# Patient Record
Sex: FEMALE | Race: WHITE | NOT HISPANIC OR LATINO | Employment: OTHER | URBAN - METROPOLITAN AREA
[De-identification: names, ages, dates, MRNs, and addresses within clinical notes are randomized per-mention and may not be internally consistent; named-entity substitution may affect disease eponyms.]

---

## 2017-01-11 ENCOUNTER — AMBULATORY - HEALTHEAST (OUTPATIENT)
Dept: CARDIOLOGY | Facility: CLINIC | Age: 82
End: 2017-01-11

## 2017-01-13 ENCOUNTER — OFFICE VISIT - HEALTHEAST (OUTPATIENT)
Dept: CARDIOLOGY | Facility: CLINIC | Age: 82
End: 2017-01-13

## 2017-01-13 DIAGNOSIS — I25.83 CORONARY ARTERY DISEASE DUE TO LIPID RICH PLAQUE: ICD-10-CM

## 2017-01-13 DIAGNOSIS — I25.10 CORONARY ARTERY DISEASE DUE TO LIPID RICH PLAQUE: ICD-10-CM

## 2017-01-13 DIAGNOSIS — E78.00 HYPERCHOLESTEROLEMIA: ICD-10-CM

## 2017-01-13 ASSESSMENT — MIFFLIN-ST. JEOR: SCORE: 1019.21

## 2017-02-01 ENCOUNTER — RECORDS - HEALTHEAST (OUTPATIENT)
Dept: LAB | Facility: CLINIC | Age: 82
End: 2017-02-01

## 2017-02-01 LAB
CHOLEST SERPL-MCNC: 190 MG/DL
FASTING STATUS PATIENT QL REPORTED: NO
HDLC SERPL-MCNC: 62 MG/DL
LDLC SERPL CALC-MCNC: 114 MG/DL
TRIGL SERPL-MCNC: 68 MG/DL

## 2017-02-22 ENCOUNTER — OFFICE VISIT - HEALTHEAST (OUTPATIENT)
Dept: CARDIOLOGY | Facility: CLINIC | Age: 82
End: 2017-02-22

## 2017-02-22 DIAGNOSIS — I10 ESSENTIAL HYPERTENSION WITH GOAL BLOOD PRESSURE LESS THAN 140/90: ICD-10-CM

## 2017-02-22 DIAGNOSIS — I25.10 CAD (CORONARY ARTERY DISEASE): ICD-10-CM

## 2017-02-22 ASSESSMENT — MIFFLIN-ST. JEOR: SCORE: 1010.82

## 2017-02-27 ENCOUNTER — RECORDS - HEALTHEAST (OUTPATIENT)
Dept: ADMINISTRATIVE | Facility: OTHER | Age: 82
End: 2017-02-27

## 2017-02-27 ENCOUNTER — OFFICE VISIT - HEALTHEAST (OUTPATIENT)
Dept: VASCULAR SURGERY | Facility: CLINIC | Age: 82
End: 2017-02-27

## 2017-02-27 ENCOUNTER — RECORDS - HEALTHEAST (OUTPATIENT)
Dept: VASCULAR ULTRASOUND | Facility: CLINIC | Age: 82
End: 2017-02-27

## 2017-02-27 DIAGNOSIS — I65.23 CAROTID STENOSIS, BILATERAL: ICD-10-CM

## 2017-02-27 DIAGNOSIS — I65.29 OCCLUSION AND STENOSIS OF UNSPECIFIED CAROTID ARTERY: ICD-10-CM

## 2017-02-27 DIAGNOSIS — Z87.891 HISTORY OF SMOKING: ICD-10-CM

## 2017-02-27 DIAGNOSIS — I10 HTN (HYPERTENSION): ICD-10-CM

## 2017-02-27 DIAGNOSIS — E78.00 HYPERCHOLESTEROLEMIA: ICD-10-CM

## 2017-02-27 ASSESSMENT — MIFFLIN-ST. JEOR: SCORE: 1006.68

## 2017-05-30 ENCOUNTER — COMMUNICATION - HEALTHEAST (OUTPATIENT)
Dept: ADMINISTRATIVE | Facility: CLINIC | Age: 82
End: 2017-05-30

## 2017-08-25 ENCOUNTER — RECORDS - HEALTHEAST (OUTPATIENT)
Dept: ADMINISTRATIVE | Facility: OTHER | Age: 82
End: 2017-08-25

## 2017-08-25 ENCOUNTER — AMBULATORY - HEALTHEAST (OUTPATIENT)
Dept: CARDIOLOGY | Facility: CLINIC | Age: 82
End: 2017-08-25

## 2017-08-28 ENCOUNTER — OFFICE VISIT - HEALTHEAST (OUTPATIENT)
Dept: CARDIOLOGY | Facility: CLINIC | Age: 82
End: 2017-08-28

## 2017-08-28 DIAGNOSIS — I10 ESSENTIAL HYPERTENSION WITH GOAL BLOOD PRESSURE LESS THAN 140/90: ICD-10-CM

## 2017-08-28 DIAGNOSIS — I25.110 CORONARY ARTERY DISEASE INVOLVING NATIVE CORONARY ARTERY OF NATIVE HEART WITH UNSTABLE ANGINA PECTORIS (H): ICD-10-CM

## 2017-08-28 DIAGNOSIS — N18.3 CKD (CHRONIC KIDNEY DISEASE), STAGE 3 (MODERATE): ICD-10-CM

## 2017-08-28 DIAGNOSIS — E78.00 HYPERCHOLESTEROLEMIA: ICD-10-CM

## 2017-08-28 ASSESSMENT — MIFFLIN-ST. JEOR: SCORE: 988.14

## 2017-11-16 ENCOUNTER — COMMUNICATION - HEALTHEAST (OUTPATIENT)
Dept: SURGERY | Facility: CLINIC | Age: 82
End: 2017-11-16

## 2017-11-17 ENCOUNTER — OFFICE VISIT - HEALTHEAST (OUTPATIENT)
Dept: FAMILY MEDICINE | Facility: CLINIC | Age: 82
End: 2017-11-17

## 2017-11-17 DIAGNOSIS — Z87.898 HISTORY OF EPISTAXIS: ICD-10-CM

## 2017-11-20 ENCOUNTER — OFFICE VISIT - HEALTHEAST (OUTPATIENT)
Dept: FAMILY MEDICINE | Facility: CLINIC | Age: 82
End: 2017-11-20

## 2017-11-20 DIAGNOSIS — R04.0 LEFT-SIDED EPISTAXIS: ICD-10-CM

## 2017-11-20 DIAGNOSIS — K52.1 ANTIBIOTIC-ASSOCIATED DIARRHEA: ICD-10-CM

## 2017-11-20 DIAGNOSIS — T36.95XA ANTIBIOTIC-ASSOCIATED DIARRHEA: ICD-10-CM

## 2017-11-22 ENCOUNTER — OFFICE VISIT - HEALTHEAST (OUTPATIENT)
Dept: OTOLARYNGOLOGY | Facility: CLINIC | Age: 82
End: 2017-11-22

## 2017-11-22 DIAGNOSIS — R04.0 ANTERIOR EPISTAXIS: ICD-10-CM

## 2017-11-22 DIAGNOSIS — R04.0 EPISTAXIS: ICD-10-CM

## 2017-12-07 ENCOUNTER — COMMUNICATION - HEALTHEAST (OUTPATIENT)
Dept: CARDIOLOGY | Facility: CLINIC | Age: 82
End: 2017-12-07

## 2017-12-07 DIAGNOSIS — I25.83 CORONARY ARTERY DISEASE DUE TO LIPID RICH PLAQUE: ICD-10-CM

## 2017-12-07 DIAGNOSIS — I21.4 NSTEMI (NON-ST ELEVATED MYOCARDIAL INFARCTION) (H): ICD-10-CM

## 2017-12-07 DIAGNOSIS — I25.10 CORONARY ARTERY DISEASE DUE TO LIPID RICH PLAQUE: ICD-10-CM

## 2017-12-27 ENCOUNTER — COMMUNICATION - HEALTHEAST (OUTPATIENT)
Dept: CARDIOLOGY | Facility: CLINIC | Age: 82
End: 2017-12-27

## 2018-03-19 ENCOUNTER — COMMUNICATION - HEALTHEAST (OUTPATIENT)
Dept: CARDIOLOGY | Facility: CLINIC | Age: 83
End: 2018-03-19

## 2018-04-12 ENCOUNTER — COMMUNICATION - HEALTHEAST (OUTPATIENT)
Dept: CARDIOLOGY | Facility: CLINIC | Age: 83
End: 2018-04-12

## 2018-04-12 DIAGNOSIS — I25.10 CORONARY ARTERY DISEASE DUE TO LIPID RICH PLAQUE: ICD-10-CM

## 2018-04-12 DIAGNOSIS — I25.83 CORONARY ARTERY DISEASE DUE TO LIPID RICH PLAQUE: ICD-10-CM

## 2018-04-12 DIAGNOSIS — I21.4 NSTEMI (NON-ST ELEVATED MYOCARDIAL INFARCTION) (H): ICD-10-CM

## 2018-06-20 ENCOUNTER — RECORDS - HEALTHEAST (OUTPATIENT)
Dept: LAB | Facility: CLINIC | Age: 83
End: 2018-06-20

## 2018-06-20 LAB
ALBUMIN SERPL-MCNC: 3.5 G/DL (ref 3.5–5)
ALP SERPL-CCNC: 63 U/L (ref 45–120)
ALT SERPL W P-5'-P-CCNC: 11 U/L (ref 0–45)
ANION GAP SERPL CALCULATED.3IONS-SCNC: 6 MMOL/L (ref 5–18)
AST SERPL W P-5'-P-CCNC: 20 U/L (ref 0–40)
BILIRUB SERPL-MCNC: 0.5 MG/DL (ref 0–1)
BUN SERPL-MCNC: 19 MG/DL (ref 8–28)
CALCIUM SERPL-MCNC: 8.6 MG/DL (ref 8.5–10.5)
CHLORIDE BLD-SCNC: 111 MMOL/L (ref 98–107)
CHOLEST SERPL-MCNC: 236 MG/DL
CO2 SERPL-SCNC: 22 MMOL/L (ref 22–31)
CREAT SERPL-MCNC: 1.14 MG/DL (ref 0.6–1.1)
ERYTHROCYTE [DISTWIDTH] IN BLOOD BY AUTOMATED COUNT: 18 % (ref 11–14.5)
FASTING STATUS PATIENT QL REPORTED: ABNORMAL
GFR SERPL CREATININE-BSD FRML MDRD: 46 ML/MIN/1.73M2
GLUCOSE BLD-MCNC: 99 MG/DL (ref 70–125)
HCT VFR BLD AUTO: 34.2 % (ref 35–47)
HDLC SERPL-MCNC: 65 MG/DL
HGB BLD-MCNC: 11.1 G/DL (ref 12–16)
LDLC SERPL CALC-MCNC: 159 MG/DL
MCH RBC QN AUTO: 29.8 PG (ref 27–34)
MCHC RBC AUTO-ENTMCNC: 32.5 G/DL (ref 32–36)
MCV RBC AUTO: 92 FL (ref 80–100)
PLATELET # BLD AUTO: 248 THOU/UL (ref 140–440)
PMV BLD AUTO: 10.4 FL (ref 8.5–12.5)
POTASSIUM BLD-SCNC: 4.2 MMOL/L (ref 3.5–5)
PROT SERPL-MCNC: 6.2 G/DL (ref 6–8)
RBC # BLD AUTO: 3.72 MILL/UL (ref 3.8–5.4)
SODIUM SERPL-SCNC: 139 MMOL/L (ref 136–145)
TRIGL SERPL-MCNC: 60 MG/DL
WBC: 8 THOU/UL (ref 4–11)

## 2018-06-22 ENCOUNTER — HOSPITAL ENCOUNTER (OUTPATIENT)
Dept: MAMMOGRAPHY | Facility: CLINIC | Age: 83
Discharge: HOME | End: 2018-06-22

## 2018-06-22 DIAGNOSIS — Z12.31 VISIT FOR SCREENING MAMMOGRAM: ICD-10-CM

## 2018-08-14 ENCOUNTER — RECORDS - HEALTHEAST (OUTPATIENT)
Dept: ADMINISTRATIVE | Facility: OTHER | Age: 83
End: 2018-08-14

## 2018-08-14 ENCOUNTER — AMBULATORY - HEALTHEAST (OUTPATIENT)
Dept: CARDIOLOGY | Facility: CLINIC | Age: 83
End: 2018-08-14

## 2018-08-17 ENCOUNTER — OFFICE VISIT - HEALTHEAST (OUTPATIENT)
Dept: CARDIOLOGY | Facility: CLINIC | Age: 83
End: 2018-08-17

## 2018-08-17 DIAGNOSIS — I35.0 NONRHEUMATIC AORTIC VALVE STENOSIS: ICD-10-CM

## 2018-08-17 DIAGNOSIS — I10 ESSENTIAL HYPERTENSION WITH GOAL BLOOD PRESSURE LESS THAN 140/90: ICD-10-CM

## 2018-08-17 DIAGNOSIS — E78.00 HYPERCHOLESTEROLEMIA: ICD-10-CM

## 2018-08-17 DIAGNOSIS — I25.110 CORONARY ARTERY DISEASE INVOLVING NATIVE CORONARY ARTERY OF NATIVE HEART WITH UNSTABLE ANGINA PECTORIS (H): ICD-10-CM

## 2018-08-17 ASSESSMENT — MIFFLIN-ST. JEOR: SCORE: 980.66

## 2018-08-28 ENCOUNTER — HOSPITAL ENCOUNTER (OUTPATIENT)
Dept: CARDIOLOGY | Facility: HOSPITAL | Age: 83
Discharge: HOME | End: 2018-08-28
Attending: INTERNAL MEDICINE

## 2018-08-28 DIAGNOSIS — I25.110 CORONARY ARTERY DISEASE INVOLVING NATIVE CORONARY ARTERY OF NATIVE HEART WITH UNSTABLE ANGINA PECTORIS (H): ICD-10-CM

## 2018-08-28 DIAGNOSIS — I35.0 NONRHEUMATIC AORTIC VALVE STENOSIS: ICD-10-CM

## 2018-08-29 LAB
AORTIC ROOT: 2.97 CM
AORTIC VALVE MEAN VELOCITY: 200 CM/S
ASCENDING AORTA: 3.45 CM
AV DIMENSIONLESS INDEX VTI: 0.4
AV MEAN GRADIENT: 17.6 MMHG
AV PEAK GRADIENT: 28.9 MMHG
AV REGURGITATION PRESSURE HALF TIME: 614 MS
AV VALVE AREA: 1 CM2
AV VELOCITY RATIO: 0.4
BSA FOR ECHO PROCEDURE: 1.59 M2
DOP CALC AO PEAK VEL: 269 CM/S
DOP CALC AO VTI: 73.7 CM
DOP CALC LVOT AREA: 2.83 CM2
DOP CALC LVOT DIAMETER: 1.9 CM
DOP CALC LVOT PEAK VEL: 97.2 CM/S
DOP CALC LVOT STROKE VOLUME: 73.1 CM3
DOP CALCLVOT PEAK VEL VTI: 25.8 CM
EJECTION FRACTION: 63 % (ref 55–75)
FRACTIONAL SHORTENING: 32.4 % (ref 28–44)
INTERVENTRICULAR SEPTUM IN END DIASTOLE: 0.93 CM (ref 0.6–0.9)
IVS/PW RATIO: 0.8
LA AREA 1: 15 CM2
LA AREA 2: 15.1 CM2
LEFT ATRIUM LENGTH: 5.4 CM
LEFT ATRIUM SIZE: 2.52 CM
LEFT ATRIUM VOLUME INDEX: 22.4 ML/M2
LEFT ATRIUM VOLUME: 35.7 ML
LEFT VENTRICLE CARDIAC INDEX: 2.6 L/MIN/M2
LEFT VENTRICLE CARDIAC OUTPUT: 4.2 L/MIN
LEFT VENTRICLE DIASTOLIC VOLUME INDEX: 34 CM3/M2 (ref 34–74)
LEFT VENTRICLE DIASTOLIC VOLUME: 54 CM3 (ref 46–106)
LEFT VENTRICLE HEART RATE: 57 BPM
LEFT VENTRICLE MASS INDEX: 65.2 G/M2
LEFT VENTRICLE SYSTOLIC VOLUME INDEX: 12.6 CM3/M2 (ref 11–31)
LEFT VENTRICLE SYSTOLIC VOLUME: 20 CM3 (ref 14–42)
LEFT VENTRICULAR INTERNAL DIMENSION IN DIASTOLE: 3.39 CM (ref 3.8–5.2)
LEFT VENTRICULAR INTERNAL DIMENSION IN SYSTOLE: 2.29 CM (ref 2.2–3.5)
LEFT VENTRICULAR MASS: 103.7 G
LEFT VENTRICULAR OUTFLOW TRACT MEAN GRADIENT: 2.13 MMHG
LEFT VENTRICULAR OUTFLOW TRACT MEAN VELOCITY: 69.1 CM/S
LEFT VENTRICULAR OUTFLOW TRACT PEAK GRADIENT: 3.75 MMHG
LEFT VENTRICULAR POSTERIOR WALL IN END DIASTOLE: 1.14 CM (ref 0.6–0.9)
LV STROKE VOLUME INDEX: 46 ML/M2
MITRAL VALVE E/A RATIO: 1.1
MV AVERAGE E/E' RATIO: 17.7 CM/S
MV DECELERATION TIME: 238 S
MV E'TISSUE VEL-LAT: 6.7 CM/S
MV E'TISSUE VEL-MED: 6.07 CM/S
MV LATERAL E/E' RATIO: 16.9
MV MEDIAL E/E' RATIO: 18.6
MV PEAK A VELOCITY: 104 CM/S
MV PEAK E VELOCITY: 113 CM/S
TRICUSPID REGURGITATION PEAK PRESSURE GRADIENT: 20.3 MMHG
TRICUSPID VALVE ANULAR PLANE SYSTOLIC EXCURSION: 2.3 CM
TRICUSPID VALVE PEAK REGURGITANT VELOCITY: 225 CM/S

## 2018-09-27 ENCOUNTER — RECORDS - HEALTHEAST (OUTPATIENT)
Dept: LAB | Facility: CLINIC | Age: 83
End: 2018-09-27

## 2018-09-27 LAB
ALBUMIN SERPL-MCNC: 3.6 G/DL (ref 3.5–5)
ALP SERPL-CCNC: 63 U/L (ref 45–120)
ALT SERPL W P-5'-P-CCNC: 17 U/L (ref 0–45)
ANION GAP SERPL CALCULATED.3IONS-SCNC: 7 MMOL/L (ref 5–18)
AST SERPL W P-5'-P-CCNC: 24 U/L (ref 0–40)
BILIRUB SERPL-MCNC: 0.6 MG/DL (ref 0–1)
BUN SERPL-MCNC: 23 MG/DL (ref 8–28)
CALCIUM SERPL-MCNC: 9.3 MG/DL (ref 8.5–10.5)
CHLORIDE BLD-SCNC: 110 MMOL/L (ref 98–107)
CO2 SERPL-SCNC: 24 MMOL/L (ref 22–31)
CREAT SERPL-MCNC: 1.02 MG/DL (ref 0.6–1.1)
GFR SERPL CREATININE-BSD FRML MDRD: 52 ML/MIN/1.73M2
GLUCOSE BLD-MCNC: 86 MG/DL (ref 70–125)
POTASSIUM BLD-SCNC: 4.2 MMOL/L (ref 3.5–5)
PROT SERPL-MCNC: 6.3 G/DL (ref 6–8)
SODIUM SERPL-SCNC: 141 MMOL/L (ref 136–145)

## 2018-10-07 ENCOUNTER — COMMUNICATION - HEALTHEAST (OUTPATIENT)
Dept: CARDIOLOGY | Facility: CLINIC | Age: 83
End: 2018-10-07

## 2018-10-07 DIAGNOSIS — I25.83 CORONARY ARTERY DISEASE DUE TO LIPID RICH PLAQUE: ICD-10-CM

## 2018-10-07 DIAGNOSIS — I25.10 CORONARY ARTERY DISEASE DUE TO LIPID RICH PLAQUE: ICD-10-CM

## 2018-10-25 ENCOUNTER — RECORDS - HEALTHEAST (OUTPATIENT)
Dept: LAB | Facility: CLINIC | Age: 83
End: 2018-10-25

## 2018-10-25 LAB
ALBUMIN SERPL-MCNC: 3.7 G/DL (ref 3.5–5)
ALP SERPL-CCNC: 67 U/L (ref 45–120)
ALT SERPL W P-5'-P-CCNC: 14 U/L (ref 0–45)
ANION GAP SERPL CALCULATED.3IONS-SCNC: 12 MMOL/L (ref 5–18)
AST SERPL W P-5'-P-CCNC: 22 U/L (ref 0–40)
BILIRUB SERPL-MCNC: 0.5 MG/DL (ref 0–1)
BUN SERPL-MCNC: 25 MG/DL (ref 8–28)
CALCIUM SERPL-MCNC: 9.4 MG/DL (ref 8.5–10.5)
CHLORIDE BLD-SCNC: 108 MMOL/L (ref 98–107)
CO2 SERPL-SCNC: 20 MMOL/L (ref 22–31)
CREAT SERPL-MCNC: 1.08 MG/DL (ref 0.6–1.1)
GFR SERPL CREATININE-BSD FRML MDRD: 48 ML/MIN/1.73M2
GLUCOSE BLD-MCNC: 83 MG/DL (ref 70–125)
POTASSIUM BLD-SCNC: 4.4 MMOL/L (ref 3.5–5)
PROT SERPL-MCNC: 6.3 G/DL (ref 6–8)
SODIUM SERPL-SCNC: 140 MMOL/L (ref 136–145)

## 2018-11-16 ENCOUNTER — COMMUNICATION - HEALTHEAST (OUTPATIENT)
Dept: CARDIOLOGY | Facility: CLINIC | Age: 83
End: 2018-11-16

## 2018-11-16 DIAGNOSIS — I21.4 NSTEMI (NON-ST ELEVATED MYOCARDIAL INFARCTION) (H): ICD-10-CM

## 2018-11-16 DIAGNOSIS — I25.10 CORONARY ARTERY DISEASE DUE TO LIPID RICH PLAQUE: ICD-10-CM

## 2018-11-16 DIAGNOSIS — I25.83 CORONARY ARTERY DISEASE DUE TO LIPID RICH PLAQUE: ICD-10-CM

## 2019-02-22 ENCOUNTER — RECORDS - HEALTHEAST (OUTPATIENT)
Dept: LAB | Facility: CLINIC | Age: 84
End: 2019-02-22

## 2019-02-22 LAB
ALBUMIN SERPL-MCNC: 3.7 G/DL (ref 3.5–5)
ALP SERPL-CCNC: 58 U/L (ref 45–120)
ALT SERPL W P-5'-P-CCNC: 15 U/L (ref 0–45)
ANION GAP SERPL CALCULATED.3IONS-SCNC: 7 MMOL/L (ref 5–18)
AST SERPL W P-5'-P-CCNC: 18 U/L (ref 0–40)
BILIRUB SERPL-MCNC: 0.5 MG/DL (ref 0–1)
BUN SERPL-MCNC: 22 MG/DL (ref 8–28)
CALCIUM SERPL-MCNC: 8.7 MG/DL (ref 8.5–10.5)
CHLORIDE BLD-SCNC: 110 MMOL/L (ref 98–107)
CO2 SERPL-SCNC: 24 MMOL/L (ref 22–31)
CREAT SERPL-MCNC: 1.07 MG/DL (ref 0.6–1.1)
GFR SERPL CREATININE-BSD FRML MDRD: 49 ML/MIN/1.73M2
GLUCOSE BLD-MCNC: 87 MG/DL (ref 70–125)
POTASSIUM BLD-SCNC: 4.1 MMOL/L (ref 3.5–5)
PROT SERPL-MCNC: 6.3 G/DL (ref 6–8)
SODIUM SERPL-SCNC: 141 MMOL/L (ref 136–145)

## 2019-05-18 ENCOUNTER — COMMUNICATION - HEALTHEAST (OUTPATIENT)
Dept: CARDIOLOGY | Facility: CLINIC | Age: 84
End: 2019-05-18

## 2019-05-18 DIAGNOSIS — I21.4 NSTEMI (NON-ST ELEVATED MYOCARDIAL INFARCTION) (H): ICD-10-CM

## 2019-05-18 DIAGNOSIS — I25.83 CORONARY ARTERY DISEASE DUE TO LIPID RICH PLAQUE: ICD-10-CM

## 2019-05-18 DIAGNOSIS — I25.10 CORONARY ARTERY DISEASE DUE TO LIPID RICH PLAQUE: ICD-10-CM

## 2019-05-31 ENCOUNTER — RECORDS - HEALTHEAST (OUTPATIENT)
Dept: LAB | Facility: CLINIC | Age: 84
End: 2019-05-31

## 2019-05-31 ENCOUNTER — RECORDS - HEALTHEAST (OUTPATIENT)
Dept: ADMINISTRATIVE | Facility: OTHER | Age: 84
End: 2019-05-31

## 2019-05-31 LAB
ALBUMIN SERPL-MCNC: 3 G/DL (ref 3.5–5)
ALP SERPL-CCNC: 49 U/L (ref 45–120)
ALT SERPL W P-5'-P-CCNC: 13 U/L (ref 0–45)
ANION GAP SERPL CALCULATED.3IONS-SCNC: 9 MMOL/L (ref 5–18)
AST SERPL W P-5'-P-CCNC: 19 U/L (ref 0–40)
BILIRUB SERPL-MCNC: 0.4 MG/DL (ref 0–1)
BNP SERPL-MCNC: 67 PG/ML (ref 0–167)
BUN SERPL-MCNC: 22 MG/DL (ref 8–28)
CALCIUM SERPL-MCNC: 8.7 MG/DL (ref 8.5–10.5)
CHLORIDE BLD-SCNC: 110 MMOL/L (ref 98–107)
CHOLEST SERPL-MCNC: 238 MG/DL
CO2 SERPL-SCNC: 20 MMOL/L (ref 22–31)
CREAT SERPL-MCNC: 1.11 MG/DL (ref 0.6–1.1)
FASTING STATUS PATIENT QL REPORTED: NO
GFR SERPL CREATININE-BSD FRML MDRD: 47 ML/MIN/1.73M2
GLUCOSE BLD-MCNC: 84 MG/DL (ref 70–125)
HDLC SERPL-MCNC: 67 MG/DL
LDLC SERPL CALC-MCNC: 161 MG/DL
POTASSIUM BLD-SCNC: 4.6 MMOL/L (ref 3.5–5)
PROT SERPL-MCNC: 5.7 G/DL (ref 6–8)
SODIUM SERPL-SCNC: 139 MMOL/L (ref 136–145)
T4 FREE SERPL-MCNC: 1 NG/DL (ref 0.7–1.8)
TRIGL SERPL-MCNC: 51 MG/DL
TSH SERPL DL<=0.005 MIU/L-ACNC: 2.57 UIU/ML (ref 0.3–5)
VIT B12 SERPL-MCNC: 732 PG/ML (ref 213–816)

## 2019-06-01 LAB — T PALLIDUM AB SER QL: NEGATIVE

## 2019-06-03 ENCOUNTER — RECORDS - HEALTHEAST (OUTPATIENT)
Dept: LAB | Facility: CLINIC | Age: 84
End: 2019-06-03

## 2019-06-03 ENCOUNTER — AMBULATORY - HEALTHEAST (OUTPATIENT)
Dept: CARDIOLOGY | Facility: CLINIC | Age: 84
End: 2019-06-03

## 2019-06-03 LAB
25(OH)D3 SERPL-MCNC: 16.5 NG/ML (ref 30–80)
FERRITIN SERPL-MCNC: 215 NG/ML (ref 10–130)
IRON SATN MFR SERPL: 17 % (ref 20–50)
IRON SERPL-MCNC: 40 UG/DL (ref 42–175)
TIBC SERPL-MCNC: 239 UG/DL (ref 313–563)
TRANSFERRIN SERPL-MCNC: 191 MG/DL (ref 212–360)

## 2019-06-12 ENCOUNTER — HOSPITAL ENCOUNTER (OUTPATIENT)
Dept: NUCLEAR MEDICINE | Facility: HOSPITAL | Age: 84
Discharge: HOME | End: 2019-06-12

## 2019-06-12 ENCOUNTER — HOSPITAL ENCOUNTER (OUTPATIENT)
Dept: CARDIOLOGY | Facility: HOSPITAL | Age: 84
Discharge: HOME | End: 2019-06-12

## 2019-06-12 DIAGNOSIS — R06.02 SOB (SHORTNESS OF BREATH) ON EXERTION: ICD-10-CM

## 2019-06-12 LAB
CV STRESS CURRENT BP HE: NORMAL
CV STRESS CURRENT HR HE: 73
CV STRESS CURRENT HR HE: 75
CV STRESS CURRENT HR HE: 79
CV STRESS CURRENT HR HE: 91
CV STRESS CURRENT HR HE: 91
CV STRESS CURRENT HR HE: 92
CV STRESS CURRENT HR HE: 93
CV STRESS CURRENT HR HE: 93
CV STRESS CURRENT HR HE: 95
CV STRESS CURRENT HR HE: 96
CV STRESS CURRENT HR HE: 96
CV STRESS CURRENT HR HE: 97
CV STRESS DEVIATION TIME HE: NORMAL
CV STRESS ECHO PERCENT HR HE: NORMAL
CV STRESS EXERCISE STAGE HE: NORMAL
CV STRESS FINAL RESTING BP HE: NORMAL
CV STRESS FINAL RESTING HR HE: 91
CV STRESS MAX HR HE: 98
CV STRESS MAX TREADMILL GRADE HE: 0
CV STRESS MAX TREADMILL SPEED HE: 0
CV STRESS PEAK DIA BP HE: NORMAL
CV STRESS PEAK SYS BP HE: NORMAL
CV STRESS PHASE HE: NORMAL
CV STRESS PROTOCOL HE: NORMAL
CV STRESS RESTING PT POSITION HE: NORMAL
CV STRESS ST DEVIATION AMOUNT HE: NORMAL
CV STRESS ST DEVIATION ELEVATION HE: NORMAL
CV STRESS ST EVELATION AMOUNT HE: NORMAL
CV STRESS TEST TYPE HE: NORMAL
CV STRESS TOTAL STAGE TIME MIN 1 HE: NORMAL
NUC STRESS EJECTION FRACTION: 75 %
STRESS ECHO BASELINE BP: NORMAL
STRESS ECHO BASELINE HR: 73
STRESS ECHO CALCULATED PERCENT HR: 72 %
STRESS ECHO LAST STRESS BP: NORMAL
STRESS ECHO LAST STRESS HR: 97

## 2019-07-10 ENCOUNTER — SURGERY - HEALTHEAST (OUTPATIENT)
Dept: GASTROENTEROLOGY | Facility: HOSPITAL | Age: 84
End: 2019-07-10

## 2019-07-10 ENCOUNTER — AMBULATORY - HEALTHEAST (OUTPATIENT)
Dept: CARDIOLOGY | Facility: CLINIC | Age: 84
End: 2019-07-10

## 2019-07-10 ASSESSMENT — MIFFLIN-ST. JEOR: SCORE: 1003.34

## 2020-02-01 ENCOUNTER — COMMUNICATION - HEALTHEAST (OUTPATIENT)
Dept: CARDIOLOGY | Facility: CLINIC | Age: 85
End: 2020-02-01

## 2020-02-01 DIAGNOSIS — I25.83 CORONARY ARTERY DISEASE DUE TO LIPID RICH PLAQUE: ICD-10-CM

## 2020-02-01 DIAGNOSIS — I25.10 CORONARY ARTERY DISEASE DUE TO LIPID RICH PLAQUE: ICD-10-CM

## 2020-03-04 ENCOUNTER — RECORDS - HEALTHEAST (OUTPATIENT)
Dept: LAB | Facility: CLINIC | Age: 85
End: 2020-03-04

## 2020-03-04 LAB
ALBUMIN SERPL-MCNC: 3.7 G/DL (ref 3.5–5)
ALP SERPL-CCNC: 65 U/L (ref 45–120)
ALT SERPL W P-5'-P-CCNC: 23 U/L (ref 0–45)
ANION GAP SERPL CALCULATED.3IONS-SCNC: 10 MMOL/L (ref 5–18)
AST SERPL W P-5'-P-CCNC: 33 U/L (ref 0–40)
BILIRUB SERPL-MCNC: 0.4 MG/DL (ref 0–1)
BUN SERPL-MCNC: 26 MG/DL (ref 8–28)
CALCIUM SERPL-MCNC: 9.1 MG/DL (ref 8.5–10.5)
CHLORIDE BLD-SCNC: 105 MMOL/L (ref 98–107)
CO2 SERPL-SCNC: 26 MMOL/L (ref 22–31)
CREAT SERPL-MCNC: 1.4 MG/DL (ref 0.6–1.1)
FERRITIN SERPL-MCNC: 64 NG/ML (ref 10–130)
GFR SERPL CREATININE-BSD FRML MDRD: 36 ML/MIN/1.73M2
GLUCOSE BLD-MCNC: 77 MG/DL (ref 70–125)
MAGNESIUM SERPL-MCNC: 2.1 MG/DL (ref 1.8–2.6)
POTASSIUM BLD-SCNC: 4.1 MMOL/L (ref 3.5–5)
PROT SERPL-MCNC: 6.5 G/DL (ref 6–8)
SODIUM SERPL-SCNC: 141 MMOL/L (ref 136–145)
T4 FREE SERPL-MCNC: 0.9 NG/DL (ref 0.7–1.8)
TSH SERPL DL<=0.005 MIU/L-ACNC: 3.9 UIU/ML (ref 0.3–5)
VIT B12 SERPL-MCNC: 751 PG/ML (ref 213–816)

## 2020-03-05 LAB — 25(OH)D3 SERPL-MCNC: 45.7 NG/ML (ref 30–80)

## 2020-03-18 ENCOUNTER — RECORDS - HEALTHEAST (OUTPATIENT)
Dept: LAB | Facility: CLINIC | Age: 85
End: 2020-03-18

## 2020-03-19 LAB — BACTERIA SPEC CULT: NO GROWTH

## 2020-03-23 ENCOUNTER — RECORDS - HEALTHEAST (OUTPATIENT)
Dept: LAB | Facility: CLINIC | Age: 85
End: 2020-03-23

## 2020-03-23 LAB — BNP SERPL-MCNC: 58 PG/ML (ref 0–167)

## 2020-04-16 ENCOUNTER — SURGERY - HEALTHEAST (OUTPATIENT)
Dept: CARDIOLOGY | Facility: CLINIC | Age: 85
End: 2020-04-16

## 2020-04-16 ASSESSMENT — MIFFLIN-ST. JEOR
SCORE: 1050.05
SCORE: 1050.05

## 2020-04-17 ENCOUNTER — SURGERY - HEALTHEAST (OUTPATIENT)
Dept: CARDIOLOGY | Facility: CLINIC | Age: 85
End: 2020-04-17

## 2020-04-17 ASSESSMENT — MIFFLIN-ST. JEOR
SCORE: 980.65
SCORE: 980.65

## 2020-04-18 ASSESSMENT — MIFFLIN-ST. JEOR
SCORE: 961.15
SCORE: 1016.03
SCORE: 1016.03
SCORE: 961.15

## 2020-04-19 ASSESSMENT — MIFFLIN-ST. JEOR
SCORE: 1010.59
SCORE: 1010.59

## 2020-04-27 ENCOUNTER — AMBULATORY - HEALTHEAST (OUTPATIENT)
Dept: CARDIOLOGY | Facility: CLINIC | Age: 85
End: 2020-04-27

## 2020-04-27 ENCOUNTER — RECORDS - HEALTHEAST (OUTPATIENT)
Dept: ADMINISTRATIVE | Facility: OTHER | Age: 85
End: 2020-04-27

## 2020-04-28 ENCOUNTER — OFFICE VISIT - HEALTHEAST (OUTPATIENT)
Dept: CARDIOLOGY | Facility: CLINIC | Age: 85
End: 2020-04-28

## 2020-04-28 DIAGNOSIS — I25.10 CORONARY ARTERY DISEASE INVOLVING NATIVE CORONARY ARTERY OF NATIVE HEART, ANGINA PRESENCE UNSPECIFIED: ICD-10-CM

## 2020-04-28 DIAGNOSIS — I10 BENIGN ESSENTIAL HYPERTENSION: ICD-10-CM

## 2020-04-28 DIAGNOSIS — I35.0 SEVERE CALCIFIC AORTIC VALVE STENOSIS: ICD-10-CM

## 2020-04-30 ENCOUNTER — COMMUNICATION - HEALTHEAST (OUTPATIENT)
Dept: CARDIOLOGY | Facility: CLINIC | Age: 85
End: 2020-04-30

## 2020-04-30 ENCOUNTER — OFFICE VISIT - HEALTHEAST (OUTPATIENT)
Dept: CARDIOLOGY | Facility: CLINIC | Age: 85
End: 2020-04-30

## 2020-04-30 DIAGNOSIS — I35.0 NONRHEUMATIC AORTIC VALVE STENOSIS: ICD-10-CM

## 2020-05-05 ENCOUNTER — COMMUNICATION - HEALTHEAST (OUTPATIENT)
Dept: CARDIOLOGY | Facility: CLINIC | Age: 85
End: 2020-05-05

## 2020-05-05 DIAGNOSIS — I35.0 SEVERE AORTIC STENOSIS: ICD-10-CM

## 2020-05-11 ENCOUNTER — COMMUNICATION - HEALTHEAST (OUTPATIENT)
Dept: CARDIOLOGY | Facility: CLINIC | Age: 85
End: 2020-05-11

## 2020-05-12 ENCOUNTER — OFFICE VISIT - HEALTHEAST (OUTPATIENT)
Dept: CARDIOLOGY | Facility: CLINIC | Age: 85
End: 2020-05-12

## 2020-05-12 DIAGNOSIS — I35.0 NONRHEUMATIC AORTIC VALVE STENOSIS: ICD-10-CM

## 2020-05-19 ENCOUNTER — COMMUNICATION - HEALTHEAST (OUTPATIENT)
Dept: CARDIOLOGY | Facility: CLINIC | Age: 85
End: 2020-05-19

## 2020-05-19 ENCOUNTER — RECORDS - HEALTHEAST (OUTPATIENT)
Dept: ADMINISTRATIVE | Facility: OTHER | Age: 85
End: 2020-05-19

## 2020-05-19 ENCOUNTER — AMBULATORY - HEALTHEAST (OUTPATIENT)
Dept: CARDIOLOGY | Facility: CLINIC | Age: 85
End: 2020-05-19

## 2020-05-19 DIAGNOSIS — Z11.59 ENCOUNTER FOR SCREENING FOR OTHER VIRAL DISEASES: ICD-10-CM

## 2020-05-19 DIAGNOSIS — I35.0 SEVERE AORTIC STENOSIS: ICD-10-CM

## 2020-05-20 ENCOUNTER — COMMUNICATION - HEALTHEAST (OUTPATIENT)
Dept: CARDIOLOGY | Facility: CLINIC | Age: 85
End: 2020-05-20

## 2020-05-21 ENCOUNTER — SURGERY - HEALTHEAST (OUTPATIENT)
Dept: CARDIOLOGY | Facility: CLINIC | Age: 85
End: 2020-05-21

## 2020-05-21 DIAGNOSIS — I35.0 SEVERE CALCIFIC AORTIC VALVE STENOSIS: ICD-10-CM

## 2020-05-22 ENCOUNTER — ANESTHESIA - HEALTHEAST (OUTPATIENT)
Dept: CARDIOLOGY | Facility: CLINIC | Age: 85
End: 2020-05-22

## 2020-05-22 ENCOUNTER — OFFICE VISIT - HEALTHEAST (OUTPATIENT)
Dept: CARDIOLOGY | Facility: CLINIC | Age: 85
End: 2020-05-22

## 2020-05-22 ENCOUNTER — COMMUNICATION - HEALTHEAST (OUTPATIENT)
Dept: CARDIOLOGY | Facility: CLINIC | Age: 85
End: 2020-05-22

## 2020-05-22 DIAGNOSIS — I35.0 SEVERE CALCIFIC AORTIC VALVE STENOSIS: ICD-10-CM

## 2020-05-22 DIAGNOSIS — I10 BENIGN ESSENTIAL HYPERTENSION: ICD-10-CM

## 2020-05-22 DIAGNOSIS — I25.110 CORONARY ARTERY DISEASE INVOLVING NATIVE CORONARY ARTERY OF NATIVE HEART WITH UNSTABLE ANGINA PECTORIS (H): ICD-10-CM

## 2020-05-23 ENCOUNTER — AMBULATORY - HEALTHEAST (OUTPATIENT)
Dept: FAMILY MEDICINE | Facility: CLINIC | Age: 85
End: 2020-05-23

## 2020-05-23 DIAGNOSIS — Z11.59 ENCOUNTER FOR SCREENING FOR OTHER VIRAL DISEASES: ICD-10-CM

## 2020-05-26 ENCOUNTER — COMMUNICATION - HEALTHEAST (OUTPATIENT)
Dept: CARDIOLOGY | Facility: CLINIC | Age: 85
End: 2020-05-26

## 2020-05-27 ENCOUNTER — COMMUNICATION - HEALTHEAST (OUTPATIENT)
Dept: CARDIOLOGY | Facility: CLINIC | Age: 85
End: 2020-05-27

## 2020-05-28 ENCOUNTER — AMBULATORY - HEALTHEAST (OUTPATIENT)
Dept: CARDIOLOGY | Facility: CLINIC | Age: 85
End: 2020-05-28

## 2020-05-28 ENCOUNTER — COMMUNICATION - HEALTHEAST (OUTPATIENT)
Dept: CARDIOLOGY | Facility: CLINIC | Age: 85
End: 2020-05-28

## 2020-05-28 DIAGNOSIS — Z11.59 ENCOUNTER FOR SCREENING FOR OTHER VIRAL DISEASES: ICD-10-CM

## 2020-05-29 ENCOUNTER — COMMUNICATION - HEALTHEAST (OUTPATIENT)
Dept: CARDIOLOGY | Facility: CLINIC | Age: 85
End: 2020-05-29

## 2020-05-30 ENCOUNTER — AMBULATORY - HEALTHEAST (OUTPATIENT)
Dept: FAMILY MEDICINE | Facility: CLINIC | Age: 85
End: 2020-05-30

## 2020-05-30 DIAGNOSIS — Z11.59 ENCOUNTER FOR SCREENING FOR OTHER VIRAL DISEASES: ICD-10-CM

## 2020-06-01 ENCOUNTER — COMMUNICATION - HEALTHEAST (OUTPATIENT)
Dept: CARDIOLOGY | Facility: CLINIC | Age: 85
End: 2020-06-01

## 2020-06-01 ENCOUNTER — RECORDS - HEALTHEAST (OUTPATIENT)
Dept: ADMINISTRATIVE | Facility: OTHER | Age: 85
End: 2020-06-01

## 2020-06-01 ENCOUNTER — AMBULATORY - HEALTHEAST (OUTPATIENT)
Dept: CARDIOLOGY | Facility: CLINIC | Age: 85
End: 2020-06-01

## 2020-06-02 ENCOUNTER — COMMUNICATION - HEALTHEAST (OUTPATIENT)
Dept: CARDIOLOGY | Facility: CLINIC | Age: 85
End: 2020-06-02

## 2020-06-02 ENCOUNTER — SURGERY - HEALTHEAST (OUTPATIENT)
Dept: CARDIOLOGY | Facility: CLINIC | Age: 85
End: 2020-06-02

## 2020-06-02 ASSESSMENT — MIFFLIN-ST. JEOR
SCORE: 1024.42
SCORE: 1012.79

## 2020-06-03 ENCOUNTER — AMBULATORY - HEALTHEAST (OUTPATIENT)
Dept: CARDIOLOGY | Facility: CLINIC | Age: 85
End: 2020-06-03

## 2020-06-03 DIAGNOSIS — Z95.2 S/P TAVR (TRANSCATHETER AORTIC VALVE REPLACEMENT): ICD-10-CM

## 2020-06-03 ASSESSMENT — MIFFLIN-ST. JEOR: SCORE: 1017.61

## 2020-06-04 ASSESSMENT — MIFFLIN-ST. JEOR: SCORE: 1018.52

## 2020-06-05 ENCOUNTER — HOME CARE/HOSPICE - HEALTHEAST (OUTPATIENT)
Dept: HOME HEALTH SERVICES | Facility: HOME HEALTH | Age: 85
End: 2020-06-05

## 2020-06-05 ASSESSMENT — MIFFLIN-ST. JEOR: SCORE: 1006.73

## 2020-06-07 ENCOUNTER — HOME CARE/HOSPICE - HEALTHEAST (OUTPATIENT)
Dept: HOME HEALTH SERVICES | Facility: HOME HEALTH | Age: 85
End: 2020-06-07

## 2020-06-08 ENCOUNTER — HOME CARE/HOSPICE - HEALTHEAST (OUTPATIENT)
Dept: HOME HEALTH SERVICES | Facility: HOME HEALTH | Age: 85
End: 2020-06-08

## 2020-06-09 ENCOUNTER — HOME CARE/HOSPICE - HEALTHEAST (OUTPATIENT)
Dept: HOME HEALTH SERVICES | Facility: HOME HEALTH | Age: 85
End: 2020-06-09

## 2020-06-10 ENCOUNTER — RECORDS - HEALTHEAST (OUTPATIENT)
Dept: ADMINISTRATIVE | Facility: OTHER | Age: 85
End: 2020-06-10

## 2020-06-10 ENCOUNTER — RECORDS - HEALTHEAST (OUTPATIENT)
Dept: LAB | Facility: HOSPITAL | Age: 85
End: 2020-06-10

## 2020-06-10 ENCOUNTER — HOME CARE/HOSPICE - HEALTHEAST (OUTPATIENT)
Dept: HOME HEALTH SERVICES | Facility: HOME HEALTH | Age: 85
End: 2020-06-10

## 2020-06-10 ENCOUNTER — AMBULATORY - HEALTHEAST (OUTPATIENT)
Dept: CARDIOLOGY | Facility: CLINIC | Age: 85
End: 2020-06-10

## 2020-06-10 LAB
ERYTHROCYTE [DISTWIDTH] IN BLOOD BY AUTOMATED COUNT: 18.7 % (ref 11–14.5)
HCT VFR BLD AUTO: 28.4 % (ref 35–47)
HGB BLD-MCNC: 9.3 G/DL (ref 12–16)
MCH RBC QN AUTO: 29.5 PG (ref 27–34)
MCHC RBC AUTO-ENTMCNC: 32.7 G/DL (ref 32–36)
MCV RBC AUTO: 90 FL (ref 80–100)
PLATELET # BLD AUTO: 212 THOU/UL (ref 140–440)
PMV BLD AUTO: 11 FL (ref 8.5–12.5)
RBC # BLD AUTO: 3.15 MILL/UL (ref 3.8–5.4)
WBC: 15.7 THOU/UL (ref 4–11)

## 2020-06-11 ENCOUNTER — OFFICE VISIT - HEALTHEAST (OUTPATIENT)
Dept: CARDIOLOGY | Facility: CLINIC | Age: 85
End: 2020-06-11

## 2020-06-11 ENCOUNTER — HOME CARE/HOSPICE - HEALTHEAST (OUTPATIENT)
Dept: HOME HEALTH SERVICES | Facility: HOME HEALTH | Age: 85
End: 2020-06-11

## 2020-06-11 DIAGNOSIS — I35.0 NONRHEUMATIC AORTIC VALVE STENOSIS: ICD-10-CM

## 2020-06-11 DIAGNOSIS — Z95.2 S/P TAVR (TRANSCATHETER AORTIC VALVE REPLACEMENT): ICD-10-CM

## 2020-06-11 DIAGNOSIS — I25.110 CORONARY ARTERY DISEASE INVOLVING NATIVE CORONARY ARTERY OF NATIVE HEART WITH UNSTABLE ANGINA PECTORIS (H): ICD-10-CM

## 2020-06-11 DIAGNOSIS — I50.31 ACUTE DIASTOLIC CONGESTIVE HEART FAILURE (H): ICD-10-CM

## 2020-06-11 DIAGNOSIS — I10 BENIGN ESSENTIAL HYPERTENSION: ICD-10-CM

## 2020-06-11 RX ORDER — AMLODIPINE BESYLATE 5 MG/1
5 TABLET ORAL DAILY
Qty: 90 TABLET | Refills: 3 | Status: SHIPPED | OUTPATIENT
Start: 2020-06-11 | End: 2021-10-11

## 2020-06-15 ENCOUNTER — HOME CARE/HOSPICE - HEALTHEAST (OUTPATIENT)
Dept: HOME HEALTH SERVICES | Facility: HOME HEALTH | Age: 85
End: 2020-06-15

## 2020-06-16 ENCOUNTER — HOME CARE/HOSPICE - HEALTHEAST (OUTPATIENT)
Dept: HOME HEALTH SERVICES | Facility: HOME HEALTH | Age: 85
End: 2020-06-16

## 2020-06-18 ENCOUNTER — HOME CARE/HOSPICE - HEALTHEAST (OUTPATIENT)
Dept: HOME HEALTH SERVICES | Facility: HOME HEALTH | Age: 85
End: 2020-06-18

## 2020-06-18 ENCOUNTER — RECORDS - HEALTHEAST (OUTPATIENT)
Dept: LAB | Facility: HOSPITAL | Age: 85
End: 2020-06-18

## 2020-06-18 LAB
ERYTHROCYTE [DISTWIDTH] IN BLOOD BY AUTOMATED COUNT: 20 % (ref 11–14.5)
HCT VFR BLD AUTO: 28.9 % (ref 35–47)
HGB BLD-MCNC: 9.2 G/DL (ref 12–16)
MCH RBC QN AUTO: 29.3 PG (ref 27–34)
MCHC RBC AUTO-ENTMCNC: 31.8 G/DL (ref 32–36)
MCV RBC AUTO: 92 FL (ref 80–100)
PLATELET # BLD AUTO: 172 THOU/UL (ref 140–440)
PMV BLD AUTO: 11.1 FL (ref 8.5–12.5)
RBC # BLD AUTO: 3.14 MILL/UL (ref 3.8–5.4)
WBC: 11.5 THOU/UL (ref 4–11)

## 2020-06-23 ENCOUNTER — HOME CARE/HOSPICE - HEALTHEAST (OUTPATIENT)
Dept: HOME HEALTH SERVICES | Facility: HOME HEALTH | Age: 85
End: 2020-06-23

## 2020-06-24 ENCOUNTER — HOME CARE/HOSPICE - HEALTHEAST (OUTPATIENT)
Dept: HOME HEALTH SERVICES | Facility: HOME HEALTH | Age: 85
End: 2020-06-24

## 2020-06-30 ENCOUNTER — RECORDS - HEALTHEAST (OUTPATIENT)
Dept: ADMINISTRATIVE | Facility: OTHER | Age: 85
End: 2020-06-30

## 2020-07-02 ENCOUNTER — RECORDS - HEALTHEAST (OUTPATIENT)
Dept: ADMINISTRATIVE | Facility: OTHER | Age: 85
End: 2020-07-02

## 2020-07-02 ENCOUNTER — HOSPITAL ENCOUNTER (OUTPATIENT)
Dept: CARDIOLOGY | Facility: HOSPITAL | Age: 85
Discharge: HOME | End: 2020-07-02
Attending: INTERNAL MEDICINE

## 2020-07-02 ENCOUNTER — AMBULATORY - HEALTHEAST (OUTPATIENT)
Dept: CARDIOLOGY | Facility: CLINIC | Age: 85
End: 2020-07-02

## 2020-07-02 DIAGNOSIS — I35.0 SEVERE AORTIC STENOSIS: ICD-10-CM

## 2020-07-02 LAB
AORTIC ROOT: 3 CM
AORTIC VALVE MEAN VELOCITY: 161 CM/S
ASCENDING AORTA: 3.6 CM
AV DIMENSIONLESS INDEX VTI: 0.5
AV MEAN GRADIENT: 15 MMHG
AV PEAK GRADIENT: 26.2 MMHG
AV VALVE AREA: 1.3 CM2
AV VELOCITY RATIO: 0.4
BSA FOR ECHO PROCEDURE: 1.63 M2
CV BLOOD PRESSURE: ABNORMAL MMHG
CV ECHO HEIGHT: 61.5 IN
CV ECHO WEIGHT: 135 LBS
DOP CALC AO PEAK VEL: 256 CM/S
DOP CALC AO VTI: 53.2 CM
DOP CALC LVOT AREA: 2.54 CM2
DOP CALC LVOT DIAMETER: 1.8 CM
DOP CALC LVOT PEAK VEL: 107 CM/S
DOP CALC LVOT STROKE VOLUME: 67.7 CM3
DOP CALC MV VTI: 39.2 CM
DOP CALCLVOT PEAK VEL VTI: 26.6 CM
EJECTION FRACTION: 62 % (ref 55–75)
FRACTIONAL SHORTENING: 40.6 % (ref 28–44)
INTERVENTRICULAR SEPTUM IN END DIASTOLE: 1.36 CM (ref 0.6–0.9)
IVS/PW RATIO: 1.2
LA AREA 1: 14.5 CM2
LA AREA 2: 13.9 CM2
LEFT ATRIUM LENGTH: 5.1 CM
LEFT ATRIUM SIZE: 2.3 CM
LEFT ATRIUM TO AORTIC ROOT RATIO: 0.77 NO UNITS
LEFT ATRIUM VOLUME INDEX: 20.6 ML/M2
LEFT ATRIUM VOLUME: 33.6 ML
LEFT VENTRICLE CARDIAC INDEX: 2.6 L/MIN/M2
LEFT VENTRICLE CARDIAC OUTPUT: 4.3 L/MIN
LEFT VENTRICLE DIASTOLIC VOLUME INDEX: 36.7 CM3/M2 (ref 29–61)
LEFT VENTRICLE DIASTOLIC VOLUME: 59.8 CM3 (ref 46–106)
LEFT VENTRICLE HEART RATE: 63 BPM
LEFT VENTRICLE MASS INDEX: 82.3 G/M2
LEFT VENTRICLE SYSTOLIC VOLUME INDEX: 13.8 CM3/M2 (ref 8–24)
LEFT VENTRICLE SYSTOLIC VOLUME: 22.5 CM3 (ref 14–42)
LEFT VENTRICULAR INTERNAL DIMENSION IN DIASTOLE: 3.35 CM (ref 3.8–5.2)
LEFT VENTRICULAR INTERNAL DIMENSION IN SYSTOLE: 1.99 CM (ref 2.2–3.5)
LEFT VENTRICULAR MASS: 134.2 G
LEFT VENTRICULAR OUTFLOW TRACT MEAN GRADIENT: 3 MMHG
LEFT VENTRICULAR OUTFLOW TRACT MEAN VELOCITY: 79.1 CM/S
LEFT VENTRICULAR OUTFLOW TRACT PEAK GRADIENT: 5 MMHG
LEFT VENTRICULAR POSTERIOR WALL IN END DIASTOLE: 1.12 CM (ref 0.6–0.9)
LV STROKE VOLUME INDEX: 41.5 ML/M2
MITRAL VALVE DECELERATION SLOPE: 3810 MM/S2
MITRAL VALVE E/A RATIO: 0.8
MITRAL VALVE MEAN INFLOW VELOCITY: 88.8 CM/S
MITRAL VALVE PEAK VELOCITY: 142 CM/S
MITRAL VALVE PRESSURE HALF-TIME: 90 MS
MV AREA VTI: 1.73 CM2
MV AVERAGE E/E' RATIO: 18.4 CM/S
MV DECELERATION TIME: 250 MS
MV E'TISSUE VEL-LAT: 5.68 CM/S
MV E'TISSUE VEL-MED: 6.16 CM/S
MV LATERAL E/E' RATIO: 19.2
MV MEAN GRADIENT: 4 MMHG
MV MEDIAL E/E' RATIO: 17.7
MV PEAK A VELOCITY: 135 CM/S
MV PEAK E VELOCITY: 109 CM/S
MV PEAK GRADIENT: 8.1 MMHG
MV VALVE AREA BY CONTINUITY EQUATION: 1.7 CM2
MV VALVE AREA PRESSURE 1/2 METHOD: 2.4 CM2
NUC REST DIASTOLIC VOLUME INDEX: 2160 LBS
NUC REST SYSTOLIC VOLUME INDEX: 61.5 IN
TRICUSPID REGURGITATION PEAK PRESSURE GRADIENT: 19.9 MMHG
TRICUSPID VALVE ANULAR PLANE SYSTOLIC EXCURSION: 2.7 CM
TRICUSPID VALVE PEAK REGURGITANT VELOCITY: 223 CM/S

## 2020-07-02 ASSESSMENT — MIFFLIN-ST. JEOR: SCORE: 997.67

## 2020-07-07 ENCOUNTER — OFFICE VISIT - HEALTHEAST (OUTPATIENT)
Dept: CARDIOLOGY | Facility: CLINIC | Age: 85
End: 2020-07-07

## 2020-07-07 DIAGNOSIS — I35.0 NONRHEUMATIC AORTIC VALVE STENOSIS: ICD-10-CM

## 2020-07-08 ENCOUNTER — COMMUNICATION - HEALTHEAST (OUTPATIENT)
Dept: CARDIOLOGY | Facility: CLINIC | Age: 85
End: 2020-07-08

## 2020-07-11 ENCOUNTER — HOSPITAL ENCOUNTER (OUTPATIENT)
Dept: MRI IMAGING | Facility: HOSPITAL | Age: 85
Discharge: HOME | End: 2020-07-11

## 2020-07-11 DIAGNOSIS — K86.9 LESION OF PANCREAS: ICD-10-CM

## 2020-07-11 LAB
CREAT BLD-MCNC: 1.4 MG/DL (ref 0.6–1.1)
GFR SERPL CREATININE-BSD FRML MDRD: 36 ML/MIN/1.73M2

## 2020-07-23 ENCOUNTER — COMMUNICATION - HEALTHEAST (OUTPATIENT)
Dept: CARDIOLOGY | Facility: CLINIC | Age: 85
End: 2020-07-23

## 2020-09-01 ENCOUNTER — RECORDS - HEALTHEAST (OUTPATIENT)
Dept: ADMINISTRATIVE | Facility: OTHER | Age: 85
End: 2020-09-01

## 2020-09-03 ENCOUNTER — HOSPITAL ENCOUNTER (OUTPATIENT)
Dept: ULTRASOUND IMAGING | Facility: HOSPITAL | Age: 85
Discharge: HOME | End: 2020-09-03
Attending: PHYSICIAN ASSISTANT

## 2020-09-03 DIAGNOSIS — M79.606 LEG PAIN: ICD-10-CM

## 2020-09-03 DIAGNOSIS — M79.89 PAIN AND SWELLING OF LEFT LOWER LEG: ICD-10-CM

## 2020-09-03 DIAGNOSIS — M79.662 PAIN AND SWELLING OF LEFT LOWER LEG: ICD-10-CM

## 2020-09-03 DIAGNOSIS — M79.605 PAIN OF LEFT LEG: ICD-10-CM

## 2020-09-08 ENCOUNTER — COMMUNICATION - HEALTHEAST (OUTPATIENT)
Dept: CARDIOLOGY | Facility: CLINIC | Age: 85
End: 2020-09-08

## 2020-09-08 DIAGNOSIS — I50.31 ACUTE DIASTOLIC CONGESTIVE HEART FAILURE (H): ICD-10-CM

## 2020-09-09 ENCOUNTER — AMBULATORY - HEALTHEAST (OUTPATIENT)
Dept: LAB | Facility: HOSPITAL | Age: 85
End: 2020-09-09

## 2020-09-09 DIAGNOSIS — I50.31 ACUTE DIASTOLIC CONGESTIVE HEART FAILURE (H): ICD-10-CM

## 2020-09-09 LAB
ANION GAP SERPL CALCULATED.3IONS-SCNC: 6 MMOL/L (ref 5–18)
BNP SERPL-MCNC: 68 PG/ML (ref 0–167)
BUN SERPL-MCNC: 22 MG/DL (ref 8–28)
CALCIUM SERPL-MCNC: 9.1 MG/DL (ref 8.5–10.5)
CHLORIDE BLD-SCNC: 108 MMOL/L (ref 98–107)
CO2 SERPL-SCNC: 27 MMOL/L (ref 22–31)
CREAT SERPL-MCNC: 1 MG/DL (ref 0.6–1.1)
GFR SERPL CREATININE-BSD FRML MDRD: 53 ML/MIN/1.73M2
GLUCOSE BLD-MCNC: 71 MG/DL (ref 70–125)
POTASSIUM BLD-SCNC: 3.9 MMOL/L (ref 3.5–5)
SODIUM SERPL-SCNC: 141 MMOL/L (ref 136–145)

## 2020-10-16 ENCOUNTER — RECORDS - HEALTHEAST (OUTPATIENT)
Dept: ADMINISTRATIVE | Facility: OTHER | Age: 85
End: 2020-10-16

## 2020-10-16 ENCOUNTER — AMBULATORY - HEALTHEAST (OUTPATIENT)
Dept: CARDIOLOGY | Facility: CLINIC | Age: 85
End: 2020-10-16

## 2020-10-19 ENCOUNTER — COMMUNICATION - HEALTHEAST (OUTPATIENT)
Dept: CARDIOLOGY | Facility: CLINIC | Age: 85
End: 2020-10-19

## 2020-10-20 ENCOUNTER — OFFICE VISIT - HEALTHEAST (OUTPATIENT)
Dept: CARDIOLOGY | Facility: CLINIC | Age: 85
End: 2020-10-20

## 2020-10-20 DIAGNOSIS — E78.5 DYSLIPIDEMIA, GOAL LDL BELOW 100: ICD-10-CM

## 2020-10-20 DIAGNOSIS — N18.30 STAGE 3 CHRONIC KIDNEY DISEASE (H): ICD-10-CM

## 2020-10-20 DIAGNOSIS — I50.32 CHRONIC DIASTOLIC HEART FAILURE (H): ICD-10-CM

## 2020-10-20 DIAGNOSIS — I10 BENIGN ESSENTIAL HYPERTENSION: ICD-10-CM

## 2020-10-20 DIAGNOSIS — I25.110 CORONARY ARTERY DISEASE INVOLVING NATIVE CORONARY ARTERY OF NATIVE HEART WITH UNSTABLE ANGINA PECTORIS (H): ICD-10-CM

## 2020-10-20 DIAGNOSIS — Z95.2 S/P TAVR (TRANSCATHETER AORTIC VALVE REPLACEMENT): ICD-10-CM

## 2020-10-20 ASSESSMENT — MIFFLIN-ST. JEOR: SCORE: 997.2

## 2020-10-21 ENCOUNTER — RECORDS - HEALTHEAST (OUTPATIENT)
Dept: LAB | Facility: CLINIC | Age: 85
End: 2020-10-21

## 2020-10-21 LAB
ALBUMIN UR-MCNC: NEGATIVE MG/DL
ANION GAP SERPL CALCULATED.3IONS-SCNC: 8 MMOL/L (ref 5–18)
APPEARANCE UR: CLEAR
BACTERIA #/AREA URNS HPF: ABNORMAL HPF
BILIRUB UR QL STRIP: NEGATIVE
BUN SERPL-MCNC: 22 MG/DL (ref 8–28)
CALCIUM SERPL-MCNC: 9 MG/DL (ref 8.5–10.5)
CHLORIDE BLD-SCNC: 108 MMOL/L (ref 98–107)
CO2 SERPL-SCNC: 25 MMOL/L (ref 22–31)
COLOR UR AUTO: YELLOW
CREAT SERPL-MCNC: 1.06 MG/DL (ref 0.6–1.1)
GFR SERPL CREATININE-BSD FRML MDRD: 49 ML/MIN/1.73M2
GLUCOSE BLD-MCNC: 87 MG/DL (ref 70–125)
GLUCOSE UR STRIP-MCNC: NEGATIVE MG/DL
HGB UR QL STRIP: NEGATIVE
KETONES UR STRIP-MCNC: NEGATIVE MG/DL
LEUKOCYTE ESTERASE UR QL STRIP: NEGATIVE
MUCOUS THREADS #/AREA URNS LPF: ABNORMAL LPF
NITRATE UR QL: NEGATIVE
PH UR STRIP: 5.5 [PH] (ref 4.5–8)
POTASSIUM BLD-SCNC: 4.4 MMOL/L (ref 3.5–5)
RBC #/AREA URNS AUTO: ABNORMAL HPF
SODIUM SERPL-SCNC: 141 MMOL/L (ref 136–145)
SP GR UR STRIP: 1.01 (ref 1–1.03)
SQUAMOUS #/AREA URNS AUTO: ABNORMAL LPF
UROBILINOGEN UR STRIP-ACNC: ABNORMAL
WBC #/AREA URNS AUTO: ABNORMAL HPF

## 2020-10-22 LAB — BACTERIA SPEC CULT: NO GROWTH

## 2021-01-06 ENCOUNTER — RECORDS - HEALTHEAST (OUTPATIENT)
Dept: LAB | Facility: CLINIC | Age: 86
End: 2021-01-06

## 2021-01-06 LAB
ANION GAP SERPL CALCULATED.3IONS-SCNC: 9 MMOL/L (ref 5–18)
BNP SERPL-MCNC: 20 PG/ML (ref 0–167)
BUN SERPL-MCNC: 24 MG/DL (ref 8–28)
CALCIUM SERPL-MCNC: 8.9 MG/DL (ref 8.5–10.5)
CHLORIDE BLD-SCNC: 108 MMOL/L (ref 98–107)
CO2 SERPL-SCNC: 24 MMOL/L (ref 22–31)
CREAT SERPL-MCNC: 1.12 MG/DL (ref 0.6–1.1)
GFR SERPL CREATININE-BSD FRML MDRD: 46 ML/MIN/1.73M2
GLUCOSE BLD-MCNC: 81 MG/DL (ref 70–125)
POTASSIUM BLD-SCNC: 4.5 MMOL/L (ref 3.5–5)
SODIUM SERPL-SCNC: 141 MMOL/L (ref 136–145)

## 2021-03-12 ENCOUNTER — RECORDS - HEALTHEAST (OUTPATIENT)
Dept: LAB | Facility: CLINIC | Age: 86
End: 2021-03-12

## 2021-03-12 LAB
ALBUMIN SERPL-MCNC: 3.7 G/DL (ref 3.5–5)
ALP SERPL-CCNC: 71 U/L (ref 45–120)
ALT SERPL W P-5'-P-CCNC: 18 U/L (ref 0–45)
ANION GAP SERPL CALCULATED.3IONS-SCNC: 9 MMOL/L (ref 5–18)
AST SERPL W P-5'-P-CCNC: 26 U/L (ref 0–40)
BILIRUB SERPL-MCNC: 0.4 MG/DL (ref 0–1)
BUN SERPL-MCNC: 26 MG/DL (ref 8–28)
C REACTIVE PROTEIN LHE: 0.2 MG/DL (ref 0–0.8)
CALCIUM SERPL-MCNC: 8.4 MG/DL (ref 8.5–10.5)
CHLORIDE BLD-SCNC: 111 MMOL/L (ref 98–107)
CK SERPL-CCNC: 232 U/L (ref 30–190)
CO2 SERPL-SCNC: 22 MMOL/L (ref 22–31)
CREAT SERPL-MCNC: 1.1 MG/DL (ref 0.6–1.1)
ERYTHROCYTE [SEDIMENTATION RATE] IN BLOOD BY WESTERGREN METHOD: 18 MM/HR (ref 0–20)
GFR SERPL CREATININE-BSD FRML MDRD: 47 ML/MIN/1.73M2
GLUCOSE BLD-MCNC: 86 MG/DL (ref 70–125)
POTASSIUM BLD-SCNC: 4.4 MMOL/L (ref 3.5–5)
PROT SERPL-MCNC: 6.3 G/DL (ref 6–8)
SODIUM SERPL-SCNC: 142 MMOL/L (ref 136–145)
T4 FREE SERPL-MCNC: 0.9 NG/DL (ref 0.7–1.8)
TSH SERPL DL<=0.005 MIU/L-ACNC: 1.4 UIU/ML (ref 0.3–5)

## 2021-03-15 LAB
25(OH)D3 SERPL-MCNC: 51.4 NG/ML (ref 30–80)
B BURGDOR IGG+IGM SER QL: 0.07 INDEX VALUE

## 2021-03-30 ENCOUNTER — RECORDS - HEALTHEAST (OUTPATIENT)
Dept: LAB | Facility: CLINIC | Age: 86
End: 2021-03-30

## 2021-03-30 LAB
CHOLEST SERPL-MCNC: 226 MG/DL
CK SERPL-CCNC: 216 U/L (ref 30–190)
FASTING STATUS PATIENT QL REPORTED: ABNORMAL
HDLC SERPL-MCNC: 76 MG/DL
LDLC SERPL CALC-MCNC: 132 MG/DL
TRIGL SERPL-MCNC: 89 MG/DL

## 2021-04-05 ENCOUNTER — RECORDS - HEALTHEAST (OUTPATIENT)
Dept: ADMINISTRATIVE | Facility: OTHER | Age: 86
End: 2021-04-05

## 2021-04-05 ENCOUNTER — AMBULATORY - HEALTHEAST (OUTPATIENT)
Dept: CARDIOLOGY | Facility: CLINIC | Age: 86
End: 2021-04-05

## 2021-04-09 ENCOUNTER — OFFICE VISIT - HEALTHEAST (OUTPATIENT)
Dept: CARDIOLOGY | Facility: CLINIC | Age: 86
End: 2021-04-09

## 2021-04-09 DIAGNOSIS — E78.5 DYSLIPIDEMIA, GOAL LDL BELOW 100: ICD-10-CM

## 2021-04-09 DIAGNOSIS — I35.0 SEVERE CALCIFIC AORTIC VALVE STENOSIS: ICD-10-CM

## 2021-04-09 DIAGNOSIS — I50.32 CHRONIC DIASTOLIC HEART FAILURE (H): ICD-10-CM

## 2021-04-09 DIAGNOSIS — N18.30 STAGE 3 CHRONIC KIDNEY DISEASE (H): ICD-10-CM

## 2021-04-09 DIAGNOSIS — I10 BENIGN ESSENTIAL HYPERTENSION: ICD-10-CM

## 2021-04-09 DIAGNOSIS — I25.110 CORONARY ARTERY DISEASE INVOLVING NATIVE CORONARY ARTERY OF NATIVE HEART WITH UNSTABLE ANGINA PECTORIS (H): ICD-10-CM

## 2021-04-12 ENCOUNTER — COMMUNICATION - HEALTHEAST (OUTPATIENT)
Dept: CARDIOLOGY | Facility: CLINIC | Age: 86
End: 2021-04-12

## 2021-04-12 DIAGNOSIS — E78.5 DYSLIPIDEMIA, GOAL LDL BELOW 100: ICD-10-CM

## 2021-04-13 RX ORDER — EVOLOCUMAB 140 MG/ML
140 INJECTION, SOLUTION SUBCUTANEOUS
Qty: 2 SYRINGE | Refills: 6 | Status: SHIPPED | OUTPATIENT
Start: 2021-04-13 | End: 2021-10-27

## 2021-04-17 ENCOUNTER — AMBULATORY - HEALTHEAST (OUTPATIENT)
Dept: SURGERY | Facility: CLINIC | Age: 86
End: 2021-04-17

## 2021-04-17 DIAGNOSIS — Z11.59 ENCOUNTER FOR SCREENING FOR OTHER VIRAL DISEASES: ICD-10-CM

## 2021-05-03 ENCOUNTER — HOSPITAL ENCOUNTER (OUTPATIENT)
Dept: CARDIOLOGY | Facility: HOSPITAL | Age: 86
Discharge: HOME | End: 2021-05-03
Attending: INTERNAL MEDICINE

## 2021-05-03 ENCOUNTER — HOSPITAL ENCOUNTER (OUTPATIENT)
Dept: NUCLEAR MEDICINE | Facility: HOSPITAL | Age: 86
Discharge: HOME | End: 2021-05-03
Attending: INTERNAL MEDICINE

## 2021-05-03 DIAGNOSIS — I35.0 SEVERE CALCIFIC AORTIC VALVE STENOSIS: ICD-10-CM

## 2021-05-03 DIAGNOSIS — I25.110 CORONARY ARTERY DISEASE INVOLVING NATIVE CORONARY ARTERY OF NATIVE HEART WITH UNSTABLE ANGINA PECTORIS (H): ICD-10-CM

## 2021-05-03 LAB
AORTIC VALVE MEAN VELOCITY: 124 CM/S
ASCENDING AORTA: 3.4 CM
AV DIMENSIONLESS INDEX VTI: 0.7
AV MEAN GRADIENT: 7 MMHG
AV PEAK GRADIENT: 14.7 MMHG
AV VALVE AREA: 1.8 CM2
BSA FOR ECHO PROCEDURE: 1.66 M2
CV BLOOD PRESSURE: ABNORMAL MMHG
CV ECHO HEIGHT: 61.5 IN
CV ECHO WEIGHT: 140 LBS
CV STRESS CURRENT BP HE: NORMAL
CV STRESS CURRENT HR HE: 74
CV STRESS CURRENT HR HE: 76
CV STRESS CURRENT HR HE: 78
CV STRESS CURRENT HR HE: 80
CV STRESS CURRENT HR HE: 87
CV STRESS CURRENT HR HE: 88
CV STRESS CURRENT HR HE: 90
CV STRESS CURRENT HR HE: 90
CV STRESS CURRENT HR HE: 91
CV STRESS CURRENT HR HE: 91
CV STRESS CURRENT HR HE: 92
CV STRESS CURRENT HR HE: 93
CV STRESS CURRENT HR HE: 93
CV STRESS CURRENT HR HE: 95
CV STRESS CURRENT HR HE: 95
CV STRESS CURRENT HR HE: 96
CV STRESS CURRENT HR HE: 99
CV STRESS DEVIATION TIME HE: NORMAL
CV STRESS ECHO PERCENT HR HE: NORMAL
CV STRESS EXERCISE STAGE HE: NORMAL
CV STRESS FINAL RESTING BP HE: NORMAL
CV STRESS FINAL RESTING HR HE: 87
CV STRESS MAX HR HE: 100
CV STRESS MAX TREADMILL GRADE HE: 0
CV STRESS MAX TREADMILL SPEED HE: 0
CV STRESS PEAK DIA BP HE: NORMAL
CV STRESS PEAK SYS BP HE: NORMAL
CV STRESS PHASE HE: NORMAL
CV STRESS PROTOCOL HE: NORMAL
CV STRESS RESTING PT POSITION HE: NORMAL
CV STRESS ST DEVIATION AMOUNT HE: NORMAL
CV STRESS ST DEVIATION ELEVATION HE: NORMAL
CV STRESS ST EVELATION AMOUNT HE: NORMAL
CV STRESS TEST TYPE HE: NORMAL
CV STRESS TOTAL STAGE TIME MIN 1 HE: NORMAL
DOP CALC AO PEAK VEL: 192 CM/S
DOP CALC AO VTI: 37.5 CM
DOP CALC LVOT AREA: 2.54 CM2
DOP CALC LVOT DIAMETER: 1.8 CM
DOP CALC LVOT STROKE VOLUME: 67.1 CM3
DOP CALC MV VTI: 30.8 CM
DOP CALCLVOT PEAK VEL VTI: 26.4 CM
EJECTION FRACTION: 71 % (ref 55–75)
FRACTIONAL SHORTENING: 33.3 % (ref 28–44)
INTERVENTRICULAR SEPTUM IN END DIASTOLE: 1.3 CM (ref 0.6–0.9)
IVS/PW RATIO: 1
LA AREA 1: 13.5 CM2
LA AREA 2: 12.6 CM2
LEFT ATRIUM LENGTH: 4.95 CM
LEFT ATRIUM SIZE: 2.6 CM
LEFT ATRIUM VOLUME INDEX: 17.6 ML/M2
LEFT ATRIUM VOLUME: 29.2 ML
LEFT VENTRICLE CARDIAC INDEX: 3.9 L/MIN/M2
LEFT VENTRICLE CARDIAC OUTPUT: 6.4 L/MIN
LEFT VENTRICLE DIASTOLIC VOLUME INDEX: 27.1 CM3/M2 (ref 29–61)
LEFT VENTRICLE DIASTOLIC VOLUME: 45 CM3 (ref 46–106)
LEFT VENTRICLE HEART RATE: 96 BPM
LEFT VENTRICLE MASS INDEX: 47.8 G/M2
LEFT VENTRICLE SYSTOLIC VOLUME INDEX: 7.8 CM3/M2 (ref 8–24)
LEFT VENTRICLE SYSTOLIC VOLUME: 13 CM3 (ref 14–42)
LEFT VENTRICULAR INTERNAL DIMENSION IN DIASTOLE: 2.1 CM (ref 3.8–5.2)
LEFT VENTRICULAR INTERNAL DIMENSION IN SYSTOLE: 1.4 CM (ref 2.2–3.5)
LEFT VENTRICULAR MASS: 79.3 G
LEFT VENTRICULAR OUTFLOW TRACT MEAN GRADIENT: 5 MMHG
LEFT VENTRICULAR OUTFLOW TRACT MEAN VELOCITY: 95 CM/S
LEFT VENTRICULAR POSTERIOR WALL IN END DIASTOLE: 1.3 CM (ref 0.6–0.9)
LV STROKE VOLUME INDEX: 40.4 ML/M2
MITRAL VALVE DECELERATION SLOPE: 2400 MM/S2
MITRAL VALVE E/A RATIO: 0.5
MITRAL VALVE MEAN INFLOW VELOCITY: 76.2 CM/S
MITRAL VALVE PEAK VELOCITY: 136 CM/S
MITRAL VALVE PRESSURE HALF-TIME: 106 MS
MV AREA VTI: 2.18 CM2
MV AVERAGE E/E' RATIO: 20.2 CM/S
MV DECELERATION TIME: 345 MS
MV E'TISSUE VEL-LAT: 2.53 CM/S
MV E'TISSUE VEL-MED: 3.51 CM/S
MV LATERAL E/E' RATIO: 24.1
MV MEAN GRADIENT: 3 MMHG
MV MEDIAL E/E' RATIO: 17.4
MV PEAK A VELOCITY: 126 CM/S
MV PEAK E VELOCITY: 60.9 CM/S
MV PEAK GRADIENT: 7.4 MMHG
MV VALVE AREA BY CONTINUITY EQUATION: 2.2 CM2
MV VALVE AREA PRESSURE 1/2 METHOD: 2.1 CM2
NUC REST DIASTOLIC VOLUME INDEX: 2240 LBS
NUC REST SYSTOLIC VOLUME INDEX: 61.5 IN
PR MAX PG: 2 MMHG
PR PEAK VELOCITY: 78.7 CM/S
RATE PRESSURE PRODUCT: NORMAL
STRESS ECHO BASELINE HR: 65
STRESS ECHO CALCULATED PERCENT HR: 75 %
STRESS ECHO LAST STRESS DIASTOLIC BP: 63
STRESS ECHO LAST STRESS HR: 96
STRESS ECHO LAST STRESS SYSTOLIC BP: 139
STRESS ECHO TARGET HR: 134
TRICUSPID REGURGITATION PEAK PRESSURE GRADIENT: 18.3 MMHG
TRICUSPID VALVE ANULAR PLANE SYSTOLIC EXCURSION: 2.7 CM
TRICUSPID VALVE PEAK REGURGITANT VELOCITY: 214 CM/S

## 2021-05-03 ASSESSMENT — MIFFLIN-ST. JEOR: SCORE: 1020.35

## 2021-05-08 ENCOUNTER — AMBULATORY - HEALTHEAST (OUTPATIENT)
Dept: FAMILY MEDICINE | Facility: CLINIC | Age: 86
End: 2021-05-08

## 2021-05-08 DIAGNOSIS — Z11.59 ENCOUNTER FOR SCREENING FOR OTHER VIRAL DISEASES: ICD-10-CM

## 2021-05-09 LAB
SARS-COV-2 PCR COMMENT: NORMAL
SARS-COV-2 RNA SPEC QL NAA+PROBE: NEGATIVE
SARS-COV-2 VIRUS SPECIMEN SOURCE: NORMAL

## 2021-05-10 ENCOUNTER — ANESTHESIA - HEALTHEAST (OUTPATIENT)
Dept: SURGERY | Facility: CLINIC | Age: 86
End: 2021-05-10

## 2021-05-10 ENCOUNTER — COMMUNICATION - HEALTHEAST (OUTPATIENT)
Dept: SCHEDULING | Facility: CLINIC | Age: 86
End: 2021-05-10

## 2021-05-11 ENCOUNTER — RECORDS - HEALTHEAST (OUTPATIENT)
Dept: ADMINISTRATIVE | Facility: OTHER | Age: 86
End: 2021-05-11

## 2021-05-11 ENCOUNTER — SURGERY - HEALTHEAST (OUTPATIENT)
Dept: SURGERY | Facility: CLINIC | Age: 86
End: 2021-05-11
Payer: MEDICARE

## 2021-05-11 ASSESSMENT — MIFFLIN-ST. JEOR
SCORE: 979.63
SCORE: 1025.89

## 2021-05-14 ENCOUNTER — RECORDS - HEALTHEAST (OUTPATIENT)
Dept: ADMINISTRATIVE | Facility: OTHER | Age: 86
End: 2021-05-14

## 2021-05-14 ENCOUNTER — RECORDS - HEALTHEAST (OUTPATIENT)
Dept: LAB | Facility: CLINIC | Age: 86
End: 2021-05-14

## 2021-05-14 ENCOUNTER — OFFICE VISIT - HEALTHEAST (OUTPATIENT)
Dept: GERIATRICS | Facility: CLINIC | Age: 86
End: 2021-05-14

## 2021-05-14 DIAGNOSIS — D62 ACUTE BLOOD LOSS ANEMIA: ICD-10-CM

## 2021-05-14 DIAGNOSIS — Z96.652 HISTORY OF TOTAL LEFT KNEE REPLACEMENT: ICD-10-CM

## 2021-05-14 DIAGNOSIS — I50.9 CHRONIC CONGESTIVE HEART FAILURE, UNSPECIFIED HEART FAILURE TYPE (H): ICD-10-CM

## 2021-05-14 DIAGNOSIS — I10 ESSENTIAL HYPERTENSION: ICD-10-CM

## 2021-05-14 DIAGNOSIS — R52 PAIN MANAGEMENT: ICD-10-CM

## 2021-05-17 ENCOUNTER — OFFICE VISIT - HEALTHEAST (OUTPATIENT)
Dept: GERIATRICS | Facility: CLINIC | Age: 86
End: 2021-05-17

## 2021-05-17 DIAGNOSIS — Z96.652 HISTORY OF TOTAL LEFT KNEE REPLACEMENT: ICD-10-CM

## 2021-05-17 DIAGNOSIS — D62 ACUTE BLOOD LOSS ANEMIA: ICD-10-CM

## 2021-05-17 DIAGNOSIS — I50.9 CHRONIC CONGESTIVE HEART FAILURE, UNSPECIFIED HEART FAILURE TYPE (H): ICD-10-CM

## 2021-05-17 DIAGNOSIS — M17.5 OTHER SECONDARY OSTEOARTHRITIS OF LEFT KNEE: ICD-10-CM

## 2021-05-17 DIAGNOSIS — I10 ESSENTIAL HYPERTENSION: ICD-10-CM

## 2021-05-17 LAB
ANION GAP SERPL CALCULATED.3IONS-SCNC: 10 MMOL/L (ref 5–18)
BUN SERPL-MCNC: 15 MG/DL (ref 8–28)
CALCIUM SERPL-MCNC: 8.7 MG/DL (ref 8.5–10.5)
CHLORIDE BLD-SCNC: 105 MMOL/L (ref 98–107)
CO2 SERPL-SCNC: 25 MMOL/L (ref 22–31)
CREAT SERPL-MCNC: 0.85 MG/DL (ref 0.6–1.1)
GFR SERPL CREATININE-BSD FRML MDRD: >60 ML/MIN/1.73M2
GLUCOSE BLD-MCNC: 88 MG/DL (ref 70–125)
HGB BLD-MCNC: 9.3 G/DL (ref 12–16)
POTASSIUM BLD-SCNC: 4.1 MMOL/L (ref 3.5–5)
SODIUM SERPL-SCNC: 140 MMOL/L (ref 136–145)

## 2021-05-18 ENCOUNTER — OFFICE VISIT - HEALTHEAST (OUTPATIENT)
Dept: GERIATRICS | Facility: CLINIC | Age: 86
End: 2021-05-18

## 2021-05-18 DIAGNOSIS — I50.9 CHRONIC CONGESTIVE HEART FAILURE, UNSPECIFIED HEART FAILURE TYPE (H): ICD-10-CM

## 2021-05-18 DIAGNOSIS — I10 ESSENTIAL HYPERTENSION: ICD-10-CM

## 2021-05-18 DIAGNOSIS — D62 ACUTE BLOOD LOSS ANEMIA: ICD-10-CM

## 2021-05-18 DIAGNOSIS — Z96.652 HISTORY OF TOTAL LEFT KNEE REPLACEMENT: ICD-10-CM

## 2021-05-18 DIAGNOSIS — K21.9 GASTROESOPHAGEAL REFLUX DISEASE WITHOUT ESOPHAGITIS: ICD-10-CM

## 2021-05-20 ENCOUNTER — OFFICE VISIT - HEALTHEAST (OUTPATIENT)
Dept: GERIATRICS | Facility: CLINIC | Age: 86
End: 2021-05-20

## 2021-05-20 DIAGNOSIS — G25.81 RESTLESS LEG SYNDROME: ICD-10-CM

## 2021-05-20 DIAGNOSIS — I10 ESSENTIAL HYPERTENSION: ICD-10-CM

## 2021-05-20 DIAGNOSIS — Z96.652 HISTORY OF TOTAL LEFT KNEE REPLACEMENT: ICD-10-CM

## 2021-05-20 DIAGNOSIS — D62 ACUTE BLOOD LOSS ANEMIA: ICD-10-CM

## 2021-05-20 DIAGNOSIS — I82.4Z2 ACUTE DEEP VEIN THROMBOSIS (DVT) OF DISTAL VEIN OF LEFT LOWER EXTREMITY (H): ICD-10-CM

## 2021-05-20 DIAGNOSIS — K21.9 GASTROESOPHAGEAL REFLUX DISEASE WITHOUT ESOPHAGITIS: ICD-10-CM

## 2021-05-24 ENCOUNTER — RECORDS - HEALTHEAST (OUTPATIENT)
Dept: ADMINISTRATIVE | Facility: CLINIC | Age: 86
End: 2021-05-24

## 2021-05-24 ENCOUNTER — OFFICE VISIT - HEALTHEAST (OUTPATIENT)
Dept: GERIATRICS | Facility: CLINIC | Age: 86
End: 2021-05-24

## 2021-05-24 ENCOUNTER — RECORDS - HEALTHEAST (OUTPATIENT)
Dept: ADMINISTRATIVE | Facility: OTHER | Age: 86
End: 2021-05-24

## 2021-05-24 ENCOUNTER — HOSPITAL ENCOUNTER (OUTPATIENT)
Dept: CT IMAGING | Facility: HOSPITAL | Age: 86
Discharge: HOME | End: 2021-05-24

## 2021-05-24 DIAGNOSIS — G25.81 RESTLESS LEG SYNDROME: ICD-10-CM

## 2021-05-24 DIAGNOSIS — I50.9 CHRONIC CONGESTIVE HEART FAILURE, UNSPECIFIED HEART FAILURE TYPE (H): ICD-10-CM

## 2021-05-24 DIAGNOSIS — K21.9 GASTROESOPHAGEAL REFLUX DISEASE WITHOUT ESOPHAGITIS: ICD-10-CM

## 2021-05-24 DIAGNOSIS — I26.99 PULMONARY EMBOLI (H): ICD-10-CM

## 2021-05-24 DIAGNOSIS — I82.4Z2 ACUTE DEEP VEIN THROMBOSIS (DVT) OF DISTAL VEIN OF LEFT LOWER EXTREMITY (H): ICD-10-CM

## 2021-05-24 DIAGNOSIS — D62 ACUTE BLOOD LOSS ANEMIA: ICD-10-CM

## 2021-05-24 DIAGNOSIS — I10 ESSENTIAL HYPERTENSION: ICD-10-CM

## 2021-05-24 DIAGNOSIS — Z96.652 HISTORY OF TOTAL LEFT KNEE REPLACEMENT: ICD-10-CM

## 2021-05-24 DIAGNOSIS — R06.02 SOB (SHORTNESS OF BREATH): ICD-10-CM

## 2021-05-25 ENCOUNTER — RECORDS - HEALTHEAST (OUTPATIENT)
Dept: ADMINISTRATIVE | Facility: CLINIC | Age: 86
End: 2021-05-25

## 2021-05-25 ENCOUNTER — OFFICE VISIT - HEALTHEAST (OUTPATIENT)
Dept: GERIATRICS | Facility: CLINIC | Age: 86
End: 2021-05-25

## 2021-05-25 DIAGNOSIS — K21.9 GASTROESOPHAGEAL REFLUX DISEASE WITHOUT ESOPHAGITIS: ICD-10-CM

## 2021-05-25 DIAGNOSIS — R06.02 SOB (SHORTNESS OF BREATH): ICD-10-CM

## 2021-05-25 DIAGNOSIS — G25.81 RESTLESS LEG SYNDROME: ICD-10-CM

## 2021-05-25 DIAGNOSIS — I82.4Z2 ACUTE DEEP VEIN THROMBOSIS (DVT) OF DISTAL VEIN OF LEFT LOWER EXTREMITY (H): ICD-10-CM

## 2021-05-25 DIAGNOSIS — Z96.652 HISTORY OF TOTAL LEFT KNEE REPLACEMENT: ICD-10-CM

## 2021-05-25 DIAGNOSIS — D62 ACUTE BLOOD LOSS ANEMIA: ICD-10-CM

## 2021-05-25 DIAGNOSIS — I10 ESSENTIAL HYPERTENSION: ICD-10-CM

## 2021-05-27 ENCOUNTER — RECORDS - HEALTHEAST (OUTPATIENT)
Dept: ADMINISTRATIVE | Facility: CLINIC | Age: 86
End: 2021-05-27

## 2021-05-27 VITALS — BODY MASS INDEX: 25.76 KG/M2 | HEIGHT: 62 IN | WEIGHT: 140 LBS

## 2021-05-27 VITALS — BODY MASS INDEX: 30.08 KG/M2 | WEIGHT: 140 LBS | BODY MASS INDEX: 28.04 KG/M2 | WEIGHT: 150.2 LBS

## 2021-05-27 VITALS — BODY MASS INDEX: 28.04 KG/M2 | BODY MASS INDEX: 28.04 KG/M2 | HEIGHT: 59 IN | HEIGHT: 59 IN

## 2021-05-27 VITALS — BODY MASS INDEX: 29.48 KG/M2 | WEIGHT: 147.2 LBS

## 2021-05-28 ENCOUNTER — RECORDS - HEALTHEAST (OUTPATIENT)
Dept: ADMINISTRATIVE | Facility: CLINIC | Age: 86
End: 2021-05-28

## 2021-05-29 ENCOUNTER — RECORDS - HEALTHEAST (OUTPATIENT)
Dept: ADMINISTRATIVE | Facility: CLINIC | Age: 86
End: 2021-05-29

## 2021-05-30 ENCOUNTER — RECORDS - HEALTHEAST (OUTPATIENT)
Dept: ADMINISTRATIVE | Facility: CLINIC | Age: 86
End: 2021-05-30

## 2021-05-30 VITALS — HEIGHT: 62 IN | BODY MASS INDEX: 25.38 KG/M2 | WEIGHT: 137.9 LBS

## 2021-05-30 VITALS — HEIGHT: 61 IN | BODY MASS INDEX: 25.86 KG/M2 | WEIGHT: 137 LBS

## 2021-05-30 VITALS — WEIGHT: 138 LBS | BODY MASS INDEX: 25.4 KG/M2 | HEIGHT: 62 IN

## 2021-05-31 ENCOUNTER — RECORDS - HEALTHEAST (OUTPATIENT)
Dept: ADMINISTRATIVE | Facility: CLINIC | Age: 86
End: 2021-05-31

## 2021-05-31 VITALS — BODY MASS INDEX: 24.46 KG/M2 | HEIGHT: 62 IN | WEIGHT: 132.9 LBS

## 2021-05-31 VITALS — BODY MASS INDEX: 24.72 KG/M2 | WEIGHT: 133 LBS

## 2021-05-31 VITALS — WEIGHT: 133 LBS | BODY MASS INDEX: 24.72 KG/M2

## 2021-06-01 ENCOUNTER — RECORDS - HEALTHEAST (OUTPATIENT)
Dept: ADMINISTRATIVE | Facility: CLINIC | Age: 86
End: 2021-06-01

## 2021-06-01 VITALS — WEIGHT: 133 LBS | HEIGHT: 61 IN | BODY MASS INDEX: 25.11 KG/M2

## 2021-06-03 ENCOUNTER — RECORDS - HEALTHEAST (OUTPATIENT)
Dept: ADMINISTRATIVE | Facility: CLINIC | Age: 86
End: 2021-06-03

## 2021-06-03 ENCOUNTER — RECORDS - HEALTHEAST (OUTPATIENT)
Dept: ADMINISTRATIVE | Facility: OTHER | Age: 86
End: 2021-06-03

## 2021-06-03 VITALS — BODY MASS INDEX: 26.06 KG/M2 | HEIGHT: 61 IN | WEIGHT: 138 LBS

## 2021-06-04 VITALS — WEIGHT: 135 LBS | BODY MASS INDEX: 24.84 KG/M2 | HEIGHT: 62 IN

## 2021-06-07 NOTE — TELEPHONE ENCOUNTER
Valve Clinic Nursing Note: Aortic Stenosis    Referring provider: Dr. Hopper (inpatient)  Prim card: Dr. Moran    Patient medical history is significant for CAD x1 KIANA distal LAD , 1 possible remaining  L CFx, AS, HTN, DLD.    Social history:     She lives independently in a senior Co-op directly across the street from [facility]. She has 4 children that live nearby and has been  for a few years now. 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    Symptoms include decrease in functional capacity- she has noticed since the first of the year that she has to stop a few times while climbing the steps down to her car at her senior Co-op. She also continues to have some tightness in her chest. Fatigue is another disabling symptom for her.    Echo information: ()  EF: 75% M P. Fahad: 3.37 BIRD: 0.8 Di: 0.3 Svi: 39    Cath/PCI:   - PCI to severe dLAD lesion w/ DESx1, attempted but unsuccessful another attempt at AV Lcx branch - abandoned due to likely , small size, suboptimal for stent (so would have been balloon only result)  - invasive AV gradient is in the severe range. Therefore, would arrange an outpatient valve clinic appointment for TAVR evaluation  - continue ASA 81mg daily indefinitely, add clopidogrel 600mg once, followed by 75mg daily for at least 12 months   - add amlodipine for both BP control and small vessel vasodilaiton in case Lcx gives her angina. Could also consider imdur  - increase atorva to 80  - continue aggressive risk factor modification           Findings:  LM:no obstruction  LAD:80% dLAD lesion  Lcx:small AV Lcx is either subtotally occluded w/ a small channel or a  w/ bridging collaterals, YONIS 2-3 flow before intervention  RCA:not injected     LVEDP:17  AV mean gradient: simultaneous 39     Access:  R Femoral artery     PCI:  LMT was engaged  w/ a 6F EBU 3.0 Guide catheter and the lesion in Lcx was attempted to be wired w/ the help of Turnpike LP, TwinPass, and Supercross microcatheters and Stephania, Nicolas FC,  200, and Fighter wires, unsuccessfully. Due to the branch small size, likely balloon only result for this lesion, we chose to switch our attention to the LAD, which was wired w/ a Forte wire, ballooned w/ 2.0x8mm Emerge balloon, and stented w/ a 2.5x20mm Synergy EES at 20 xavier. Of note, she had severe 6/10 CP w/ balloon and stent inflations w/ LAD, which resolved after PCI and IC NTG administration. Final angiography showed no dissection or perforation and a YONIS 3 flow.     Closure:   Manual     This is a complex modifer 22 procedure due to difficult anatomy, need for advanced interventional () technics     Tentative Plan: Patient will have a virtual visit with Dr. Caceres this afternoon and discuss timing and plan. She actually did her own research on TAVR and seems to have a pretty good understanding of the procedure. I was able to discuss the routine pre-op testing and appt schedule with her, which she states understanding of. I will call her next week to discuss Dr. Caceres's recommendations.        Preliminary STS score: 3%      YUMI Flores  Valve Clinic Coordinator

## 2021-06-07 NOTE — PROGRESS NOTES
"Review of Systems - Cardiovascular ROS: positive for - chest pain  ALL OTHERS WNL    PT IS CONCERNED ABOUT ADDITIONAL PROBLEMS THAT A YOUNGER PERSON WOULD NOT HAVE    PT ASKED IF THE TEAM IS \"HOT\"    NO OTHER CONCERNS    Chelle Duncan, [place]          "

## 2021-06-07 NOTE — TELEPHONE ENCOUNTER
----- Message from Kim Price RN sent at 5/5/2020  9:59 AM CDT -----  Regarding: FW: STEFANO PT - UPDATE ON PT'S STATUS    ----- Message -----  From: Micheline Estes  Sent: 5/5/2020   9:52 AM CDT  To: Kim Price RN  Subject: STEFANO PT - UPDATE ON PT'S STATUS                General phone call:    Caller: PtCarly issa dtr     Primary cardiologist: STEFANO     Detailed reason for call: Pt's dtr Carly called to give MA a status update on pt. Since Friday, 5/1, pt has been in the hospital and she was in the ED on Sunday for a fall. Carly states MA recommended some surgeries for pt and Carly would like a call back to discuss this.     New or active symptoms? Yes     Best phone number: [phone].     Best time to contact: Anytime     Ok to leave a detailed message? Yes     Device? No     Additional Info:

## 2021-06-07 NOTE — TELEPHONE ENCOUNTER
Patient is getting worse per her daughter Carly- She is now back in the ED at [facility] for chest pain. She states that patient is getting weaker and more and more fatigued since she spoke with Dr. Caceres last week for her virtual valve clinic visit.  I did order the CT scan yesterday but it has not been completed yet.    Dr. Caceres, what would you recommend at this point?    YUMI Flores  Valve Clinic Coordinator

## 2021-06-08 NOTE — TELEPHONE ENCOUNTER
I spoke to Sherice today regarding her abnormal finding on her CTA. Her pcp would like to further assess the pancreatic lesion on an MRI. I did ask Dr. Caceres if he was okay with proceeding with TAVR and he would recommend to proceed with TAVR next week. That way, if the lesion warrants further invasive work up, he heart will be able to handle it better with a new heart valve.  We will plan to use a 23 S3 valve and go through her right subclavian artery. She is aware of this- we discussed that she will be put under general anesthesia for this. She agrees with the plan. Patient will [facility] at 5:30 am.    I am unable to assess frailty on this patient due to COVID-19 pandemic restrictions at this time. Her STS is 4.5%.     I was able to go over instructions with her regarding diet NPO after midnight the evening prior to her procedure. Also, I informed her to take only her Plavix and amlodipine. She is planning to have her pre-op with HALINA Nam on Friday. She knows to call me with any further questions.     YUMI Flores  Valve Clinic Coordinator

## 2021-06-08 NOTE — PROGRESS NOTES
Home PT SOC completed on 6/7/2020.  PATIENT GOAL: return to independence and active lifestyle  REASON FOR THIS EPISODE: hospitalized 6/2-6/5 with severe aortic stenosis, underwent TAVR procedure on 6/2  PERTINENT MEDICAL HISTORY: dyslipidemia, acute diastolic CHF, leukocytosis, asthma  PRECAUTIONS/SAFETY: fall risk, limit R UE use and no lifting with R arm, no driving x2 weeks  PRIOR LEVEL OF FUNCTION: very active lifestyle, no AD used,   very independent, used walker only on occasion if knee pain was increased  CURRENT LEVEL OF FUNCTION: Pt completed TUG in 53 seconds, Jigaretti 13/28 (high risk for falls).  Pt's daughter reports she had a very difficult time walking on the first day she came home from the hospital but this improved yesterday and much better today during SOC. She demonstrates some initial difficulty with bearing weight on L LE upon standing but this impr  oves as distance progresses and stiffness decreases.  Verbal cues for increased step length and heel strike, posture, and continuous motion of walker.  She completes sit<>stand transfers with mod I and safe technique/hand placement during SOC.  Bed transfer with mod I, increased time to complete and use of small foot stool to help boost into bed. Pt has assist/supervision from daughter for bathing.  She reports decreased appetite since   surgery but is trying to still eat majority of her meals.  PERSONS PRESENT FOR VISIT: pt, PT, daughter Jacqueline  HOME ENVIRONMENT: Lives alone in independent senior living apartment, daughters are taking turns staying with patient during day and overnight since coming home from hospital but pt thinks she can manage on her own at night soon  ASSISTANCE AVAILABLE: daughters Jacqueline, Carly, and Gulshan  MULTIDISCIPLINARY PLAN/FOLLOW UP NEEDS:     SN: comprehensive eval with emphasis on monitoring cardiac symptoms and integument healing s/p TAVR, coordination of labs as able (pt has referral for CBC lab panel to completed on  6/10)  PT: SOC + 2w4 for strengthening, endurance, gait, and balance training  Pt refused OT and HHA.

## 2021-06-08 NOTE — TELEPHONE ENCOUNTER
"I touched base with Sherice today to see how she is doing after her recent hospitalization last week. She felt pretty remorseful for going into the hospital for chest pain but I reassured her that we would prefer her to get evaluated when she is having symptoms and that she did the right thing.  She states that she is feeling much better since then. She did fall back on 5/3 and suffered a pretty bruised up left arm but she said that it was x-rayed and it was not broken. She has been \"babying\" it.   She informed me that she is scheduled for her dental appt on Thursday of this week. I have also reached out to the CV surgeon's  to get her appt scheduled. She should look forward to hearing from their department within the next few days.   Once those two things are completed, we can start looking into scheduling her TAVR.  I will be in contact with her within the next week or so.    YUMI Flores  Valve Clinic Coordinator  "

## 2021-06-08 NOTE — TELEPHONE ENCOUNTER
Called pts home and cell several times to discuss the possibility of needing to reschedule her TAVR which is currently scheduled on 5/26. Pt did not answer. Will try later.

## 2021-06-08 NOTE — ANESTHESIA POSTPROCEDURE EVALUATION
Patient: Sherice Alvarez  Procedure(s) with comments:  CV TRANSCATHETER AORTIC VALVE REPLACEMENT - RIGHT SUBCLAVIAN APPROACH  OR TRANSCATHETER AORTIC VALVE REPLACEMENT, SUBCLAVIAN APPROACH - general anesthesia whole case, H&P done virtual 05/22-update in [facility]-admit at 730  Anesthesia type: general    Patient location: [facility]  Last vitals:   Vitals Value Taken Time   BP 96/50 6/2/2020  1:30 PM   Temp 36.4  C (97.5  F) 6/2/2020 12:53 PM   Pulse 65 6/2/2020  1:40 PM   Resp 14 6/2/2020 12:53 PM   SpO2 95 % 6/2/2020  1:40 PM   Vitals shown include unvalidated device data.  Post vital signs: stable  Level of consciousness: awake and responds to simple questions  Post-anesthesia pain: pain controlled  Post-anesthesia nausea and vomiting: no  Pulmonary: unassisted  Cardiovascular: stable  Hydration: adequate  Anesthetic events: no    QCDR Measures:  ASA# 11 - Donya-op Cardiac Arrest: ASA11B - Patient did NOT experience unanticipated cardiac arrest  ASA# 12 - Donya-op Mortality Rate: ASA12B - Patient did NOT die  ASA# 13 - PACU Re-Intubation Rate: ASA13B - Patient did NOT require a new airway mgmt  ASA# 10 - Composite Anes Safety: ASA10A - No serious adverse event    Additional Notes:

## 2021-06-08 NOTE — CONSULTS
DATE OF SERVICE: 05/12/2020    The patient has been informed of the following.  This telephone visit will be  conducted via call between you and your physician/provider.  We have found that  certain health care needs can be provided without the need for a physical exam.   This service lets you provide the care you need with a phone conversation.  If a  prescription is necessary, we can send it directly to your pharmacy.  If lab work is  needed, we can place an order for that and you can then stop by our lab to have this  test done at a later time.  If during the course of the call the physician/provider  feels a telephone visit is not appropriate, you will not be charged for this  service.  Verbal consent has been obtained for this service by a care team member.   Heart Care phone encounter.  I was asked to evaluate this patient for open surgical  versus transcatheter aortic valve replacement by Dr. John Caceres.    HISTORY OF PRESENT ILLNESS:  Mrs. Alvarez is an 85-year-old woman with severe  symptomatic aortic stenosis.  Because she has continuing symptoms, it is felt that  she would benefit from aortic valve replacement.  Given her age of 85 years, she  would be a better candidate to undergo transcatheter aortic valve replacement and  this is in keeping with her wishes as well as her family's wishes.  She has not had  a CTA done yet, but that is pending.  She is seeing a dentist later this week.  She  was hospitalized in April with angina and the PCI of her distal LAD was done with a  drug-eluting stent.  She also has some obstructive disease in the distal circumflex  but this is a chronic occlusion.  Her aortic stenosis is severe.  Her mean gradient  is 39 mmHg.  Her aortic valve area 0.8 cm2 with a peak velocity of 3.4 meters per  second and a hyperdynamic left ventricular systolic function.  Her main symptoms  have been dyspnea on exertion as well as easy fatigability.  She has not had any  chest pain  since her PCI.    PAST SURGICAL AND MEDICAL HISTORY:  SURGICAL PROCEDURES:  1. Status post appendectomy.  2. Status post carotid endarterectomy.  3. Status post  section.  4. Status post hemorrhoidectomy.  5. Status post tonsillectomy and adenoidectomy.  6. Status post right total knee arthroplasty.    MEDICAL PROBLEMS:  1. Asthma.  2. Coronary artery disease.  3. History of basal cell cancer.  4. Chronic kidney disease.  5. Crohn's disease.  6. GERD.  7. Hyperlipidemia.  8. Hypertension.  9. Restless legs syndrome.  10. History of stroke.  11. Aortic stenosis.    ALLERGIES:  ERYTHROMYCIN CAUSES DIARRHEA.  GABAPENTIN CAUSES JERKING MOVEMENTS AND  SWELLING.  NAPROSYN UNKNOWN.  SHE IS ALSO ALLERGIC TO MOLDS AND DANDER.    CURRENT MEDICATIONS:  See chart.    FAMILY HISTORY:  Positive for atrial fibrillation in her mother and parkinsonism in  her father.    SOCIAL HISTORY:  She lives independently.  She has children who live in the area and  her 3rd great grandchild was born last week.    REVIEW OF SYSTEMS:  A 12-organ system review was done and is negative except for the  above-stated.    PHYSICAL EXAMINATION:  Not done, but she reports that she presently is afebrile and  she weighs 141 pounds.    ASSESSMENT:  Mrs. Alvarez is an independent living 85-year-old woman who wants to  live for several more years.  I do not think she would be a good candidate for open  surgical aortic valve replacement.  She would, however, be a good candidate for  transcatheter aortic valve replacement.  She understands that the risks for that  procedure include bleeding, infection, stroke, heart block requiring a pacemaker,  cardiac perforation, aortic root rupture, aortic dissection and an operative  mortality of 1 to 2%.  She accepts these risks and is seeing the dentist later this  week.  She wishes to continue the workup to undergo transcatheter aortic valve  replacement.  Thank you very much for this referral.    Dictated:   5/12/2020 @1536      ROSETTA CAMACHO MD  pg  D 05/14/2020 09:18:24  T 05/14/2020 09:22:55  R 05/14/2020 09:22:55  [id number]        cc:ROSETTA CAMACHO MD

## 2021-06-08 NOTE — PROGRESS NOTES
Assessment/Plan:     1.  Coronary artery disease: She was hospitalized in November 2016 and coronary angiogram showed severe three-vessel disease.  She declined bypass surgery at that time since her  was in hospice.  She had a PCI with drug-eluting stents to proximal RCA and proximal circumflex.  She continued to have chest pain and had a drug-eluting stent to mid LAD on December 27, 2016.  Dual antiplatelet therapy is being used with aspirin indefinitely and clopidogrel for 1 year.  We discussed the importance of antiplatelet therapy and talking with her cardiologist prior to stopping these medications for any reason.  Puncture site is well-healed.      Risk factor modification and lifestyle management topics were discussed including managing comorbidities, heart healthy diet and exercise.  She plans to restart therapy at [facility].      2.  Dyslipidemia: Sherice Alvarez is on rosuvastatin 10 mg every other day.  She is unable to tolerate it daily due to symptoms.  We discussed a diet low in saturated fat, weight loss, and exercise along with medication for better control of cholesterol.     3.  Orthostatic hypotension: Orthostatic blood pressure was borderline positive today (124/70 sitting, 116/70 standing).  She gets lightheaded with rapid position changes.  Lisinopril decreased to 2.5 mg daily.    Sherice will follow-up with Dr. Moran on February 22.    Subjective:     Sherice Alvarez is seen at [facility] for post coronary intervention follow up.  She was hospitalized in November 2016 and coronary angiogram showed severe three-vessel disease.  She declined bypass surgery at that time since her  was in hospice.  She had a PCI with drug-eluting stents to proximal RCA and proximal circumflex.  She continued to have chest pain and had a drug-eluting stent to mid LAD on December 27, 2016.  Dual antiplatelet therapy is being used with aspirin indefinitely and clopidogrel for 1  year.      She denies any chest pain since PCI.  She has occasional heaviness in her chest and hoarseness but she believes both of these are related to acid reflux.  She will be seeing a GI doctor next week.  She has had 3 episodes of feeling weak and shaky over the past 3 weeks.  She has lightheadedness with rapid position changes.  Orthostatic blood pressure was borderline positive today (124/70 sitting, 116/70 standing).  She denies any syncope or near syncope.  She denies shortness of breath, dyspnea on exertion and lower extremity edema.      Review of Systems:   General: WNL  Eyes: WNL  Ears/Nose/Throat: WNL  Lungs: Cough, Shortness of Breath, Wheezing  Heart: WNL  Stomach: WNL  Bladder: WNL  Muscle/Joints: Joint Pain  Skin: WNL  Nervous System: WNL  Mental Health: WNL     Blood: Easy Bruising     Patient Active Problem List   Diagnosis     Constipation     Cancer     Gastroesophageal reflux disease with esophagitis     Essential hypertension     Coronary artery disease due to lipid rich plaque     Hypercholesterolemia     Essential hypertension with goal blood pressure less than 140/90       Past Medical History   Diagnosis Date     Asthma      CAD (coronary artery disease)      Cancer      basal cell     Chronic kidney disease      ckd 3     Crohn's disease      GERD (gastroesophageal reflux disease)      Hyperlipidemia      Hypertension      Restless legs syndrome      Stroke 2010     numbness rt jaw       Past Surgical History   Procedure Laterality Date     Total knee arthroplasty Right       section       Carotid endarterectomy       Hemorroidectomy       Carotid endarterectomy Right 2010     Appendectomy       Tonsillectomy and adenoidectomy       Cardiac catheterization  2016     multiple vessel disease.  no intervention     Cardiac catheterization  2016     Cardiac catheterization N/A 2016     Procedure: Coronary Angiogram;  Surgeon: Sunil Moran MD;  [facility]   [facility];  Service:        No family history on file.    Social History     Social History     Marital status:      Spouse name: N/A     Number of children: N/A     Years of education: N/A     Occupational History     Not on file.     Social History Main Topics     Smoking status: Former Smoker     Quit date: 1/1/1980     Smokeless tobacco: Not on file     Alcohol use No     Drug use: No     Sexual activity: Not on file     Other Topics Concern     Not on file     Social History Narrative    Recently put  onto hospice cares       Current Outpatient Prescriptions   Medication Sig Dispense Refill     acetaminophen (TYLENOL) 500 MG tablet Take 1 tablet (500 mg total) by mouth every 4 (four) hours as needed for pain or fever. 30 tablet 0     albuterol (PROVENTIL HFA;VENTOLIN HFA) 90 mcg/actuation inhaler Inhale 1-2 puffs every 6 (six) hours as needed for wheezing.       amitriptyline (ELAVIL) 25 MG tablet Take 25 mg by mouth bedtime as needed for sleep.        aspirin 81 mg chewable tablet Chew 1 tablet (81 mg total) daily. 30 tablet 0     BIOTIN ORAL Take 1 capsule by mouth daily.       clopidogrel (PLAVIX) 75 mg tablet Take 1 tablet (75 mg total) by mouth daily. 90 tablet 3     coenzyme Q10 100 mg capsule Take 100 mg by mouth daily.       Lactobacillus rhamnosus GG (CULTURELLE) 10-15 Billion cell capsule Take 1 capsule by mouth daily.       lisinopril (PRINIVIL,ZESTRIL) 2.5 MG tablet Take 1 tablet (2.5 mg total) by mouth daily. 90 tablet 3     melatonin 3 mg Tab tablet Take 1 tablet (3 mg total) by mouth bedtime as needed. 30 tablet 0     metoprolol tartrate (LOPRESSOR) 25 MG tablet Take 0.5 tablets (12.5 mg total) by mouth 2 (two) times a day. 90 tablet 3     multivitamin therapeutic (THERAGRAN) tablet Take 1 tablet by mouth daily.       nitroglycerin (NITROSTAT) 0.4 MG SL tablet Place 1 tablet (0.4 mg total) under the tongue every 5 (five) minutes as needed for chest pain. 90 tablet 0      predniSONE (DELTASONE) 10 MG tablet Take 5 mg by mouth as needed (allergies, asthma exacerbation).        rOPINIRole (REQUIP) 2 MG tablet Take 4 mg by mouth bedtime.        rosuvastatin (CRESTOR) 10 MG tablet Take 10 mg by mouth every other day.        sodium chloride (OCEAN) 0.65 % nasal spray 1 spray into each nostril daily as needed for congestion.       sodium chloride (OCEAN) 0.65 % nasal spray Spray twice into both nostrils every hour as needed for treatment of dry nose. 15 mL 0     traMADol (ULTRAM) 50 mg tablet Take 50 mg by mouth every 6 (six) hours as needed for pain (every 6-8 hours as needed for back pain).       No current facility-administered medications for this visit.        Allergies   Allergen Reactions     Erythromycin Unknown     Childhood reaction     Naprosyn [Naproxen] Unknown     Other Environmental Allergy      Molds  dander       Objective:     Vitals:    01/13/17 1029   BP: 122/70   Pulse: 76   Resp: 18     Body mass index is 25.24 kg/(m^2).      General Appearance:   Alert, cooperative and in no acute distress.   HEENT:  No scleral icterus; the mucous membranes were pink and moist.   Chest: The spine was straight. The chest was symmetric.   Lungs:   Respirations unlabored; the lungs are clear to auscultation.   Cardiovascular:   Regular rhythm. S1 and S2 without murmur, clicks or rubs.    Abdomen:  Soft, nontender, nondistended, bowel sounds present   Extremities: No cyanosis, clubbing, or edema.   Skin: No xanthelasma.   Neurologic: Mood and affect are appropriate.   Puncture site: Right radial site is soft with no bruising.  Radial pulses and Pedal pulses intact and symmetrical.  CMS intact.         Lab Review   Lab Results   Component Value Date    CREATININE 1.00 12/28/2016    BUN 17 12/28/2016     12/28/2016    K 4.3 12/28/2016     (H) 12/28/2016    CO2 23 12/28/2016     CREATININE (mg/dL)   Date Value   12/28/2016 1.00   12/27/2016 1.20 (H)   12/01/2016 1.26 (H)    11/08/2016 1.04       Cardiographics  Echocardiogram 11/4/16:  Summary  1. Normal left ventricular size and systolic performance. The ejection  fraction is estimated to be 65%.  2. There is mild aortic stenosis.  3. There is mild aortic insufficiency.      When compared to the prior real-time echocardiogram dated 14 August 2011,  there is now evidence of mild aortic stenosis and mild aortic  insufficiency. Otherwise, there has been little appreciable interval  change.      40 minutes were spent with the patient with greater than 50% spent on education and counseling.      Rosa Carter, [facility]

## 2021-06-08 NOTE — TELEPHONE ENCOUNTER
Voicemail from patient stating that she got it drained and it is infected. She also stated that she has been started on antibiotics. No additional details.    I called and LM for return call    Artis Kent RN  [facility]  [phone]  05/29/20  4:53 PM

## 2021-06-08 NOTE — TELEPHONE ENCOUNTER
Called patient's daughter Gulshan to give her an update on how the TAVR went. We called each other back and fourth a few times but she stated that she did get a hold of Dr. Lerner and he reported that she is doing well. I gave her patient's room number for up on 4000 and she will call up there in a while to check in.    YUMI Flores  Valve Clinic Coordinator

## 2021-06-08 NOTE — TELEPHONE ENCOUNTER
"Patient complains of leg tightness and she is walking slow and she feels \"wobbly\". She is not sure if this is due to her heart valve but she doesn't feel that well.   She's also complaining of some numbness in her hands. She doesn't sound like she's feeling the best these days. I am going to schedule her for her TAVR next Tuesday- May 26th.   She will need to have her pre-op scheduled for Friday, the 22nd since we are closed memorial day. I am planning to call her Thursday to discuss her prep and instructions. She will need a COVID-19 test to be done on Saturday.     She knows to call with any questions.     YUMI Flores  Valve Clinic Coordinator  "

## 2021-06-08 NOTE — TELEPHONE ENCOUNTER
"I called Sherice this morning to see how her weekend went. She states that it was fine. Her right arm feels much better now. The swelling has greatly improved. She denies a fever over the weekend. She has been taking her antibiotic.   She did tell me that she has been feeling increasingly weak and \"doesn't feel right\". She thinks it may be her valve. She has no energy and is ready to have her heart valve replaced. I have obtained records from [facility] with information from her visit with her PCP office. It appears that her WBC was not elevated, with the value being 8.7. Her diagnosis was traumatic bursitis.     Per Dr. Caceres, he states that it is safe to proceed with her TAVR tomorrow. He would prefer that she stays on ABX 5 days post-TAVR as well. I will call patient and inform her of the plan and also go over any additional questions that she has for tomorrow. She is scheduled to arrive at 7:30 tomorrow. Her COVID-19 PCR results were negative. Will plan to re-check all pre-op labs upon arrival tomorrow.       YUMI Flores  Valve Clinic Coordinator  "

## 2021-06-08 NOTE — PROGRESS NOTES
Incision is slightly bleeding, seeing pmd tomorrow. All other review of systems are within normal limits.    Pt has been out of amlodipine for 4 days if you could refill it for her.

## 2021-06-08 NOTE — TELEPHONE ENCOUNTER
"Patient called this morning to let me know that her injured right arm from a fall weeks ago is progressively getting worse these last few days. She informed me that her elbow on her right arm is \"puffed and hard like a blood blister. The scab fell off, it is hot in temperature and dark in color\". She also states that it feels as though it is \"pulling\" when she moves her arm up.   I did encourage her to call her primary physician so they can assess it. With her TAVR procedure scheduled next week, I worry about any type of infection in that area. I will await her call back to let me know what her PCP recommends.     YUMI Flores  Valve Clinic Coordinator  "

## 2021-06-08 NOTE — ANESTHESIA PREPROCEDURE EVALUATION
Anesthesia Evaluation      Patient summary reviewed   No history of anesthetic complications     Airway   Mallampati: II  Neck ROM: full   Pulmonary - normal exam   (+) asthma  shortness of breath (at baseline),   (-) pneumonia                         Cardiovascular   (+) hypertension, CAD, , hypercholesterolemia,     (-) angina  ECG reviewed   murmur      Neuro/Psych    (+) CVA (no residual symptoms) ,     Endo/Other       GI/Hepatic/Renal    (+) GERD,   chronic renal disease,     Comments: Crohns disease          Dental - normal exam                        Anesthesia Plan  Planned anesthetic: general endotracheal    ASA 4   Induction: intravenous   Anesthetic plan and risks discussed with: patient  Anesthesia plan special considerations: arterial catheterization, IV therapy two IVs,   Post-op plan: routine recovery

## 2021-06-08 NOTE — ANESTHESIA PROCEDURE NOTES
GIBRAN    Patient location during procedure: OR  Start time: 6/2/2020 10:37 AM  Staffing:  Performing  Anesthesiologist: Ora Santillan MD  GIBRAN:  Type/Reason: Monitoring GIBRAN  Technique: blind insertion  Difficulty: easy    interpretation completed by Cardiology

## 2021-06-08 NOTE — TELEPHONE ENCOUNTER
Spoke with pt yesterday at about 8pm regarding the plan for her TAVR. Informed pt we may need to reschedule her TAVR to a later date. Pt will be called Monday with update. Pt understands she is to self-isolate at home after her covid PCR testing done today until after her TAVR. Patient verbalizes understanding and agrees with plan. No further questions or concerns at this time.

## 2021-06-08 NOTE — ANESTHESIA CARE TRANSFER NOTE
Last vitals:   Vitals:    06/02/20 1253   BP: 113/57   Pulse: 81   Resp: 14   Temp: 36.4  C (97.5  F)   SpO2: 100%     Patient's level of consciousness is drowsy  Spontaneous respirations: yes  Maintains airway independently: yes  Dentition unchanged: yes  Oropharynx: oropharynx clear of all foreign objects    QCDR Measures:  ASA# 20 - Surgical Safety Checklist: WHO surgical safety checklist completed prior to induction    PQRS# 430 - Adult PONV Prevention: 4558F - Pt received => 2 anti-emetic agents (different classes) preop & intraop  ASA# 8 - Peds PONV Prevention: NA - Not pediatric patient, not GA or 2 or more risk factors NOT present  PQRS# 424 - Donya-op Temp Management: 4559F - At least one body temp DOCUMENTED => 35.5C or 95.9F within required timeframe  PQRS# 426 - PACU Transfer Protocol: - Transfer of care checklist used  ASA# 14 - Acute Post-op Pain: NA - Patient under age 10y or did not go to PACU

## 2021-06-08 NOTE — ANESTHESIA PROCEDURE NOTES
Arterial Line  Reason for Procedure: hemodynamic monitoring  Patient location during procedure: Pre-op  Start time: 6/2/2020 9:37 AM  End time: 6/2/2020 9:42 AM  Staffing:  Performing  Anesthesiologist: Ora Santillan MD  Sterile Precautions:  sterile barriers used during insertion: cap, mask, sterile gloves, large sheet, and hand hygiene used.  Arterial Line:     Laterality: left  Location: radial  Prepped with: ChloroPrep    Needle gauge: 20 G  Number of Attempts: 1  Secured with: tape, transparent dressing and pressure dressing  Flushed with: saline  1% lidocaine local anesthesia used for skin prep.   See MAR for additional medications given.  Ultrasound evaluation of access site: yes  Vessel patent by US exam    Concurrent real time visualization of needle entry

## 2021-06-09 NOTE — PROGRESS NOTES
VASCULAR SURGERY CLINIC    VASCULAR SURGEON: Bogdan Regalado MD     LOCATION:  [facility]    Sherice Alvarez   Medical Record #:  [id number]  YOB: 1934  Age:  82 y.o.     Date of Service: 2017    PRIMARY CARE PROVIDER: José Miguel Chowdary MD  Requesting Provider: José Miguel Chowdary MD      Reason for visit:  Carotid Disease    IMPRESSION:    No evidence for hemodynamically significant right ICA stenosis after CEA 6 years prior.  50-69% Left ICA stenosis, asymptomatic    RECOMMENDATION:    Continued cardiac and peripheral arterial risk factor optimization   Follow up with me as needed  If region on left anterior calf does not heal, she should have a dermatology consultation    HPI:  Sherice Alvarez is a 82 y.o. female who was seen today in follow up from her carotid artery disease. She had a right cea by ne approx 6 years prior. She has not had any symptoms of tia, cva or amaurosis fugax. She has had a PCI (KIANA)m in October and 2026. Subsequently she is on ASA/Plavix. She has been having nose bleeds. Otherwise she is doing well. She   the same day she had an MI and requred PCI by Dr. Luisa MD in 10/2016.    PHH:    Past Medical History:   Diagnosis Date     Asthma      CAD (coronary artery disease)      Cancer     basal cell     Chronic kidney disease     ckd 3     Crohn's disease      GERD (gastroesophageal reflux disease)      Hyperlipidemia      Hypertension      Restless legs syndrome      Stroke     numbness rt jaw        Past Surgical History:   Procedure Laterality Date     APPENDECTOMY       CARDIAC CATHETERIZATION  2016    multiple vessel disease.  no intervention     CARDIAC CATHETERIZATION  2016     CARDIAC CATHETERIZATION N/A 2016    Procedure: Coronary Angiogram;  Surgeon: Sunil Moran MD;  Location: [facility];  Service:      CAROTID ENDARTERECTOMY       CAROTID ENDARTERECTOMY Right 2010       SECTION       HEMORROIDECTOMY       TONSILLECTOMY AND ADENOIDECTOMY       TOTAL KNEE ARTHROPLASTY Right        ALLERGIES:  Erythromycin; Naprosyn [naproxen]; and Other environmental allergy    MEDS:    Current Outpatient Prescriptions:      acetaminophen (TYLENOL) 500 MG tablet, Take 1 tablet (500 mg total) by mouth every 4 (four) hours as needed for pain or fever., Disp: 30 tablet, Rfl: 0     acetaminophen-codeine (TYLENOL #3) 300-30 mg per tablet, Take 1 tablet by mouth every 6 (six) hours as needed. Back pain, Disp: , Rfl: 0     albuterol (PROVENTIL HFA;VENTOLIN HFA) 90 mcg/actuation inhaler, Inhale 1-2 puffs every 6 (six) hours as needed for wheezing., Disp: , Rfl:      amitriptyline (ELAVIL) 25 MG tablet, Take 25 mg by mouth bedtime as needed for sleep. , Disp: , Rfl:      aspirin 81 mg chewable tablet, Chew 1 tablet (81 mg total) daily., Disp: 30 tablet, Rfl: 0     BIOTIN ORAL, Take 1 capsule by mouth daily., Disp: , Rfl:      clopidogrel (PLAVIX) 75 mg tablet, Take 1 tablet (75 mg total) by mouth daily., Disp: 90 tablet, Rfl: 3     coenzyme Q10 100 mg capsule, Take 100 mg by mouth daily., Disp: , Rfl:      Lactobacillus rhamnosus GG (CULTURELLE) 10-15 Billion cell capsule, Take 1 capsule by mouth daily., Disp: , Rfl:      lisinopril (PRINIVIL,ZESTRIL) 2.5 MG tablet, Take 1 tablet (2.5 mg total) by mouth daily., Disp: 90 tablet, Rfl: 3     metoprolol tartrate (LOPRESSOR) 25 MG tablet, Take 0.5 tablets (12.5 mg total) by mouth 2 (two) times a day., Disp: 90 tablet, Rfl: 3     multivitamin therapeutic (THERAGRAN) tablet, Take 1 tablet by mouth daily., Disp: , Rfl:      rOPINIRole (REQUIP) 2 MG tablet, Take 4 mg by mouth bedtime. , Disp: , Rfl:      rosuvastatin (CRESTOR) 10 MG tablet, Take 10 mg by mouth every other day. , Disp: , Rfl:      traMADol (ULTRAM) 50 mg tablet, Take 50 mg by mouth every 6 (six) hours as needed for pain (every 6-8 hours as needed for back pain)., Disp: , Rfl:      nitroglycerin  "(NITROSTAT) 0.4 MG SL tablet, Place 1 tablet (0.4 mg total) under the tongue every 5 (five) minutes as needed for chest pain., Disp: 90 tablet, Rfl: 0     predniSONE (DELTASONE) 10 MG tablet, Take 5 mg by mouth daily as needed (allergies, asthma exacerbation). , Disp: , Rfl:     SOCIAL HABITS:    History   Smoking Status     Former Smoker     Quit date: 1/1/1980   Smokeless Tobacco     Not on file       History   Alcohol Use No       History   Drug Use No       FAMILY HISTORY:    Family History   Problem Relation Age of Onset     Atrial fibrillation Mother      Parkinsonism Father        REVIEW OF SYSTEMS:  12 point ros reviewed    PE:  Visit Vitals     /68     Pulse 76     Temp 99  F (37.2  C) (Temporal)     Resp 14     Ht 5' 1.5\" (1.562 m)     Wt 137 lb (62.1 kg)     LMP  (LMP Unknown)     Breastfeeding No     BMI 25.47 kg/m2     Gen: NAD, AAOx 3  HEENT:  Well healed right sided neck incision   Chest:  nl  Lungs:  nl  Heart:  Rrr. + car/brachial/fem pulses  Abd:  nl  Ext:  Dry eschar on left anterior calf  Skin: intact except for area of eschar  Neuro:Mo deficits      DIAGNOSTIC STUDIES:  Personally interpreted and reviewed with patient. Results as above             Bogdan Regalado MD  VASCULAR SURGERY     [facility]    "

## 2021-06-09 NOTE — PATIENT INSTRUCTIONS - HE
I am glad to hear how well you have done  Let's continue current meds, follow up w/  or one of partners at [facility] ion 6 mos, and w/ me in 1 year with echo

## 2021-06-09 NOTE — PROGRESS NOTES
[facility]   Outpatient Follow-up evaluation.     Current Outpatient Prescriptions:      acetaminophen (TYLENOL) 500 MG tablet, Take 1 tablet (500 mg total) by mouth every 4 (four) hours as needed for pain or fever., Disp: 30 tablet, Rfl: 0     acetaminophen-codeine (TYLENOL #3) 300-30 mg per tablet, Take 1 tablet by mouth every 6 (six) hours as needed. Back pain, Disp: , Rfl: 0     albuterol (PROVENTIL HFA;VENTOLIN HFA) 90 mcg/actuation inhaler, Inhale 1-2 puffs every 6 (six) hours as needed for wheezing., Disp: , Rfl:      amitriptyline (ELAVIL) 25 MG tablet, Take 25 mg by mouth bedtime as needed for sleep. , Disp: , Rfl:      amoxicillin-clavulanate (AUGMENTIN) 500-125 mg per tablet, Take 1 tablet (500 mg total) by mouth 2 (two) times a day for 5 days., Disp: 10 tablet, Rfl: 0     aspirin 81 mg chewable tablet, Chew 1 tablet (81 mg total) daily., Disp: 30 tablet, Rfl: 0     BIOTIN ORAL, Take 1 capsule by mouth daily., Disp: , Rfl:      clopidogrel (PLAVIX) 75 mg tablet, Take 1 tablet (75 mg total) by mouth daily., Disp: 90 tablet, Rfl: 3     coenzyme Q10 100 mg capsule, Take 100 mg by mouth daily., Disp: , Rfl:      Lactobacillus rhamnosus GG (CULTURELLE) 10-15 Billion cell capsule, Take 1 capsule by mouth daily., Disp: , Rfl:      lisinopril (PRINIVIL,ZESTRIL) 2.5 MG tablet, Take 1 tablet (2.5 mg total) by mouth daily., Disp: 90 tablet, Rfl: 3     metoprolol tartrate (LOPRESSOR) 25 MG tablet, Take 0.5 tablets (12.5 mg total) by mouth 2 (two) times a day., Disp: 90 tablet, Rfl: 3     multivitamin therapeutic (THERAGRAN) tablet, Take 1 tablet by mouth daily., Disp: , Rfl:      nitroglycerin (NITROSTAT) 0.4 MG SL tablet, Place 1 tablet (0.4 mg total) under the tongue every 5 (five) minutes as needed for chest pain., Disp: 90 tablet, Rfl: 0     predniSONE (DELTASONE) 10 MG tablet, Take 5 mg by mouth daily as needed (allergies, asthma exacerbation). , Disp: , Rfl:      rOPINIRole (REQUIP) 2 MG  tablet, Take 4 mg by mouth bedtime. , Disp: , Rfl:      rosuvastatin (CRESTOR) 10 MG tablet, Take 10 mg by mouth every other day. , Disp: , Rfl:      traMADol (ULTRAM) 50 mg tablet, Take 50 mg by mouth every 6 (six) hours as needed for pain (every 6-8 hours as needed for back pain)., Disp: , Rfl:         Sherice Alvarez is a 82 y.o. Female    Chief Complaint   Patient presents with     Follow-up       Diagnoses:  CAD    Recommendations:    Continue plavix until       Subjective:   Nosebleeds but thus far controlled, no angna, no sob.    Past Medical History:   Diagnosis Date     Asthma      CAD (coronary artery disease)      Cancer     basal cell     Chronic kidney disease     ckd 3     Crohn's disease      GERD (gastroesophageal reflux disease)      Hyperlipidemia      Hypertension      Restless legs syndrome      Stroke     numbness rt jaw     Past Surgical History:   Procedure Laterality Date     APPENDECTOMY       CARDIAC CATHETERIZATION  2016    multiple vessel disease.  no intervention     CARDIAC CATHETERIZATION  2016     CARDIAC CATHETERIZATION N/A 2016    Procedure: Coronary Angiogram;  Surgeon: Sunil Moran MD;  Location: [facility];  Service:      CAROTID ENDARTERECTOMY       CAROTID ENDARTERECTOMY Right 2010      SECTION       HEMORROIDECTOMY       TONSILLECTOMY AND ADENOIDECTOMY       TOTAL KNEE ARTHROPLASTY Right      Allergies   Allergen Reactions     Erythromycin Unknown     Childhood reaction     Naprosyn [Naproxen] Unknown     Other Environmental Allergy      Molds  dander     No family history on file.   Social History     Social History     Marital status:      Spouse name: N/A     Number of children: N/A     Years of education: N/A     Occupational History     Not on file.     Social History Main Topics     Smoking status: Former Smoker     Quit date: 1980     Smokeless tobacco: Not on file     Alcohol use No     Drug use: No      Sexual activity: Not on file     Other Topics Concern     Not on file     Social History Narrative    Recently put  onto hospice cares     Family history not pertinent to chief complaint or presenting problem    Current Outpatient Prescriptions:      acetaminophen (TYLENOL) 500 MG tablet, Take 1 tablet (500 mg total) by mouth every 4 (four) hours as needed for pain or fever., Disp: 30 tablet, Rfl: 0     acetaminophen-codeine (TYLENOL #3) 300-30 mg per tablet, Take 1 tablet by mouth every 6 (six) hours as needed. Back pain, Disp: , Rfl: 0     albuterol (PROVENTIL HFA;VENTOLIN HFA) 90 mcg/actuation inhaler, Inhale 1-2 puffs every 6 (six) hours as needed for wheezing., Disp: , Rfl:      amitriptyline (ELAVIL) 25 MG tablet, Take 25 mg by mouth bedtime as needed for sleep. , Disp: , Rfl:      amoxicillin-clavulanate (AUGMENTIN) 500-125 mg per tablet, Take 1 tablet (500 mg total) by mouth 2 (two) times a day for 5 days., Disp: 10 tablet, Rfl: 0     aspirin 81 mg chewable tablet, Chew 1 tablet (81 mg total) daily., Disp: 30 tablet, Rfl: 0     BIOTIN ORAL, Take 1 capsule by mouth daily., Disp: , Rfl:      clopidogrel (PLAVIX) 75 mg tablet, Take 1 tablet (75 mg total) by mouth daily., Disp: 90 tablet, Rfl: 3     coenzyme Q10 100 mg capsule, Take 100 mg by mouth daily., Disp: , Rfl:      Lactobacillus rhamnosus GG (CULTURELLE) 10-15 Billion cell capsule, Take 1 capsule by mouth daily., Disp: , Rfl:      lisinopril (PRINIVIL,ZESTRIL) 2.5 MG tablet, Take 1 tablet (2.5 mg total) by mouth daily., Disp: 90 tablet, Rfl: 3     metoprolol tartrate (LOPRESSOR) 25 MG tablet, Take 0.5 tablets (12.5 mg total) by mouth 2 (two) times a day., Disp: 90 tablet, Rfl: 3     multivitamin therapeutic (THERAGRAN) tablet, Take 1 tablet by mouth daily., Disp: , Rfl:      nitroglycerin (NITROSTAT) 0.4 MG SL tablet, Place 1 tablet (0.4 mg total) under the tongue every 5 (five) minutes as needed for chest pain., Disp: 90 tablet, Rfl: 0      "predniSONE (DELTASONE) 10 MG tablet, Take 5 mg by mouth daily as needed (allergies, asthma exacerbation). , Disp: , Rfl:      rOPINIRole (REQUIP) 2 MG tablet, Take 4 mg by mouth bedtime. , Disp: , Rfl:      rosuvastatin (CRESTOR) 10 MG tablet, Take 10 mg by mouth every other day. , Disp: , Rfl:      traMADol (ULTRAM) 50 mg tablet, Take 50 mg by mouth every 6 (six) hours as needed for pain (every 6-8 hours as needed for back pain)., Disp: , Rfl:       Objective:   Visit Vitals     /78 (Patient Site: Left Arm, Patient Position: Sitting, Cuff Size: Adult Regular)     Pulse 81     Ht 5' 1.5\" (1.562 m)     Wt 137 lb 14.4 oz (62.6 kg)     SpO2 95%     BMI 25.63 kg/m2     137 lb 14.4 oz (62.6 kg)   Wt Readings from Last 3 Encounters:   02/22/17 137 lb 14.4 oz (62.6 kg)   02/19/17 139 lb 12.8 oz (63.4 kg)   02/16/17 137 lb (62.1 kg)     BP Readings from Last 3 Encounters:   02/22/17 140/78   02/20/17 131/62   02/16/17 166/69     Pulse Readings from Last 3 Encounters:   02/22/17 81   02/20/17 85   02/16/17 92     General appearance: alert, appears stated age and cooperative  Head: Normocephalic, without obvious abnormality, atraumatic  Eyes: Normal external exam without jaundice.  Ears: Normal external auricular exam.  Nose: Normal external exam.  Lungs: clear to auscultation bilaterally  Chest wall: no tenderness  Heart: regular rate and rhythm, S1, S2 normal, no murmur, click, rub or gallop   Pulses: 2+ and symmetric  Skin: Skin color, texture, turgor normal.   Neurologic: Grossly normal, no focal neurologic findings.    Review of Systems:   General: WNL  Cardiographics: Reviewed in clinic.    Lab Results:  Lab Results: Personally reviewed  Lab Results   Component Value Date    WBC 8.8 02/20/2017    WBC 8.7 05/17/2015    HGB 9.5 (L) 02/20/2017    HCT 27.9 (L) 02/20/2017    MCV 89 02/20/2017     02/20/2017     Lab Results   Component Value Date    CHOL 190 02/01/2017    TRIG 68 02/01/2017    HDL 62 02/01/2017 "       Clinical evaluation time today including exam 25 minutes.  At least 50% of clinic evaluation time involved in assessment and patient counseling.  Part of this chart was created using a dictation software.  Typographic errors, word substitutions, and grammatical errors may unintentionally occur.    Sunil Moran M.D.  [facility]

## 2021-06-09 NOTE — TELEPHONE ENCOUNTER
Patient given results. She knows to follow up in 6 months post-TAVR with her primary cardiologist.     YUMI Flores

## 2021-06-09 NOTE — TELEPHONE ENCOUNTER
I called patient to give her the results of her 30 day post-TAVR echo. She did not answer so I left a message for a return call.    YUMI Flores

## 2021-06-09 NOTE — TELEPHONE ENCOUNTER
===View-only below this line===  ----- Message -----  From: John Caceres MD  Sent: 7/24/2020  10:12 AM CDT  To: Pedrito Price RN    Yes, ok to hold plavix for 7 days if she is having significant back pain. Would resume after the procedure    John

## 2021-06-09 NOTE — PROGRESS NOTES
82 year old returning patient, last seen in 2013. Carotid US Done 11/05/2016 showed 50-69% stenosis left ICA w/ less than 50% stenosis right ICA. Hx of right CEA roughly 6 years ago.  Carotid US prior to appt. Patient reports no new neurologic symptoms, she however suffered a recent MI latter part of 2016 after her  dies, wanted to follow-up and ensure things has not worsened with carotid stenosis. She is followed by Dr. Sunil Moran, underwent PTCA w/ stent placement x 3. Patient is maintained on dual anti-platelet therapy. She has had issues w/ epistaxes, she has seen cardiology fr this.

## 2021-06-09 NOTE — TELEPHONE ENCOUNTER
Left detailed message on nurses confidential phone line, of recommendations from MA. Encouraged to return call if further needs, like needing this recommendation in writing. Given contact information for writer if needed. FELIPE,Rn

## 2021-06-09 NOTE — TELEPHONE ENCOUNTER
----- Message from Shalini Trotter sent at 7/23/2020  8:54 AM CDT -----        Primary cardiologist: Dr. Carrillo    Detailed reason for call: Chester from [facility] called and wants to ask if it would be ok for patient to stop clopidogreL (PLAVIX) 75 mg tablet for 7 days for a procedure she is having with Ortho. Patient has had a TAVR and stents in the past. Please advise at [phone]    Device? no     PC to Chester to clarify Ortho procedure needed. LM to return call. FELIPE,RN

## 2021-06-09 NOTE — TELEPHONE ENCOUNTER
Incoming return call from YUMI Briggs @ [facility] reports that the patient is having back issues, and requesting an HILLARY(Epidural Steroid Injection). Her back is in a lot of pain, procedure is not scheduled at this time. Reports that patient would like to have it scheduled in the near future to help with her discomfort. Will forward to providers for recommendations. CMM,Rn     Patient S/P TAVR 6/20/20 and S/P PCI with Synergy KIANA of dLAD with Clopidogrel load and initiation of therapy on 4/17/20.    Dr Ospina, In the absence of EMG and also in context of placement of TAVR by you, can you please review and make recommendation of request for 7 day hold of plavix for Ortho procedure. Please advise thank you CMM,RN

## 2021-06-11 NOTE — TELEPHONE ENCOUNTER
----- Message from Micheline Estes sent at 9/8/2020  1:54 PM CDT -----  Regarding: [facility]K PT / SYMPTOMS  General phone call:    Caller: Sherice Donnelly     Primary cardiologist: RICHIE    Detailed reason for call: Pt states she used to see [facility]K, but she does not want to follow up with him as he is only down at [facility]. LB has seen pt in the hospital before and she'd like a call back from [facility]'s nurse to discuss her recent weight gain.     Best phone number: [phone]    Best time to contact: Anytime     Ok to leave a detailed message? Yes     Device? No     Additional Info: Pt has an upcoming follow up appt with [facility] on 10/20/20.      Returned call to discuss concerns. Pt states that s      Dr. Feldman, pt requests to establish care with you (she is a past [facility] patient). She did see you inpatient 4/16/2020 and schedule an appointment to see you 10/20. Would you be able to review? She calls with concerns of lower extremity edema - primarily in her left leg that comes and goes. Pt denies shortness of breath. Currently she is only taking 10 mg of lasix as needed (she was unaware that the her med list says to take lasix differently). She took one 10 mg pill yesterday and reports frequent urination and improvement in the edema, however, today the swelling came back. Also, she is following up with [facility] for left knee pain and did have a venous US 9/3. She expresses concern that the swelling could be related to her heart. Are you able to provide any recs/comments regarding this or should we refer back to [facility]? Any change to lasix administration?

## 2021-06-12 NOTE — TELEPHONE ENCOUNTER
Wellness Screening Tool  Symptom Screening:  Do you have one of the following NEW symptoms:    Fever (subjective or >100.0)?  No    A new cough?  No    Shortness of breath?  No     Chills? No     New loss of taste or smell? No     Generalized body aches? No     New persistent headache? No     New sore throat? No     Nausea, vomiting, or diarrhea?  No    Within the past 2 weeks, have you been exposed to someone with a known positive illness below:    COVID-19 (known or suspected)?  No    Chicken pox?  No    Mealses?  No    Pertussis?  No    Patient notified of visitor policy- They may have one person accompany them to their appointment, but they will need to wear a mask and will be screened upon arrival for symptoms: Yes  Pt informed to wear a mask: Yes  Pt notified if they develop any symptoms listed above, prior to their appointment, they are to call the clinic directly at [phone] for further instructions.  Yes  Patient's appointment status: Patient will be seen in clinic as scheduled on 10/20/20

## 2021-06-12 NOTE — PROGRESS NOTES
[facility]   Outpatient Follow-up evaluation.      Current Outpatient Prescriptions:      albuterol (PROVENTIL HFA;VENTOLIN HFA) 90 mcg/actuation inhaler, Inhale 1-2 puffs every 6 (six) hours as needed for wheezing., Disp: , Rfl:      amitriptyline (ELAVIL) 25 MG tablet, Take 25 mg by mouth bedtime as needed for sleep. , Disp: , Rfl:      atorvastatin (LIPITOR) 40 MG tablet, TAKE 1 TABLET BY MOUTH DAILY, Disp: , Rfl: 6     BIOTIN ORAL, Take 1 capsule by mouth every other day. , Disp: , Rfl:      calcium, as carbonate, (OS-BESS) 500 mg calcium (1,250 mg) tablet, Take 1 tablet by mouth daily., Disp: , Rfl:      clopidogrel (PLAVIX) 75 mg tablet, Take 1 tablet (75 mg total) by mouth daily., Disp: 90 tablet, Rfl: 3     coenzyme Q10 100 mg capsule, Take 100 mg by mouth every other day. , Disp: , Rfl:      ferrous sulfate 325 (65 FE) MG tablet, Take 1 tablet by mouth daily with breakfast., Disp: , Rfl:      HYDROmorphone (DILAUDID) 2 MG tablet, Take 0.5 tablets (1 mg total) by mouth every 4 (four) hours as needed for pain., Disp: 20 tablet, Rfl: 0     Lactobacillus rhamnosus GG (CULTURELLE) 10-15 Billion cell capsule, Take 1 capsule by mouth daily., Disp: , Rfl:      lisinopril (PRINIVIL,ZESTRIL) 2.5 MG tablet, Take 1 tablet (2.5 mg total) by mouth daily., Disp: 90 tablet, Rfl: 3     metoprolol tartrate (LOPRESSOR) 25 MG tablet, Take 0.5 tablets (12.5 mg total) by mouth 2 (two) times a day., Disp: 90 tablet, Rfl: 3     multivitamin therapeutic (THERAGRAN) tablet, Take 1 tablet by mouth daily., Disp: , Rfl:      nitroglycerin (NITROSTAT) 0.4 MG SL tablet, Place 1 tablet (0.4 mg total) under the tongue every 5 (five) minutes as needed for chest pain., Disp: 90 tablet, Rfl: 0     omeprazole (PRILOSEC) 20 MG capsule, TAKE ONE CAPSULE BY MOUTH EVERY DAY, Disp: , Rfl: 1     predniSONE (DELTASONE) 10 MG tablet, Take 5 mg by mouth daily as needed (allergies, asthma exacerbation). , Disp: , Rfl:      rOPINIRole  (REQUIP) 2 MG tablet, Take 4 mg by mouth every evening. , Disp: , Rfl:      rosuvastatin (CRESTOR) 10 MG tablet, Take 10 mg by mouth every other day. , Disp: , Rfl:         Sherice Alvarez is a 83 y.o. Female    Chief Complaint   Patient presents with     Follow-up       Diagnoses:  See Problem list     Recommendations:    For now we will continue with her current medical therapy.  In December she can discontinue clopidogrel and then start aspirin 81 mg a day.  For now were continuing with with just 1 antiplatelet agent in view of her recurrent epistaxis and bleeding risk.      Subjective:   Patient returns for follow-up.  83 years old.  Status post three-vessel PCI stent late 2016.  She has not had chest pressure pain or anginal symptoms.  She does have chronic heartburn symptoms that this is typical of her gastroesophageal reflux.  This year she has had recurring issues with bleeding including significant epistaxis.  This prompted a 6 day hospitalization to control a episode of bleeding from her sinusitis leading to a hemorrhagic epistaxis.  In view of this her antiplatelet therapy has been reduced to monotherapy with clopidogrel.  The bleeding is stopped she has resumed her usual activity she says she can walk 16 flights of stairs goes to the [employer] and does Gema [employer]ill[employer] aerobic exercise tolerates all these activities well without symptoms or limitation                    Past Medical History:   Diagnosis Date     Asthma      CAD (coronary artery disease)      Cancer     basal cell     Chronic kidney disease     ckd 3     Crohn's disease      GERD (gastroesophageal reflux disease)      Hyperlipidemia      Hypertension      Restless legs syndrome      Stroke 2010    numbness rt jaw     Past Surgical History:   Procedure Laterality Date     APPENDECTOMY       CARDIAC CATHETERIZATION  11/04/2016    multiple vessel disease.  no intervention     CARDIAC CATHETERIZATION  11/07/2016     CARDIAC  CATHETERIZATION N/A 2016    Procedure: Coronary Angiogram;  Surgeon: Sunil Moran MD;  Location: [facility] Cath Lab;  Service:      CAROTID ENDARTERECTOMY       CAROTID ENDARTERECTOMY Right 2010      SECTION       HEMORROIDECTOMY       TONSILLECTOMY AND ADENOIDECTOMY       TOTAL KNEE ARTHROPLASTY Right      Allergies   Allergen Reactions     Erythromycin Unknown     Childhood reaction     Naprosyn [Naproxen] Unknown     Other Environmental Allergy      Molds  dander     Family History   Problem Relation Age of Onset     Atrial fibrillation Mother      Parkinsonism Father       Social History     Social History     Marital status:      Spouse name: N/A     Number of children: N/A     Years of education: N/A     Occupational History     Not on file.     Social History Main Topics     Smoking status: Former Smoker     Quit date: 1980     Smokeless tobacco: Never Used     Alcohol use No     Drug use: No     Sexual activity: Not on file     Other Topics Concern     Not on file     Social History Narrative    Recently put  onto hospice cares     Family history not pertinent to chief complaint or presenting problem    Current Outpatient Prescriptions:      albuterol (PROVENTIL HFA;VENTOLIN HFA) 90 mcg/actuation inhaler, Inhale 1-2 puffs every 6 (six) hours as needed for wheezing., Disp: , Rfl:      amitriptyline (ELAVIL) 25 MG tablet, Take 25 mg by mouth bedtime as needed for sleep. , Disp: , Rfl:      atorvastatin (LIPITOR) 40 MG tablet, TAKE 1 TABLET BY MOUTH DAILY, Disp: , Rfl: 6     BIOTIN ORAL, Take 1 capsule by mouth every other day. , Disp: , Rfl:      calcium, as carbonate, (OS-BESS) 500 mg calcium (1,250 mg) tablet, Take 1 tablet by mouth daily., Disp: , Rfl:      clopidogrel (PLAVIX) 75 mg tablet, Take 1 tablet (75 mg total) by mouth daily., Disp: 90 tablet, Rfl: 3     coenzyme Q10 100 mg capsule, Take 100 mg by mouth every other day. , Disp: , Rfl:      ferrous sulfate 325  "(65 FE) MG tablet, Take 1 tablet by mouth daily with breakfast., Disp: , Rfl:      HYDROmorphone (DILAUDID) 2 MG tablet, Take 0.5 tablets (1 mg total) by mouth every 4 (four) hours as needed for pain., Disp: 20 tablet, Rfl: 0     Lactobacillus rhamnosus GG (CULTURELLE) 10-15 Billion cell capsule, Take 1 capsule by mouth daily., Disp: , Rfl:      lisinopril (PRINIVIL,ZESTRIL) 2.5 MG tablet, Take 1 tablet (2.5 mg total) by mouth daily., Disp: 90 tablet, Rfl: 3     metoprolol tartrate (LOPRESSOR) 25 MG tablet, Take 0.5 tablets (12.5 mg total) by mouth 2 (two) times a day., Disp: 90 tablet, Rfl: 3     multivitamin therapeutic (THERAGRAN) tablet, Take 1 tablet by mouth daily., Disp: , Rfl:      nitroglycerin (NITROSTAT) 0.4 MG SL tablet, Place 1 tablet (0.4 mg total) under the tongue every 5 (five) minutes as needed for chest pain., Disp: 90 tablet, Rfl: 0     omeprazole (PRILOSEC) 20 MG capsule, TAKE ONE CAPSULE BY MOUTH EVERY DAY, Disp: , Rfl: 1     predniSONE (DELTASONE) 10 MG tablet, Take 5 mg by mouth daily as needed (allergies, asthma exacerbation). , Disp: , Rfl:      rOPINIRole (REQUIP) 2 MG tablet, Take 4 mg by mouth every evening. , Disp: , Rfl:      rosuvastatin (CRESTOR) 10 MG tablet, Take 10 mg by mouth every other day. , Disp: , Rfl:       Objective:   /76 (Patient Site: Left Arm, Patient Position: Sitting, Cuff Size: Adult Regular)  Pulse 66  Resp 16  Ht 5' 1.5\" (1.562 m)  Wt 132 lb 14.4 oz (60.3 kg)  LMP  (LMP Unknown)  BMI 24.7 kg/m2  132 lb 14.4 oz (60.3 kg)   Wt Readings from Last 3 Encounters:   08/28/17 132 lb 14.4 oz (60.3 kg)   07/21/17 132 lb 12.8 oz (60.2 kg)   04/22/17 135 lb (61.2 kg)     BP Readings from Last 3 Encounters:   08/28/17 130/76   07/21/17 142/75   04/22/17 134/63     Pulse Readings from Last 3 Encounters:   08/28/17 66   07/21/17 75   04/22/17 69     General appearance: alert, appears stated age and cooperative  Head: Normocephalic, without obvious abnormality, " atraumatic  Eyes: Normal external exam without jaundice.  Ears: Normal external auricular exam.  Nose: Normal external exam.  Lungs: clear to auscultation bilaterally  Chest wall: no tenderness  Heart: regular rate and rhythm, S1, S2 normal, no murmur, click, rub or gallop a 3/6 but somewhat soft systolic ejection murmur no carotid bruit  Pulses: 2+ and symmetric  Skin: Skin color, texture, turgor normal.   Neurologic: Grossly normal, no focal neurologic findings.    Review of Systems:   General: Weight Loss  Cardiographics: Reviewed in clinic.    Lab Results:  Lab Results: Personally reviewed  Lab Results   Component Value Date    CHOL 190 02/01/2017    TRIG 68 02/01/2017    HDL 62 02/01/2017       Clinical evaluation time today including exam 30 minutes.  At least 50% of clinic evaluation time involved in assessment and patient counseling.  Part of this chart was created using a dictation software.  Typographic errors, word substitutions, and grammatical errors may unintentionally occur.    Sunil Moran M.D.  [facility]

## 2021-06-12 NOTE — PATIENT INSTRUCTIONS - HE
Ms Sherice Alvarez,  I enjoyed visiting with you again today.  I am glad to hear you are doing well.  Per our conversation stop the PLAVIX around April 17, 2021, limit fluids and salt the best you can, and if weight up 3 pounds overnight take a whole FUROSEMIDE.  I will plan on seeing you around April.  Ulisses Feldman

## 2021-06-14 NOTE — PROGRESS NOTES
HISTORY OF PRESENT ILLNESS  Patient reports that she had a nosebleed last week that would not stop.  It was primary from the left side of the nose.  She presented to the ED about 6 days ago and her left nose was packed with a rapid rhino.  She reports no further bleeding since the nose was packed.  No nasal drainage or discharge.  The patient reports that she is otherwise healthy and is not taking any blood thinners.      REVIEW OF SYSTEMS  Review of Systems: a 10-system review was performed. Pertinent positives are noted in the HPI and on a separate scanned document in the chart.    PMH, PSH, FH and SH has documented in the EHR.      EXAM    CONSTITUTIONAL  General Appearance:  Normal, well developed, well nourished, no obvious distress  Ability to Communicate:  communicates appropriately.    HEAD AND FACE  Appearance and Symmetry:  Normal, no scalp or facial scarring or suspicious lesions.  Paranasal sinuses tenderness:  Normal, Paranasal sinuses non tender    EARS  Clinical speech reception threshold:  Normal, able to hear normal speech.  Auricle:  Normal, Auricles without scars, lesions, masses.  External auditory canal:  Normal, External auditory canal normal.  Tympanic membrane:  Normal, Tympanic membranes normal without swelling or erythema.  Tympanic membrane mobility:  Normal, Normal tympanic membrane mobility.    NOSE (speculum or scope)  Architecture:  Normal, Grossly normal external nasal architecture with no masses or lesions.  Mucosa:  Rapid rhino removed from the left side of the nose. No obvious source of bleeding anteriorly.  There was some fresh blood on the mid left septums.  This was likely related to the trauma of the nasal packing.  Septum:  Normal, Septum non-obstructing.  Turbinates:  Normal, No turbinate abnormalities    ORAL CAVITY AND OROPHARYNX  Lips:  Normal.  Dental and gingiva:  Normal, No obvious dental or gingival disease.  Mucosa:  Normal, Moist mucous membranes.  Tongue:  Normal,  Tongue mobile with no mucosal abnormalities  Hard and soft palate:  Normal, Hard and soft palate without cleft or mucosal lesions.  Oral pharynx:  Normal, Posterior pharynx without lesions or remarkable asymmetry.  Saliva:  Normal, Clear saliva.  Masses:  Normal, No palpable masses or pathologically enlarged lymph nodes.    NECK  Masses/lymph nodes:  Normal, No worrisome neck masses or lymph nodes.  Salivary glands:  Normal, Parotid and submandibular glands.  Trachea and larynx position:  Normal, Trachea and larynx midline.  Thyroid:  Normal, No thyroid abnormality.  Tenderness:  Normal, No cervical tenderness.  Suppleness:  Normal, Neck supple    NEUROLOGICAL  Speech pattern:  Normal, Proasaic    RESPIRATORY  Symmetry and Respiratory effort:  Normal, Symmetric chest movement and expansion with no increased intercostal retractions or use of accessory muscles.     IMPRESSION  Some minor blood noted on the septum.  No active bleeding.  I reassured the patient of the findings.     RECOMMENDATION  There  Is nothing to do based on today's exam.  I discussed for the need of an ENT physician exam should she bleed from the left nose in the near future.    Khalif Valdez MD

## 2021-06-14 NOTE — PROGRESS NOTES
Chief Complaint   Patient presents with     Allergic Reaction     Diarrhea     since pt has been on antibiotic on Friday, pt took only 3 pills only and stop taking it.        HPI    Patient is here for having several episodes per day of nonbloody watery diarrhea x 2 days. She was started on Keflex 11/16 after left sided nasal packing 2/2 epistaxis. She stopped taking the keflex 2 days ago and her diarrhea improved. Today she has not had any diarrhea. No dizziness, fever, abdominal pain, (except slight cramping). She had some nausea which resolved. No vomiting. No recent travel, nor dietary changes. She has not been on Keflex before. She is scheduled to see ENT for packing removal in 2 days.       ROS: Pertinent ROS noted in HPI.     Allergies   Allergen Reactions     Erythromycin Unknown     Childhood reaction     Naprosyn [Naproxen] Unknown     Other Environmental Allergy      Molds  dander       Patient Active Problem List   Diagnosis     Constipation     Cancer     Gastroesophageal reflux disease with esophagitis     Coronary artery disease involving native coronary artery of native heart with unstable angina pectoris     Hypercholesterolemia     Essential hypertension with goal blood pressure less than 140/90     Acute infection of nasal sinus     Epistaxis     CKD (chronic kidney disease), stage 3 (moderate)     Chest pain     Abnormal chest CT       Family History   Problem Relation Age of Onset     Atrial fibrillation Mother      Parkinsonism Father        Social History     Social History     Marital status:      Spouse name: N/A     Number of children: N/A     Years of education: N/A     Occupational History     Not on file.     Social History Main Topics     Smoking status: Former Smoker     Quit date: 1/1/1980     Smokeless tobacco: Never Used     Alcohol use No     Drug use: No     Sexual activity: Not on file     Other Topics Concern     Not on file     Social History Narrative    Recently put   onto hospice cares         Objective:    Vitals:    11/20/17 1233   BP: 100/60   Pulse: 65   Resp: 16   Temp: 98  F (36.7  C)   SpO2: 98%       Gen:NAD  Oropharynx: normal throat, oral mucosa moist  Nose: nasal packing in place on left side without evidence of infection, pus drainage. R nasal mucosa normal  CV: RRR, no M, R, G  Pulm: CTA, normal effort  Abd: normal bowel sounds, soft, no pain, no HSM/mass.       Assessment:    #1. Antibiotic associated diarrhea - improving with discontinuation of Keflex.     #2. Left epistaxis s/p nasal packing - no evidence of infection     Plan:    #1. Discontinue Keflex. Start Augmentin with probiotics.  #2. Start Augmentin. F/u with ENT as already scheduled.

## 2021-06-16 PROBLEM — I20.9 ANGINA PECTORIS, UNSPECIFIED (H): Status: ACTIVE | Noted: 2020-04-16

## 2021-06-16 PROBLEM — R06.00 DYSPNEA: Status: ACTIVE | Noted: 2020-05-07

## 2021-06-16 PROBLEM — R07.9 CHEST PAIN: Status: ACTIVE | Noted: 2017-07-21

## 2021-06-16 PROBLEM — R07.89 ATYPICAL CHEST PAIN: Status: ACTIVE | Noted: 2020-04-16

## 2021-06-16 PROBLEM — R06.89 ACUTE RESPIRATORY INSUFFICIENCY: Status: ACTIVE | Noted: 2019-05-14

## 2021-06-16 PROBLEM — J45.21 MILD INTERMITTENT ASTHMA WITH EXACERBATION: Status: ACTIVE | Noted: 2019-05-17

## 2021-06-16 PROBLEM — R04.0 ANTERIOR EPISTAXIS: Status: ACTIVE | Noted: 2017-11-22

## 2021-06-16 PROBLEM — J18.9 RLL PNEUMONIA: Status: ACTIVE | Noted: 2019-05-14

## 2021-06-16 NOTE — TELEPHONE ENCOUNTER
Spoke with patient, she was agreeable to starting Repatha. She is pleased to stop atorvastatin since it has been causing her muscle aches. Referred her to Repatha website for instructions on administration. She will obtain help from her daughter as well. No further questions. Lipid panel order placed.  -kcl

## 2021-06-16 NOTE — PATIENT INSTRUCTIONS - HE
Ms Sherice Alvarez,  I enjoyed visiting with you again today.  I am glad to hear you are doing well.  Per our conversation we will recheck the echo of the heart and the stress test.  I will plan on seeing you 1 year.  Ulisses Feldman

## 2021-06-16 NOTE — TELEPHONE ENCOUNTER
Dr. Feldman, pt did not wish to increase atorvastatin due to muscle aches. However, she was ok with going forward with Repatha and her PA was approved today. Ok to stop atorvastatin and start Repatha injections 140 mg injection every 2 weeks?

## 2021-06-17 ENCOUNTER — RECORDS - HEALTHEAST (OUTPATIENT)
Dept: ADMINISTRATIVE | Facility: OTHER | Age: 86
End: 2021-06-17

## 2021-06-17 NOTE — TELEPHONE ENCOUNTER
Telephone Encounter by Anali Venegas RN at 4/12/2021  1:55 PM     Author: Anali Venegas RN Service: -- Author Type: Registered Nurse    Filed: 4/12/2021  2:02 PM Encounter Date: 4/12/2021 Status: Signed    : Anali Venegas RN (Registered Nurse)       Yolanda Feldman MD Larsen, Kellie C, RN   Caller: Unspecified (Today, 10:29 AM)             I would ask her to try to take her atorvastatin 80 mg every day.  If she feels like muscle cramps are really bad we need to discontinue this and go to a PCSK9 inhibitor.  Given that these recent labs are from March 30, I would like her to not get repeat lipids for about 2 to 3 months provided she can tolerate atorvastatin daily.  If not once again, she should contact us and we will consider PCSK9 inhibitor, plan on fasting lipids once again in about 2 to 3 months depending on her tolerance of the medication and at that time also would like to add CPK and LFTs.  LF      Spoke with patient, she would prefer not to increase dose of Atorvastatin, she states the muscle cramps are pretty bad. She was interested in a PCSK9 inhibitor. She understand these are self-injections and dose not have a problem with that.     Mahnaz, can you help start the PA process for Repatha? Pt was agreeable to going forward with this. Thanks!

## 2021-06-17 NOTE — TELEPHONE ENCOUNTER
Telephone Encounter by Anali Venegas RN at 4/12/2021 10:26 AM     Author: Anali Venegas RN Service: -- Author Type: Registered Nurse    Filed: 4/12/2021 11:47 AM Encounter Date: 4/12/2021 Status: Addendum    : Anali Venegas RN (Registered Nurse)    Related Notes: Original Note by Anali Venegas RN (Registered Nurse) filed at 4/12/2021 10:33 AM       ----- Message from [employer] sent at 4/12/2021 10:12 AM CDT -----  Regarding: [facility] PT / MEDICATION QUESTIONS  General phone call:    Caller: Pt's dtrJacqueline    Primary cardiologist: NEETA     Detailed reason for call: Jacqueline states that pt had a follow-up visit with [facility] on 4/9 and she was instructed to stop taking Plavix. Jacqueline wants to know if this will be a permanent stop or will pt go back on Plavix at a later time? Also, Jacqueline wants to know if pt can have anti-inflammatory medications? Please call back.     Best phone number: [phone], Pt's dtr Jacqueline     Best time to contact: Anytime     Ok to leave a detailed message? Yes     Device? No     Additional Info:        Yolanda Feldman MD Caswell, Shelley AHN RN             A little help with this 86-year-old lady with coronary artery disease so I am getting a stress test and echo on.  I reviewed her lipids, markedly elevated over 200 whereas last time they were good in the 100s.  States she is on atorvastatin 80 mg.  Can we call her at home, probably Monday, and see if she is in fact taking atorvastatin 80 mg and if not why it was stopped.  If she is in fact taking it could we reset fasting lipids, the lipids look drastically changed.            Returned call to patient's daughter, Jacqueline. She wanted to clarify when to discontinue Plavix. Per 4/9 OV note Dr. Feldman recommends discontinuing Plavix at the end of April. Jacqueline also wanted to know if NSAIDs would be ok to use for pain. Informed Jacqueline that due to heart failure, NSAIDs should be avoided. Instructed her to discuss pain  management with PCP for alternatives.     Sherice confirms she is taking atorvastatin 80 mg every other day. She recently had her lipid panel rechecked on 3/30. She states she received a letter stating that her cholesterol has improved so she was confused.     Dr. Feldman, pt confirms she is taking 80 mg of atorvastatin every other day. She does stop taking medication at times for a day or two because it causes her to have muscle cramps occasionally. Also, she states there was a time when she was told to stop taking it for a while but she does not remember why or when. She was agreeable to re-checking lipid profile. I'll have them stop at the lab on 5/3 when she has her echo and stress test completed. Any other recs?

## 2021-06-17 NOTE — ANESTHESIA POSTPROCEDURE EVALUATION
Patient: Sherice Alvarez  Procedure(s):  LEFT TOTAL KNEE ARTHROPLASTY (Left)  Anesthesia type: spinal    Patient location: PACU  Last vitals:   Vitals Value Taken Time   /63 05/11/21 1735   Temp 35.8  C (96.5  F) 05/11/21 1710   Pulse 63 05/11/21 1735   Resp 16 05/11/21 1735   SpO2 95 % 05/11/21 1735     Post vital signs: stable  Level of consciousness: awake and responds to simple questions  Post-anesthesia pain: pain controlled  Post-anesthesia nausea and vomiting: no  Pulmonary: unassisted, return to baseline  Cardiovascular: stable and blood pressure at baseline  Hydration: adequate  Anesthetic events: no

## 2021-06-17 NOTE — PROGRESS NOTES
Code Status:  FULL CODE  Visit Type: Problem Visit (DVT, SOB )    Facility:  [facility] [[id number]]             History of Present Illness: Sherice Alvarez is a 86 y.o. female who I am seeing today for follow-up on the TCU.  Patient recently hospitalized on 5/11/2021 secondary to DJD of the left knee.  Patient status post left total knee arthroplasty on 5/11/2021.  Patient continues on Tylenol and oxycodone for pain.  There were no intraoperative complications.  Postoperative complications included some bruising.  And acute blood loss anemia.  Hemoglobin down to 8.6.  Past medical history includes GERD's, dyslipidemia, hypertension, intermittent asthma, CAD, chronic diastolic heart failure status post TAVR and stage III kidney disease.    Today patient sitting up in bedside chair.  Patient status post left total knee arthroplasty. Pt continues on oxycodone and Tylenol for pain. She is having some increased pain in her RLE. She continues on xarelto for DVT. Over the weekend she complained of shortness of breath and chest tightness. Nursing thought it may be anxiety. Today I talked with her and her daughter regarding possible PE. I have recommended follow up chest CT to rule out prior to discharging. Pt is to discharge home in am. Initially she denied any shortness of breath or CP. However after initial visit just prior to going out for her CT she had an episode in which she felt faint, dizzy and tingling in her arm and hands. She has her eyes closed on visit and is breathing rapid like hyperventilation. I did talk her through it and it resolved. She also described the pain in both her hips and legs as unbearable. After much discussion she was given a muscle relaxer. Acute blood loss anemia.  Hemoglobin 9.3.  She does continue on iron supplement.  History of GERD on PPI twice daily.  Hypertension satisfactory controlled.  Restless leg syndrome on Requip.    Active Ambulatory Problems      Diagnosis Date Noted     Constipation 06/03/2014     Cancer (H)      Gastroesophageal reflux disease with esophagitis 11/04/2016     Dyslipidemia, goal LDL below 100 11/04/2016     Benign essential hypertension      Epistaxis      Chest pain 07/21/2017     Abnormal chest CT      Anterior epistaxis 11/22/2017     Acute respiratory insufficiency 05/14/2019     RLL pneumonia 05/14/2019     Stage 3 chronic kidney disease 05/15/2019     Asthma 05/17/2019     Mild intermittent asthma with exacerbation 05/17/2019     Coronary artery disease involving native coronary artery of native heart with unstable angina pectoris (H)      Lightheadedness      Angina pectoris, unspecified (H) 04/16/2020     Atypical chest pain 04/16/2020     Severe calcific aortic valve stenosis      Dyspnea 05/07/2020     S/P TAVR (transcatheter aortic valve replacement) 06/02/2020     Acute diastolic congestive heart failure (H)      Chronic diastolic heart failure (H)      Leukocytosis, unspecified type      S/P total knee arthroplasty, left 05/11/2021     Resolved Ambulatory Problems     Diagnosis Date Noted     Chest pain 11/03/2016     ACS (acute coronary syndrome) (H) 11/04/2016     Ischemic chest pain (H)      NSTEMI (non-ST elevated myocardial infarction) (H)      Acute chest pain 12/27/2016     Acute infection of nasal sinus 02/18/2017     Severe aortic stenosis 08/17/2018     Acute respiratory failure with hypoxia (H) 05/15/2019     Coronary artery disease 04/23/2020     Past Medical History:   Diagnosis Date     Arthritis      CAD (coronary artery disease)      Chronic kidney disease      Chronic pain syndrome      Crohn's disease (H)      GERD (gastroesophageal reflux disease)      Hx of heart artery stent 11/2016     Hyperlipidemia      Hypertension      PONV (postoperative nausea and vomiting)      Restless legs syndrome      Stroke (H) 2010       Current Outpatient Medications   Medication Sig Note     acetaminophen (TYLENOL) 325 MG  tablet Take 3 tablets (975 mg total) by mouth every 8 (eight) hours.      albuterol (PROVENTIL HFA;VENTOLIN HFA) 90 mcg/actuation inhaler Inhale 1-2 puffs every 6 (six) hours as needed for wheezing.      amLODIPine (NORVASC) 5 MG tablet Take 1 tablet (5 mg total) by mouth daily.      beclomethasone (QVAR) 80 mcg/actuation inhaler Inhale 1 puff 2 (two) times a day.      coenzyme Q10 100 mg capsule Take 100 mg by mouth daily.       ergocalciferol (ERGOCALCIFEROL) 50,000 unit capsule Take 50,000 Units by mouth once a week. 10/8/2019: On Thursdays     evolocumab (REPATHA SYRINGE) 140 mg/mL Syrg Inject 1 mL (140 mg total) under the skin every 14 (fourteen) days.      ferrous sulfate 325 (65 FE) MG tablet Take 1 tablet (325 mg total) by mouth daily with breakfast.      furosemide (LASIX) 20 MG tablet Take 10 mg by mouth 2 (two) times a day as needed.      melatonin 5 mg Tab tablet Take 5 mg by mouth at bedtime as needed (for sleep).       metoprolol succinate (TOPROL-XL) 25 MG Take 1 tablet (25 mg total) by mouth daily.      montelukast (SINGULAIR) 10 mg tablet Take 10 mg by mouth at bedtime as needed.       multivitamin therapeutic (THERAGRAN) tablet Take 1 tablet by mouth daily.      nitroglycerin (NITROSTAT) 0.4 MG SL tablet Place 1 tablet (0.4 mg total) under the tongue every 5 (five) minutes as needed for chest pain.      omeprazole (PRILOSEC) 20 MG capsule Take 1-2 capsules (20-40 mg total) by mouth daily before breakfast.      oxyCODONE (ROXICODONE) 5 MG immediate release tablet Take 1-2 tabs every 4-6 hours as needed for pain. Take 1 tab for pain 1-6/10, take 2 tabs for pain 7-10/10. Do not exceed 6 pills in one day.      rivaroxaban ANTICOAGULANT (XARELTO) 15 mg tablet Take 15 mg by mouth 2 (two) times a day with meals. Take 15 mg twice daily x21 days then 20 mg daily for 3 months.      rOPINIRole (REQUIP) 2 MG tablet Take 4 mg by mouth every evening. Patient takes 7pm      senna-docusate (PERICOLACE) 8.6-50 mg  tablet Take 1 tablet by mouth 2 (two) times a day as needed for constipation.        Allergies   Allergen Reactions     Amitriptyline Hcl Other (See Comments)     Erythromycin Diarrhea and Other (See Comments)     Childhood reaction     Gabapentin Other (See Comments)     Jerking movements, swelling     Naproxen Unknown and Other (See Comments)     Other Environmental Allergy      Molds  dander     Pregabalin Other (See Comments)         Review of Systems  No fevers or chills. No headache, lightheadedness or dizziness. No SOB, chest pains or palpitations. Appetite is fair.  She reports dislike of the food.  No nausea, vomiting, constipation or diarrhea. No dysuria, frequency, burning or pain with urination.  Pain a little better on today's visit.  She also has increased swelling and bruising to the left lower extremity.  Restless leg syndrome on Requip.  Patient continues on Tylenol and oxycodone for pain.  Otherwise review of systems are negative.     Physical Exam  PHYSICAL EXAMINATION:  Vital signs: /74, pulse 84, respirations 12, O2 sat 99% on room air.  Weight 139.4 pounds.  General: Awake, Alert, oriented x3, appropriately, follows simple commands, conversant  HEENT:PERRLA, Pink conjunctiva, anicteric sclerae, moist oral mucosa  NECK: Supple, without any lymphadenopathy, or masses  CVS:  S1  S2, without murmur or gallop.   LUNG: Clear to auscultation, No wheezes, rales or rhonci.  BACK: No kyphosis of the thoracic spine  ABDOMEN: Soft, nontender to palpation, with positive bowel sounds  EXTREMITIES: Good range of motion on both upper and lower extremities, 2+ pedal edema on the surgical side, mild tenderness to LLE.   SKIN: Moderate bruising of the left thigh, knee and shin.  Tegaderm dressing in place.  NEUROLOGIC: Intact, pulses weak on the operative side.  PSYCHIATRIC: Cognition intact.  Pleasant affect.      Labs:    Lab Results   Component Value Date    WBC 11.5 (H) 06/18/2020    HGB 9.3 (L)  05/17/2021    HCT 28.9 (L) 06/18/2020    MCV 92 06/18/2020     06/18/2020     Results for orders placed or performed in visit on 05/17/21   Basic Metabolic Panel   Result Value Ref Range    Sodium 140 136 - 145 mmol/L    Potassium 4.1 3.5 - 5.0 mmol/L    Chloride 105 98 - 107 mmol/L    CO2 25 22 - 31 mmol/L    Anion Gap, Calculation 10 5 - 18 mmol/L    Glucose 88 70 - 125 mg/dL    Calcium 8.7 8.5 - 10.5 mg/dL    BUN 15 8 - 28 mg/dL    Creatinine 0.85 0.60 - 1.10 mg/dL    GFR MDRD Af Amer >60 >60 mL/min/1.73m2    GFR MDRD Non Af Amer >60 >60 mL/min/1.73m2           Assessment/Plan:  1. History of total left knee replacement   Continue with Tylenol and oxycodone for pain.  Methocarbamol 250 mg now and two times a day prn.    2.   DVT left lower extremity  Xarelto 15 mg p.o. twice daily x21 days then transition to 20 mg p.o. daily x3 months.   Continue ice and elevation.  Continue with BLAIR hose on a.m. off at at bedtime. Continue ice and elevation.    3.  shortness of breath  Follow up CTPE run today to rule out PE.   Also could be anxiety.    4.    GERD  continue PPI twice daily while on anticoagulation.  Aspirin discontinued while on Xarelto.   5.  Chronic congestive heart failure, unspecified heart failure type (H)  Weight stable. Edema less on today's visit.    6.  Essential hypertension   satisfactory control.   7.  Acute blood loss anemia   hemoglobin 9.3.  Patient continues on iron supplement.     Total time spent for this visit was 60 minutes with > 50% of the time spent face to face with pt and her daughter reviewing signs of PE, treatment of DVT, muscle relaxer and pain management.     This note has been dictated using voice recognition software. Any grammatical or context distortions are unintentional and inherent to the software    Electronically signed by: Karyn Abraham, EMILE

## 2021-06-17 NOTE — PROGRESS NOTES
Code Status:  FULL CODE  Visit Type: Problem Visit (Left total knee arthroplasty, DVT)     Facility:  [facility] [[id number]]             History of Present Illness: Sherice Alvarez is a 86 y.o. female who I am seeing today for follow-up on the TCU.  Patient recently hospitalized on 5/11/2021 secondary to DJD of the left knee.  Patient status post left total knee arthroplasty on 5/11/2021.  Patient continues on Tylenol and oxycodone for pain.  There were no intraoperative complications.  Postoperative complications included some bruising.  And acute blood loss anemia.  Hemoglobin down to 8.6.  Past medical history includes GERD's, dyslipidemia, hypertension, intermittent asthma, CAD, chronic diastolic heart failure status post TAVR and stage III kidney disease.    Today patient sitting up in bedside chair.  Patient status post left total knee arthroplasty.  Pain well controlled with oxycodone and Tylenol.  Surgical dressing in place.  She does have moderate swelling and bruising.  The bruising today is slightly improved.  Recent venous Doppler obtained which showed DVT to the left lower extremity.  She continues on Xarelto.  Her aspirin has been discontinued while on Xarelto secondary to increased bruising.  She continues in BLAIR hose.  Acute blood loss anemia.  Hemoglobin 9.3.  She does continue on iron supplement.  History of GERD on PPI twice daily.  Hypertension satisfactory controlled.  Restless leg syndrome on Requip.    Active Ambulatory Problems     Diagnosis Date Noted     Constipation 06/03/2014     Cancer (H)      Gastroesophageal reflux disease with esophagitis 11/04/2016     Dyslipidemia, goal LDL below 100 11/04/2016     Benign essential hypertension      Epistaxis      Chest pain 07/21/2017     Abnormal chest CT      Anterior epistaxis 11/22/2017     Acute respiratory insufficiency 05/14/2019     RLL pneumonia 05/14/2019     Stage 3 chronic kidney disease 05/15/2019     Asthma  05/17/2019     Mild intermittent asthma with exacerbation 05/17/2019     Coronary artery disease involving native coronary artery of native heart with unstable angina pectoris (H)      Lightheadedness      Angina pectoris, unspecified (H) 04/16/2020     Atypical chest pain 04/16/2020     Severe calcific aortic valve stenosis      Dyspnea 05/07/2020     S/P TAVR (transcatheter aortic valve replacement) 06/02/2020     Acute diastolic congestive heart failure (H)      Chronic diastolic heart failure (H)      Leukocytosis, unspecified type      S/P total knee arthroplasty, left 05/11/2021     Resolved Ambulatory Problems     Diagnosis Date Noted     Chest pain 11/03/2016     ACS (acute coronary syndrome) (H) 11/04/2016     Ischemic chest pain (H)      NSTEMI (non-ST elevated myocardial infarction) (H)      Acute chest pain 12/27/2016     Acute infection of nasal sinus 02/18/2017     Severe aortic stenosis 08/17/2018     Acute respiratory failure with hypoxia (H) 05/15/2019     Coronary artery disease 04/23/2020     Past Medical History:   Diagnosis Date     Arthritis      CAD (coronary artery disease)      Chronic kidney disease      Chronic pain syndrome      Crohn's disease (H)      GERD (gastroesophageal reflux disease)      Hx of heart artery stent 11/2016     Hyperlipidemia      Hypertension      PONV (postoperative nausea and vomiting)      Restless legs syndrome      Stroke (H) 2010       Current Outpatient Medications   Medication Sig Note     acetaminophen (TYLENOL) 325 MG tablet Take 3 tablets (975 mg total) by mouth every 8 (eight) hours.      albuterol (PROVENTIL HFA;VENTOLIN HFA) 90 mcg/actuation inhaler Inhale 1-2 puffs every 6 (six) hours as needed for wheezing.      amLODIPine (NORVASC) 5 MG tablet Take 1 tablet (5 mg total) by mouth daily.      beclomethasone (QVAR) 80 mcg/actuation inhaler Inhale 1 puff 2 (two) times a day.      coenzyme Q10 100 mg capsule Take 100 mg by mouth daily.        ergocalciferol (ERGOCALCIFEROL) 50,000 unit capsule Take 50,000 Units by mouth once a week. 10/8/2019: On Thursdays     evolocumab (REPATHA SYRINGE) 140 mg/mL Syrg Inject 1 mL (140 mg total) under the skin every 14 (fourteen) days.      ferrous sulfate 325 (65 FE) MG tablet Take 1 tablet (325 mg total) by mouth daily with breakfast.      furosemide (LASIX) 20 MG tablet Take 10 mg by mouth 2 (two) times a day as needed.      melatonin 5 mg Tab tablet Take 5 mg by mouth at bedtime as needed (for sleep).       metoprolol succinate (TOPROL-XL) 25 MG Take 1 tablet (25 mg total) by mouth daily.      montelukast (SINGULAIR) 10 mg tablet Take 10 mg by mouth at bedtime as needed.       multivitamin therapeutic (THERAGRAN) tablet Take 1 tablet by mouth daily.      nitroglycerin (NITROSTAT) 0.4 MG SL tablet Place 1 tablet (0.4 mg total) under the tongue every 5 (five) minutes as needed for chest pain.      omeprazole (PRILOSEC) 20 MG capsule Take 1-2 capsules (20-40 mg total) by mouth daily before breakfast.      oxyCODONE (ROXICODONE) 5 MG immediate release tablet Take 1-2 tabs every 4-6 hours as needed for pain. Take 1 tab for pain 1-6/10, take 2 tabs for pain 7-10/10. Do not exceed 6 pills in one day.      rivaroxaban ANTICOAGULANT (XARELTO) 15 mg tablet Take 15 mg by mouth 2 (two) times a day with meals. Take 15 mg twice daily x21 days then 20 mg daily for 3 months.      rOPINIRole (REQUIP) 2 MG tablet Take 4 mg by mouth every evening. Patient takes 7pm      senna-docusate (PERICOLACE) 8.6-50 mg tablet Take 1 tablet by mouth 2 (two) times a day as needed for constipation.        Allergies   Allergen Reactions     Amitriptyline Hcl Other (See Comments)     Erythromycin Diarrhea and Other (See Comments)     Childhood reaction     Gabapentin Other (See Comments)     Jerking movements, swelling     Naproxen Unknown and Other (See Comments)     Other Environmental Allergy      Molds  dander     Pregabalin Other (See Comments)          Review of Systems  No fevers or chills. No headache, lightheadedness or dizziness. No SOB, chest pains or palpitations. Appetite is fair.  She reports dislike of the food.  No nausea, vomiting, constipation or diarrhea. No dysuria, frequency, burning or pain with urination.  Pain a little better on today's visit.  She also has increased swelling and bruising to the left lower extremity.  Restless leg syndrome on Requip.  Patient continues on Tylenol and oxycodone for pain.  Otherwise review of systems are negative.     Physical Exam  PHYSICAL EXAMINATION:  Vital signs: /65, pulse 86, respirations 20, temperature 98.4, O2 sat 96% on room air.  Weight 139.8 pounds.  General: Awake, Alert, oriented x3, appropriately, follows simple commands, conversant  HEENT:PERRLA, Pink conjunctiva, anicteric sclerae, moist oral mucosa  NECK: Supple, without any lymphadenopathy, or masses  CVS:  S1  S2, without murmur or gallop.   LUNG: Clear to auscultation, No wheezes, rales or rhonci.  BACK: No kyphosis of the thoracic spine  ABDOMEN: Soft, nontender to palpation, with positive bowel sounds  EXTREMITIES: Good range of motion on both upper and lower extremities, 3+ pedal edema on the surgical side, mild tenderness to LLE.   SKIN: Moderate bruising of the left thigh, knee and shin.  Tegaderm dressing in place.  NEUROLOGIC: Intact, pulses weak on the operative side.  PSYCHIATRIC: Cognition intact.  Pleasant affect.      Labs:    Lab Results   Component Value Date    WBC 11.5 (H) 06/18/2020    HGB 9.3 (L) 05/17/2021    HCT 28.9 (L) 06/18/2020    MCV 92 06/18/2020     06/18/2020     Results for orders placed or performed in visit on 05/17/21   Basic Metabolic Panel   Result Value Ref Range    Sodium 140 136 - 145 mmol/L    Potassium 4.1 3.5 - 5.0 mmol/L    Chloride 105 98 - 107 mmol/L    CO2 25 22 - 31 mmol/L    Anion Gap, Calculation 10 5 - 18 mmol/L    Glucose 88 70 - 125 mg/dL    Calcium 8.7 8.5 - 10.5 mg/dL     BUN 15 8 - 28 mg/dL    Creatinine 0.85 0.60 - 1.10 mg/dL    GFR MDRD Af Amer >60 >60 mL/min/1.73m2    GFR MDRD Non Af Amer >60 >60 mL/min/1.73m2           Assessment/Plan:  1. History of total left knee replacement   Continue with Tylenol and oxycodone for pain.   2.   DVT left lower extremity  Xarelto 15 mg p.o. twice daily x21 days then transition to 20 mg p.o. daily x3 months.   Continue ice and elevation.  Continue with BLAIR hose on a.m. off at at bedtime.    2.   GERD  continue PPI twice daily while on anticoagulation.  Aspirin discontinued while on Xarelto.   3. Chronic congestive heart failure, unspecified heart failure type (H)   compensated.  No shortness of breath or chest pain.   4. Essential hypertension   satisfactory control.   5. Acute blood loss anemia   hemoglobin 9.3.  Patient continues on iron supplement.         This note has been dictated using voice recognition software. Any grammatical or context distortions are unintentional and inherent to the software    Electronically signed by: Karyn Abraham, CNP

## 2021-06-17 NOTE — ANESTHESIA PROCEDURE NOTES
Spinal Block    Patient location during procedure: OR  Start time: 5/11/2021 2:46 PM  End time: 5/11/2021 2:49 PM  Reason for block: primary anesthetic    Staffing:  Performing  Anesthesiologist: Phong Daniels MD    Preanesthetic Checklist  Completed: patient identified, risks, benefits, and alternatives discussed, timeout performed, consent obtained, at patient's request, airway assessed, oxygen available, suction available, emergency drugs available and hand hygiene performed  Spinal Block  Patient position: sitting  Prep: ChloraPrep  Patient monitoring: blood pressure, continuous pulse ox, cardiac monitor and heart rate  Approach: midline  Location: L3-4  Injection technique: single-shot  Needle type: pencil-tip   Needle gauge: 24 G

## 2021-06-17 NOTE — PROGRESS NOTES
Code Status:  FULL CODE  Visit Type: Problem Visit (left knee swelling, pain )     Facility:  [facility] [[id number]]             History of Present Illness: Sherice Alvarez is a 86 y.o. female who I am seeing today for follow-up on the TCU.  Patient recently hospitalized on 5/11/2021 secondary to DJD of the left knee.  Patient status post left total knee arthroplasty on 5/11/2021.  Patient continues on Tylenol and oxycodone for pain.  There were no intraoperative complications.  Postoperative complications included some bruising.  And acute blood loss anemia.  Hemoglobin down to 8.6.  Past medical history includes GERD's, dyslipidemia, hypertension, intermittent asthma, CAD, chronic diastolic heart failure status post TAVR and stage III kidney disease.    Today patient sitting up in bedside chair.  Nursing staff reporting increased pain in her left lower extremity.  She does have moderate edema with the left leg being about 2 times the size of the right leg.  She does have moderate bruising surrounding the entire knee, thigh and shin.  She reports pain with dorsi flexion.  Pulses weak.  She does continue on aspirin for DVT prophylactics.  She also has restless leg syndrome.  She takes Requip for.  Earlier she was reporting increased jumpiness in her legs however upon my exam is reporting numbness and tingling with intense pain.  Underlying hypertension.  She continues on metoprolol, Norvasc and Lasix.  Acute blood loss anemia.  Continues on iron supplement.  Today hemoglobin 9.3.  Congestive heart failure.  Compensated.  Today BMP unremarkable.  Patient denies any shortness of breath or chest pain.      Active Ambulatory Problems     Diagnosis Date Noted     Constipation 06/03/2014     Cancer (H)      Gastroesophageal reflux disease with esophagitis 11/04/2016     Dyslipidemia, goal LDL below 100 11/04/2016     Benign essential hypertension      Epistaxis      Chest pain 07/21/2017     Abnormal  chest CT      Anterior epistaxis 11/22/2017     Acute respiratory insufficiency 05/14/2019     RLL pneumonia 05/14/2019     Stage 3 chronic kidney disease 05/15/2019     Asthma 05/17/2019     Mild intermittent asthma with exacerbation 05/17/2019     Coronary artery disease involving native coronary artery of native heart with unstable angina pectoris (H)      Lightheadedness      Angina pectoris, unspecified (H) 04/16/2020     Atypical chest pain 04/16/2020     Severe calcific aortic valve stenosis      Dyspnea 05/07/2020     S/P TAVR (transcatheter aortic valve replacement) 06/02/2020     Acute diastolic congestive heart failure (H)      Chronic diastolic heart failure (H)      Leukocytosis, unspecified type      S/P total knee arthroplasty, left 05/11/2021     Resolved Ambulatory Problems     Diagnosis Date Noted     Chest pain 11/03/2016     ACS (acute coronary syndrome) (H) 11/04/2016     Ischemic chest pain (H)      NSTEMI (non-ST elevated myocardial infarction) (H)      Acute chest pain 12/27/2016     Acute infection of nasal sinus 02/18/2017     Severe aortic stenosis 08/17/2018     Acute respiratory failure with hypoxia (H) 05/15/2019     Coronary artery disease 04/23/2020     Past Medical History:   Diagnosis Date     Arthritis      CAD (coronary artery disease)      Chronic kidney disease      Chronic pain syndrome      Crohn's disease (H)      GERD (gastroesophageal reflux disease)      Hx of heart artery stent 11/2016     Hyperlipidemia      Hypertension      PONV (postoperative nausea and vomiting)      Restless legs syndrome      Stroke (H) 2010       Current Outpatient Medications   Medication Sig Note     acetaminophen (TYLENOL) 325 MG tablet Take 3 tablets (975 mg total) by mouth every 8 (eight) hours.      albuterol (PROVENTIL HFA;VENTOLIN HFA) 90 mcg/actuation inhaler Inhale 1-2 puffs every 6 (six) hours as needed for wheezing.      amLODIPine (NORVASC) 5 MG tablet Take 1 tablet (5 mg total) by  mouth daily.      aspirin 81 MG EC tablet Take 1 tablet (81 mg total) by mouth 2 (two) times a day.      beclomethasone (QVAR) 80 mcg/actuation inhaler Inhale 1 puff 2 (two) times a day.      coenzyme Q10 100 mg capsule Take 100 mg by mouth daily.       ergocalciferol (ERGOCALCIFEROL) 50,000 unit capsule Take 50,000 Units by mouth once a week. 10/8/2019: On Thursdays     evolocumab (REPATHA SYRINGE) 140 mg/mL Syrg Inject 1 mL (140 mg total) under the skin every 14 (fourteen) days.      ferrous sulfate 325 (65 FE) MG tablet Take 1 tablet (325 mg total) by mouth daily with breakfast.      furosemide (LASIX) 20 MG tablet Take 10 mg by mouth 2 (two) times a day as needed.      melatonin 5 mg Tab tablet Take 5 mg by mouth at bedtime as needed (for sleep).       metoprolol succinate (TOPROL-XL) 25 MG Take 1 tablet (25 mg total) by mouth daily.      montelukast (SINGULAIR) 10 mg tablet Take 10 mg by mouth at bedtime as needed.       multivitamin therapeutic (THERAGRAN) tablet Take 1 tablet by mouth daily.      nitroglycerin (NITROSTAT) 0.4 MG SL tablet Place 1 tablet (0.4 mg total) under the tongue every 5 (five) minutes as needed for chest pain.      omeprazole (PRILOSEC) 20 MG capsule Take 1-2 capsules (20-40 mg total) by mouth daily before breakfast.      oxyCODONE (ROXICODONE) 5 MG immediate release tablet Take 1-2 tabs every 4-6 hours as needed for pain. Take 1 tab for pain 1-6/10, take 2 tabs for pain 7-10/10. Do not exceed 6 pills in one day.      rOPINIRole (REQUIP) 2 MG tablet Take 4 mg by mouth every evening. Patient takes 7pm      senna-docusate (PERICOLACE) 8.6-50 mg tablet Take 1 tablet by mouth 2 (two) times a day as needed for constipation.        Allergies   Allergen Reactions     Amitriptyline Hcl Other (See Comments)     Erythromycin Diarrhea and Other (See Comments)     Childhood reaction     Gabapentin Other (See Comments)     Jerking movements, swelling     Naproxen Unknown and Other (See Comments)      Other Environmental Allergy      Molds  dander     Pregabalin Other (See Comments)         Review of Systems  No fevers or chills. No headache, lightheadedness or dizziness. No SOB, chest pains or palpitations. Appetite is fair.  She reports dislike of the food.  No nausea, vomiting, constipation or diarrhea. No dysuria, frequency, burning or pain with urination.  Patient with increased pain with walking and dorsiflexion.  She also has increased swelling and bruising to the left lower extremity.  Restless leg syndrome on Requip.  Patient continues on Tylenol and oxycodone for pain.  Otherwise review of systems are negative.     Physical Exam  PHYSICAL EXAMINATION:  Vital signs: /70, pulse 89, respirations 18, temperature 98.2, O2 sat 99% on room air.  Weight 139 pounds.  General: Awake, Alert, oriented x3, appropriately, follows simple commands, conversant  HEENT:PERRLA, Pink conjunctiva, anicteric sclerae, moist oral mucosa  NECK: Supple, without any lymphadenopathy, or masses  CVS:  S1  S2, without murmur or gallop.   LUNG: Clear to auscultation, No wheezes, rales or rhonci.  BACK: No kyphosis of the thoracic spine  ABDOMEN: Soft, nontender to palpation, with positive bowel sounds  EXTREMITIES: Good range of motion on both upper and lower extremities, 3+ pedal edema on the surgical side, pain with dorsi flexion.  SKIN: Moderate bruising of the left thigh, knee and shin.  Tegaderm dressing in place.  NEUROLOGIC: Intact, pulses weak on the operative side.  PSYCHIATRIC: Cognition intact.  Anxiety.      Labs:    Lab Results   Component Value Date    WBC 11.5 (H) 06/18/2020    HGB 9.3 (L) 05/17/2021    HCT 28.9 (L) 06/18/2020    MCV 92 06/18/2020     06/18/2020     Results for orders placed or performed in visit on 05/17/21   Basic Metabolic Panel   Result Value Ref Range    Sodium 140 136 - 145 mmol/L    Potassium 4.1 3.5 - 5.0 mmol/L    Chloride 105 98 - 107 mmol/L    CO2 25 22 - 31 mmol/L    Anion Gap,  Calculation 10 5 - 18 mmol/L    Glucose 88 70 - 125 mg/dL    Calcium 8.7 8.5 - 10.5 mg/dL    BUN 15 8 - 28 mg/dL    Creatinine 0.85 0.60 - 1.10 mg/dL    GFR MDRD Af Amer >60 >60 mL/min/1.73m2    GFR MDRD Non Af Amer >60 >60 mL/min/1.73m2           Assessment/Plan:  1. History of total left knee replacement   patient with increased pain in the left lower extremity with dorsi flexion and walking.  She does have increased swelling with left lower extremity about 2 times size of the right.  Obtain venous Doppler to rule out DVT.  Patient continues on aspirin for DVT prophylactics.  She does have moderate bruising.  Ice and elevation between therapies.  BLAIR hose on in the a.m. off at at bedtime.  Continue with Tylenol and oxycodone for pain.   2. Other secondary osteoarthritis of left knee   continue with Tylenol and oxycodone for pain.  Continue therapy.   3. Chronic congestive heart failure, unspecified heart failure type (H)   compensated.  No shortness of breath or chest pain.   4. Essential hypertension   satisfactory control.   5. Acute blood loss anemia   iron today 9.3.  Patient continues on iron supplement.         This note has been dictated using voice recognition software. Any grammatical or context distortions are unintentional and inherent to the software    Electronically signed by: Karyn Abraham, EMILE

## 2021-06-17 NOTE — TELEPHONE ENCOUNTER
Telephone Encounter by Anali Venegas RN at 4/12/2021  3:06 PM     Author: Anali Venegas RN Service: -- Author Type: Registered Nurse    Filed: 4/13/2021  8:47 AM Encounter Date: 4/12/2021 Status: Addendum    : Anali Venegas RN (Registered Nurse)    Related Notes: Original Note by Anali Venegas RN (Registered Nurse) filed at 4/12/2021  3:21 PM       Mahnaz Gallegos Kellie C, RN   Caller: Unspecified (Today, 10:29 AM)             PA approved with $30/month copay. Spoke with pt and she will like to fill at [facility]. She also mentioned that she is having to get another COVID vaccine before her knee surgery that is scheduled on May 11th. Just wanted to make sure the vaccine was safe with starting this medication. Pt did have questions about injecting. Does the clinic do injection training?     Thanks,   Mahnaz

## 2021-06-17 NOTE — ANESTHESIA PREPROCEDURE EVALUATION
Anesthesia Evaluation        Airway   Mallampati: II  Neck ROM: full   Pulmonary - normal exam    breath sounds clear to auscultation  (+) asthma                           Cardiovascular - normal exam  (+) hypertension, CAD, angina, CHF, ,     Rhythm: regular  Rate: normal,         Neuro/Psych    (+) CVA ,     Endo/Other       GI/Hepatic/Renal    (+) GERD,   chronic renal disease,           Dental - normal exam                        Anesthesia Plan  Planned anesthetic: spinal and peripheral nerve block    Chart reviewed. Patient examined.    Nerve block procedure and its risks, including, but not limited to, persistent post operative paresthesias or pain, nerve damage, bleeding, infection, seizure, cardiorespiratory arrest, and block failure were discussed fully with patient. The spinal block procedure and its risks, including, but not limited to headache, nerve damage, bleeding, infection, back pain, low blood pressure, cardiorespiratory arrest, and block failure were also discussed fully with patient.   Opportunity for questions given.   Informed consent obtained for all procedures. Desires to proceed.   Routine sedation meds.  ASA 3

## 2021-06-17 NOTE — ANESTHESIA PROCEDURE NOTES
Peripheral Block    Patient location during procedure: pre-op  Start time: 5/11/2021 2:08 PM  End time: 5/11/2021 2:11 PM  post-op analgesia per surgeon order as noted in medical record  Staffing:  Performing  Anesthesiologist: Phong Daniels MD  Preanesthetic Checklist  Completed: patient identified, site marked, risks, benefits, and alternatives discussed, timeout performed, consent obtained, at patient's request, airway assessed, oxygen available, suction available, emergency drugs available and hand hygiene performed  Peripheral Block  Block type: saphenous, adductor canal block  Prep: ChloraPrep  Patient position: supine  Patient monitoring: cardiac monitor, continuous pulse oximetry, heart rate and blood pressure  Laterality: left  Injection technique: ultrasound guided    Ultrasound used to visualize needle placement in proximity to nerve being blocked: yes   US used to visualize anesthetic spread  Visualized anatomic structures normal  No Pathological Findings  Permanent ultrasound image captured for medical record  Sterile gel and probe cover used for ultrasound.  Needle  Needle type: echogenic   Needle gauge: 20G  Needle length: 6 in  no peripheral nerve catheter placed  Assessment  Injection assessment: negative aspiration for heme, no paresthesia on injection, incremental injection and no difficulty with injection

## 2021-06-17 NOTE — TELEPHONE ENCOUNTER
Telephone Encounter by Mahnaz Gallegos at 4/12/2021  2:35 PM     Author: Mahnaz Gallegos Service: -- Author Type: Financial Resource Guide    Filed: 4/12/2021  4:15 PM Encounter Date: 4/12/2021 Status: Signed    : Mahnaz Gallegos (Financial Resource Guide)       Prior Authorization Approval  Medication: Repatha sureclick 140mg/ml  Qty: 2 pens for 28 days  Effective Dates: 3/13/2021 - 04/11/2024  Reference Number: NA  Insurance Company: [employer] Part D (express scripts)  Pharmacy: [facility]   Expected Copay: $30  Copay Card Available: [facility] Assistance Needed/Available: no  Patient Assistance Program Needed: no  Patient Notified? Yes  Pharmacy Notified? Yes

## 2021-06-17 NOTE — TELEPHONE ENCOUNTER
Telephone Encounter by Anali Venegas RN at 4/13/2021  9:07 AM     Author: Anali Venegas RN Service: -- Author Type: Registered Nurse    Filed: 4/13/2021  9:08 AM Encounter Date: 4/12/2021 Status: Signed    : Anali Venegas RN (Registered Nurse)       Yolanda Feldman MD Larsen, Kellie C, RN   Caller: Unspecified (Yesterday, 10:29 AM)             Yes, if PCSK9 inhibitor Repatha is covered, I have no problems with discontinuing atorvastatin and trying the injection every 2 weeks.  We will want to recheck lipids in about 2 months.  LF

## 2021-06-17 NOTE — ANESTHESIA CARE TRANSFER NOTE
Last vitals:   Vitals:    05/11/21 1624   BP: 118/63   Pulse: 81   Resp: 14   Temp: 36.1  C (96.9  F)   SpO2: 97%     Patient's level of consciousness is drowsy  Spontaneous respirations: yes  Maintains airway independently: yes  Dentition unchanged: yes  Oropharynx: oropharynx clear of all foreign objects    QCDR Measures:  ASA# 20 - Surgical Safety Checklist: WHO surgical safety checklist completed prior to induction    PQRS# 430 - Adult PONV Prevention: 4558F - Pt received => 2 anti-emetic agents (different classes) preop & intraop  ASA# 8 - Peds PONV Prevention: NA - Not pediatric patient, not GA or 2 or more risk factors NOT present  PQRS# 424 - Donya-op Temp Management: 4559F - At least one body temp DOCUMENTED => 35.5C or 95.9F within required timeframe  PQRS# 426 - PACU Transfer Protocol: - Transfer of care checklist used  ASA# 14 - Acute Post-op Pain: ASA14B - Patient did NOT experience pain >= 7 out of 10

## 2021-06-17 NOTE — PROGRESS NOTES
Code Status:  FULL CODE  Visit Type: No chief complaint on file.     Facility:  [facility] [[id number]]             History of Present Illness: Sherice Alvarez is a 86 y.o. female who I am seeing today for follow-up on the TCU.  Patient recently hospitalized on 5/11/2021 secondary to DJD of the left knee.  Patient status post left total knee arthroplasty on 5/11/2021.  Patient continues on Tylenol and oxycodone for pain.  There were no intraoperative complications.  Postoperative complications included some bruising.  And acute blood loss anemia.  Hemoglobin down to 8.6.  Past medical history includes GERD's, dyslipidemia, hypertension, intermittent asthma, CAD, chronic diastolic heart failure status post TAVR and stage III kidney disease.    Today patient sitting up in bedside chair.  Patient with recent increased pain in the left lower extremity with moderate edema.  Patient had a venous Doppler which revealed positive DVT.  On call was notified.  She was placed on Xarelto 15 mg p.o. twice daily x21 days then transition to 20 mg daily x3 months.  She does continue with moderate edema.  She is in BLAIR hose.  She has some bruising throughout.  We will discontinue her aspirin while she is on Xarelto.  She does have underlying GERD.  She continues on omeprazole twice daily.  Hypertension.  Satisfactory.  Acute blood loss anemia on iron supplement.  Hemoglobin 9.3.  Restless leg syndrome on Requip.  She is asking can she have this closer to 5 PM instead of 7 PM.  Patient denies any shortness of breath or chest pain.    Active Ambulatory Problems     Diagnosis Date Noted     Constipation 06/03/2014     Cancer (H)      Gastroesophageal reflux disease with esophagitis 11/04/2016     Dyslipidemia, goal LDL below 100 11/04/2016     Benign essential hypertension      Epistaxis      Chest pain 07/21/2017     Abnormal chest CT      Anterior epistaxis 11/22/2017     Acute respiratory insufficiency 05/14/2019      RLL pneumonia 05/14/2019     Stage 3 chronic kidney disease 05/15/2019     Asthma 05/17/2019     Mild intermittent asthma with exacerbation 05/17/2019     Coronary artery disease involving native coronary artery of native heart with unstable angina pectoris (H)      Lightheadedness      Angina pectoris, unspecified (H) 04/16/2020     Atypical chest pain 04/16/2020     Severe calcific aortic valve stenosis      Dyspnea 05/07/2020     S/P TAVR (transcatheter aortic valve replacement) 06/02/2020     Acute diastolic congestive heart failure (H)      Chronic diastolic heart failure (H)      Leukocytosis, unspecified type      S/P total knee arthroplasty, left 05/11/2021     Resolved Ambulatory Problems     Diagnosis Date Noted     Chest pain 11/03/2016     ACS (acute coronary syndrome) (H) 11/04/2016     Ischemic chest pain (H)      NSTEMI (non-ST elevated myocardial infarction) (H)      Acute chest pain 12/27/2016     Acute infection of nasal sinus 02/18/2017     Severe aortic stenosis 08/17/2018     Acute respiratory failure with hypoxia (H) 05/15/2019     Coronary artery disease 04/23/2020     Past Medical History:   Diagnosis Date     Arthritis      CAD (coronary artery disease)      Chronic kidney disease      Chronic pain syndrome      Crohn's disease (H)      GERD (gastroesophageal reflux disease)      Hx of heart artery stent 11/2016     Hyperlipidemia      Hypertension      PONV (postoperative nausea and vomiting)      Restless legs syndrome      Stroke (H) 2010       Current Outpatient Medications   Medication Sig Note     rivaroxaban ANTICOAGULANT (XARELTO) 15 mg tablet Take 15 mg by mouth 2 (two) times a day with meals. Take 15 mg twice daily x21 days then 20 mg daily for 3 months.      acetaminophen (TYLENOL) 325 MG tablet Take 3 tablets (975 mg total) by mouth every 8 (eight) hours.      albuterol (PROVENTIL HFA;VENTOLIN HFA) 90 mcg/actuation inhaler Inhale 1-2 puffs every 6 (six) hours as needed for  wheezing.      amLODIPine (NORVASC) 5 MG tablet Take 1 tablet (5 mg total) by mouth daily.      beclomethasone (QVAR) 80 mcg/actuation inhaler Inhale 1 puff 2 (two) times a day.      coenzyme Q10 100 mg capsule Take 100 mg by mouth daily.       ergocalciferol (ERGOCALCIFEROL) 50,000 unit capsule Take 50,000 Units by mouth once a week. 10/8/2019: On Thursdays     evolocumab (REPATHA SYRINGE) 140 mg/mL Syrg Inject 1 mL (140 mg total) under the skin every 14 (fourteen) days.      ferrous sulfate 325 (65 FE) MG tablet Take 1 tablet (325 mg total) by mouth daily with breakfast.      furosemide (LASIX) 20 MG tablet Take 10 mg by mouth 2 (two) times a day as needed.      melatonin 5 mg Tab tablet Take 5 mg by mouth at bedtime as needed (for sleep).       metoprolol succinate (TOPROL-XL) 25 MG Take 1 tablet (25 mg total) by mouth daily.      montelukast (SINGULAIR) 10 mg tablet Take 10 mg by mouth at bedtime as needed.       multivitamin therapeutic (THERAGRAN) tablet Take 1 tablet by mouth daily.      nitroglycerin (NITROSTAT) 0.4 MG SL tablet Place 1 tablet (0.4 mg total) under the tongue every 5 (five) minutes as needed for chest pain.      omeprazole (PRILOSEC) 20 MG capsule Take 1-2 capsules (20-40 mg total) by mouth daily before breakfast.      oxyCODONE (ROXICODONE) 5 MG immediate release tablet Take 1-2 tabs every 4-6 hours as needed for pain. Take 1 tab for pain 1-6/10, take 2 tabs for pain 7-10/10. Do not exceed 6 pills in one day.      rOPINIRole (REQUIP) 2 MG tablet Take 4 mg by mouth every evening. Patient takes 7pm      senna-docusate (PERICOLACE) 8.6-50 mg tablet Take 1 tablet by mouth 2 (two) times a day as needed for constipation.        Allergies   Allergen Reactions     Amitriptyline Hcl Other (See Comments)     Erythromycin Diarrhea and Other (See Comments)     Childhood reaction     Gabapentin Other (See Comments)     Jerking movements, swelling     Naproxen Unknown and Other (See Comments)     Other  Environmental Allergy      Molds  dander     Pregabalin Other (See Comments)         Review of Systems  No fevers or chills. No headache, lightheadedness or dizziness. No SOB, chest pains or palpitations. Appetite is fair.  She reports dislike of the food.  No nausea, vomiting, constipation or diarrhea. No dysuria, frequency, burning or pain with urination.  Pain a little better on today's visit.  She also has increased swelling and bruising to the left lower extremity.  Restless leg syndrome on Requip.  Patient continues on Tylenol and oxycodone for pain.  Otherwise review of systems are negative.     Physical Exam  PHYSICAL EXAMINATION:  Vital signs: /64, pulse 91, respirations 18, temperature 96.3, O2 sat 96% on room air.  Weight 131.4 pounds.  General: Awake, Alert, oriented x3, appropriately, follows simple commands, conversant  HEENT:PERRLA, Pink conjunctiva, anicteric sclerae, moist oral mucosa  NECK: Supple, without any lymphadenopathy, or masses  CVS:  S1  S2, without murmur or gallop.   LUNG: Clear to auscultation, No wheezes, rales or rhonci.  BACK: No kyphosis of the thoracic spine  ABDOMEN: Soft, nontender to palpation, with positive bowel sounds  EXTREMITIES: Good range of motion on both upper and lower extremities, 3+ pedal edema on the surgical side, mild tenderness to LLE.   SKIN: Moderate bruising of the left thigh, knee and shin.  Tegaderm dressing in place.  NEUROLOGIC: Intact, pulses weak on the operative side.  PSYCHIATRIC: Cognition intact.  Pleasant affect.      Labs:    Lab Results   Component Value Date    WBC 11.5 (H) 06/18/2020    HGB 9.3 (L) 05/17/2021    HCT 28.9 (L) 06/18/2020    MCV 92 06/18/2020     06/18/2020     Results for orders placed or performed in visit on 05/17/21   Basic Metabolic Panel   Result Value Ref Range    Sodium 140 136 - 145 mmol/L    Potassium 4.1 3.5 - 5.0 mmol/L    Chloride 105 98 - 107 mmol/L    CO2 25 22 - 31 mmol/L    Anion Gap, Calculation 10 5  - 18 mmol/L    Glucose 88 70 - 125 mg/dL    Calcium 8.7 8.5 - 10.5 mg/dL    BUN 15 8 - 28 mg/dL    Creatinine 0.85 0.60 - 1.10 mg/dL    GFR MDRD Af Amer >60 >60 mL/min/1.73m2    GFR MDRD Non Af Amer >60 >60 mL/min/1.73m2           Assessment/Plan:  1. History of total left knee replacement   patient with increased pain and swelling in the left venous Doppler positive for DVT.  Patient started on Xarelto 15 mg p.o. twice daily x21 days then transition to 20 mg p.o. daily x3 months.   Continue ice and elevation.  Continue with BLAIR hose on a.m. off at at bedtime.   DC aspirin due to history of GERD.    She does have moderate bruising.  Continue with Tylenol and oxycodone for pain.   2.   GERD  continue PPI twice daily while on anticoagulation.   3. Chronic congestive heart failure, unspecified heart failure type (H)   compensated.  No shortness of breath or chest pain.   4. Essential hypertension   satisfactory control.   5. Acute blood loss anemia   iron today 9.3.  Patient continues on iron supplement.         This note has been dictated using voice recognition software. Any grammatical or context distortions are unintentional and inherent to the software    Electronically signed by: Karyn Abraham CNP

## 2021-06-17 NOTE — TELEPHONE ENCOUNTER
Telephone Encounter by Anali Venegas RN at 9/8/2020  4:27 PM     Author: Anali Venegas RN Service: -- Author Type: Registered Nurse    Filed: 9/8/2020  4:39 PM Encounter Date: 9/8/2020 Status: Signed    : Anali Venegas RN (Registered Nurse)       Yolanda Feldman MD Larsen, Kellie C, RN    Caller: Unspecified (Today,  2:54 PM)               I have no problem following up with her and to help until then would get a bmp and bnp and if bnp up then daily lasix.   LF      Called pt and shared with her LBF's recs. She will utilize walk-in lab at [facility] tomorrow morning. She also shares complaints/concerns of bruises on her lower extremities. Informed her that this is likely caused by her plavix medication. Continue to monitor and let us know if bruises or lesions worsen or do not heal. -kcl

## 2021-06-17 NOTE — PROGRESS NOTES
[facility]      Facility:    [facility] [[id number]]  Code Status: FULL CODE      Chief Complaint/Reason for Visit:  Chief Complaint   Patient presents with     H & P     Left total knee arthroplasty, pain management, anemia, essential hypertension, asthma, fibromyalgia, Crohn's disease, arthritis.       HPI:   Sherice is a 86 y.o. female who heart failure with preserved ejection fraction as well as multiple other conditions mentioned below.  He was then admitted to the hospital on 5/11/2021 for elective left total knee arthroplasty.  This was done secondary DJD is refractory to conservative management.  He underwent the procedure without a perioperative complications and pain was controlled.  He was then treated appropriately and transferred here to the TCU at [facility] in stable condition.    Patient is sitting in chair company does not appear to be in acute distress she says she just moved had a bowel movement and she is moving well at this time and she does not have any pain issues.  She is urinating without difficulty has no chest pain or shortness of breath she is progressing as anticipated with physical and Occupational Therapy.    Past Medical History:  Past Medical History:   Diagnosis Date     Arthritis      Asthma      CAD (coronary artery disease)      Cancer (H)     basal cell     Chronic kidney disease     ckd 3     Chronic pain syndrome      Crohn's disease (H)      GERD (gastroesophageal reflux disease)      Hx of heart artery stent 11/2016    1 stent R Proximal CA and 1 Stent L Proximal circumflex  12/2016 Stent proximal LAD     Hyperlipidemia      Hypertension      NSTEMI (non-ST elevated myocardial infarction) (H) 11/2016     PONV (postoperative nausea and vomiting)     severe povn with last knee surgery     Restless legs syndrome      Stroke (H) 2010    numbness rt jaw           Surgical History:  Past Surgical History:   Procedure  Laterality Date     APPENDECTOMY       CARDIAC CATHETERIZATION  2016    multiple vessel disease.  no intervention     CARDIAC CATHETERIZATION  2016     CARDIAC CATHETERIZATION N/A 2016    Procedure: Coronary Angiogram;  Surgeon: Sunil Moran MD;  Location: [facility];  Service:      CAROTID ENDARTERECTOMY       CAROTID ENDARTERECTOMY Right 2010     CERVICAL LAMINECTOMY  1994      SECTION       CV CORONARY ANGIOGRAM N/A 2020    Procedure: Coronary Angiogram;  Surgeon: Vinita Ramos MD;  Location: [facility];  Service: Cardiology     CV LEFT HEART CATHETERIZATION WO LEFT VETRICULOGRAM Left 2020    Procedure: Left Heart Catheterization Without Left Ventriculogram;  Surgeon: Jerad Lerner MD;  Location: [facility];  Service: Cardiology     CV OTHER APPROACH TRANSCATHETER (TAVR) N/A 2020    Procedure: CV TRANSCATHETER AORTIC VALVE REPLACEMENT - RIGHT SUBCLAVIAN APPROACH;  Surgeon: John Caceres MD;  Location: [facility];  Service: Cardiology     HEMORROIDECTOMY       JOINT REPLACEMENT       UT ESOPHAGOGASTRODUODENOSCOPY TRANSORAL DIAGNOSTIC N/A 07/10/2019    Procedure: ESOPHAGOGASTRODUODENOSCOPY (EGD) with gastric biopsy;  Surgeon: Sunil Stuart MD;  Location: [facility];  Service: Gastroenterology     UT REPLACE AORTIC VALVE OPEN AXILLRY ARTRY APPROACH N/A 2020    Procedure: OR TRANSCATHETER AORTIC VALVE REPLACEMENT, SUBCLAVIAN APPROACH;  Surgeon: Bhavin Cuadra MD;  Location: [facility];  Service: Cardiology     UT TOTAL KNEE ARTHROPLASTY Left 2021    Procedure: LEFT TOTAL KNEE ARTHROPLASTY;  Surgeon: Doug Rhodes MD;  Location: [facility];  Service: Orthopedics     TONSILLECTOMY AND ADENOIDECTOMY       TOTAL KNEE ARTHROPLASTY Right        Family History:   Family History   Problem Relation Age of Onset     Atrial fibrillation Mother      Parkinsonism Father        Social  History:    Social History     Socioeconomic History     Marital status:      Spouse name: None     Number of children: None     Years of education: None     Highest education level: None   Occupational History     None   Social Needs     Financial resource strain: None     Food insecurity     Worry: None     Inability: None     Transportation needs     Medical: None     Non-medical: None   Tobacco Use     Smoking status: Former Smoker     Quit date: 1980     Years since quittin.3     Smokeless tobacco: Never Used   Substance and Sexual Activity     Alcohol use: No     Drug use: No     Sexual activity: None   Lifestyle     Physical activity     Days per week: None     Minutes per session: None     Stress: None   Relationships     Social connections     Talks on phone: None     Gets together: None     Attends Tenri[employer] service: None     Active member of club or organization: None     Attends meetings of clubs or organizations: None     Relationship status: None     Intimate partner violence     Fear of current or ex partner: None     Emotionally abused: None     Physically abused: None     Forced sexual activity: None   Other Topics Concern     None   Social History Narrative    Patient .  Adult daughter here.          Review of Systems   Constitutional:        Patient denies any pain fevers chills nausea vomit diarrhea change in vision hearing taste or smell weakness one-sided of the chest pain shortness of breath.  Denies any current shortness stool polyphagia polydipsia polyuria depression or anxiety in the range of the review of systems is negative.       Vitals:    21 1444   BP: 116/71   Pulse: 84   Resp: 17   Temp: 98.5  F (36.9  C)   SpO2: 95%       Physical Exam  Constitutional:       General: She is not in acute distress.  HENT:      Head: Normocephalic.      Nose: Nose normal.      Mouth/Throat:      Mouth: Mucous membranes are moist.      Pharynx: Oropharynx is clear.   Eyes:       General:         Right eye: No discharge.         Left eye: No discharge.   Cardiovascular:      Rate and Rhythm: Normal rate and regular rhythm.   Pulmonary:      Effort: Pulmonary effort is normal. No respiratory distress.   Abdominal:      General: Bowel sounds are normal. There is no distension.      Tenderness: There is no abdominal tenderness.   Musculoskeletal:      Right lower leg: No edema.      Left lower leg: No edema.   Skin:     General: Skin is warm.   Neurological:      Mental Status: She is alert. Mental status is at baseline.   Psychiatric:         Mood and Affect: Mood normal.         Behavior: Behavior normal.         Medication List:  Current Outpatient Medications   Medication Sig     acetaminophen (TYLENOL) 325 MG tablet Take 3 tablets (975 mg total) by mouth every 8 (eight) hours.     albuterol (PROVENTIL HFA;VENTOLIN HFA) 90 mcg/actuation inhaler Inhale 1-2 puffs every 6 (six) hours as needed for wheezing.     amLODIPine (NORVASC) 5 MG tablet Take 1 tablet (5 mg total) by mouth daily.     aspirin 81 MG EC tablet Take 1 tablet (81 mg total) by mouth 2 (two) times a day.     beclomethasone (QVAR) 80 mcg/actuation inhaler Inhale 1 puff 2 (two) times a day.     coenzyme Q10 100 mg capsule Take 100 mg by mouth daily.      ergocalciferol (ERGOCALCIFEROL) 50,000 unit capsule Take 50,000 Units by mouth once a week.     evolocumab (REPATHA SYRINGE) 140 mg/mL Syrg Inject 1 mL (140 mg total) under the skin every 14 (fourteen) days.     ferrous sulfate 325 (65 FE) MG tablet Take 1 tablet (325 mg total) by mouth daily with breakfast.     furosemide (LASIX) 20 MG tablet Take 10 mg by mouth 2 (two) times a day as needed.     melatonin 5 mg Tab tablet Take 5 mg by mouth at bedtime as needed (for sleep).      metoprolol succinate (TOPROL-XL) 25 MG Take 1 tablet (25 mg total) by mouth daily.     montelukast (SINGULAIR) 10 mg tablet Take 10 mg by mouth at bedtime as needed.      multivitamin therapeutic  (THERAGRAN) tablet Take 1 tablet by mouth daily.     nitroglycerin (NITROSTAT) 0.4 MG SL tablet Place 1 tablet (0.4 mg total) under the tongue every 5 (five) minutes as needed for chest pain.     omeprazole (PRILOSEC) 20 MG capsule Take 1-2 capsules (20-40 mg total) by mouth daily before breakfast.     oxyCODONE (ROXICODONE) 5 MG immediate release tablet Take 1-2 tabs every 4-6 hours as needed for pain. Take 1 tab for pain 1-6/10, take 2 tabs for pain 7-10/10. Do not exceed 6 pills in one day.     rOPINIRole (REQUIP) 2 MG tablet Take 4 mg by mouth every evening. Patient takes 7pm     senna-docusate (PERICOLACE) 8.6-50 mg tablet Take 1 tablet by mouth 2 (two) times a day as needed for constipation.       Labs: Hospital labs are as follows; hemoglobin is 8.6, troponins were less than 0.01, basic metabolic profile was as follows sodium is 141, potassium 4.3, chloride is 114, CO2 is 24, anion gap is 3, glucose is 89, calcium 7.6, BUN was 25, creatinine 0.94, GFR is 56.      Assessment:    ICD-10-CM    1. History of total left knee replacement  Z96.652    2. Pain management  R52    3. Chronic congestive heart failure, unspecified heart failure type (H)  I50.9    4. Essential hypertension  I10    5. Acute blood loss anemia  D62        Plan: At this time hemoglobin on Monday second acute blood loss anemia basic metabolic profile done Monday second of CHF on diuretics.  Will check weights daily notify provider for any 2 pound weight changes at this time will push fluids.  She will continue with physical and occupational therapy at this time and no other changes to care plan at this time no changes in her pain plan.        Electronically signed by: Frank Parisi DO

## 2021-06-18 NOTE — PATIENT INSTRUCTIONS - HE
Patient Instructions by Viv Tolbert PA-C at 6/11/2020  1:30 PM     Author: Viv Tolbert PA-C Service: -- Author Type: Physician Assistant    Filed: 6/11/2020 11:39 AM Encounter Date: 6/11/2020 Status: Signed    : Viv Tolbert PA-C (Physician Assistant)       Sherice Alvarez,    It was a pleasure to speak with you today over the telephone regarding your recent TAVR.     My recommendations after this visit include:     - no medication changes for now  - your white blood cell count is still on high side, which I have no explanation for.  Your blood cultures were negative, your CXR looked fine and your urine was clean.  Your incision looked fine via video today, but make sure you follow up with us if you are having fevers, not feeling well or your incision is looking read    You should followup with an echo on July 2, followed with a visit with Dr. Lerner on July 7      If you have questions or concerns, please call using the numbers below:    Valve Clinic Pool  [phone]    After Hours/Scheduling  [phone]    Otherwise you can dial the nurse directly at:    Sandra Dean RN  Valve clinic coordinator  [phone]

## 2021-06-19 NOTE — PROGRESS NOTES
[facility]   Outpatient Follow-up evaluation.      Current Outpatient Prescriptions:      albuterol (PROVENTIL HFA;VENTOLIN HFA) 90 mcg/actuation inhaler, Inhale 1-2 puffs every 6 (six) hours as needed for wheezing., Disp: , Rfl:      aspirin 81 MG EC tablet, Take 81 mg by mouth daily., Disp: , Rfl:      calcium, as carbonate, (OS-BESS) 500 mg calcium (1,250 mg) tablet, Take 1 tablet by mouth daily., Disp: , Rfl:      coenzyme Q10 100 mg capsule, Take 100 mg by mouth every other day. , Disp: , Rfl:      ferrous sulfate 325 (65 FE) MG tablet, Take 1 tablet by mouth daily with breakfast., Disp: , Rfl:      lisinopril (PRINIVIL,ZESTRIL) 2.5 MG tablet, TAKE 1 TABLET (2.5 MG TOTAL) BY MOUTH DAILY., Disp: 90 tablet, Rfl: 1     metoprolol tartrate (LOPRESSOR) 25 MG tablet, TAKE HALF A TABLET (12.5 MG TOTAL) BY MOUTH 2 (TWO) TIMES A DAY., Disp: 90 tablet, Rfl: 1     nitroglycerin (NITROSTAT) 0.4 MG SL tablet, Place 1 tablet (0.4 mg total) under the tongue every 5 (five) minutes as needed for chest pain., Disp: 90 tablet, Rfl: 0     rOPINIRole (REQUIP) 2 MG tablet, Take 4 mg by mouth every evening. , Disp: , Rfl:      amitriptyline (ELAVIL) 25 MG tablet, Take 25 mg by mouth bedtime as needed for sleep. , Disp: , Rfl:      atorvastatin (LIPITOR) 40 MG tablet, TAKE 1 TABLET BY MOUTH DAILY, Disp: , Rfl: 6     BIOTIN ORAL, Take 1 capsule by mouth every other day. , Disp: , Rfl:      clopidogrel (PLAVIX) 75 mg tablet, TAKE 1 TABLET (75 MG TOTAL) BY MOUTH DAILY., Disp: 90 tablet, Rfl: 2     HYDROcodone-acetaminophen (NORCO )  mg per tablet, Take 1 tablet by mouth every 6 (six) hours as needed for pain., Disp: 20 tablet, Rfl: 0     HYDROcodone-acetaminophen 5-325 mg per tablet, Take 1 tablet by mouth every 6 (six) hours as needed for pain., Disp: 10 tablet, Rfl: 0     HYDROmorphone (DILAUDID) 2 MG tablet, Take 0.5 tablets (1 mg total) by mouth every 4 (four) hours as needed for pain., Disp: 20 tablet,  Rfl: 0     Lactobacillus rhamnosus GG (CULTURELLE) 10-15 Billion cell capsule, Take 1 capsule by mouth daily., Disp: , Rfl:      multivitamin therapeutic (THERAGRAN) tablet, Take 1 tablet by mouth daily., Disp: , Rfl:      omeprazole (PRILOSEC) 20 MG capsule, TAKE ONE CAPSULE BY MOUTH EVERY DAY, Disp: , Rfl: 1     predniSONE (DELTASONE) 10 MG tablet, Take 5 mg by mouth daily as needed (allergies, asthma exacerbation). , Disp: , Rfl:      rosuvastatin (CRESTOR) 10 MG tablet, Take 10 mg by mouth every other day. , Disp: , Rfl:         Sherice Alvarez is a 83 y.o. Female    Chief Complaint   Patient presents with     Follow-up       Diagnoses:(See Problem list)      AS   CHF   CAD    Recommendations:    Would recheck echo to asses AV LV.  No edema on exam today  Asa at 81 every other day because of bruising      Subjective:   C/o of swelling in hands and feet, ecchysmoses of skin, jint pains, but no chest pain, shortness of breath.                    Past Medical History:   Diagnosis Date     Asthma      CAD (coronary artery disease)      Cancer (H)     basal cell     Chronic kidney disease     ckd 3     Crohn's disease (H)      GERD (gastroesophageal reflux disease)      Hyperlipidemia      Hypertension      Restless legs syndrome      Stroke (H)     numbness rt jaw     Past Surgical History:   Procedure Laterality Date     APPENDECTOMY       CARDIAC CATHETERIZATION  2016    multiple vessel disease.  no intervention     CARDIAC CATHETERIZATION  2016     CARDIAC CATHETERIZATION N/A 2016    Procedure: Coronary Angiogram;  Surgeon: Sunil Moran MD;  Location: [facility];  Service:      CAROTID ENDARTERECTOMY       CAROTID ENDARTERECTOMY Right 2010      SECTION       HEMORROIDECTOMY       TONSILLECTOMY AND ADENOIDECTOMY       TOTAL KNEE ARTHROPLASTY Right      Allergies   Allergen Reactions     Erythromycin Unknown     Childhood reaction     Naprosyn [Naproxen] Unknown      "Other Environmental Allergy      Molds  dander     Family History   Problem Relation Age of Onset     Atrial fibrillation Mother      Parkinsonism Father       Social History     Social History     Marital status:      Spouse name: N/A     Number of children: N/A     Years of education: N/A     Occupational History     Not on file.     Social History Main Topics     Smoking status: Former Smoker     Quit date: 1/1/1980     Smokeless tobacco: Never Used     Alcohol use No     Drug use: No     Sexual activity: Not on file     Other Topics Concern     Not on file     Social History Narrative    Recently put  onto hospice cares         Objective:   /80 (Patient Site: Left Arm, Patient Position: Sitting, Cuff Size: Adult Large)  Pulse 80  Resp 18  Ht 5' 1\" (1.549 m)  Wt 133 lb (60.3 kg)  LMP  (LMP Unknown)  BMI 25.13 kg/m2  133 lb (60.3 kg)   Wt Readings from Last 3 Encounters:   08/17/18 133 lb (60.3 kg)   11/29/17 130 lb (59 kg)   11/28/17 131 lb (59.4 kg)     BP Readings from Last 3 Encounters:   08/17/18 134/80   11/29/17 110/73   11/28/17 169/83     Pulse Readings from Last 3 Encounters:   08/17/18 80   11/29/17 96   11/28/17 100     General appearance: alert, appears stated age and cooperative  Head: Normocephalic, without obvious abnormality, atraumatic  Eyes: Normal external exam without jaundice.  Ears: Normal external auricular exam.  Nose: Normal external exam.  Lungs: clear to auscultation bilaterally  Chest wall: no tenderness  Heart: regular rate and rhythm, S1, S2 normal, no murmur, click, rub or gallop 3/6 KAROL  Pulses: 2+ and symmetric  Skin: Skin color, texture, turgor normal.   Neurologic: Grossly normal, no focal neurologic findings.    Review of Systems:   General: Weight Gain  Cardiographics: Reviewed in clinic.    Lab Results:  Lab Results: Personally reviewed  Lab Results   Component Value Date    CHOL 236 (H) 06/20/2018    TRIG 60 06/20/2018    HDL 65 06/20/2018 "       Clinical evaluation time today including exam 45 minutes.  At least 50% of clinic evaluation time involved in assessment and patient counseling.  Part of this chart was created using a dictation software.  Typographic errors, word substitutions, and grammatical errors may unintentionally occur.    Sunil Moran M.D.  [facility]

## 2021-06-21 ENCOUNTER — HOSPITAL ENCOUNTER (OUTPATIENT)
Dept: ULTRASOUND IMAGING | Facility: HOSPITAL | Age: 86
Discharge: HOME | End: 2021-06-21
Attending: ORTHOPAEDIC SURGERY
Payer: MEDICARE

## 2021-06-21 DIAGNOSIS — I82.402 DEEP VEIN THROMBOSIS (DVT) OF LEFT LOWER EXTREMITY, UNSPECIFIED CHRONICITY, UNSPECIFIED VEIN (H): ICD-10-CM

## 2021-06-21 DIAGNOSIS — M25.562 LEFT KNEE PAIN: ICD-10-CM

## 2021-06-21 DIAGNOSIS — M17.12 LEFT KNEE DJD: ICD-10-CM

## 2021-06-21 NOTE — LETTER
Letter by Karyn Abraham CNP at      Author: Karyn Abraham CNP Service: -- Author Type: --    Filed:  Encounter Date: 5/18/2021 Status: (Other)         Patient: Sherice Alvarez   MR Number: [id number]   YOB: 1934   Date of Visit: 5/18/2021     Code Status:  FULL CODE  Visit Type: No chief complaint on file.     Facility:  [facility] [[id number]]             History of Present Illness: Sherice Alvarez is a 86 y.o. female who I am seeing today for follow-up on the TCU.  Patient recently hospitalized on 5/11/2021 secondary to DJD of the left knee.  Patient status post left total knee arthroplasty on 5/11/2021.  Patient continues on Tylenol and oxycodone for pain.  There were no intraoperative complications.  Postoperative complications included some bruising.  And acute blood loss anemia.  Hemoglobin down to 8.6.  Past medical history includes GERD's, dyslipidemia, hypertension, intermittent asthma, CAD, chronic diastolic heart failure status post TAVR and stage III kidney disease.    Today patient sitting up in bedside chair.  Patient with recent increased pain in the left lower extremity with moderate edema.  Patient had a venous Doppler which revealed positive DVT.  On call was notified.  She was placed on Xarelto 15 mg p.o. twice daily x21 days then transition to 20 mg daily x3 months.  She does continue with moderate edema.  She is in BLAIR hose.  She has some bruising throughout.  We will discontinue her aspirin while she is on Xarelto.  She does have underlying GERD.  She continues on omeprazole twice daily.  Hypertension.  Satisfactory.  Acute blood loss anemia on iron supplement.  Hemoglobin 9.3.  Restless leg syndrome on Requip.  She is asking can she have this closer to 5 PM instead of 7 PM.  Patient denies any shortness of breath or chest pain.    Active Ambulatory Problems     Diagnosis Date Noted   ? Constipation 06/03/2014   ? Cancer (H)    ?  Gastroesophageal reflux disease with esophagitis 11/04/2016   ? Dyslipidemia, goal LDL below 100 11/04/2016   ? Benign essential hypertension    ? Epistaxis    ? Chest pain 07/21/2017   ? Abnormal chest CT    ? Anterior epistaxis 11/22/2017   ? Acute respiratory insufficiency 05/14/2019   ? RLL pneumonia 05/14/2019   ? Stage 3 chronic kidney disease 05/15/2019   ? Asthma 05/17/2019   ? Mild intermittent asthma with exacerbation 05/17/2019   ? Coronary artery disease involving native coronary artery of native heart with unstable angina pectoris (H)    ? Lightheadedness    ? Angina pectoris, unspecified (H) 04/16/2020   ? Atypical chest pain 04/16/2020   ? Severe calcific aortic valve stenosis    ? Dyspnea 05/07/2020   ? S/P TAVR (transcatheter aortic valve replacement) 06/02/2020   ? Acute diastolic congestive heart failure (H)    ? Chronic diastolic heart failure (H)    ? Leukocytosis, unspecified type    ? S/P total knee arthroplasty, left 05/11/2021     Resolved Ambulatory Problems     Diagnosis Date Noted   ? Chest pain 11/03/2016   ? ACS (acute coronary syndrome) (H) 11/04/2016   ? Ischemic chest pain (H)    ? NSTEMI (non-ST elevated myocardial infarction) (H)    ? Acute chest pain 12/27/2016   ? Acute infection of nasal sinus 02/18/2017   ? Severe aortic stenosis 08/17/2018   ? Acute respiratory failure with hypoxia (H) 05/15/2019   ? Coronary artery disease 04/23/2020     Past Medical History:   Diagnosis Date   ? Arthritis    ? CAD (coronary artery disease)    ? Chronic kidney disease    ? Chronic pain syndrome    ? Crohn's disease (H)    ? GERD (gastroesophageal reflux disease)    ? Hx of heart artery stent 11/2016   ? Hyperlipidemia    ? Hypertension    ? PONV (postoperative nausea and vomiting)    ? Restless legs syndrome    ? Stroke (H) 2010       Current Outpatient Medications   Medication Sig Note   ? rivaroxaban ANTICOAGULANT (XARELTO) 15 mg tablet Take 15 mg by mouth 2 (two) times a day with meals.  Take 15 mg twice daily x21 days then 20 mg daily for 3 months.    ? acetaminophen (TYLENOL) 325 MG tablet Take 3 tablets (975 mg total) by mouth every 8 (eight) hours.    ? albuterol (PROVENTIL HFA;VENTOLIN HFA) 90 mcg/actuation inhaler Inhale 1-2 puffs every 6 (six) hours as needed for wheezing.    ? amLODIPine (NORVASC) 5 MG tablet Take 1 tablet (5 mg total) by mouth daily.    ? beclomethasone (QVAR) 80 mcg/actuation inhaler Inhale 1 puff 2 (two) times a day.    ? coenzyme Q10 100 mg capsule Take 100 mg by mouth daily.     ? ergocalciferol (ERGOCALCIFEROL) 50,000 unit capsule Take 50,000 Units by mouth once a week. 10/8/2019: On Thursdays   ? evolocumab (REPATHA SYRINGE) 140 mg/mL Syrg Inject 1 mL (140 mg total) under the skin every 14 (fourteen) days.    ? ferrous sulfate 325 (65 FE) MG tablet Take 1 tablet (325 mg total) by mouth daily with breakfast.    ? furosemide (LASIX) 20 MG tablet Take 10 mg by mouth 2 (two) times a day as needed.    ? melatonin 5 mg Tab tablet Take 5 mg by mouth at bedtime as needed (for sleep).     ? metoprolol succinate (TOPROL-XL) 25 MG Take 1 tablet (25 mg total) by mouth daily.    ? montelukast (SINGULAIR) 10 mg tablet Take 10 mg by mouth at bedtime as needed.     ? multivitamin therapeutic (THERAGRAN) tablet Take 1 tablet by mouth daily.    ? nitroglycerin (NITROSTAT) 0.4 MG SL tablet Place 1 tablet (0.4 mg total) under the tongue every 5 (five) minutes as needed for chest pain.    ? omeprazole (PRILOSEC) 20 MG capsule Take 1-2 capsules (20-40 mg total) by mouth daily before breakfast.    ? oxyCODONE (ROXICODONE) 5 MG immediate release tablet Take 1-2 tabs every 4-6 hours as needed for pain. Take 1 tab for pain 1-6/10, take 2 tabs for pain 7-10/10. Do not exceed 6 pills in one day.    ? rOPINIRole (REQUIP) 2 MG tablet Take 4 mg by mouth every evening. Patient takes 7pm    ? senna-docusate (PERICOLACE) 8.6-50 mg tablet Take 1 tablet by mouth 2 (two) times a day as needed for  constipation.        Allergies   Allergen Reactions   ? Amitriptyline Hcl Other (See Comments)   ? Erythromycin Diarrhea and Other (See Comments)     Childhood reaction   ? Gabapentin Other (See Comments)     Jerking movements, swelling   ? Naproxen Unknown and Other (See Comments)   ? Other Environmental Allergy      Molds  dander   ? Pregabalin Other (See Comments)         Review of Systems  No fevers or chills. No headache, lightheadedness or dizziness. No SOB, chest pains or palpitations. Appetite is fair.  She reports dislike of the food.  No nausea, vomiting, constipation or diarrhea. No dysuria, frequency, burning or pain with urination.  Pain a little better on today's visit.  She also has increased swelling and bruising to the left lower extremity.  Restless leg syndrome on Requip.  Patient continues on Tylenol and oxycodone for pain.  Otherwise review of systems are negative.     Physical Exam  PHYSICAL EXAMINATION:  Vital signs: /64, pulse 91, respirations 18, temperature 96.3, O2 sat 96% on room air.  Weight 131.4 pounds.  General: Awake, Alert, oriented x3, appropriately, follows simple commands, conversant  HEENT:PERRLA, Pink conjunctiva, anicteric sclerae, moist oral mucosa  NECK: Supple, without any lymphadenopathy, or masses  CVS:  S1  S2, without murmur or gallop.   LUNG: Clear to auscultation, No wheezes, rales or rhonci.  BACK: No kyphosis of the thoracic spine  ABDOMEN: Soft, nontender to palpation, with positive bowel sounds  EXTREMITIES: Good range of motion on both upper and lower extremities, 3+ pedal edema on the surgical side, mild tenderness to LLE.   SKIN: Moderate bruising of the left thigh, knee and shin.  Tegaderm dressing in place.  NEUROLOGIC: Intact, pulses weak on the operative side.  PSYCHIATRIC: Cognition intact.  Pleasant affect.      Labs:    Lab Results   Component Value Date    WBC 11.5 (H) 06/18/2020    HGB 9.3 (L) 05/17/2021    HCT 28.9 (L) 06/18/2020    MCV 92  06/18/2020     06/18/2020     Results for orders placed or performed in visit on 05/17/21   Basic Metabolic Panel   Result Value Ref Range    Sodium 140 136 - 145 mmol/L    Potassium 4.1 3.5 - 5.0 mmol/L    Chloride 105 98 - 107 mmol/L    CO2 25 22 - 31 mmol/L    Anion Gap, Calculation 10 5 - 18 mmol/L    Glucose 88 70 - 125 mg/dL    Calcium 8.7 8.5 - 10.5 mg/dL    BUN 15 8 - 28 mg/dL    Creatinine 0.85 0.60 - 1.10 mg/dL    GFR MDRD Af Amer >60 >60 mL/min/1.73m2    GFR MDRD Non Af Amer >60 >60 mL/min/1.73m2           Assessment/Plan:  1. History of total left knee replacement   patient with increased pain and swelling in the left venous Doppler positive for DVT.  Patient started on Xarelto 15 mg p.o. twice daily x21 days then transition to 20 mg p.o. daily x3 months.   Continue ice and elevation.  Continue with BLAIR hose on a.m. off at at bedtime.   DC aspirin due to history of GERD.    She does have moderate bruising.  Continue with Tylenol and oxycodone for pain.   2.   GERD  continue PPI twice daily while on anticoagulation.   3. Chronic congestive heart failure, unspecified heart failure type (H)   compensated.  No shortness of breath or chest pain.   4. Essential hypertension   satisfactory control.   5. Acute blood loss anemia   iron today 9.3.  Patient continues on iron supplement.         This note has been dictated using voice recognition software. Any grammatical or context distortions are unintentional and inherent to the software    Electronically signed by: Karyn Abraham, CNP

## 2021-06-21 NOTE — LETTER
Letter by Frank Parisi DO at      Author: Frank Parisi DO Service: -- Author Type: --    Filed:  Encounter Date: 5/14/2021 Status: (Other)         Patient: Sherice Alvarez   MR Number: [id number]   YOB: 1934   Date of Visit: 5/14/2021     [facility]      Facility:    [facility] [[id number]]  Code Status: FULL CODE      Chief Complaint/Reason for Visit:  Chief Complaint   Patient presents with   ? H & P     Left total knee arthroplasty, pain management, anemia, essential hypertension, asthma, fibromyalgia, Crohn's disease, arthritis.       HPI:   Sherice is a 86 y.o. female who heart failure with preserved ejection fraction as well as multiple other conditions mentioned below.  He was then admitted to the hospital on 5/11/2021 for elective left total knee arthroplasty.  This was done secondary DJD is refractory to conservative management.  He underwent the procedure without a perioperative complications and pain was controlled.  He was then treated appropriately and transferred here to the TCU at [facility] in stable condition.    Patient is sitting in chair company does not appear to be in acute distress she says she just moved had a bowel movement and she is moving well at this time and she does not have any pain issues.  She is urinating without difficulty has no chest pain or shortness of breath she is progressing as anticipated with physical and Occupational Therapy.    Past Medical History:  Past Medical History:   Diagnosis Date   ? Arthritis    ? Asthma    ? CAD (coronary artery disease)    ? Cancer (H)     basal cell   ? Chronic kidney disease     ckd 3   ? Chronic pain syndrome    ? Crohn's disease (H)    ? GERD (gastroesophageal reflux disease)    ? Hx of heart artery stent 11/2016    1 stent R Proximal CA and 1 Stent L Proximal circumflex  12/2016 Stent proximal LAD   ? Hyperlipidemia    ? Hypertension    ? NSTEMI  (non-ST elevated myocardial infarction) (H) 2016   ? PONV (postoperative nausea and vomiting)     severe povn with last knee surgery   ? Restless legs syndrome    ? Stroke (H)     numbness rt jaw           Surgical History:  Past Surgical History:   Procedure Laterality Date   ? APPENDECTOMY     ? CARDIAC CATHETERIZATION  2016    multiple vessel disease.  no intervention   ? CARDIAC CATHETERIZATION  2016   ? CARDIAC CATHETERIZATION N/A 2016    Procedure: Coronary Angiogram;  Surgeon: Sunil Moran MD;  Location: [facility] Cath Lab;  Service:    ? CAROTID ENDARTERECTOMY     ? CAROTID ENDARTERECTOMY Right    ? CERVICAL LAMINECTOMY     ?  SECTION     ? CV CORONARY ANGIOGRAM N/A 2020    Procedure: Coronary Angiogram;  Surgeon: Vinita Ramos MD;  Location: [facility] Cath Lab;  Service: Cardiology   ? CV LEFT HEART CATHETERIZATION WO LEFT VETRICULOGRAM Left 2020    Procedure: Left Heart Catheterization Without Left Ventriculogram;  Surgeon: Jerad Lerner MD;  Location: [facility] Cath Lab;  Service: Cardiology   ? CV OTHER APPROACH TRANSCATHETER (TAVR) N/A 2020    Procedure: CV TRANSCATHETER AORTIC VALVE REPLACEMENT - RIGHT SUBCLAVIAN APPROACH;  Surgeon: John Caceres MD;  Location: [facility] Cath Lab;  Service: Cardiology   ? HEMORROIDECTOMY     ? JOINT REPLACEMENT     ? MS ESOPHAGOGASTRODUODENOSCOPY TRANSORAL DIAGNOSTIC N/A 07/10/2019    Procedure: ESOPHAGOGASTRODUODENOSCOPY (EGD) with gastric biopsy;  Surgeon: Sunil Stuart MD;  Location: [facility];  Service: Gastroenterology   ? MS REPLACE AORTIC VALVE OPEN AXILLRY ARTRY APPROACH N/A 2020    Procedure: OR TRANSCATHETER AORTIC VALVE REPLACEMENT, SUBCLAVIAN APPROACH;  Surgeon: Bhavin Cuadra MD;  Location: [facility] Cath Lab;  Service: Cardiology   ? MS TOTAL KNEE ARTHROPLASTY Left 2021    Procedure: LEFT TOTAL KNEE ARTHROPLASTY;  Surgeon: Doug Rhodes,  MD;  Location: [facility] Main OR;  Service: Orthopedics   ? TONSILLECTOMY AND ADENOIDECTOMY     ? TOTAL KNEE ARTHROPLASTY Right        Family History:   Family History   Problem Relation Age of Onset   ? Atrial fibrillation Mother    ? Parkinsonism Father        Social History:    Social History     Socioeconomic History   ? Marital status:      Spouse name: None   ? Number of children: None   ? Years of education: None   ? Highest education level: None   Occupational History   ? None   Social Needs   ? Financial resource strain: None   ? Food insecurity     Worry: None     Inability: None   ? Transportation needs     Medical: None     Non-medical: None   Tobacco Use   ? Smoking status: Former Smoker     Quit date: 1980     Years since quittin.3   ? Smokeless tobacco: Never Used   Substance and Sexual Activity   ? Alcohol use: No   ? Drug use: No   ? Sexual activity: None   Lifestyle   ? Physical activity     Days per week: None     Minutes per session: None   ? Stress: None   Relationships   ? Social connections     Talks on phone: None     Gets together: None     Attends Jainism service: None     Active member of club or organization: None     Attends meetings of clubs or organizations: None     Relationship status: None   ? Intimate partner violence     Fear of current or ex partner: None     Emotionally abused: None     Physically abused: None     Forced sexual activity: None   Other Topics Concern   ? None   Social History Narrative    Patient .  Adult daughter here.          Review of Systems   Constitutional:        Patient denies any pain fevers chills nausea vomit diarrhea change in vision hearing taste or smell weakness one-sided of the chest pain shortness of breath.  Denies any current shortness stool polyphagia polydipsia polyuria depression or anxiety in the range of the review of systems is negative.       Vitals:    21 1444   BP: 116/71   Pulse: 84   Resp: 17   Temp:  98.5  F (36.9  C)   SpO2: 95%       Physical Exam  Constitutional:       General: She is not in acute distress.  HENT:      Head: Normocephalic.      Nose: Nose normal.      Mouth/Throat:      Mouth: Mucous membranes are moist.      Pharynx: Oropharynx is clear.   Eyes:      General:         Right eye: No discharge.         Left eye: No discharge.   Cardiovascular:      Rate and Rhythm: Normal rate and regular rhythm.   Pulmonary:      Effort: Pulmonary effort is normal. No respiratory distress.   Abdominal:      General: Bowel sounds are normal. There is no distension.      Tenderness: There is no abdominal tenderness.   Musculoskeletal:      Right lower leg: No edema.      Left lower leg: No edema.   Skin:     General: Skin is warm.   Neurological:      Mental Status: She is alert. Mental status is at baseline.   Psychiatric:         Mood and Affect: Mood normal.         Behavior: Behavior normal.         Medication List:  Current Outpatient Medications   Medication Sig   ? acetaminophen (TYLENOL) 325 MG tablet Take 3 tablets (975 mg total) by mouth every 8 (eight) hours.   ? albuterol (PROVENTIL HFA;VENTOLIN HFA) 90 mcg/actuation inhaler Inhale 1-2 puffs every 6 (six) hours as needed for wheezing.   ? amLODIPine (NORVASC) 5 MG tablet Take 1 tablet (5 mg total) by mouth daily.   ? aspirin 81 MG EC tablet Take 1 tablet (81 mg total) by mouth 2 (two) times a day.   ? beclomethasone (QVAR) 80 mcg/actuation inhaler Inhale 1 puff 2 (two) times a day.   ? coenzyme Q10 100 mg capsule Take 100 mg by mouth daily.    ? ergocalciferol (ERGOCALCIFEROL) 50,000 unit capsule Take 50,000 Units by mouth once a week.   ? evolocumab (REPATHA SYRINGE) 140 mg/mL Syrg Inject 1 mL (140 mg total) under the skin every 14 (fourteen) days.   ? ferrous sulfate 325 (65 FE) MG tablet Take 1 tablet (325 mg total) by mouth daily with breakfast.   ? furosemide (LASIX) 20 MG tablet Take 10 mg by mouth 2 (two) times a day as needed.   ? melatonin 5  mg Tab tablet Take 5 mg by mouth at bedtime as needed (for sleep).    ? metoprolol succinate (TOPROL-XL) 25 MG Take 1 tablet (25 mg total) by mouth daily.   ? montelukast (SINGULAIR) 10 mg tablet Take 10 mg by mouth at bedtime as needed.    ? multivitamin therapeutic (THERAGRAN) tablet Take 1 tablet by mouth daily.   ? nitroglycerin (NITROSTAT) 0.4 MG SL tablet Place 1 tablet (0.4 mg total) under the tongue every 5 (five) minutes as needed for chest pain.   ? omeprazole (PRILOSEC) 20 MG capsule Take 1-2 capsules (20-40 mg total) by mouth daily before breakfast.   ? oxyCODONE (ROXICODONE) 5 MG immediate release tablet Take 1-2 tabs every 4-6 hours as needed for pain. Take 1 tab for pain 1-6/10, take 2 tabs for pain 7-10/10. Do not exceed 6 pills in one day.   ? rOPINIRole (REQUIP) 2 MG tablet Take 4 mg by mouth every evening. Patient takes 7pm   ? senna-docusate (PERICOLACE) 8.6-50 mg tablet Take 1 tablet by mouth 2 (two) times a day as needed for constipation.       Labs: Hospital labs are as follows; hemoglobin is 8.6, troponins were less than 0.01, basic metabolic profile was as follows sodium is 141, potassium 4.3, chloride is 114, CO2 is 24, anion gap is 3, glucose is 89, calcium 7.6, BUN was 25, creatinine 0.94, GFR is 56.      Assessment:    ICD-10-CM    1. History of total left knee replacement  Z96.652    2. Pain management  R52    3. Chronic congestive heart failure, unspecified heart failure type (H)  I50.9    4. Essential hypertension  I10    5. Acute blood loss anemia  D62        Plan: At this time hemoglobin on Monday second acute blood loss anemia basic metabolic profile done Monday second of CHF on diuretics.  Will check weights daily notify provider for any 2 pound weight changes at this time will push fluids.  She will continue with physical and occupational therapy at this time and no other changes to care plan at this time no changes in her pain plan.        Electronically signed by: Frank JONES  DO [place]

## 2021-06-21 NOTE — LETTER
Letter by Karyn Abraham CNP at      Author: Karyn Abraham CNP Service: -- Author Type: --    Filed:  Encounter Date: 5/20/2021 Status: (Other)         Patient: Sherice Alvarez   MR Number: [id number]   YOB: 1934   Date of Visit: 5/20/2021     Code Status:  FULL CODE  Visit Type: Problem Visit (Left total knee arthroplasty, DVT)     Facility:  [facility] [[id number]]             History of Present Illness: Sherice Alvarez is a 86 y.o. female who I am seeing today for follow-up on the TCU.  Patient recently hospitalized on 5/11/2021 secondary to DJD of the left knee.  Patient status post left total knee arthroplasty on 5/11/2021.  Patient continues on Tylenol and oxycodone for pain.  There were no intraoperative complications.  Postoperative complications included some bruising.  And acute blood loss anemia.  Hemoglobin down to 8.6.  Past medical history includes GERD's, dyslipidemia, hypertension, intermittent asthma, CAD, chronic diastolic heart failure status post TAVR and stage III kidney disease.    Today patient sitting up in bedside chair.  Patient status post left total knee arthroplasty.  Pain well controlled with oxycodone and Tylenol.  Surgical dressing in place.  She does have moderate swelling and bruising.  The bruising today is slightly improved.  Recent venous Doppler obtained which showed DVT to the left lower extremity.  She continues on Xarelto.  Her aspirin has been discontinued while on Xarelto secondary to increased bruising.  She continues in BLAIR hose.  Acute blood loss anemia.  Hemoglobin 9.3.  She does continue on iron supplement.  History of GERD on PPI twice daily.  Hypertension satisfactory controlled.  Restless leg syndrome on Requip.    Active Ambulatory Problems     Diagnosis Date Noted   ? Constipation 06/03/2014   ? Cancer (H)    ? Gastroesophageal reflux disease with esophagitis 11/04/2016   ? Dyslipidemia, goal LDL below 100  11/04/2016   ? Benign essential hypertension    ? Epistaxis    ? Chest pain 07/21/2017   ? Abnormal chest CT    ? Anterior epistaxis 11/22/2017   ? Acute respiratory insufficiency 05/14/2019   ? RLL pneumonia 05/14/2019   ? Stage 3 chronic kidney disease 05/15/2019   ? Asthma 05/17/2019   ? Mild intermittent asthma with exacerbation 05/17/2019   ? Coronary artery disease involving native coronary artery of native heart with unstable angina pectoris (H)    ? Lightheadedness    ? Angina pectoris, unspecified (H) 04/16/2020   ? Atypical chest pain 04/16/2020   ? Severe calcific aortic valve stenosis    ? Dyspnea 05/07/2020   ? S/P TAVR (transcatheter aortic valve replacement) 06/02/2020   ? Acute diastolic congestive heart failure (H)    ? Chronic diastolic heart failure (H)    ? Leukocytosis, unspecified type    ? S/P total knee arthroplasty, left 05/11/2021     Resolved Ambulatory Problems     Diagnosis Date Noted   ? Chest pain 11/03/2016   ? ACS (acute coronary syndrome) (H) 11/04/2016   ? Ischemic chest pain (H)    ? NSTEMI (non-ST elevated myocardial infarction) (H)    ? Acute chest pain 12/27/2016   ? Acute infection of nasal sinus 02/18/2017   ? Severe aortic stenosis 08/17/2018   ? Acute respiratory failure with hypoxia (H) 05/15/2019   ? Coronary artery disease 04/23/2020     Past Medical History:   Diagnosis Date   ? Arthritis    ? CAD (coronary artery disease)    ? Chronic kidney disease    ? Chronic pain syndrome    ? Crohn's disease (H)    ? GERD (gastroesophageal reflux disease)    ? Hx of heart artery stent 11/2016   ? Hyperlipidemia    ? Hypertension    ? PONV (postoperative nausea and vomiting)    ? Restless legs syndrome    ? Stroke (H) 2010       Current Outpatient Medications   Medication Sig Note   ? acetaminophen (TYLENOL) 325 MG tablet Take 3 tablets (975 mg total) by mouth every 8 (eight) hours.    ? albuterol (PROVENTIL HFA;VENTOLIN HFA) 90 mcg/actuation inhaler Inhale 1-2 puffs every 6 (six)  hours as needed for wheezing.    ? amLODIPine (NORVASC) 5 MG tablet Take 1 tablet (5 mg total) by mouth daily.    ? beclomethasone (QVAR) 80 mcg/actuation inhaler Inhale 1 puff 2 (two) times a day.    ? coenzyme Q10 100 mg capsule Take 100 mg by mouth daily.     ? ergocalciferol (ERGOCALCIFEROL) 50,000 unit capsule Take 50,000 Units by mouth once a week. 10/8/2019: On Thursdays   ? evolocumab (REPATHA SYRINGE) 140 mg/mL Syrg Inject 1 mL (140 mg total) under the skin every 14 (fourteen) days.    ? ferrous sulfate 325 (65 FE) MG tablet Take 1 tablet (325 mg total) by mouth daily with breakfast.    ? furosemide (LASIX) 20 MG tablet Take 10 mg by mouth 2 (two) times a day as needed.    ? melatonin 5 mg Tab tablet Take 5 mg by mouth at bedtime as needed (for sleep).     ? metoprolol succinate (TOPROL-XL) 25 MG Take 1 tablet (25 mg total) by mouth daily.    ? montelukast (SINGULAIR) 10 mg tablet Take 10 mg by mouth at bedtime as needed.     ? multivitamin therapeutic (THERAGRAN) tablet Take 1 tablet by mouth daily.    ? nitroglycerin (NITROSTAT) 0.4 MG SL tablet Place 1 tablet (0.4 mg total) under the tongue every 5 (five) minutes as needed for chest pain.    ? omeprazole (PRILOSEC) 20 MG capsule Take 1-2 capsules (20-40 mg total) by mouth daily before breakfast.    ? oxyCODONE (ROXICODONE) 5 MG immediate release tablet Take 1-2 tabs every 4-6 hours as needed for pain. Take 1 tab for pain 1-6/10, take 2 tabs for pain 7-10/10. Do not exceed 6 pills in one day.    ? rivaroxaban ANTICOAGULANT (XARELTO) 15 mg tablet Take 15 mg by mouth 2 (two) times a day with meals. Take 15 mg twice daily x21 days then 20 mg daily for 3 months.    ? rOPINIRole (REQUIP) 2 MG tablet Take 4 mg by mouth every evening. Patient takes 7pm    ? senna-docusate (PERICOLACE) 8.6-50 mg tablet Take 1 tablet by mouth 2 (two) times a day as needed for constipation.        Allergies   Allergen Reactions   ? Amitriptyline Hcl Other (See Comments)   ?  Erythromycin Diarrhea and Other (See Comments)     Childhood reaction   ? Gabapentin Other (See Comments)     Jerking movements, swelling   ? Naproxen Unknown and Other (See Comments)   ? Other Environmental Allergy      Molds  dander   ? Pregabalin Other (See Comments)         Review of Systems  No fevers or chills. No headache, lightheadedness or dizziness. No SOB, chest pains or palpitations. Appetite is fair.  She reports dislike of the food.  No nausea, vomiting, constipation or diarrhea. No dysuria, frequency, burning or pain with urination.  Pain a little better on today's visit.  She also has increased swelling and bruising to the left lower extremity.  Restless leg syndrome on Requip.  Patient continues on Tylenol and oxycodone for pain.  Otherwise review of systems are negative.     Physical Exam  PHYSICAL EXAMINATION:  Vital signs: /65, pulse 86, respirations 20, temperature 98.4, O2 sat 96% on room air.  Weight 139.8 pounds.  General: Awake, Alert, oriented x3, appropriately, follows simple commands, conversant  HEENT:PERRLA, Pink conjunctiva, anicteric sclerae, moist oral mucosa  NECK: Supple, without any lymphadenopathy, or masses  CVS:  S1  S2, without murmur or gallop.   LUNG: Clear to auscultation, No wheezes, rales or rhonci.  BACK: No kyphosis of the thoracic spine  ABDOMEN: Soft, nontender to palpation, with positive bowel sounds  EXTREMITIES: Good range of motion on both upper and lower extremities, 3+ pedal edema on the surgical side, mild tenderness to LLE.   SKIN: Moderate bruising of the left thigh, knee and shin.  Tegaderm dressing in place.  NEUROLOGIC: Intact, pulses weak on the operative side.  PSYCHIATRIC: Cognition intact.  Pleasant affect.      Labs:    Lab Results   Component Value Date    WBC 11.5 (H) 06/18/2020    HGB 9.3 (L) 05/17/2021    HCT 28.9 (L) 06/18/2020    MCV 92 06/18/2020     06/18/2020     Results for orders placed or performed in visit on 05/17/21   Basic  Metabolic Panel   Result Value Ref Range    Sodium 140 136 - 145 mmol/L    Potassium 4.1 3.5 - 5.0 mmol/L    Chloride 105 98 - 107 mmol/L    CO2 25 22 - 31 mmol/L    Anion Gap, Calculation 10 5 - 18 mmol/L    Glucose 88 70 - 125 mg/dL    Calcium 8.7 8.5 - 10.5 mg/dL    BUN 15 8 - 28 mg/dL    Creatinine 0.85 0.60 - 1.10 mg/dL    GFR MDRD Af Amer >60 >60 mL/min/1.73m2    GFR MDRD Non Af Amer >60 >60 mL/min/1.73m2           Assessment/Plan:  1. History of total left knee replacement   Continue with Tylenol and oxycodone for pain.   2.   DVT left lower extremity  Xarelto 15 mg p.o. twice daily x21 days then transition to 20 mg p.o. daily x3 months.   Continue ice and elevation.  Continue with BLAIR hose on a.m. off at at bedtime.    2.   GERD  continue PPI twice daily while on anticoagulation.  Aspirin discontinued while on Xarelto.   3. Chronic congestive heart failure, unspecified heart failure type (H)   compensated.  No shortness of breath or chest pain.   4. Essential hypertension   satisfactory control.   5. Acute blood loss anemia   hemoglobin 9.3.  Patient continues on iron supplement.         This note has been dictated using voice recognition software. Any grammatical or context distortions are unintentional and inherent to the software    Electronically signed by: Karyn Abraham, EMILE

## 2021-06-21 NOTE — LETTER
Letter by Karyn Abraham CNP at      Author: Karyn Abraham CNP Service: -- Author Type: --    Filed:  Encounter Date: 5/17/2021 Status: (Other)         Patient: Sherice Alvarez   MR Number: [id number]   YOB: 1934   Date of Visit: 5/17/2021     Code Status:  FULL CODE  Visit Type: Problem Visit (left knee swelling, pain )     Facility:  [facility] [[id number]]             History of Present Illness: Sherice Alvarez is a 86 y.o. female who I am seeing today for follow-up on the TCU.  Patient recently hospitalized on 5/11/2021 secondary to DJD of the left knee.  Patient status post left total knee arthroplasty on 5/11/2021.  Patient continues on Tylenol and oxycodone for pain.  There were no intraoperative complications.  Postoperative complications included some bruising.  And acute blood loss anemia.  Hemoglobin down to 8.6.  Past medical history includes GERD's, dyslipidemia, hypertension, intermittent asthma, CAD, chronic diastolic heart failure status post TAVR and stage III kidney disease.    Today patient sitting up in bedside chair.  Nursing staff reporting increased pain in her left lower extremity.  She does have moderate edema with the left leg being about 2 times the size of the right leg.  She does have moderate bruising surrounding the entire knee, thigh and shin.  She reports pain with dorsi flexion.  Pulses weak.  She does continue on aspirin for DVT prophylactics.  She also has restless leg syndrome.  She takes Requip for.  Earlier she was reporting increased jumpiness in her legs however upon my exam is reporting numbness and tingling with intense pain.  Underlying hypertension.  She continues on metoprolol, Norvasc and Lasix.  Acute blood loss anemia.  Continues on iron supplement.  Today hemoglobin 9.3.  Congestive heart failure.  Compensated.  Today BMP unremarkable.  Patient denies any shortness of breath or chest pain.      Active Ambulatory  Problems     Diagnosis Date Noted   ? Constipation 06/03/2014   ? Cancer (H)    ? Gastroesophageal reflux disease with esophagitis 11/04/2016   ? Dyslipidemia, goal LDL below 100 11/04/2016   ? Benign essential hypertension    ? Epistaxis    ? Chest pain 07/21/2017   ? Abnormal chest CT    ? Anterior epistaxis 11/22/2017   ? Acute respiratory insufficiency 05/14/2019   ? RLL pneumonia 05/14/2019   ? Stage 3 chronic kidney disease 05/15/2019   ? Asthma 05/17/2019   ? Mild intermittent asthma with exacerbation 05/17/2019   ? Coronary artery disease involving native coronary artery of native heart with unstable angina pectoris (H)    ? Lightheadedness    ? Angina pectoris, unspecified (H) 04/16/2020   ? Atypical chest pain 04/16/2020   ? Severe calcific aortic valve stenosis    ? Dyspnea 05/07/2020   ? S/P TAVR (transcatheter aortic valve replacement) 06/02/2020   ? Acute diastolic congestive heart failure (H)    ? Chronic diastolic heart failure (H)    ? Leukocytosis, unspecified type    ? S/P total knee arthroplasty, left 05/11/2021     Resolved Ambulatory Problems     Diagnosis Date Noted   ? Chest pain 11/03/2016   ? ACS (acute coronary syndrome) (H) 11/04/2016   ? Ischemic chest pain (H)    ? NSTEMI (non-ST elevated myocardial infarction) (H)    ? Acute chest pain 12/27/2016   ? Acute infection of nasal sinus 02/18/2017   ? Severe aortic stenosis 08/17/2018   ? Acute respiratory failure with hypoxia (H) 05/15/2019   ? Coronary artery disease 04/23/2020     Past Medical History:   Diagnosis Date   ? Arthritis    ? CAD (coronary artery disease)    ? Chronic kidney disease    ? Chronic pain syndrome    ? Crohn's disease (H)    ? GERD (gastroesophageal reflux disease)    ? Hx of heart artery stent 11/2016   ? Hyperlipidemia    ? Hypertension    ? PONV (postoperative nausea and vomiting)    ? Restless legs syndrome    ? Stroke (H) 2010       Current Outpatient Medications   Medication Sig Note   ? acetaminophen  (TYLENOL) 325 MG tablet Take 3 tablets (975 mg total) by mouth every 8 (eight) hours.    ? albuterol (PROVENTIL HFA;VENTOLIN HFA) 90 mcg/actuation inhaler Inhale 1-2 puffs every 6 (six) hours as needed for wheezing.    ? amLODIPine (NORVASC) 5 MG tablet Take 1 tablet (5 mg total) by mouth daily.    ? aspirin 81 MG EC tablet Take 1 tablet (81 mg total) by mouth 2 (two) times a day.    ? beclomethasone (QVAR) 80 mcg/actuation inhaler Inhale 1 puff 2 (two) times a day.    ? coenzyme Q10 100 mg capsule Take 100 mg by mouth daily.     ? ergocalciferol (ERGOCALCIFEROL) 50,000 unit capsule Take 50,000 Units by mouth once a week. 10/8/2019: On Thursdays   ? evolocumab (REPATHA SYRINGE) 140 mg/mL Syrg Inject 1 mL (140 mg total) under the skin every 14 (fourteen) days.    ? ferrous sulfate 325 (65 FE) MG tablet Take 1 tablet (325 mg total) by mouth daily with breakfast.    ? furosemide (LASIX) 20 MG tablet Take 10 mg by mouth 2 (two) times a day as needed.    ? melatonin 5 mg Tab tablet Take 5 mg by mouth at bedtime as needed (for sleep).     ? metoprolol succinate (TOPROL-XL) 25 MG Take 1 tablet (25 mg total) by mouth daily.    ? montelukast (SINGULAIR) 10 mg tablet Take 10 mg by mouth at bedtime as needed.     ? multivitamin therapeutic (THERAGRAN) tablet Take 1 tablet by mouth daily.    ? nitroglycerin (NITROSTAT) 0.4 MG SL tablet Place 1 tablet (0.4 mg total) under the tongue every 5 (five) minutes as needed for chest pain.    ? omeprazole (PRILOSEC) 20 MG capsule Take 1-2 capsules (20-40 mg total) by mouth daily before breakfast.    ? oxyCODONE (ROXICODONE) 5 MG immediate release tablet Take 1-2 tabs every 4-6 hours as needed for pain. Take 1 tab for pain 1-6/10, take 2 tabs for pain 7-10/10. Do not exceed 6 pills in one day.    ? rOPINIRole (REQUIP) 2 MG tablet Take 4 mg by mouth every evening. Patient takes 7pm    ? senna-docusate (PERICOLACE) 8.6-50 mg tablet Take 1 tablet by mouth 2 (two) times a day as needed for  constipation.        Allergies   Allergen Reactions   ? Amitriptyline Hcl Other (See Comments)   ? Erythromycin Diarrhea and Other (See Comments)     Childhood reaction   ? Gabapentin Other (See Comments)     Jerking movements, swelling   ? Naproxen Unknown and Other (See Comments)   ? Other Environmental Allergy      Molds  dander   ? Pregabalin Other (See Comments)         Review of Systems  No fevers or chills. No headache, lightheadedness or dizziness. No SOB, chest pains or palpitations. Appetite is fair.  She reports dislike of the food.  No nausea, vomiting, constipation or diarrhea. No dysuria, frequency, burning or pain with urination.  Patient with increased pain with walking and dorsiflexion.  She also has increased swelling and bruising to the left lower extremity.  Restless leg syndrome on Requip.  Patient continues on Tylenol and oxycodone for pain.  Otherwise review of systems are negative.     Physical Exam  PHYSICAL EXAMINATION:  Vital signs: /70, pulse 89, respirations 18, temperature 98.2, O2 sat 99% on room air.  Weight 139 pounds.  General: Awake, Alert, oriented x3, appropriately, follows simple commands, conversant  HEENT:PERRLA, Pink conjunctiva, anicteric sclerae, moist oral mucosa  NECK: Supple, without any lymphadenopathy, or masses  CVS:  S1  S2, without murmur or gallop.   LUNG: Clear to auscultation, No wheezes, rales or rhonci.  BACK: No kyphosis of the thoracic spine  ABDOMEN: Soft, nontender to palpation, with positive bowel sounds  EXTREMITIES: Good range of motion on both upper and lower extremities, 3+ pedal edema on the surgical side, pain with dorsi flexion.  SKIN: Moderate bruising of the left thigh, knee and shin.  Tegaderm dressing in place.  NEUROLOGIC: Intact, pulses weak on the operative side.  PSYCHIATRIC: Cognition intact.  Anxiety.      Labs:    Lab Results   Component Value Date    WBC 11.5 (H) 06/18/2020    HGB 9.3 (L) 05/17/2021    HCT 28.9 (L) 06/18/2020     MCV 92 06/18/2020     06/18/2020     Results for orders placed or performed in visit on 05/17/21   Basic Metabolic Panel   Result Value Ref Range    Sodium 140 136 - 145 mmol/L    Potassium 4.1 3.5 - 5.0 mmol/L    Chloride 105 98 - 107 mmol/L    CO2 25 22 - 31 mmol/L    Anion Gap, Calculation 10 5 - 18 mmol/L    Glucose 88 70 - 125 mg/dL    Calcium 8.7 8.5 - 10.5 mg/dL    BUN 15 8 - 28 mg/dL    Creatinine 0.85 0.60 - 1.10 mg/dL    GFR MDRD Af Amer >60 >60 mL/min/1.73m2    GFR MDRD Non Af Amer >60 >60 mL/min/1.73m2           Assessment/Plan:  1. History of total left knee replacement   patient with increased pain in the left lower extremity with dorsi flexion and walking.  She does have increased swelling with left lower extremity about 2 times size of the right.  Obtain venous Doppler to rule out DVT.  Patient continues on aspirin for DVT prophylactics.  She does have moderate bruising.  Ice and elevation between therapies.  BLAIR hose on in the a.m. off at at bedtime.  Continue with Tylenol and oxycodone for pain.   2. Other secondary osteoarthritis of left knee   continue with Tylenol and oxycodone for pain.  Continue therapy.   3. Chronic congestive heart failure, unspecified heart failure type (H)   compensated.  No shortness of breath or chest pain.   4. Essential hypertension   satisfactory control.   5. Acute blood loss anemia   iron today 9.3.  Patient continues on iron supplement.         This note has been dictated using voice recognition software. Any grammatical or context distortions are unintentional and inherent to the software    Electronically signed by: Karyn Abraham, CNP

## 2021-06-21 NOTE — LETTER
Letter by Karyn Abraham CNP at      Author: Karyn Abraham CNP Service: -- Author Type: --    Filed:  Encounter Date: 5/24/2021 Status: (Other)         Patient: Sherice Alvarez   MR Number: [id number]   YOB: 1934   Date of Visit: 5/24/2021     Code Status:  FULL CODE  Visit Type: Problem Visit (DVT, SOB )    Facility:  [facility] [[id number]]             History of Present Illness: Sherice Alvarez is a 86 y.o. female who I am seeing today for follow-up on the TCU.  Patient recently hospitalized on 5/11/2021 secondary to DJD of the left knee.  Patient status post left total knee arthroplasty on 5/11/2021.  Patient continues on Tylenol and oxycodone for pain.  There were no intraoperative complications.  Postoperative complications included some bruising.  And acute blood loss anemia.  Hemoglobin down to 8.6.  Past medical history includes GERD's, dyslipidemia, hypertension, intermittent asthma, CAD, chronic diastolic heart failure status post TAVR and stage III kidney disease.    Today patient sitting up in bedside chair.  Patient status post left total knee arthroplasty. Pt continues on oxycodone and Tylenol for pain. She is having some increased pain in her RLE. She continues on xarelto for DVT. Over the weekend she complained of shortness of breath and chest tightness. Nursing thought it may be anxiety. Today I talked with her and her daughter regarding possible PE. I have recommended follow up chest CT to rule out prior to discharging. Pt is to discharge home in am. Initially she denied any shortness of breath or CP. However after initial visit just prior to going out for her CT she had an episode in which she felt faint, dizzy and tingling in her arm and hands. She has her eyes closed on visit and is breathing rapid like hyperventilation. I did talk her through it and it resolved. She also described the pain in both her hips and legs as unbearable. After  much discussion she was given a muscle relaxer. Acute blood loss anemia.  Hemoglobin 9.3.  She does continue on iron supplement.  History of GERD on PPI twice daily.  Hypertension satisfactory controlled.  Restless leg syndrome on Requip.    Active Ambulatory Problems     Diagnosis Date Noted   ? Constipation 06/03/2014   ? Cancer (H)    ? Gastroesophageal reflux disease with esophagitis 11/04/2016   ? Dyslipidemia, goal LDL below 100 11/04/2016   ? Benign essential hypertension    ? Epistaxis    ? Chest pain 07/21/2017   ? Abnormal chest CT    ? Anterior epistaxis 11/22/2017   ? Acute respiratory insufficiency 05/14/2019   ? RLL pneumonia 05/14/2019   ? Stage 3 chronic kidney disease 05/15/2019   ? Asthma 05/17/2019   ? Mild intermittent asthma with exacerbation 05/17/2019   ? Coronary artery disease involving native coronary artery of native heart with unstable angina pectoris (H)    ? Lightheadedness    ? Angina pectoris, unspecified (H) 04/16/2020   ? Atypical chest pain 04/16/2020   ? Severe calcific aortic valve stenosis    ? Dyspnea 05/07/2020   ? S/P TAVR (transcatheter aortic valve replacement) 06/02/2020   ? Acute diastolic congestive heart failure (H)    ? Chronic diastolic heart failure (H)    ? Leukocytosis, unspecified type    ? S/P total knee arthroplasty, left 05/11/2021     Resolved Ambulatory Problems     Diagnosis Date Noted   ? Chest pain 11/03/2016   ? ACS (acute coronary syndrome) (H) 11/04/2016   ? Ischemic chest pain (H)    ? NSTEMI (non-ST elevated myocardial infarction) (H)    ? Acute chest pain 12/27/2016   ? Acute infection of nasal sinus 02/18/2017   ? Severe aortic stenosis 08/17/2018   ? Acute respiratory failure with hypoxia (H) 05/15/2019   ? Coronary artery disease 04/23/2020     Past Medical History:   Diagnosis Date   ? Arthritis    ? CAD (coronary artery disease)    ? Chronic kidney disease    ? Chronic pain syndrome    ? Crohn's disease (H)    ? GERD (gastroesophageal reflux  disease)    ? Hx of heart artery stent 11/2016   ? Hyperlipidemia    ? Hypertension    ? PONV (postoperative nausea and vomiting)    ? Restless legs syndrome    ? Stroke (H) 2010       Current Outpatient Medications   Medication Sig Note   ? acetaminophen (TYLENOL) 325 MG tablet Take 3 tablets (975 mg total) by mouth every 8 (eight) hours.    ? albuterol (PROVENTIL HFA;VENTOLIN HFA) 90 mcg/actuation inhaler Inhale 1-2 puffs every 6 (six) hours as needed for wheezing.    ? amLODIPine (NORVASC) 5 MG tablet Take 1 tablet (5 mg total) by mouth daily.    ? beclomethasone (QVAR) 80 mcg/actuation inhaler Inhale 1 puff 2 (two) times a day.    ? coenzyme Q10 100 mg capsule Take 100 mg by mouth daily.     ? ergocalciferol (ERGOCALCIFEROL) 50,000 unit capsule Take 50,000 Units by mouth once a week. 10/8/2019: On Thursdays   ? evolocumab (REPATHA SYRINGE) 140 mg/mL Syrg Inject 1 mL (140 mg total) under the skin every 14 (fourteen) days.    ? ferrous sulfate 325 (65 FE) MG tablet Take 1 tablet (325 mg total) by mouth daily with breakfast.    ? furosemide (LASIX) 20 MG tablet Take 10 mg by mouth 2 (two) times a day as needed.    ? melatonin 5 mg Tab tablet Take 5 mg by mouth at bedtime as needed (for sleep).     ? metoprolol succinate (TOPROL-XL) 25 MG Take 1 tablet (25 mg total) by mouth daily.    ? montelukast (SINGULAIR) 10 mg tablet Take 10 mg by mouth at bedtime as needed.     ? multivitamin therapeutic (THERAGRAN) tablet Take 1 tablet by mouth daily.    ? nitroglycerin (NITROSTAT) 0.4 MG SL tablet Place 1 tablet (0.4 mg total) under the tongue every 5 (five) minutes as needed for chest pain.    ? omeprazole (PRILOSEC) 20 MG capsule Take 1-2 capsules (20-40 mg total) by mouth daily before breakfast.    ? oxyCODONE (ROXICODONE) 5 MG immediate release tablet Take 1-2 tabs every 4-6 hours as needed for pain. Take 1 tab for pain 1-6/10, take 2 tabs for pain 7-10/10. Do not exceed 6 pills in one day.    ? rivaroxaban  ANTICOAGULANT (XARELTO) 15 mg tablet Take 15 mg by mouth 2 (two) times a day with meals. Take 15 mg twice daily x21 days then 20 mg daily for 3 months.    ? rOPINIRole (REQUIP) 2 MG tablet Take 4 mg by mouth every evening. Patient takes 7pm    ? senna-docusate (PERICOLACE) 8.6-50 mg tablet Take 1 tablet by mouth 2 (two) times a day as needed for constipation.        Allergies   Allergen Reactions   ? Amitriptyline Hcl Other (See Comments)   ? Erythromycin Diarrhea and Other (See Comments)     Childhood reaction   ? Gabapentin Other (See Comments)     Jerking movements, swelling   ? Naproxen Unknown and Other (See Comments)   ? Other Environmental Allergy      Molds  dander   ? Pregabalin Other (See Comments)         Review of Systems  No fevers or chills. No headache, lightheadedness or dizziness. No SOB, chest pains or palpitations. Appetite is fair.  She reports dislike of the food.  No nausea, vomiting, constipation or diarrhea. No dysuria, frequency, burning or pain with urination.  Pain a little better on today's visit.  She also has increased swelling and bruising to the left lower extremity.  Restless leg syndrome on Requip.  Patient continues on Tylenol and oxycodone for pain.  Otherwise review of systems are negative.     Physical Exam  PHYSICAL EXAMINATION:  Vital signs: /74, pulse 84, respirations 12, O2 sat 99% on room air.  Weight 139.4 pounds.  General: Awake, Alert, oriented x3, appropriately, follows simple commands, conversant  HEENT:PERRLA, Pink conjunctiva, anicteric sclerae, moist oral mucosa  NECK: Supple, without any lymphadenopathy, or masses  CVS:  S1  S2, without murmur or gallop.   LUNG: Clear to auscultation, No wheezes, rales or rhonci.  BACK: No kyphosis of the thoracic spine  ABDOMEN: Soft, nontender to palpation, with positive bowel sounds  EXTREMITIES: Good range of motion on both upper and lower extremities, 2+ pedal edema on the surgical side, mild tenderness to LLE.   SKIN:  Moderate bruising of the left thigh, knee and shin.  Tegaderm dressing in place.  NEUROLOGIC: Intact, pulses weak on the operative side.  PSYCHIATRIC: Cognition intact.  Pleasant affect.      Labs:    Lab Results   Component Value Date    WBC 11.5 (H) 06/18/2020    HGB 9.3 (L) 05/17/2021    HCT 28.9 (L) 06/18/2020    MCV 92 06/18/2020     06/18/2020     Results for orders placed or performed in visit on 05/17/21   Basic Metabolic Panel   Result Value Ref Range    Sodium 140 136 - 145 mmol/L    Potassium 4.1 3.5 - 5.0 mmol/L    Chloride 105 98 - 107 mmol/L    CO2 25 22 - 31 mmol/L    Anion Gap, Calculation 10 5 - 18 mmol/L    Glucose 88 70 - 125 mg/dL    Calcium 8.7 8.5 - 10.5 mg/dL    BUN 15 8 - 28 mg/dL    Creatinine 0.85 0.60 - 1.10 mg/dL    GFR MDRD Af Amer >60 >60 mL/min/1.73m2    GFR MDRD Non Af Amer >60 >60 mL/min/1.73m2           Assessment/Plan:  1. History of total left knee replacement   Continue with Tylenol and oxycodone for pain.  Methocarbamol 250 mg now and two times a day prn.    2.   DVT left lower extremity  Xarelto 15 mg p.o. twice daily x21 days then transition to 20 mg p.o. daily x3 months.   Continue ice and elevation.  Continue with BLAIR hose on a.m. off at at bedtime. Continue ice and elevation.    3.  shortness of breath  Follow up CTPE run today to rule out PE.   Also could be anxiety.    4.    GERD  continue PPI twice daily while on anticoagulation.  Aspirin discontinued while on Xarelto.   5.  Chronic congestive heart failure, unspecified heart failure type (H)  Weight stable. Edema less on today's visit.    6.  Essential hypertension   satisfactory control.   7.  Acute blood loss anemia   hemoglobin 9.3.  Patient continues on iron supplement.     Total time spent for this visit was 60 minutes with > 50% of the time spent face to face with pt and her daughter reviewing signs of PE, treatment of DVT, muscle relaxer and pain management.     This note has been dictated using voice  recognition software. Any grammatical or context distortions are unintentional and inherent to the software    Electronically signed by: Karyn Abraham CNP

## 2021-06-25 NOTE — PROGRESS NOTES
Progress Notes by Charu Lomeli DO at 11/17/2017 12:10 PM     Author: Charu Lomeli DO Service: -- Author Type: Physician    Filed: 11/20/2017  9:21 AM Encounter Date: 11/17/2017 Status: Signed    : Charu Lomeli DO (Physician)       Chief Complaint   Patient presents with   ? Facial Pain     was Discharges yesterday, burning pain  - asking for pain medication, next appt to see PCP is on 11/22/17      History of Present Illness    Sherice Alvarez is a pleasant 84 y/o female with Hx of recurrent epistaxis who presents to [facility] clinic concerned about pain due to nasal packing.    The patient was seen at [facility] ER for evaluation of epistaxis and had nasal packing with Rhino Rocket which stopped her epistaxis.  She has history of recurrent epistaxis, previously on ASA and Plavix now stopped.  No known underlying bleeding disorder and denies any history of easy bruising or spontaneously bleeding beside episodes of epistaxis.  She uses Vaseline to prevent dryness.  She has appointment with ENT next week.   She is on Keflex prophylactic since she will have nasal packing for about 1 week.  She had prior infections due to prolonged nasal packing in place and had to be hospitalized with sepsis at one point in the past, she was not on Px antibiotics at the time.  She denies any fevers, chills or rigors.  She is asking for pain relief as Tylenol alone has not helped her pain.      Review of systems: See history of present illness, otherwise negative.   Current Outpatient Prescriptions   Medication Sig Dispense Refill   ? albuterol (PROVENTIL HFA;VENTOLIN HFA) 90 mcg/actuation inhaler Inhale 1-2 puffs every 6 (six) hours as needed for wheezing.     ? amitriptyline (ELAVIL) 25 MG tablet Take 25 mg by mouth bedtime as needed for sleep.      ? BIOTIN ORAL Take 1 capsule by mouth every other day.      ? calcium, as carbonate, (OS-BESS) 500 mg calcium (1,250 mg) tablet Take 1 tablet by mouth daily.     ?  cephalexin (KEFLEX) 500 MG capsule Take 1 capsule (500 mg total) by mouth 4 (four) times a day for 5 days. 20 capsule 0   ? coenzyme Q10 100 mg capsule Take 100 mg by mouth every other day.      ? HYDROmorphone (DILAUDID) 2 MG tablet Take 0.5 tablets (1 mg total) by mouth every 4 (four) hours as needed for pain. 20 tablet 0   ? Lactobacillus rhamnosus GG (CULTURELLE) 10-15 Billion cell capsule Take 1 capsule by mouth daily.     ? lisinopril (PRINIVIL,ZESTRIL) 2.5 MG tablet Take 1 tablet (2.5 mg total) by mouth daily. 90 tablet 3   ? metoprolol tartrate (LOPRESSOR) 25 MG tablet Take 0.5 tablets (12.5 mg total) by mouth 2 (two) times a day. 90 tablet 3   ? multivitamin therapeutic (THERAGRAN) tablet Take 1 tablet by mouth daily.     ? nitroglycerin (NITROSTAT) 0.4 MG SL tablet Place 1 tablet (0.4 mg total) under the tongue every 5 (five) minutes as needed for chest pain. 90 tablet 0   ? omeprazole (PRILOSEC) 20 MG capsule TAKE ONE CAPSULE BY MOUTH EVERY DAY  1   ? predniSONE (DELTASONE) 10 MG tablet Take 5 mg by mouth daily as needed (allergies, asthma exacerbation).      ? rOPINIRole (REQUIP) 2 MG tablet Take 4 mg by mouth every evening.      ? rosuvastatin (CRESTOR) 10 MG tablet Take 10 mg by mouth every other day.      ? atorvastatin (LIPITOR) 40 MG tablet TAKE 1 TABLET BY MOUTH DAILY  6   ? clopidogrel (PLAVIX) 75 mg tablet Take 1 tablet (75 mg total) by mouth daily. 90 tablet 3   ? ferrous sulfate 325 (65 FE) MG tablet Take 1 tablet by mouth daily with breakfast.     ? HYDROcodone-acetaminophen (NORCO )  mg per tablet Take 1 tablet by mouth every 6 (six) hours as needed for pain. 20 tablet 0     No current facility-administered medications for this visit.       Past Medical History:   Diagnosis Date   ? Asthma    ? CAD (coronary artery disease)    ? Cancer     basal cell   ? Chronic kidney disease     ckd 3   ? Crohn's disease    ? GERD (gastroesophageal reflux disease)    ? Hyperlipidemia    ?  Hypertension    ? Restless legs syndrome    ? Stroke     numbness rt jaw      Past Surgical History:   Procedure Laterality Date   ? APPENDECTOMY     ? CARDIAC CATHETERIZATION  2016    multiple vessel disease.  no intervention   ? CARDIAC CATHETERIZATION  2016   ? CARDIAC CATHETERIZATION N/A 2016    Procedure: Coronary Angiogram;  Surgeon: Sunil Moran MD;  Location: [facility] Cath Lab;  Service:    ? CAROTID ENDARTERECTOMY     ? CAROTID ENDARTERECTOMY Right 2010   ?  SECTION     ? HEMORROIDECTOMY     ? TONSILLECTOMY AND ADENOIDECTOMY     ? TOTAL KNEE ARTHROPLASTY Right       Social History     Social History   ? Marital status:      Spouse name: N/A   ? Number of children: N/A   ? Years of education: N/A     Social History Main Topics   ? Smoking status: Former Smoker     Quit date: 1980   ? Smokeless tobacco: Never Used   ? Alcohol use No   ? Drug use: No   ? Sexual activity: Not Asked     Other Topics Concern   ? None     Social History Narrative    Recently put  onto hospice cares      History   Smoking Status   ? Former Smoker   ? Quit date: 1980   Smokeless Tobacco   ? Never Used      Exam:   Blood pressure 100/64, pulse 83, temperature 98.7  F (37.1  C), temperature source Oral, weight 133 lb (60.3 kg), SpO2 97 %, not currently breastfeeding.   Nose: Rhino-Rocket (nasal Packing) in place, no evidence of active bleed or epistaxis.        No results found for this or any previous visit (from the past 24 hour(s)).   Assessment/Plan   1. History of epistaxis        Patient Instructions     Prescribed few pills of Norco for pain relief to be use only for severe pain, she will try Tylenol first.  Recommended to follow up with ENT.  Report any signs suggestive of infection including fever, nasal drainage e.t.c.    Nosebleed  The skin inside your nose is fragile and filled with blood vessels. That's why even a slight injury to your nose sometimes may cause  bleeding. Hard nose blowing, dry winter air, colds, and nose-picking can also cause nosebleeds. Medicines such as warfarin, aspirin, and other blood thinners can make it more likely to have a nosebleed that is difficult to stop. Normally, nosebleeds aren't a cause for concern. But in some cases, they can mean that you have a more serious health problem. Know when to seek medical care for a nosebleed.  When to go to the emergency room (ER)  Most nosebleeds arent a medical emergency. In fact, you often can treat them yourself. But see your healthcare provider if you have nosebleeds often. And seek care right away if you:    Have a head injury    Have bleeding that lasts more than 15 to 30 minutes or is severe    Feel weak or faint    Have trouble breathing  What to expect in the ER    You will be examined and may have blood tests.    You may be given medicated nose drops to stop the nosebleed.    The doctor may pack gauze into your nose to put pressure on the vessel and help stop bleeding.    The bleeding vessel may be cauterized. During this procedure, the vessel is burned with an electrical device or chemical. Your nose is first numbed so you wont feel any pain.    In rare cases, you may need surgery to control the bleeding.  Home care for a nosebleed    Don't blow your nose for 12 hours after the bleeding stops. This will allow a strong blood clot to form. Don't pick your nose. This may restart bleeding.    Don't drink alcohol or hot liquids for the next 2 days. Alcohol and hot liquids can dilate blood vessels in your nose. This can cause bleeding to start again.    Don't take ibuprofen, naproxen, or medicines that contain aspirin. These thin the blood and may cause your nose to bleed. You may take acetaminophen for pain, unless another pain medicine was prescribed.    If the bleeding starts again, sit up and lean forward to prevent swallowing blood. Pinch your nose tightly on both sides for 10 to 15 minutes. Time  yourself. Dont release the pressure on your nose until 10 minutes is up. If bleeding doesn't stop, continue to pinch your nose. Call your healthcare provider.    If you have a cold, allergies, or dry nasal membranes, lubricate the nasal passages. Apply a small amount of petroleum jelly inside the nose with a cotton swab twice a day (morning and night).    Don't overheat your home. This can dry the air and make your condition worse.    Put a humidifier in the room where you sleep. This will add moisture to the air.    Use a saline nasal spray to keep nasal passages moist.    Don't pick your nose. Keep fingernails trimmed to decrease risk of bleeds.    Don't smoke.    Follow all other home care instructions from your healthcare provider.    Call your healthcare provider if you have any questions or concerns.  Date Last Reviewed: 10/1/2016    1651-4010 [employer] [address] [place] [address], [place] [address]. All rights reserved. This information is not intended as a substitute for professional medical care. Always follow your healthcare professional's instructions.           Charu Lomeli, DO

## 2021-06-29 NOTE — PROGRESS NOTES
Progress Notes by Yolanda Feldman MD at 10/20/2020 12:50 PM     Author: Yolanda Feldman MD Service: -- Author Type: Physician    Filed: 10/20/2020  1:40 PM Encounter Date: 10/20/2020 Status: Signed    : Yolanda Feldman MD (Physician)           [facility] Follow-up Note    Assessment & Plan        1. Coronary artery disease involving native coronary artery of native heart with unstable angina pectoris (H) -angiography April 2020 showed normal left main, proximal LAD 20% stenosis with a patent stent, mid sequential 30% lesion with a distal 70% lesion, circumflex had patent proximal stent with a third obtuse marginal artery 99% stenosis.  Right coronary artery had a patent stent with a mid 40% stenosis.  Had attempted intervention on distal LAD as well as obtuse marginal artery which were unsuccessful and she is not having any symptoms and continue current medicines.   2. Chronic diastolic heart failure (H) -acute on chronic in the setting of preserved ejection fraction of 71%, no significant signs or symptoms so continue conservative therapy.   3. Benign essential hypertension -slightly elevated today, however given patient's history and age would allow her to have systolics up to 150.   4. Dyslipidemia, goal LDL below 100-cholesterol 146 with an LDL of 63 which is excellent.   5. S/P TAVR (transcatheter aortic valve replacement) -June 2020 had a 23 mm DANIEL 3 valve placed, TAVR, with echo showing 11 mm gradient.   6. Stage 3 chronic kidney disease-under good control with creatinine of 1.0 from September 2020.     Plan  1.  Continue current medications, plan on discontinuing Plavix April 2021.  2.  Furosemide 10 mg a day, if dehydrated hold, if extra fluid 20 mg a day.  3.  Follow-up with me in April which will be a year out from her most recent stent.  4.  Can proceed with knee surgery at mildly increased but not prohibitive risk if needed.    Subjective  CC: 86-year-old  white female being seen in post hospital discharge follow-up, she is here with her daughter who I have never met.  She lives independently in senior housing.  She does admit to shortness of breath and very heavy activity and does have significant back and knee discomfort.  No significant syncope, dizziness, chest discomfort, palpitations, PND, orthopnea or peripheral edema.    Medications  Current Outpatient Medications   Medication Sig Note   ? albuterol (PROVENTIL HFA;VENTOLIN HFA) 90 mcg/actuation inhaler Inhale 1-2 puffs every 6 (six) hours as needed for wheezing.    ? amLODIPine (NORVASC) 5 MG tablet Take 1 tablet (5 mg total) by mouth daily.    ? aspirin 81 mg chewable tablet Chew 1 tablet (81 mg total) daily. Take aspirin indefinitely (for the rest of your life).    ? atorvastatin (LIPITOR) 80 MG tablet Take 80 mg by mouth every other day.    ? clopidogreL (PLAVIX) 75 mg tablet Take 1 tablet (75 mg total) by mouth daily. For 12 months    ? coenzyme Q10 100 mg capsule Take 100 mg by mouth daily.     ? ergocalciferol (ERGOCALCIFEROL) 50,000 unit capsule Take 50,000 Units by mouth once a week. 10/8/2019: On Thursdays   ? ferrous sulfate 325 (65 FE) MG tablet Take 1 tablet (325 mg total) by mouth daily with breakfast.    ? furosemide (LASIX) 20 MG tablet Take 0.5 tablets (10 mg total) by mouth 2 (two) times a day at 9am and 6pm. (Patient taking differently: Take 10 mg by mouth daily as needed. )    ? melatonin 5 mg Tab tablet Take 5 mg by mouth at bedtime as needed (for sleep).     ? metoprolol succinate (TOPROL-XL) 25 MG Take 1 tablet (25 mg total) by mouth daily.    ? montelukast (SINGULAIR) 10 mg tablet Take 10 mg by mouth at bedtime.    ? multivitamin therapeutic (THERAGRAN) tablet Take 1 tablet by mouth daily.    ? nitroglycerin (NITROSTAT) 0.4 MG SL tablet Place 1 tablet (0.4 mg total) under the tongue every 5 (five) minutes as needed for chest pain.    ? omeprazole (PRILOSEC) 20 MG capsule Take 2 capsules  "(40 mg total) by mouth daily before breakfast.    ? rOPINIRole (REQUIP) 2 MG tablet Take 4 mg by mouth every evening. Patient takes between 7-8 pm        Objective  /82 (Patient Site: Right Arm, Patient Position: Sitting, Cuff Size: Adult Regular)   Pulse 72   Resp 16   Ht 5' 1.5\" (1.562 m)   Wt 136 lb (61.7 kg)   LMP  (LMP Unknown)   BMI 25.28 kg/m      General Appearance:    Alert, cooperative, no distress, appears stated age   Head:    Normocephalic, without obvious abnormality, atraumatic   Throat:   Lips, mucosa, and tongue normal; teeth and gums normal   Neck:   Supple, symmetrical, trachea midline, no adenopathy;        thyroid:  No enlargement/tenderness/nodules; no carotid    bruit or JVD   Back:     Symmetric, no curvature, ROM normal, no CVA tenderness   Lungs:     Clear to auscultation bilaterally, respirations unlabored   Chest wall:    No tenderness or deformity   Heart:    Regular rate and rhythm, S1 and S2 normal, no murmur, rub   or gallop   Abdomen:     Soft, non-tender, bowel sounds active all four quadrants,     no masses, no organomegaly   Extremities:   Normal, atraumatic, no cyanosis or edema   Pulses:   2+ and symmetric all extremities   Skin:   Skin color, texture, turgor normal, no rashes or lesions     Results    Lab Results personally reviewed   Lab Results   Component Value Date    CHOL 146 06/04/2020    CHOL 238 (H) 05/31/2019     Lab Results   Component Value Date    HDL 67 06/04/2020    HDL 67 05/31/2019     Lab Results   Component Value Date    LDLCALC 63 06/04/2020    LDLCALC 161 (H) 05/31/2019     Lab Results   Component Value Date    TRIG 78 06/04/2020    TRIG 51 05/31/2019     Lab Results   Component Value Date    WBC 11.5 (H) 06/18/2020    HGB 9.2 (L) 06/18/2020    HCT 28.9 (L) 06/18/2020     06/18/2020     Lab Results   Component Value Date    CREATININE 1.00 09/09/2020    BUN 22 09/09/2020     09/09/2020    K 3.9 09/09/2020    CO2 27 09/09/2020 "     Review of Systems:   General: WNL  Eyes: WNL  Ears/Nose/Throat: Hearing Loss  Lungs: WNL  Heart: Irregular Heartbeat, Leg Swelling  Stomach: WNL  Bladder: Frequent Urination at Night  Muscle/Joints: Joint Pain, Muscle Pain  Skin: WNL  Nervous System: Loss of Balance  Mental Health: Anxiety     Blood: Easy Bruising, Easy Bleeding

## 2021-06-29 NOTE — PROGRESS NOTES
"Progress Notes by Rosa Carter CNP at 4/28/2020  1:30 PM     Author: Rosa Carter CNP Service: -- Author Type: Nurse Practitioner    Filed: 4/28/2020  2:07 PM Encounter Date: 4/28/2020 Status: Signed    : Rosa Carter CNP (Nurse Practitioner)           The patient has been notified of following:     \"This telephone visit will be conducted via a call between you and your physician/provider. We have found that certain health care needs can be provided without the need for a physical exam.  This service lets us provide the care you need with a phone conversation.  If a prescription is necessary we can send it directly to your pharmacy.  If lab work is needed we can place an order for that and you can then stop by our lab to have the test done at a later time. If during the course of the call the physician/provider feels a telephone visit is not appropriate, you will not be charged for this service.\" Verbal consent has been obtained for this service by care team member:         HEART CARE PHONE ENCOUNTER        The patient has chosen to have the visit conducted as a telephone visit, to reduce risk of exposure given the current status of Coronavirus in our community. This telephone visit is being conducted via a call between the patient and physician/provider. Health care needs are being provided without a physical exam.     Assessment/Recommendations   Assessment/Plan:    1. Coronary artery disease:  PCI in April 17, 2020 to the distal LAD with drug-eluting stent and unsuccessful PCI of left circumflex.  She continues on aspirin and Plavix.  We discussed the importance of antiplatelet therapy and talking with her cardiologist prior to stopping these medications for any reason.  She reports no pain or bruising of right femoral or right radial site.  She still has a small \" lump\" at right radial site.    2.  Dyslipidemia: Sherice Alvarez is on high intensity statin therapy with " atorvastatin 80 mg daily.  We discussed a diet low in saturated fat, weight loss, and exercise along with medication for better control of cholesterol.     3.  Severe aortic stenosis: She has an appointment with Dr. Caceres on April 30 to discuss.    I have reviewed the note as documented.  This accurately captures the substance of my conversation with the patient.    Total time of call between patient and provider was 23 minutes   Start Time: 1:22   Stop Time: 1:45       History of Present Illness/Subjective    Sherice Alvarez is a 85 y.o. female who is being evaluated via a billable telephone visit.  She has a history of coronary artery disease with stenting in 2016, hypertension, dyslipidemia, and aortic stenosis.  She was hospitalized April 15 to April 19.  PCI to the distal LAD with drug-eluting stent and unsuccessful PCI of left circumflex.  She was again hospitalized April 23 to April 24 with chest pain and back pain but this was felt to be musculoskeletal.  Echocardiogram on April 16, 2020 showed ejection fraction of 75%.  She was found to have severe aortic stenosis and referral to valve clinic was made.  Dual antiplatelet therapy is being used with aspirin and Plavix.     She notes chest tightness with walking on distances.  She denies any worsening since hospital discharge.  She has not needed any nitro.  She thinks the tightness is improving.  She denies lightheadedness, shortness of breath and dyspnea on exertion.  She has chronic lower extremity edema, left greater than right and is back to baseline.    Results for orders placed during the hospital encounter of 04/16/20   Cardiac Catheterization [CATH01] 04/17/2020    Narrative Sherice Alvarez is a 85 y.o. old female with CAD s/p prior PCI's   latest to mLAD '18, AS, HTN, HL,  who is here for accelearating angina.    - PCI to severe dLAD lesion w/ DESx1, attempted but unsuccessful another   attempt at AV Lcx branch - abandoned due to likely ,  small size,   suboptimal for stent (so would have been balloon only result)  - invasive AV gradient is in the severe range. Therefore, would arrange an   outpatient valve clinic appointment for TAVR evaluation  - continue ASA 81mg daily indefinitely, add clopidogrel 600mg once,   followed by 75mg daily for at least 12 months   - add amlodipine for both BP control and small vessel vasodilaiton in case   Lcx gives her angina. Could also consider imdur  - increase atorva to 80  - continue aggressive risk factor modification         Findings:  LM:no obstruction  LAD:80% dLAD lesion  Lcx:small AV Lcx is either subtotally occluded w/ a small channel or a    w/ bridging collaterals, YONIS 2-3 flow before intervention  RCA:not injected    LVEDP:17  AV mean gradient: simultaneous 39    Access:  R Femoral artery    PCI:  LMT was engaged w/ a 6F EBU 3.0 Guide catheter and the lesion in Lcx was   attempted to be wired w/ the help of Turn[employer] LP, TwinPass, and Supercross   microcatheters and Anjalie, Nicolas FC,  200, and Fighter wires,   unsuccessfully. Due to the branch small size, likely balloon only result   for this lesion, we chose to switch our attention to the LAD, which was   wired w/ a Forte wire, ballooned w/ 2.0x8mm Emerge balloon, and stented w/   a 2.5x20mm Synergy EES at 20 xavier. Of note, she had severe 6/10 CP w/   balloon and stent inflations w/ LAD, which resolved after PCI and IC NTG   administration. Final angiography showed no dissection or perforation and   a YONIS 3 flow.    Closure:   Manual    This is a complex modifer 22 procedure due to difficult anatomy, need for   advanced interventional () technics            I have reviewed and updated the patient's Past Medical History, Social History, Family History and Medication List.     Physical Examination not performed given phone encounter Review of Systems                                                Medical History  Surgical History Family  History Social History   Past Medical History:   Diagnosis Date   ? Asthma    ? CAD (coronary artery disease)    ? Cancer (H)     basal cell   ? Chronic kidney disease     ckd 3   ? Crohn's disease (H)    ? GERD (gastroesophageal reflux disease)    ? Hyperlipidemia    ? Hypertension    ? Restless legs syndrome    ? Stroke (H)     numbness rt jaw    Past Surgical History:   Procedure Laterality Date   ? APPENDECTOMY     ? CARDIAC CATHETERIZATION  2016    multiple vessel disease.  no intervention   ? CARDIAC CATHETERIZATION  2016   ? CARDIAC CATHETERIZATION N/A 2016    Procedure: Coronary Angiogram;  Surgeon: Sunil Moran MD;  Location: [facility];  Service:    ? CAROTID ENDARTERECTOMY     ? CAROTID ENDARTERECTOMY Right    ?  SECTION     ? CV CORONARY ANGIOGRAM N/A 2020    Procedure: Coronary Angiogram;  Surgeon: Vinita Ramos MD;  Location: [facility];  Service: Cardiology   ? CV LEFT HEART CATHETERIZATION WO LEFT VETRICULOGRAM Left 2020    Procedure: Left Heart Catheterization Without Left Ventriculogram;  Surgeon: Jerad Lerner MD;  Location: [facility];  Service: Cardiology   ? HEMORROIDECTOMY     ? NM ESOPHAGOGASTRODUODENOSCOPY TRANSORAL DIAGNOSTIC N/A 7/10/2019    Procedure: ESOPHAGOGASTRODUODENOSCOPY (EGD) with gastric biopsy;  Surgeon: Sunil Stuart MD;  Location: [facility];  Service: Gastroenterology   ? TONSILLECTOMY AND ADENOIDECTOMY     ? TOTAL KNEE ARTHROPLASTY Right     Family History   Problem Relation Age of Onset   ? Atrial fibrillation Mother    ? Parkinsonism Father     Social History     Socioeconomic History   ? Marital status:      Spouse name: Not on file   ? Number of children: Not on file   ? Years of education: Not on file   ? Highest education level: Not on file   Occupational History   ? Not on file   Social Needs   ? Financial resource strain: Not on file   ? Food insecurity     Worry:  Not on file     Inability: Not on file   ? Transportation needs     Medical: Not on file     Non-medical: Not on file   Tobacco Use   ? Smoking status: Former Smoker     Last attempt to quit: 1980     Years since quittin.3   ? Smokeless tobacco: Never Used   Substance and Sexual Activity   ? Alcohol use: No   ? Drug use: No   ? Sexual activity: Not on file   Lifestyle   ? Physical activity     Days per week: Not on file     Minutes per session: Not on file   ? Stress: Not on file   Relationships   ? Social connections     Talks on phone: Not on file     Gets together: Not on file     Attends Holiness service: Not on file     Active member of club or organization: Not on file     Attends meetings of clubs or organizations: Not on file     Relationship status: Not on file   ? Intimate partner violence     Fear of current or ex partner: Not on file     Emotionally abused: Not on file     Physically abused: Not on file     Forced sexual activity: Not on file   Other Topics Concern   ? Not on file   Social History Narrative    Patient .  Adult daughter here.          Medications  Allergies   Current Outpatient Medications   Medication Sig Dispense Refill   ? albuterol (PROVENTIL HFA;VENTOLIN HFA) 90 mcg/actuation inhaler Inhale 1-2 puffs every 6 (six) hours as needed for wheezing.     ? amLODIPine (NORVASC) 5 MG tablet Take 1 tablet (5 mg total) by mouth daily. 30 tablet 0   ? aspirin 81 mg chewable tablet Chew 1 tablet (81 mg total) daily. Take aspirin indefinitely (for the rest of your life). 30 tablet 0   ? atorvastatin (LIPITOR) 80 MG tablet Take 1 tablet (80 mg total) by mouth at bedtime. 30 tablet 11   ? clopidogreL (PLAVIX) 75 mg tablet Take 1 tablet (75 mg total) by mouth daily. For 12 months 30 tablet 11   ? coenzyme Q10 100 mg capsule Take 100 mg by mouth daily.      ? ergocalciferol (ERGOCALCIFEROL) 50,000 unit capsule Take 50,000 Units by mouth once a week.     ? melatonin 5 mg Tab tablet  Take 5 mg by mouth at bedtime as needed (for sleep).      ? metoprolol succinate (TOPROL-XL) 25 MG Take 1.5 tablets (37.5 mg total) by mouth daily. 90 tablet 0   ? montelukast (SINGULAIR) 10 mg tablet Take 10 mg by mouth at bedtime.     ? multivitamin therapeutic (THERAGRAN) tablet Take 1 tablet by mouth daily.     ? omeprazole (PRILOSEC) 20 MG capsule Take 20 mg by mouth daily before breakfast.     ? rOPINIRole (REQUIP) 2 MG tablet Take 4 mg by mouth every evening.      ? nitroglycerin (NITROSTAT) 0.4 MG SL tablet Place 1 tablet (0.4 mg total) under the tongue every 5 (five) minutes as needed for chest pain. 90 tablet 0     No current facility-administered medications for this visit.     Allergies   Allergen Reactions   ? Erythromycin Unknown     Childhood reaction   ? Gabapentin      Jerking movements, swelling   ? Naprosyn [Naproxen] Unknown   ? Other Environmental Allergy      Molds  dander         Lab Results    Chemistry/lipid CBC Cardiac Enzymes/BNP/TSH/INR   Lab Results   Component Value Date    CHOL 238 (H) 05/31/2019    HDL 67 05/31/2019    LDLCALC 161 (H) 05/31/2019    TRIG 51 05/31/2019    CREATININE 1.12 (H) 04/23/2020    BUN 20 04/23/2020    K 4.0 04/23/2020     04/23/2020     04/23/2020    CO2 24 04/23/2020    Lab Results   Component Value Date    WBC 9.2 04/23/2020    HGB 10.3 (L) 04/23/2020    HCT 32.1 (L) 04/23/2020    MCV 92 04/23/2020     04/23/2020    Lab Results   Component Value Date    CKTOTAL 166 03/19/2012    CKMB 3 12/28/2016    TROPONINI 0.03 04/23/2020    BNP 68 04/23/2020    TSH 3.90 03/04/2020    INR 1.08 04/23/2020

## 2021-06-29 NOTE — PROGRESS NOTES
"Progress Notes by Jerad Lerner MD at 7/7/2020 10:50 AM     Author: Jerad Lerner MD Service: -- Author Type: Physician    Filed: 7/7/2020 11:27 AM Encounter Date: 7/7/2020 Status: Signed    : Jerad Lerner MD (Physician)           The patient has been notified of following:     \"This phone visit will be conducted via a call between you and your physician/provider. We have found that certain health care needs can be provided without the need for an in-person physical exam.  This service lets us provide the care you need with a phone conversation.  If a prescription is necessary we can send it directly to your pharmacy.  If lab work is needed we can place an order for that and you can then stop by our lab to have the test done at a later time.      Patient has given verbal consent to a Video visit? Yes    HEART CARE VIDEO ENCOUNTER        The patient has chosen to have the visit conducted as a phone visit, to reduce risk of exposure given the current status of Coronavirus in our community. This visit is being conducted via a call between the patient and physician/provider. Health care needs are being provided without a physical exam.     Assessment/Recommendations   Assessment/Plan: 85F w/ AS s/pTAVR 6/20, CAD s/p prior PCI's latest to dLAD '20, HTN, HL who is here for f/u post-TAVR.    # AS - s/p TAVR w/ #23S3 via R subclavian approach, mean gradient 15, no AI  - continue ASA/clopidogrel    # CAD - multiple PCI's, most recent to severe dLAD lesion w/ DESx1, attempted but unsuccessful another attempt at AV Lcx branch - abandoned due to likely , small size, suboptimal for stent (so would have been balloon only result)  - she had complete resolution of her angina w/ PCI to LAD, so will not plan to pursue Lcx   - continue ASA 81mg daily indefinitely, clopidogrel 75mg daily for at least 12 months   - continue amlodipine for both BP control and small vessel vasodilaiton in case Lcx gives her " angina. Could also consider imdur  - continue atorva to 80  - continue aggressive risk factor modification           Follow Up Plan:  12 months w/ us 6 mods w/ primary Cardiology   I have reviewed the note as documented.  This accurately captures the substance of my conversation with the patient.    Total time of phone call between patient and provider was 10 minutes   Start Time:1105  Stop Time:1115      Distant Location (provider location):  [facility] HEART [facility]      Mode of Communication:  Video Conference via [email or web address]       History of Present Illness/Subjective    Sherice Alvarez is a 85 y.o. female who is being evaluated via a billable video visit and has consented to a video visit. Sherice Alvarez has a history of AS s/pTAVR 6/20, CAD s/p prior PCI's latest to dLAD '20, HTN, HL who is here for f/u post-TAVR.    She feels much better post-PCI and TAVR, no longer has CP, denies orthopnea/PND/edema/syncope/N/V/D/F/C.     I have reviewed and updated the patient's Past Medical History, Social History, Family History and Medication List.     Physical Examination performed via live video encounter Review of Systems   General Appearance:   no distress, normal body habitus, upright.   ENT/Mouth: membranes moist, no nasal discharge or bleeding gums.  Normal head shape, no evidence of injury or laceration.     EYES:  no scleral icterus, normal conjunctivae   Neck: no evidence of thyromegaly.  Supple   Chest/Lungs:   No audible wheezing equal chest wall expansion. Non labored breathing.  No cough.   Cardiovascular:   No evidence of elevated jugular venous pressure.  No evidence of pitting edema bilaterally    Abdomen:  no evidence of abdominal distention. No observe juandice.     Extremities: no cyanosis or clubbing noted.    Skin: no xanthelasma, normal skin color. No evidence of facial lacerations.      Neurologic: Normal arm motion bilateral, no tremors.  No evidence of focal defect.       Psychiatric:  alert and oriented x3, calm                                               Medical History  Surgical History Family History Social History   Past Medical History:   Diagnosis Date   ? Asthma    ? CAD (coronary artery disease)    ? Cancer (H)     basal cell   ? Chronic kidney disease     ckd 3   ? Crohn's disease (H)    ? GERD (gastroesophageal reflux disease)    ? Hyperlipidemia    ? Hypertension    ? Restless legs syndrome    ? Stroke (H)     numbness rt jaw    Past Surgical History:   Procedure Laterality Date   ? APPENDECTOMY     ? CARDIAC CATHETERIZATION  2016    multiple vessel disease.  no intervention   ? CARDIAC CATHETERIZATION  2016   ? CARDIAC CATHETERIZATION N/A 2016    Procedure: Coronary Angiogram;  Surgeon: Sunil Moran MD;  Location: [facility] Cath Lab;  Service:    ? CAROTID ENDARTERECTOMY     ? CAROTID ENDARTERECTOMY Right 2010   ?  SECTION     ? CV CORONARY ANGIOGRAM N/A 2020    Procedure: Coronary Angiogram;  Surgeon: Vinita Ramos MD;  Location: [facility] Cath Lab;  Service: Cardiology   ? CV LEFT HEART CATHETERIZATION WO LEFT VETRICULOGRAM Left 2020    Procedure: Left Heart Catheterization Without Left Ventriculogram;  Surgeon: Jerad Lerner MD;  Location: [facility] Lab;  Service: Cardiology   ? CV OTHER APPROACH TRANSCATHETER (TAVR) N/A 2020    Procedure: CV TRANSCATHETER AORTIC VALVE REPLACEMENT - RIGHT SUBCLAVIAN APPROACH;  Surgeon: John Caceres MD;  Location: [facility] Cath Lab;  Service: Cardiology   ? HEMORROIDECTOMY     ? TX ESOPHAGOGASTRODUODENOSCOPY TRANSORAL DIAGNOSTIC N/A 7/10/2019    Procedure: ESOPHAGOGASTRODUODENOSCOPY (EGD) with gastric biopsy;  Surgeon: Sunil Stuart MD;  Location: [facility];  Service: Gastroenterology   ? TX REPLACE AORTIC VALVE OPEN AXILLRY ARTRY APPROACH N/A 2020    Procedure: OR TRANSCATHETER AORTIC VALVE REPLACEMENT, SUBCLAVIAN APPROACH;  Surgeon: Bhavin Cuadra  MD;  Location: [facility];  Service: Cardiology   ? TONSILLECTOMY AND ADENOIDECTOMY     ? TOTAL KNEE ARTHROPLASTY Right     Family History   Problem Relation Age of Onset   ? Atrial fibrillation Mother    ? Parkinsonism Father       Social History     Socioeconomic History   ? Marital status:      Spouse name: Not on file   ? Number of children: Not on file   ? Years of education: Not on file   ? Highest education level: Not on file   Occupational History   ? Not on file   Social Needs   ? Financial resource strain: Not on file   ? Food insecurity     Worry: Not on file     Inability: Not on file   ? Transportation needs     Medical: Not on file     Non-medical: Not on file   Tobacco Use   ? Smoking status: Former Smoker     Last attempt to quit: 1980     Years since quittin.5   ? Smokeless tobacco: Never Used   Substance and Sexual Activity   ? Alcohol use: No   ? Drug use: No   ? Sexual activity: Not on file   Lifestyle   ? Physical activity     Days per week: Not on file     Minutes per session: Not on file   ? Stress: Not on file   Relationships   ? Social connections     Talks on phone: Not on file     Gets together: Not on file     Attends Protestant service: Not on file     Active member of club or organization: Not on file     Attends meetings of clubs or organizations: Not on file     Relationship status: Not on file   ? Intimate partner violence     Fear of current or ex partner: Not on file     Emotionally abused: Not on file     Physically abused: Not on file     Forced sexual activity: Not on file   Other Topics Concern   ? Not on file   Social History Narrative    Patient .  Adult daughter here.          Medications  Allergies   Current Outpatient Medications   Medication Sig Dispense Refill   ? albuterol (PROVENTIL HFA;VENTOLIN HFA) 90 mcg/actuation inhaler Inhale 1-2 puffs every 6 (six) hours as needed for wheezing.     ? amLODIPine (NORVASC) 5 MG tablet Take 1 tablet  (5 mg total) by mouth daily. 90 tablet 3   ? aspirin 81 mg chewable tablet Chew 1 tablet (81 mg total) daily. Take aspirin indefinitely (for the rest of your life). 30 tablet 0   ? atorvastatin (LIPITOR) 80 MG tablet Take 80 mg by mouth every other day.     ? clopidogreL (PLAVIX) 75 mg tablet Take 1 tablet (75 mg total) by mouth daily. For 12 months 30 tablet 11   ? coenzyme Q10 100 mg capsule Take 100 mg by mouth daily.      ? ergocalciferol (ERGOCALCIFEROL) 50,000 unit capsule Take 50,000 Units by mouth once a week.     ? ferrous sulfate 325 (65 FE) MG tablet Take 1 tablet (325 mg total) by mouth daily with breakfast.  0   ? furosemide (LASIX) 20 MG tablet Take 0.5 tablets (10 mg total) by mouth 2 (two) times a day at 9am and 6pm. (Patient taking differently: Take 10 mg by mouth daily. ) 60 tablet 1   ? melatonin 5 mg Tab tablet Take 5 mg by mouth at bedtime as needed (for sleep).      ? metoprolol succinate (TOPROL-XL) 25 MG Take 1 tablet (25 mg total) by mouth daily. 30 tablet 0   ? montelukast (SINGULAIR) 10 mg tablet Take 10 mg by mouth at bedtime.     ? multivitamin therapeutic (THERAGRAN) tablet Take 1 tablet by mouth daily.     ? nitroglycerin (NITROSTAT) 0.4 MG SL tablet Place 1 tablet (0.4 mg total) under the tongue every 5 (five) minutes as needed for chest pain. 90 tablet 0   ? omeprazole (PRILOSEC) 20 MG capsule Take 2 capsules (40 mg total) by mouth daily before breakfast. 60 capsule 0   ? rOPINIRole (REQUIP) 2 MG tablet Take 4 mg by mouth every evening. Patient takes between 7-8 pm     ? amoxicillin-clavulanate (AUGMENTIN) 875-125 mg per tablet Take 1 tablet by mouth 2 (two) times a day. 1 tablet PO BID for 7 days  Indications: Possible post-op infection       No current facility-administered medications for this visit.     Allergies   Allergen Reactions   ? Erythromycin Diarrhea     Childhood reaction   ? Gabapentin      Jerking movements, swelling   ? Naprosyn [Naproxen] Unknown   ? Other  Environmental Allergy      Molds  dander         Lab Results    Chemistry/lipid CBC Cardiac Enzymes/BNP/TSH/INR   Lab Results   Component Value Date    CHOL 146 06/04/2020    HDL 67 06/04/2020    LDLCALC 63 06/04/2020    TRIG 78 06/04/2020    CREATININE 0.97 06/05/2020    BUN 22 06/05/2020    K 4.0 06/05/2020     06/05/2020     06/05/2020    CO2 25 06/05/2020    Lab Results   Component Value Date    WBC 11.5 (H) 06/18/2020    HGB 9.2 (L) 06/18/2020    HCT 28.9 (L) 06/18/2020    MCV 92 06/18/2020     06/18/2020    Lab Results   Component Value Date    CKTOTAL 166 03/19/2012    CKMB 3 12/28/2016    TROPONINI 0.02 05/06/2020    BNP 48 06/02/2020    TSH 3.90 03/04/2020    INR 1.34 (H) 06/02/2020        Jerad Lerner

## 2021-06-29 NOTE — PROGRESS NOTES
"Progress Notes by Viv Tolbert PA-C at 6/11/2020  1:30 PM     Author: Viv Tolbert PA-C Service: -- Author Type: Physician Assistant    Filed: 6/11/2020  3:08 PM Encounter Date: 6/11/2020 Status: Signed    : Viv Tolbert PA-C (Physician Assistant)       The patient has been notified of following:     \"This video visit will be conducted via a call between you and your physician/provider. We have found that certain health care needs can be provided without the need for an in-person physical exam.  This service lets us provide the care you need with a video conversation.  If a prescription is necessary we can send it directly to your pharmacy.  If lab work is needed we can place an order for that and you can then stop by our lab to have the test done at a later time.      Patient has given verbal consent to a Video visit? Yes    HEART CARE VIDEO ENCOUNTER        The patient has chosen to have the visit conducted as a video visit, to reduce risk of exposure given the current status of Coronavirus in our community. This video visit is being conducted via a call between the patient and physician/provider. Health care needs are being provided without a physical exam.     Assessment/Recommendations   Assessment/Plan:  1. Severe AS - s/p TAVR on June 2 via the right subclavian approach and using a #23 jamari valve.  She has seen significant improvement in her breathing.  She should continue ASA daily.  She is doing in-home PT.  She will have repeat echo on JUly 2 with follow up visit with Dr. Lerner on July 7  2. CAD - no angina.  She should continue DAPT.  Most recently had KIANA to LAD in April 2020, but has hx of stents to circ and RCA in past too.  Continue beta-blocker and statin  3. Hypertension - no BP reported today.  Continue current dose of amlodipine, metoprolol and lasix (patient has decreased dose to 10 mg two times a day)  4. Heart failure with preserved EF, NYHA " class III - compensated.  Patient reports that lasix 20 mg two times a day was too much so she has cut back to 10 mg two times a day.    5. Leukocytosis - WBC is still elevated with last draw yesterday at 15.  Unsure of why.  Her blood cultures, UA/UC were all negative in hospital.  She is afebrile and not feeling lethargic or run down.  Her procalcitonin was normal and CXR showed no evidence of infiltrate.  She just finished up Augmentin for traumatic bursitis and visually her subclavian incision looked good with no signs of infection.  She is seeing her PMD tomorrow to follow up on this.   I have reviewed the note as documented.  This accurately captures the substance of my conversation with the patient.    Total time of video between patient and provider was 18 minutes   Start Time: 1110   Stop Time: 1128    Originating Location (pt. Location): Home    Distant Location (provider location):  [facility] HEART [facility]      Mode of Communication:  [facility] Conference [facility]       History of Present Illness/Subjective    Sherice Alvarez is a 85 y.o. female who is being evaluated via a billable video visit and has consented to a video visit.     Sherice has history of both coronary and valvular heart disease, having undergone stents in the past to her LAD, circumflex and RCA.  She presented back to the hospital in April with angina and underwent coronary angiogram which showed progressive disease in the distal part of her LAD.  All stents were patent at that time, but echocardiogram showed progression of her aortic stenosis to the severe range.    She underwent successful TAVR on June 2.  She is doing much better since the procedure.  She says she feels better each day. Her breathing has improved and she no longer has chest pressure/tightness.  She has a visiting nurse who checks in on her and she said that she's been having a little bleeding from the incision site.  She denies dizziness or lightheadedness. She has  had no fever.     I have reviewed and updated the patient's Past Medical History, Social History, Family History and Medication List.    Pre-discharge echo from [facility] 3, 2020:    When compared to the previous study dated 4/16/2020, Bioprosthetic valve is presented.    Normal left ventricular size, wall motion and function. Left ventricle ejection fraction is normal. The calculated left ventricular ejection fraction is 71%.    Normal right ventricular size and systolic function.    Normal function of bioprosthetic aortic valve (23 mm [place] 3). No stenosis or regurgitation.    No pericardial effusion.     Physical Examination performed via live video encounter Review of Systems   General Appearance:   no distress, normal body habitus, upright.   ENT/Mouth: membranes moist, no nasal discharge or bleeding gums.  Normal head shape, no evidence of injury or laceration.     EYES:  no scleral icterus, normal conjunctivae   Neck: no evidence of thyromegaly.  Supple   Chest/Lungs:   No audible wheezing equal chest wall expansion. Non labored breathing.  No cough.   Cardiovascular:   No evidence of elevated jugular venous pressure.  No evidence of pitting edema bilaterally    Abdomen:  no evidence of abdominal distention. No observe juandice.     Extremities: no cyanosis or clubbing noted.    Skin: no xanthelasma, normal skin color. No evidence of facial lacerations.      Neurologic: Normal arm motion bilateral, no tremors.  No evidence of focal defect.       Psychiatric: alert and oriented x3, calm                                               Medical History  Surgical History Family History Social History   Past Medical History:   Diagnosis Date   ? Asthma    ? CAD (coronary artery disease)    ? Cancer (H)     basal cell   ? Chronic kidney disease     ckd 3   ? Crohn's disease (H)    ? GERD (gastroesophageal reflux disease)    ? Hyperlipidemia    ? Hypertension    ? Restless legs syndrome    ? Stroke (H) 2010    numbness rt  jaw    Past Surgical History:   Procedure Laterality Date   ? APPENDECTOMY     ? CARDIAC CATHETERIZATION  2016    multiple vessel disease.  no intervention   ? CARDIAC CATHETERIZATION  2016   ? CARDIAC CATHETERIZATION N/A 2016    Procedure: Coronary Angiogram;  Surgeon: Sunil Moran MD;  Location: [facility];  Service:    ? CAROTID ENDARTERECTOMY     ? CAROTID ENDARTERECTOMY Right 2010   ?  SECTION     ? CV CORONARY ANGIOGRAM N/A 2020    Procedure: Coronary Angiogram;  Surgeon: Vinita Ramos MD;  Location: [facility];  Service: Cardiology   ? CV LEFT HEART CATHETERIZATION WO LEFT VETRICULOGRAM Left 2020    Procedure: Left Heart Catheterization Without Left Ventriculogram;  Surgeon: Jerad Lerner MD;  Location: [facility];  Service: Cardiology   ? CV OTHER APPROACH TRANSCATHETER (TAVR) N/A 2020    Procedure: CV TRANSCATHETER AORTIC VALVE REPLACEMENT - RIGHT SUBCLAVIAN APPROACH;  Surgeon: John Caceres MD;  Location: [facility];  Service: Cardiology   ? HEMORROIDECTOMY     ? GA ESOPHAGOGASTRODUODENOSCOPY TRANSORAL DIAGNOSTIC N/A 7/10/2019    Procedure: ESOPHAGOGASTRODUODENOSCOPY (EGD) with gastric biopsy;  Surgeon: Sunil Stuart MD;  Location: [facility];  Service: Gastroenterology   ? GA REPLACE AORTIC VALVE OPEN AXILLRY ARTRY APPROACH N/A 2020    Procedure: OR TRANSCATHETER AORTIC VALVE REPLACEMENT, SUBCLAVIAN APPROACH;  Surgeon: Bhavin Cuadra MD;  Location: [facility];  Service: Cardiology   ? TONSILLECTOMY AND ADENOIDECTOMY     ? TOTAL KNEE ARTHROPLASTY Right     Family History   Problem Relation Age of Onset   ? Atrial fibrillation Mother    ? Parkinsonism Father       Social History     Socioeconomic History   ? Marital status:      Spouse name: Not on file   ? Number of children: Not on file   ? Years of education: Not on file   ? Highest education level: Not on file    Occupational History   ? Not on file   Social Needs   ? Financial resource strain: Not on file   ? Food insecurity     Worry: Not on file     Inability: Not on file   ? Transportation needs     Medical: Not on file     Non-medical: Not on file   Tobacco Use   ? Smoking status: Former Smoker     Last attempt to quit: 1980     Years since quittin.4   ? Smokeless tobacco: Never Used   Substance and Sexual Activity   ? Alcohol use: No   ? Drug use: No   ? Sexual activity: Not on file   Lifestyle   ? Physical activity     Days per week: Not on file     Minutes per session: Not on file   ? Stress: Not on file   Relationships   ? Social connections     Talks on phone: Not on file     Gets together: Not on file     Attends Orthodox service: Not on file     Active member of club or organization: Not on file     Attends meetings of clubs or organizations: Not on file     Relationship status: Not on file   ? Intimate partner violence     Fear of current or ex partner: Not on file     Emotionally abused: Not on file     Physically abused: Not on file     Forced sexual activity: Not on file   Other Topics Concern   ? Not on file   Social History Narrative    Patient .  Adult daughter here.          Medications  Allergies   Current Outpatient Medications   Medication Sig Dispense Refill   ? albuterol (PROVENTIL HFA;VENTOLIN HFA) 90 mcg/actuation inhaler Inhale 1-2 puffs every 6 (six) hours as needed for wheezing.     ? amLODIPine (NORVASC) 5 MG tablet Take 1 tablet (5 mg total) by mouth daily. 90 tablet 3   ? aspirin 81 mg chewable tablet Chew 1 tablet (81 mg total) daily. Take aspirin indefinitely (for the rest of your life). 30 tablet 0   ? atorvastatin (LIPITOR) 80 MG tablet Take 80 mg by mouth every other day.     ? clopidogreL (PLAVIX) 75 mg tablet Take 1 tablet (75 mg total) by mouth daily. For 12 months 30 tablet 11   ? coenzyme Q10 100 mg capsule Take 100 mg by mouth daily.      ? ergocalciferol  (ERGOCALCIFEROL) 50,000 unit capsule Take 50,000 Units by mouth once a week.     ? ferrous sulfate 325 (65 FE) MG tablet Take 1 tablet (325 mg total) by mouth daily with breakfast.  0   ? furosemide (LASIX) 20 MG tablet Take 0.5 tablets (10 mg total) by mouth 2 (two) times a day at 9am and 6pm. 60 tablet 1   ? melatonin 5 mg Tab tablet Take 5 mg by mouth at bedtime as needed (for sleep).      ? metoprolol succinate (TOPROL-XL) 25 MG Take 1 tablet (25 mg total) by mouth daily. 30 tablet 0   ? montelukast (SINGULAIR) 10 mg tablet Take 10 mg by mouth at bedtime.     ? multivitamin therapeutic (THERAGRAN) tablet Take 1 tablet by mouth daily.     ? nitroglycerin (NITROSTAT) 0.4 MG SL tablet Place 1 tablet (0.4 mg total) under the tongue every 5 (five) minutes as needed for chest pain. 90 tablet 0   ? omeprazole (PRILOSEC) 20 MG capsule Take 2 capsules (40 mg total) by mouth daily before breakfast. 60 capsule 0   ? rOPINIRole (REQUIP) 2 MG tablet Take 4 mg by mouth every evening. Patient takes between 7-8 pm       No current facility-administered medications for this visit.     Allergies   Allergen Reactions   ? Erythromycin Diarrhea     Childhood reaction   ? Gabapentin      Jerking movements, swelling   ? Naprosyn [Naproxen] Unknown   ? Other Environmental Allergy      Molds  dander         Lab Results    Chemistry/lipid CBC Cardiac Enzymes/BNP/TSH/INR   Lab Results   Component Value Date    CHOL 146 06/04/2020    HDL 67 06/04/2020    LDLCALC 63 06/04/2020    TRIG 78 06/04/2020    CREATININE 0.97 06/05/2020    BUN 22 06/05/2020    K 4.0 06/05/2020     06/05/2020     06/05/2020    CO2 25 06/05/2020    Lab Results   Component Value Date    WBC 15.7 (H) 06/10/2020    HGB 9.3 (L) 06/10/2020    HCT 28.4 (L) 06/10/2020    MCV 90 06/10/2020     06/10/2020    Lab Results   Component Value Date    CKTOTAL 166 03/19/2012    CKMB 3 12/28/2016    TROPONINI 0.02 05/06/2020    BNP 48 06/02/2020    TSH 3.90  03/04/2020    INR 1.34 (H) 06/02/2020

## 2021-06-29 NOTE — PROGRESS NOTES
"Progress Notes by Viv Tolbert PA-C at 5/22/2020  2:50 PM     Author: Viv Tolbert PA-C Service: -- Author Type: Physician Assistant    Filed: 5/22/2020  4:35 PM Encounter Date: 5/22/2020 Status: Signed    : Viv Tolbert PA-C (Physician Assistant)           The patient has been notified of following:     \"This video visit will be conducted via a call between you and your physician/provider. We have found that certain health care needs can be provided without the need for an in-person physical exam.  This service lets us provide the care you need with a video conversation.  If a prescription is necessary we can send it directly to your pharmacy.  If lab work is needed we can place an order for that and you can then stop by our lab to have the test done at a later time.      Patient has given verbal consent to a Video visit? Yes    HEART CARE VIDEO ENCOUNTER        The patient has chosen to have the visit conducted as a video visit, to reduce risk of exposure given the current status of Coronavirus in our community. This video visit is being conducted via a call between the patient and physician/provider. Health care needs are being provided without a physical exam.     Assessment/Recommendations   Assessment/Plan:    1. Severe aortic stenosis - This has become severe and is now causing fatigue and shortness of breath.  She has seen a decline in her functional status.  She has been evaluated by a multidisciplinary team and has been deemed a good candidate for TAVR.  She has now undergone all the required workup for TAVR and she wishes to proceed.  She is currently NYHA class III heart failure.  We discussed the procedure in detail,  including post-procedure care, restrictions and follow up.   She understands that there is a 4-5% risk of bleeding, infection, stroke, heart block requiring permanent pacemaker, cardiac perforation, aortic root rupture, dissection and death. "     All questions were answered   Consents will be signed and witnessed in [facility] on Tuesday morning.  She has never had trouble with anesthesia in the past.    Labs and EKG will be done Tuesday morning upon arrival  Her COVID test is pending    The plan will be for GA with GIBRAN monitoring.  We will use the [place] 3 valve. Sherice will report Tuesday morning for the procedure and all instructions regarding times and medications were given by TAVR coordinator RN.   She would like to think about her bailout options over the weekend and he will speak with her again regarding this  decision Tuesday morning prior to starting the procedure    2. CAD - s/p stents to LAD, circ and RCA in past; all patent, but with progressive native disease in LAD so had PCI to that lesion with DESx1 on April 17.  Now on DAPT with ASA and Plavix.  Atorvastatin was increased and we'll check lipid panel following TAVR.  Last LDL was 161 (in May 2019).  She is also on appropriate beta-blocker therapy and was started on NTG patch for her small vessel disease    3. Hypertension -no BP reported today.  She should continue amlodipine 5 mg daily and metoprolol succinate 50 mg daily    I have reviewed the note as documented.  This accurately captures the substance of my conversation with the patient.    Total time of video between patient and provider was 40 minutes   Start Time:  1500  Stop Time: 1540    Originating Location (pt. Location): Home    Distant Location (provider location):  [facility]      Mode of Communication:  [facility] Conference [facility]       History of Present Illness/Subjective    Sherice Alvarez is a 85 y.o. female who is being evaluated via a billable video visit and has consented to a video visit.     Sherice has history of both coronary and valvular heart disease, having undergone stents in the past 2 the LAD, circumflex and RCA.  She presented to the hospital in April with angina and underwent repeat  coronary angiogram showing progressive disease in the distal part of her LAD.  All 3 patent stents were patent however.  She went back to the cardiac catheterization lab the next day and had PCI with another drug-eluting stent placed to the distal LAD.  There was disease in her circumflex that was not able to be intervened on.    During that admission, she had an echocardiogram showing moderate to severe aortic stenosis, but in the cardiac catheterization lab, invasive aortic valve gradient was in the severe range and we began work-up towards transcatheter aortic valve replacement.  She has noticed progressive symptoms in the last couple of weeks and we are moving forward with procedure.    Sherice complains of feeling fatigued and continues to have CHANEL.  She has no more chest discomfort.  She denies palpitations, paroxysmal nocturnal dyspnea, orthopnea, lightheadedness, dizziness, pre-syncope, or syncope, weight loss, changes in appetite, nausea or vomiting.     ECG: most recent one on May 6, 2020 shows NSR with 1st degree AV block    ECHOCARDIOGRAM done on April 16, 2020:    Left ventricle ejection fraction is normal. The estimated left ventricular ejection fraction is 75%.    Normal right ventricular size and systolic function.    Moderate aortic stenosis. Mild aortic regurgitation.    Mild tricuspid valve regurgitation. No pulmonary hypertension present.    When compared to the previous study dated 8/28/2018, aortic stenosis has progressed    CATH done on April 17, 2020:  Findings:  LM:no obstruction  LAD:80% dLAD lesion  Lcx:small AV Lcx is either subtotally occluded w/ a small channel or a  w/ bridging collaterals, YONIS 2-3 flow before intervention  RCA:not injected     LVEDP:17  AV mean gradient: simultaneous 39     Access:  R Femoral artery     PCI:  LMT was engaged w/ a 6F EBU 3.0 Guide catheter and the lesion in Lcx was attempted to be wired w/ the help of Turnpike LP, TwinPass, and Supercross  microcatheters and Forte, Fielder FC,  200, and Fighter wires, unsuccessfully. Due to the branch small size, likely balloon only result for this lesion, we chose to switch our attention to the LAD, which was wired w/ a Forte wire, ballooned w/ 2.0x8mm Emerge balloon, and stented w/ a 2.5x20mm Synergy EES at 20 xavier. Of note, she had severe 6/10 CP w/ balloon and stent inflations w/ LAD, which resolved after PCI and IC NTG administration. Final angiography showed no dissection or perforation and a YONIS 3 flow.    I have reviewed and updated the patient's Past Medical History, Social History, Family History and Medication List.     Physical Examination performed via live video encounter Review of Systems   General Appearance:   no distress, normal body habitus, upright.   ENT/Mouth: membranes moist, no nasal discharge or bleeding gums.  Normal head shape, no evidence of injury or laceration.     EYES:  no scleral icterus, normal conjunctivae   Neck: no evidence of thyromegaly.  Supple   Chest/Lungs:   No audible wheezing equal chest wall expansion. Non labored breathing.  No cough.   Cardiovascular:   No evidence of elevated jugular venous pressure.  No evidence of pitting edema bilaterally    Abdomen:  no evidence of abdominal distention. No observe juandice.     Extremities: no cyanosis or clubbing noted.    Skin: no xanthelasma, normal skin color. No evidence of facial lacerations.      Neurologic: Normal arm motion bilateral, no tremors.  No evidence of focal defect.       Psychiatric: alert and oriented x3, calm                                               Medical History  Surgical History Family History Social History   Past Medical History:   Diagnosis Date   ? Asthma    ? CAD (coronary artery disease)    ? Cancer (H)     basal cell   ? Chronic kidney disease     ckd 3   ? Crohn's disease (H)    ? GERD (gastroesophageal reflux disease)    ? Hyperlipidemia    ? Hypertension    ? Restless legs syndrome    ?  Stroke (H)     numbness rt jaw    Past Surgical History:   Procedure Laterality Date   ? APPENDECTOMY     ? CARDIAC CATHETERIZATION  2016    multiple vessel disease.  no intervention   ? CARDIAC CATHETERIZATION  2016   ? CARDIAC CATHETERIZATION N/A 2016    Procedure: Coronary Angiogram;  Surgeon: Sunil Moran MD;  Location: [facility] Cath Lab;  Service:    ? CAROTID ENDARTERECTOMY     ? CAROTID ENDARTERECTOMY Right 2010   ?  SECTION     ? CV CORONARY ANGIOGRAM N/A 2020    Procedure: Coronary Angiogram;  Surgeon: Vinita Ramos MD;  Location: [facility] Cath Lab;  Service: Cardiology   ? CV LEFT HEART CATHETERIZATION WO LEFT VETRICULOGRAM Left 2020    Procedure: Left Heart Catheterization Without Left Ventriculogram;  Surgeon: Jerad Lerner MD;  Location: [facility] Cath Lab;  Service: Cardiology   ? HEMORROIDECTOMY     ? MI ESOPHAGOGASTRODUODENOSCOPY TRANSORAL DIAGNOSTIC N/A 7/10/2019    Procedure: ESOPHAGOGASTRODUODENOSCOPY (EGD) with gastric biopsy;  Surgeon: Sunil Stuart MD;  Location: [facility] GI;  Service: Gastroenterology   ? TONSILLECTOMY AND ADENOIDECTOMY     ? TOTAL KNEE ARTHROPLASTY Right     Family History   Problem Relation Age of Onset   ? Atrial fibrillation Mother    ? Parkinsonism Father       Social History     Socioeconomic History   ? Marital status:      Spouse name: Not on file   ? Number of children: Not on file   ? Years of education: Not on file   ? Highest education level: Not on file   Occupational History   ? Not on file   Social Needs   ? Financial resource strain: Not on file   ? Food insecurity     Worry: Not on file     Inability: Not on file   ? Transportation needs     Medical: Not on file     Non-medical: Not on file   Tobacco Use   ? Smoking status: Former Smoker     Last attempt to quit: 1980     Years since quittin.4   ? Smokeless tobacco: Never Used   Substance and Sexual Activity   ? Alcohol use:  No   ? Drug use: No   ? Sexual activity: Not on file   Lifestyle   ? Physical activity     Days per week: Not on file     Minutes per session: Not on file   ? Stress: Not on file   Relationships   ? Social connections     Talks on phone: Not on file     Gets together: Not on file     Attends [employer] service: Not on file     Active member of club or organization: Not on file     Attends meetings of clubs or organizations: Not on file     Relationship status: Not on file   ? Intimate partner violence     Fear of current or ex partner: Not on file     Emotionally abused: Not on file     Physically abused: Not on file     Forced sexual activity: Not on file   Other Topics Concern   ? Not on file   Social History Narrative    Patient .  Adult daughter here.          Medications  Allergies   Current Outpatient Medications   Medication Sig Dispense Refill   ? albuterol (PROVENTIL HFA;VENTOLIN HFA) 90 mcg/actuation inhaler Inhale 1-2 puffs every 6 (six) hours as needed for wheezing.     ? amLODIPine (NORVASC) 5 MG tablet Take 1 tablet (5 mg total) by mouth daily. 30 tablet 0   ? aspirin 81 mg chewable tablet Chew 1 tablet (81 mg total) daily. Take aspirin indefinitely (for the rest of your life). 30 tablet 0   ? atorvastatin (LIPITOR) 80 MG tablet Take 1 tablet (80 mg total) by mouth at bedtime. (Patient taking differently: Take 80 mg by mouth every other day. ) 30 tablet 11   ? clopidogreL (PLAVIX) 75 mg tablet Take 1 tablet (75 mg total) by mouth daily. For 12 months 30 tablet 11   ? coenzyme Q10 100 mg capsule Take 100 mg by mouth daily.      ? ergocalciferol (ERGOCALCIFEROL) 50,000 unit capsule Take 50,000 Units by mouth once a week.     ? furosemide (LASIX) 20 MG tablet Take 20 mg by mouth 2 (two) times a day as needed (leg swelling).     ? melatonin 5 mg Tab tablet Take 5 mg by mouth at bedtime as needed (for sleep).      ? metoprolol succinate (TOPROL-XL) 50 MG 24 hr tablet Take 1 tablet (50 mg total) by  mouth daily. 30 tablet 0   ? montelukast (SINGULAIR) 10 mg tablet Take 10 mg by mouth at bedtime.     ? multivitamin therapeutic (THERAGRAN) tablet Take 1 tablet by mouth daily.     ? nitroglycerin (NITRODUR) 0.2 mg/hr Place 1 patch on the skin Daily at 8:00 am.. 30 patch 0   ? nitroglycerin (NITROSTAT) 0.4 MG SL tablet Place 1 tablet (0.4 mg total) under the tongue every 5 (five) minutes as needed for chest pain. 90 tablet 0   ? omeprazole (PRILOSEC) 20 MG capsule Take 2 capsules (40 mg total) by mouth daily before breakfast. 60 capsule 0   ? rOPINIRole (REQUIP) 2 MG tablet Take 4 mg by mouth every evening. Patient takes between 7-8 pm       No current facility-administered medications for this visit.      Facility-Administered Medications Ordered in Other Visits   Medication Dose Route Frequency Provider Last Rate Last Dose   ? sodium chloride bacteriostatic 0.9 % injection 0.1-0.3 mL  0.1-0.3 mL Subcutaneous PRN Ora Santillan MD       ? sodium chloride flush 3 mL (NS)  3 mL Intravenous Line Care Ora Santillan MD        Allergies   Allergen Reactions   ? Erythromycin Diarrhea     Childhood reaction   ? Gabapentin      Jerking movements, swelling   ? Naprosyn [Naproxen] Unknown   ? Other Environmental Allergy      Molds  dander         Lab Results    Chemistry/lipid CBC Cardiac Enzymes/BNP/TSH/INR   Lab Results   Component Value Date    CHOL 238 (H) 05/31/2019    HDL 67 05/31/2019    LDLCALC 161 (H) 05/31/2019    TRIG 51 05/31/2019    CREATININE 1.18 (H) 05/06/2020    BUN 25 05/06/2020    K 3.9 05/06/2020     05/06/2020     (H) 05/06/2020    CO2 25 05/06/2020    Lab Results   Component Value Date    WBC 7.3 05/06/2020    HGB 9.9 (L) 05/06/2020    HCT 30.7 (L) 05/06/2020    MCV 92 05/06/2020     05/06/2020    Lab Results   Component Value Date    CKTOTAL 166 03/19/2012    CKMB 3 12/28/2016    TROPONINI 0.02 05/06/2020    BNP 50 05/06/2020    TSH 3.90 03/04/2020    INR 1.11 (H) 05/06/2020

## 2021-06-29 NOTE — PROGRESS NOTES
"Progress Notes by John Caceres MD at 4/30/2020  1:30 PM     Author: John Caceres MD Service: -- Author Type: Physician    Filed: 4/30/2020  2:53 PM Encounter Date: 4/30/2020 Status: Signed    : John Caceres MD (Physician)           The patient has been notified of following:     \"This telephone visit will be conducted via a call between you and your physician/provider. We have found that certain health care needs can be provided without the need for a physical exam.  This service lets us provide the care you need with a phone conversation.  If a prescription is necessary we can send it directly to your pharmacy.  If lab work is needed we can place an order for that and you can then stop by our lab to have the test done at a later time. If during the course of the call the physician/provider feels a telephone visit is not appropriate, you will not be charged for this service.\" Verbal consent has been obtained for this service by care team member:         HEART CARE PHONE ENCOUNTER        The patient has chosen to have the visit conducted as a telephone visit, to reduce risk of exposure given the current status of Coronavirus in our community. This telephone visit is being conducted via a call between the patient and physician/provider. Health care needs are being provided without a physical exam.     Assessment/Recommendations   Assessment/Plan:    1.  Severe symptomatic aortic stenosis: Given her persistent symptoms, she will benefit from aortic valve replacement.  The options of surgical as well as transcatheter aortic valve replacement were reviewed, and given her age, she would be a better candidate for TAVR, which is her medical family's preference as well.    She will be scheduled for a CTA sometime in late May 2020, with the TAVR to follow likely in June or July 2020.  This timeline will hopefully allow for some resolution of the COVID-19 pandemic.  She knows however to call before then if " she has any worsening of her symptoms.    I have reviewed the note as documented.  This accurately captures the substance of my conversation with the patient.    Total time of call between patient and provider was 20 minutes   Start Time:155   Stop Time:215       History of Present Illness/Subjective    Sherice Alvarez is a 85 y.o. female who is being evaluated via a billable telephone visit.      Mr Alvarez is a pleasant 84 yo female with established coronary and valvular heart disease.  She was hospitalized in April 2020 with angina, and underwent PCI of her distal LAD with a 2.5 x 20 mm Synergy drug-eluting stent.  She was also seen to have obstructive disease in the distal circumflex, which could not be successfully intervened on, and was felt to be a chronic occlusion.    Invasive assessment of her aortic valve was performed at that time as well which revealed severe aortic stenosis with a mean gradient of 39 mmHg.  On her echocardiogram on April 16, 2020, she had a valve area of 0.8 cm  with a peak velocity of 3.4 m/s and hyperdynamic left ventricular systolic function.    Since then, she states that she has continued to note dyspnea on exertion as well as fatigue.  She has not had any chest discomfort.    I have reviewed and updated the patient's Past Medical History, Social History, Family History and Medication List.     Physical Examination not performed given phone encounter Review of Systems                                                Medical History  Surgical History Family History Social History   Past Medical History:   Diagnosis Date   ? Asthma    ? CAD (coronary artery disease)    ? Cancer (H)     basal cell   ? Chronic kidney disease     ckd 3   ? Crohn's disease (H)    ? GERD (gastroesophageal reflux disease)    ? Hyperlipidemia    ? Hypertension    ? Restless legs syndrome    ? Stroke (H) 2010    numbness rt jaw    Past Surgical History:   Procedure Laterality Date   ? APPENDECTOMY     ?  CARDIAC CATHETERIZATION  2016    multiple vessel disease.  no intervention   ? CARDIAC CATHETERIZATION  2016   ? CARDIAC CATHETERIZATION N/A 2016    Procedure: Coronary Angiogram;  Surgeon: Sunil Moran MD;  Location: [facility] Cath Lab;  Service:    ? CAROTID ENDARTERECTOMY     ? CAROTID ENDARTERECTOMY Right 2010   ?  SECTION     ? CV CORONARY ANGIOGRAM N/A 2020    Procedure: Coronary Angiogram;  Surgeon: Vinita Ramos MD;  Location: [facility] Cath Lab;  Service: Cardiology   ? CV LEFT HEART CATHETERIZATION WO LEFT VETRICULOGRAM Left 2020    Procedure: Left Heart Catheterization Without Left Ventriculogram;  Surgeon: Jerad Lerner MD;  Location: [facility] Cath Lab;  Service: Cardiology   ? HEMORROIDECTOMY     ? VA ESOPHAGOGASTRODUODENOSCOPY TRANSORAL DIAGNOSTIC N/A 7/10/2019    Procedure: ESOPHAGOGASTRODUODENOSCOPY (EGD) with gastric biopsy;  Surgeon: Sunil Stuart MD;  Location: [facility];  Service: Gastroenterology   ? TONSILLECTOMY AND ADENOIDECTOMY     ? TOTAL KNEE ARTHROPLASTY Right     Family History   Problem Relation Age of Onset   ? Atrial fibrillation Mother    ? Parkinsonism Father     Social History     Socioeconomic History   ? Marital status:      Spouse name: Not on file   ? Number of children: Not on file   ? Years of education: Not on file   ? Highest education level: Not on file   Occupational History   ? Not on file   Social Needs   ? Financial resource strain: Not on file   ? Food insecurity     Worry: Not on file     Inability: Not on file   ? Transportation needs     Medical: Not on file     Non-medical: Not on file   Tobacco Use   ? Smoking status: Former Smoker     Last attempt to quit: 1980     Years since quittin.3   ? Smokeless tobacco: Never Used   Substance and Sexual Activity   ? Alcohol use: No   ? Drug use: No   ? Sexual activity: Not on file   Lifestyle   ? Physical activity     Days per week: Not on  file     Minutes per session: Not on file   ? Stress: Not on file   Relationships   ? Social connections     Talks on phone: Not on file     Gets together: Not on file     Attends Protestant service: Not on file     Active member of club or organization: Not on file     Attends meetings of clubs or organizations: Not on file     Relationship status: Not on file   ? Intimate partner violence     Fear of current or ex partner: Not on file     Emotionally abused: Not on file     Physically abused: Not on file     Forced sexual activity: Not on file   Other Topics Concern   ? Not on file   Social History Narrative    Patient .  Adult daughter here.          Medications  Allergies   Current Outpatient Medications   Medication Sig Dispense Refill   ? albuterol (PROVENTIL HFA;VENTOLIN HFA) 90 mcg/actuation inhaler Inhale 1-2 puffs every 6 (six) hours as needed for wheezing.     ? amLODIPine (NORVASC) 5 MG tablet Take 1 tablet (5 mg total) by mouth daily. 30 tablet 0   ? aspirin 81 mg chewable tablet Chew 1 tablet (81 mg total) daily. Take aspirin indefinitely (for the rest of your life). 30 tablet 0   ? atorvastatin (LIPITOR) 80 MG tablet Take 1 tablet (80 mg total) by mouth at bedtime. 30 tablet 11   ? clopidogreL (PLAVIX) 75 mg tablet Take 1 tablet (75 mg total) by mouth daily. For 12 months 30 tablet 11   ? coenzyme Q10 100 mg capsule Take 100 mg by mouth daily.      ? ergocalciferol (ERGOCALCIFEROL) 50,000 unit capsule Take 50,000 Units by mouth once a week.     ? melatonin 5 mg Tab tablet Take 5 mg by mouth at bedtime as needed (for sleep).      ? metoprolol succinate (TOPROL-XL) 25 MG Take 1.5 tablets (37.5 mg total) by mouth daily. 90 tablet 0   ? montelukast (SINGULAIR) 10 mg tablet Take 10 mg by mouth at bedtime.     ? multivitamin therapeutic (THERAGRAN) tablet Take 1 tablet by mouth daily.     ? omeprazole (PRILOSEC) 20 MG capsule Take 20 mg by mouth daily before breakfast.     ? rOPINIRole (REQUIP) 2 MG  tablet Take 4 mg by mouth every evening.      ? nitroglycerin (NITROSTAT) 0.4 MG SL tablet Place 1 tablet (0.4 mg total) under the tongue every 5 (five) minutes as needed for chest pain. 90 tablet 0     No current facility-administered medications for this visit.     Allergies   Allergen Reactions   ? Erythromycin Unknown     Childhood reaction   ? Gabapentin      Jerking movements, swelling   ? Naprosyn [Naproxen] Unknown   ? Other Environmental Allergy      Molds  dander         Lab Results    Chemistry/lipid CBC Cardiac Enzymes/BNP/TSH/INR   Lab Results   Component Value Date    CHOL 238 (H) 05/31/2019    HDL 67 05/31/2019    LDLCALC 161 (H) 05/31/2019    TRIG 51 05/31/2019    CREATININE 1.12 (H) 04/23/2020    BUN 20 04/23/2020    K 4.0 04/23/2020     04/23/2020     04/23/2020    CO2 24 04/23/2020    Lab Results   Component Value Date    WBC 9.2 04/23/2020    HGB 10.3 (L) 04/23/2020    HCT 32.1 (L) 04/23/2020    MCV 92 04/23/2020     04/23/2020    Lab Results   Component Value Date    CKTOTAL 166 03/19/2012    CKMB 3 12/28/2016    TROPONINI 0.03 04/23/2020    BNP 68 04/23/2020    TSH 3.90 03/04/2020    INR 1.08 04/23/2020        John Caceres MD

## 2021-06-30 NOTE — PROGRESS NOTES
Progress Notes by Yolanda Feldman MD at 4/9/2021  3:50 PM     Author: Yolanda Feldman MD Service: -- Author Type: Physician    Filed: 4/9/2021  4:21 PM Encounter Date: 4/9/2021 Status: Signed    : Yolanda Feldman MD (Physician)           [facility] Follow-up Note    Assessment & Plan        1. Coronary artery disease involving native coronary artery of native heart with unstable angina pectoris (H) ) -angiography April 2020 showed normal left main, proximal LAD 20% stenosis with a patent stent, mid sequential 30% lesion with a distal 70% lesion, circumflex had patent proximal stent with a third obtuse marginal artery 99% stenosis. Right coronary artery had a patent stent with a mid 40% stenosis.  Had attempted intervention on distal LAD as well as obtuse marginal artery which were unsuccessful and given her increased chest discomfort and shortness of breath of late will perform pharmacological stress nuclear to make sure there are no major issues.  In addition, plan was to discontinue Plavix at the end of April.    2. Dyslipidemia, goal LDL below 100-cholesterol 226 with an LDL of 132 from March of this year which is unacceptable.  We will need to have these rechecked since markedly elevated from a year ago when it was cholesterol 146 with an LDL of 63.  We will need to confirm that she is taking atorvastatin.   3. Severe calcific aortic valve stenosis June 2020 had a 23 mm DANIEL 3 valve placed, TAVR, with echo showing 11 mm gradient.  Given upcoming surgery and new symptoms will recheck echo.   4. Stage 3 chronic kidney disease-creatinine good at 1.10.   5. Chronic diastolic heart failure (H)-no significant signs or symptoms currently.   6. Benign essential hypertension-under good control currently.     Plan  1.  We will confirm that she is taking atorvastatin, if so recheck fasting lipid profile to determine what is going on.  2.  Echo given TAVR and need for orthopedic  procedure.  3.  Pharmacological stress nuclear and evaluate further for this.  4.  Follow-up me in 1 year or sooner if needed.  5.  Plan on discontinuing Plavix at the end of April.    Subjective  CC: 86-year-old white female here for an urgent add-on visit.  Since I seen her she is now scheduled to have knee replacement on the left side May 11 with [facility].  She tells me she is noted some increased weakness since she is putting more's pressure on her right leg.  During this time she developed a tightness in the chest and shortness of breath on walking a flat area which goes away when she rests.  There is no syncope, dizziness, PND, orthopnea or significant peripheral edema.    Medications  Current Outpatient Medications   Medication Sig Note   ? albuterol (PROVENTIL HFA;VENTOLIN HFA) 90 mcg/actuation inhaler Inhale 1-2 puffs every 6 (six) hours as needed for wheezing.    ? amLODIPine (NORVASC) 5 MG tablet Take 1 tablet (5 mg total) by mouth daily.    ? aspirin 81 mg chewable tablet Chew 1 tablet (81 mg total) daily. Take aspirin indefinitely (for the rest of your life).    ? atorvastatin (LIPITOR) 80 MG tablet Take 80 mg by mouth every other day.    ? clopidogreL (PLAVIX) 75 mg tablet Take 1 tablet (75 mg total) by mouth daily. For 12 months    ? coenzyme Q10 100 mg capsule Take 100 mg by mouth daily.     ? ferrous sulfate 325 (65 FE) MG tablet Take 1 tablet (325 mg total) by mouth daily with breakfast.    ? furosemide (LASIX) 20 MG tablet Take 0.5 tablets (10 mg total) by mouth 2 (two) times a day at 9am and 6pm. (Patient taking differently: Take 10 mg by mouth daily as needed. )    ? melatonin 5 mg Tab tablet Take 5 mg by mouth at bedtime as needed (for sleep).     ? metoprolol succinate (TOPROL-XL) 25 MG Take 1 tablet (25 mg total) by mouth daily.    ? montelukast (SINGULAIR) 10 mg tablet Take 10 mg by mouth at bedtime.    ? multivitamin therapeutic (THERAGRAN) tablet Take 1 tablet by mouth daily.     ? rOPINIRole (REQUIP) 2 MG tablet Take 4 mg by mouth every evening. Patient takes between 7-8 pm    ? ergocalciferol (ERGOCALCIFEROL) 50,000 unit capsule Take 50,000 Units by mouth once a week. 10/8/2019: On Thursdays   ? nitroglycerin (NITROSTAT) 0.4 MG SL tablet Place 1 tablet (0.4 mg total) under the tongue every 5 (five) minutes as needed for chest pain.        Objective  /68 (Patient Site: Left Arm, Patient Position: Sitting, Cuff Size: Adult Regular)   Pulse 93   Resp 17   Wt 140 lb (63.5 kg)   LMP  (LMP Unknown)   SpO2 98%   BMI 26.02 kg/m      General Appearance:    Alert, cooperative, no distress, appears stated age   Head:    Normocephalic, without obvious abnormality, atraumatic   Throat:   Lips, mucosa, and tongue normal; teeth and gums normal   Neck:   Supple, symmetrical, trachea midline, no adenopathy;        thyroid:  No enlargement/tenderness/nodules; no carotid    bruit or JVD   Back:     Symmetric, no curvature, ROM normal, no CVA tenderness   Lungs:     Clear to auscultation bilaterally, respirations unlabored   Chest wall:    No tenderness or deformity   Heart:    Regular rate and rhythm, S1 and S2 normal, no murmur, rub   or gallop   Abdomen:     Soft, non-tender, bowel sounds active all four quadrants,     no masses, no organomegaly   Extremities:   Normal, atraumatic, no cyanosis or edema   Pulses:   2+ and symmetric all extremities   Skin:   Skin color, texture, turgor normal, no rashes or lesions     Results    Lab Results personally reviewed   Lab Results   Component Value Date    CHOL 226 (H) 03/30/2021    CHOL 146 06/04/2020     Lab Results   Component Value Date    HDL 76 03/30/2021    HDL 67 06/04/2020     Lab Results   Component Value Date    LDLCALC 132 (H) 03/30/2021    LDLCALC 63 06/04/2020     Lab Results   Component Value Date    TRIG 89 03/30/2021    TRIG 78 06/04/2020     Lab Results   Component Value Date    WBC 11.5 (H) 06/18/2020    HGB 9.2 (L) 06/18/2020     HCT 28.9 (L) 06/18/2020     06/18/2020     Lab Results   Component Value Date    CREATININE 1.10 03/12/2021    BUN 26 03/12/2021     03/12/2021    K 4.4 03/12/2021    CO2 22 03/12/2021     Review of Systems:   General: Weight Gain  Eyes: WNL  Ears/Nose/Throat: WNL  Lungs: WNL  Heart: Leg Swelling  Stomach: WNL  Bladder: Frequent Urination at Night  Muscle/Joints: Joint Pain, Muscle Weakness, Muscle Pain  Skin: WNL  Nervous System: Loss of Balance  Mental Health: Anxiety     Blood: Easy Bleeding, Easy Bruising

## 2021-07-03 NOTE — ADDENDUM NOTE
Addendum Note by Chantale Dean RN at 5/20/2020  2:19 PM     Author: Chantale Dean RN Service: -- Author Type: Registered Nurse    Filed: 5/20/2020  2:19 PM Encounter Date: 5/19/2020 Status: Signed    : Chantale Dean RN (Registered Nurse)    Addended by: CHANTALE DEAN on: 5/20/2020 02:19 PM        Modules accepted: Orders

## 2021-07-04 NOTE — LETTER
Letter by Karyn Abraham CNP at      Author: Karyn Abraham CNP Service: -- Author Type: --    Filed:  Encounter Date: 5/25/2021 Status: (Other)         Patient: Sherice Alvarez   MR Number: [id number]   YOB: 1934   Date of Visit: 5/25/2021     Code Status:  FULL CODE  Visit Type: Discharge Summary    Facility:  [facility] [[id number]]             History of Present Illness: Sherice Alvarez is a 86 y.o. female who I am seeing today for discharge from the TCU.  Patient recently hospitalized on 5/11/2021 secondary to DJD of the left knee.  Patient status post left total knee arthroplasty on 5/11/2021.  Patient continues on Tylenol and oxycodone for pain.  There were no intraoperative complications.  Postoperative complications included some bruising.  And acute blood loss anemia.  Hemoglobin down to 8.6.  Past medical history includes GERD's, dyslipidemia, hypertension, intermittent asthma, CAD, chronic diastolic heart failure status post TAVR and stage III kidney disease.    Today patient ambulating about the unit with rolling walker.  I did accompany her to her room.  Patient status post left total knee arthroplasty. Pt continues on oxycodone and Tylenol for pain.  Recent addition of methocarbamol 250 mg p.o. twice daily for muscle spasms.  Patient ambulating over 300 feet with rolling walker.  She continues with pain in her lower extremities.  Continue to ice elevate and rest at home.  She continues on Xarelto for DVT for 3 months.  Patient underwent CT of the chest secondary to some increased shortness of breath and chest tightness over the weekend.  This was negative for pulmonary embolism.  It did show atelectasis in bilateral lower bases.  She continues on as needed albuterol.  Patient also with some anxiety however this is greatly improving.  She did have an episode of hyperventilation.  Acute blood loss anemia.  Hemoglobin 9.3.  She does continue on iron  supplement.  History of GERD on PPI.  Hypertension.  Satisfactory control.  Restless leg syndrome on Requip.      Active Ambulatory Problems     Diagnosis Date Noted   ? Constipation 06/03/2014   ? Cancer (H)    ? Gastroesophageal reflux disease with esophagitis 11/04/2016   ? Dyslipidemia, goal LDL below 100 11/04/2016   ? Benign essential hypertension    ? Epistaxis    ? Chest pain 07/21/2017   ? Abnormal chest CT    ? Anterior epistaxis 11/22/2017   ? Acute respiratory insufficiency 05/14/2019   ? RLL pneumonia 05/14/2019   ? Stage 3 chronic kidney disease 05/15/2019   ? Asthma 05/17/2019   ? Mild intermittent asthma with exacerbation 05/17/2019   ? Coronary artery disease involving native coronary artery of native heart with unstable angina pectoris (H)    ? Lightheadedness    ? Angina pectoris, unspecified (H) 04/16/2020   ? Atypical chest pain 04/16/2020   ? Severe calcific aortic valve stenosis    ? Dyspnea 05/07/2020   ? S/P TAVR (transcatheter aortic valve replacement) 06/02/2020   ? Acute diastolic congestive heart failure (H)    ? Chronic diastolic heart failure (H)    ? Leukocytosis, unspecified type    ? S/P total knee arthroplasty, left 05/11/2021     Resolved Ambulatory Problems     Diagnosis Date Noted   ? Chest pain 11/03/2016   ? ACS (acute coronary syndrome) (H) 11/04/2016   ? Ischemic chest pain (H)    ? NSTEMI (non-ST elevated myocardial infarction) (H)    ? Acute chest pain 12/27/2016   ? Acute infection of nasal sinus 02/18/2017   ? Severe aortic stenosis 08/17/2018   ? Acute respiratory failure with hypoxia (H) 05/15/2019   ? Coronary artery disease 04/23/2020     Past Medical History:   Diagnosis Date   ? Arthritis    ? CAD (coronary artery disease)    ? Chronic kidney disease    ? Chronic pain syndrome    ? Crohn's disease (H)    ? GERD (gastroesophageal reflux disease)    ? Hx of heart artery stent 11/2016   ? Hyperlipidemia    ? Hypertension    ? PONV (postoperative nausea and vomiting)     ? Restless legs syndrome    ? Stroke (H) 2010       Current Outpatient Medications   Medication Sig Note   ? acetaminophen (TYLENOL) 325 MG tablet Take 3 tablets (975 mg total) by mouth every 8 (eight) hours.    ? albuterol (PROVENTIL HFA;VENTOLIN HFA) 90 mcg/actuation inhaler Inhale 1-2 puffs every 6 (six) hours as needed for wheezing.    ? amLODIPine (NORVASC) 5 MG tablet Take 1 tablet (5 mg total) by mouth daily.    ? beclomethasone (QVAR) 80 mcg/actuation inhaler Inhale 1 puff 2 (two) times a day.    ? coenzyme Q10 100 mg capsule Take 100 mg by mouth daily.     ? ergocalciferol (ERGOCALCIFEROL) 50,000 unit capsule Take 50,000 Units by mouth once a week. 10/8/2019: On Thursdays   ? evolocumab (REPATHA SYRINGE) 140 mg/mL Syrg Inject 1 mL (140 mg total) under the skin every 14 (fourteen) days.    ? ferrous sulfate 325 (65 FE) MG tablet Take 1 tablet (325 mg total) by mouth daily with breakfast.    ? furosemide (LASIX) 20 MG tablet Take 10 mg by mouth 2 (two) times a day as needed.    ? melatonin 5 mg Tab tablet Take 5 mg by mouth at bedtime as needed (for sleep).     ? metoprolol succinate (TOPROL-XL) 25 MG Take 1 tablet (25 mg total) by mouth daily.    ? montelukast (SINGULAIR) 10 mg tablet Take 10 mg by mouth at bedtime as needed.     ? multivitamin therapeutic (THERAGRAN) tablet Take 1 tablet by mouth daily.    ? nitroglycerin (NITROSTAT) 0.4 MG SL tablet Place 1 tablet (0.4 mg total) under the tongue every 5 (five) minutes as needed for chest pain.    ? omeprazole (PRILOSEC) 20 MG capsule Take 1-2 capsules (20-40 mg total) by mouth daily before breakfast.    ? oxyCODONE (ROXICODONE) 5 MG immediate release tablet Take 1-2 tabs every 4-6 hours as needed for pain. Take 1 tab for pain 1-6/10, take 2 tabs for pain 7-10/10. Do not exceed 6 pills in one day.    ? rivaroxaban ANTICOAGULANT (XARELTO) 15 mg tablet Take 15 mg by mouth 2 (two) times a day with meals. Take 15 mg twice daily x21 days then 20 mg daily for 3  months.    ? rOPINIRole (REQUIP) 2 MG tablet Take 4 mg by mouth every evening. Patient takes 7pm    ? senna-docusate (PERICOLACE) 8.6-50 mg tablet Take 1 tablet by mouth 2 (two) times a day as needed for constipation.        Allergies   Allergen Reactions   ? Amitriptyline Hcl Other (See Comments)   ? Erythromycin Diarrhea and Other (See Comments)     Childhood reaction   ? Gabapentin Other (See Comments)     Jerking movements, swelling   ? Naproxen Unknown and Other (See Comments)   ? Other Environmental Allergy      Molds  dander   ? Pregabalin Other (See Comments)         Review of Systems  No fevers or chills. No headache, lightheadedness or dizziness. No SOB, chest pains or palpitations. Appetite is fair.  She reports dislike of the food.  No nausea, vomiting, constipation or diarrhea. No dysuria, frequency, burning or pain with urination.  Pain a little better on today's visit.  She also has increased swelling and bruising to the left lower extremity.  Restless leg syndrome on Requip.  Patient continues on Tylenol, methocarbamol and oxycodone for pain.  Otherwise review of systems are negative.     Physical Exam  PHYSICAL EXAMINATION:  Vital signs: /74, pulse 84, respirations 12, O2 sat 99% on room air.  Weight 139.4 pounds.  General: Awake, Alert, oriented x3, appropriately, follows simple commands, conversant  HEENT:PERRLA, Pink conjunctiva, anicteric sclerae, moist oral mucosa  NECK: Supple, without any lymphadenopathy, or masses  CVS:  S1  S2, without murmur or gallop.   LUNG: Clear to auscultation, No wheezes, rales or rhonci.  BACK: No kyphosis of the thoracic spine  ABDOMEN: Soft, nontender to palpation, with positive bowel sounds  EXTREMITIES: Good range of motion on both upper and lower extremities, 2+ pedal edema on the surgical side, Ace wraps in place.     SKIN: Moderate bruising of the left thigh, knee and shin.  Tegaderm dressing in place.  NEUROLOGIC: Intact, pulses weak on the operative  side.  PSYCHIATRIC: Cognition intact.  Pleasant affect.      Labs:    Lab Results   Component Value Date    WBC 11.5 (H) 06/18/2020    HGB 9.3 (L) 05/17/2021    HCT 28.9 (L) 06/18/2020    MCV 92 06/18/2020     06/18/2020     Results for orders placed or performed in visit on 05/17/21   Basic Metabolic Panel   Result Value Ref Range    Sodium 140 136 - 145 mmol/L    Potassium 4.1 3.5 - 5.0 mmol/L    Chloride 105 98 - 107 mmol/L    CO2 25 22 - 31 mmol/L    Anion Gap, Calculation 10 5 - 18 mmol/L    Glucose 88 70 - 125 mg/dL    Calcium 8.7 8.5 - 10.5 mg/dL    BUN 15 8 - 28 mg/dL    Creatinine 0.85 0.60 - 1.10 mg/dL    GFR MDRD Af Amer >60 >60 mL/min/1.73m2    GFR MDRD Non Af Amer >60 >60 mL/min/1.73m2           Assessment/Plan:  1. History of total left knee replacement   Continue with Tylenol and oxycodone for pain.  Recent addition of methocarbamol 250 mg now and two times a day prn.    2.   DVT left lower extremity  Xarelto 15 mg p.o. twice daily x21 days then transition to 20 mg p.o. daily x3 months.   Continue ice and elevation.  Continue with BLAIR hose on a.m. off at at bedtime. Continue ice and elevation.    3.  shortness of breath  Follow up CTPE run yesterday negative.  Atelectasis was seen.  Continue to encourage cough and deep breathing.  She also has albuterol inhaler as needed.  Control anxiety.   4.    GERD  continue PPI twice daily while on anticoagulation.  Aspirin discontinued while on Xarelto.   5.  Chronic congestive heart failure, unspecified heart failure type (H)  Weight stable. Edema less on today's visit.    6.  Essential hypertension   satisfactory control.   7.  Acute blood loss anemia   hemoglobin 9.3.  Patient continues on iron supplement.     Okay to DC home with current meds, treatments and narcotics.  Home PT, OT, home health aide and RN for medication management as well as pain assessment.  Follow-up with primary care provider in 1 week.  Follow-up with Ortho on June 1.  Total time  spent for this visit was 35 minutes with greater than 50% of time spent face-to-face    DISCHARGE PLAN/FACE TO FACE:  I certify that this patient is under my care and that I, or a nurse practitioner or physician's assistant working with me, had a face-to-face encounter that meets the physician face-to-face encounter requirements with this patient.       I certify that, based on my findings, the following services are medically necessary home health services.    My clinical findings support the need for the above skilled services.    This patient is homebound because: Right TKA with post surgical DVT.     The patient is, or has been, under my care and I have initiated the establishment of the plan of care. This patient will be followed by a physician who will periodically review the plan of care.    Total time spent for this visit was 60 minutes with > 50% of the time spent face to face with pt with patient reviewing discharge instructions, home care and follow-ups.    This note has been dictated using voice recognition software. Any grammatical or context distortions are unintentional and inherent to the software    Electronically signed by: Karyn Abraham CNP

## 2021-07-04 NOTE — ADDENDUM NOTE
Addendum Note by Fam Du CRNA at 5/11/2021  7:00 PM     Author: Fam Du CRNA Service: -- Author Type: Nurse Anesthetist    Filed: 5/11/2021  7:00 PM Date of Service: 5/11/2021  7:00 PM Status: Signed    : Fam Du CRNA (Nurse Anesthetist)       Addendum  created 05/11/21 1900 by Fam Du CRNA    Intraprocedure Meds edited

## 2021-07-14 PROBLEM — I25.10 CORONARY ARTERY DISEASE: Status: RESOLVED | Noted: 2020-04-23 | Resolved: 2020-10-20

## 2021-07-14 PROBLEM — J96.01 ACUTE RESPIRATORY FAILURE WITH HYPOXIA (H): Status: RESOLVED | Noted: 2019-05-15 | Resolved: 2020-04-28

## 2021-08-05 ENCOUNTER — LAB REQUISITION (OUTPATIENT)
Dept: LAB | Facility: CLINIC | Age: 86
End: 2021-08-05

## 2021-08-05 DIAGNOSIS — I82.402 ACUTE EMBOLISM AND THROMBOSIS OF UNSPECIFIED DEEP VEINS OF LEFT LOWER EXTREMITY (H): ICD-10-CM

## 2021-08-05 LAB
ERYTHROCYTE [DISTWIDTH] IN BLOOD BY AUTOMATED COUNT: 19.2 % (ref 10–15)
HCT VFR BLD AUTO: 33.4 % (ref 35–47)
HGB BLD-MCNC: 10.8 G/DL (ref 11.7–15.7)
MCH RBC QN AUTO: 30.3 PG (ref 26.5–33)
MCHC RBC AUTO-ENTMCNC: 32.3 G/DL (ref 31.5–36.5)
MCV RBC AUTO: 94 FL (ref 78–100)
PLATELET # BLD AUTO: 252 10E3/UL (ref 150–450)
RBC # BLD AUTO: 3.56 10E6/UL (ref 3.8–5.2)
WBC # BLD AUTO: 7.9 10E3/UL (ref 4–11)

## 2021-08-05 PROCEDURE — 85027 COMPLETE CBC AUTOMATED: CPT | Performed by: NURSE PRACTITIONER

## 2021-08-19 ENCOUNTER — APPOINTMENT (OUTPATIENT)
Dept: CT IMAGING | Facility: HOSPITAL | Age: 86
End: 2021-08-19
Payer: COMMERCIAL

## 2021-08-19 ENCOUNTER — APPOINTMENT (OUTPATIENT)
Dept: RADIOLOGY | Facility: HOSPITAL | Age: 86
End: 2021-08-19
Payer: COMMERCIAL

## 2021-08-19 ENCOUNTER — HOSPITAL ENCOUNTER (EMERGENCY)
Facility: HOSPITAL | Age: 86
Discharge: HOME | End: 2021-08-19
Attending: STUDENT IN AN ORGANIZED HEALTH CARE EDUCATION/TRAINING PROGRAM | Admitting: STUDENT IN AN ORGANIZED HEALTH CARE EDUCATION/TRAINING PROGRAM
Payer: COMMERCIAL

## 2021-08-19 VITALS
WEIGHT: 141 LBS | HEART RATE: 67 BPM | BODY MASS INDEX: 25.95 KG/M2 | OXYGEN SATURATION: 97 % | TEMPERATURE: 98.2 F | DIASTOLIC BLOOD PRESSURE: 84 MMHG | SYSTOLIC BLOOD PRESSURE: 187 MMHG | RESPIRATION RATE: 20 BRPM | HEIGHT: 62 IN

## 2021-08-19 DIAGNOSIS — R06.00 DYSPNEA: ICD-10-CM

## 2021-08-19 LAB
ALBUMIN UR-MCNC: NEGATIVE MG/DL
ANION GAP SERPL CALCULATED.3IONS-SCNC: 4 MMOL/L (ref 5–18)
APPEARANCE UR: CLEAR
APTT PPP: 29 SECONDS (ref 22–38)
ATRIAL RATE - MUSE: 77 BPM
BILIRUB UR QL STRIP: NEGATIVE
BNP SERPL-MCNC: 159 PG/ML (ref 0–167)
BUN SERPL-MCNC: 19 MG/DL (ref 8–28)
CALCIUM SERPL-MCNC: 8.8 MG/DL (ref 8.5–10.5)
CHLORIDE BLD-SCNC: 113 MMOL/L (ref 98–107)
CO2 SERPL-SCNC: 27 MMOL/L (ref 22–31)
COLOR UR AUTO: ABNORMAL
CREAT SERPL-MCNC: 0.94 MG/DL (ref 0.6–1.1)
DIASTOLIC BLOOD PRESSURE - MUSE: 75 MMHG
ERYTHROCYTE [DISTWIDTH] IN BLOOD BY AUTOMATED COUNT: 19.1 % (ref 10–15)
GFR SERPL CREATININE-BSD FRML MDRD: 55 ML/MIN/1.73M2
GLUCOSE BLD-MCNC: 81 MG/DL (ref 70–125)
GLUCOSE UR STRIP-MCNC: NEGATIVE MG/DL
HCT VFR BLD AUTO: 32.4 % (ref 35–47)
HGB BLD-MCNC: 10 G/DL (ref 11.7–15.7)
HGB UR QL STRIP: NEGATIVE
INR PPP: 1.15 (ref 0.9–1.15)
INTERPRETATION ECG - MUSE: NORMAL
KETONES UR STRIP-MCNC: NEGATIVE MG/DL
LEUKOCYTE ESTERASE UR QL STRIP: NEGATIVE
MAGNESIUM SERPL-MCNC: 2.1 MG/DL (ref 1.8–2.6)
MCH RBC QN AUTO: 29.1 PG (ref 26.5–33)
MCHC RBC AUTO-ENTMCNC: 30.9 G/DL (ref 31.5–36.5)
MCV RBC AUTO: 94 FL (ref 78–100)
MUCOUS THREADS #/AREA URNS LPF: PRESENT /LPF
NITRATE UR QL: NEGATIVE
P AXIS - MUSE: 67 DEGREES
PH UR STRIP: 6 [PH] (ref 5–7)
PLATELET # BLD AUTO: 207 10E3/UL (ref 150–450)
POTASSIUM BLD-SCNC: 3.9 MMOL/L (ref 3.5–5)
PR INTERVAL - MUSE: 194 MS
QRS DURATION - MUSE: 82 MS
QT - MUSE: 386 MS
QTC - MUSE: 436 MS
R AXIS - MUSE: 49 DEGREES
RBC # BLD AUTO: 3.44 10E6/UL (ref 3.8–5.2)
RBC URINE: <1 /HPF
SODIUM SERPL-SCNC: 144 MMOL/L (ref 136–145)
SP GR UR STRIP: 1.02 (ref 1–1.03)
SQUAMOUS EPITHELIAL: <1 /HPF
SYSTOLIC BLOOD PRESSURE - MUSE: 166 MMHG
T AXIS - MUSE: 72 DEGREES
TROPONIN I SERPL-MCNC: 0.01 NG/ML (ref 0–0.29)
UROBILINOGEN UR STRIP-MCNC: <2 MG/DL
VENTRICULAR RATE- MUSE: 77 BPM
WBC # BLD AUTO: 8.6 10E3/UL (ref 4–11)
WBC URINE: 1 /HPF

## 2021-08-19 PROCEDURE — 93005 ELECTROCARDIOGRAM TRACING: CPT | Performed by: STUDENT IN AN ORGANIZED HEALTH CARE EDUCATION/TRAINING PROGRAM

## 2021-08-19 PROCEDURE — 99285 EMERGENCY DEPT VISIT HI MDM: CPT | Mod: 25

## 2021-08-19 PROCEDURE — 250N000011 HC RX IP 250 OP 636: Performed by: STUDENT IN AN ORGANIZED HEALTH CARE EDUCATION/TRAINING PROGRAM

## 2021-08-19 PROCEDURE — 85610 PROTHROMBIN TIME: CPT | Performed by: PHYSICIAN ASSISTANT

## 2021-08-19 PROCEDURE — 85730 THROMBOPLASTIN TIME PARTIAL: CPT | Performed by: PHYSICIAN ASSISTANT

## 2021-08-19 PROCEDURE — 85027 COMPLETE CBC AUTOMATED: CPT | Performed by: PHYSICIAN ASSISTANT

## 2021-08-19 PROCEDURE — 71045 X-RAY EXAM CHEST 1 VIEW: CPT

## 2021-08-19 PROCEDURE — 84484 ASSAY OF TROPONIN QUANT: CPT | Performed by: PHYSICIAN ASSISTANT

## 2021-08-19 PROCEDURE — 80048 BASIC METABOLIC PNL TOTAL CA: CPT | Performed by: PHYSICIAN ASSISTANT

## 2021-08-19 PROCEDURE — 83735 ASSAY OF MAGNESIUM: CPT | Performed by: PHYSICIAN ASSISTANT

## 2021-08-19 PROCEDURE — 36415 COLL VENOUS BLD VENIPUNCTURE: CPT | Performed by: PHYSICIAN ASSISTANT

## 2021-08-19 PROCEDURE — 81001 URINALYSIS AUTO W/SCOPE: CPT | Performed by: PHYSICIAN ASSISTANT

## 2021-08-19 PROCEDURE — 83880 ASSAY OF NATRIURETIC PEPTIDE: CPT | Performed by: PHYSICIAN ASSISTANT

## 2021-08-19 PROCEDURE — 71275 CT ANGIOGRAPHY CHEST: CPT

## 2021-08-19 RX ORDER — IOPAMIDOL 755 MG/ML
75 INJECTION, SOLUTION INTRAVASCULAR ONCE
Status: COMPLETED | OUTPATIENT
Start: 2021-08-19 | End: 2021-08-19

## 2021-08-19 RX ADMIN — IOPAMIDOL 75 ML: 755 INJECTION, SOLUTION INTRAVENOUS at 12:13

## 2021-08-19 ASSESSMENT — MIFFLIN-ST. JEOR: SCORE: 1024.88

## 2021-08-19 NOTE — ED PROVIDER NOTES
ED PROVIDER NOTE    EMERGENCY DEPARTMENT ENCOUNTER      NAME: Sherice Alvarez  AGE: 86 year old female  YOB: 1934  MRN: [id number]  EVALUATION DATE & TIME: 8/19/2021  9:28 AM    PCP: Anum Rosenberg    ED PROVIDER: Delma Sun PA-C      Chief Complaint   Patient presents with     Chest Pain     Breathing Problem         FINAL IMPRESSION:  1. Dyspnea          MEDICAL DECISION MAKING:    Pertinent Labs & Imaging studies reviewed. (See chart for details)    85yo female with a h/o CHF, DVT, TAVR, HTN, CAD, chronic pain presenting with chest pain and shortness of breath.  On 20mg lasix, presenting with increased shortness of breath, chest tightness and a dry cough of the past several days.    ECHO 5/3/21 revealed EF 71%. Stress test negative.    Initially on arrival here she was hypertensive but this resolved without any intervention.  On examination she has some mild lower extremity edema.  Differential diagnosis includes CHF, ACS, pneumonia, PE, MARC, metabolic imbalance, anemia, anxiety, asthma.  Initiated work-up with EKG, lab work, chest x-ray to start with.  Ultimately obtained CTA of the chest.    Her work-up was overall unremarkable.  Her troponin is negative.  BNP normal at 159.  Hemoglobin stable.  No leukocytosis.  CTA of her chest was without any new airspace disease.  Does have findings of CAD but will normal troponin and several days of symptoms do not feel further delta troponin indicated today.  We discussed possible admission given her symptoms for further workup and monitoring including possible ECHO.  Patient reports she is really feeling quite good and would like to go home.  Ithink this is reasonable given stable workup and vitals. She will contact her cardiology clinic and schedule a follow up. She knows to return to the ER in the mean time if she has new or worsening symptoms.         At the conclusion of the encounter I discussed the results of all of the tests and the  disposition. The questions were answered. The patient or family acknowledged understanding and was agreeable with the care plan.         ED COURSE  9:34 AM  Met and evaluated patient. Discussed ED plan. I was wearing PPE including gloves and surgical mask.  10:42 AM I staffed the patient with my colleague, Dr. Larson.  1:36 PM I rechecked on the patient and updated her on lab and imaging results. Patient is feeling better and is comfortable going home. We discussed the plan for discharge and the patient is agreeable. Reviewed supportive cares, symptomatic treatment, outpatient follow up, and reasons to return to the Emergency Department. Patient to be discharged by ED RN.         MEDICATIONS GIVEN IN THE EMERGENCY:  Medications   iopamidol (ISOVUE-370) solution 75 mL (75 mLs Intravenous Given 8/19/21 1213)       NEW PRESCRIPTIONS STARTED AT TODAY'S ER VISIT  New Prescriptions    No medications on file          =================================================================    HPI    Patient information was obtained from: Patient    Use of Intrepreter: N/A      Sherice Alvarez is a 86 year old female with a PMH of asthma, hypertension, hyperlipidemia, coronary artery disease, NSTEMI, stroke, CKD (stage 3), and chronic diastolic heart failure who presents to this ED for evaluation of chest pain and shortness of breath.    Per chart review,  Patient had a total left knee arthroplasty 5/11/21 and was admitted to TCU. On evaluation 5/18/21, patient was noted to have increased pain and swelling in the left lower extremity. Venous doppler revealed DVT and patient was started on 15 mg Xarelto BID and placed in BLAIR hose. Has since discontinued the Xarelto    Patient reports increased shortness of breath, chest pain, and a dry cough over the past several days. She reports yesterday she did not feel good and felt very fatigued, which is abnormal for her since she is typically active. Patient also endorses diffuse  abdominal pain. Additionally, patient reports increased left leg swelling since her knee surgery 5/11/21 and reports her right leg has also become more swollen over the past 2 weeks. Patient states she is still taking Lasix, but is not on a blood thinner. Of note, patient is fully vaccinated against COVID-19.    Patient denies any fevers, chills, sore throat, vomiting, diarrhea, abdominal distention, urinary symptoms, or other symptoms or concerns at this time.      REVIEW OF SYSTEMS   Constitutional: Negative for fevers, chills. Positive for fatigue and malaise.  Vision: Negative for vision changes  HENT:  Negative for congestion, sore throat  Pulmonary: Positive for shortness of breath and cough (dry).  Cardiac: Positive for chest pain and leg swelling (bilaterally, left>right)  GI:Negative for nausea, vomiting, diarrhea, or abdominal distention. Positive for abdominal pain  : Negative for urinary symptoms  Musculoskeletal: Negative for extremity pain  Skin: Negative for skin changes  Neuro: Negative for weakness, numbness    All other systems reviewed and are negative      PAST MEDICAL HISTORY:  Past Medical History:   Diagnosis Date     Arthritis      Asthma      CAD (coronary artery disease)      Cancer (H)     basal cell     Chronic kidney disease     ckd 3     Chronic pain syndrome      Crohn's disease (H)      GERD (gastroesophageal reflux disease)      Hx of heart artery stent 11/2016    1 stent R Proximal CA and 1 Stent L Proximal circumflex  12/2016 Stent proximal LAD     Hyperlipidemia      Hypertension      NSTEMI (non-ST elevated myocardial infarction) (H) 11/2016     PONV (postoperative nausea and vomiting)     severe povn with last knee surgery     Restless legs syndrome      Stroke (H) 2010    numbness rt jaw       PAST SURGICAL HISTORY:  Past Surgical History:   Procedure Laterality Date     APPENDECTOMY       C TOTAL KNEE ARTHROPLASTY Left 5/11/2021    Procedure: LEFT TOTAL KNEE ARTHROPLASTY;   Surgeon: Doug Rhodes MD;  Location: [facility];  Service: Orthopedics     CARDIAC CATHETERIZATION  2016    multiple vessel disease.  no intervention     CARDIAC CATHETERIZATION  2016     CARDIAC CATHETERIZATION N/A 2016    Procedure: Coronary Angiogram;  Surgeon: Sunil Moran MD;  Location: [facility];  Service:      CAROTID ENDARTERECTOMY       CAROTID ENDARTERECTOMY Right 2010     CERVICAL LAMINECTOMY  1994      SECTION       CV CORONARY ANGIOGRAM N/A 2020    Procedure: Coronary Angiogram;  Surgeon: Vinita Ramos MD;  Location: [facility];  Service: Cardiology     CV LEFT HEART CATHETERIZATION WITHOUT LEFT VENTRICULOGRAM Left 2020    Procedure: Left Heart Catheterization Without Left Ventriculogram;  Surgeon: Jeard Lerner MD;  Location: [facility];  Service: Cardiology     CV TRANSCATHETER AORTIC VALVE REPLACEMENT - OTHER APPROACH N/A 2020    Procedure: CV TRANSCATHETER AORTIC VALVE REPLACEMENT - RIGHT SUBCLAVIAN APPROACH;  Surgeon: John Caceres MD;  Location: [facility];  Service: Cardiology     HEMORRHOIDECTOMY EXTERNAL       IR LUMBAR EPIDURAL STEROID INJECTION  2014     IR LUMBAR NERVE ROOT INJECTION  10/1/2013     JOINT REPLACEMENT       SC ESOPHAGOGASTRODUODENOSCOPY TRANSORAL DIAGNOSTIC N/A 07/10/2019    Procedure: ESOPHAGOGASTRODUODENOSCOPY (EGD) with gastric biopsy;  Surgeon: Sunil Stuart MD;  Location: [facility];  Service: Gastroenterology     SC REPLACE AORTIC VALVE OPEN AXILLRY ARTRY APPROACH N/A 2020    Procedure: OR TRANSCATHETER AORTIC VALVE REPLACEMENT, SUBCLAVIAN APPROACH;  Surgeon: Bhavin Cuadra MD;  Location: [facility];  Service: Cardiology     TONSILLECTOMY & ADENOIDECTOMY       TOTAL KNEE ARTHROPLASTY Right            CURRENT MEDICATIONS:    No current facility-administered medications for this encounter.    Current Outpatient Medications:       acetaminophen (TYLENOL) 325 MG tablet, [ACETAMINOPHEN (TYLENOL) 325 MG TABLET] Take 3 tablets (975 mg total) by mouth every 8 (eight) hours., Disp: 60 tablet, Rfl: 0     albuterol (PROVENTIL HFA;VENTOLIN HFA) 90 mcg/actuation inhaler, [ALBUTEROL (PROVENTIL HFA;VENTOLIN HFA) 90 MCG/ACTUATION INHALER] Inhale 1-2 puffs every 6 (six) hours as needed for wheezing., Disp: , Rfl:      amLODIPine (NORVASC) 5 MG tablet, [AMLODIPINE (NORVASC) 5 MG TABLET] Take 1 tablet (5 mg total) by mouth daily., Disp: 90 tablet, Rfl: 3     beclomethasone (QVAR) 80 mcg/actuation inhaler, [BECLOMETHASONE (QVAR) 80 MCG/ACTUATION INHALER] Inhale 1 puff 2 (two) times a day., Disp: , Rfl:      coenzyme Q10 100 mg capsule, [COENZYME Q10 100 MG CAPSULE] Take 100 mg by mouth daily. , Disp: , Rfl:      ergocalciferol (ERGOCALCIFEROL) 50,000 unit capsule, [ERGOCALCIFEROL (ERGOCALCIFEROL) 50,000 UNIT CAPSULE] Take 50,000 Units by mouth once a week., Disp: , Rfl:      evolocumab (REPATHA SYRINGE) 140 mg/mL Syrg, [EVOLOCUMAB (REPATHA SYRINGE) 140 MG/ML SYRG] Inject 1 mL (140 mg total) under the skin every 14 (fourteen) days., Disp: 2 Syringe, Rfl: 6     ferrous sulfate 325 (65 FE) MG tablet, [FERROUS SULFATE 325 (65 FE) MG TABLET] Take 1 tablet (325 mg total) by mouth daily with breakfast., Disp: , Rfl: 0     furosemide (LASIX) 20 MG tablet, [FUROSEMIDE (LASIX) 20 MG TABLET] Take 10 mg by mouth 2 (two) times a day as needed., Disp: , Rfl:      melatonin 5 mg Tab tablet, [MELATONIN 5 MG TAB TABLET] Take 5 mg by mouth at bedtime as needed (for sleep). , Disp: , Rfl:      metoprolol succinate (TOPROL-XL) 25 MG, [METOPROLOL SUCCINATE (TOPROL-XL) 25 MG] Take 1 tablet (25 mg total) by mouth daily., Disp: 30 tablet, Rfl: 0     montelukast (SINGULAIR) 10 mg tablet, [MONTELUKAST (SINGULAIR) 10 MG TABLET] Take 10 mg by mouth at bedtime as needed. , Disp: , Rfl:      multivitamin therapeutic (THERAGRAN) tablet, [MULTIVITAMIN THERAPEUTIC (THERAGRAN) TABLET]  Take 1 tablet by mouth daily., Disp: , Rfl:      nitroglycerin (NITROSTAT) 0.4 MG SL tablet, [NITROGLYCERIN (NITROSTAT) 0.4 MG SL TABLET] Place 1 tablet (0.4 mg total) under the tongue every 5 (five) minutes as needed for chest pain., Disp: 90 tablet, Rfl: 0     omeprazole (PRILOSEC) 20 MG capsule, [OMEPRAZOLE (PRILOSEC) 20 MG CAPSULE] Take 1-2 capsules (20-40 mg total) by mouth daily before breakfast., Disp: , Rfl: 0     oxyCODONE (ROXICODONE) 5 MG immediate release tablet, [OXYCODONE (ROXICODONE) 5 MG IMMEDIATE RELEASE TABLET] Take 1-2 tabs every 4-6 hours as needed for pain. Take 1 tab for pain 1-6/10, take 2 tabs for pain 7-10/10. Do not exceed 6 pills in one day., Disp: 40 tablet, Rfl: 0     rivaroxaban ANTICOAGULANT (XARELTO) 15 mg tablet, [RIVAROXABAN ANTICOAGULANT (XARELTO) 15 MG TABLET] Take 15 mg by mouth 2 (two) times a day with meals. Take 15 mg twice daily x21 days then 20 mg daily for 3 months., Disp: , Rfl:      rOPINIRole (REQUIP) 2 MG tablet, [ROPINIROLE (REQUIP) 2 MG TABLET] Take 4 mg by mouth every evening. Patient takes 7pm, Disp: , Rfl:      senna-docusate (PERICOLACE) 8.6-50 mg tablet, [SENNA-DOCUSATE (PERICOLACE) 8.6-50 MG TABLET] Take 1 tablet by mouth 2 (two) times a day as needed for constipation., Disp: , Rfl:     ALLERGIES:  Allergies   Allergen Reactions     Amitriptyline Hcl [Amitriptyline] Other (See Comments)     Erythromycin Diarrhea and Other (See Comments)     Childhood reaction     Gabapentin Other (See Comments)     Jerking movements, swelling     Naproxen Unknown and Other (See Comments)     Other Environmental Allergy Unknown     Molds, dander     Pregabalin Other (See Comments)       FAMILY HISTORY:  Family History   Problem Relation Age of Onset     Atrial fibrillation Mother      Parkinsonism Father        SOCIAL HISTORY:   Smoking: Former smoker.  Alcohol: No  Illicit drug: No    VITALS:  Vitals:    08/19/21 1000 08/19/21 1030 08/19/21 1045 08/19/21 1215   BP: 114/55 (!)  151/64 (!) 153/67 (!) 175/71   Pulse: 68 66 68 68   Resp: 20 20 20 21   Temp:       TempSrc:       SpO2: 96% 96% 95% 95%   Weight:       Height:           PHYSICAL EXAM    General Appearance:  Alert, cooperative, no distress, appears stated age  HENT: Normocephalic without obvious deformity, atraumatic. Mucous membranes moist   Eyes: Conjunctiva clear, Lids normal. No discharge.   Respiratory: No distress. Lungs clear to ausculation bilaterally. No wheezes, rhonchi or stridor  Cardiovascular: Regular rate and rhythm, no murmur. Normal cap refill.   GI: Abdomen soft, nontender, normal bowel sounds  : No CVA tenderness  Musculoskeletal: Moving all extremities. 1+ LE edema  Integument: Warm, dry, no rashes or lesions  Neurologic: Alert and orientated x3. No focal deficits.  Psych: Normal mood and affect        LAB:  Labs Ordered and Resulted from Time of ED Arrival Up to the Time of Departure from the ED   BASIC METABOLIC PANEL - Abnormal; Notable for the following components:       Result Value    Chloride 113 (*)     Anion Gap 4 (*)     GFR Estimate 55 (*)     All other components within normal limits   CBC WITH PLATELETS - Abnormal; Notable for the following components:    RBC Count 3.44 (*)     Hemoglobin 10.0 (*)     Hematocrit 32.4 (*)     MCHC 30.9 (*)     RDW 19.1 (*)     All other components within normal limits   ROUTINE UA WITH MICROSCOPIC REFLEX TO CULTURE - Abnormal; Notable for the following components:    Mucus Urine Present (*)     All other components within normal limits    Narrative:     Urine Culture not indicated   B-TYPE NATRIURETIC PEPTIDE ( EAST ONLY) - Normal   TROPONIN I - Normal   PARTIAL THROMBOPLASTIN TIME - Normal   INR - Normal   MAGNESIUM - Normal   MAY SALINE LOCK IV       RADIOLOGY:  CT Chest Pulmonary Embolism w Contrast   Final Result   IMPRESSION:   1.  No PE.   2.  No new airspace disease. Linear atelectasis or scarring in the right lower lobe has not changed. There is minimal  debris in the airways in the right lower lobe.   3.  Status post TAVR.   4.  An infrarenal abdominal aortic aneurysm measuring 3.2 cm has not changed. Recommend surveillance imaging at 3 year intervals.   5.  Severe coronary artery disease.      REFERENCE:   [facility] practice guidelines on the care of patients with an abdominal aortic aneurysm. Suzan HERNANDEZ. J Vasc Surg, 2018. PMID: [id number].      Aorta size: 3.0 cm to 3.9 cm: Surveillance imaging at 3-year intervals.      XR Chest Port 1 View   Final Result   IMPRESSION: Minimal bibasilar atelectasis versus scarring. No focal pneumonic consolidation or pleural effusion. Post TAVR. Upper normal heart size. No vascular congestion.          EKG:    Performed at: 09:26  Impression: Sinus rhythm. No acute changes.  Dr. Larson and I have independently reviewed and interpreted the EKG(s) documented above.      I, Silvina Harris, am serving as a scribe to document services personally performed by Delma Sun PA-C based on my observation and the provider's statements to me. I, Delma Sun PA-C attest that Silvina Harris is acting in a scribe capacity, has observed my performance of the services and has documented them in accordance with my direction.    Delma Sun PA-C   Emergency Medicine       Delma Sun PA-C  08/19/21 4250

## 2021-08-19 NOTE — ED PROVIDER NOTES
At this point I agree with the current assessment done in the Emergency Department.   I, Mira Larson MD have reviewed the documentation, personally taken the patient's history, performed an exam and agree with the physical finds, diagnosis and management plan.     Patient is seen with Delma Sun PA-C. Sherice Alvarez is a 86 year oldfemale, with a history of asthma, hypertension, hyperlipidemia, coronary artery disease, NSTEMI, stroke, CKD (stage 3), and chronic diastolic heart failure , who presents to the Emergency Department for the evaluation of chest pain and breathing problem.     Patient reports increased shortness of breath, chest pain, and a dry cough over the past several days. She reports yesterday she did not feel good and felt very fatigued, which is abnormal for her since she is typically active. Patient also endorses diffuse abdominal pain. Additionally, patient reports increased left leg swelling since her knee surgery 5/11/21 and reports her right leg has also become more swollen over the past 2 weeks. Patient states she is still taking Lasix, but is not on a blood thinner. Patient is fully vaccinated against COVID-19. Patient denies any fevers, chills, sore throat, vomiting, diarrhea, abdominal distention, urinary symptoms, or other symptoms or concerns at this time.     Social: see PA-C note    Physical Exam:   Constitutional: Well developed, well nourished. No acute distress  HENT: Normocephalic, atraumatic, mucous membranes moist. Neck- gross ROM intact.   Eyes: Pupils mid-range, sclera white, no discharge  Respiratory: Clear to auscultation bilaterally, no respiratory distress, no wheezing, speaks full sentences easily.  Cardiovascular: Trace lower extremity edema. Normal heart rate, regular rhythm, no murmurs. 2+ DP pulses.   GI: Soft, no tenderness to deep palpation in all quadrants, no masses.  Musculoskeletal: Moving all 4 extremities intentionally and without pain. No obvious  deformity.  Skin: Warm, dry, no rash.  Neurologic: Alert & oriented x 3, speech clear, moving all extremities spontaneously   Psychiatric: Affect normal, cooperative.    11:30 AM I examined the patient  MDM: 87 y/o F with history of DVT, CAD, asthma, CHF among others who presents with shortness of breath. Vitals here with hypertension otherwise reassuring. Her EKG is sinus without acute ischemic changes, troponin negative, no chest pain and symptoms several days, do not suspect ACS. CTA PE scan without PE or other acute finding to explain her symptoms. BNP within normal range and does not appear significantly volume up on exam. No wheezing to suggest concerning asthma exacerbation and has had normal o2 sats here. Labs generally reassuring. Offered admission (given she reported symptoms were more severe earlier) vs discharge and she would prefer to go home with cardiology follow up.          Mira Larson MD  08/19/21 2162

## 2021-08-19 NOTE — ED TRIAGE NOTES
"Patient reports increased shortness of breath/chest pressure for 3 days, also \"such varying BP's all day yesterday and during the night.  Increased bilateral lower extremity swelling.  "

## 2021-08-19 NOTE — DISCHARGE INSTRUCTIONS
Your work-up today does not show any emergent findings.  We have discussed admission to the hospital however given you are feeling better and after discussion with you, we are discharging you home.  Please contact your cardiology clinic and schedule a follow-up.  If you have worsening breathing, chest pain, fevers, generalized weakness or other progressing symptoms, return to the ER.

## 2021-09-08 ENCOUNTER — DOCUMENTATION ONLY (OUTPATIENT)
Dept: CARDIOLOGY | Facility: CLINIC | Age: 86
End: 2021-09-08

## 2021-09-08 ENCOUNTER — TELEPHONE (OUTPATIENT)
Dept: CARDIOLOGY | Facility: CLINIC | Age: 86
End: 2021-09-08

## 2021-09-08 ENCOUNTER — OFFICE VISIT (OUTPATIENT)
Dept: CARDIOLOGY | Facility: CLINIC | Age: 86
End: 2021-09-08
Payer: COMMERCIAL

## 2021-09-08 VITALS
RESPIRATION RATE: 14 BRPM | SYSTOLIC BLOOD PRESSURE: 132 MMHG | DIASTOLIC BLOOD PRESSURE: 68 MMHG | HEART RATE: 72 BPM | BODY MASS INDEX: 26.68 KG/M2 | WEIGHT: 145 LBS | HEIGHT: 62 IN

## 2021-09-08 DIAGNOSIS — R79.89 ELEVATED BRAIN NATRIURETIC PEPTIDE (BNP) LEVEL: Primary | ICD-10-CM

## 2021-09-08 DIAGNOSIS — I50.32 CHRONIC DIASTOLIC HEART FAILURE (H): ICD-10-CM

## 2021-09-08 DIAGNOSIS — R06.00 DYSPNEA: ICD-10-CM

## 2021-09-08 DIAGNOSIS — I25.110 CORONARY ARTERY DISEASE INVOLVING NATIVE CORONARY ARTERY OF NATIVE HEART WITH UNSTABLE ANGINA PECTORIS (H): Primary | ICD-10-CM

## 2021-09-08 DIAGNOSIS — Z95.2 S/P TAVR (TRANSCATHETER AORTIC VALVE REPLACEMENT): ICD-10-CM

## 2021-09-08 DIAGNOSIS — R06.00 DYSPNEA, UNSPECIFIED TYPE: ICD-10-CM

## 2021-09-08 DIAGNOSIS — I50.31 ACUTE DIASTOLIC CONGESTIVE HEART FAILURE (H): ICD-10-CM

## 2021-09-08 DIAGNOSIS — E78.5 DYSLIPIDEMIA, GOAL LDL BELOW 100: ICD-10-CM

## 2021-09-08 DIAGNOSIS — I10 BENIGN ESSENTIAL HYPERTENSION: ICD-10-CM

## 2021-09-08 DIAGNOSIS — I82.4Z2 ACUTE DEEP VEIN THROMBOSIS (DVT) OF DISTAL VEIN OF LEFT LOWER EXTREMITY (H): ICD-10-CM

## 2021-09-08 PROBLEM — I82.409 ACUTE DEEP VEIN THROMBOSIS (DVT) OF LOWER EXTREMITY (H): Status: ACTIVE | Noted: 2021-09-08

## 2021-09-08 PROBLEM — R07.9 CHEST PAIN: Status: RESOLVED | Noted: 2017-07-21 | Resolved: 2021-09-08

## 2021-09-08 PROBLEM — R07.89 ATYPICAL CHEST PAIN: Status: RESOLVED | Noted: 2020-04-16 | Resolved: 2021-09-08

## 2021-09-08 LAB
ANION GAP SERPL CALCULATED.3IONS-SCNC: 10 MMOL/L (ref 5–18)
BUN SERPL-MCNC: 24 MG/DL (ref 8–28)
CALCIUM SERPL-MCNC: 9.3 MG/DL (ref 8.5–10.5)
CHLORIDE BLD-SCNC: 107 MMOL/L (ref 98–107)
CO2 SERPL-SCNC: 25 MMOL/L (ref 22–31)
CREAT SERPL-MCNC: 1.14 MG/DL (ref 0.6–1.1)
GFR SERPL CREATININE-BSD FRML MDRD: 43 ML/MIN/1.73M2
GLUCOSE BLD-MCNC: 83 MG/DL (ref 70–125)
POTASSIUM BLD-SCNC: 4.1 MMOL/L (ref 3.5–5)
SODIUM SERPL-SCNC: 142 MMOL/L (ref 136–145)
TROPONIN I SERPL-MCNC: 0.01 NG/ML (ref 0–0.29)

## 2021-09-08 PROCEDURE — 83880 ASSAY OF NATRIURETIC PEPTIDE: CPT | Performed by: INTERNAL MEDICINE

## 2021-09-08 PROCEDURE — 80048 BASIC METABOLIC PNL TOTAL CA: CPT | Performed by: INTERNAL MEDICINE

## 2021-09-08 PROCEDURE — 99214 OFFICE O/P EST MOD 30 MIN: CPT | Performed by: INTERNAL MEDICINE

## 2021-09-08 PROCEDURE — 36415 COLL VENOUS BLD VENIPUNCTURE: CPT | Performed by: INTERNAL MEDICINE

## 2021-09-08 PROCEDURE — 84484 ASSAY OF TROPONIN QUANT: CPT | Performed by: INTERNAL MEDICINE

## 2021-09-08 ASSESSMENT — MIFFLIN-ST. JEOR: SCORE: 1045.97

## 2021-09-08 NOTE — LETTER
9/8/2021    Anum Rosenberg NP  [place] [address] [facility] [address]    RE: Sherice Alvarez       Dear Colleague,    I had the pleasure of seeing Sherice Alvarez in the [facility].        [facility]  Heart Care Clinic Follow-up Note    Assessment & Plan        (I25.110) Coronary artery disease involving native coronary artery of native heart with unstable angina pectoris (H)  (primary encounter diagnosis)  Comment: Angiography April 2020 showed normal left main, proximal LAD 20% stenosis with patent stent, mid sequential 30% lesion with a distal 70% lesion, circumflex with a patent proximal stent with a third obtuse marginal artery 99% stenosis.  Right coronary artery are patent stents with a mid 40% stenosis.  Attempted intervention on distal LAD as well as obtuse marginal artery which were unsuccessful and underwent nuclear stress test in May of this year which was normal.  Vague chest discomfort, will check troponin.  If chest pain persist consider stress test.     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      (I50.32) Chronic diastolic heart failure (H)  Comment: Increased shortness of breath with what sounds like some PND, constant chest tightness, weight gain of about 10 pounds.  Bipedal edema as well, suspect heart failure we will have her increase her diuretic to 20 mg p.o. twice daily, instructed her in fluid and salt restriction with nurses today.  Check BMP and BNP today.  We will follow-up with heart failure nurse practitioners.    (I10) Benign essential hypertension  Comment: Under good control currently.    (E78.5) Dyslipidemia, goal LDL below 100  Comment: Cholesterol 226 with LDL of 132.  Consider for one of our studies.    (R06.00)  Dyspnea, unspecified type  Comment: Suspect heart failure and check BNP and increased diuretics.  Might need to consider PE as well but evaluate for this first.    (I82.4Z2) Acute deep vein thrombosis (DVT) of distal vein of left lower extremity (H)  Comment: Left-sided, provoked, on Xarelto for 3 months which has been discontinued.  If shortness of breath persists might need to consider ruling out PE.    Plan  1.  Check BNP and BMP today, increase Lasix to 20 mg p.o. twice daily, however follow-up with heart failure nurse practitioners.  2.  If blood tests are not abnormal, will need to rule out PE.  3.  Check troponin, if chest discomfort persists will need to pursue repeat echo.  4.  Follow-up with heart failure nurse practitioners in 2 weeks and me in 1 to 2 months given all these issues.  5.  Consider for research trial looking at lipids or possibly heart failure.    Subjective  CC: 87-year-old white female being seen in urgent follow-up today.  She states since she has had her knee replacement May 11, she has had increased shortness of breath with 10 pound weight gain.  She is extremely fatigued and has had a tightness in her chest.  This is constant.  She also complains of some PND and possibly orthopnea.  She does have increased bipedal edema.  She is fatigued as well.  The chest discomfort is not effort related.    Medications  Current Outpatient Medications   Medication Sig Dispense Refill     acetaminophen (TYLENOL) 325 MG tablet [ACETAMINOPHEN (TYLENOL) 325 MG TABLET] Take 3 tablets (975 mg total) by mouth every 8 (eight) hours. (Patient taking differently: Take 975 mg by mouth daily as needed ) 60 tablet 0     albuterol (PROVENTIL HFA;VENTOLIN HFA) 90 mcg/actuation inhaler [ALBUTEROL (PROVENTIL HFA;VENTOLIN HFA) 90 MCG/ACTUATION INHALER] Inhale 1-2 puffs every 6 (six) hours as needed for wheezing.       amLODIPine (NORVASC) 5 MG tablet [AMLODIPINE (NORVASC) 5 MG TABLET] Take 1 tablet (5 mg total) by  mouth daily. 90 tablet 3     aspirin (ASPIRIN) 81 MG EC tablet Take 81 mg by mouth daily       beclomethasone (QVAR) 80 mcg/actuation inhaler Inhale 1 puff into the lungs 2 times daily as needed        coenzyme Q10 100 mg capsule [COENZYME Q10 100 MG CAPSULE] Take 100 mg by mouth daily.        diclofenac (VOLTAREN) 1 % topical gel Apply 4 g topically 4 times daily       ergocalciferol (ERGOCALCIFEROL) 50,000 unit capsule [ERGOCALCIFEROL (ERGOCALCIFEROL) 50,000 UNIT CAPSULE] Take 50,000 Units by mouth once a week.       evolocumab (REPATHA SYRINGE) 140 mg/mL Syrg [EVOLOCUMAB (REPATHA SYRINGE) 140 MG/ML SYRG] Inject 1 mL (140 mg total) under the skin every 14 (fourteen) days. 2 Syringe 6     ferrous sulfate 325 (65 FE) MG tablet [FERROUS SULFATE 325 (65 FE) MG TABLET] Take 1 tablet (325 mg total) by mouth daily with breakfast.  0     furosemide (LASIX) 20 MG tablet [FUROSEMIDE (LASIX) 20 MG TABLET] Take 10 mg by mouth 2 (two) times a day as needed.       melatonin 5 mg Tab tablet [MELATONIN 5 MG TAB TABLET] Take 5 mg by mouth at bedtime as needed (for sleep).        metoprolol succinate (TOPROL-XL) 25 MG [METOPROLOL SUCCINATE (TOPROL-XL) 25 MG] Take 1 tablet (25 mg total) by mouth daily. 30 tablet 0     multivitamin therapeutic (THERAGRAN) tablet [MULTIVITAMIN THERAPEUTIC (THERAGRAN) TABLET] Take 1 tablet by mouth daily.       nitroglycerin (NITROSTAT) 0.4 MG SL tablet [NITROGLYCERIN (NITROSTAT) 0.4 MG SL TABLET] Place 1 tablet (0.4 mg total) under the tongue every 5 (five) minutes as needed for chest pain. 90 tablet 0     omeprazole (PRILOSEC) 20 MG capsule [OMEPRAZOLE (PRILOSEC) 20 MG CAPSULE] Take 1-2 capsules (20-40 mg total) by mouth daily before breakfast.  0     rOPINIRole (REQUIP) 2 MG tablet [ROPINIROLE (REQUIP) 2 MG TABLET] Take 4 mg by mouth every evening. Patient takes 7pm         Objective  /68 (BP Location: Left arm, Patient Position: Sitting, Cuff Size: Adult Regular)   Pulse 72   Resp 14   Ht  "1.575 m (5' 2\")   Wt 65.8 kg (145 lb)   BMI 26.52 kg/m      General Appearance:    Alert, cooperative, no distress, appears stated age   Head:    Normocephalic, without obvious abnormality, atraumatic   Throat:   Lips, mucosa, and tongue normal; teeth and gums normal   Neck:   Supple, symmetrical, trachea midline, no adenopathy;        thyroid:  No enlargement/tenderness/nodules; no carotid    bruit, 2 to 3 cm at 30 degrees JVD   Back:     Symmetric, no curvature, ROM normal, no CVA tenderness   Lungs:     Crackles at bases bilaterally, respirations unlabored   Chest wall:    No tenderness or deformity   Heart:    Regular rate and rhythm, S1 and S2 normal, no murmur, rub   or gallop   Abdomen:     Soft, non-tender, bowel sounds active all four quadrants,     no masses, no organomegaly   Extremities:   Normal, atraumatic, no cyanosis or edema   Pulses:   2+ and symmetric all extremities   Skin:   Skin color, texture, turgor normal, no rashes or lesions     Results    Lab Results personally reviewed   Lab Results   Component Value Date    CHOL 226 (H) 03/30/2021    CHOL 146 06/04/2020     Lab Results   Component Value Date    HDL 76 03/30/2021    HDL 67 06/04/2020     No components found for: LDLCALC  Lab Results   Component Value Date    TRIG 89 03/30/2021    TRIG 78 06/04/2020     Lab Results   Component Value Date    WBC 8.6 08/19/2021    HGB 10.0 (L) 08/19/2021    HCT 32.4 (L) 08/19/2021     08/19/2021     Lab Results   Component Value Date    BUN 19 08/19/2021     08/19/2021    CO2 27 08/19/2021     Review of Systems:   Enc Vitals  BP: 132/68  Pulse: 72  Resp: 14  Weight: 65.8 kg (145 lb) (Taken at home this AM.)  Height: 157.5 cm (5' 2\")                                              Thank you for allowing me to participate in the care of your patient.      Sincerely,     NANCY NAVARRO MD     [facility]  cc:   No referring provider defined " for this encounter.

## 2021-09-08 NOTE — PATIENT INSTRUCTIONS
Ms Sherice Alvarez,  I enjoyed visiting with you again today.  I am sorry to hear of the chest tightness and shortness of breath and 10 pound weight gain.  Per our conversation limited fluids, tried a diuretic 20 mg twice a day, if the chest discomfort does not get better let us know.  Follow-up with my nurses  I will plan on seeing you 2 months.  Ulisses Feldman

## 2021-09-08 NOTE — TELEPHONE ENCOUNTER
Ulisses Feldman MD Caswell, Mallory J, RN  Looks like this is an 86-year-old lady of mine who is status post TAVR with history of coronary artery disease and history of diastolic heart failure.  [employer] is concerned about her weight, can we contact her and see how her breathing is doing, if seems short of breath will need to check BNP and renal profile and possibly go up on diuretics.  LF         Orders placed for BNP & BMP. Pt actually has an appt to see LBF today at 10:10 am. Msg to him that labs were in; pt to be evaluated by MD shortly. -[facility] [place]

## 2021-09-08 NOTE — PROGRESS NOTES
[facility]  Heart Care Clinic Follow-up Note    Assessment & Plan        (I25.110) Coronary artery disease involving native coronary artery of native heart with unstable angina pectoris (H)  (primary encounter diagnosis)  Comment: Angiography April 2020 showed normal left main, proximal LAD 20% stenosis with patent stent, mid sequential 30% lesion with a distal 70% lesion, circumflex with a patent proximal stent with a third obtuse marginal artery 99% stenosis.  Right coronary artery are patent stents with a mid 40% stenosis.  Attempted intervention on distal LAD as well as obtuse marginal artery which were unsuccessful and underwent nuclear stress test in May of this year which was normal.  Vague chest discomfort, will check troponin.  If chest pain persist consider stress test.     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      (I50.32) Chronic diastolic heart failure (H)  Comment: Increased shortness of breath with what sounds like some PND, constant chest tightness, weight gain of about 10 pounds.  Bipedal edema as well, suspect heart failure we will have her increase her diuretic to 20 mg p.o. twice daily, instructed her in fluid and salt restriction with nurses today.  Check BMP and BNP today.  We will follow-up with heart failure nurse practitioners.    (I10) Benign essential hypertension  Comment: Under good control currently.    (E78.5) Dyslipidemia, goal LDL below 100  Comment: Cholesterol 226 with LDL of 132.  Consider for one of our studies.    (R06.00) Dyspnea, unspecified type  Comment: Suspect heart failure and check BNP and increased diuretics.  Might need to consider PE as well but evaluate for this first.    (I82.4Z2) Acute deep vein thrombosis (DVT) of distal vein of left lower extremity (H)  Comment: Left-sided,  provoked, on Xarelto for 3 months which has been discontinued.  If shortness of breath persists might need to consider ruling out PE.    Plan  1.  Check BNP and BMP today, increase Lasix to 20 mg p.o. twice daily, however follow-up with heart failure nurse practitioners.  2.  If blood tests are not abnormal, will need to rule out PE.  3.  Check troponin, if chest discomfort persists will need to pursue repeat echo.  4.  Follow-up with heart failure nurse practitioners in 2 weeks and me in 1 to 2 months given all these issues.  5.  Consider for research trial looking at lipids or possibly heart failure.    Subjective  CC: 87-year-old white female being seen in urgent follow-up today.  She states since she has had her knee replacement May 11, she has had increased shortness of breath with 10 pound weight gain.  She is extremely fatigued and has had a tightness in her chest.  This is constant.  She also complains of some PND and possibly orthopnea.  She does have increased bipedal edema.  She is fatigued as well.  The chest discomfort is not effort related.    Medications  Current Outpatient Medications   Medication Sig Dispense Refill     acetaminophen (TYLENOL) 325 MG tablet [ACETAMINOPHEN (TYLENOL) 325 MG TABLET] Take 3 tablets (975 mg total) by mouth every 8 (eight) hours. (Patient taking differently: Take 975 mg by mouth daily as needed ) 60 tablet 0     albuterol (PROVENTIL HFA;VENTOLIN HFA) 90 mcg/actuation inhaler [ALBUTEROL (PROVENTIL HFA;VENTOLIN HFA) 90 MCG/ACTUATION INHALER] Inhale 1-2 puffs every 6 (six) hours as needed for wheezing.       amLODIPine (NORVASC) 5 MG tablet [AMLODIPINE (NORVASC) 5 MG TABLET] Take 1 tablet (5 mg total) by mouth daily. 90 tablet 3     aspirin (ASPIRIN) 81 MG EC tablet Take 81 mg by mouth daily       beclomethasone (QVAR) 80 mcg/actuation inhaler Inhale 1 puff into the lungs 2 times daily as needed        coenzyme Q10 100 mg capsule [COENZYME Q10 100 MG CAPSULE] Take 100 mg by  "mouth daily.        diclofenac (VOLTAREN) 1 % topical gel Apply 4 g topically 4 times daily       ergocalciferol (ERGOCALCIFEROL) 50,000 unit capsule [ERGOCALCIFEROL (ERGOCALCIFEROL) 50,000 UNIT CAPSULE] Take 50,000 Units by mouth once a week.       evolocumab (REPATHA SYRINGE) 140 mg/mL Syrg [EVOLOCUMAB (REPATHA SYRINGE) 140 MG/ML SYRG] Inject 1 mL (140 mg total) under the skin every 14 (fourteen) days. 2 Syringe 6     ferrous sulfate 325 (65 FE) MG tablet [FERROUS SULFATE 325 (65 FE) MG TABLET] Take 1 tablet (325 mg total) by mouth daily with breakfast.  0     furosemide (LASIX) 20 MG tablet [FUROSEMIDE (LASIX) 20 MG TABLET] Take 10 mg by mouth 2 (two) times a day as needed.       melatonin 5 mg Tab tablet [MELATONIN 5 MG TAB TABLET] Take 5 mg by mouth at bedtime as needed (for sleep).        metoprolol succinate (TOPROL-XL) 25 MG [METOPROLOL SUCCINATE (TOPROL-XL) 25 MG] Take 1 tablet (25 mg total) by mouth daily. 30 tablet 0     multivitamin therapeutic (THERAGRAN) tablet [MULTIVITAMIN THERAPEUTIC (THERAGRAN) TABLET] Take 1 tablet by mouth daily.       nitroglycerin (NITROSTAT) 0.4 MG SL tablet [NITROGLYCERIN (NITROSTAT) 0.4 MG SL TABLET] Place 1 tablet (0.4 mg total) under the tongue every 5 (five) minutes as needed for chest pain. 90 tablet 0     omeprazole (PRILOSEC) 20 MG capsule [OMEPRAZOLE (PRILOSEC) 20 MG CAPSULE] Take 1-2 capsules (20-40 mg total) by mouth daily before breakfast.  0     rOPINIRole (REQUIP) 2 MG tablet [ROPINIROLE (REQUIP) 2 MG TABLET] Take 4 mg by mouth every evening. Patient takes 7pm         Objective  /68 (BP Location: Left arm, Patient Position: Sitting, Cuff Size: Adult Regular)   Pulse 72   Resp 14   Ht 1.575 m (5' 2\")   Wt 65.8 kg (145 lb)   BMI 26.52 kg/m      General Appearance:    Alert, cooperative, no distress, appears stated age   Head:    Normocephalic, without obvious abnormality, atraumatic   Throat:   Lips, mucosa, and tongue normal; teeth and gums normal " "  Neck:   Supple, symmetrical, trachea midline, no adenopathy;        thyroid:  No enlargement/tenderness/nodules; no carotid    bruit, 2 to 3 cm at 30 degrees JVD   Back:     Symmetric, no curvature, ROM normal, no CVA tenderness   Lungs:     Crackles at bases bilaterally, respirations unlabored   Chest wall:    No tenderness or deformity   Heart:    Regular rate and rhythm, S1 and S2 normal, no murmur, rub   or gallop   Abdomen:     Soft, non-tender, bowel sounds active all four quadrants,     no masses, no organomegaly   Extremities:   Normal, atraumatic, no cyanosis or edema   Pulses:   2+ and symmetric all extremities   Skin:   Skin color, texture, turgor normal, no rashes or lesions     Results    Lab Results personally reviewed   Lab Results   Component Value Date    CHOL 226 (H) 03/30/2021    CHOL 146 06/04/2020     Lab Results   Component Value Date    HDL 76 03/30/2021    HDL 67 06/04/2020     No components found for: LDLCALC  Lab Results   Component Value Date    TRIG 89 03/30/2021    TRIG 78 06/04/2020     Lab Results   Component Value Date    WBC 8.6 08/19/2021    HGB 10.0 (L) 08/19/2021    HCT 32.4 (L) 08/19/2021     08/19/2021     Lab Results   Component Value Date    BUN 19 08/19/2021     08/19/2021    CO2 27 08/19/2021     Review of Systems:   Enc Vitals  BP: 132/68  Pulse: 72  Resp: 14  Weight: 65.8 kg (145 lb) (Taken at home this AM.)  Height: 157.5 cm (5' 2\")                                          "

## 2021-09-09 LAB — NT-PROBNP SERPL-MCNC: 481 PG/ML (ref 0–450)

## 2021-09-12 NOTE — PROGRESS NOTES
Assessment/Recommendations   Assessment:    1. Acute on chronic diastolic heart failure, NYHA class III:Reports weight gain of 10 pounds with chest tightness with bilateral pedal edema in the last 6 weeks.likely due to salt indiscretion in her diet.  Current home weight is 143.4 pounds this morning.She reports her baseline weight is 136 pounds. She took furosemide dose 20 mg twice a day for 2 days.BMP on 9/8/2021 showed sodium 142, potassium 4.1, creatinine 1.14. NT proBNP was found elevated at 481.    She continues to have shortness of breath and chest tightness on exertion.  She denies PND orthopnea.  She has mild to moderate swelling in bilateral lower extremities (Lt >Rt).    We discussed and reviewed about heart failure, medication management, and lifestyle management including low sodium diet <2 g/day, daily weight, and staying physically active as tolerated. Patient received heart failure education on 9/8/2021.      2. Coronary artery disease with status post PCI/KIANA to distal LAD in April 2021.  Intervention to circumflex was unsuccessful at that time.  She had a negative stress test in May 2021.  Patient was having some vague chest discomfort and chest tightness.  Troponin was negative on 9/8/2021.  Dr. Feldman recommended stress test if persistent chest pain.    She is on aspirin 81 mg daily.  She has nitroglycerin as needed    3  Dyslipidemia with LDL goal <100: On Repatha.  Primary cardiologist recommended to participate in one of our research study.  Most recent LDL is 132 in March 2021.     4.  Hypertension: Her blood pressure is stable.    5.  History of aortic stenosis with status post TAVR 2020: Echocardiogram in May 2021 showed well-seated 23 mm CPN 3 bioprosthetic aortic valve with no evidence of prosthetic stenosis with peak velocity 1.9M/S, mean gradient 7 mmHg.  No regurgitation noted.    6.  History of acute DVT of the left lower extremity: Patient was on Xarelto for 3 months and was  discontinued.  Most recent chest CT was negative for pulmonary embolism in August 2021.  Most recent ultrasound of left lower extremity was negative for DVT in June 2021.    Plan/Recommendation:  -Increase furosemide from 10 mg twice a day to 40 mg daily in a.m.  -Take metoprolol succinate at bedtime to see if this helps with fatigue  -Keep sodium intake less than 2000 mg/day and daily weight monitoring  -Encouraged to seek medical attention if recurrent persistent worsening shortness of breath, chest pain, lightheadedness or dizziness  -Discussed and reviewed utilization of as needed nitroglycerin  -Follow-up in heart failure core clinic in 1 week with repeat BMP    Follow up with Dr. Feldman in 4 weeks.      History of Present Illness/Subjective    Ms. Sherice Alvarez is a 87 year old female with a past medical history of coronary artery disease with status post PCI/KIANA to distal LAD in 2016,dyslipidemia, hypertension, severe aortic stenosis with status post TAVR in June 2020, acute DVT, GERD, coronary disease stage III, asthma, and chronic diastolic heart failure who is seen at [facility] for follow up per recommendation from Dr. Feldman.    Patient had a follow-up with Dr. Feldman 5 days ago.  She was noted to have weight gain of 10 pounds with chest tightness and lower extremity edema.  She was suspected fluid retention.  Her furosemide dose was increased.  She was recommended to follow-up with me.  Her NT proBNP was mildly elevated in 400s.    Today, Sherice reports she did not feel much difference taking increased dose of furosemide for couple days.  She continues to have shortness of breath and some chest tightness with exertion and some dry cough.  She reports weight gain of 10 pounds over the last 6 weeks.  She has been eating out more often.  She eats mostly fast food.  She is suspecting that she is consuming more salt than usual.  She feels her leg started to  swell up more since her left knee surgery back in May.    She denies lightheadedness, shortness of breath, orthopnea, PND, palpitations, chest pain and abdominal fullness/bloating.  She denies chest pain or tightness during the visit today.  She does not report any bleeding complications.  She reports chronic neuropathy pain in both legs.  She has also been feeling more tightness in her both legs since increased swelling.    ECHO May 2021-Reviewed:      Narrative & Impression    Left ventricular ejection fraction is normal. The calculated left ventricular ejection fraction is 71%.    Left ventricular cavity size is small. Moderate concentric increase in wall thickness.    Normal right ventricular size and systolic function.    A well-seated 23 mm DANIEL 3 bioprosthetic aortic valve is present. There is no evidence of prosthetic stenosis with peak velocity 1.9 m/s, mean gradient 7 mm Hg, and dimensionless index 0.7. No regurgitation is noted.    When compared to the previous study dated 7/2/2020, there has been no significant change.    Stress test done on 5/3/2021: Reviewed  Result Text     The nuclear stress test is negative for inducible myocardial ischemia or infarction.     The left ventricular ejection fraction at stress is greater than 70%.     A prior study was conducted on 6/12/2019.  No interval change.    Coronary Angiogram done on 4/17/2021: reviewed:  Shericejoshua Alvarez is a 85 y.o. old female with CAD s/p prior PCI's latest to mLAD '18, AS, HTN, HL,  who is here for accelearating angina.     - PCI to severe dLAD lesion w/ DESx1, attempted but unsuccessful another attempt at AV Lcx branch - abandoned due to likely , small size, suboptimal for stent (so would have been balloon only result)  - invasive AV gradient is in the severe range. Therefore, would arrange an outpatient valve clinic appointment for TAVR evaluation  - continue ASA 81mg daily indefinitely, add clopidogrel 600mg once, followed by  "75mg daily for at least 12 months   - add amlodipine for both BP control and small vessel vasodilaiton in case Lcx gives her angina. Could also consider imdur  - increase atorva to 80  - continue aggressive risk factor modification     Physical Examination Review of Systems   /72 (BP Location: Left arm, Patient Position: Sitting, Cuff Size: Adult Large)   Pulse 77   Resp 28   Ht 1.562 m (5' 1.5\")   Wt 66.2 kg (146 lb)   BMI 27.14 kg/m    Body mass index is 27.14 kg/m .  Wt Readings from Last 3 Encounters:   09/13/21 66.2 kg (146 lb)   09/08/21 65.8 kg (145 lb)   08/19/21 64 kg (141 lb)     General Appearance:   no distress, normal body habitus   ENT/Mouth: membranes moist, no oral lesions or bleeding gums.      EYES:  no scleral icterus, normal conjunctivae   Neck: no carotid bruits or thyromegaly   Chest/Lungs:   lungs are clear to auscultation, no rales or wheezing, equal chest wall expansion    Cardiovascular:    Heart rate regular. Normal first and second heart sounds with no murmurs, rubs, or gallops; the carotid, radial and posterior tibial pulses are intact, JVP is difficult to assess due to the patient's obesity and body habitus   , moderate 2+ non pitting edema bilaterally (Lt>Rt)   Abdomen:  no organomegaly, masses, bruits, or tenderness; bowel sounds are present   Extremities   no cyanosis or clubbing   Radial pulses and Pedal pulses intact and symmetrical.  CMS intact.   Skin: no xanthelasma, warm.    Neurologic: normal  bilateral, no tremors     Psychiatric: alert and oriented x3, calm           Negative unless noted in HPI     Medical History  Surgical History Family History Social History   Past Medical History:   Diagnosis Date     Arthritis      Asthma      CAD (coronary artery disease)      Cancer (H)     basal cell     Chronic kidney disease     ckd 3     Chronic pain syndrome      Crohn's disease (H)      GERD (gastroesophageal reflux disease)      Hx of heart artery stent 11/2016 "    1 stent R Proximal CA and 1 Stent L Proximal circumflex  2016 Stent proximal LAD     Hyperlipidemia      Hypertension      NSTEMI (non-ST elevated myocardial infarction) (H) 2016     PONV (postoperative nausea and vomiting)     severe povn with last knee surgery     Restless legs syndrome      Stroke (H)     numbness rt jaw    Past Surgical History:   Procedure Laterality Date     APPENDECTOMY       C TOTAL KNEE ARTHROPLASTY Left 2021    Procedure: LEFT TOTAL KNEE ARTHROPLASTY;  Surgeon: Doug Rhodes MD;  Location: [facility];  Service: Orthopedics     CARDIAC CATHETERIZATION  2016    multiple vessel disease.  no intervention     CARDIAC CATHETERIZATION  2016     CARDIAC CATHETERIZATION N/A 2016    Procedure: Coronary Angiogram;  Surgeon: Sunil Moran MD;  Location: [facility];  Service:      CAROTID ENDARTERECTOMY       CAROTID ENDARTERECTOMY Right 2010     CERVICAL LAMINECTOMY  1994      SECTION       CV CORONARY ANGIOGRAM N/A 2020    Procedure: Coronary Angiogram;  Surgeon: Vinita Ramos MD;  Location: [facility];  Service: Cardiology     CV LEFT HEART CATHETERIZATION WITHOUT LEFT VENTRICULOGRAM Left 2020    Procedure: Left Heart Catheterization Without Left Ventriculogram;  Surgeon: Jerad Lerner MD;  Location: [facility];  Service: Cardiology     CV TRANSCATHETER AORTIC VALVE REPLACEMENT - OTHER APPROACH N/A 2020    Procedure: CV TRANSCATHETER AORTIC VALVE REPLACEMENT - RIGHT SUBCLAVIAN APPROACH;  Surgeon: John Caceres MD;  Location: [facility];  Service: Cardiology     HEMORRHOIDECTOMY EXTERNAL       IR LUMBAR EPIDURAL STEROID INJECTION  2014     IR LUMBAR NERVE ROOT INJECTION  10/1/2013     JOINT REPLACEMENT       GA ESOPHAGOGASTRODUODENOSCOPY TRANSORAL DIAGNOSTIC N/A 07/10/2019    Procedure: ESOPHAGOGASTRODUODENOSCOPY (EGD) with gastric biopsy;  Surgeon: Sunil Stuart  MD;  Location: [facility];  Service: Gastroenterology     NH REPLACE AORTIC VALVE OPEN AXILLRY ARTRY APPROACH N/A 2020    Procedure: OR TRANSCATHETER AORTIC VALVE REPLACEMENT, SUBCLAVIAN APPROACH;  Surgeon: Bhavin Cuadra MD;  Location: [facility];  Service: Cardiology     TONSILLECTOMY & ADENOIDECTOMY       TOTAL KNEE ARTHROPLASTY Right     Family History   Problem Relation Age of Onset     Atrial fibrillation Mother      Parkinsonism Father     Social History     Socioeconomic History     Marital status:      Spouse name: Not on file     Number of children: Not on file     Years of education: Not on file     Highest education level: Not on file   Occupational History     Not on file   Tobacco Use     Smoking status: Former Smoker     Quit date: 1980     Years since quittin.7     Smokeless tobacco: Never Used   Substance and Sexual Activity     Alcohol use: No     Drug use: No     Sexual activity: Not on file   Other Topics Concern     Not on file   Social History Narrative     Not on file     Social Determinants of Health     Financial Resource Strain:      Difficulty of Paying Living Expenses:    Food Insecurity:      Worried About Running Out of Food in the Last Year:      Ran Out of Food in the Last Year:    Transportation Needs:      Lack of Transportation (Medical):      Lack of Transportation (Non-Medical):    Physical Activity:      Days of Exercise per Week:      Minutes of Exercise per Session:    Stress:      Feeling of Stress :    Social Connections:      Frequency of Communication with Friends and Family:      Frequency of Social Gatherings with Friends and Family:      Attends [employer] Services:      Active Member of Clubs or Organizations:      Attends Club or Organization Meetings:      Marital Status:    Intimate Partner Violence:      Fear of Current or Ex-Partner:      Emotionally Abused:      Physically Abused:      Sexually Abused:            Medications  Allergies   Current Outpatient Medications   Medication Sig Dispense Refill     acetaminophen (TYLENOL) 325 MG tablet [ACETAMINOPHEN (TYLENOL) 325 MG TABLET] Take 3 tablets (975 mg total) by mouth every 8 (eight) hours. (Patient taking differently: Take 975 mg by mouth daily as needed ) 60 tablet 0     albuterol (PROVENTIL HFA;VENTOLIN HFA) 90 mcg/actuation inhaler [ALBUTEROL (PROVENTIL HFA;VENTOLIN HFA) 90 MCG/ACTUATION INHALER] Inhale 1-2 puffs every 6 (six) hours as needed for wheezing.       amLODIPine (NORVASC) 5 MG tablet [AMLODIPINE (NORVASC) 5 MG TABLET] Take 1 tablet (5 mg total) by mouth daily. 90 tablet 3     aspirin (ASPIRIN) 81 MG EC tablet Take 81 mg by mouth daily       beclomethasone (QVAR) 80 mcg/actuation inhaler Inhale 1 puff into the lungs 2 times daily as needed        coenzyme Q10 100 mg capsule [COENZYME Q10 100 MG CAPSULE] Take 100 mg by mouth daily.        diclofenac (VOLTAREN) 1 % topical gel Apply 4 g topically 4 times daily       ergocalciferol (ERGOCALCIFEROL) 50,000 unit capsule [ERGOCALCIFEROL (ERGOCALCIFEROL) 50,000 UNIT CAPSULE] Take 50,000 Units by mouth once a week.       evolocumab (REPATHA SYRINGE) 140 mg/mL Syrg [EVOLOCUMAB (REPATHA SYRINGE) 140 MG/ML SYRG] Inject 1 mL (140 mg total) under the skin every 14 (fourteen) days. 2 Syringe 6     ferrous sulfate 325 (65 FE) MG tablet [FERROUS SULFATE 325 (65 FE) MG TABLET] Take 1 tablet (325 mg total) by mouth daily with breakfast.  0     furosemide (LASIX) 20 MG tablet [FUROSEMIDE (LASIX) 20 MG TABLET] Take 10 mg by mouth 2 (two) times a day as needed.       melatonin 5 mg Tab tablet [MELATONIN 5 MG TAB TABLET] Take 5 mg by mouth at bedtime as needed (for sleep).        metoprolol succinate (TOPROL-XL) 25 MG [METOPROLOL SUCCINATE (TOPROL-XL) 25 MG] Take 1 tablet (25 mg total) by mouth daily. 30 tablet 0     multivitamin therapeutic (THERAGRAN) tablet [MULTIVITAMIN THERAPEUTIC (THERAGRAN) TABLET] Take 1 tablet by  mouth daily.       nitroglycerin (NITROSTAT) 0.4 MG SL tablet [NITROGLYCERIN (NITROSTAT) 0.4 MG SL TABLET] Place 1 tablet (0.4 mg total) under the tongue every 5 (five) minutes as needed for chest pain. 90 tablet 0     omeprazole (PRILOSEC) 20 MG capsule [OMEPRAZOLE (PRILOSEC) 20 MG CAPSULE] Take 1-2 capsules (20-40 mg total) by mouth daily before breakfast.  0     rOPINIRole (REQUIP) 2 MG tablet [ROPINIROLE (REQUIP) 2 MG TABLET] Take 4 mg by mouth every evening. Patient takes 7pm      Allergies   Allergen Reactions     Amitriptyline Hcl [Amitriptyline] Other (See Comments)     Erythromycin Diarrhea and Other (See Comments)     Childhood reaction     Gabapentin Other (See Comments)     Jerking movements, swelling     Naproxen Unknown and Other (See Comments)     Other Environmental Allergy Unknown     Molds, dander     Pregabalin Other (See Comments)         Lab Results    Chemistry/lipid CBC Cardiac Enzymes/BNP/TSH/INR   Lab Results   Component Value Date    CHOL 226 (H) 03/30/2021    HDL 76 03/30/2021    TRIG 89 03/30/2021    BUN 24 09/08/2021     09/08/2021    CO2 25 09/08/2021    Lab Results   Component Value Date    WBC 8.6 08/19/2021    HGB 10.0 (L) 08/19/2021    HCT 32.4 (L) 08/19/2021    MCV 94 08/19/2021     08/19/2021    Lab Results   Component Value Date    TROPONINI 0.01 09/08/2021     08/19/2021    TSH 1.40 03/12/2021    INR 1.15 08/19/2021        50  minutes spent on the date of encounter doing chart review, review of test results, interpretation with above tests, patient visit, documentation, discussion with other provider and discussion with family.      This note has been dictated using voice recognition software. Any grammatical, typographical, or context distortions are unintentional and inherent to the software

## 2021-09-12 NOTE — PATIENT INSTRUCTIONS
Sherice Alvarez,    It was a pleasure to see you today at the [facility].     My recommendations after this visit include:    - Increase Furosemide from 10 mg twice a day to 40 mg daily in AM (take 2 tablets of 20 mg Furosemide).     - Take your Metoprolol Succinate at bed time instead to see if this helps with tiredness     - Keep your salt intake <2000 mg per day    - Please seek medical attention if persistent worsening shortness of breath, chest tightness , lightheaded or dizziness    - Follow up in the Heart Failure Core Clinic in 1 week    - Follow up with Dr. Feldman in 4 weeks    - Please call YUMI Dawn on [phone]  if you have any questions or concerns    Lane Rosario CNP    What Is Heart Failure?  The heart is a muscle. It pumps oxygen-rich blood to all parts of the body. When you have heart failure, the heart can t pump as well as it should. Blood and fluid may back up into the lungs, and some parts of the body don t get enough oxygen-rich blood to work normally. These problems lead to the symptoms you feel.    When You Have Heart Failure  Because of heart failure, not enough blood leaves the heart with each beat. There are two types of heart failure. Both affect the ventricles  ability to pump blood. You may have one or both types.     Systolic heart failure: The heart muscle becomes weak and enlarged. It can t pump enough blood forward when the ventricles contract. Ejection fraction is lower than normal.   Diastolic heart failure: The heart muscle becomes stiff. It doesn t relax normally between contractions, which keeps the ventricles from filling with blood. Ejection fraction is often in the normal range.     How Heart Failure Affects Your Body  When the heart doesn t pump enough blood, hormones (body chemicals) are sent to increase the amount of work the heart does. Some hormones make the heart grow larger. Others tell the heart to pump faster. As a result, the heart  may pump more blood at first, but it can t keep up with the ongoing demands. So, the heart muscle becomes more damaged. Over time, even less blood is pumped through the heart. This leads to problems throughout the body.    What Is Ejection Fraction?  Ejection fraction (EF) measures how much blood the heart pumps out (ejects). This is measured to help diagnose heart failure. A healthy heart pumps at least half of the blood from the ventricles with each beat. This means a normal ejection fraction is around 50% or more.       2894-4105 [employer]. [address] [place] [id number]. All rights reserved. This information is not intended as a substitute for professional medical care. Always follow your healthcare professional's instructions.

## 2021-09-13 ENCOUNTER — OFFICE VISIT (OUTPATIENT)
Dept: CARDIOLOGY | Facility: CLINIC | Age: 86
End: 2021-09-13
Payer: COMMERCIAL

## 2021-09-13 VITALS
WEIGHT: 146 LBS | HEIGHT: 62 IN | BODY MASS INDEX: 26.87 KG/M2 | HEART RATE: 77 BPM | RESPIRATION RATE: 28 BRPM | SYSTOLIC BLOOD PRESSURE: 128 MMHG | DIASTOLIC BLOOD PRESSURE: 72 MMHG

## 2021-09-13 DIAGNOSIS — N18.31 STAGE 3A CHRONIC KIDNEY DISEASE (H): ICD-10-CM

## 2021-09-13 DIAGNOSIS — I25.110 CORONARY ARTERY DISEASE INVOLVING NATIVE CORONARY ARTERY OF NATIVE HEART WITH UNSTABLE ANGINA PECTORIS (H): ICD-10-CM

## 2021-09-13 DIAGNOSIS — E78.5 DYSLIPIDEMIA, GOAL LDL BELOW 100: ICD-10-CM

## 2021-09-13 DIAGNOSIS — R06.00 DYSPNEA, UNSPECIFIED TYPE: ICD-10-CM

## 2021-09-13 DIAGNOSIS — I35.0 SEVERE AORTIC STENOSIS: ICD-10-CM

## 2021-09-13 DIAGNOSIS — I10 BENIGN ESSENTIAL HYPERTENSION: ICD-10-CM

## 2021-09-13 DIAGNOSIS — I50.33 ACUTE ON CHRONIC DIASTOLIC HEART FAILURE (H): Primary | ICD-10-CM

## 2021-09-13 DIAGNOSIS — I82.4Z2 ACUTE DEEP VEIN THROMBOSIS (DVT) OF DISTAL VEIN OF LEFT LOWER EXTREMITY (H): ICD-10-CM

## 2021-09-13 PROCEDURE — 99215 OFFICE O/P EST HI 40 MIN: CPT | Performed by: NURSE PRACTITIONER

## 2021-09-13 RX ORDER — FUROSEMIDE 20 MG
40 TABLET ORAL DAILY
Qty: 60 TABLET | Refills: 0 | Status: SHIPPED | OUTPATIENT
Start: 2021-09-13 | End: 2021-09-23

## 2021-09-13 RX ORDER — METOPROLOL SUCCINATE 25 MG/1
25 TABLET, EXTENDED RELEASE ORAL AT BEDTIME
Qty: 30 TABLET | Refills: 0
Start: 2021-09-13 | End: 2022-01-13

## 2021-09-13 ASSESSMENT — MIFFLIN-ST. JEOR: SCORE: 1042.56

## 2021-09-13 NOTE — LETTER
9/13/2021    Anum Rosenberg NP  [place] [address] [address] [facility] [address]    RE: Sherice Alvarez       Dear Colleague,    I had the pleasure of seeing Sherice Alvarez in the [facility].            Assessment/Recommendations   Assessment:    1. Acute on chronic diastolic heart failure, NYHA class III:Reports weight gain of 10 pounds with chest tightness with bilateral pedal edema in the last 6 weeks.likely due to salt indiscretion in her diet.  Current home weight is 143.4 pounds this morning.She reports her baseline weight is 136 pounds. She took furosemide dose 20 mg twice a day for 2 days.BMP on 9/8/2021 showed sodium 142, potassium 4.1, creatinine 1.14. NT proBNP was found elevated at 481.    She continues to have shortness of breath and chest tightness on exertion.  She denies PND orthopnea.  She has mild to moderate swelling in bilateral lower extremities (Lt >Rt).    We discussed and reviewed about heart failure, medication management, and lifestyle management including low sodium diet <2 g/day, daily weight, and staying physically active as tolerated. Patient received heart failure education on 9/8/2021.      2. Coronary artery disease with status post PCI/KIANA to distal LAD in April 2021.  Intervention to circumflex was unsuccessful at that time.  She had a negative stress test in May 2021.  Patient was having some vague chest discomfort and chest tightness.  Troponin was negative on 9/8/2021.  Dr. Feldman recommended stress test if persistent chest pain.    She is on aspirin 81 mg daily.  She has nitroglycerin as needed    3  Dyslipidemia with LDL goal <100: On Repatha.  Primary cardiologist recommended to participate in one of our research study.  Most recent LDL is 132 in March 2021.     4.  Hypertension: Her blood pressure is stable.    5.  History of aortic stenosis with status post TAVR 2020: Echocardiogram in May  2021 showed well-seated 23 mm CPN 3 bioprosthetic aortic valve with no evidence of prosthetic stenosis with peak velocity 1.9M/S, mean gradient 7 mmHg.  No regurgitation noted.    6.  History of acute DVT of the left lower extremity: Patient was on Xarelto for 3 months and was discontinued.  Most recent chest CT was negative for pulmonary embolism in August 2021.  Most recent ultrasound of left lower extremity was negative for DVT in June 2021.    Plan/Recommendation:  -Increase furosemide from 10 mg twice a day to 40 mg daily in a.m.  -Take metoprolol succinate at bedtime to see if this helps with fatigue  -Keep sodium intake less than 2000 mg/day and daily weight monitoring  -Encouraged to seek medical attention if recurrent persistent worsening shortness of breath, chest pain, lightheadedness or dizziness  -Discussed and reviewed utilization of as needed nitroglycerin  -Follow-up in heart failure core clinic in 1 week with repeat BMP    Follow up with Dr. Feldman in 4 weeks.      History of Present Illness/Subjective    Ms. Sherice Alvarez is a 87 year old female with a past medical history of coronary artery disease with status post PCI/KIANA to distal LAD in 2016,dyslipidemia, hypertension, severe aortic stenosis with status post TAVR in June 2020, acute DVT, GERD, coronary disease stage III, asthma, and chronic diastolic heart failure who is seen at [facility] for follow up per recommendation from Dr. Feldman.    Patient had a follow-up with Dr. Feldman 5 days ago.  She was noted to have weight gain of 10 pounds with chest tightness and lower extremity edema.  She was suspected fluid retention.  Her furosemide dose was increased.  She was recommended to follow-up with me.  Her NT proBNP was mildly elevated in 400s.    Today, Sherice reports she did not feel much difference taking increased dose of furosemide for couple days.  She continues to have shortness of breath and  some chest tightness with exertion and some dry cough.  She reports weight gain of 10 pounds over the last 6 weeks.  She has been eating out more often.  She eats mostly fast food.  She is suspecting that she is consuming more salt than usual.  She feels her leg started to swell up more since her left knee surgery back in May.    She denies lightheadedness, shortness of breath, orthopnea, PND, palpitations, chest pain and abdominal fullness/bloating.  She denies chest pain or tightness during the visit today.  She does not report any bleeding complications.  She reports chronic neuropathy pain in both legs.  She has also been feeling more tightness in her both legs since increased swelling.    ECHO May 2021-Reviewed:      Narrative & Impression    Left ventricular ejection fraction is normal. The calculated left ventricular ejection fraction is 71%.    Left ventricular cavity size is small. Moderate concentric increase in wall thickness.    Normal right ventricular size and systolic function.    A well-seated 23 mm DANIEL 3 bioprosthetic aortic valve is present. There is no evidence of prosthetic stenosis with peak velocity 1.9 m/s, mean gradient 7 mm Hg, and dimensionless index 0.7. No regurgitation is noted.    When compared to the previous study dated 7/2/2020, there has been no significant change.    Stress test done on 5/3/2021: Reviewed  Result Text     The nuclear stress test is negative for inducible myocardial ischemia or infarction.     The left ventricular ejection fraction at stress is greater than 70%.     A prior study was conducted on 6/12/2019.  No interval change.    Coronary Angiogram done on 4/17/2021: reviewed:  Sherice PADMINI Larrybecky is a 85 y.o. old female with CAD s/p prior PCI's latest to mLAD '18, AS, HTN, HL,  who is here for accelearating angina.     - PCI to severe dLAD lesion w/ DESx1, attempted but unsuccessful another attempt at AV Lcx branch - abandoned due to likely , small size,  "suboptimal for stent (so would have been balloon only result)  - invasive AV gradient is in the severe range. Therefore, would arrange an outpatient valve clinic appointment for TAVR evaluation  - continue ASA 81mg daily indefinitely, add clopidogrel 600mg once, followed by 75mg daily for at least 12 months   - add amlodipine for both BP control and small vessel vasodilaiton in case Lcx gives her angina. Could also consider imdur  - increase atorva to 80  - continue aggressive risk factor modification     Physical Examination Review of Systems   /72 (BP Location: Left arm, Patient Position: Sitting, Cuff Size: Adult Large)   Pulse 77   Resp 28   Ht 1.562 m (5' 1.5\")   Wt 66.2 kg (146 lb)   BMI 27.14 kg/m    Body mass index is 27.14 kg/m .  Wt Readings from Last 3 Encounters:   09/13/21 66.2 kg (146 lb)   09/08/21 65.8 kg (145 lb)   08/19/21 64 kg (141 lb)     General Appearance:   no distress, normal body habitus   ENT/Mouth: membranes moist, no oral lesions or bleeding gums.      EYES:  no scleral icterus, normal conjunctivae   Neck: no carotid bruits or thyromegaly   Chest/Lungs:   lungs are clear to auscultation, no rales or wheezing, equal chest wall expansion    Cardiovascular:    Heart rate regular. Normal first and second heart sounds with no murmurs, rubs, or gallops; the carotid, radial and posterior tibial pulses are intact, JVP is difficult to assess due to the patient's obesity and body habitus   , moderate 2+ non pitting edema bilaterally (Lt>Rt)   Abdomen:  no organomegaly, masses, bruits, or tenderness; bowel sounds are present   Extremities   no cyanosis or clubbing   Radial pulses and Pedal pulses intact and symmetrical.  CMS intact.   Skin: no xanthelasma, warm.    Neurologic: normal  bilateral, no tremors     Psychiatric: alert and oriented x3, calm           Negative unless noted in HPI     Medical History  Surgical History Family History Social History   Past Medical History: "   Diagnosis Date     Arthritis      Asthma      CAD (coronary artery disease)      Cancer (H)     basal cell     Chronic kidney disease     ckd 3     Chronic pain syndrome      Crohn's disease (H)      GERD (gastroesophageal reflux disease)      Hx of heart artery stent 2016    1 stent R Proximal CA and 1 Stent L Proximal circumflex  2016 Stent proximal LAD     Hyperlipidemia      Hypertension      NSTEMI (non-ST elevated myocardial infarction) (H) 2016     PONV (postoperative nausea and vomiting)     severe povn with last knee surgery     Restless legs syndrome      Stroke (H)     numbness rt jaw    Past Surgical History:   Procedure Laterality Date     APPENDECTOMY       C TOTAL KNEE ARTHROPLASTY Left 2021    Procedure: LEFT TOTAL KNEE ARTHROPLASTY;  Surgeon: Doug Rhodes MD;  Location: [facility];  Service: Orthopedics     CARDIAC CATHETERIZATION  2016    multiple vessel disease.  no intervention     CARDIAC CATHETERIZATION  2016     CARDIAC CATHETERIZATION N/A 2016    Procedure: Coronary Angiogram;  Surgeon: Sunil Moran MD;  Location: [facility];  Service:      CAROTID ENDARTERECTOMY       CAROTID ENDARTERECTOMY Right 2010     CERVICAL LAMINECTOMY        SECTION       CV CORONARY ANGIOGRAM N/A 2020    Procedure: Coronary Angiogram;  Surgeon: Vinita Ramos MD;  Location: [facility];  Service: Cardiology     CV LEFT HEART CATHETERIZATION WITHOUT LEFT VENTRICULOGRAM Left 2020    Procedure: Left Heart Catheterization Without Left Ventriculogram;  Surgeon: Jerad Lerner MD;  Location: [facility];  Service: Cardiology     CV TRANSCATHETER AORTIC VALVE REPLACEMENT - OTHER APPROACH N/A 2020    Procedure: CV TRANSCATHETER AORTIC VALVE REPLACEMENT - RIGHT SUBCLAVIAN APPROACH;  Surgeon: John Caceres MD;  Location: [facility];  Service: Cardiology     HEMORRHOIDECTOMY EXTERNAL       IR  LUMBAR EPIDURAL STEROID INJECTION  2014     IR LUMBAR NERVE ROOT INJECTION  10/1/2013     JOINT REPLACEMENT       NC ESOPHAGOGASTRODUODENOSCOPY TRANSORAL DIAGNOSTIC N/A 07/10/2019    Procedure: ESOPHAGOGASTRODUODENOSCOPY (EGD) with gastric biopsy;  Surgeon: Sunil Stuart MD;  Location: [facility];  Service: Gastroenterology     NC REPLACE AORTIC VALVE OPEN AXILLRY ARTRY APPROACH N/A 2020    Procedure: OR TRANSCATHETER AORTIC VALVE REPLACEMENT, SUBCLAVIAN APPROACH;  Surgeon: Bhavin Cuadra MD;  Location: [facility];  Service: Cardiology     TONSILLECTOMY & ADENOIDECTOMY       TOTAL KNEE ARTHROPLASTY Right     Family History   Problem Relation Age of Onset     Atrial fibrillation Mother      Parkinsonism Father     Social History     Socioeconomic History     Marital status:      Spouse name: Not on file     Number of children: Not on file     Years of education: Not on file     Highest education level: Not on file   Occupational History     Not on file   Tobacco Use     Smoking status: Former Smoker     Quit date: 1980     Years since quittin.7     Smokeless tobacco: Never Used   Substance and Sexual Activity     Alcohol use: No     Drug use: No     Sexual activity: Not on file   Other Topics Concern     Not on file   Social History Narrative     Not on file     Social Determinants of Health     Financial Resource Strain:      Difficulty of Paying Living Expenses:    Food Insecurity:      Worried About Running Out of Food in the Last Year:      Ran Out of Food in the Last Year:    Transportation Needs:      Lack of Transportation (Medical):      Lack of Transportation (Non-Medical):    Physical Activity:      Days of Exercise per Week:      Minutes of Exercise per Session:    Stress:      Feeling of Stress :    Social Connections:      Frequency of Communication with Friends and Family:      Frequency of Social Gatherings with Friends and Family:      Attends  Jainism Services:      Active Member of Clubs or Organizations:      Attends Club or Organization Meetings:      Marital Status:    Intimate Partner Violence:      Fear of Current or Ex-Partner:      Emotionally Abused:      Physically Abused:      Sexually Abused:           Medications  Allergies   Current Outpatient Medications   Medication Sig Dispense Refill     acetaminophen (TYLENOL) 325 MG tablet [ACETAMINOPHEN (TYLENOL) 325 MG TABLET] Take 3 tablets (975 mg total) by mouth every 8 (eight) hours. (Patient taking differently: Take 975 mg by mouth daily as needed ) 60 tablet 0     albuterol (PROVENTIL HFA;VENTOLIN HFA) 90 mcg/actuation inhaler [ALBUTEROL (PROVENTIL HFA;VENTOLIN HFA) 90 MCG/ACTUATION INHALER] Inhale 1-2 puffs every 6 (six) hours as needed for wheezing.       amLODIPine (NORVASC) 5 MG tablet [AMLODIPINE (NORVASC) 5 MG TABLET] Take 1 tablet (5 mg total) by mouth daily. 90 tablet 3     aspirin (ASPIRIN) 81 MG EC tablet Take 81 mg by mouth daily       beclomethasone (QVAR) 80 mcg/actuation inhaler Inhale 1 puff into the lungs 2 times daily as needed        coenzyme Q10 100 mg capsule [COENZYME Q10 100 MG CAPSULE] Take 100 mg by mouth daily.        diclofenac (VOLTAREN) 1 % topical gel Apply 4 g topically 4 times daily       ergocalciferol (ERGOCALCIFEROL) 50,000 unit capsule [ERGOCALCIFEROL (ERGOCALCIFEROL) 50,000 UNIT CAPSULE] Take 50,000 Units by mouth once a week.       evolocumab (REPATHA SYRINGE) 140 mg/mL Syrg [EVOLOCUMAB (REPATHA SYRINGE) 140 MG/ML SYRG] Inject 1 mL (140 mg total) under the skin every 14 (fourteen) days. 2 Syringe 6     ferrous sulfate 325 (65 FE) MG tablet [FERROUS SULFATE 325 (65 FE) MG TABLET] Take 1 tablet (325 mg total) by mouth daily with breakfast.  0     furosemide (LASIX) 20 MG tablet [FUROSEMIDE (LASIX) 20 MG TABLET] Take 10 mg by mouth 2 (two) times a day as needed.       melatonin 5 mg Tab tablet [MELATONIN 5 MG TAB TABLET] Take 5 mg by mouth at bedtime as  needed (for sleep).        metoprolol succinate (TOPROL-XL) 25 MG [METOPROLOL SUCCINATE (TOPROL-XL) 25 MG] Take 1 tablet (25 mg total) by mouth daily. 30 tablet 0     multivitamin therapeutic (THERAGRAN) tablet [MULTIVITAMIN THERAPEUTIC (THERAGRAN) TABLET] Take 1 tablet by mouth daily.       nitroglycerin (NITROSTAT) 0.4 MG SL tablet [NITROGLYCERIN (NITROSTAT) 0.4 MG SL TABLET] Place 1 tablet (0.4 mg total) under the tongue every 5 (five) minutes as needed for chest pain. 90 tablet 0     omeprazole (PRILOSEC) 20 MG capsule [OMEPRAZOLE (PRILOSEC) 20 MG CAPSULE] Take 1-2 capsules (20-40 mg total) by mouth daily before breakfast.  0     rOPINIRole (REQUIP) 2 MG tablet [ROPINIROLE (REQUIP) 2 MG TABLET] Take 4 mg by mouth every evening. Patient takes 7pm      Allergies   Allergen Reactions     Amitriptyline Hcl [Amitriptyline] Other (See Comments)     Erythromycin Diarrhea and Other (See Comments)     Childhood reaction     Gabapentin Other (See Comments)     Jerking movements, swelling     Naproxen Unknown and Other (See Comments)     Other Environmental Allergy Unknown     Molds, dander     Pregabalin Other (See Comments)         Lab Results    Chemistry/lipid CBC Cardiac Enzymes/BNP/TSH/INR   Lab Results   Component Value Date    CHOL 226 (H) 03/30/2021    HDL 76 03/30/2021    TRIG 89 03/30/2021    BUN 24 09/08/2021     09/08/2021    CO2 25 09/08/2021    Lab Results   Component Value Date    WBC 8.6 08/19/2021    HGB 10.0 (L) 08/19/2021    HCT 32.4 (L) 08/19/2021    MCV 94 08/19/2021     08/19/2021    Lab Results   Component Value Date    TROPONINI 0.01 09/08/2021     08/19/2021    TSH 1.40 03/12/2021    INR 1.15 08/19/2021        50  minutes spent on the date of encounter doing chart review, review of test results, interpretation with above tests, patient visit, documentation, discussion with other provider and discussion with family.      This note has been dictated using voice recognition  software. Any grammatical, typographical, or context distortions are unintentional and inherent to the software          Thank you for allowing me to participate in the care of your patient.      Sincerely,     TAVO [facility]  cc:   No referring provider defined for this encounter.

## 2021-09-20 ENCOUNTER — OFFICE VISIT (OUTPATIENT)
Dept: CARDIOLOGY | Facility: CLINIC | Age: 86
End: 2021-09-20
Payer: COMMERCIAL

## 2021-09-20 VITALS
WEIGHT: 143 LBS | BODY MASS INDEX: 26.31 KG/M2 | HEART RATE: 74 BPM | SYSTOLIC BLOOD PRESSURE: 100 MMHG | DIASTOLIC BLOOD PRESSURE: 54 MMHG | RESPIRATION RATE: 18 BRPM | HEIGHT: 62 IN

## 2021-09-20 DIAGNOSIS — I50.33 ACUTE ON CHRONIC DIASTOLIC HEART FAILURE (H): ICD-10-CM

## 2021-09-20 DIAGNOSIS — I25.110 CORONARY ARTERY DISEASE INVOLVING NATIVE CORONARY ARTERY OF NATIVE HEART WITH UNSTABLE ANGINA PECTORIS (H): ICD-10-CM

## 2021-09-20 DIAGNOSIS — R06.00 DYSPNEA, UNSPECIFIED TYPE: ICD-10-CM

## 2021-09-20 LAB
ANION GAP SERPL CALCULATED.3IONS-SCNC: 9 MMOL/L (ref 5–18)
BUN SERPL-MCNC: 27 MG/DL (ref 8–28)
CALCIUM SERPL-MCNC: 9 MG/DL (ref 8.5–10.5)
CHLORIDE BLD-SCNC: 108 MMOL/L (ref 98–107)
CO2 SERPL-SCNC: 27 MMOL/L (ref 22–31)
CREAT SERPL-MCNC: 1.26 MG/DL (ref 0.6–1.1)
GFR SERPL CREATININE-BSD FRML MDRD: 38 ML/MIN/1.73M2
GLUCOSE BLD-MCNC: 97 MG/DL (ref 70–125)
POTASSIUM BLD-SCNC: 4 MMOL/L (ref 3.5–5)
SODIUM SERPL-SCNC: 144 MMOL/L (ref 136–145)

## 2021-09-20 PROCEDURE — 83880 ASSAY OF NATRIURETIC PEPTIDE: CPT | Performed by: NURSE PRACTITIONER

## 2021-09-20 PROCEDURE — 36415 COLL VENOUS BLD VENIPUNCTURE: CPT | Performed by: NURSE PRACTITIONER

## 2021-09-20 PROCEDURE — 99214 OFFICE O/P EST MOD 30 MIN: CPT | Performed by: NURSE PRACTITIONER

## 2021-09-20 PROCEDURE — 80048 BASIC METABOLIC PNL TOTAL CA: CPT | Performed by: NURSE PRACTITIONER

## 2021-09-20 ASSESSMENT — MIFFLIN-ST. JEOR: SCORE: 1028.95

## 2021-09-20 NOTE — LETTER
9/20/2021    Anum Rosenberg NP  [place] [address] [address] [facility] [address]    RE: Sherice Alvarez       Dear Colleague,    I had the pleasure of seeing Sherice Alvarez in the [facility].          Assessment/Recommendations   Assessment:    1.  Heart failure with preserved ejection fraction: There has been some improvement in her weight and Lasix last week.  She continues to have shortness of breath and cough.  She does have a history of asthma and seasonal allergies.  She is not taking any allergy medication and only taking rescue inhaler occasionally.    2.  Hypertension: Blood pressure 100/54    3.  Coronary artery disease: History of PCI in 2016.  She denies any chest pain.  She has pain in her shoulder but is reproducible with palpation.    Plan:  1.  BMP and NT proBNP pending  2.  Continue low-sodium diet  3.  Her daughter is recommending that she see a pulmonologist due to her history of asthma and prior smoking history.  She has never been assessed for COPD.  I agree with recommendation to see pulmonology.  4.  Recommend starting over-the-counter seasonal allergy medication.  She previously took this for seasonal allergies with relief.    Sherice Alvarez will follow up with Dr. Feldman on October 11.       History of Present Illness/Subjective    Ms. Sherice Alvarez is a 87 year old female seen at [facility] today for follow-up.  She has a history of coronary artery disease, PCI in 2016, heart failure with preserved ejection fraction, dyslipidemia, hypertension, TAVR in 2020, DVT, GERD, and asthma.    BNP was elevated in early September and Dr. Feldman increase Lasix.  She was seen again last week in clinic with no significant improvement in symptoms.  Lasix was increased again to 40 mg daily.  Her weight has decreased 3 pounds.  She continues to have a dry cough and dyspnea on exertion.   "She feels that some of this congestion is from seasonal allergies.  She has taken her albuterol inhaler without much improvement.  She does note wheezing.  Lower extremity edema has improved.  She denies lightheadedness and chest pain.      She is working on following a low-sodium diet.  She admits to higher sodium diet in August due to multiple celebrations for her birthday.        Physical Examination Review of Systems   Vitals: /54 (BP Location: Right arm, Patient Position: Sitting, Cuff Size: Adult Regular)   Pulse 74   Resp 18   Ht 1.562 m (5' 1.5\")   Wt 64.9 kg (143 lb)   BMI 26.58 kg/m    BMI= Body mass index is 26.58 kg/m .  Wt Readings from Last 3 Encounters:   09/20/21 64.9 kg (143 lb)   09/13/21 66.2 kg (146 lb)   09/08/21 65.8 kg (145 lb)       General Appearance:     Alert, cooperative and in no acute distress.   ENT/Mouth: membranes moist, no oral lesions or bleeding gums.      EYES:  no scleral icterus, normal conjunctivae   Chest/Lungs:    Wheezes, no crackles   Cardiovascular:   Regular. Normal first and second heart sounds, trace edema bilateral lower extremities    Abdomen:  Soft, nontender, nondistended, bowel sounds present   Extremities: no cyanosis or clubbing   Skin: warm, dry.    Neurologic: mood and affect are appropriate, alert and oriented x3         Please refer above for cardiac ROS details.      Medical History  Surgical History Family History Social History   Past Medical History:   Diagnosis Date     Arthritis      Asthma      CAD (coronary artery disease)      Cancer (H)     basal cell     Chronic kidney disease     ckd 3     Chronic pain syndrome      Crohn's disease (H)      GERD (gastroesophageal reflux disease)      Hx of heart artery stent 11/2016    1 stent R Proximal CA and 1 Stent L Proximal circumflex  12/2016 Stent proximal LAD     Hyperlipidemia      Hypertension      NSTEMI (non-ST elevated myocardial infarction) (H) 11/2016     PONV (postoperative nausea and " vomiting)     severe povn with last knee surgery     Restless legs syndrome      Stroke (H)     numbness rt jaw     Past Surgical History:   Procedure Laterality Date     APPENDECTOMY       C TOTAL KNEE ARTHROPLASTY Left 2021    Procedure: LEFT TOTAL KNEE ARTHROPLASTY;  Surgeon: Doug Rhodes MD;  Location: [facility];  Service: Orthopedics     CARDIAC CATHETERIZATION  2016    multiple vessel disease.  no intervention     CARDIAC CATHETERIZATION  2016     CARDIAC CATHETERIZATION N/A 2016    Procedure: Coronary Angiogram;  Surgeon: Sunil Moran MD;  Location: [facility];  Service:      CAROTID ENDARTERECTOMY       CAROTID ENDARTERECTOMY Right 2010     CERVICAL LAMINECTOMY        SECTION       CV CORONARY ANGIOGRAM N/A 2020    Procedure: Coronary Angiogram;  Surgeon: Vinita Ramos MD;  Location: [facility];  Service: Cardiology     CV LEFT HEART CATHETERIZATION WITHOUT LEFT VENTRICULOGRAM Left 2020    Procedure: Left Heart Catheterization Without Left Ventriculogram;  Surgeon: Jerad Lerner MD;  Location: [facility];  Service: Cardiology     CV TRANSCATHETER AORTIC VALVE REPLACEMENT - OTHER APPROACH N/A 2020    Procedure: CV TRANSCATHETER AORTIC VALVE REPLACEMENT - RIGHT SUBCLAVIAN APPROACH;  Surgeon: John Caceres MD;  Location: [facility];  Service: Cardiology     HEMORRHOIDECTOMY EXTERNAL       IR LUMBAR EPIDURAL STEROID INJECTION  2014     IR LUMBAR NERVE ROOT INJECTION  10/1/2013     JOINT REPLACEMENT       KS ESOPHAGOGASTRODUODENOSCOPY TRANSORAL DIAGNOSTIC N/A 07/10/2019    Procedure: ESOPHAGOGASTRODUODENOSCOPY (EGD) with gastric biopsy;  Surgeon: Sunil Stuart MD;  Location: [facility];  Service: Gastroenterology     KS REPLACE AORTIC VALVE OPEN AXILLRY ARTRY APPROACH N/A 2020    Procedure: OR TRANSCATHETER AORTIC VALVE REPLACEMENT, SUBCLAVIAN APPROACH;  Surgeon:  Bhavin Cuadra MD;  Location: [facility];  Service: Cardiology     TONSILLECTOMY & ADENOIDECTOMY       TOTAL KNEE ARTHROPLASTY Right      Family History   Problem Relation Age of Onset     Atrial fibrillation Mother      Parkinsonism Father     Social History     Socioeconomic History     Marital status:      Spouse name: Not on file     Number of children: Not on file     Years of education: Not on file     Highest education level: Not on file   Occupational History     Not on file   Tobacco Use     Smoking status: Former Smoker     Quit date: 1980     Years since quittin.7     Smokeless tobacco: Never Used   Substance and Sexual Activity     Alcohol use: No     Drug use: No     Sexual activity: Not on file   Other Topics Concern     Not on file   Social History Narrative     Not on file     Social Determinants of Health     Financial Resource Strain:      Difficulty of Paying Living Expenses:    Food Insecurity:      Worried About Running Out of Food in the Last Year:      Ran Out of Food in the Last Year:    Transportation Needs:      Lack of Transportation (Medical):      Lack of Transportation (Non-Medical):    Physical Activity:      Days of Exercise per Week:      Minutes of Exercise per Session:    Stress:      Feeling of Stress :    Social Connections:      Frequency of Communication with Friends and Family:      Frequency of Social Gatherings with Friends and Family:      Attends Yazidi Services:      Active Member of Clubs or Organizations:      Attends Club or Organization Meetings:      Marital Status:    Intimate Partner Violence:      Fear of Current or Ex-Partner:      Emotionally Abused:      Physically Abused:      Sexually Abused:           Medications  Allergies   Current Outpatient Medications   Medication Sig Dispense Refill     acetaminophen (TYLENOL) 325 MG tablet [ACETAMINOPHEN (TYLENOL) 325 MG TABLET] Take 3 tablets (975 mg total) by mouth every 8  (eight) hours. (Patient taking differently: Take 975 mg by mouth daily as needed ) 60 tablet 0     albuterol (PROVENTIL HFA;VENTOLIN HFA) 90 mcg/actuation inhaler [ALBUTEROL (PROVENTIL HFA;VENTOLIN HFA) 90 MCG/ACTUATION INHALER] Inhale 1-2 puffs every 6 (six) hours as needed for wheezing.       amLODIPine (NORVASC) 5 MG tablet [AMLODIPINE (NORVASC) 5 MG TABLET] Take 1 tablet (5 mg total) by mouth daily. 90 tablet 3     aspirin (ASPIRIN) 81 MG EC tablet Take 81 mg by mouth daily       beclomethasone (QVAR) 80 mcg/actuation inhaler Inhale 1 puff into the lungs 2 times daily as needed        coenzyme Q10 100 mg capsule [COENZYME Q10 100 MG CAPSULE] Take 100 mg by mouth daily.        diclofenac (VOLTAREN) 1 % topical gel Apply 4 g topically 4 times daily       ergocalciferol (ERGOCALCIFEROL) 50,000 unit capsule [ERGOCALCIFEROL (ERGOCALCIFEROL) 50,000 UNIT CAPSULE] Take 50,000 Units by mouth once a week.       evolocumab (REPATHA SYRINGE) 140 mg/mL Syrg [EVOLOCUMAB (REPATHA SYRINGE) 140 MG/ML SYRG] Inject 1 mL (140 mg total) under the skin every 14 (fourteen) days. 2 Syringe 6     ferrous sulfate 325 (65 FE) MG tablet [FERROUS SULFATE 325 (65 FE) MG TABLET] Take 1 tablet (325 mg total) by mouth daily with breakfast.  0     furosemide (LASIX) 20 MG tablet Take 2 tablets (40 mg) by mouth daily 60 tablet 0     melatonin 5 mg Tab tablet [MELATONIN 5 MG TAB TABLET] Take 5 mg by mouth at bedtime as needed (for sleep).        metoprolol succinate ER (TOPROL-XL) 25 MG 24 hr tablet Take 1 tablet (25 mg) by mouth At Bedtime 30 tablet 0     multivitamin therapeutic (THERAGRAN) tablet [MULTIVITAMIN THERAPEUTIC (THERAGRAN) TABLET] Take 1 tablet by mouth daily.       nitroglycerin (NITROSTAT) 0.4 MG SL tablet [NITROGLYCERIN (NITROSTAT) 0.4 MG SL TABLET] Place 1 tablet (0.4 mg total) under the tongue every 5 (five) minutes as needed for chest pain. 90 tablet 0     omeprazole (PRILOSEC) 20 MG capsule [OMEPRAZOLE (PRILOSEC) 20 MG  CAPSULE] Take 1-2 capsules (20-40 mg total) by mouth daily before breakfast.  0     rOPINIRole (REQUIP) 2 MG tablet [ROPINIROLE (REQUIP) 2 MG TABLET] Take 4 mg by mouth every evening. Patient takes 7pm      Allergies   Allergen Reactions     Amitriptyline Hcl [Amitriptyline] Other (See Comments)     Erythromycin Diarrhea and Other (See Comments)     Childhood reaction     Gabapentin Other (See Comments)     Jerking movements, swelling     Naproxen Unknown and Other (See Comments)     Other Environmental Allergy Unknown     Molds, dander     Pregabalin Other (See Comments)         Lab Results    Chemistry/lipid CBC Cardiac Enzymes/BNP/TSH/INR   Recent Labs   Lab Test 03/30/21  1507   CHOL 226*   HDL 76   *   TRIG 89     Recent Labs   Lab Test 03/30/21  1507 06/04/20  0814 05/31/19  1044   * 63 161*     Recent Labs   Lab Test 09/08/21  1346      POTASSIUM 4.1   CHLORIDE 107   CO2 25   GLC 83   BUN 24   CR 1.14*   GFRESTIMATED 43*   BESS 9.3     Recent Labs   Lab Test 09/08/21  1346 08/19/21  1021 06/07/21  1359   CR 1.14* 0.94 1.24*     Recent Labs   Lab Test 11/06/16  0555   A1C 5.6    Recent Labs   Lab Test 08/19/21  1021   WBC 8.6   HGB 10.0*   HCT 32.4*   MCV 94        Recent Labs   Lab Test 08/19/21  1021 08/05/21  1202 06/07/21  1359   HGB 10.0* 10.8* 10.1*    Recent Labs   Lab Test 09/08/21  1346 08/19/21  1021 06/07/21  1359   TROPONINI 0.01 0.01 <0.01     Recent Labs   Lab Test 09/08/21  1346 08/19/21  1021 06/07/21  1359 01/06/21  0835   BNP  --  159 61 20   NTBNP 481*  --   --   --      Recent Labs   Lab Test 03/12/21  1608   TSH 1.40     Recent Labs   Lab Test 08/19/21  1021 05/11/21  1323 06/02/20  1319   INR 1.15 1.19* 1.34*                                                 Thank you for allowing me to participate in the care of your patient.      Sincerely,     Rosa Savage NP     [facility]  cc:   [place]  [facility]  [address]  [place] [address]

## 2021-09-20 NOTE — PROGRESS NOTES
Assessment/Recommendations   Assessment:    1.  Heart failure with preserved ejection fraction: There has been some improvement in her weight and Lasix last week.  She continues to have shortness of breath and cough.  She does have a history of asthma and seasonal allergies.  She is not taking any allergy medication and only taking rescue inhaler occasionally.    2.  Hypertension: Blood pressure 100/54    3.  Coronary artery disease: History of PCI in 2016.  She denies any chest pain.  She has pain in her shoulder but is reproducible with palpation.    Plan:  1.  BMP and NT proBNP pending  2.  Continue low-sodium diet  3.  Her daughter is recommending that she see a pulmonologist due to her history of asthma and prior smoking history.  She has never been assessed for COPD.  I agree with recommendation to see pulmonology.  4.  Recommend starting over-the-counter seasonal allergy medication.  She previously took this for seasonal allergies with relief.    Sherice Alvarez will follow up with Dr. Feldman on October 11.       History of Present Illness/Subjective    Ms. Sherice Alvarez is a 87 year old female seen at [facility] today for follow-up.  She has a history of coronary artery disease, PCI in 2016, heart failure with preserved ejection fraction, dyslipidemia, hypertension, TAVR in 2020, DVT, GERD, and asthma.    BNP was elevated in early September and Dr. Feldman increase Lasix.  She was seen again last week in clinic with no significant improvement in symptoms.  Lasix was increased again to 40 mg daily.  Her weight has decreased 3 pounds.  She continues to have a dry cough and dyspnea on exertion.  She feels that some of this congestion is from seasonal allergies.  She has taken her albuterol inhaler without much improvement.  She does note wheezing.  Lower extremity edema has improved.  She denies lightheadedness and chest pain.      She is working on following a  "low-sodium diet.  She admits to higher sodium diet in August due to multiple celebrations for her birthday.        Physical Examination Review of Systems   Vitals: /54 (BP Location: Right arm, Patient Position: Sitting, Cuff Size: Adult Regular)   Pulse 74   Resp 18   Ht 1.562 m (5' 1.5\")   Wt 64.9 kg (143 lb)   BMI 26.58 kg/m    BMI= Body mass index is 26.58 kg/m .  Wt Readings from Last 3 Encounters:   09/20/21 64.9 kg (143 lb)   09/13/21 66.2 kg (146 lb)   09/08/21 65.8 kg (145 lb)       General Appearance:     Alert, cooperative and in no acute distress.   ENT/Mouth: membranes moist, no oral lesions or bleeding gums.      EYES:  no scleral icterus, normal conjunctivae   Chest/Lungs:    Wheezes, no crackles   Cardiovascular:   Regular. Normal first and second heart sounds, trace edema bilateral lower extremities    Abdomen:  Soft, nontender, nondistended, bowel sounds present   Extremities: no cyanosis or clubbing   Skin: warm, dry.    Neurologic: mood and affect are appropriate, alert and oriented x3         Please refer above for cardiac ROS details.      Medical History  Surgical History Family History Social History   Past Medical History:   Diagnosis Date     Arthritis      Asthma      CAD (coronary artery disease)      Cancer (H)     basal cell     Chronic kidney disease     ckd 3     Chronic pain syndrome      Crohn's disease (H)      GERD (gastroesophageal reflux disease)      Hx of heart artery stent 11/2016    1 stent R Proximal CA and 1 Stent L Proximal circumflex  12/2016 Stent proximal LAD     Hyperlipidemia      Hypertension      NSTEMI (non-ST elevated myocardial infarction) (H) 11/2016     PONV (postoperative nausea and vomiting)     severe povn with last knee surgery     Restless legs syndrome      Stroke (H) 2010    numbness rt jaw     Past Surgical History:   Procedure Laterality Date     APPENDECTOMY       C TOTAL KNEE ARTHROPLASTY Left 5/11/2021    Procedure: LEFT TOTAL KNEE " ARTHROPLASTY;  Surgeon: Doug Rhodes MD;  Location: [facility] OR;  Service: Orthopedics     CARDIAC CATHETERIZATION  2016    multiple vessel disease.  no intervention     CARDIAC CATHETERIZATION  2016     CARDIAC CATHETERIZATION N/A 2016    Procedure: Coronary Angiogram;  Surgeon: Sunil Moran MD;  Location: [facility];  Service:      CAROTID ENDARTERECTOMY       CAROTID ENDARTERECTOMY Right 2010     CERVICAL LAMINECTOMY  1994      SECTION       CV CORONARY ANGIOGRAM N/A 2020    Procedure: Coronary Angiogram;  Surgeon: Vinita Ramos MD;  Location: [facility];  Service: Cardiology     CV LEFT HEART CATHETERIZATION WITHOUT LEFT VENTRICULOGRAM Left 2020    Procedure: Left Heart Catheterization Without Left Ventriculogram;  Surgeon: Jerad Lerner MD;  Location: [facility];  Service: Cardiology     CV TRANSCATHETER AORTIC VALVE REPLACEMENT - OTHER APPROACH N/A 2020    Procedure: CV TRANSCATHETER AORTIC VALVE REPLACEMENT - RIGHT SUBCLAVIAN APPROACH;  Surgeon: John Caceres MD;  Location: [facility];  Service: Cardiology     HEMORRHOIDECTOMY EXTERNAL       IR LUMBAR EPIDURAL STEROID INJECTION  2014     IR LUMBAR NERVE ROOT INJECTION  10/1/2013     JOINT REPLACEMENT       TN ESOPHAGOGASTRODUODENOSCOPY TRANSORAL DIAGNOSTIC N/A 07/10/2019    Procedure: ESOPHAGOGASTRODUODENOSCOPY (EGD) with gastric biopsy;  Surgeon: Sunil Stuart MD;  Location: [facility];  Service: Gastroenterology     TN REPLACE AORTIC VALVE OPEN AXILLRY ARTRY APPROACH N/A 2020    Procedure: OR TRANSCATHETER AORTIC VALVE REPLACEMENT, SUBCLAVIAN APPROACH;  Surgeon: Bhavin Cuadra MD;  Location: [facility];  Service: Cardiology     TONSILLECTOMY & ADENOIDECTOMY       TOTAL KNEE ARTHROPLASTY Right      Family History   Problem Relation Age of Onset     Atrial fibrillation Mother      Parkinsonism Father     Social  History     Socioeconomic History     Marital status:      Spouse name: Not on file     Number of children: Not on file     Years of education: Not on file     Highest education level: Not on file   Occupational History     Not on file   Tobacco Use     Smoking status: Former Smoker     Quit date: 1980     Years since quittin.7     Smokeless tobacco: Never Used   Substance and Sexual Activity     Alcohol use: No     Drug use: No     Sexual activity: Not on file   Other Topics Concern     Not on file   Social History Narrative     Not on file     Social Determinants of Health     Financial Resource Strain:      Difficulty of Paying Living Expenses:    Food Insecurity:      Worried About Running Out of Food in the Last Year:      Ran Out of Food in the Last Year:    Transportation Needs:      Lack of Transportation (Medical):      Lack of Transportation (Non-Medical):    Physical Activity:      Days of Exercise per Week:      Minutes of Exercise per Session:    Stress:      Feeling of Stress :    Social Connections:      Frequency of Communication with Friends and Family:      Frequency of Social Gatherings with Friends and Family:      Attends Shinto Services:      Active Member of Clubs or Organizations:      Attends Club or Organization Meetings:      Marital Status:    Intimate Partner Violence:      Fear of Current or Ex-Partner:      Emotionally Abused:      Physically Abused:      Sexually Abused:           Medications  Allergies   Current Outpatient Medications   Medication Sig Dispense Refill     acetaminophen (TYLENOL) 325 MG tablet [ACETAMINOPHEN (TYLENOL) 325 MG TABLET] Take 3 tablets (975 mg total) by mouth every 8 (eight) hours. (Patient taking differently: Take 975 mg by mouth daily as needed ) 60 tablet 0     albuterol (PROVENTIL HFA;VENTOLIN HFA) 90 mcg/actuation inhaler [ALBUTEROL (PROVENTIL HFA;VENTOLIN HFA) 90 MCG/ACTUATION INHALER] Inhale 1-2 puffs every 6 (six) hours as needed  for wheezing.       amLODIPine (NORVASC) 5 MG tablet [AMLODIPINE (NORVASC) 5 MG TABLET] Take 1 tablet (5 mg total) by mouth daily. 90 tablet 3     aspirin (ASPIRIN) 81 MG EC tablet Take 81 mg by mouth daily       beclomethasone (QVAR) 80 mcg/actuation inhaler Inhale 1 puff into the lungs 2 times daily as needed        coenzyme Q10 100 mg capsule [COENZYME Q10 100 MG CAPSULE] Take 100 mg by mouth daily.        diclofenac (VOLTAREN) 1 % topical gel Apply 4 g topically 4 times daily       ergocalciferol (ERGOCALCIFEROL) 50,000 unit capsule [ERGOCALCIFEROL (ERGOCALCIFEROL) 50,000 UNIT CAPSULE] Take 50,000 Units by mouth once a week.       evolocumab (REPATHA SYRINGE) 140 mg/mL Syrg [EVOLOCUMAB (REPATHA SYRINGE) 140 MG/ML SYRG] Inject 1 mL (140 mg total) under the skin every 14 (fourteen) days. 2 Syringe 6     ferrous sulfate 325 (65 FE) MG tablet [FERROUS SULFATE 325 (65 FE) MG TABLET] Take 1 tablet (325 mg total) by mouth daily with breakfast.  0     furosemide (LASIX) 20 MG tablet Take 2 tablets (40 mg) by mouth daily 60 tablet 0     melatonin 5 mg Tab tablet [MELATONIN 5 MG TAB TABLET] Take 5 mg by mouth at bedtime as needed (for sleep).        metoprolol succinate ER (TOPROL-XL) 25 MG 24 hr tablet Take 1 tablet (25 mg) by mouth At Bedtime 30 tablet 0     multivitamin therapeutic (THERAGRAN) tablet [MULTIVITAMIN THERAPEUTIC (THERAGRAN) TABLET] Take 1 tablet by mouth daily.       nitroglycerin (NITROSTAT) 0.4 MG SL tablet [NITROGLYCERIN (NITROSTAT) 0.4 MG SL TABLET] Place 1 tablet (0.4 mg total) under the tongue every 5 (five) minutes as needed for chest pain. 90 tablet 0     omeprazole (PRILOSEC) 20 MG capsule [OMEPRAZOLE (PRILOSEC) 20 MG CAPSULE] Take 1-2 capsules (20-40 mg total) by mouth daily before breakfast.  0     rOPINIRole (REQUIP) 2 MG tablet [ROPINIROLE (REQUIP) 2 MG TABLET] Take 4 mg by mouth every evening. Patient takes 7pm      Allergies   Allergen Reactions     Amitriptyline Hcl [Amitriptyline] Other  (See Comments)     Erythromycin Diarrhea and Other (See Comments)     Childhood reaction     Gabapentin Other (See Comments)     Jerking movements, swelling     Naproxen Unknown and Other (See Comments)     Other Environmental Allergy Unknown     Molds, dander     Pregabalin Other (See Comments)         Lab Results    Chemistry/lipid CBC Cardiac Enzymes/BNP/TSH/INR   Recent Labs   Lab Test 03/30/21  1507   CHOL 226*   HDL 76   *   TRIG 89     Recent Labs   Lab Test 03/30/21  1507 06/04/20  0814 05/31/19  1044   * 63 161*     Recent Labs   Lab Test 09/08/21  1346      POTASSIUM 4.1   CHLORIDE 107   CO2 25   GLC 83   BUN 24   CR 1.14*   GFRESTIMATED 43*   BESS 9.3     Recent Labs   Lab Test 09/08/21  1346 08/19/21  1021 06/07/21  1359   CR 1.14* 0.94 1.24*     Recent Labs   Lab Test 11/06/16  0555   A1C 5.6    Recent Labs   Lab Test 08/19/21  1021   WBC 8.6   HGB 10.0*   HCT 32.4*   MCV 94        Recent Labs   Lab Test 08/19/21  1021 08/05/21  1202 06/07/21  1359   HGB 10.0* 10.8* 10.1*    Recent Labs   Lab Test 09/08/21  1346 08/19/21  1021 06/07/21  1359   TROPONINI 0.01 0.01 <0.01     Recent Labs   Lab Test 09/08/21  1346 08/19/21  1021 06/07/21  1359 01/06/21  0835   BNP  --  159 61 20   NTBNP 481*  --   --   --      Recent Labs   Lab Test 03/12/21  1608   TSH 1.40     Recent Labs   Lab Test 08/19/21  1021 05/11/21  1323 06/02/20  1319   INR 1.15 1.19* 1.34*

## 2021-09-20 NOTE — PATIENT INSTRUCTIONS
Sherice Alvarez,    It was a pleasure to see you today at the [facility].     My recommendations after this visit include:  - You will be called with the results of your labs.    - I would recommend seeing a lung doctor for asthma and to consider checking for COPD.   - Limit salt in your diet.   - If you have any questions or concerns, please call [phone] to talk with my nurse.    Rosa Carter, CNP

## 2021-09-21 LAB — NT-PROBNP SERPL-MCNC: 539 PG/ML (ref 0–450)

## 2021-09-23 ENCOUNTER — TELEPHONE (OUTPATIENT)
Dept: CARDIOLOGY | Facility: CLINIC | Age: 86
End: 2021-09-23

## 2021-09-23 DIAGNOSIS — R06.00 DYSPNEA, UNSPECIFIED TYPE: Primary | ICD-10-CM

## 2021-09-23 RX ORDER — FUROSEMIDE 20 MG
40 TABLET ORAL 2 TIMES DAILY
Qty: 160 TABLET | Refills: 3 | Status: SHIPPED | OUTPATIENT
Start: 2021-09-23 | End: 2021-10-11

## 2021-09-23 NOTE — TELEPHONE ENCOUNTER
----- Message from Rosa Savage NP sent at 9/23/2021 12:51 PM CDT -----  Can you please try calling patient again?

## 2021-09-23 NOTE — TELEPHONE ENCOUNTER
Sherice is reached today and advised of her lab results. Which indicated elevated BNP.  She was given new recommended Lasix dosing instructions of 40mg bid as per Rosa Carter CNP.  She was advised to take the second dose approx 6 hrs after the initial dose each day.  Sherice was also instructed today to keep fluid intake at 7.5-8 cups of fluids daily. She had not been doing this.  She does all of her own cooking, lives alone and struggles with the sodium content of her meals.  [facility] program was offered to her today.  She was very much in favor of starting this program.  An application will be completed for her and faxed today.   F/U with Lane in one week was advised ( asked  to call to arrange)    Nhi Avery RN BSN, CHFN

## 2021-10-11 ENCOUNTER — TELEPHONE (OUTPATIENT)
Dept: CARDIOLOGY | Facility: CLINIC | Age: 86
End: 2021-10-11

## 2021-10-11 ENCOUNTER — OFFICE VISIT (OUTPATIENT)
Dept: CARDIOLOGY | Facility: CLINIC | Age: 86
End: 2021-10-11
Payer: COMMERCIAL

## 2021-10-11 VITALS
BODY MASS INDEX: 25.84 KG/M2 | SYSTOLIC BLOOD PRESSURE: 124 MMHG | HEART RATE: 75 BPM | WEIGHT: 139 LBS | RESPIRATION RATE: 14 BRPM | DIASTOLIC BLOOD PRESSURE: 70 MMHG

## 2021-10-11 DIAGNOSIS — I35.0 NONRHEUMATIC AORTIC VALVE STENOSIS: ICD-10-CM

## 2021-10-11 DIAGNOSIS — I50.32 CHRONIC DIASTOLIC HEART FAILURE (H): Primary | ICD-10-CM

## 2021-10-11 DIAGNOSIS — R06.00 DYSPNEA, UNSPECIFIED TYPE: ICD-10-CM

## 2021-10-11 DIAGNOSIS — N18.30 STAGE 3 CHRONIC KIDNEY DISEASE, UNSPECIFIED WHETHER STAGE 3A OR 3B CKD (H): ICD-10-CM

## 2021-10-11 DIAGNOSIS — I25.110 CORONARY ARTERY DISEASE INVOLVING NATIVE CORONARY ARTERY OF NATIVE HEART WITH UNSTABLE ANGINA PECTORIS (H): ICD-10-CM

## 2021-10-11 DIAGNOSIS — I35.0 SEVERE CALCIFIC AORTIC VALVE STENOSIS: ICD-10-CM

## 2021-10-11 DIAGNOSIS — I10 BENIGN ESSENTIAL HYPERTENSION: ICD-10-CM

## 2021-10-11 DIAGNOSIS — E78.5 DYSLIPIDEMIA, GOAL LDL BELOW 100: ICD-10-CM

## 2021-10-11 LAB
ANION GAP SERPL CALCULATED.3IONS-SCNC: 7 MMOL/L (ref 5–18)
BNP SERPL-MCNC: 86 PG/ML (ref 0–167)
BUN SERPL-MCNC: 31 MG/DL (ref 8–28)
CALCIUM SERPL-MCNC: 9.2 MG/DL (ref 8.5–10.5)
CHLORIDE BLD-SCNC: 108 MMOL/L (ref 98–107)
CO2 SERPL-SCNC: 27 MMOL/L (ref 22–31)
CREAT SERPL-MCNC: 1.31 MG/DL (ref 0.6–1.1)
GFR SERPL CREATININE-BSD FRML MDRD: 37 ML/MIN/1.73M2
GLUCOSE BLD-MCNC: 87 MG/DL (ref 70–125)
POTASSIUM BLD-SCNC: 3.9 MMOL/L (ref 3.5–5)
SODIUM SERPL-SCNC: 142 MMOL/L (ref 136–145)

## 2021-10-11 PROCEDURE — 80048 BASIC METABOLIC PNL TOTAL CA: CPT | Performed by: INTERNAL MEDICINE

## 2021-10-11 PROCEDURE — 83880 ASSAY OF NATRIURETIC PEPTIDE: CPT | Performed by: INTERNAL MEDICINE

## 2021-10-11 PROCEDURE — 36415 COLL VENOUS BLD VENIPUNCTURE: CPT | Performed by: INTERNAL MEDICINE

## 2021-10-11 PROCEDURE — 99214 OFFICE O/P EST MOD 30 MIN: CPT | Performed by: INTERNAL MEDICINE

## 2021-10-11 RX ORDER — AMLODIPINE BESYLATE 2.5 MG/1
2.5 TABLET ORAL DAILY
Qty: 90 TABLET | Refills: 3 | Status: SHIPPED | OUTPATIENT
Start: 2021-10-11 | End: 2022-01-13

## 2021-10-11 RX ORDER — FUROSEMIDE 20 MG
20 TABLET ORAL 2 TIMES DAILY
Qty: 180 TABLET | Refills: 1
Start: 2021-10-11 | End: 2022-01-14

## 2021-10-11 NOTE — LETTER
10/11/2021    Anum Rosenberg NP  [facility] [address]    RE: Sherice Alvarez       Dear Colleague,    I had the pleasure of seeing Sherice Alvarez in the [facility].        [facility]  Heart Care Clinic Follow-up Note    Assessment & Plan        (I50.32) Chronic diastolic heart failure (H)  (primary encounter diagnosis)  Comment: Increased shortness of breath with what sounds like some PND, constant chest tightness, weight gain of about 10 pounds.  Bipedal edema as well, suspected heart failure and noted increase in BNP and had her furosemide increased to 40 mg p.o. twice daily with mild increase in creatinine.  Still slightly symptomatic, recheck renal profile with BMP today and adjust with heart failure nurse practitioners.  Continue fluid and salt restriction.    Dyspnea on exertion  Comment: Not sure etiology, normal stress test, TAVR looks good, treating heart failure, no recent hemoglobin and will need to re-check this if symptoms persist although was 10.0 in August of this year.  Could also be an element of asthma according to family and will need that evaluated as well.    (I25.110) Coronary artery disease involving native coronary artery of native heart with unstable angina pectoris (H)  Comment: Angiography April 2020 showed normal left main, proximal LAD 20% stenosis with patent stent, mid sequential 30% lesion with a distal 70% lesion, circumflex with a patent proximal stent with a third obtuse marginal artery 99% stenosis.  Right coronary artery patent stents with a mid 40% stenosis.  Attempted intervention on distal LAD as well as obtuse marginal artery which were unsuccessful and underwent nuclear stress test in May of this year which was normal.  Vague chest discomfort, will check troponin.  If chest pain persist consider stress test.     (E78.5) Dyslipidemia, goal LDL below  100  Comment: Cholesterol 226 with an LDL of 133 which are unacceptable.  We will try to get PCSK9 inhibitor.    (I10) Benign essential hypertension  Comment: Under good control currently.    (I35.0) Severe calcific aortic valve stenosis  Comment: Severe calcific aortic valve stenosis June 2020 had a 23 mm DANIEL 3 valve placed, TAVR, with echo showing 7 mm gradient and peak velocity 1.9 m/s.      (N18.30) Stage 3 chronic kidney disease, unspecified whether stage 3a or 3b CKD (H)  Comment: So noted with creatinine mildly increased at 1.26.    Plan  1.  Continue fluid and salt restriction.  2.  Check BNP and BMP today and address if needed.  3.  Have a follow-up with heart failure nurse practitioners in 2 to 3 weeks.  4.  If continued symptoms we need to recheck stress test, hemoglobin, and also asthma/allergy of referral.  5.  We will look for PCSK9 inhibitor.    Subjective  CC: 87-year-old white female here for a follow-up visit with one of her daughters.  She is still living independently, still doing everything she needs to, she has good days and bad days.  Bad days to usually have a tightness in her chest with associated tightness in her legs with an inability to walk due to peripheral edema.  When she walks she has shortness of breath and wheezing and is generally weak.  This weakness get so bad that she becomes nauseated and has a decreased appetite.  She also has a cough productive of mucus.  Sometimes she gets so weak that she has lightheaded but no blackouts.    Medications  Current Outpatient Medications   Medication Sig Dispense Refill     acetaminophen (TYLENOL) 325 MG tablet [ACETAMINOPHEN (TYLENOL) 325 MG TABLET] Take 3 tablets (975 mg total) by mouth every 8 (eight) hours. (Patient taking differently: Take 975 mg by mouth daily as needed ) 60 tablet 0     albuterol (PROVENTIL HFA;VENTOLIN HFA) 90 mcg/actuation inhaler [ALBUTEROL (PROVENTIL HFA;VENTOLIN HFA) 90 MCG/ACTUATION INHALER] Inhale 1-2 puffs  every 6 (six) hours as needed for wheezing.       amLODIPine (NORVASC) 5 MG tablet [AMLODIPINE (NORVASC) 5 MG TABLET] Take 1 tablet (5 mg total) by mouth daily. 90 tablet 3     aspirin (ASPIRIN) 81 MG EC tablet Take 81 mg by mouth daily       beclomethasone (QVAR) 80 mcg/actuation inhaler Inhale 1 puff into the lungs 2 times daily as needed        coenzyme Q10 100 mg capsule [COENZYME Q10 100 MG CAPSULE] Take 100 mg by mouth daily.        diclofenac (VOLTAREN) 1 % topical gel Apply 4 g topically 4 times daily       ergocalciferol (ERGOCALCIFEROL) 50,000 unit capsule [ERGOCALCIFEROL (ERGOCALCIFEROL) 50,000 UNIT CAPSULE] Take 50,000 Units by mouth once a week.       evolocumab (REPATHA SYRINGE) 140 mg/mL Syrg [EVOLOCUMAB (REPATHA SYRINGE) 140 MG/ML SYRG] Inject 1 mL (140 mg total) under the skin every 14 (fourteen) days. 2 Syringe 6     ferrous sulfate 325 (65 FE) MG tablet [FERROUS SULFATE 325 (65 FE) MG TABLET] Take 1 tablet (325 mg total) by mouth daily with breakfast.  0     furosemide (LASIX) 20 MG tablet TAKE 2 TABLETS (40 MG) BY MOUTH DAILY 180 tablet 1     melatonin 5 mg Tab tablet [MELATONIN 5 MG TAB TABLET] Take 5 mg by mouth at bedtime as needed (for sleep).        metoprolol succinate ER (TOPROL-XL) 25 MG 24 hr tablet Take 1 tablet (25 mg) by mouth At Bedtime 30 tablet 0     multivitamin therapeutic (THERAGRAN) tablet [MULTIVITAMIN THERAPEUTIC (THERAGRAN) TABLET] Take 1 tablet by mouth daily.       nitroglycerin (NITROSTAT) 0.4 MG SL tablet [NITROGLYCERIN (NITROSTAT) 0.4 MG SL TABLET] Place 1 tablet (0.4 mg total) under the tongue every 5 (five) minutes as needed for chest pain. 90 tablet 0     omeprazole (PRILOSEC) 20 MG capsule [OMEPRAZOLE (PRILOSEC) 20 MG CAPSULE] Take 1-2 capsules (20-40 mg total) by mouth daily before breakfast.  0     rOPINIRole (REQUIP) 2 MG tablet [ROPINIROLE (REQUIP) 2 MG TABLET] Take 4 mg by mouth every evening. Patient takes 7pm         Objective  /70 (BP Location: Left  arm, Patient Position: Sitting, Cuff Size: Adult Regular)   Pulse 75   Resp 14   Wt 63 kg (139 lb)   BMI 25.84 kg/m      General Appearance:    Alert, cooperative, no distress, appears stated age   Head:    Normocephalic, without obvious abnormality, atraumatic   Throat:   Lips, mucosa, and tongue normal; teeth and gums normal   Neck:   Supple, symmetrical, trachea midline, no adenopathy;        thyroid:  No enlargement/tenderness/nodules; no carotid    bruit or JVD   Back:     Symmetric, no curvature, ROM normal, no CVA tenderness   Lungs:     Clear to auscultation bilaterally, respirations unlabored   Chest wall:    No tenderness or deformity   Heart:    Regular rate and rhythm, S1 and S2 normal, no murmur, rub   or gallop   Abdomen:     Soft, non-tender, bowel sounds active all four quadrants,     no masses, no organomegaly   Extremities:   Normal, atraumatic, no cyanosis or edema   Pulses:   2+ and symmetric all extremities   Skin:   Skin color, texture, turgor normal, no rashes or lesions     Results    Lab Results personally reviewed   Lab Results   Component Value Date    CHOL 226 (H) 03/30/2021    CHOL 146 06/04/2020     Lab Results   Component Value Date    HDL 76 03/30/2021    HDL 67 06/04/2020     No components found for: LDLCALC  Lab Results   Component Value Date    TRIG 89 03/30/2021    TRIG 78 06/04/2020     Lab Results   Component Value Date    WBC 8.6 08/19/2021    HGB 10.0 (L) 08/19/2021    HCT 32.4 (L) 08/19/2021     08/19/2021     Lab Results   Component Value Date    BUN 27 09/20/2021     09/20/2021    CO2 27 09/20/2021               Thank you for allowing me to participate in the care of your patient.      Sincerely,     NANCY FELDMAN MD     [facility]  cc:   Nancy Feldman MD  [address] [place] [id number]

## 2021-10-11 NOTE — TELEPHONE ENCOUNTER
Left detailed msg for patient's daughter Jacqueline informing her that patient confirmed present dose of Amlodipine and understanding that new Rx for 2.5mg tabs prescribed per Dr. Feldman instructions - requested call back with any further questions/concerns.  mg

## 2021-10-11 NOTE — PATIENT INSTRUCTIONS
Ms Sherice Alvarez,  I enjoyed visiting with you again today.  I am sorry to hear of all your issues with the shortness of breath and allergies.  Per our conversation speak with your primary about allergies or inhalers.  Try 1/2 of the AMLODIPINE a day for fluid.  I will get the fluid blood tests,  I will plan on seeing you thereafter.  Ulisses Feldman

## 2021-10-11 NOTE — TELEPHONE ENCOUNTER
----- Message from Alexia Logan sent at 10/11/2021 11:59 AM CDT -----  Regarding: MEDICATION-MELANIE LONG . MELANIE ESTEVEZ WAS AT EXIT.  TOLD HER TO TAKE AMLODIPINE AND SHE DOESN'T HAVE ANY AT HOME AND THE AVS DOESN'T SHOW IT WAS CALLED IN.   ALSO IT SAYS TO TAKE HALF. IS THAT 5 MG OR 2.5 MG?     MRN [id number]    COULD YOU PLEASE CALL HER WITH WHAT SHE IS SUPPOSED TO DO?    THANKS.    PHONE- [phone]

## 2021-10-11 NOTE — TELEPHONE ENCOUNTER
"Noted per 10-11-21 visit note med list indicates:  amLODIPine (NORVASC) 5 MG tablet [AMLODIPINE (NORVASC) 5 MG TABLET] Take 1 tablet (5 mg total) by mouth daily.     Per Dr. Feldman's instructions \"Try 1/2 of the AMLODIPINE a day for fluid\"    Return call to patient who confirmed present dose of Amlodipine 5mg daily which she has been taking and has a few tabs left - confirmed preferred pharmacy for new Rx of 2.5mg daily - no further questions/concerns offered at this time.  mg  "

## 2021-10-11 NOTE — TELEPHONE ENCOUNTER
Msg rec'd from Kimmy Villela 10-11-21 @ 12:23 PM  General phone call:   Caller: PATIENTS DAUGHTERCECILIA   Primary cardiologist: NEETA   Detailed reason for call: PATIENT  WAS AT THE OFFICE TODAY, FORGOT TO TELL LBF SHE IS   ALREADY ON AMLODIPINE. SI IT WON'T NEED TO BE ORDERED as A NEW DRUG, PER DAUGHTER.   Best phone number: [phone]   Best time to contact: ANY   Ok to leave a detailedmessage? YES   Device? NO

## 2021-10-11 NOTE — PROGRESS NOTES
[facility]  Heart Care Clinic Follow-up Note    Assessment & Plan        (I50.32) Chronic diastolic heart failure (H)  (primary encounter diagnosis)  Comment: Increased shortness of breath with what sounds like some PND, constant chest tightness, weight gain of about 10 pounds.  Bipedal edema as well, suspected heart failure and noted increase in BNP and had her furosemide increased to 40 mg p.o. twice daily with mild increase in creatinine.  Still slightly symptomatic, recheck renal profile with BMP today and adjust with heart failure nurse practitioners.  Continue fluid and salt restriction.    Dyspnea on exertion  Comment: Not sure etiology, normal stress test, TAVR looks good, treating heart failure, no recent hemoglobin and will need to re-check this if symptoms persist although was 10.0 in August of this year.  Could also be an element of asthma according to family and will need that evaluated as well.    (I25.110) Coronary artery disease involving native coronary artery of native heart with unstable angina pectoris (H)  Comment: Angiography April 2020 showed normal left main, proximal LAD 20% stenosis with patent stent, mid sequential 30% lesion with a distal 70% lesion, circumflex with a patent proximal stent with a third obtuse marginal artery 99% stenosis.  Right coronary artery patent stents with a mid 40% stenosis.  Attempted intervention on distal LAD as well as obtuse marginal artery which were unsuccessful and underwent nuclear stress test in May of this year which was normal.  Vague chest discomfort, will check troponin.  If chest pain persist consider stress test.     (E78.5) Dyslipidemia, goal LDL below 100  Comment: Cholesterol 226 with an LDL of 133 which are unacceptable.  We will try to get PCSK9 inhibitor.    (I10) Benign essential hypertension  Comment: Under good control currently.    (I35.0) Severe calcific aortic valve stenosis  Comment: Severe calcific aortic valve stenosis  June 2020 had a 23 mm DANIEL 3 valve placed, TAVR, with echo showing 7 mm gradient and peak velocity 1.9 m/s.      (N18.30) Stage 3 chronic kidney disease, unspecified whether stage 3a or 3b CKD (H)  Comment: So noted with creatinine mildly increased at 1.26.    Plan  1.  Continue fluid and salt restriction.  2.  Check BNP and BMP today and address if needed.  3.  Have a follow-up with heart failure nurse practitioners in 2 to 3 weeks.  4.  If continued symptoms we need to recheck stress test, hemoglobin, and also asthma/allergy of referral.  5.  We will look for PCSK9 inhibitor.    Subjective  CC: 87-year-old white female here for a follow-up visit with one of her daughters.  She is still living independently, still doing everything she needs to, she has good days and bad days.  Bad days to usually have a tightness in her chest with associated tightness in her legs with an inability to walk due to peripheral edema.  When she walks she has shortness of breath and wheezing and is generally weak.  This weakness get so bad that she becomes nauseated and has a decreased appetite.  She also has a cough productive of mucus.  Sometimes she gets so weak that she has lightheaded but no blackouts.    Medications  Current Outpatient Medications   Medication Sig Dispense Refill     acetaminophen (TYLENOL) 325 MG tablet [ACETAMINOPHEN (TYLENOL) 325 MG TABLET] Take 3 tablets (975 mg total) by mouth every 8 (eight) hours. (Patient taking differently: Take 975 mg by mouth daily as needed ) 60 tablet 0     albuterol (PROVENTIL HFA;VENTOLIN HFA) 90 mcg/actuation inhaler [ALBUTEROL (PROVENTIL HFA;VENTOLIN HFA) 90 MCG/ACTUATION INHALER] Inhale 1-2 puffs every 6 (six) hours as needed for wheezing.       amLODIPine (NORVASC) 5 MG tablet [AMLODIPINE (NORVASC) 5 MG TABLET] Take 1 tablet (5 mg total) by mouth daily. 90 tablet 3     aspirin (ASPIRIN) 81 MG EC tablet Take 81 mg by mouth daily       beclomethasone (QVAR) 80 mcg/actuation  inhaler Inhale 1 puff into the lungs 2 times daily as needed        coenzyme Q10 100 mg capsule [COENZYME Q10 100 MG CAPSULE] Take 100 mg by mouth daily.        diclofenac (VOLTAREN) 1 % topical gel Apply 4 g topically 4 times daily       ergocalciferol (ERGOCALCIFEROL) 50,000 unit capsule [ERGOCALCIFEROL (ERGOCALCIFEROL) 50,000 UNIT CAPSULE] Take 50,000 Units by mouth once a week.       evolocumab (REPATHA SYRINGE) 140 mg/mL Syrg [EVOLOCUMAB (REPATHA SYRINGE) 140 MG/ML SYRG] Inject 1 mL (140 mg total) under the skin every 14 (fourteen) days. 2 Syringe 6     ferrous sulfate 325 (65 FE) MG tablet [FERROUS SULFATE 325 (65 FE) MG TABLET] Take 1 tablet (325 mg total) by mouth daily with breakfast.  0     furosemide (LASIX) 20 MG tablet TAKE 2 TABLETS (40 MG) BY MOUTH DAILY 180 tablet 1     melatonin 5 mg Tab tablet [MELATONIN 5 MG TAB TABLET] Take 5 mg by mouth at bedtime as needed (for sleep).        metoprolol succinate ER (TOPROL-XL) 25 MG 24 hr tablet Take 1 tablet (25 mg) by mouth At Bedtime 30 tablet 0     multivitamin therapeutic (THERAGRAN) tablet [MULTIVITAMIN THERAPEUTIC (THERAGRAN) TABLET] Take 1 tablet by mouth daily.       nitroglycerin (NITROSTAT) 0.4 MG SL tablet [NITROGLYCERIN (NITROSTAT) 0.4 MG SL TABLET] Place 1 tablet (0.4 mg total) under the tongue every 5 (five) minutes as needed for chest pain. 90 tablet 0     omeprazole (PRILOSEC) 20 MG capsule [OMEPRAZOLE (PRILOSEC) 20 MG CAPSULE] Take 1-2 capsules (20-40 mg total) by mouth daily before breakfast.  0     rOPINIRole (REQUIP) 2 MG tablet [ROPINIROLE (REQUIP) 2 MG TABLET] Take 4 mg by mouth every evening. Patient takes 7pm         Objective  /70 (BP Location: Left arm, Patient Position: Sitting, Cuff Size: Adult Regular)   Pulse 75   Resp 14   Wt 63 kg (139 lb)   BMI 25.84 kg/m      General Appearance:    Alert, cooperative, no distress, appears stated age   Head:    Normocephalic, without obvious abnormality, atraumatic   Throat:   Lips,  mucosa, and tongue normal; teeth and gums normal   Neck:   Supple, symmetrical, trachea midline, no adenopathy;        thyroid:  No enlargement/tenderness/nodules; no carotid    bruit or JVD   Back:     Symmetric, no curvature, ROM normal, no CVA tenderness   Lungs:     Clear to auscultation bilaterally, respirations unlabored   Chest wall:    No tenderness or deformity   Heart:    Regular rate and rhythm, S1 and S2 normal, no murmur, rub   or gallop   Abdomen:     Soft, non-tender, bowel sounds active all four quadrants,     no masses, no organomegaly   Extremities:   Normal, atraumatic, no cyanosis or edema   Pulses:   2+ and symmetric all extremities   Skin:   Skin color, texture, turgor normal, no rashes or lesions     Results    Lab Results personally reviewed   Lab Results   Component Value Date    CHOL 226 (H) 03/30/2021    CHOL 146 06/04/2020     Lab Results   Component Value Date    HDL 76 03/30/2021    HDL 67 06/04/2020     No components found for: LDLCALC  Lab Results   Component Value Date    TRIG 89 03/30/2021    TRIG 78 06/04/2020     Lab Results   Component Value Date    WBC 8.6 08/19/2021    HGB 10.0 (L) 08/19/2021    HCT 32.4 (L) 08/19/2021     08/19/2021     Lab Results   Component Value Date    BUN 27 09/20/2021     09/20/2021    CO2 27 09/20/2021

## 2021-10-27 DIAGNOSIS — E78.5 DYSLIPIDEMIA, GOAL LDL BELOW 100: ICD-10-CM

## 2021-10-27 RX ORDER — EVOLOCUMAB 140 MG/ML
140 INJECTION, SOLUTION SUBCUTANEOUS
Qty: 6 ML | Refills: 1 | Status: SHIPPED | OUTPATIENT
Start: 2021-10-27 | End: 2022-07-18

## 2021-10-28 ENCOUNTER — OFFICE VISIT (OUTPATIENT)
Dept: CARDIOLOGY | Facility: CLINIC | Age: 86
End: 2021-10-28
Payer: COMMERCIAL

## 2021-10-28 VITALS
SYSTOLIC BLOOD PRESSURE: 118 MMHG | DIASTOLIC BLOOD PRESSURE: 62 MMHG | RESPIRATION RATE: 16 BRPM | BODY MASS INDEX: 25.76 KG/M2 | HEART RATE: 70 BPM | HEIGHT: 62 IN | WEIGHT: 140 LBS

## 2021-10-28 DIAGNOSIS — I50.32 CHRONIC HEART FAILURE WITH PRESERVED EJECTION FRACTION (H): Primary | ICD-10-CM

## 2021-10-28 LAB
ANION GAP SERPL CALCULATED.3IONS-SCNC: 7 MMOL/L (ref 5–18)
BUN SERPL-MCNC: 29 MG/DL (ref 8–28)
CALCIUM SERPL-MCNC: 9 MG/DL (ref 8.5–10.5)
CHLORIDE BLD-SCNC: 108 MMOL/L (ref 98–107)
CO2 SERPL-SCNC: 27 MMOL/L (ref 22–31)
CREAT SERPL-MCNC: 1.38 MG/DL (ref 0.6–1.1)
GFR SERPL CREATININE-BSD FRML MDRD: 34 ML/MIN/1.73M2
GLUCOSE BLD-MCNC: 83 MG/DL (ref 70–125)
POTASSIUM BLD-SCNC: 4.1 MMOL/L (ref 3.5–5)
SODIUM SERPL-SCNC: 142 MMOL/L (ref 136–145)

## 2021-10-28 PROCEDURE — 36415 COLL VENOUS BLD VENIPUNCTURE: CPT | Performed by: NURSE PRACTITIONER

## 2021-10-28 PROCEDURE — 99215 OFFICE O/P EST HI 40 MIN: CPT | Performed by: NURSE PRACTITIONER

## 2021-10-28 PROCEDURE — 80048 BASIC METABOLIC PNL TOTAL CA: CPT | Performed by: NURSE PRACTITIONER

## 2021-10-28 ASSESSMENT — MIFFLIN-ST. JEOR: SCORE: 1015.35

## 2021-10-28 NOTE — PROGRESS NOTES
"      Assessment/Recommendations   Assessment:    1.  Heart failure with preserved ejection fraction: She has no symptoms of acute heart failure.  Her shortness of breath has improved.    2.  Hypertension: Blood pressure 118/62    3.  Coronary artery disease: History of PCI in 2016.  She denies any chest pain.     Plan:  1.  BMP pending  2.  Continue low-sodium diet      Sherice Alvarez will follow up with Dr. Feldman in September 2022       History of Present Illness/Subjective    Ms. Sherice Alvarez is a 87 year old female seen at [facility] today for follow-up.  She has a history of coronary artery disease, PCI in 2016, heart failure with preserved ejection fraction, dyslipidemia, hypertension, TAVR in 2020, DVT, GERD, and asthma.    She was seen by Dr. Feldman 2 weeks ago.  BMP was normal and renal function slightly worse.  Lasix was decreased to 20 mg twice daily.  She denies any worsening symptoms on a lower dose of Lasix.  She states that her dyspnea on exertion has improved significantly.  She denies chest pain.  She has edema in her ankles which worsens throughout the day.  She wears compression stockings daily.  She denies lightheadedness.      She is working on following a low-sodium diet.  She receives low-sodium meals from [facility].         Physical Examination Review of Systems   Vitals: /62 (BP Location: Right arm, Patient Position: Sitting, Cuff Size: Adult Regular)   Pulse 70   Resp 16   Ht 1.562 m (5' 1.5\")   Wt 63.5 kg (140 lb)   BMI 26.02 kg/m    BMI= Body mass index is 26.02 kg/m .  Wt Readings from Last 3 Encounters:   10/28/21 63.5 kg (140 lb)   10/11/21 63 kg (139 lb)   09/20/21 64.9 kg (143 lb)       General Appearance:     Alert, cooperative and in no acute distress.   ENT/Mouth: membranes moist, no oral lesions or bleeding gums.      EYES:  no scleral icterus, normal conjunctivae   Chest/Lungs:    Lung sounds clear   Cardiovascular:   " Regular. Normal first and second heart sounds, trace edema bilateral lower extremities    Abdomen:  Soft, nontender, nondistended, bowel sounds present   Extremities: no cyanosis or clubbing   Skin: warm, dry.    Neurologic: mood and affect are appropriate, alert and oriented x3         Please refer above for cardiac ROS details.      Medical History  Surgical History Family History Social History   Past Medical History:   Diagnosis Date     Arthritis      Asthma      CAD (coronary artery disease)      Cancer (H)     basal cell     Chronic kidney disease     ckd 3     Chronic pain syndrome      Crohn's disease (H)      GERD (gastroesophageal reflux disease)      Hx of heart artery stent 2016    1 stent R Proximal CA and 1 Stent L Proximal circumflex  2016 Stent proximal LAD     Hyperlipidemia      Hypertension      NSTEMI (non-ST elevated myocardial infarction) (H) 2016     PONV (postoperative nausea and vomiting)     severe povn with last knee surgery     Restless legs syndrome      Stroke (H)     numbness rt jaw     Past Surgical History:   Procedure Laterality Date     APPENDECTOMY       C TOTAL KNEE ARTHROPLASTY Left 2021    Procedure: LEFT TOTAL KNEE ARTHROPLASTY;  Surgeon: Doug Rhodes MD;  Location: [facility];  Service: Orthopedics     CARDIAC CATHETERIZATION  2016    multiple vessel disease.  no intervention     CARDIAC CATHETERIZATION  2016     CARDIAC CATHETERIZATION N/A 2016    Procedure: Coronary Angiogram;  Surgeon: Sunil Moran MD;  Location: [facility];  Service:      CAROTID ENDARTERECTOMY       CAROTID ENDARTERECTOMY Right 2010     CERVICAL LAMINECTOMY  1994      SECTION       CV CORONARY ANGIOGRAM N/A 2020    Procedure: Coronary Angiogram;  Surgeon: Vinita Ramos MD;  Location: [facility];  Service: Cardiology     CV LEFT HEART CATHETERIZATION WITHOUT LEFT VENTRICULOGRAM Left 2020    Procedure: Left  Heart Catheterization Without Left Ventriculogram;  Surgeon: Jerad Lerner MD;  Location: [facility];  Service: Cardiology     CV TRANSCATHETER AORTIC VALVE REPLACEMENT - OTHER APPROACH N/A 2020    Procedure: CV TRANSCATHETER AORTIC VALVE REPLACEMENT - RIGHT SUBCLAVIAN APPROACH;  Surgeon: John Caceres MD;  Location: [facility];  Service: Cardiology     HEMORRHOIDECTOMY EXTERNAL       IR LUMBAR EPIDURAL STEROID INJECTION  2014     IR LUMBAR NERVE ROOT INJECTION  10/1/2013     JOINT REPLACEMENT       IA ESOPHAGOGASTRODUODENOSCOPY TRANSORAL DIAGNOSTIC N/A 07/10/2019    Procedure: ESOPHAGOGASTRODUODENOSCOPY (EGD) with gastric biopsy;  Surgeon: Sunil Stuart MD;  Location: [facility];  Service: Gastroenterology     IA REPLACE AORTIC VALVE OPEN AXILLRY ARTRY APPROACH N/A 2020    Procedure: OR TRANSCATHETER AORTIC VALVE REPLACEMENT, SUBCLAVIAN APPROACH;  Surgeon: Bhavin Cuadra MD;  Location: [facility];  Service: Cardiology     TONSILLECTOMY & ADENOIDECTOMY       TOTAL KNEE ARTHROPLASTY Right      Family History   Problem Relation Age of Onset     Atrial fibrillation Mother      Parkinsonism Father     Social History     Socioeconomic History     Marital status:      Spouse name: Not on file     Number of children: Not on file     Years of education: Not on file     Highest education level: Not on file   Occupational History     Not on file   Tobacco Use     Smoking status: Former Smoker     Quit date: 1980     Years since quittin.8     Smokeless tobacco: Never Used   Substance and Sexual Activity     Alcohol use: No     Drug use: No     Sexual activity: Not on file   Other Topics Concern     Not on file   Social History Narrative     Not on file     Social Determinants of Health     Financial Resource Strain:      Difficulty of Paying Living Expenses:    Food Insecurity:      Worried About Running Out of Food in the Last Year:       Ran Out of Food in the Last Year:    Transportation Needs:      Lack of Transportation (Medical):      Lack of Transportation (Non-Medical):    Physical Activity:      Days of Exercise per Week:      Minutes of Exercise per Session:    Stress:      Feeling of Stress :    Social Connections:      Frequency of Communication with Friends and Family:      Frequency of Social Gatherings with Friends and Family:      Attends Taoist Services:      Active Member of Clubs or Organizations:      Attends Club or Organization Meetings:      Marital Status:    Intimate Partner Violence:      Fear of Current or Ex-Partner:      Emotionally Abused:      Physically Abused:      Sexually Abused:           Medications  Allergies   Current Outpatient Medications   Medication Sig Dispense Refill     acetaminophen (TYLENOL) 325 MG tablet [ACETAMINOPHEN (TYLENOL) 325 MG TABLET] Take 3 tablets (975 mg total) by mouth every 8 (eight) hours. (Patient taking differently: Take 975 mg by mouth daily as needed ) 60 tablet 0     albuterol (PROVENTIL HFA;VENTOLIN HFA) 90 mcg/actuation inhaler [ALBUTEROL (PROVENTIL HFA;VENTOLIN HFA) 90 MCG/ACTUATION INHALER] Inhale 1-2 puffs every 6 (six) hours as needed for wheezing.       amLODIPine (NORVASC) 2.5 MG tablet Take 1 tablet (2.5 mg) by mouth daily 90 tablet 3     aspirin (ASPIRIN) 81 MG EC tablet Take 81 mg by mouth daily       beclomethasone (QVAR) 80 mcg/actuation inhaler Inhale 1 puff into the lungs 2 times daily as needed        coenzyme Q10 100 mg capsule [COENZYME Q10 100 MG CAPSULE] Take 100 mg by mouth daily.        diclofenac (VOLTAREN) 1 % topical gel Apply 4 g topically 4 times daily       ergocalciferol (ERGOCALCIFEROL) 50,000 unit capsule [ERGOCALCIFEROL (ERGOCALCIFEROL) 50,000 UNIT CAPSULE] Take 50,000 Units by mouth once a week.       evolocumab (REPATHA) 140 MG/ML prefilled syringe Inject 1 mL (140 mg) Subcutaneous every 14 days 6 mL 1     ferrous sulfate 325 (65 FE) MG tablet  [FERROUS SULFATE 325 (65 FE) MG TABLET] Take 1 tablet (325 mg total) by mouth daily with breakfast.  0     furosemide (LASIX) 20 MG tablet Take 1 tablet (20 mg) by mouth 2 times daily 180 tablet 1     melatonin 5 mg Tab tablet [MELATONIN 5 MG TAB TABLET] Take 5 mg by mouth at bedtime as needed (for sleep).        metoprolol succinate ER (TOPROL-XL) 25 MG 24 hr tablet Take 1 tablet (25 mg) by mouth At Bedtime 30 tablet 0     multivitamin therapeutic (THERAGRAN) tablet [MULTIVITAMIN THERAPEUTIC (THERAGRAN) TABLET] Take 1 tablet by mouth daily.       nitroglycerin (NITROSTAT) 0.4 MG SL tablet [NITROGLYCERIN (NITROSTAT) 0.4 MG SL TABLET] Place 1 tablet (0.4 mg total) under the tongue every 5 (five) minutes as needed for chest pain. 90 tablet 0     rOPINIRole (REQUIP) 2 MG tablet [ROPINIROLE (REQUIP) 2 MG TABLET] Take 4 mg by mouth every evening. Patient takes 7pm      Allergies   Allergen Reactions     Amitriptyline Hcl [Amitriptyline] Other (See Comments)     Erythromycin Diarrhea and Other (See Comments)     Childhood reaction     Gabapentin Other (See Comments)     Jerking movements, swelling     Naproxen Unknown and Other (See Comments)     Other Environmental Allergy Unknown     Molds, dander     Pregabalin Other (See Comments)         Lab Results    Chemistry/lipid CBC Cardiac Enzymes/BNP/TSH/INR   Recent Labs   Lab Test 03/30/21  1507   CHOL 226*   HDL 76   *   TRIG 89     Recent Labs   Lab Test 03/30/21  1507 06/04/20  0814 05/31/19  1044   * 63 161*     Recent Labs   Lab Test 09/08/21  1346      POTASSIUM 4.1   CHLORIDE 107   CO2 25   GLC 83   BUN 24   CR 1.14*   GFRESTIMATED 43*   BESS 9.3     Recent Labs   Lab Test 09/08/21  1346 08/19/21  1021 06/07/21  1359   CR 1.14* 0.94 1.24*     Recent Labs   Lab Test 11/06/16  0555   A1C 5.6    Recent Labs   Lab Test 08/19/21  1021   WBC 8.6   HGB 10.0*   HCT 32.4*   MCV 94        Recent Labs   Lab Test 08/19/21  1021 08/05/21  1202  06/07/21  1359   HGB 10.0* 10.8* 10.1*    Recent Labs   Lab Test 09/08/21  1346 08/19/21  1021 06/07/21  1359   TROPONINI 0.01 0.01 <0.01     Recent Labs   Lab Test 09/08/21  1346 08/19/21  1021 06/07/21  1359 01/06/21  0835   BNP  --  159 61 20   NTBNP 481*  --   --   --      Recent Labs   Lab Test 03/12/21  1608   TSH 1.40     Recent Labs   Lab Test 08/19/21  1021 05/11/21  1323 06/02/20  1319   INR 1.15 1.19* 1.34*        40 minutes spent on the date of encounter doing chart review, review of test results, patient visit and documentation.

## 2021-10-28 NOTE — PATIENT INSTRUCTIONS
Sherice Alvarez,    It was a pleasure to see you today at the [facility].     My recommendations after this visit include:  - You will be called with the results of your labs.   - Elevate your legs for 30 minutes in the middle of the day.   - Take your second dose of furosemide (lasix) around 1:00pm.  Limit fluids you drink in the evening so you do not have to go to the bathroom so much at night.   - You will see Dr. Feldman in October 2022.  Please call sooner if needed.   - If you have any questions or concerns, please call [phone] to talk with my nurse.    Rosa Carter, CNP

## 2021-10-28 NOTE — LETTER
"10/28/2021    Anum Rosenberg NP  [address] [address]    RE: Sherice Alvarez       Dear Colleague,    I had the pleasure of seeing Sherice Alvarez in the [facility].          Assessment/Recommendations   Assessment:    1.  Heart failure with preserved ejection fraction: She has no symptoms of acute heart failure.  Her shortness of breath has improved.    2.  Hypertension: Blood pressure 118/62    3.  Coronary artery disease: History of PCI in 2016.  She denies any chest pain.     Plan:  1.  BMP pending  2.  Continue low-sodium diet      Sherice Alvarez will follow up with Dr. Feldman in September 2022       History of Present Illness/Subjective    Ms. Sherice Alvarez is a 87 year old female seen at [facility] today for follow-up.  She has a history of coronary artery disease, PCI in 2016, heart failure with preserved ejection fraction, dyslipidemia, hypertension, TAVR in 2020, DVT, GERD, and asthma.    She was seen by Dr. Feldman 2 weeks ago.  BMP was normal and renal function slightly worse.  Lasix was decreased to 20 mg twice daily.  She denies any worsening symptoms on a lower dose of Lasix.  She states that her dyspnea on exertion has improved significantly.  She denies chest pain.  She has edema in her ankles which worsens throughout the day.  She wears compression stockings daily.  She denies lightheadedness.      She is working on following a low-sodium diet.  She receives low-sodium meals from [facility].         Physical Examination Review of Systems   Vitals: /62 (BP Location: Right arm, Patient Position: Sitting, Cuff Size: Adult Regular)   Pulse 70   Resp 16   Ht 1.562 m (5' 1.5\")   Wt 63.5 kg (140 lb)   BMI 26.02 kg/m    BMI= Body mass index is 26.02 kg/m .  Wt Readings from Last 3 Encounters:   10/28/21 63.5 kg (140 lb)   10/11/21 63 kg (139 lb) "   09/20/21 64.9 kg (143 lb)       General Appearance:     Alert, cooperative and in no acute distress.   ENT/Mouth: membranes moist, no oral lesions or bleeding gums.      EYES:  no scleral icterus, normal conjunctivae   Chest/Lungs:    Lung sounds clear   Cardiovascular:   Regular. Normal first and second heart sounds, trace edema bilateral lower extremities    Abdomen:  Soft, nontender, nondistended, bowel sounds present   Extremities: no cyanosis or clubbing   Skin: warm, dry.    Neurologic: mood and affect are appropriate, alert and oriented x3         Please refer above for cardiac ROS details.      Medical History  Surgical History Family History Social History   Past Medical History:   Diagnosis Date     Arthritis      Asthma      CAD (coronary artery disease)      Cancer (H)     basal cell     Chronic kidney disease     ckd 3     Chronic pain syndrome      Crohn's disease (H)      GERD (gastroesophageal reflux disease)      Hx of heart artery stent 11/2016    1 stent R Proximal CA and 1 Stent L Proximal circumflex  12/2016 Stent proximal LAD     Hyperlipidemia      Hypertension      NSTEMI (non-ST elevated myocardial infarction) (H) 11/2016     PONV (postoperative nausea and vomiting)     severe povn with last knee surgery     Restless legs syndrome      Stroke (H) 2010    numbness rt jaw     Past Surgical History:   Procedure Laterality Date     APPENDECTOMY       C TOTAL KNEE ARTHROPLASTY Left 5/11/2021    Procedure: LEFT TOTAL KNEE ARTHROPLASTY;  Surgeon: Doug Rhodes MD;  Location: [facility];  Service: Orthopedics     CARDIAC CATHETERIZATION  11/04/2016    multiple vessel disease.  no intervention     CARDIAC CATHETERIZATION  11/07/2016     CARDIAC CATHETERIZATION N/A 12/27/2016    Procedure: Coronary Angiogram;  Surgeon: Sunil Moran MD;  Location: [facility];  Service:      CAROTID ENDARTERECTOMY       CAROTID ENDARTERECTOMY Right 2010     CERVICAL LAMINECTOMY  1994       SECTION       CV CORONARY ANGIOGRAM N/A 2020    Procedure: Coronary Angiogram;  Surgeon: Vinita Ramos MD;  Location: [facility];  Service: Cardiology     CV LEFT HEART CATHETERIZATION WITHOUT LEFT VENTRICULOGRAM Left 2020    Procedure: Left Heart Catheterization Without Left Ventriculogram;  Surgeon: Jerad Lerner MD;  Location: [facility];  Service: Cardiology     CV TRANSCATHETER AORTIC VALVE REPLACEMENT - OTHER APPROACH N/A 2020    Procedure: CV TRANSCATHETER AORTIC VALVE REPLACEMENT - RIGHT SUBCLAVIAN APPROACH;  Surgeon: John Caceres MD;  Location: [facility];  Service: Cardiology     HEMORRHOIDECTOMY EXTERNAL       IR LUMBAR EPIDURAL STEROID INJECTION  2014     IR LUMBAR NERVE ROOT INJECTION  10/1/2013     JOINT REPLACEMENT       [place] ESOPHAGOGASTRODUODENOSCOPY TRANSORAL DIAGNOSTIC N/A 07/10/2019    Procedure: ESOPHAGOGASTRODUODENOSCOPY (EGD) with gastric biopsy;  Surgeon: Sunil Stuart MD;  Location: [facility];  Service: Gastroenterology     [place] REPLACE AORTIC VALVE OPEN AXILLRY ARTRY APPROACH N/A 2020    Procedure: OR TRANSCATHETER AORTIC VALVE REPLACEMENT, SUBCLAVIAN APPROACH;  Surgeon: Bhavin Cuadra MD;  Location: [facility];  Service: Cardiology     TONSILLECTOMY & ADENOIDECTOMY       TOTAL KNEE ARTHROPLASTY Right      Family History   Problem Relation Age of Onset     Atrial fibrillation Mother      Parkinsonism Father     Social History     Socioeconomic History     Marital status:      Spouse name: Not on file     Number of children: Not on file     Years of education: Not on file     Highest education level: Not on file   Occupational History     Not on file   Tobacco Use     Smoking status: Former Smoker     Quit date: 1980     Years since quittin.8     Smokeless tobacco: Never Used   Substance and Sexual Activity     Alcohol use: No     Drug use: No     Sexual activity: Not on  file   Other Topics Concern     Not on file   Social History Narrative     Not on file     Social Determinants of Health     Financial Resource Strain:      Difficulty of Paying Living Expenses:    Food Insecurity:      Worried About Running Out of Food in the Last Year:      Ran Out of Food in the Last Year:    Transportation Needs:      Lack of Transportation (Medical):      Lack of Transportation (Non-Medical):    Physical Activity:      Days of Exercise per Week:      Minutes of Exercise per Session:    Stress:      Feeling of Stress :    Social Connections:      Frequency of Communication with Friends and Family:      Frequency of Social Gatherings with Friends and Family:      Attends Protestant Services:      Active Member of Clubs or Organizations:      Attends Club or Organization Meetings:      Marital Status:    Intimate Partner Violence:      Fear of Current or Ex-Partner:      Emotionally Abused:      Physically Abused:      Sexually Abused:           Medications  Allergies   Current Outpatient Medications   Medication Sig Dispense Refill     acetaminophen (TYLENOL) 325 MG tablet [ACETAMINOPHEN (TYLENOL) 325 MG TABLET] Take 3 tablets (975 mg total) by mouth every 8 (eight) hours. (Patient taking differently: Take 975 mg by mouth daily as needed ) 60 tablet 0     albuterol (PROVENTIL HFA;VENTOLIN HFA) 90 mcg/actuation inhaler [ALBUTEROL (PROVENTIL HFA;VENTOLIN HFA) 90 MCG/ACTUATION INHALER] Inhale 1-2 puffs every 6 (six) hours as needed for wheezing.       amLODIPine (NORVASC) 2.5 MG tablet Take 1 tablet (2.5 mg) by mouth daily 90 tablet 3     aspirin (ASPIRIN) 81 MG EC tablet Take 81 mg by mouth daily       beclomethasone (QVAR) 80 mcg/actuation inhaler Inhale 1 puff into the lungs 2 times daily as needed        coenzyme Q10 100 mg capsule [COENZYME Q10 100 MG CAPSULE] Take 100 mg by mouth daily.        diclofenac (VOLTAREN) 1 % topical gel Apply 4 g topically 4 times daily       ergocalciferol  (ERGOCALCIFEROL) 50,000 unit capsule [ERGOCALCIFEROL (ERGOCALCIFEROL) 50,000 UNIT CAPSULE] Take 50,000 Units by mouth once a week.       evolocumab (REPATHA) 140 MG/ML prefilled syringe Inject 1 mL (140 mg) Subcutaneous every 14 days 6 mL 1     ferrous sulfate 325 (65 FE) MG tablet [FERROUS SULFATE 325 (65 FE) MG TABLET] Take 1 tablet (325 mg total) by mouth daily with breakfast.  0     furosemide (LASIX) 20 MG tablet Take 1 tablet (20 mg) by mouth 2 times daily 180 tablet 1     melatonin 5 mg Tab tablet [MELATONIN 5 MG TAB TABLET] Take 5 mg by mouth at bedtime as needed (for sleep).        metoprolol succinate ER (TOPROL-XL) 25 MG 24 hr tablet Take 1 tablet (25 mg) by mouth At Bedtime 30 tablet 0     multivitamin therapeutic (THERAGRAN) tablet [MULTIVITAMIN THERAPEUTIC (THERAGRAN) TABLET] Take 1 tablet by mouth daily.       nitroglycerin (NITROSTAT) 0.4 MG SL tablet [NITROGLYCERIN (NITROSTAT) 0.4 MG SL TABLET] Place 1 tablet (0.4 mg total) under the tongue every 5 (five) minutes as needed for chest pain. 90 tablet 0     rOPINIRole (REQUIP) 2 MG tablet [ROPINIROLE (REQUIP) 2 MG TABLET] Take 4 mg by mouth every evening. Patient takes 7pm      Allergies   Allergen Reactions     Amitriptyline Hcl [Amitriptyline] Other (See Comments)     Erythromycin Diarrhea and Other (See Comments)     Childhood reaction     Gabapentin Other (See Comments)     Jerking movements, swelling     Naproxen Unknown and Other (See Comments)     Other Environmental Allergy Unknown     Molds, dander     Pregabalin Other (See Comments)         Lab Results    Chemistry/lipid CBC Cardiac Enzymes/BNP/TSH/INR   Recent Labs   Lab Test 03/30/21  1507   CHOL 226*   HDL 76   *   TRIG 89     Recent Labs   Lab Test 03/30/21  1507 06/04/20  0814 05/31/19  1044   * 63 161*     Recent Labs   Lab Test 09/08/21  1346      POTASSIUM 4.1   CHLORIDE 107   CO2 25   GLC 83   BUN 24   CR 1.14*   GFRESTIMATED 43*   BESS 9.3     Recent Labs   Lab  Test 09/08/21  1346 08/19/21  1021 06/07/21  1359   CR 1.14* 0.94 1.24*     Recent Labs   Lab Test 11/06/16  0555   A1C 5.6    Recent Labs   Lab Test 08/19/21  1021   WBC 8.6   HGB 10.0*   HCT 32.4*   MCV 94        Recent Labs   Lab Test 08/19/21  1021 08/05/21  1202 06/07/21  1359   HGB 10.0* 10.8* 10.1*    Recent Labs   Lab Test 09/08/21  1346 08/19/21  1021 06/07/21  1359   TROPONINI 0.01 0.01 <0.01     Recent Labs   Lab Test 09/08/21  1346 08/19/21  1021 06/07/21  1359 01/06/21  0835   BNP  --  159 61 20   NTBNP 481*  --   --   --      Recent Labs   Lab Test 03/12/21  1608   TSH 1.40     Recent Labs   Lab Test 08/19/21  1021 05/11/21  1323 06/02/20  1319   INR 1.15 1.19* 1.34*        40 minutes spent on the date of encounter doing chart review, review of test results, patient visit and documentation.                                             Thank you for allowing me to participate in the care of your patient.      Sincerely,     Rosa Savage NP     [facility]  cc:   Ulisses Feldman MD  [address] [place] [address]

## 2021-10-29 ENCOUNTER — TELEPHONE (OUTPATIENT)
Dept: CARDIOLOGY | Facility: CLINIC | Age: 86
End: 2021-10-29

## 2021-10-29 DIAGNOSIS — I50.32 CHRONIC HEART FAILURE WITH PRESERVED EJECTION FRACTION (H): Primary | ICD-10-CM

## 2021-10-29 NOTE — TELEPHONE ENCOUNTER
Called pt and relayed Rosa's lab result message. Pt will decrease lasix to 20 mg daily. Reviewed s/s with fluid retention and when to call RN line. Pt agreeable to plan. Will have Megha from scheduling call and schedule pt for 2-3 week Follow-up with Rosa.     Maria Ines Guerrero RN

## 2021-10-29 NOTE — TELEPHONE ENCOUNTER
----- Message from Rosa Savage NP sent at 10/29/2021  9:25 AM CDT -----  Renal function remains abnormal.  She had no symptoms of fluid retention yesterday.  Please have her decrease lasix to 20 mg daily and monitor for fluid retention symptoms.  Please have her follow up with me in 2-3 weeks.

## 2021-11-22 ENCOUNTER — OFFICE VISIT (OUTPATIENT)
Dept: CARDIOLOGY | Facility: CLINIC | Age: 86
End: 2021-11-22
Attending: NURSE PRACTITIONER
Payer: COMMERCIAL

## 2021-11-22 VITALS
RESPIRATION RATE: 18 BRPM | WEIGHT: 141 LBS | OXYGEN SATURATION: 96 % | HEART RATE: 76 BPM | BODY MASS INDEX: 26.21 KG/M2 | DIASTOLIC BLOOD PRESSURE: 68 MMHG | SYSTOLIC BLOOD PRESSURE: 104 MMHG

## 2021-11-22 DIAGNOSIS — I10 BENIGN ESSENTIAL HYPERTENSION: ICD-10-CM

## 2021-11-22 DIAGNOSIS — N18.30 STAGE 3 CHRONIC KIDNEY DISEASE, UNSPECIFIED WHETHER STAGE 3A OR 3B CKD (H): ICD-10-CM

## 2021-11-22 DIAGNOSIS — I50.32 CHRONIC HEART FAILURE WITH PRESERVED EJECTION FRACTION (H): Primary | ICD-10-CM

## 2021-11-22 LAB
ANION GAP SERPL CALCULATED.3IONS-SCNC: 6 MMOL/L (ref 5–18)
BNP SERPL-MCNC: 109 PG/ML (ref 0–167)
BUN SERPL-MCNC: 34 MG/DL (ref 8–28)
CALCIUM SERPL-MCNC: 9.2 MG/DL (ref 8.5–10.5)
CHLORIDE BLD-SCNC: 108 MMOL/L (ref 98–107)
CO2 SERPL-SCNC: 29 MMOL/L (ref 22–31)
CREAT SERPL-MCNC: 1.65 MG/DL (ref 0.6–1.1)
GFR SERPL CREATININE-BSD FRML MDRD: 28 ML/MIN/1.73M2
GLUCOSE BLD-MCNC: 80 MG/DL (ref 70–125)
POTASSIUM BLD-SCNC: 4.6 MMOL/L (ref 3.5–5)
SODIUM SERPL-SCNC: 143 MMOL/L (ref 136–145)

## 2021-11-22 PROCEDURE — 80048 BASIC METABOLIC PNL TOTAL CA: CPT | Performed by: NURSE PRACTITIONER

## 2021-11-22 PROCEDURE — 99214 OFFICE O/P EST MOD 30 MIN: CPT | Performed by: NURSE PRACTITIONER

## 2021-11-22 PROCEDURE — 83880 ASSAY OF NATRIURETIC PEPTIDE: CPT | Performed by: NURSE PRACTITIONER

## 2021-11-22 PROCEDURE — 36415 COLL VENOUS BLD VENIPUNCTURE: CPT | Performed by: NURSE PRACTITIONER

## 2021-11-22 NOTE — LETTER
11/22/2021    Anum Rosenberg NP  [facility] [address] [facility] [address]    RE: Sherice Alvarez       Dear Colleague,    I had the pleasure of seeing Sherice Alvarez in the [facility].          Assessment/Recommendations   Assessment:    1.  Heart failure with preserved ejection fraction: She notes increased edema and dyspnea on exertion over the past 10 days.  Symptoms have not improved with increasing Lasix.  She does note increased urine output.    2.  Hypertension: She reports home blood pressures have decreased and are now around 100 systolic most days.  She notes a slight increase in lightheadedness.  Blood pressure today 104/68.     3.  Coronary artery disease: History of PCI in 2016.  She denies any chest pain.     Plan:  1.  BMP and BNP pending  2.  Continue low-sodium diet  3.  Will assess medications after labs reviewed.  If BNP is elevated, increase diuretic.  May need to decrease BP medication.     Follow-up will be determined after lab results are reviewed.     History of Present Illness/Subjective    Ms. Sherice Alvarez is a 87 year old female seen at [facility] today for follow-up.  She has a history of coronary artery disease, PCI in 2016, heart failure with preserved ejection fraction, dyslipidemia, hypertension, TAVR in 2020, DVT, GERD, and asthma.    She was seen in clinic about 1 month ago.  She had no symptoms of fluid retention.  Renal function was abnormal.  Lasix decreased to 20 mg daily.  About 10 days ago, she noted increased edema and dyspnea on exertion.  She increased Lasix on her own to 20 mg twice daily.  She continues to have similar symptoms but notes increased urine output.  She has had a slight increase in lightheadedness.  She reports home blood pressures have decreased and are now around 100 systolic most days.  She denies orthopnea, chest pain and abdominal  fullness/bloating.      She receives low-sodium meals from [employer].         Physical Examination Review of Systems   Vitals: /68 (BP Location: Left arm, Patient Position: Sitting, Cuff Size: Adult Regular)   Pulse 76   Resp 18   Wt 64 kg (141 lb)   SpO2 96%   BMI 26.21 kg/m    BMI= Body mass index is 26.21 kg/m .  Wt Readings from Last 3 Encounters:   11/22/21 64 kg (141 lb)   10/28/21 63.5 kg (140 lb)   10/11/21 63 kg (139 lb)       General Appearance:     Alert, cooperative and in no acute distress.   ENT/Mouth: membranes moist, no oral lesions or bleeding gums.      EYES:  no scleral icterus, normal conjunctivae   Chest/Lungs:    Lung sounds clear   Cardiovascular:   Regular. Normal first and second heart sounds, 1+ edema bilateral lower extremities    Abdomen:  Soft, nontender, nondistended, bowel sounds present   Extremities: no cyanosis or clubbing   Skin: warm, dry.    Neurologic: mood and affect are appropriate, alert and oriented x3         Please refer above for cardiac ROS details.      Medical History  Surgical History Family History Social History   Past Medical History:   Diagnosis Date     Arthritis      Asthma      CAD (coronary artery disease)      Cancer (H)     basal cell     Chronic kidney disease     ckd 3     Chronic pain syndrome      Crohn's disease (H)      GERD (gastroesophageal reflux disease)      Hx of heart artery stent 11/2016    1 stent R Proximal CA and 1 Stent L Proximal circumflex  12/2016 Stent proximal LAD     Hyperlipidemia      Hypertension      NSTEMI (non-ST elevated myocardial infarction) (H) 11/2016     PONV (postoperative nausea and vomiting)     severe povn with last knee surgery     Restless legs syndrome      Stroke (H) 2010    numbness rt jaw     Past Surgical History:   Procedure Laterality Date     APPENDECTOMY       C TOTAL KNEE ARTHROPLASTY Left 5/11/2021    Procedure: LEFT TOTAL KNEE ARTHROPLASTY;  Surgeon: Doug Rhodes MD;  Location: [facility]  Main OR;  Service: Orthopedics     CARDIAC CATHETERIZATION  2016    multiple vessel disease.  no intervention     CARDIAC CATHETERIZATION  2016     CARDIAC CATHETERIZATION N/A 2016    Procedure: Coronary Angiogram;  Surgeon: Sunil Moran MD;  Location: [facility] Cath Lab;  Service:      CAROTID ENDARTERECTOMY       CAROTID ENDARTERECTOMY Right 2010     CERVICAL LAMINECTOMY  1994      SECTION       CV CORONARY ANGIOGRAM N/A 2020    Procedure: Coronary Angiogram;  Surgeon: Vinita Ramos MD;  Location: [facility] Cath Lab;  Service: Cardiology     CV LEFT HEART CATHETERIZATION WITHOUT LEFT VENTRICULOGRAM Left 2020    Procedure: Left Heart Catheterization Without Left Ventriculogram;  Surgeon: Jerad Lerner MD;  Location: [facility] Cath Lab;  Service: Cardiology     CV TRANSCATHETER AORTIC VALVE REPLACEMENT - OTHER APPROACH N/A 2020    Procedure: CV TRANSCATHETER AORTIC VALVE REPLACEMENT - RIGHT SUBCLAVIAN APPROACH;  Surgeon: John Caceres MD;  Location: [facility] Cath Lab;  Service: Cardiology     HEMORRHOIDECTOMY EXTERNAL       IR LUMBAR EPIDURAL STEROID INJECTION  2014     IR LUMBAR NERVE ROOT INJECTION  10/1/2013     JOINT REPLACEMENT       ID ESOPHAGOGASTRODUODENOSCOPY TRANSORAL DIAGNOSTIC N/A 07/10/2019    Procedure: ESOPHAGOGASTRODUODENOSCOPY (EGD) with gastric biopsy;  Surgeon: Sunil Stuart MD;  Location: [facility];  Service: Gastroenterology     ID REPLACE AORTIC VALVE OPEN AXILLRY ARTRY APPROACH N/A 2020    Procedure: OR TRANSCATHETER AORTIC VALVE REPLACEMENT, SUBCLAVIAN APPROACH;  Surgeon: Bhavin Cuadra MD;  Location: [facility];  Service: Cardiology     TONSILLECTOMY & ADENOIDECTOMY       TOTAL KNEE ARTHROPLASTY Right      Family History   Problem Relation Age of Onset     Atrial fibrillation Mother      Parkinsonism Father     Social History     Socioeconomic History     Marital status:       Spouse name: Not on file     Number of children: Not on file     Years of education: Not on file     Highest education level: Not on file   Occupational History     Not on file   Tobacco Use     Smoking status: Former Smoker     Quit date: 1980     Years since quittin.9     Smokeless tobacco: Never Used   Substance and Sexual Activity     Alcohol use: No     Drug use: No     Sexual activity: Not on file   Other Topics Concern     Not on file   Social History Narrative     Not on file     Social Determinants of Health     Financial Resource Strain: Not on file   Food Insecurity: Not on file   Transportation Needs: Not on file   Physical Activity: Not on file   Stress: Not on file   Social Connections: Not on file   Intimate Partner Violence: Not on file   Housing Stability: Not on file          Medications  Allergies   Current Outpatient Medications   Medication Sig Dispense Refill     acetaminophen (TYLENOL) 325 MG tablet [ACETAMINOPHEN (TYLENOL) 325 MG TABLET] Take 3 tablets (975 mg total) by mouth every 8 (eight) hours. (Patient taking differently: Take 975 mg by mouth daily as needed ) 60 tablet 0     albuterol (PROVENTIL HFA;VENTOLIN HFA) 90 mcg/actuation inhaler [ALBUTEROL (PROVENTIL HFA;VENTOLIN HFA) 90 MCG/ACTUATION INHALER] Inhale 1-2 puffs every 6 (six) hours as needed for wheezing.       amLODIPine (NORVASC) 2.5 MG tablet Take 1 tablet (2.5 mg) by mouth daily 90 tablet 3     aspirin (ASPIRIN) 81 MG EC tablet Take 81 mg by mouth daily       beclomethasone (QVAR) 80 mcg/actuation inhaler Inhale 1 puff into the lungs 2 times daily as needed        coenzyme Q10 100 mg capsule [COENZYME Q10 100 MG CAPSULE] Take 100 mg by mouth daily.        diclofenac (VOLTAREN) 1 % topical gel Apply 4 g topically 4 times daily       ergocalciferol (ERGOCALCIFEROL) 50,000 unit capsule [ERGOCALCIFEROL (ERGOCALCIFEROL) 50,000 UNIT CAPSULE] Take 50,000 Units by mouth once a week.       evolocumab (REPATHA) 140 MG/ML  prefilled syringe Inject 1 mL (140 mg) Subcutaneous every 14 days 6 mL 1     ferrous sulfate 325 (65 FE) MG tablet [FERROUS SULFATE 325 (65 FE) MG TABLET] Take 1 tablet (325 mg total) by mouth daily with breakfast.  0     furosemide (LASIX) 20 MG tablet Take 1 tablet (20 mg) by mouth 2 times daily 180 tablet 1     melatonin 5 mg Tab tablet [MELATONIN 5 MG TAB TABLET] Take 5 mg by mouth at bedtime as needed (for sleep).        metoprolol succinate ER (TOPROL-XL) 25 MG 24 hr tablet Take 1 tablet (25 mg) by mouth At Bedtime 30 tablet 0     multivitamin therapeutic (THERAGRAN) tablet [MULTIVITAMIN THERAPEUTIC (THERAGRAN) TABLET] Take 1 tablet by mouth daily.       nitroglycerin (NITROSTAT) 0.4 MG SL tablet [NITROGLYCERIN (NITROSTAT) 0.4 MG SL TABLET] Place 1 tablet (0.4 mg total) under the tongue every 5 (five) minutes as needed for chest pain. 90 tablet 0     rOPINIRole (REQUIP) 2 MG tablet [ROPINIROLE (REQUIP) 2 MG TABLET] Take 4 mg by mouth every evening. Patient takes 7pm      Allergies   Allergen Reactions     Amitriptyline Hcl [Amitriptyline] Other (See Comments)     Erythromycin Diarrhea and Other (See Comments)     Childhood reaction     Gabapentin Other (See Comments)     Jerking movements, swelling     Naproxen Unknown and Other (See Comments)     Other Environmental Allergy Unknown     Molds, dander     Pregabalin Other (See Comments)         Lab Results    Chemistry/lipid CBC Cardiac Enzymes/BNP/TSH/INR   Recent Labs   Lab Test 03/30/21  1507   CHOL 226*   HDL 76   *   TRIG 89     Recent Labs   Lab Test 03/30/21  1507 06/04/20  0814 05/31/19  1044   * 63 161*     Recent Labs   Lab Test 09/08/21  1346      POTASSIUM 4.1   CHLORIDE 107   CO2 25   GLC 83   BUN 24   CR 1.14*   GFRESTIMATED 43*   BESS 9.3     Recent Labs   Lab Test 09/08/21  1346 08/19/21  1021 06/07/21  1359   CR 1.14* 0.94 1.24*     Recent Labs   Lab Test 11/06/16  0555   A1C 5.6    Recent Labs   Lab Test 08/19/21  1021   WBC  8.6   HGB 10.0*   HCT 32.4*   MCV 94        Recent Labs   Lab Test 08/19/21  1021 08/05/21  1202 06/07/21  1359   HGB 10.0* 10.8* 10.1*    Recent Labs   Lab Test 09/08/21  1346 08/19/21  1021 06/07/21  1359   TROPONINI 0.01 0.01 <0.01     Recent Labs   Lab Test 09/08/21  1346 08/19/21  1021 06/07/21  1359 01/06/21  0835   BNP  --  159 61 20   NTBNP 481*  --   --   --      Recent Labs   Lab Test 03/12/21  1608   TSH 1.40     Recent Labs   Lab Test 08/19/21  1021 05/11/21  1323 06/02/20  1319   INR 1.15 1.19* 1.34*                  Thank you for allowing me to participate in the care of your patient.      Sincerely,     Rosa Savage NP     [facility]  cc:   Rosa Savage NP  [address] [place] [address]

## 2021-11-22 NOTE — PROGRESS NOTES
Assessment/Recommendations   Assessment:    1.  Heart failure with preserved ejection fraction: She notes increased edema and dyspnea on exertion over the past 10 days.  Symptoms have not improved with increasing Lasix.  She does note increased urine output.    2.  Hypertension: She reports home blood pressures have decreased and are now around 100 systolic most days.  She notes a slight increase in lightheadedness.  Blood pressure today 104/68.     3.  Coronary artery disease: History of PCI in 2016.  She denies any chest pain.     Plan:  1.  BMP and BNP pending  2.  Continue low-sodium diet  3.  Will assess medications after labs reviewed.  If BNP is elevated, increase diuretic.  May need to decrease BP medication.     Follow-up will be determined after lab results are reviewed.     History of Present Illness/Subjective    Ms. Sherice Alvarez is a 87 year old female seen at [facility] today for follow-up.  She has a history of coronary artery disease, PCI in 2016, heart failure with preserved ejection fraction, dyslipidemia, hypertension, TAVR in 2020, DVT, GERD, and asthma.    She was seen in clinic about 1 month ago.  She had no symptoms of fluid retention.  Renal function was abnormal.  Lasix decreased to 20 mg daily.  About 10 days ago, she noted increased edema and dyspnea on exertion.  She increased Lasix on her own to 20 mg twice daily.  She continues to have similar symptoms but notes increased urine output.  She has had a slight increase in lightheadedness.  She reports home blood pressures have decreased and are now around 100 systolic most days.  She denies orthopnea, chest pain and abdominal fullness/bloating.      She receives low-sodium meals from [employer].         Physical Examination Review of Systems   Vitals: /68 (BP Location: Left arm, Patient Position: Sitting, Cuff Size: Adult Regular)   Pulse 76   Resp 18   Wt 64 kg (141 lb)   SpO2 96%   BMI  26.21 kg/m    BMI= Body mass index is 26.21 kg/m .  Wt Readings from Last 3 Encounters:   11/22/21 64 kg (141 lb)   10/28/21 63.5 kg (140 lb)   10/11/21 63 kg (139 lb)       General Appearance:     Alert, cooperative and in no acute distress.   ENT/Mouth: membranes moist, no oral lesions or bleeding gums.      EYES:  no scleral icterus, normal conjunctivae   Chest/Lungs:    Lung sounds clear   Cardiovascular:   Regular. Normal first and second heart sounds, 1+ edema bilateral lower extremities    Abdomen:  Soft, nontender, nondistended, bowel sounds present   Extremities: no cyanosis or clubbing   Skin: warm, dry.    Neurologic: mood and affect are appropriate, alert and oriented x3         Please refer above for cardiac ROS details.      Medical History  Surgical History Family History Social History   Past Medical History:   Diagnosis Date     Arthritis      Asthma      CAD (coronary artery disease)      Cancer (H)     basal cell     Chronic kidney disease     ckd 3     Chronic pain syndrome      Crohn's disease (H)      GERD (gastroesophageal reflux disease)      Hx of heart artery stent 11/2016    1 stent R Proximal CA and 1 Stent L Proximal circumflex  12/2016 Stent proximal LAD     Hyperlipidemia      Hypertension      NSTEMI (non-ST elevated myocardial infarction) (H) 11/2016     PONV (postoperative nausea and vomiting)     severe povn with last knee surgery     Restless legs syndrome      Stroke (H) 2010    numbness rt jaw     Past Surgical History:   Procedure Laterality Date     APPENDECTOMY       C TOTAL KNEE ARTHROPLASTY Left 5/11/2021    Procedure: LEFT TOTAL KNEE ARTHROPLASTY;  Surgeon: Doug Rhodes MD;  Location: [facility];  Service: Orthopedics     CARDIAC CATHETERIZATION  11/04/2016    multiple vessel disease.  no intervention     CARDIAC CATHETERIZATION  11/07/2016     CARDIAC CATHETERIZATION N/A 12/27/2016    Procedure: Coronary Angiogram;  Surgeon: Sunil Moran MD;  Location:   [facility] Cath Lab;  Service:      CAROTID ENDARTERECTOMY       CAROTID ENDARTERECTOMY Right 2010     CERVICAL LAMINECTOMY  1994      SECTION       CV CORONARY ANGIOGRAM N/A 2020    Procedure: Coronary Angiogram;  Surgeon: Vinita Ramos MD;  Location: [facility] Cath Lab;  Service: Cardiology     CV LEFT HEART CATHETERIZATION WITHOUT LEFT VENTRICULOGRAM Left 2020    Procedure: Left Heart Catheterization Without Left Ventriculogram;  Surgeon: Jerad Lerner MD;  Location: [facility] Cath Lab;  Service: Cardiology     CV TRANSCATHETER AORTIC VALVE REPLACEMENT - OTHER APPROACH N/A 2020    Procedure: CV TRANSCATHETER AORTIC VALVE REPLACEMENT - RIGHT SUBCLAVIAN APPROACH;  Surgeon: John Caceres MD;  Location: [facility] Cath Lab;  Service: Cardiology     HEMORRHOIDECTOMY EXTERNAL       IR LUMBAR EPIDURAL STEROID INJECTION  2014     IR LUMBAR NERVE ROOT INJECTION  10/1/2013     JOINT REPLACEMENT       SD ESOPHAGOGASTRODUODENOSCOPY TRANSORAL DIAGNOSTIC N/A 07/10/2019    Procedure: ESOPHAGOGASTRODUODENOSCOPY (EGD) with gastric biopsy;  Surgeon: Sunil Stuart MD;  Location: [facility] GI;  Service: Gastroenterology     SD REPLACE AORTIC VALVE OPEN AXILLRY ARTRY APPROACH N/A 2020    Procedure: OR TRANSCATHETER AORTIC VALVE REPLACEMENT, SUBCLAVIAN APPROACH;  Surgeon: Bhavin Cuadra MD;  Location: [facility];  Service: Cardiology     TONSILLECTOMY & ADENOIDECTOMY       TOTAL KNEE ARTHROPLASTY Right      Family History   Problem Relation Age of Onset     Atrial fibrillation Mother      Parkinsonism Father     Social History     Socioeconomic History     Marital status:      Spouse name: Not on file     Number of children: Not on file     Years of education: Not on file     Highest education level: Not on file   Occupational History     Not on file   Tobacco Use     Smoking status: Former Smoker     Quit date: 1980     Years since quittin.9      Smokeless tobacco: Never Used   Substance and Sexual Activity     Alcohol use: No     Drug use: No     Sexual activity: Not on file   Other Topics Concern     Not on file   Social History Narrative     Not on file     Social Determinants of Health     Financial Resource Strain: Not on file   Food Insecurity: Not on file   Transportation Needs: Not on file   Physical Activity: Not on file   Stress: Not on file   Social Connections: Not on file   Intimate Partner Violence: Not on file   Housing Stability: Not on file          Medications  Allergies   Current Outpatient Medications   Medication Sig Dispense Refill     acetaminophen (TYLENOL) 325 MG tablet [ACETAMINOPHEN (TYLENOL) 325 MG TABLET] Take 3 tablets (975 mg total) by mouth every 8 (eight) hours. (Patient taking differently: Take 975 mg by mouth daily as needed ) 60 tablet 0     albuterol (PROVENTIL HFA;VENTOLIN HFA) 90 mcg/actuation inhaler [ALBUTEROL (PROVENTIL HFA;VENTOLIN HFA) 90 MCG/ACTUATION INHALER] Inhale 1-2 puffs every 6 (six) hours as needed for wheezing.       amLODIPine (NORVASC) 2.5 MG tablet Take 1 tablet (2.5 mg) by mouth daily 90 tablet 3     aspirin (ASPIRIN) 81 MG EC tablet Take 81 mg by mouth daily       beclomethasone (QVAR) 80 mcg/actuation inhaler Inhale 1 puff into the lungs 2 times daily as needed        coenzyme Q10 100 mg capsule [COENZYME Q10 100 MG CAPSULE] Take 100 mg by mouth daily.        diclofenac (VOLTAREN) 1 % topical gel Apply 4 g topically 4 times daily       ergocalciferol (ERGOCALCIFEROL) 50,000 unit capsule [ERGOCALCIFEROL (ERGOCALCIFEROL) 50,000 UNIT CAPSULE] Take 50,000 Units by mouth once a week.       evolocumab (REPATHA) 140 MG/ML prefilled syringe Inject 1 mL (140 mg) Subcutaneous every 14 days 6 mL 1     ferrous sulfate 325 (65 FE) MG tablet [FERROUS SULFATE 325 (65 FE) MG TABLET] Take 1 tablet (325 mg total) by mouth daily with breakfast.  0     furosemide (LASIX) 20 MG tablet Take 1 tablet (20 mg) by mouth  2 times daily 180 tablet 1     melatonin 5 mg Tab tablet [MELATONIN 5 MG TAB TABLET] Take 5 mg by mouth at bedtime as needed (for sleep).        metoprolol succinate ER (TOPROL-XL) 25 MG 24 hr tablet Take 1 tablet (25 mg) by mouth At Bedtime 30 tablet 0     multivitamin therapeutic (THERAGRAN) tablet [MULTIVITAMIN THERAPEUTIC (THERAGRAN) TABLET] Take 1 tablet by mouth daily.       nitroglycerin (NITROSTAT) 0.4 MG SL tablet [NITROGLYCERIN (NITROSTAT) 0.4 MG SL TABLET] Place 1 tablet (0.4 mg total) under the tongue every 5 (five) minutes as needed for chest pain. 90 tablet 0     rOPINIRole (REQUIP) 2 MG tablet [ROPINIROLE (REQUIP) 2 MG TABLET] Take 4 mg by mouth every evening. Patient takes 7pm      Allergies   Allergen Reactions     Amitriptyline Hcl [Amitriptyline] Other (See Comments)     Erythromycin Diarrhea and Other (See Comments)     Childhood reaction     Gabapentin Other (See Comments)     Jerking movements, swelling     Naproxen Unknown and Other (See Comments)     Other Environmental Allergy Unknown     Molds, dander     Pregabalin Other (See Comments)         Lab Results    Chemistry/lipid CBC Cardiac Enzymes/BNP/TSH/INR   Recent Labs   Lab Test 03/30/21  1507   CHOL 226*   HDL 76   *   TRIG 89     Recent Labs   Lab Test 03/30/21  1507 06/04/20  0814 05/31/19  1044   * 63 161*     Recent Labs   Lab Test 09/08/21  1346      POTASSIUM 4.1   CHLORIDE 107   CO2 25   GLC 83   BUN 24   CR 1.14*   GFRESTIMATED 43*   BESS 9.3     Recent Labs   Lab Test 09/08/21  1346 08/19/21  1021 06/07/21  1359   CR 1.14* 0.94 1.24*     Recent Labs   Lab Test 11/06/16  0555   A1C 5.6    Recent Labs   Lab Test 08/19/21  1021   WBC 8.6   HGB 10.0*   HCT 32.4*   MCV 94        Recent Labs   Lab Test 08/19/21  1021 08/05/21  1202 06/07/21  1359   HGB 10.0* 10.8* 10.1*    Recent Labs   Lab Test 09/08/21  1346 08/19/21  1021 06/07/21  1359   TROPONINI 0.01 0.01 <0.01     Recent Labs   Lab Test 09/08/21  5549  08/19/21  1021 06/07/21  1359 01/06/21  0835   BNP  --  159 61 20   NTBNP 481*  --   --   --      Recent Labs   Lab Test 03/12/21  1608   TSH 1.40     Recent Labs   Lab Test 08/19/21  1021 05/11/21  1323 06/02/20  1319   INR 1.15 1.19* 1.34*

## 2021-11-22 NOTE — PATIENT INSTRUCTIONS
Sherice Alvarez,    It was a pleasure to see you today at the [facility].     My recommendations after this visit include:  - You will be called with the results of your labs on Tuesday or Wednesday.    - Continue to limit salt in your diet.   - We will plan follow up after your labs are reviewed.   - Continue to monitor for signs of retaining fluid (increasing weights, shortness of breath, swelling) and call with any concerns.  - If you have any questions or concerns, please call [phone] to talk with my nurse.    Rosa Carter, CNP

## 2021-11-23 ENCOUNTER — TELEPHONE (OUTPATIENT)
Dept: CARDIOLOGY | Facility: CLINIC | Age: 86
End: 2021-11-23
Payer: MEDICARE

## 2021-11-23 NOTE — TELEPHONE ENCOUNTER
----- Message from Rosa Savage NP sent at 11/23/2021  8:55 AM CST -----  BNP is normal so I do not think her symptoms are from fluid retention.  Renal function has worsened.  Please have her decrease lasix back to 20 mg daily and stop amlodipine. Her BP has been running low which may be contributing to her symptoms of lightheadedness, dyspnea on exertion, and fatigue. Please have her schedule follow up with Kim in 1-2 weeks.  If she has worsening symptoms, she needs to call sooner.

## 2021-11-23 NOTE — TELEPHONE ENCOUNTER
Called pt and relayed Rosa's message below. Pt will stop taking Amlodipine and decrease her Lasix to 20 mg once per day.     Pt has existing follow up appointment with Kim on 12-2-21. Advised pt to [facility] HF RN line if she has any new or worsening symptoms. Pt agreeable to plan; confirmed that she had HF RN number.     Maria Ines Guerrero RN

## 2021-11-30 ENCOUNTER — TELEPHONE (OUTPATIENT)
Dept: CARDIOLOGY | Facility: CLINIC | Age: 86
End: 2021-11-30
Payer: MEDICARE

## 2021-11-30 NOTE — TELEPHONE ENCOUNTER
"Pt called and left  asking for a call back regarding labs drawn on 11-23-21. Writer had already relayed lab results to pt with medication change recommendations.     Called pt back and reviewed Rosa's message from previous labs. Pt stated \"oh yes I remember now!\" Confirmed that patient had stopped taking amlodipine and reduced lasix to 20 mg daily when we talked on 11-23-21.     Maria Ines Guerrero RN    "

## 2021-12-31 ENCOUNTER — TELEPHONE (OUTPATIENT)
Dept: CARDIOLOGY | Facility: CLINIC | Age: 86
End: 2021-12-31
Payer: COMMERCIAL

## 2021-12-31 NOTE — TELEPHONE ENCOUNTER
Message  Received: Yesterday  Ulisses Feldman MD Caswell, Shelley AHN, RN  Seen by chf in November and not since, got this report today, can we forward to chf np, looks stable to me.   If unable then would consider calling her to see how doing.   LF           Noted LBF review of weight log. Pt was seen by HF NP back in November and then cancelled her follow up visit due to covid exposure. msg sent to schedulers to please arrange follow up visit with DCB as previously recommended by JE. Will route to DC[facility] for review Monday per LBF request. -[facility]        Cody Alvarez,  See scanned in [employer] weight log dated 12/30/21. Forwarding on to HF team per LBF request. He reviewed as stable. Pt was due to see you 12/2 and cancelled her appt. I sent a message to scheduling team to please reach out and reschedule this visit.  Thanks,  Mal

## 2022-01-10 ENCOUNTER — TELEPHONE (OUTPATIENT)
Dept: CARDIOLOGY | Facility: CLINIC | Age: 87
End: 2022-01-10
Payer: COMMERCIAL

## 2022-01-10 NOTE — TELEPHONE ENCOUNTER
Ulisses Feldman MD Caswell, Mallory J, RN  Looks like [employer] device triggered phone call from their nurses due to higher blood pressures.  Most recent blood pressures are normal.  Can we give her a call in a few days to make sure she is still doing well, if so follow-up as needed.  If not we will need to be seen by rapid access.  LF           Noted BP review and writer reviewed. Pt is being seen in HF clinic on 1/13- appropriate follow up plan in place. No rac required. -[facility]

## 2022-01-12 VITALS — BODY MASS INDEX: 30.28 KG/M2 | WEIGHT: 150.2 LBS | HEIGHT: 59 IN

## 2022-01-13 ENCOUNTER — OFFICE VISIT (OUTPATIENT)
Dept: CARDIOLOGY | Facility: CLINIC | Age: 87
End: 2022-01-13
Payer: COMMERCIAL

## 2022-01-13 VITALS
DIASTOLIC BLOOD PRESSURE: 72 MMHG | SYSTOLIC BLOOD PRESSURE: 120 MMHG | BODY MASS INDEX: 26.87 KG/M2 | HEIGHT: 62 IN | WEIGHT: 146 LBS | HEART RATE: 72 BPM | RESPIRATION RATE: 16 BRPM

## 2022-01-13 DIAGNOSIS — Z95.2 S/P TAVR (TRANSCATHETER AORTIC VALVE REPLACEMENT): ICD-10-CM

## 2022-01-13 DIAGNOSIS — N18.30 STAGE 3 CHRONIC KIDNEY DISEASE, UNSPECIFIED WHETHER STAGE 3A OR 3B CKD (H): ICD-10-CM

## 2022-01-13 DIAGNOSIS — I35.0 NONRHEUMATIC AORTIC VALVE STENOSIS: ICD-10-CM

## 2022-01-13 DIAGNOSIS — I10 BENIGN ESSENTIAL HYPERTENSION: ICD-10-CM

## 2022-01-13 DIAGNOSIS — I35.0 SEVERE AORTIC STENOSIS: ICD-10-CM

## 2022-01-13 DIAGNOSIS — I50.32 CHRONIC HEART FAILURE WITH PRESERVED EJECTION FRACTION (H): Primary | ICD-10-CM

## 2022-01-13 LAB
ALBUMIN SERPL-MCNC: 3.6 G/DL (ref 3.5–5)
ALP SERPL-CCNC: 68 U/L (ref 45–120)
ALT SERPL W P-5'-P-CCNC: 14 U/L (ref 0–45)
ANION GAP SERPL CALCULATED.3IONS-SCNC: 7 MMOL/L (ref 5–18)
AST SERPL W P-5'-P-CCNC: 23 U/L (ref 0–40)
BILIRUB SERPL-MCNC: 0.5 MG/DL (ref 0–1)
BNP SERPL-MCNC: 152 PG/ML (ref 0–167)
BUN SERPL-MCNC: 21 MG/DL (ref 8–28)
CALCIUM SERPL-MCNC: 9.1 MG/DL (ref 8.5–10.5)
CHLORIDE BLD-SCNC: 109 MMOL/L (ref 98–107)
CO2 SERPL-SCNC: 25 MMOL/L (ref 22–31)
CREAT SERPL-MCNC: 1.16 MG/DL (ref 0.6–1.1)
ERYTHROCYTE [DISTWIDTH] IN BLOOD BY AUTOMATED COUNT: 19.5 % (ref 10–15)
GFR SERPL CREATININE-BSD FRML MDRD: 45 ML/MIN/1.73M2
GLUCOSE BLD-MCNC: 94 MG/DL (ref 70–125)
HCT VFR BLD AUTO: 37.1 % (ref 35–47)
HGB BLD-MCNC: 11.7 G/DL (ref 11.7–15.7)
MCH RBC QN AUTO: 30 PG (ref 26.5–33)
MCHC RBC AUTO-ENTMCNC: 31.5 G/DL (ref 31.5–36.5)
MCV RBC AUTO: 95 FL (ref 78–100)
PLATELET # BLD AUTO: 212 10E3/UL (ref 150–450)
POTASSIUM BLD-SCNC: 4.1 MMOL/L (ref 3.5–5)
PROT SERPL-MCNC: 6.5 G/DL (ref 6–8)
RBC # BLD AUTO: 3.9 10E6/UL (ref 3.8–5.2)
SODIUM SERPL-SCNC: 141 MMOL/L (ref 136–145)
WBC # BLD AUTO: 7.9 10E3/UL (ref 4–11)

## 2022-01-13 PROCEDURE — 83880 ASSAY OF NATRIURETIC PEPTIDE: CPT | Performed by: NURSE PRACTITIONER

## 2022-01-13 PROCEDURE — 36415 COLL VENOUS BLD VENIPUNCTURE: CPT | Performed by: NURSE PRACTITIONER

## 2022-01-13 PROCEDURE — 85027 COMPLETE CBC AUTOMATED: CPT | Performed by: NURSE PRACTITIONER

## 2022-01-13 PROCEDURE — 99214 OFFICE O/P EST MOD 30 MIN: CPT | Performed by: NURSE PRACTITIONER

## 2022-01-13 PROCEDURE — 80053 COMPREHEN METABOLIC PANEL: CPT | Performed by: NURSE PRACTITIONER

## 2022-01-13 RX ORDER — AMLODIPINE BESYLATE 2.5 MG/1
2.5 TABLET ORAL DAILY
Qty: 90 TABLET | Refills: 3 | Status: SHIPPED | OUTPATIENT
Start: 2022-01-13 | End: 2022-08-02

## 2022-01-13 RX ORDER — METOPROLOL SUCCINATE 25 MG/1
25 TABLET, EXTENDED RELEASE ORAL AT BEDTIME
Qty: 90 TABLET | Refills: 3 | Status: SHIPPED | OUTPATIENT
Start: 2022-01-13 | End: 2022-08-03

## 2022-01-13 ASSESSMENT — MIFFLIN-ST. JEOR: SCORE: 1050.5

## 2022-01-13 NOTE — LETTER
1/13/2022    Anum Rosenberg NP  [place] [address] [facility] [address]    RE: Sherice Alvarez       Dear Colleague,     I had the pleasure of seeing Sherice Alvarez in the [facility].        Assessment/Recommendations   Assessment:    1. Heart failure with preserved ejection fraction, NYHA class III: Decompensated.  She states she has had increased weight, dyspnea on exertion, fatigue, chest tightness, abdominal distention and lower extremity edema.  She follows a low-sodium diet and is part of [employer] service.  2. Hypertension: Controlled here in the clinic 120/72.  She monitors her blood pressure at home and has been 170/80  3. Chronic kidney disease stage III: Her creatinine was elevated in November at 1.65.    Plan:  1. CMP, BNP and CBC pending    2. Take amlodipine at night  3. Continue daily weights and low sodium diet  4. Monitor blood pressure different times of the day and keep a log    Sherice Alvarez will follow up with Dr Feldman in April and in the heart failure clinic in 1-2 weeks.     History of Present Illness/Subjective    Ms. Sherice Alvarez is a 87 year old female seen at [facility] heart failure clinic today for continued follow-up.  She follows up for heart failure with preserved ejection fraction.  The heart clinic was notified a few weeks ago due to elevated blood pressures and weights increasing.  Dr. Feldman recommended she follow-up in the heart failure clinic.  Her SBP has been around 170.  She also has gained approximately 6 pounds.  Past medical history significant for hypertension, TAVR, DVT, dyslipidemia, chronic kidney disease stage III.    Today, she comes in with increased fatigue, dyspnea on exertion, abdominal distention, edema and chest tightness.  Her weight has also increased.  She denies orthopnea or PND.  She denies lightheadedness, orthopnea, PND and palpitations.      She is monitoring home weights  "which have increased to 146 pounds from 140 pounds.  She is following a low sodium diet. She is enrolled in [employer] service.         Physical Examination Review of Systems   /72 (BP Location: Left arm, Patient Position: Sitting, Cuff Size: Adult Regular)   Pulse 72   Resp 16   Ht 1.575 m (5' 2\")   Wt 66.2 kg (146 lb)   BMI 26.70 kg/m    Body mass index is 26.7 kg/m .  Wt Readings from Last 3 Encounters:   01/13/22 66.2 kg (146 lb)   11/22/21 64 kg (141 lb)   10/28/21 63.5 kg (140 lb)       General Appearance:   no acute distress   ENT/Mouth: membranes moist, no oral lesions or bleeding gums.      EYES:  no scleral icterus, normal conjunctivae   Neck: no carotid bruits or thyromegaly   Chest/Lungs:   lungs are clear to auscultation, no rales or wheezing, equal chest wall expansion    Cardiovascular:   Regular. Normal first and second heart sounds with no murmurs, rubs, or gallops; Jugular venous pressure normal, mild edema bilaterally    Abdomen:  no organomegaly, masses, bruits, or tenderness; bowel sounds are present   Extremities: no cyanosis or clubbing   Skin: no xanthelasma, warm.    Neurologic: normal  bilateral, no tremors     Psychiatric: alert and oriented x3    Enc Vitals  BP: 120/72  Pulse: 72  Resp: 16  Weight: 66.2 kg (146 lb)  Height: 157.5 cm (5' 2\")                                         Medical History  Surgical History Family History Social History   Past Medical History:   Diagnosis Date     Arthritis      Asthma      CAD (coronary artery disease)      Cancer (H)     basal cell     Chronic kidney disease     ckd 3     Chronic pain syndrome      Crohn's disease (H)      GERD (gastroesophageal reflux disease)      Hx of heart artery stent 11/2016    1 stent R Proximal CA and 1 Stent L Proximal circumflex  12/2016 Stent proximal LAD     Hyperlipidemia      Hypertension      NSTEMI (non-ST elevated myocardial infarction) (H) 11/2016     PONV (postoperative nausea and vomiting)  "    severe povn with last knee surgery     Restless legs syndrome      Stroke (H)     numbness rt jaw    Past Surgical History:   Procedure Laterality Date     APPENDECTOMY       CARDIAC CATHETERIZATION  2016    multiple vessel disease.  no intervention     CARDIAC CATHETERIZATION  2016     CARDIAC CATHETERIZATION N/A 2016    Procedure: Coronary Angiogram;  Surgeon: Sunil Moran MD;  Location: [facility] Cath Lab;  Service:      CAROTID ENDARTERECTOMY       CAROTID ENDARTERECTOMY Right 2010     CERVICAL LAMINECTOMY  1994      SECTION       CV CORONARY ANGIOGRAM N/A 2020    Procedure: Coronary Angiogram;  Surgeon: Vinita Ramos MD;  Location: [facility] Cath Lab;  Service: Cardiology     CV LEFT HEART CATHETERIZATION WITHOUT LEFT VENTRICULOGRAM Left 2020    Procedure: Left Heart Catheterization Without Left Ventriculogram;  Surgeon: Jerad Lerner MD;  Location: [facility] Cath Lab;  Service: Cardiology     CV TRANSCATHETER AORTIC VALVE REPLACEMENT - OTHER APPROACH N/A 2020    Procedure: CV TRANSCATHETER AORTIC VALVE REPLACEMENT - RIGHT SUBCLAVIAN APPROACH;  Surgeon: John Caceres MD;  Location: [facility] Cath Lab;  Service: Cardiology     HEMORRHOIDECTOMY EXTERNAL       IR LUMBAR EPIDURAL STEROID INJECTION  2014     IR LUMBAR NERVE ROOT INJECTION  10/1/2013     JOINT REPLACEMENT       RI ESOPHAGOGASTRODUODENOSCOPY TRANSORAL DIAGNOSTIC N/A 07/10/2019    Procedure: ESOPHAGOGASTRODUODENOSCOPY (EGD) with gastric biopsy;  Surgeon: Sunil Stuart MD;  Location: [facility];  Service: Gastroenterology     RI REPLACE AORTIC VALVE OPEN AXILLRY ARTRY APPROACH N/A 2020    Procedure: OR TRANSCATHETER AORTIC VALVE REPLACEMENT, SUBCLAVIAN APPROACH;  Surgeon: Bhavin Cuadra MD;  Location: [facility] Lab;  Service: Cardiology     TONSILLECTOMY & ADENOIDECTOMY       TOTAL KNEE ARTHROPLASTY Right      ZZC TOTAL KNEE ARTHROPLASTY Left  2021    Procedure: LEFT TOTAL KNEE ARTHROPLASTY;  Surgeon: Doug Rhodes MD;  Location: [facility] Main OR;  Service: Orthopedics    Family History   Problem Relation Age of Onset     Atrial fibrillation Mother      Parkinsonism Father     Social History     Socioeconomic History     Marital status:      Spouse name: Not on file     Number of children: Not on file     Years of education: Not on file     Highest education level: Not on file   Occupational History     Not on file   Tobacco Use     Smoking status: Former Smoker     Quit date: 1980     Years since quittin.0     Smokeless tobacco: Never Used   Substance and Sexual Activity     Alcohol use: No     Drug use: No     Sexual activity: Not on file   Other Topics Concern     Not on file   Social History Narrative     Not on file     Social Determinants of Health     Financial Resource Strain: Not on file   Food Insecurity: Not on file   Transportation Needs: Not on file   Physical Activity: Not on file   Stress: Not on file   Social Connections: Not on file   Intimate Partner Violence: Not on file   Housing Stability: Not on file          Medications  Allergies   Current Outpatient Medications   Medication Sig Dispense Refill     acetaminophen (TYLENOL) 325 MG tablet [ACETAMINOPHEN (TYLENOL) 325 MG TABLET] Take 3 tablets (975 mg total) by mouth every 8 (eight) hours. (Patient taking differently: Take 975 mg by mouth daily as needed ) 60 tablet 0     albuterol (PROVENTIL HFA;VENTOLIN HFA) 90 mcg/actuation inhaler [ALBUTEROL (PROVENTIL HFA;VENTOLIN HFA) 90 MCG/ACTUATION INHALER] Inhale 1-2 puffs every 6 (six) hours as needed for wheezing.       amLODIPine (NORVASC) 2.5 MG tablet Take 1 tablet (2.5 mg) by mouth daily 90 tablet 3     aspirin (ASPIRIN) 81 MG EC tablet Take 81 mg by mouth daily       beclomethasone (QVAR) 80 mcg/actuation inhaler Inhale 1 puff into the lungs 2 times daily as needed        coenzyme Q10 100 mg capsule [COENZYME  Q10 100 MG CAPSULE] Take 100 mg by mouth daily.        diclofenac (VOLTAREN) 1 % topical gel Apply 4 g topically 4 times daily       ergocalciferol (ERGOCALCIFEROL) 50,000 unit capsule [ERGOCALCIFEROL (ERGOCALCIFEROL) 50,000 UNIT CAPSULE] Take 50,000 Units by mouth once a week.       evolocumab (REPATHA) 140 MG/ML prefilled syringe Inject 1 mL (140 mg) Subcutaneous every 14 days 6 mL 1     ferrous sulfate 325 (65 FE) MG tablet [FERROUS SULFATE 325 (65 FE) MG TABLET] Take 1 tablet (325 mg total) by mouth daily with breakfast. (Patient taking differently: Take 1 tablet by mouth four times a week )  0     furosemide (LASIX) 20 MG tablet Take 1 tablet (20 mg) by mouth 2 times daily 180 tablet 1     melatonin 5 mg Tab tablet [MELATONIN 5 MG TAB TABLET] Take 5 mg by mouth at bedtime as needed (for sleep).        metoprolol succinate ER (TOPROL-XL) 25 MG 24 hr tablet Take 1 tablet (25 mg) by mouth At Bedtime 90 tablet 3     multivitamin therapeutic (THERAGRAN) tablet [MULTIVITAMIN THERAPEUTIC (THERAGRAN) TABLET] Take 1 tablet by mouth daily.       nitroglycerin (NITROSTAT) 0.4 MG SL tablet [NITROGLYCERIN (NITROSTAT) 0.4 MG SL TABLET] Place 1 tablet (0.4 mg total) under the tongue every 5 (five) minutes as needed for chest pain. 90 tablet 0     rOPINIRole (REQUIP) 2 MG tablet [ROPINIROLE (REQUIP) 2 MG TABLET] Take 4 mg by mouth every evening. Patient takes 7pm      Allergies   Allergen Reactions     Amitriptyline Hcl [Amitriptyline] Other (See Comments)     Erythromycin Diarrhea and Other (See Comments)     Childhood reaction     Gabapentin Other (See Comments)     Jerking movements, swelling     Naproxen Unknown and Other (See Comments)     Other Environmental Allergy Unknown     Molds, dander     Pregabalin Other (See Comments)         Lab Results    Chemistry/lipid CBC Cardiac Enzymes/BNP/TSH/INR   Lab Results   Component Value Date    CHOL 226 (H) 03/30/2021    HDL 76 03/30/2021    TRIG 89 03/30/2021    BUN 34 (H)  11/22/2021     11/22/2021    CO2 29 11/22/2021    Lab Results   Component Value Date    WBC 8.6 08/19/2021    HGB 10.0 (L) 08/19/2021    HCT 32.4 (L) 08/19/2021    MCV 94 08/19/2021     08/19/2021    Lab Results   Component Value Date    TROPONINI 0.01 09/08/2021     11/22/2021    TSH 1.40 03/12/2021    INR 1.15 08/19/2021          This note has been dictated using voice recognition software. Any grammatical, typographical, or context distortions are unintentional and inherent to the software    30 minutes spent on the date of encounter doing chart review, review of outside records, review of test results, patient visit and documentation.

## 2022-01-13 NOTE — PROGRESS NOTES
Assessment/Recommendations   Assessment:    1. Heart failure with preserved ejection fraction, NYHA class III: Decompensated.  She states she has had increased weight, dyspnea on exertion, fatigue, chest tightness, abdominal distention and lower extremity edema.  She follows a low-sodium diet and is part of [employer] service.  2. Hypertension: Controlled here in the clinic 120/72.  She monitors her blood pressure at home and has been 170/80  3. Chronic kidney disease stage III: Her creatinine was elevated in November at 1.65.    Plan:  1. CMP, BNP and CBC pending    2. Take amlodipine at night  3. Continue daily weights and low sodium diet  4. Monitor blood pressure different times of the day and keep a log    Sherice Alvarez will follow up with Dr Feldman in April and in the heart failure clinic in 1-2 weeks.     History of Present Illness/Subjective    Ms. Sherice Alvarez is a 87 year old female seen at [facility] heart failure clinic today for continued follow-up.  She follows up for heart failure with preserved ejection fraction.  The heart clinic was notified a few weeks ago due to elevated blood pressures and weights increasing.  Dr. Feldman recommended she follow-up in the heart failure clinic.  Her SBP has been around 170.  She also has gained approximately 6 pounds.  Past medical history significant for hypertension, TAVR, DVT, dyslipidemia, chronic kidney disease stage III.    Today, she comes in with increased fatigue, dyspnea on exertion, abdominal distention, edema and chest tightness.  Her weight has also increased.  She denies orthopnea or PND.  She denies lightheadedness, orthopnea, PND and palpitations.      She is monitoring home weights which have increased to 146 pounds from 140 pounds.  She is following a low sodium diet. She is enrolled in [employer] service.         Physical Examination Review of Systems   /72 (BP Location: Left arm, Patient Position:  "Sitting, Cuff Size: Adult Regular)   Pulse 72   Resp 16   Ht 1.575 m (5' 2\")   Wt 66.2 kg (146 lb)   BMI 26.70 kg/m    Body mass index is 26.7 kg/m .  Wt Readings from Last 3 Encounters:   01/13/22 66.2 kg (146 lb)   11/22/21 64 kg (141 lb)   10/28/21 63.5 kg (140 lb)       General Appearance:   no acute distress   ENT/Mouth: membranes moist, no oral lesions or bleeding gums.      EYES:  no scleral icterus, normal conjunctivae   Neck: no carotid bruits or thyromegaly   Chest/Lungs:   lungs are clear to auscultation, no rales or wheezing, equal chest wall expansion    Cardiovascular:   Regular. Normal first and second heart sounds with no murmurs, rubs, or gallops; Jugular venous pressure normal, mild edema bilaterally    Abdomen:  no organomegaly, masses, bruits, or tenderness; bowel sounds are present   Extremities: no cyanosis or clubbing   Skin: no xanthelasma, warm.    Neurologic: normal  bilateral, no tremors     Psychiatric: alert and oriented x3    Enc Vitals  BP: 120/72  Pulse: 72  Resp: 16  Weight: 66.2 kg (146 lb)  Height: 157.5 cm (5' 2\")                                         Medical History  Surgical History Family History Social History   Past Medical History:   Diagnosis Date     Arthritis      Asthma      CAD (coronary artery disease)      Cancer (H)     basal cell     Chronic kidney disease     ckd 3     Chronic pain syndrome      Crohn's disease (H)      GERD (gastroesophageal reflux disease)      Hx of heart artery stent 11/2016    1 stent R Proximal CA and 1 Stent L Proximal circumflex  12/2016 Stent proximal LAD     Hyperlipidemia      Hypertension      NSTEMI (non-ST elevated myocardial infarction) (H) 11/2016     PONV (postoperative nausea and vomiting)     severe povn with last knee surgery     Restless legs syndrome      Stroke (H) 2010    numbness rt jaw    Past Surgical History:   Procedure Laterality Date     APPENDECTOMY       CARDIAC CATHETERIZATION  11/04/2016    multiple " vessel disease.  no intervention     CARDIAC CATHETERIZATION  2016     CARDIAC CATHETERIZATION N/A 2016    Procedure: Coronary Angiogram;  Surgeon: Sunil Moran MD;  Location: [facility] Cath Lab;  Service:      CAROTID ENDARTERECTOMY       CAROTID ENDARTERECTOMY Right 2010     CERVICAL LAMINECTOMY  1994      SECTION       CV CORONARY ANGIOGRAM N/A 2020    Procedure: Coronary Angiogram;  Surgeon: Vinita Ramos MD;  Location: [facility] Cath Lab;  Service: Cardiology     CV LEFT HEART CATHETERIZATION WITHOUT LEFT VENTRICULOGRAM Left 2020    Procedure: Left Heart Catheterization Without Left Ventriculogram;  Surgeon: Jerad Lerner MD;  Location: [facility] Cath Lab;  Service: Cardiology     CV TRANSCATHETER AORTIC VALVE REPLACEMENT - OTHER APPROACH N/A 2020    Procedure: CV TRANSCATHETER AORTIC VALVE REPLACEMENT - RIGHT SUBCLAVIAN APPROACH;  Surgeon: John Caceres MD;  Location: [facility] Cath Lab;  Service: Cardiology     HEMORRHOIDECTOMY EXTERNAL       IR LUMBAR EPIDURAL STEROID INJECTION  2014     IR LUMBAR NERVE ROOT INJECTION  10/1/2013     JOINT REPLACEMENT       NH ESOPHAGOGASTRODUODENOSCOPY TRANSORAL DIAGNOSTIC N/A 07/10/2019    Procedure: ESOPHAGOGASTRODUODENOSCOPY (EGD) with gastric biopsy;  Surgeon: Sunil Stuart MD;  Location: [facility];  Service: Gastroenterology     NH REPLACE AORTIC VALVE OPEN AXILLRY ARTRY APPROACH N/A 2020    Procedure: OR TRANSCATHETER AORTIC VALVE REPLACEMENT, SUBCLAVIAN APPROACH;  Surgeon: Bhavin Cuadra MD;  Location: [facility] Cath Lab;  Service: Cardiology     TONSILLECTOMY & ADENOIDECTOMY       TOTAL KNEE ARTHROPLASTY Right      ZZC TOTAL KNEE ARTHROPLASTY Left 2021    Procedure: LEFT TOTAL KNEE ARTHROPLASTY;  Surgeon: Doug Rhodes MD;  Location: [facility];  Service: Orthopedics    Family History   Problem Relation Age of Onset     Atrial fibrillation Mother       Parkinsonism Father     Social History     Socioeconomic History     Marital status:      Spouse name: Not on file     Number of children: Not on file     Years of education: Not on file     Highest education level: Not on file   Occupational History     Not on file   Tobacco Use     Smoking status: Former Smoker     Quit date: 1980     Years since quittin.0     Smokeless tobacco: Never Used   Substance and Sexual Activity     Alcohol use: No     Drug use: No     Sexual activity: Not on file   Other Topics Concern     Not on file   Social History Narrative     Not on file     Social Determinants of Health     Financial Resource Strain: Not on file   Food Insecurity: Not on file   Transportation Needs: Not on file   Physical Activity: Not on file   Stress: Not on file   Social Connections: Not on file   Intimate Partner Violence: Not on file   Housing Stability: Not on file          Medications  Allergies   Current Outpatient Medications   Medication Sig Dispense Refill     acetaminophen (TYLENOL) 325 MG tablet [ACETAMINOPHEN (TYLENOL) 325 MG TABLET] Take 3 tablets (975 mg total) by mouth every 8 (eight) hours. (Patient taking differently: Take 975 mg by mouth daily as needed ) 60 tablet 0     albuterol (PROVENTIL HFA;VENTOLIN HFA) 90 mcg/actuation inhaler [ALBUTEROL (PROVENTIL HFA;VENTOLIN HFA) 90 MCG/ACTUATION INHALER] Inhale 1-2 puffs every 6 (six) hours as needed for wheezing.       amLODIPine (NORVASC) 2.5 MG tablet Take 1 tablet (2.5 mg) by mouth daily 90 tablet 3     aspirin (ASPIRIN) 81 MG EC tablet Take 81 mg by mouth daily       beclomethasone (QVAR) 80 mcg/actuation inhaler Inhale 1 puff into the lungs 2 times daily as needed        coenzyme Q10 100 mg capsule [COENZYME Q10 100 MG CAPSULE] Take 100 mg by mouth daily.        diclofenac (VOLTAREN) 1 % topical gel Apply 4 g topically 4 times daily       ergocalciferol (ERGOCALCIFEROL) 50,000 unit capsule [ERGOCALCIFEROL (ERGOCALCIFEROL) 50,000  UNIT CAPSULE] Take 50,000 Units by mouth once a week.       evolocumab (REPATHA) 140 MG/ML prefilled syringe Inject 1 mL (140 mg) Subcutaneous every 14 days 6 mL 1     ferrous sulfate 325 (65 FE) MG tablet [FERROUS SULFATE 325 (65 FE) MG TABLET] Take 1 tablet (325 mg total) by mouth daily with breakfast. (Patient taking differently: Take 1 tablet by mouth four times a week )  0     furosemide (LASIX) 20 MG tablet Take 1 tablet (20 mg) by mouth 2 times daily 180 tablet 1     melatonin 5 mg Tab tablet [MELATONIN 5 MG TAB TABLET] Take 5 mg by mouth at bedtime as needed (for sleep).        metoprolol succinate ER (TOPROL-XL) 25 MG 24 hr tablet Take 1 tablet (25 mg) by mouth At Bedtime 90 tablet 3     multivitamin therapeutic (THERAGRAN) tablet [MULTIVITAMIN THERAPEUTIC (THERAGRAN) TABLET] Take 1 tablet by mouth daily.       nitroglycerin (NITROSTAT) 0.4 MG SL tablet [NITROGLYCERIN (NITROSTAT) 0.4 MG SL TABLET] Place 1 tablet (0.4 mg total) under the tongue every 5 (five) minutes as needed for chest pain. 90 tablet 0     rOPINIRole (REQUIP) 2 MG tablet [ROPINIROLE (REQUIP) 2 MG TABLET] Take 4 mg by mouth every evening. Patient takes 7pm      Allergies   Allergen Reactions     Amitriptyline Hcl [Amitriptyline] Other (See Comments)     Erythromycin Diarrhea and Other (See Comments)     Childhood reaction     Gabapentin Other (See Comments)     Jerking movements, swelling     Naproxen Unknown and Other (See Comments)     Other Environmental Allergy Unknown     Molds, dander     Pregabalin Other (See Comments)         Lab Results    Chemistry/lipid CBC Cardiac Enzymes/BNP/TSH/INR   Lab Results   Component Value Date    CHOL 226 (H) 03/30/2021    HDL 76 03/30/2021    TRIG 89 03/30/2021    BUN 34 (H) 11/22/2021     11/22/2021    CO2 29 11/22/2021    Lab Results   Component Value Date    WBC 8.6 08/19/2021    HGB 10.0 (L) 08/19/2021    HCT 32.4 (L) 08/19/2021    MCV 94 08/19/2021     08/19/2021    Lab Results    Component Value Date    TROPONINI 0.01 09/08/2021     11/22/2021    TSH 1.40 03/12/2021    INR 1.15 08/19/2021             This note has been dictated using voice recognition software. Any grammatical, typographical, or context distortions are unintentional and inherent to the software      30 minutes spent on the date of encounter doing chart review, review of outside records, review of test results, patient visit and documentation.

## 2022-01-13 NOTE — PATIENT INSTRUCTIONS
Sherice Alvarez,    It was a pleasure to see you today at [facility] HE[facility].     My recommendations after this visit include:  - Please follow up with Dr Feldman in April   - Please follow up with Kim Arreola in 1-2 weeks  - I have checked labs and will contact you with results  - Continue current medications  - Try taking your amlodipine at night  - Monitor your blood pressure different times of the day and log them and we will touch base with you early next week    Kim Arreola, CNP

## 2022-01-14 ENCOUNTER — TELEPHONE (OUTPATIENT)
Dept: CARDIOLOGY | Facility: CLINIC | Age: 87
End: 2022-01-14
Payer: COMMERCIAL

## 2022-01-14 DIAGNOSIS — R06.00 DYSPNEA, UNSPECIFIED TYPE: ICD-10-CM

## 2022-01-14 RX ORDER — FUROSEMIDE 20 MG
TABLET ORAL
Qty: 270 TABLET | Refills: 2 | Status: SHIPPED | OUTPATIENT
Start: 2022-01-14 | End: 2022-08-03

## 2022-01-14 NOTE — TELEPHONE ENCOUNTER
----- Message from TAVO Fernandez CNP sent at 1/14/2022  7:37 AM CST -----  Please inform patient of lab results.  BNP mildly elevated but within normal limits.  She has been having issues with higher blood pressures and fluid retention.  Please have her increase Lasix to 40 mg in the morning and 20 mg in the afternoon.  Please have her continue to monitor blood pressure 3 times a day and record readings.  We will follow-up with her early next week to touch base.  Thank you

## 2022-01-14 NOTE — TELEPHONE ENCOUNTER
Called pt and reviewed lab results. Relayed Kim's recommendation to increase Lasix ro 40 mg in the AM and 20 mg in the afternoon. Medication updated in Epic.  Pt requested refill of Lasix, called into preferred pharmacy.     Pt will continue monitoring BP 3x per day and recording.     Will call pt on Monday to see how she is feeling on increased Lasix dose.     Maria Ines Guerrero RN

## 2022-01-15 ENCOUNTER — HOSPITAL ENCOUNTER (EMERGENCY)
Facility: HOSPITAL | Age: 87
Discharge: HOME | End: 2022-01-15
Attending: EMERGENCY MEDICINE | Admitting: EMERGENCY MEDICINE
Payer: COMMERCIAL

## 2022-01-15 ENCOUNTER — APPOINTMENT (OUTPATIENT)
Dept: CT IMAGING | Facility: HOSPITAL | Age: 87
End: 2022-01-15
Attending: EMERGENCY MEDICINE
Payer: COMMERCIAL

## 2022-01-15 ENCOUNTER — APPOINTMENT (OUTPATIENT)
Dept: RADIOLOGY | Facility: HOSPITAL | Age: 87
End: 2022-01-15
Attending: EMERGENCY MEDICINE
Payer: COMMERCIAL

## 2022-01-15 VITALS
OXYGEN SATURATION: 94 % | DIASTOLIC BLOOD PRESSURE: 64 MMHG | HEART RATE: 73 BPM | SYSTOLIC BLOOD PRESSURE: 135 MMHG | RESPIRATION RATE: 21 BRPM

## 2022-01-15 DIAGNOSIS — J40 BRONCHITIS: ICD-10-CM

## 2022-01-15 DIAGNOSIS — R07.9 CHEST PAIN, UNSPECIFIED TYPE: ICD-10-CM

## 2022-01-15 LAB
ALBUMIN SERPL-MCNC: 3.6 G/DL (ref 3.5–5)
ALP SERPL-CCNC: 67 U/L (ref 45–120)
ALT SERPL W P-5'-P-CCNC: 12 U/L (ref 0–45)
ANION GAP SERPL CALCULATED.3IONS-SCNC: 11 MMOL/L (ref 5–18)
APTT PPP: 30 SECONDS (ref 22–38)
AST SERPL W P-5'-P-CCNC: 21 U/L (ref 0–40)
ATRIAL RATE - MUSE: 80 BPM
BASOPHILS # BLD AUTO: 0.1 10E3/UL (ref 0–0.2)
BASOPHILS NFR BLD AUTO: 1 %
BILIRUB SERPL-MCNC: 0.4 MG/DL (ref 0–1)
BNP SERPL-MCNC: 80 PG/ML (ref 0–167)
BUN SERPL-MCNC: 29 MG/DL (ref 8–28)
C REACTIVE PROTEIN LHE: 0.3 MG/DL (ref 0–0.8)
CALCIUM SERPL-MCNC: 9 MG/DL (ref 8.5–10.5)
CHLORIDE BLD-SCNC: 105 MMOL/L (ref 98–107)
CO2 SERPL-SCNC: 25 MMOL/L (ref 22–31)
CREAT SERPL-MCNC: 1.51 MG/DL (ref 0.6–1.1)
D DIMER PPP FEU-MCNC: 1.56 UG/ML FEU (ref 0–0.5)
DIASTOLIC BLOOD PRESSURE - MUSE: 70 MMHG
EOSINOPHIL # BLD AUTO: 0.3 10E3/UL (ref 0–0.7)
EOSINOPHIL NFR BLD AUTO: 4 %
ERYTHROCYTE [DISTWIDTH] IN BLOOD BY AUTOMATED COUNT: 19.4 % (ref 10–15)
FLUAV RNA SPEC QL NAA+PROBE: NEGATIVE
FLUBV RNA RESP QL NAA+PROBE: NEGATIVE
GFR SERPL CREATININE-BSD FRML MDRD: 33 ML/MIN/1.73M2
GLUCOSE BLD-MCNC: 86 MG/DL (ref 70–125)
HCT VFR BLD AUTO: 34.4 % (ref 35–47)
HGB BLD-MCNC: 11.1 G/DL (ref 11.7–15.7)
HOLD SPECIMEN: NORMAL
IMM GRANULOCYTES # BLD: 0.1 10E3/UL
IMM GRANULOCYTES NFR BLD: 1 %
INR PPP: 1.13 (ref 0.9–1.15)
INTERPRETATION ECG - MUSE: NORMAL
LYMPHOCYTES # BLD AUTO: 1.6 10E3/UL (ref 0.8–5.3)
LYMPHOCYTES NFR BLD AUTO: 19 %
MAGNESIUM SERPL-MCNC: 2.1 MG/DL (ref 1.8–2.6)
MCH RBC QN AUTO: 29.9 PG (ref 26.5–33)
MCHC RBC AUTO-ENTMCNC: 32.3 G/DL (ref 31.5–36.5)
MCV RBC AUTO: 93 FL (ref 78–100)
MONOCYTES # BLD AUTO: 0.9 10E3/UL (ref 0–1.3)
MONOCYTES NFR BLD AUTO: 10 %
NEUTROPHILS # BLD AUTO: 5.5 10E3/UL (ref 1.6–8.3)
NEUTROPHILS NFR BLD AUTO: 65 %
NRBC # BLD AUTO: 0 10E3/UL
NRBC BLD AUTO-RTO: 0 /100
P AXIS - MUSE: 66 DEGREES
PLATELET # BLD AUTO: 206 10E3/UL (ref 150–450)
POTASSIUM BLD-SCNC: 4 MMOL/L (ref 3.5–5)
PR INTERVAL - MUSE: 218 MS
PROT SERPL-MCNC: 6.4 G/DL (ref 6–8)
QRS DURATION - MUSE: 78 MS
QT - MUSE: 414 MS
QTC - MUSE: 477 MS
R AXIS - MUSE: 48 DEGREES
RBC # BLD AUTO: 3.71 10E6/UL (ref 3.8–5.2)
SARS-COV-2 RNA RESP QL NAA+PROBE: NEGATIVE
SODIUM SERPL-SCNC: 141 MMOL/L (ref 136–145)
SYSTOLIC BLOOD PRESSURE - MUSE: 154 MMHG
T AXIS - MUSE: 67 DEGREES
TROPONIN I SERPL-MCNC: 0.01 NG/ML (ref 0–0.29)
TROPONIN I SERPL-MCNC: 0.02 NG/ML (ref 0–0.29)
VENTRICULAR RATE- MUSE: 80 BPM
WBC # BLD AUTO: 8.4 10E3/UL (ref 4–11)

## 2022-01-15 PROCEDURE — 84484 ASSAY OF TROPONIN QUANT: CPT | Performed by: EMERGENCY MEDICINE

## 2022-01-15 PROCEDURE — 85730 THROMBOPLASTIN TIME PARTIAL: CPT | Performed by: EMERGENCY MEDICINE

## 2022-01-15 PROCEDURE — 250N000011 HC RX IP 250 OP 636: Performed by: EMERGENCY MEDICINE

## 2022-01-15 PROCEDURE — 99285 EMERGENCY DEPT VISIT HI MDM: CPT | Mod: 25

## 2022-01-15 PROCEDURE — 86140 C-REACTIVE PROTEIN: CPT | Performed by: EMERGENCY MEDICINE

## 2022-01-15 PROCEDURE — 85379 FIBRIN DEGRADATION QUANT: CPT | Performed by: EMERGENCY MEDICINE

## 2022-01-15 PROCEDURE — 83880 ASSAY OF NATRIURETIC PEPTIDE: CPT | Performed by: EMERGENCY MEDICINE

## 2022-01-15 PROCEDURE — 85014 HEMATOCRIT: CPT | Performed by: EMERGENCY MEDICINE

## 2022-01-15 PROCEDURE — 85610 PROTHROMBIN TIME: CPT | Performed by: EMERGENCY MEDICINE

## 2022-01-15 PROCEDURE — 36415 COLL VENOUS BLD VENIPUNCTURE: CPT | Performed by: EMERGENCY MEDICINE

## 2022-01-15 PROCEDURE — 83735 ASSAY OF MAGNESIUM: CPT | Performed by: EMERGENCY MEDICINE

## 2022-01-15 PROCEDURE — 71275 CT ANGIOGRAPHY CHEST: CPT

## 2022-01-15 PROCEDURE — C9803 HOPD COVID-19 SPEC COLLECT: HCPCS

## 2022-01-15 PROCEDURE — 93005 ELECTROCARDIOGRAM TRACING: CPT | Performed by: EMERGENCY MEDICINE

## 2022-01-15 PROCEDURE — 87636 SARSCOV2 & INF A&B AMP PRB: CPT | Performed by: EMERGENCY MEDICINE

## 2022-01-15 PROCEDURE — 71045 X-RAY EXAM CHEST 1 VIEW: CPT

## 2022-01-15 PROCEDURE — 80053 COMPREHEN METABOLIC PANEL: CPT | Performed by: EMERGENCY MEDICINE

## 2022-01-15 RX ORDER — IOPAMIDOL 755 MG/ML
75 INJECTION, SOLUTION INTRAVASCULAR ONCE
Status: COMPLETED | OUTPATIENT
Start: 2022-01-15 | End: 2022-01-15

## 2022-01-15 RX ADMIN — IOPAMIDOL 75 ML: 755 INJECTION, SOLUTION INTRAVENOUS at 04:59

## 2022-01-15 NOTE — ED NOTES
Pt comes in tonight with known CHF. Pt reports blood pressure in the 300's systolic at home. Pt took two extra doses of metoprolol at home to attempt to reduce BP. Pt is also short of breath and has more leg swelling than normal. Pt reports ringing in ears, dyspnea, dizziness and chest pain with exertion.

## 2022-01-15 NOTE — DISCHARGE INSTRUCTIONS
All of your labs and imaging are reassuring.  Your heart work-up does not show any significant worsening of your heart failure.  Please follow-up with your cardiologist if you are not getting better in the next 5 to 7 days.  You may call your clinic to schedule an appointment.

## 2022-01-15 NOTE — ED PROVIDER NOTES
EMERGENCY DEPARTMENT ENCOUNTER      NAME: Sherice Alvarez  AGE: 87 year old female  YOB: 1934  MRN: [id number]  EVALUATION DATE & TIME: 1/15/2022  1:47 AM    PCP: Anum Rosenberg    ED PROVIDER: Marshal Díaz D.O.      CHIEF COMPLAINT:  Chief Complaint   Patient presents with     Chest Pain     Shortness of Breath         FINAL IMPRESSION:  1. Chest pain, unspecified type          ED COURSE & MEDICAL DECISION MAKIN year old female with history of coronary disease, hypertension, GERD, CVA, CHF, DVT presented to the ED for evaluation of chest pressure and shortness of breath.  The patient reported that the symptoms of been ongoing for the last 2 weeks but worsened suddenly tonight.  The patient also reported that her systolic pressure at home earlier this evening was 296.  Patient also reports increased lower extremity edema and weight gain.  Here in the ED the patient was noted to have a blood pressure of 196/85 upon arrival.  She was also slightly tachycardic, but she was otherwise hemodynamically stable.  The patient did not appear to have any obvious respiratory difficulty and she was not acutely ill-appearing or in any obvious distress.  Patient was noted to have a systolic murmur and trace lower extremity edema but the remainder of her physical exam was unremarkable.  The patient's systolic blood pressure at the time of my admission was 163.    An EKG was obtained which revealed normal sinus rhythm without any new or concerning ST or T wave changes.  The patient's creatinine was elevated but at baseline.  The remainder of the BMP as well as a CBC, hepatic panel, magnesium, troponin, CRP, and BNP were all reassuring.  COVID testing was negative.  Chest x-ray was nondiagnostic    The patient was reevaluated and informed of the initial reassuring laboratory, EKG, and chest x-ray results.  The patient reported that her chest discomfort and shortness of breath were all resolved and now  back at her normal baseline.  At the time of reevaluation the patient's blood pressure was noted to be 117/55.  Patient was not given anything to lower her blood pressure.      The patient's D-dimer was elevated at 1.56.  A CT scan of the chest will be obtained to rule out a PE.  The patient's care was turned over to Dr. Elliott.  The patient will also need a repeat troponin at 6 am.  If the chest CT scan is unremarkable and the repeat troponin is negative the patient can be discharged home.      2:01AM I met with the patient for the initial interview and physical examination. Discussed plan for treatment and workup in the ED. PPE: Provider wore N95 mask and goggles.    At the conclusion of the encounter I discussed the results of all of the tests and the disposition. The questions were answered. The patient or family acknowledged understanding and was agreeable with the care plan.     MEDICATIONS GIVEN IN THE EMERGENCY:  Medications - No data to display    NEW PRESCRIPTIONS STARTED AT TODAY'S ER VISIT:  New Prescriptions    No medications on file          =================================================================    HPI  Sherice Alvarez is a 87 year old female with a pertinent history of CHF, CAD s/p stenting, DVT, asthma, GERD, hypertension, cancer, and chronic pain syndrome who presents to this ED for evaluation of chest pain and shortness of breath.     Patient reports that she developed chest pressure and worsening shortness of breath tonight while at rest. She has been experiencing chest pressure and shortness of breath intermittently for the past few weeks, although it was much worse tonight. Also states that she checked her BP at home and it was 296 systolic. No nausea, vomiting, or diaphoresis. Feels as if there has been more fluid in her legs the past few days. She was seen by cardiology yesterday for similar symptoms and her morning lasix dose was changed from 20mg to 40mg. Otherwise denies  cough, fever, chills, or any other complaints at this time.     Social: Denies tobacco use. Drinks alcohol socially.       I, Bhavna Ac am serving as a scribe to document services personally performed by Dr. Marshal Díaz DO, based on my observation and the provider's statements to me. I, Dr. Marshal Díaz DO attest that Bhavna Ac is acting in a scribe capacity, has observed my performance of the services and has documented them in accordance with my direction.      REVIEW OF SYSTEMS   Constitutional: Denies fever, chills, unintentional weight loss or fatigue  Eyes: Denies visual changes or discharge    HENT: Denies sore throat, ear pain or neck pain  Respiratory: Denies cough. Reports shortness of breath  Cardiovascular: Denies palpitations. Reports chest pressure and leg swelling  GI: Denies abdominal pain, nausea, vomiting, or dark, bloody stools.    : Denies hematuria, dysuria, or flank pain  Musculoskeletal: Denies any new back pain or new muscle/joint pains  Skin: Denies rash or wound  Neurologic: Denies current headache, new weakness, focal weakness, or sensory changes    Lymphatic: Denies swollen glands    Psychiatric: Denies depression, suicidal ideation or homicidal ideation.    Remainder of systems reviewed, unless noted in HPI all others negative.      PAST MEDICAL HISTORY:  Past Medical History:   Diagnosis Date     Arthritis      Asthma      CAD (coronary artery disease)      Cancer (H)     basal cell     Chronic kidney disease     ckd 3     Chronic pain syndrome      Crohn's disease (H)      GERD (gastroesophageal reflux disease)      Hx of heart artery stent 11/2016    1 stent R Proximal CA and 1 Stent L Proximal circumflex  12/2016 Stent proximal LAD     Hyperlipidemia      Hypertension      NSTEMI (non-ST elevated myocardial infarction) (H) 11/2016     PONV (postoperative nausea and vomiting)     severe povn with last knee surgery     Restless legs syndrome      Stroke (H) 2010    numbness rt  jaw       PAST SURGICAL HISTORY:  Past Surgical History:   Procedure Laterality Date     APPENDECTOMY       CARDIAC CATHETERIZATION  2016    multiple vessel disease.  no intervention     CARDIAC CATHETERIZATION  2016     CARDIAC CATHETERIZATION N/A 2016    Procedure: Coronary Angiogram;  Surgeon: Sunil Moran MD;  Location: [facility];  Service:      CAROTID ENDARTERECTOMY       CAROTID ENDARTERECTOMY Right 2010     CERVICAL LAMINECTOMY  1994      SECTION       CV CORONARY ANGIOGRAM N/A 2020    Procedure: Coronary Angiogram;  Surgeon: Vinita Ramos MD;  Location: [facility];  Service: Cardiology     CV LEFT HEART CATHETERIZATION WITHOUT LEFT VENTRICULOGRAM Left 2020    Procedure: Left Heart Catheterization Without Left Ventriculogram;  Surgeon: Jerad Lerner MD;  Location: [facility];  Service: Cardiology     CV TRANSCATHETER AORTIC VALVE REPLACEMENT - OTHER APPROACH N/A 2020    Procedure: CV TRANSCATHETER AORTIC VALVE REPLACEMENT - RIGHT SUBCLAVIAN APPROACH;  Surgeon: John Caceres MD;  Location: [facility];  Service: Cardiology     HEMORRHOIDECTOMY EXTERNAL       IR LUMBAR EPIDURAL STEROID INJECTION  2014     IR LUMBAR NERVE ROOT INJECTION  10/1/2013     JOINT REPLACEMENT       IN ESOPHAGOGASTRODUODENOSCOPY TRANSORAL DIAGNOSTIC N/A 07/10/2019    Procedure: ESOPHAGOGASTRODUODENOSCOPY (EGD) with gastric biopsy;  Surgeon: Sunil Stuart MD;  Location: [facility];  Service: Gastroenterology     IN REPLACE AORTIC VALVE OPEN AXILLRY ARTRY APPROACH N/A 2020    Procedure: OR TRANSCATHETER AORTIC VALVE REPLACEMENT, SUBCLAVIAN APPROACH;  Surgeon: Bhavin Cuadra MD;  Location: [facility];  Service: Cardiology     TONSILLECTOMY & ADENOIDECTOMY       TOTAL KNEE ARTHROPLASTY Right      ZZC TOTAL KNEE ARTHROPLASTY Left 2021    Procedure: LEFT TOTAL KNEE ARTHROPLASTY;  Surgeon: Carmelo  Doug MCKEON MD;  Location: [facility];  Service: Orthopedics           CURRENT MEDICATIONS:    Prior to Admission medications    Medication Sig Start Date End Date Taking? Authorizing Provider   acetaminophen (TYLENOL) 325 MG tablet [ACETAMINOPHEN (TYLENOL) 325 MG TABLET] Take 3 tablets (975 mg total) by mouth every 8 (eight) hours.  Patient taking differently: Take 975 mg by mouth daily as needed  5/13/21   Jayne Mendez PA-C   albuterol (PROVENTIL HFA;VENTOLIN HFA) 90 mcg/actuation inhaler [ALBUTEROL (PROVENTIL HFA;VENTOLIN HFA) 90 MCG/ACTUATION INHALER] Inhale 1-2 puffs every 6 (six) hours as needed for wheezing. 11/3/16   Provider, Historical   amLODIPine (NORVASC) 2.5 MG tablet Take 1 tablet (2.5 mg) by mouth daily 1/13/22   Kim Arreola APRN CNP   aspirin (ASPIRIN) 81 MG EC tablet Take 81 mg by mouth daily    Reported, Patient   beclomethasone (QVAR) 80 mcg/actuation inhaler Inhale 1 puff into the lungs 2 times daily as needed  5/6/21   Provider, Historical   coenzyme Q10 100 mg capsule [COENZYME Q10 100 MG CAPSULE] Take 100 mg by mouth daily.  12/27/16   Provider, Historical   diclofenac (VOLTAREN) 1 % topical gel Apply 4 g topically 4 times daily 8/5/21   Reported, Patient   ergocalciferol (ERGOCALCIFEROL) 50,000 unit capsule [ERGOCALCIFEROL (ERGOCALCIFEROL) 50,000 UNIT CAPSULE] Take 50,000 Units by mouth once a week. 10/8/19   Provider, Historical   evolocumab (REPATHA) 140 MG/ML prefilled syringe Inject 1 mL (140 mg) Subcutaneous every 14 days 10/27/21   Ulisses Feldman MD   ferrous sulfate 325 (65 FE) MG tablet [FERROUS SULFATE 325 (65 FE) MG TABLET] Take 1 tablet (325 mg total) by mouth daily with breakfast.  Patient taking differently: Take 1 tablet by mouth four times a week  6/6/20   Viv Tolbert PA-C   furosemide (LASIX) 20 MG tablet Take Lasix 40 mg in the morning and 20 mg in the afternoon. 1/14/22   Kim Arreola, APRN CNP   melatonin 5 mg Tab  tablet [MELATONIN 5 MG TAB TABLET] Take 5 mg by mouth at bedtime as needed (for sleep).  4/15/20   Provider, Historical   metoprolol succinate ER (TOPROL-XL) 25 MG 24 hr tablet Take 1 tablet (25 mg) by mouth At Bedtime 22   Kim Arreola APRN CNP   multivitamin therapeutic (THERAGRAN) tablet [MULTIVITAMIN THERAPEUTIC (THERAGRAN) TABLET] Take 1 tablet by mouth daily. 11/3/16   Provider, Historical   nitroglycerin (NITROSTAT) 0.4 MG SL tablet [NITROGLYCERIN (NITROSTAT) 0.4 MG SL TABLET] Place 1 tablet (0.4 mg total) under the tongue every 5 (five) minutes as needed for chest pain. 16   Ino Prieto MD   rOPINIRole (REQUIP) 2 MG tablet [ROPINIROLE (REQUIP) 2 MG TABLET] Take 4 mg by mouth every evening. Patient takes 7pm 14   Provider, Historical         ALLERGIES:  Allergies   Allergen Reactions     Amitriptyline Hcl [Amitriptyline] Other (See Comments)     Erythromycin Diarrhea and Other (See Comments)     Childhood reaction     Gabapentin Other (See Comments)     Jerking movements, swelling     Naproxen Unknown and Other (See Comments)     Other Environmental Allergy Unknown     Molds, dander     Pregabalin Other (See Comments)       FAMILY HISTORY:  Family History   Problem Relation Age of Onset     Atrial fibrillation Mother      Parkinsonism Father        SOCIAL HISTORY:   Social History     Socioeconomic History     Marital status:      Spouse name: None     Number of children: None     Years of education: None     Highest education level: None   Occupational History     None   Tobacco Use     Smoking status: Former Smoker     Quit date: 1980     Years since quittin.0     Smokeless tobacco: Never Used   Substance and Sexual Activity     Alcohol use: No     Drug use: No     Sexual activity: None   Other Topics Concern     None   Social History Narrative     None     Social Determinants of Health     Financial Resource Strain: Not on file   Food Insecurity: Not on  file   Transportation Needs: Not on file   Physical Activity: Not on file   Stress: Not on file   Social Connections: Not on file   Intimate Partner Violence: Not on file   Housing Stability: Not on file       PHYSICAL EXAM    /55   Pulse 66   Resp 19   SpO2 93%   General presentation: Alert, Vital signs reviewed. NAD  HENT: ENT inspection is normal. Oropharynx is moist and clear.   Eye: Pupils are equal and reactive to light. EOMI  Neck: The neck is supple, with full ROM, with no evidence of meningismus.  Pulmonary: Currently in no acute respiratory distress. Normal, non labored respirations, the lung sounds are normal with good equal air movement. Clear to auscultation bilaterally.   Circulatory: Regular rate and rhythm. Peripheral pulses are strong and equal. Systolic murmur. No rubs, or gallops.   Abdominal: The abdomen is soft. Nontender. No rigidity, guarding, or rebound. Bowel sounds normal.   Neurologic: Alert, oriented to person, place, and time. No motor deficit. No sensory deficit. Cranial nerves II through XII are intact.  Musculoskeletal: No extremity tenderness. Full range of motion in all extremities. Trace pitting edema in BLE.  Skin: Skin color is normal. No rash. Warm. Dry to touch.        LAB:  All pertinent labs reviewed and interpreted.  Labs Ordered and Resulted from Time of ED Arrival to Time of ED Departure   COMPREHENSIVE METABOLIC PANEL - Abnormal       Result Value    Sodium 141      Potassium 4.0      Chloride 105      Carbon Dioxide (CO2) 25      Anion Gap 11      Urea Nitrogen 29 (*)     Creatinine 1.51 (*)     Calcium 9.0      Glucose 86      Alkaline Phosphatase 67      AST 21      ALT 12      Protein Total 6.4      Albumin 3.6      Bilirubin Total 0.4      GFR Estimate 33 (*)    CBC WITH PLATELETS AND DIFFERENTIAL - Abnormal    WBC Count 8.4      RBC Count 3.71 (*)     Hemoglobin 11.1 (*)     Hematocrit 34.4 (*)     MCV 93      MCH 29.9      MCHC 32.3      RDW 19.4 (*)      Platelet Count 206      % Neutrophils 65      % Lymphocytes 19      % Monocytes 10      % Eosinophils 4      % Basophils 1      % Immature Granulocytes 1      NRBCs per 100 WBC 0      Absolute Neutrophils 5.5      Absolute Lymphocytes 1.6      Absolute Monocytes 0.9      Absolute Eosinophils 0.3      Absolute Basophils 0.1      Absolute Immature Granulocytes 0.1      Absolute NRBCs 0.0     D DIMER QUANTITATIVE - Abnormal    D-Dimer Quantitative 1.56 (*)    INR - Normal    INR 1.13     PARTIAL THROMBOPLASTIN TIME - Normal    aPTT 30     MAGNESIUM - Normal    Magnesium 2.1     TROPONIN I - Normal    Troponin I 0.01     B-TYPE NATRIURETIC PEPTIDE ([facility] ONLY) - Normal    BNP 80     INFLUENZA A/B & SARS-COV2 PCR MULTIPLEX - Normal    Influenza A PCR Negative      Influenza B PCR Negative      SARS CoV2 PCR Negative     CRP INFLAMMATION - Normal    CRP 0.3         RADIOLOGY:  Reviewed all pertinent imaging. Please see official radiology reports  XR Chest Port 1 View   Final Result   IMPRESSION: TAVR. Normal heart size and pulmonary vascularity. No acute infiltrates or consolidation. Mild degenerative changes in the spine and both shoulders. Remainder unremarkable. No acute findings.      CT Chest Pulmonary Embolism w Contrast    (Results Pending)         EKG:    Normal sinus rhythm.  Rate of 80.  PVC noted.  First-degree AV block.  Normal QRS.  Normal QT.  No ST or T wave changes.  Compared to the EKG on 8/19/2021 the PVC and first-degree AV block both appear new.  No other significant changes noted.    I have independently reviewed and interpreted the EKG(s) documented above.      I, Bhavna Ac, am serving as a scribe to document services personally performed by Dr. Marshal Díaz based on my observation and the provider's statements to me. I, Marshal Díaz, DO attest that Bhavna Ac is acting in a scribe capacity, has observed my performance of the services and has documented them in accordance with my  direction.    Marshal Díaz D.O.  Emergency Medicine  [facility] EMERGENCY DEPARTMENT  [facility] [id number] [phone]  Dept: [phone]       Marshal Díaz DO  01/15/22 0453

## 2022-01-15 NOTE — ED NOTES
"Pt presents NAD. SKIN: NWD.   HEENT: normocephalic, PERRL, clear x 3. Pt c/o a HA.  CHEST: symmetrical rise, CEBBS, pt c/o chest \"tightness,\" also c/o intermittent SOB.  ABD: NTND, no N&V or diarrhea.  EXT: MENDOZA x 4, trace pedal edema BLE  CARDIAC MONITOR: NSR  Rest of exam unremarkable.     "

## 2022-01-15 NOTE — ED PROVIDER NOTES
EMERGENCY DEPARTMENT SIGN OUT NOTE        ED COURSE AND MEDICAL DECISION MAKING  Patient was signed out to me by Dr Marshal Díaz at 4:55 AM  6:49 AM Met with patient. Updated them on results. Discussed plans for discharge. Patient is in agreement.     In brief, Sherice Alvarez is a 87 year old female who initially presented for evaluation of chest pain and shortness of breath. Patient reports that she developed chest pressure and worsening shortness of breath tonight while at rest. She has been experiencing chest pressure and shortness of breath intermittently for the past few weeks, although it was much worse tonight. Also states that she checked her BP at home and it was 296 systolic. No nausea, vomiting, or diaphoresis. Feels as if there has been more fluid in her legs the past few days. She was seen by cardiology yesterday for similar symptoms and her morning lasix dose was changed from 20mg to 40mg. Otherwise denies cough, fever, chills, or any other complaints at this time.     At time of sign out, disposition was pending CT scan and repeat troponin.     I reviewed the CT scan and the delta troponin.  Both are not suggestive of significant cardiothoracic disease.  She has a small amount of inflammation of the lungs which is consistent with bronchitis.  Oxygenation is stable, she is in no respiratory distress and vital signs have been normal since she has been here.  We discussed warning signs and indications to return to the emergency department.  She understands these warning signs and will return with other concerns.  She will follow-up with her cardiologist and primary care physician as needed.    FINAL IMPRESSION    1. Chest pain, unspecified type    2. Bronchitis        ED MEDS  Medications   iopamidol (ISOVUE-370) solution 75 mL (75 mLs Intravenous Given 1/15/22 6951)       LAB  Labs Ordered and Resulted from Time of ED Arrival to Time of ED Departure   COMPREHENSIVE METABOLIC PANEL - Abnormal        Result Value    Sodium 141      Potassium 4.0      Chloride 105      Carbon Dioxide (CO2) 25      Anion Gap 11      Urea Nitrogen 29 (*)     Creatinine 1.51 (*)     Calcium 9.0      Glucose 86      Alkaline Phosphatase 67      AST 21      ALT 12      Protein Total 6.4      Albumin 3.6      Bilirubin Total 0.4      GFR Estimate 33 (*)    CBC WITH PLATELETS AND DIFFERENTIAL - Abnormal    WBC Count 8.4      RBC Count 3.71 (*)     Hemoglobin 11.1 (*)     Hematocrit 34.4 (*)     MCV 93      MCH 29.9      MCHC 32.3      RDW 19.4 (*)     Platelet Count 206      % Neutrophils 65      % Lymphocytes 19      % Monocytes 10      % Eosinophils 4      % Basophils 1      % Immature Granulocytes 1      NRBCs per 100 WBC 0      Absolute Neutrophils 5.5      Absolute Lymphocytes 1.6      Absolute Monocytes 0.9      Absolute Eosinophils 0.3      Absolute Basophils 0.1      Absolute Immature Granulocytes 0.1      Absolute NRBCs 0.0     D DIMER QUANTITATIVE - Abnormal    D-Dimer Quantitative 1.56 (*)    INR - Normal    INR 1.13     PARTIAL THROMBOPLASTIN TIME - Normal    aPTT 30     MAGNESIUM - Normal    Magnesium 2.1     TROPONIN I - Normal    Troponin I 0.01     B-TYPE NATRIURETIC PEPTIDE ([facility] ONLY) - Normal    BNP 80     INFLUENZA A/B & SARS-COV2 PCR MULTIPLEX - Normal    Influenza A PCR Negative      Influenza B PCR Negative      SARS CoV2 PCR Negative     CRP INFLAMMATION - Normal    CRP 0.3     TROPONIN I - Normal    Troponin I 0.02         EKG  None.     RADIOLOGY    CT Chest Pulmonary Embolism w Contrast   Final Result   IMPRESSION:   1.  Negative for pulmonary embolism.      2.  Mild area of subpleural consolidation right lower lobe posteriorly is increased compared with the prior study. While some of this is probably due to some chronic scarring and atelectasis, cannot exclude some superimposed pneumonitis. Clinical    correlation. Consider follow-up CT scan in 3-6 months for reevaluation.      3.  Mild bronchial wall  thickening in the lower lungs suggestive of bronchiolitis.      4.  Prominent atheromatous disease including severe coronary artery calcification.         XR Chest Port 1 View   Final Result   IMPRESSION: TAVR. Normal heart size and pulmonary vascularity. No acute infiltrates or consolidation. Mild degenerative changes in the spine and both shoulders. Remainder unremarkable. No acute findings.            Cha Elliott M.D.  Emergency Medicine  [facility] EMERGENCY DEPARTMENT  [address] [place] [id number] [phone]         Cha Elliott MD  01/15/22 0653

## 2022-01-17 NOTE — TELEPHONE ENCOUNTER
Called pt this AM to Follow-up on how she is feeling over the weekend on increased Lasix 40 mg AM 20 mg afternoon.     Pt states that she has to go to the ER on Saturday 1-15-22 for 's systolic, lightheadedness and nausea. See ER notes. Pt R/O for stroke and discharged same day. Noted on CT that pt has mild Bronchitis; recommended that she Follow-up with PCP. Pt states she will call and get appt scheduled today.     Pt states she has felt well since home from hospital. She denies and lightheadedness, nausea, chest pain or SOB.     Pt weight today was 142.1#. Down from 146#.     Had pt take BP while on the phone 132/72 HR 70. Pt currently taking amlodipine 2.5 mg daily and Metoprolol 25 mg at night.     Encouraged pt to continue monitoring Bps; confirmed that pt had HF RN line and 24 hour RN line. Pt will call with any high BP's or new/worsening symptoms.     Routing to Kim to review.     Maria Ines Guerrero RN

## 2022-01-18 VITALS
RESPIRATION RATE: 16 BRPM | WEIGHT: 136 LBS | BODY MASS INDEX: 25.03 KG/M2 | HEART RATE: 72 BPM | HEIGHT: 62 IN | SYSTOLIC BLOOD PRESSURE: 148 MMHG | DIASTOLIC BLOOD PRESSURE: 82 MMHG

## 2022-01-18 VITALS
WEIGHT: 135 LBS | BODY MASS INDEX: 25.09 KG/M2 | SYSTOLIC BLOOD PRESSURE: 122 MMHG | RESPIRATION RATE: 21 BRPM | DIASTOLIC BLOOD PRESSURE: 66 MMHG | HEART RATE: 90 BPM | TEMPERATURE: 98.4 F | OXYGEN SATURATION: 95 %

## 2022-01-18 VITALS
HEART RATE: 84 BPM | SYSTOLIC BLOOD PRESSURE: 116 MMHG | TEMPERATURE: 98.5 F | RESPIRATION RATE: 17 BRPM | OXYGEN SATURATION: 95 % | DIASTOLIC BLOOD PRESSURE: 71 MMHG

## 2022-01-18 VITALS
TEMPERATURE: 98.2 F | DIASTOLIC BLOOD PRESSURE: 58 MMHG | OXYGEN SATURATION: 97 % | HEART RATE: 84 BPM | RESPIRATION RATE: 16 BRPM | SYSTOLIC BLOOD PRESSURE: 120 MMHG

## 2022-01-18 VITALS
OXYGEN SATURATION: 95 % | TEMPERATURE: 97.5 F | SYSTOLIC BLOOD PRESSURE: 132 MMHG | HEART RATE: 80 BPM | DIASTOLIC BLOOD PRESSURE: 72 MMHG | RESPIRATION RATE: 21 BRPM

## 2022-01-18 VITALS
HEART RATE: 73 BPM | OXYGEN SATURATION: 97 % | DIASTOLIC BLOOD PRESSURE: 68 MMHG | SYSTOLIC BLOOD PRESSURE: 112 MMHG | TEMPERATURE: 99 F

## 2022-01-18 VITALS
SYSTOLIC BLOOD PRESSURE: 110 MMHG | DIASTOLIC BLOOD PRESSURE: 60 MMHG | RESPIRATION RATE: 18 BRPM | TEMPERATURE: 98.3 F | HEART RATE: 74 BPM | OXYGEN SATURATION: 96 %

## 2022-01-18 VITALS — BODY MASS INDEX: 25.89 KG/M2 | DIASTOLIC BLOOD PRESSURE: 73 MMHG | WEIGHT: 137 LBS | SYSTOLIC BLOOD PRESSURE: 126 MMHG

## 2022-01-18 VITALS — WEIGHT: 138.1 LBS | HEIGHT: 62 IN | BODY MASS INDEX: 25.41 KG/M2

## 2022-01-18 VITALS
RESPIRATION RATE: 17 BRPM | BODY MASS INDEX: 26.02 KG/M2 | DIASTOLIC BLOOD PRESSURE: 68 MMHG | OXYGEN SATURATION: 98 % | WEIGHT: 140 LBS | HEART RATE: 93 BPM | SYSTOLIC BLOOD PRESSURE: 126 MMHG

## 2022-01-18 VITALS
WEIGHT: 140.7 LBS | WEIGHT: 140.7 LBS | BODY MASS INDEX: 26.56 KG/M2 | HEIGHT: 61 IN | WEIGHT: 137 LBS | BODY MASS INDEX: 25.89 KG/M2 | HEIGHT: 61 IN | BODY MASS INDEX: 26.56 KG/M2

## 2022-01-18 VITALS — WEIGHT: 137 LBS | BODY MASS INDEX: 25.89 KG/M2

## 2022-01-18 VITALS
RESPIRATION RATE: 18 BRPM | TEMPERATURE: 98 F | DIASTOLIC BLOOD PRESSURE: 62 MMHG | OXYGEN SATURATION: 94 % | SYSTOLIC BLOOD PRESSURE: 116 MMHG | HEART RATE: 78 BPM

## 2022-01-18 NOTE — TELEPHONE ENCOUNTER
Please have her increase amlodipine to 2.5 mg twice a day.  Once in the morning and once in the evening.  Continue to monitor blood pressures and call if any concerns.  Thank you

## 2022-01-18 NOTE — TELEPHONE ENCOUNTER
Called pt and relayed Kim's message to increase Amlodipine to 2.5 mg BID. Pt verbalized understanding and will start increased dose today. Amlodipine medication just refilled.  Pt will continue to monitor BP s 2 times per day.     Advised pt to call HF RN line if her BP continue to be high or if she experiences any lightheadedness or dizziness. Pt will call HF line with any changes in symptoms.     Maria Ines Guerrero RN

## 2022-01-28 ENCOUNTER — OFFICE VISIT (OUTPATIENT)
Dept: CARDIOLOGY | Facility: CLINIC | Age: 87
End: 2022-01-28
Payer: COMMERCIAL

## 2022-01-28 VITALS
BODY MASS INDEX: 26.31 KG/M2 | SYSTOLIC BLOOD PRESSURE: 108 MMHG | HEART RATE: 70 BPM | RESPIRATION RATE: 16 BRPM | HEIGHT: 62 IN | DIASTOLIC BLOOD PRESSURE: 60 MMHG | WEIGHT: 143 LBS

## 2022-01-28 DIAGNOSIS — I10 BENIGN ESSENTIAL HYPERTENSION: ICD-10-CM

## 2022-01-28 DIAGNOSIS — K21.00 GASTROESOPHAGEAL REFLUX DISEASE WITH ESOPHAGITIS WITHOUT HEMORRHAGE: ICD-10-CM

## 2022-01-28 DIAGNOSIS — I50.32 CHRONIC HEART FAILURE WITH PRESERVED EJECTION FRACTION (H): Primary | ICD-10-CM

## 2022-01-28 DIAGNOSIS — N18.30 STAGE 3 CHRONIC KIDNEY DISEASE, UNSPECIFIED WHETHER STAGE 3A OR 3B CKD (H): ICD-10-CM

## 2022-01-28 PROCEDURE — 99214 OFFICE O/P EST MOD 30 MIN: CPT | Performed by: NURSE PRACTITIONER

## 2022-01-28 RX ORDER — OMEPRAZOLE 20 MG/1
20 TABLET, DELAYED RELEASE ORAL DAILY
Qty: 90 TABLET | Refills: 0
Start: 2022-01-28 | End: 2022-08-02

## 2022-01-28 ASSESSMENT — MIFFLIN-ST. JEOR: SCORE: 1036.89

## 2022-01-28 NOTE — LETTER
1/28/2022    Anum Rosenberg NP  [address] [address]    RE: Sherice Alvarez       Dear Colleague,     I had the pleasure of seeing Sherice Alvarez in the [facility].        Assessment/Recommendations   Assessment:    1.  Heart failure with preserved ejection fraction, NYHA class II: Compensated.  She states overall she feels well.  Her lower extremity edema and dyspnea on exertion has improved.  Her weight has decreased also.  She follows a low-sodium diet.  2.  Hypertension: Controlled.  Blood pressure 108/60.  She states her blood pressures have been more stable at home.  She has had a few blood pressures 90/50 and has felt lightheaded and unsteady.  She states she is only drinking 16 ounces of fluids per day.  3.  Chronic kidney disease stage III: She had labs a few weeks ago with potassium 4.0, BUN 29 and creatinine 1.51.  Her baseline creatinine is 1.3    Plan:  1.  Continue current medications  2.  Continue daily weights and low-salt diet  3.  Increase fluid intake to 40 ounces per day    Sherice Alvarez will follow up Dr. Feldman in April and in the heart failure clinic in 1 month.     History of Present Illness/Subjective    Ms. Sherice Alvarez is a 87 year old female seen at [facility] heart failure clinic today for continued follow-up.  She follows up for heart failure with preserved ejection fraction.  Her last echocardiogram was done May 2021 which showed an ejection fraction of 71%.   Past medical history significant for hypertension, TAVR, DVT, dyslipidemia, chronic kidney disease stage III.     Today, her dyspnea on exertion and lower extremity edema have improved.  She does continue to have occasional lightheadedness and unsteadiness.  She denies shortness of breath, orthopnea, PND, palpitations, chest pain, abdominal fullness/bloating and lower extremity edema.      She is monitoring home weights which decreased to  "140 pounds.  She participates in [employer] service.  She is following a low sodium diet.  She participates in regular physical activity including walking.       Physical Examination Review of Systems   /60 (BP Location: Left arm, Patient Position: Sitting, Cuff Size: Adult Regular)   Pulse 70   Resp 16   Ht 1.575 m (5' 2\")   Wt 64.9 kg (143 lb)   BMI 26.16 kg/m    Body mass index is 26.16 kg/m .  Wt Readings from Last 3 Encounters:   01/28/22 64.9 kg (143 lb)   01/13/22 66.2 kg (146 lb)   11/22/21 64 kg (141 lb)       General Appearance:   no acute distress   ENT/Mouth: membranes moist, no oral lesions or bleeding gums.      EYES:  no scleral icterus, normal conjunctivae   Neck: no carotid bruits or thyromegaly   Chest/Lungs:   lungs are clear to auscultation, no rales or wheezing, equal chest wall expansion    Cardiovascular:   Regular. Normal first and second heart sounds with no murmurs, rubs, or gallops; Jugular venous pressure normal, no edema bilaterally    Abdomen:  no organomegaly, masses, bruits, or tenderness; bowel sounds are present   Extremities: no cyanosis or clubbing   Skin: no xanthelasma, warm.    Neurologic: normal  bilateral, no tremors     Psychiatric: alert and oriented x3                                              Medical History  Surgical History Family History Social History   Past Medical History:   Diagnosis Date     Arthritis      Asthma      CAD (coronary artery disease)      Cancer (H)     basal cell     Chronic kidney disease     ckd 3     Chronic pain syndrome      Crohn's disease (H)      GERD (gastroesophageal reflux disease)      Hx of heart artery stent 11/2016    1 stent R Proximal CA and 1 Stent L Proximal circumflex  12/2016 Stent proximal LAD     Hyperlipidemia      Hypertension      NSTEMI (non-ST elevated myocardial infarction) (H) 11/2016     PONV (postoperative nausea and vomiting)     severe povn with last knee surgery     Restless legs syndrome      " Stroke (H)     numbness rt jaw    Past Surgical History:   Procedure Laterality Date     APPENDECTOMY       CARDIAC CATHETERIZATION  2016    multiple vessel disease.  no intervention     CARDIAC CATHETERIZATION  2016     CARDIAC CATHETERIZATION N/A 2016    Procedure: Coronary Angiogram;  Surgeon: Sunil Moran MD;  Location: [facility] Lab;  Service:      CAROTID ENDARTERECTOMY       CAROTID ENDARTERECTOMY Right 2010     CERVICAL LAMINECTOMY  1994      SECTION       CV CORONARY ANGIOGRAM N/A 2020    Procedure: Coronary Angiogram;  Surgeon: Vinita Ramos MD;  Location: [facility] Cath Lab;  Service: Cardiology     CV LEFT HEART CATHETERIZATION WITHOUT LEFT VENTRICULOGRAM Left 2020    Procedure: Left Heart Catheterization Without Left Ventriculogram;  Surgeon: Jerad Lerner MD;  Location: [facility] Cath Lab;  Service: Cardiology     CV TRANSCATHETER AORTIC VALVE REPLACEMENT - OTHER APPROACH N/A 2020    Procedure: CV TRANSCATHETER AORTIC VALVE REPLACEMENT - RIGHT SUBCLAVIAN APPROACH;  Surgeon: John Caceres MD;  Location: [facility] Cath Lab;  Service: Cardiology     HEMORRHOIDECTOMY EXTERNAL       IR LUMBAR EPIDURAL STEROID INJECTION  2014     IR LUMBAR NERVE ROOT INJECTION  10/1/2013     JOINT REPLACEMENT       NC ESOPHAGOGASTRODUODENOSCOPY TRANSORAL DIAGNOSTIC N/A 07/10/2019    Procedure: ESOPHAGOGASTRODUODENOSCOPY (EGD) with gastric biopsy;  Surgeon: Sunil Stuart MD;  Location: [facility];  Service: Gastroenterology     NC REPLACE AORTIC VALVE OPEN AXILLRY ARTRY APPROACH N/A 2020    Procedure: OR TRANSCATHETER AORTIC VALVE REPLACEMENT, SUBCLAVIAN APPROACH;  Surgeon: Bhavin Cuadra MD;  Location: [facility];  Service: Cardiology     TONSILLECTOMY & ADENOIDECTOMY       TOTAL KNEE ARTHROPLASTY Right      ZZC TOTAL KNEE ARTHROPLASTY Left 2021    Procedure: LEFT TOTAL KNEE ARTHROPLASTY;  Surgeon:  Doug Rhodes MD;  Location: [facility] Main OR;  Service: Orthopedics    Family History   Problem Relation Age of Onset     Atrial fibrillation Mother      Parkinsonism Father     Social History     Socioeconomic History     Marital status:      Spouse name: Not on file     Number of children: Not on file     Years of education: Not on file     Highest education level: Not on file   Occupational History     Not on file   Tobacco Use     Smoking status: Former Smoker     Quit date: 1980     Years since quittin.1     Smokeless tobacco: Never Used   Substance and Sexual Activity     Alcohol use: No     Drug use: No     Sexual activity: Not on file   Other Topics Concern     Not on file   Social History Narrative     Not on file     Social Determinants of Health     Financial Resource Strain: Not on file   Food Insecurity: Not on file   Transportation Needs: Not on file   Physical Activity: Not on file   Stress: Not on file   Social Connections: Not on file   Intimate Partner Violence: Not on file   Housing Stability: Not on file          Medications  Allergies   Current Outpatient Medications   Medication Sig Dispense Refill     acetaminophen (TYLENOL) 325 MG tablet [ACETAMINOPHEN (TYLENOL) 325 MG TABLET] Take 3 tablets (975 mg total) by mouth every 8 (eight) hours. (Patient taking differently: Take 975 mg by mouth daily as needed ) 60 tablet 0     albuterol (PROVENTIL HFA;VENTOLIN HFA) 90 mcg/actuation inhaler [ALBUTEROL (PROVENTIL HFA;VENTOLIN HFA) 90 MCG/ACTUATION INHALER] Inhale 1-2 puffs every 6 (six) hours as needed for wheezing.       amLODIPine (NORVASC) 2.5 MG tablet Take 1 tablet (2.5 mg) by mouth daily 90 tablet 3     aspirin (ASPIRIN) 81 MG EC tablet Take 81 mg by mouth daily       beclomethasone (QVAR) 80 mcg/actuation inhaler Inhale 1 puff into the lungs 2 times daily as needed        coenzyme Q10 100 mg capsule [COENZYME Q10 100 MG CAPSULE] Take 100 mg by mouth daily.        diclofenac  (VOLTAREN) 1 % topical gel Apply 4 g topically 4 times daily       ergocalciferol (ERGOCALCIFEROL) 50,000 unit capsule [ERGOCALCIFEROL (ERGOCALCIFEROL) 50,000 UNIT CAPSULE] Take 50,000 Units by mouth once a week.       evolocumab (REPATHA) 140 MG/ML prefilled syringe Inject 1 mL (140 mg) Subcutaneous every 14 days 6 mL 1     ferrous sulfate 325 (65 FE) MG tablet [FERROUS SULFATE 325 (65 FE) MG TABLET] Take 1 tablet (325 mg total) by mouth daily with breakfast. (Patient taking differently: Take 1 tablet by mouth four times a week )  0     furosemide (LASIX) 20 MG tablet Take Lasix 40 mg in the morning and 20 mg in the afternoon. 270 tablet 2     melatonin 5 mg Tab tablet [MELATONIN 5 MG TAB TABLET] Take 5 mg by mouth at bedtime as needed (for sleep).        metoprolol succinate ER (TOPROL-XL) 25 MG 24 hr tablet Take 1 tablet (25 mg) by mouth At Bedtime 90 tablet 3     multivitamin therapeutic (THERAGRAN) tablet [MULTIVITAMIN THERAPEUTIC (THERAGRAN) TABLET] Take 1 tablet by mouth daily.       nitroglycerin (NITROSTAT) 0.4 MG SL tablet [NITROGLYCERIN (NITROSTAT) 0.4 MG SL TABLET] Place 1 tablet (0.4 mg total) under the tongue every 5 (five) minutes as needed for chest pain. 90 tablet 0     omeprazole (PRILOSEC OTC) 20 MG EC tablet Take 1 tablet (20 mg) by mouth daily 90 tablet 0     rOPINIRole (REQUIP) 2 MG tablet [ROPINIROLE (REQUIP) 2 MG TABLET] Take 4 mg by mouth every evening. Patient takes 7pm      Allergies   Allergen Reactions     Amitriptyline Hcl [Amitriptyline] Other (See Comments)     Erythromycin Diarrhea and Other (See Comments)     Childhood reaction     Gabapentin Other (See Comments)     Jerking movements, swelling     Naproxen Unknown and Other (See Comments)     Other Environmental Allergy Unknown     Molds, dander     Pregabalin Other (See Comments)         Lab Results    Chemistry/lipid CBC Cardiac Enzymes/BNP/TSH/INR   Lab Results   Component Value Date    CHOL 226 (H) 03/30/2021    HDL 76  03/30/2021    TRIG 89 03/30/2021    BUN 29 (H) 01/15/2022     01/15/2022    CO2 25 01/15/2022    Lab Results   Component Value Date    WBC 8.4 01/15/2022    HGB 11.1 (L) 01/15/2022    HCT 34.4 (L) 01/15/2022    MCV 93 01/15/2022     01/15/2022    Lab Results   Component Value Date    TROPONINI 0.02 01/15/2022    BNP 80 01/15/2022    TSH 1.40 03/12/2021    INR 1.13 01/15/2022             This note has been dictated using voice recognition software. Any grammatical, typographical, or context distortions are unintentional and inherent to the software    30 minutes spent on the date of encounter doing chart review, review of outside records, review of test results, patient visit and documentation.                Thank you for allowing me to participate in the care of your patient.      Sincerely,     TAVO Fernandez [facility]  cc:   No referring provider defined for this encounter.

## 2022-01-28 NOTE — PATIENT INSTRUCTIONS
Sherice Alvarez,    It was a pleasure to see you today at [facility].     My recommendations after this visit include:  - Please follow up with Dr Feldman in April   - Please follow up with Kim Arreola in 1 month  - Please drink 40 oz of fluids per day  - Continue current medications    Kim Arreola, CNP

## 2022-01-28 NOTE — PROGRESS NOTES
Assessment/Recommendations   Assessment:    1.  Heart failure with preserved ejection fraction, NYHA class II: Compensated.  She states overall she feels well.  Her lower extremity edema and dyspnea on exertion has improved.  Her weight has decreased also.  She follows a low-sodium diet.  2.  Hypertension: Controlled.  Blood pressure 108/60.  She states her blood pressures have been more stable at home.  She has had a few blood pressures 90/50 and has felt lightheaded and unsteady.  She states she is only drinking 16 ounces of fluids per day.  3.  Chronic kidney disease stage III: She had labs a few weeks ago with potassium 4.0, BUN 29 and creatinine 1.51.  Her baseline creatinine is 1.3    Plan:  1.  Continue current medications  2.  Continue daily weights and low-salt diet  3.  Increase fluid intake to 40 ounces per day    Sherice Alvarez will follow up Dr. Feldman in April and in the heart failure clinic in 1 month.     History of Present Illness/Subjective    Ms. Sherice Alvarez is a 87 year old female seen at [facility] heart failure clinic today for continued follow-up.  She follows up for heart failure with preserved ejection fraction.  Her last echocardiogram was done May 2021 which showed an ejection fraction of 71%.   Past medical history significant for hypertension, TAVR, DVT, dyslipidemia, chronic kidney disease stage III.     Today, her dyspnea on exertion and lower extremity edema have improved.  She does continue to have occasional lightheadedness and unsteadiness.  She denies shortness of breath, orthopnea, PND, palpitations, chest pain, abdominal fullness/bloating and lower extremity edema.      She is monitoring home weights which decreased to 140 pounds.  She participates in [employer] service.  She is following a low sodium diet.  She participates in regular physical activity including walking.       Physical Examination Review of Systems   /60 (BP Location: Left  "arm, Patient Position: Sitting, Cuff Size: Adult Regular)   Pulse 70   Resp 16   Ht 1.575 m (5' 2\")   Wt 64.9 kg (143 lb)   BMI 26.16 kg/m    Body mass index is 26.16 kg/m .  Wt Readings from Last 3 Encounters:   01/28/22 64.9 kg (143 lb)   01/13/22 66.2 kg (146 lb)   11/22/21 64 kg (141 lb)       General Appearance:   no acute distress   ENT/Mouth: membranes moist, no oral lesions or bleeding gums.      EYES:  no scleral icterus, normal conjunctivae   Neck: no carotid bruits or thyromegaly   Chest/Lungs:   lungs are clear to auscultation, no rales or wheezing, equal chest wall expansion    Cardiovascular:   Regular. Normal first and second heart sounds with no murmurs, rubs, or gallops; Jugular venous pressure normal, no edema bilaterally    Abdomen:  no organomegaly, masses, bruits, or tenderness; bowel sounds are present   Extremities: no cyanosis or clubbing   Skin: no xanthelasma, warm.    Neurologic: normal  bilateral, no tremors     Psychiatric: alert and oriented x3                                              Medical History  Surgical History Family History Social History   Past Medical History:   Diagnosis Date     Arthritis      Asthma      CAD (coronary artery disease)      Cancer (H)     basal cell     Chronic kidney disease     ckd 3     Chronic pain syndrome      Crohn's disease (H)      GERD (gastroesophageal reflux disease)      Hx of heart artery stent 11/2016    1 stent R Proximal CA and 1 Stent L Proximal circumflex  12/2016 Stent proximal LAD     Hyperlipidemia      Hypertension      NSTEMI (non-ST elevated myocardial infarction) (H) 11/2016     PONV (postoperative nausea and vomiting)     severe povn with last knee surgery     Restless legs syndrome      Stroke (H) 2010    numbness rt jaw    Past Surgical History:   Procedure Laterality Date     APPENDECTOMY       CARDIAC CATHETERIZATION  11/04/2016    multiple vessel disease.  no intervention     CARDIAC CATHETERIZATION  11/07/2016 "     CARDIAC CATHETERIZATION N/A 2016    Procedure: Coronary Angiogram;  Surgeon: Sunil Moran MD;  Location: [facility] Lab;  Service:      CAROTID ENDARTERECTOMY       CAROTID ENDARTERECTOMY Right 2010     CERVICAL LAMINECTOMY  1994      SECTION       CV CORONARY ANGIOGRAM N/A 2020    Procedure: Coronary Angiogram;  Surgeon: Vinita Ramos MD;  Location: [facility] Lab;  Service: Cardiology     CV LEFT HEART CATHETERIZATION WITHOUT LEFT VENTRICULOGRAM Left 2020    Procedure: Left Heart Catheterization Without Left Ventriculogram;  Surgeon: Jerad Lerner MD;  Location: [facility] Lab;  Service: Cardiology     CV TRANSCATHETER AORTIC VALVE REPLACEMENT - OTHER APPROACH N/A 2020    Procedure: CV TRANSCATHETER AORTIC VALVE REPLACEMENT - RIGHT SUBCLAVIAN APPROACH;  Surgeon: John Caceres MD;  Location: [facility] Lab;  Service: Cardiology     HEMORRHOIDECTOMY EXTERNAL       IR LUMBAR EPIDURAL STEROID INJECTION  2014     IR LUMBAR NERVE ROOT INJECTION  10/1/2013     JOINT REPLACEMENT       OR ESOPHAGOGASTRODUODENOSCOPY TRANSORAL DIAGNOSTIC N/A 07/10/2019    Procedure: ESOPHAGOGASTRODUODENOSCOPY (EGD) with gastric biopsy;  Surgeon: Sunil Stuart MD;  Location: [facility];  Service: Gastroenterology     OR REPLACE AORTIC VALVE OPEN AXILLRY ARTRY APPROACH N/A 2020    Procedure: OR TRANSCATHETER AORTIC VALVE REPLACEMENT, SUBCLAVIAN APPROACH;  Surgeon: Bhavin Cuadra MD;  Location: [facility] Lab;  Service: Cardiology     TONSILLECTOMY & ADENOIDECTOMY       TOTAL KNEE ARTHROPLASTY Right      ZZC TOTAL KNEE ARTHROPLASTY Left 2021    Procedure: LEFT TOTAL KNEE ARTHROPLASTY;  Surgeon: Doug Rhodes MD;  Location: [facility];  Service: Orthopedics    Family History   Problem Relation Age of Onset     Atrial fibrillation Mother      Parkinsonism Father     Social History     Socioeconomic History     Marital  status:      Spouse name: Not on file     Number of children: Not on file     Years of education: Not on file     Highest education level: Not on file   Occupational History     Not on file   Tobacco Use     Smoking status: Former Smoker     Quit date: 1980     Years since quittin.1     Smokeless tobacco: Never Used   Substance and Sexual Activity     Alcohol use: No     Drug use: No     Sexual activity: Not on file   Other Topics Concern     Not on file   Social History Narrative     Not on file     Social Determinants of Health     Financial Resource Strain: Not on file   Food Insecurity: Not on file   Transportation Needs: Not on file   Physical Activity: Not on file   Stress: Not on file   Social Connections: Not on file   Intimate Partner Violence: Not on file   Housing Stability: Not on file          Medications  Allergies   Current Outpatient Medications   Medication Sig Dispense Refill     acetaminophen (TYLENOL) 325 MG tablet [ACETAMINOPHEN (TYLENOL) 325 MG TABLET] Take 3 tablets (975 mg total) by mouth every 8 (eight) hours. (Patient taking differently: Take 975 mg by mouth daily as needed ) 60 tablet 0     albuterol (PROVENTIL HFA;VENTOLIN HFA) 90 mcg/actuation inhaler [ALBUTEROL (PROVENTIL HFA;VENTOLIN HFA) 90 MCG/ACTUATION INHALER] Inhale 1-2 puffs every 6 (six) hours as needed for wheezing.       amLODIPine (NORVASC) 2.5 MG tablet Take 1 tablet (2.5 mg) by mouth daily 90 tablet 3     aspirin (ASPIRIN) 81 MG EC tablet Take 81 mg by mouth daily       beclomethasone (QVAR) 80 mcg/actuation inhaler Inhale 1 puff into the lungs 2 times daily as needed        coenzyme Q10 100 mg capsule [COENZYME Q10 100 MG CAPSULE] Take 100 mg by mouth daily.        diclofenac (VOLTAREN) 1 % topical gel Apply 4 g topically 4 times daily       ergocalciferol (ERGOCALCIFEROL) 50,000 unit capsule [ERGOCALCIFEROL (ERGOCALCIFEROL) 50,000 UNIT CAPSULE] Take 50,000 Units by mouth once a week.       evolocumab  (REPATHA) 140 MG/ML prefilled syringe Inject 1 mL (140 mg) Subcutaneous every 14 days 6 mL 1     ferrous sulfate 325 (65 FE) MG tablet [FERROUS SULFATE 325 (65 FE) MG TABLET] Take 1 tablet (325 mg total) by mouth daily with breakfast. (Patient taking differently: Take 1 tablet by mouth four times a week )  0     furosemide (LASIX) 20 MG tablet Take Lasix 40 mg in the morning and 20 mg in the afternoon. 270 tablet 2     melatonin 5 mg Tab tablet [MELATONIN 5 MG TAB TABLET] Take 5 mg by mouth at bedtime as needed (for sleep).        metoprolol succinate ER (TOPROL-XL) 25 MG 24 hr tablet Take 1 tablet (25 mg) by mouth At Bedtime 90 tablet 3     multivitamin therapeutic (THERAGRAN) tablet [MULTIVITAMIN THERAPEUTIC (THERAGRAN) TABLET] Take 1 tablet by mouth daily.       nitroglycerin (NITROSTAT) 0.4 MG SL tablet [NITROGLYCERIN (NITROSTAT) 0.4 MG SL TABLET] Place 1 tablet (0.4 mg total) under the tongue every 5 (five) minutes as needed for chest pain. 90 tablet 0     omeprazole (PRILOSEC OTC) 20 MG EC tablet Take 1 tablet (20 mg) by mouth daily 90 tablet 0     rOPINIRole (REQUIP) 2 MG tablet [ROPINIROLE (REQUIP) 2 MG TABLET] Take 4 mg by mouth every evening. Patient takes 7pm      Allergies   Allergen Reactions     Amitriptyline Hcl [Amitriptyline] Other (See Comments)     Erythromycin Diarrhea and Other (See Comments)     Childhood reaction     Gabapentin Other (See Comments)     Jerking movements, swelling     Naproxen Unknown and Other (See Comments)     Other Environmental Allergy Unknown     Molds, dander     Pregabalin Other (See Comments)         Lab Results    Chemistry/lipid CBC Cardiac Enzymes/BNP/TSH/INR   Lab Results   Component Value Date    CHOL 226 (H) 03/30/2021    HDL 76 03/30/2021    TRIG 89 03/30/2021    BUN 29 (H) 01/15/2022     01/15/2022    CO2 25 01/15/2022    Lab Results   Component Value Date    WBC 8.4 01/15/2022    HGB 11.1 (L) 01/15/2022    HCT 34.4 (L) 01/15/2022    MCV 93 01/15/2022      01/15/2022    Lab Results   Component Value Date    TROPONINI 0.02 01/15/2022    BNP 80 01/15/2022    TSH 1.40 03/12/2021    INR 1.13 01/15/2022             This note has been dictated using voice recognition software. Any grammatical, typographical, or context distortions are unintentional and inherent to the software    30 minutes spent on the date of encounter doing chart review, review of outside records, review of test results, patient visit and documentation.

## 2022-02-25 ENCOUNTER — OFFICE VISIT (OUTPATIENT)
Dept: CARDIOLOGY | Facility: CLINIC | Age: 87
End: 2022-02-25
Payer: COMMERCIAL

## 2022-02-25 VITALS
BODY MASS INDEX: 26.16 KG/M2 | DIASTOLIC BLOOD PRESSURE: 72 MMHG | RESPIRATION RATE: 16 BRPM | WEIGHT: 143 LBS | HEART RATE: 68 BPM | SYSTOLIC BLOOD PRESSURE: 116 MMHG

## 2022-02-25 DIAGNOSIS — I50.32 CHRONIC HEART FAILURE WITH PRESERVED EJECTION FRACTION (H): Primary | ICD-10-CM

## 2022-02-25 DIAGNOSIS — I10 BENIGN ESSENTIAL HYPERTENSION: ICD-10-CM

## 2022-02-25 DIAGNOSIS — N18.31 STAGE 3A CHRONIC KIDNEY DISEASE (H): ICD-10-CM

## 2022-02-25 PROCEDURE — 99214 OFFICE O/P EST MOD 30 MIN: CPT | Performed by: NURSE PRACTITIONER

## 2022-02-25 NOTE — LETTER
2/25/2022    Anum Rosenberg NP  [place] [address] [address] [facility] [address]    RE: Sherice Alvarez       Dear Colleague,     I had the pleasure of seeing Sherice Alvarez in the [facility].      Assessment/Recommendations   Assessment:    1. Heart failure with preserved ejection fraction, NYHA class III: Compensated.  She has fatigue and dyspnea on exertion.  She states her weight has been stable.  She watches her salt intake and is enrolled in open arms meal service.  2. Hypertension: Controlled.  Blood pressure 116/72  3. Chronic kidney disease stage III: She had labs mid January which showed potassium 4.0, BUN 29 and creatinine 1.51. Baseline creatinine is close to 1.3    Plan:  1. Continue current medications  2.  Continue monitoring daily weights and low-salt diet  3.  Continue open arms  4.  Continue regular activity    Sherice Alvarez will follow up with Dr. Feldman April 19 and in the heart failure clinic in July.     History of Present Illness/Subjective    Ms. Sherice Alvarez is a 87 year old female seen at [facility] heart failure clinic today for continued follow-up.  Her daughter Carly accompanies her today.  She follows up for heart failure with preserved ejection fraction.  Her last echocardiogram was done May 2021 which showed an ejection fraction of 71%.   Past medical history significant for hypertension, TAVR, DVT, dyslipidemia, chronic kidney disease stage III.     Today, she has many complaints of body achiness and pains.  She has mild fatigue and dyspnea on exertion.  She denies lightheadedness, orthopnea, PND, palpitations, chest pain, abdominal fullness/bloating and lower extremity edema.      She is monitoring home weights which are stable.  She is following a low sodium diet and participates in open arms.  She participates in regular physical activity including walking.       Physical Examination Review of Systems   /72 (BP  Location: Left arm, Patient Position: Sitting, Cuff Size: Adult Regular)   Pulse 68   Resp 16   Wt 64.9 kg (143 lb)   BMI 26.16 kg/m    Body mass index is 26.16 kg/m .  Wt Readings from Last 3 Encounters:   02/25/22 64.9 kg (143 lb)   01/28/22 64.9 kg (143 lb)   01/13/22 66.2 kg (146 lb)       General Appearance:   no acute distress   ENT/Mouth: membranes moist, no oral lesions or bleeding gums.      EYES:  no scleral icterus, normal conjunctivae   Neck: no carotid bruits or thyromegaly   Chest/Lungs:   lungs are clear to auscultation, no rales or wheezing, equal chest wall expansion    Cardiovascular:   Regular. Normal first and second heart sounds with no murmurs, rubs, or gallops; Jugular venous pressure normal, no edema bilaterally    Abdomen:  no organomegaly, masses, bruits, or tenderness; bowel sounds are present   Extremities: no cyanosis or clubbing   Skin: no xanthelasma, warm.    Neurologic: normal  bilateral, no tremors     Psychiatric: alert and oriented x3                                              Medical History  Surgical History Family History Social History   Past Medical History:   Diagnosis Date     Arthritis      Asthma      CAD (coronary artery disease)      Cancer (H)     basal cell     Chronic kidney disease     ckd 3     Chronic pain syndrome      Crohn's disease (H)      GERD (gastroesophageal reflux disease)      Hx of heart artery stent 11/2016    1 stent R Proximal CA and 1 Stent L Proximal circumflex  12/2016 Stent proximal LAD     Hyperlipidemia      Hypertension      NSTEMI (non-ST elevated myocardial infarction) (H) 11/2016     PONV (postoperative nausea and vomiting)     severe povn with last knee surgery     Restless legs syndrome      Stroke (H) 2010    numbness rt jaw    Past Surgical History:   Procedure Laterality Date     APPENDECTOMY       CARDIAC CATHETERIZATION  11/04/2016    multiple vessel disease.  no intervention     CARDIAC CATHETERIZATION  11/07/2016      CARDIAC CATHETERIZATION N/A 2016    Procedure: Coronary Angiogram;  Surgeon: Sunil Moran MD;  Location: [facility];  Service:      CAROTID ENDARTERECTOMY       CAROTID ENDARTERECTOMY Right 2010     CERVICAL LAMINECTOMY  1994      SECTION       CV CORONARY ANGIOGRAM N/A 2020    Procedure: Coronary Angiogram;  Surgeon: Vinita Ramos MD;  Location: [facility] Lab;  Service: Cardiology     CV LEFT HEART CATHETERIZATION WITHOUT LEFT VENTRICULOGRAM Left 2020    Procedure: Left Heart Catheterization Without Left Ventriculogram;  Surgeon: Jerad Lerner MD;  Location: [facility] Lab;  Service: Cardiology     CV TRANSCATHETER AORTIC VALVE REPLACEMENT - OTHER APPROACH N/A 2020    Procedure: CV TRANSCATHETER AORTIC VALVE REPLACEMENT - RIGHT SUBCLAVIAN APPROACH;  Surgeon: John Caceres MD;  Location: [facility] Lab;  Service: Cardiology     HEMORRHOIDECTOMY EXTERNAL       IR LUMBAR EPIDURAL STEROID INJECTION  2014     IR LUMBAR NERVE ROOT INJECTION  10/1/2013     JOINT REPLACEMENT       WA ESOPHAGOGASTRODUODENOSCOPY TRANSORAL DIAGNOSTIC N/A 07/10/2019    Procedure: ESOPHAGOGASTRODUODENOSCOPY (EGD) with gastric biopsy;  Surgeon: Sunil Stuart MD;  Location: [facility];  Service: Gastroenterology     WA REPLACE AORTIC VALVE OPEN AXILLRY ARTRY APPROACH N/A 2020    Procedure: OR TRANSCATHETER AORTIC VALVE REPLACEMENT, SUBCLAVIAN APPROACH;  Surgeon: Bhavin Cuadra MD;  Location: [facility] Lab;  Service: Cardiology     TONSILLECTOMY & ADENOIDECTOMY       TOTAL KNEE ARTHROPLASTY Right      ZZC TOTAL KNEE ARTHROPLASTY Left 2021    Procedure: LEFT TOTAL KNEE ARTHROPLASTY;  Surgeon: Doug Rhodes MD;  Location: [facility];  Service: Orthopedics    Family History   Problem Relation Age of Onset     Atrial fibrillation Mother      Parkinsonism Father     Social History     Socioeconomic History     Marital status:       Spouse name: Not on file     Number of children: Not on file     Years of education: Not on file     Highest education level: Not on file   Occupational History     Not on file   Tobacco Use     Smoking status: Former Smoker     Quit date: 1980     Years since quittin.1     Smokeless tobacco: Never Used   Substance and Sexual Activity     Alcohol use: No     Drug use: No     Sexual activity: Not on file   Other Topics Concern     Not on file   Social History Narrative     Not on file     Social Determinants of Health     Financial Resource Strain: Not on file   Food Insecurity: Not on file   Transportation Needs: Not on file   Physical Activity: Not on file   Stress: Not on file   Social Connections: Not on file   Intimate Partner Violence: Not on file   Housing Stability: Not on file          Medications  Allergies   Current Outpatient Medications   Medication Sig Dispense Refill     acetaminophen (TYLENOL) 325 MG tablet [ACETAMINOPHEN (TYLENOL) 325 MG TABLET] Take 3 tablets (975 mg total) by mouth every 8 (eight) hours. (Patient taking differently: Take 975 mg by mouth daily as needed ) 60 tablet 0     albuterol (PROVENTIL HFA;VENTOLIN HFA) 90 mcg/actuation inhaler [ALBUTEROL (PROVENTIL HFA;VENTOLIN HFA) 90 MCG/ACTUATION INHALER] Inhale 1-2 puffs every 6 (six) hours as needed for wheezing.       amLODIPine (NORVASC) 2.5 MG tablet Take 1 tablet (2.5 mg) by mouth daily 90 tablet 3     aspirin (ASPIRIN) 81 MG EC tablet Take 81 mg by mouth daily       beclomethasone (QVAR) 80 mcg/actuation inhaler Inhale 1 puff into the lungs 2 times daily as needed        coenzyme Q10 100 mg capsule [COENZYME Q10 100 MG CAPSULE] Take 100 mg by mouth daily.        diclofenac (VOLTAREN) 1 % topical gel Apply 4 g topically 4 times daily       ergocalciferol (ERGOCALCIFEROL) 50,000 unit capsule [ERGOCALCIFEROL (ERGOCALCIFEROL) 50,000 UNIT CAPSULE] Take 50,000 Units by mouth once a week.       evolocumab (REPATHA) 140  MG/ML prefilled syringe Inject 1 mL (140 mg) Subcutaneous every 14 days 6 mL 1     ferrous sulfate 325 (65 FE) MG tablet [FERROUS SULFATE 325 (65 FE) MG TABLET] Take 1 tablet (325 mg total) by mouth daily with breakfast. (Patient taking differently: Take 1 tablet by mouth once a week )  0     furosemide (LASIX) 20 MG tablet Take Lasix 40 mg in the morning and 20 mg in the afternoon. 270 tablet 2     melatonin 5 mg Tab tablet [MELATONIN 5 MG TAB TABLET] Take 5 mg by mouth at bedtime as needed (for sleep).        metoprolol succinate ER (TOPROL-XL) 25 MG 24 hr tablet Take 1 tablet (25 mg) by mouth At Bedtime 90 tablet 3     multivitamin therapeutic (THERAGRAN) tablet [MULTIVITAMIN THERAPEUTIC (THERAGRAN) TABLET] Take 1 tablet by mouth daily.       nitroglycerin (NITROSTAT) 0.4 MG SL tablet [NITROGLYCERIN (NITROSTAT) 0.4 MG SL TABLET] Place 1 tablet (0.4 mg total) under the tongue every 5 (five) minutes as needed for chest pain. 90 tablet 0     omeprazole (PRILOSEC OTC) 20 MG EC tablet Take 1 tablet (20 mg) by mouth daily 90 tablet 0     rOPINIRole (REQUIP) 2 MG tablet [ROPINIROLE (REQUIP) 2 MG TABLET] Take 4 mg by mouth every evening. Patient takes 7pm      Allergies   Allergen Reactions     Amitriptyline Hcl [Amitriptyline] Other (See Comments)     Erythromycin Diarrhea and Other (See Comments)     Childhood reaction     Gabapentin Other (See Comments)     Jerking movements, swelling     Naproxen Unknown and Other (See Comments)     Other Environmental Allergy Unknown     Molds, dander     Pregabalin Other (See Comments)         Lab Results    Chemistry/lipid CBC Cardiac Enzymes/BNP/TSH/INR   Lab Results   Component Value Date    CHOL 226 (H) 03/30/2021    HDL 76 03/30/2021    TRIG 89 03/30/2021    BUN 29 (H) 01/15/2022     01/15/2022    CO2 25 01/15/2022    Lab Results   Component Value Date    WBC 8.4 01/15/2022    HGB 11.1 (L) 01/15/2022    HCT 34.4 (L) 01/15/2022    MCV 93 01/15/2022     01/15/2022     Lab Results   Component Value Date    TROPONINI 0.02 01/15/2022    BNP 80 01/15/2022    TSH 1.40 03/12/2021    INR 1.13 01/15/2022             This note has been dictated using voice recognition software. Any grammatical, typographical, or context distortions are unintentional and inherent to the software    30 minutes spent on the date of encounter doing chart review, review of outside records, review of test results, patient visit, documentation and discussion with family.                  Thank you for allowing me to participate in the care of your patient.      Sincerely,     TAVO Fernandez [facility]  cc:   No referring provider defined for this encounter.

## 2022-02-25 NOTE — PATIENT INSTRUCTIONS
Sherice Alvarez,    It was a pleasure to see you today at [facility].     My recommendations after this visit include:  - Please follow up with Dr Feldman April 19   - Please follow up with Kim Arreola in July  - Continue current medications    Kim Arreola, CNP

## 2022-02-25 NOTE — PROGRESS NOTES
Assessment/Recommendations   Assessment:    1. Heart failure with preserved ejection fraction, NYHA class III: Compensated.  She has fatigue and dyspnea on exertion.  She states her weight has been stable.  She watches her salt intake and is enrolled in [facility] meal service.  2. Hypertension: Controlled.  Blood pressure 116/72  3. Chronic kidney disease stage III: She had labs mid January which showed potassium 4.0, BUN 29 and creatinine 1.51. Baseline creatinine is close to 1.3    Plan:  1. Continue current medications  2.  Continue monitoring daily weights and low-salt diet  3.  Continue open arms  4.  Continue regular activity    Sherice Alvarez will follow up with Dr. Feldman April 19 and in the heart failure clinic in July.     History of Present Illness/Subjective    Ms. Sherice Alvarez is a 87 year old female seen at [facility] heart failure clinic today for continued follow-up.  Her daughter Carly accompanies her today.  She follows up for heart failure with preserved ejection fraction.  Her last echocardiogram was done May 2021 which showed an ejection fraction of 71%.   Past medical history significant for hypertension, TAVR, DVT, dyslipidemia, chronic kidney disease stage III.     Today, she has many complaints of body achiness and pains.  She has mild fatigue and dyspnea on exertion.  She denies lightheadedness, orthopnea, PND, palpitations, chest pain, abdominal fullness/bloating and lower extremity edema.      She is monitoring home weights which are stable.  She is following a low sodium diet and participates in open arms.  She participates in regular physical activity including walking.       Physical Examination Review of Systems   /72 (BP Location: Left arm, Patient Position: Sitting, Cuff Size: Adult Regular)   Pulse 68   Resp 16   Wt 64.9 kg (143 lb)   BMI 26.16 kg/m    Body mass index is 26.16 kg/m .  Wt Readings from Last 3 Encounters:   02/25/22 64.9 kg (143  lb)   01/28/22 64.9 kg (143 lb)   01/13/22 66.2 kg (146 lb)       General Appearance:   no acute distress   ENT/Mouth: membranes moist, no oral lesions or bleeding gums.      EYES:  no scleral icterus, normal conjunctivae   Neck: no carotid bruits or thyromegaly   Chest/Lungs:   lungs are clear to auscultation, no rales or wheezing, equal chest wall expansion    Cardiovascular:   Regular. Normal first and second heart sounds with no murmurs, rubs, or gallops; Jugular venous pressure normal, no edema bilaterally    Abdomen:  no organomegaly, masses, bruits, or tenderness; bowel sounds are present   Extremities: no cyanosis or clubbing   Skin: no xanthelasma, warm.    Neurologic: normal  bilateral, no tremors     Psychiatric: alert and oriented x3                                              Medical History  Surgical History Family History Social History   Past Medical History:   Diagnosis Date     Arthritis      Asthma      CAD (coronary artery disease)      Cancer (H)     basal cell     Chronic kidney disease     ckd 3     Chronic pain syndrome      Crohn's disease (H)      GERD (gastroesophageal reflux disease)      Hx of heart artery stent 11/2016    1 stent R Proximal CA and 1 Stent L Proximal circumflex  12/2016 Stent proximal LAD     Hyperlipidemia      Hypertension      NSTEMI (non-ST elevated myocardial infarction) (H) 11/2016     PONV (postoperative nausea and vomiting)     severe povn with last knee surgery     Restless legs syndrome      Stroke (H) 2010    numbness rt jaw    Past Surgical History:   Procedure Laterality Date     APPENDECTOMY       CARDIAC CATHETERIZATION  11/04/2016    multiple vessel disease.  no intervention     CARDIAC CATHETERIZATION  11/07/2016     CARDIAC CATHETERIZATION N/A 12/27/2016    Procedure: Coronary Angiogram;  Surgeon: Sunil Moran MD;  Location: [facility] Lab;  Service:      CAROTID ENDARTERECTOMY       CAROTID ENDARTERECTOMY Right 2010     CERVICAL  LAMINECTOMY  1994      SECTION       CV CORONARY ANGIOGRAM N/A 2020    Procedure: Coronary Angiogram;  Surgeon: Vinita Ramos MD;  Location: [facility] Lab;  Service: Cardiology     CV LEFT HEART CATHETERIZATION WITHOUT LEFT VENTRICULOGRAM Left 2020    Procedure: Left Heart Catheterization Without Left Ventriculogram;  Surgeon: Jerad Lerner MD;  Location: [facility] Lab;  Service: Cardiology     CV TRANSCATHETER AORTIC VALVE REPLACEMENT - OTHER APPROACH N/A 2020    Procedure: CV TRANSCATHETER AORTIC VALVE REPLACEMENT - RIGHT SUBCLAVIAN APPROACH;  Surgeon: John Caceres MD;  Location: [facility] Lab;  Service: Cardiology     HEMORRHOIDECTOMY EXTERNAL       IR LUMBAR EPIDURAL STEROID INJECTION  2014     IR LUMBAR NERVE ROOT INJECTION  10/1/2013     JOINT REPLACEMENT       OH ESOPHAGOGASTRODUODENOSCOPY TRANSORAL DIAGNOSTIC N/A 07/10/2019    Procedure: ESOPHAGOGASTRODUODENOSCOPY (EGD) with gastric biopsy;  Surgeon: Sunil Stuart MD;  Location: [facility];  Service: Gastroenterology     [place] REPLACE AORTIC VALVE OPEN AXILLRY ARTRY APPROACH N/A 2020    Procedure: OR TRANSCATHETER AORTIC VALVE REPLACEMENT, SUBCLAVIAN APPROACH;  Surgeon: Bhavin Cuadra MD;  Location: [facility];  Service: Cardiology     TONSILLECTOMY & ADENOIDECTOMY       TOTAL KNEE ARTHROPLASTY Right      ZZC TOTAL KNEE ARTHROPLASTY Left 2021    Procedure: LEFT TOTAL KNEE ARTHROPLASTY;  Surgeon: Doug Rhodes MD;  Location: [facility];  Service: Orthopedics    Family History   Problem Relation Age of Onset     Atrial fibrillation Mother      Parkinsonism Father     Social History     Socioeconomic History     Marital status:      Spouse name: Not on file     Number of children: Not on file     Years of education: Not on file     Highest education level: Not on file   Occupational History     Not on file   Tobacco Use     Smoking status:  Former Smoker     Quit date: 1980     Years since quittin.1     Smokeless tobacco: Never Used   Substance and Sexual Activity     Alcohol use: No     Drug use: No     Sexual activity: Not on file   Other Topics Concern     Not on file   Social History Narrative     Not on file     Social Determinants of Health     Financial Resource Strain: Not on file   Food Insecurity: Not on file   Transportation Needs: Not on file   Physical Activity: Not on file   Stress: Not on file   Social Connections: Not on file   Intimate Partner Violence: Not on file   Housing Stability: Not on file          Medications  Allergies   Current Outpatient Medications   Medication Sig Dispense Refill     acetaminophen (TYLENOL) 325 MG tablet [ACETAMINOPHEN (TYLENOL) 325 MG TABLET] Take 3 tablets (975 mg total) by mouth every 8 (eight) hours. (Patient taking differently: Take 975 mg by mouth daily as needed ) 60 tablet 0     albuterol (PROVENTIL HFA;VENTOLIN HFA) 90 mcg/actuation inhaler [ALBUTEROL (PROVENTIL HFA;VENTOLIN HFA) 90 MCG/ACTUATION INHALER] Inhale 1-2 puffs every 6 (six) hours as needed for wheezing.       amLODIPine (NORVASC) 2.5 MG tablet Take 1 tablet (2.5 mg) by mouth daily 90 tablet 3     aspirin (ASPIRIN) 81 MG EC tablet Take 81 mg by mouth daily       beclomethasone (QVAR) 80 mcg/actuation inhaler Inhale 1 puff into the lungs 2 times daily as needed        coenzyme Q10 100 mg capsule [COENZYME Q10 100 MG CAPSULE] Take 100 mg by mouth daily.        diclofenac (VOLTAREN) 1 % topical gel Apply 4 g topically 4 times daily       ergocalciferol (ERGOCALCIFEROL) 50,000 unit capsule [ERGOCALCIFEROL (ERGOCALCIFEROL) 50,000 UNIT CAPSULE] Take 50,000 Units by mouth once a week.       evolocumab (REPATHA) 140 MG/ML prefilled syringe Inject 1 mL (140 mg) Subcutaneous every 14 days 6 mL 1     ferrous sulfate 325 (65 FE) MG tablet [FERROUS SULFATE 325 (65 FE) MG TABLET] Take 1 tablet (325 mg total) by mouth daily with breakfast.  (Patient taking differently: Take 1 tablet by mouth once a week )  0     furosemide (LASIX) 20 MG tablet Take Lasix 40 mg in the morning and 20 mg in the afternoon. 270 tablet 2     melatonin 5 mg Tab tablet [MELATONIN 5 MG TAB TABLET] Take 5 mg by mouth at bedtime as needed (for sleep).        metoprolol succinate ER (TOPROL-XL) 25 MG 24 hr tablet Take 1 tablet (25 mg) by mouth At Bedtime 90 tablet 3     multivitamin therapeutic (THERAGRAN) tablet [MULTIVITAMIN THERAPEUTIC (THERAGRAN) TABLET] Take 1 tablet by mouth daily.       nitroglycerin (NITROSTAT) 0.4 MG SL tablet [NITROGLYCERIN (NITROSTAT) 0.4 MG SL TABLET] Place 1 tablet (0.4 mg total) under the tongue every 5 (five) minutes as needed for chest pain. 90 tablet 0     omeprazole (PRILOSEC OTC) 20 MG EC tablet Take 1 tablet (20 mg) by mouth daily 90 tablet 0     rOPINIRole (REQUIP) 2 MG tablet [ROPINIROLE (REQUIP) 2 MG TABLET] Take 4 mg by mouth every evening. Patient takes 7pm      Allergies   Allergen Reactions     Amitriptyline Hcl [Amitriptyline] Other (See Comments)     Erythromycin Diarrhea and Other (See Comments)     Childhood reaction     Gabapentin Other (See Comments)     Jerking movements, swelling     Naproxen Unknown and Other (See Comments)     Other Environmental Allergy Unknown     Molds, dander     Pregabalin Other (See Comments)         Lab Results    Chemistry/lipid CBC Cardiac Enzymes/BNP/TSH/INR   Lab Results   Component Value Date    CHOL 226 (H) 03/30/2021    HDL 76 03/30/2021    TRIG 89 03/30/2021    BUN 29 (H) 01/15/2022     01/15/2022    CO2 25 01/15/2022    Lab Results   Component Value Date    WBC 8.4 01/15/2022    HGB 11.1 (L) 01/15/2022    HCT 34.4 (L) 01/15/2022    MCV 93 01/15/2022     01/15/2022    Lab Results   Component Value Date    TROPONINI 0.02 01/15/2022    BNP 80 01/15/2022    TSH 1.40 03/12/2021    INR 1.13 01/15/2022             This note has been dictated using voice recognition software. Any  grammatical, typographical, or context distortions are unintentional and inherent to the software    30 minutes spent on the date of encounter doing chart review, review of outside records, review of test results, patient visit, documentation and discussion with family.

## 2022-03-03 ENCOUNTER — LAB REQUISITION (OUTPATIENT)
Dept: LAB | Facility: CLINIC | Age: 87
End: 2022-03-03

## 2022-03-03 DIAGNOSIS — G62.9 POLYNEUROPATHY, UNSPECIFIED: ICD-10-CM

## 2022-03-03 LAB
ALBUMIN SERPL-MCNC: 3.6 G/DL (ref 3.5–5)
ALP SERPL-CCNC: 72 U/L (ref 45–120)
ALT SERPL W P-5'-P-CCNC: 13 U/L (ref 0–45)
ANION GAP SERPL CALCULATED.3IONS-SCNC: 12 MMOL/L (ref 5–18)
AST SERPL W P-5'-P-CCNC: 23 U/L (ref 0–40)
BILIRUB SERPL-MCNC: 0.5 MG/DL (ref 0–1)
BUN SERPL-MCNC: 30 MG/DL (ref 8–28)
CALCIUM SERPL-MCNC: 9 MG/DL (ref 8.5–10.5)
CHLORIDE BLD-SCNC: 107 MMOL/L (ref 98–107)
CO2 SERPL-SCNC: 22 MMOL/L (ref 22–31)
CREAT SERPL-MCNC: 1.21 MG/DL (ref 0.6–1.1)
ERYTHROCYTE [DISTWIDTH] IN BLOOD BY AUTOMATED COUNT: 19.3 % (ref 10–15)
GFR SERPL CREATININE-BSD FRML MDRD: 43 ML/MIN/1.73M2
GLUCOSE BLD-MCNC: 78 MG/DL (ref 70–125)
HCT VFR BLD AUTO: 35.7 % (ref 35–47)
HGB BLD-MCNC: 11.5 G/DL (ref 11.7–15.7)
MCH RBC QN AUTO: 29.7 PG (ref 26.5–33)
MCHC RBC AUTO-ENTMCNC: 32.2 G/DL (ref 31.5–36.5)
MCV RBC AUTO: 92 FL (ref 78–100)
PLATELET # BLD AUTO: 222 10E3/UL (ref 150–450)
POTASSIUM BLD-SCNC: 3.8 MMOL/L (ref 3.5–5)
PROT SERPL-MCNC: 6.1 G/DL (ref 6–8)
RBC # BLD AUTO: 3.87 10E6/UL (ref 3.8–5.2)
SODIUM SERPL-SCNC: 141 MMOL/L (ref 136–145)
TSH SERPL DL<=0.005 MIU/L-ACNC: 3.2 UIU/ML (ref 0.3–5)
WBC # BLD AUTO: 7.7 10E3/UL (ref 4–11)

## 2022-03-03 PROCEDURE — 80053 COMPREHEN METABOLIC PANEL: CPT | Performed by: NURSE PRACTITIONER

## 2022-03-03 PROCEDURE — 82306 VITAMIN D 25 HYDROXY: CPT | Performed by: NURSE PRACTITIONER

## 2022-03-03 PROCEDURE — 82040 ASSAY OF SERUM ALBUMIN: CPT | Performed by: NURSE PRACTITIONER

## 2022-03-03 PROCEDURE — 84443 ASSAY THYROID STIM HORMONE: CPT | Performed by: NURSE PRACTITIONER

## 2022-03-03 PROCEDURE — 85027 COMPLETE CBC AUTOMATED: CPT | Performed by: NURSE PRACTITIONER

## 2022-03-03 PROCEDURE — 82607 VITAMIN B-12: CPT | Performed by: NURSE PRACTITIONER

## 2022-03-04 LAB
DEPRECATED CALCIDIOL+CALCIFEROL SERPL-MC: 67 UG/L (ref 30–80)
VIT B12 SERPL-MCNC: 490 PG/ML (ref 213–816)

## 2022-04-19 ENCOUNTER — OFFICE VISIT (OUTPATIENT)
Dept: CARDIOLOGY | Facility: CLINIC | Age: 87
End: 2022-04-19
Attending: INTERNAL MEDICINE
Payer: COMMERCIAL

## 2022-04-19 VITALS
BODY MASS INDEX: 26.7 KG/M2 | SYSTOLIC BLOOD PRESSURE: 114 MMHG | DIASTOLIC BLOOD PRESSURE: 60 MMHG | WEIGHT: 146 LBS | RESPIRATION RATE: 20 BRPM | OXYGEN SATURATION: 97 % | HEART RATE: 68 BPM

## 2022-04-19 DIAGNOSIS — I50.32 CHRONIC DIASTOLIC HEART FAILURE (H): ICD-10-CM

## 2022-04-19 DIAGNOSIS — R06.09 DYSPNEA ON EXERTION: ICD-10-CM

## 2022-04-19 DIAGNOSIS — Z95.2 S/P TAVR (TRANSCATHETER AORTIC VALVE REPLACEMENT): ICD-10-CM

## 2022-04-19 DIAGNOSIS — E78.5 DYSLIPIDEMIA, GOAL LDL BELOW 100: ICD-10-CM

## 2022-04-19 DIAGNOSIS — I25.110 CORONARY ARTERY DISEASE INVOLVING NATIVE CORONARY ARTERY OF NATIVE HEART WITH UNSTABLE ANGINA PECTORIS (H): Primary | ICD-10-CM

## 2022-04-19 DIAGNOSIS — N18.30 STAGE 3 CHRONIC KIDNEY DISEASE, UNSPECIFIED WHETHER STAGE 3A OR 3B CKD (H): ICD-10-CM

## 2022-04-19 PROCEDURE — 99214 OFFICE O/P EST MOD 30 MIN: CPT | Performed by: INTERNAL MEDICINE

## 2022-04-19 NOTE — LETTER
4/19/2022    Anum Rosenberg NP  [place] [address] [address] [facility] [address]    RE: Sherice Alvarez       Dear Colleague,     I had the pleasure of seeing Sherice Alvarez in the [facility].      [facility]  Heart Care Clinic Follow-up Note    Assessment & Plan        (I25.110) Coronary artery disease involving native coronary artery of native heart with unstable angina pectoris (H)  (primary encounter diagnosis)  Comment: Angiography April 2020 showed normal left main, proximal LAD 20% stenosis with patent stent, mid sequential 30% lesion with a distal 70% lesion, circumflex with a patent proximal stent with a third obtuse marginal artery 99% stenosis.  Right coronary artery patent stents with a mid 40% stenosis.  Attempted intervention on distal LAD as well as obtuse marginal artery which were unsuccessful and underwent nuclear stress test May 2021 which was normal.  Vague chest discomfort, and shortness of breath and significant anxiety so I will recheck stress test with exercise.      (I50.32) Chronic diastolic heart failure (H)  Comment: No significant signs or symptoms currently, ejection fraction greater than 70%, discussed fluid and salt restriction.    (E78.5) Dyslipidemia, goal LDL below 100  Comment: Cholesterol was 226 and LDL was 133, she tells me she was on a PCSK9 inhibitor but for some reason is not on it now.  We will look into this.    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      (R06.00) Dyspnea on exertion  Comment: Chronic, rule out ischemia, valve looks good before although we will check echo as well. Hemoglobin was normal in March.  If all checks out most likely is due to asthma.    (N18.30) Stage 3 chronic kidney disease, unspecified whether stage 3a or 3b CKD  (H)  Comment: Creatinine minimally elevated at 1.21.    Plan  1.  Exercise stress nuclear, if significant ischemia pursue angiography.  2.  Echocardiogram, look at TAVR.  3.  If both check out okay suggest follow-up with primary concerning shortness of breath and anxiety.  4.  Follow-up me in 1 year or sooner if needed.    Subjective  CC: 87-year-old white female here for follow-up visit.  She has numerous concerns.  She tells me she is extremely depressed over her 51-year-old adopted son who apparently is dying from multiple system abnormalities.  She tells me that because of this she is fatigued, she now has a pain and tingling in all 4 extremities.  She has increased shortness of breath with minimal activity with what sounds like a raspy voice.  This is not all the time.  There is no PND or orthopnea.  She has a vague tightness in her chest which is there all the time.  She tells me that she is able to function fairly well the majority of the time and is always running around.  There was some mild orthostatic lightheadedness but no true syncope.  She does have some mild bipedal edema but this is controlled with compression stockings.    Medications  Current Outpatient Medications   Medication Sig Dispense Refill     acetaminophen (TYLENOL) 325 MG tablet [ACETAMINOPHEN (TYLENOL) 325 MG TABLET] Take 3 tablets (975 mg total) by mouth every 8 (eight) hours. (Patient taking differently: Take 975 mg by mouth daily as needed) 60 tablet 0     albuterol (PROVENTIL HFA;VENTOLIN HFA) 90 mcg/actuation inhaler [ALBUTEROL (PROVENTIL HFA;VENTOLIN HFA) 90 MCG/ACTUATION INHALER] Inhale 1-2 puffs every 6 (six) hours as needed for wheezing.       amLODIPine (NORVASC) 2.5 MG tablet Take 1 tablet (2.5 mg) by mouth daily 90 tablet 3     aspirin (ASA) 81 MG EC tablet Take 81 mg by mouth daily       beclomethasone (QVAR) 80 mcg/actuation inhaler Inhale 1 puff into the lungs 2 times daily as needed        coenzyme Q10 100 mg capsule  [COENZYME Q10 100 MG CAPSULE] Take 100 mg by mouth daily.        diclofenac (VOLTAREN) 1 % topical gel Apply 4 g topically as needed       ferrous sulfate 325 (65 FE) MG tablet [FERROUS SULFATE 325 (65 FE) MG TABLET] Take 1 tablet (325 mg total) by mouth daily with breakfast. (Patient taking differently: Take 1 tablet by mouth once a week)  0     furosemide (LASIX) 20 MG tablet Take Lasix 40 mg in the morning and 20 mg in the afternoon. (Patient taking differently: Take 40-60 mg by mouth daily Take Lasix 40 mg in the morning and 20 mg in the afternoon prn.) 270 tablet 2     melatonin 5 mg Tab tablet [MELATONIN 5 MG TAB TABLET] Take 5 mg by mouth at bedtime as needed (for sleep).        metoprolol succinate ER (TOPROL-XL) 25 MG 24 hr tablet Take 1 tablet (25 mg) by mouth At Bedtime 90 tablet 3     multivitamin therapeutic (THERAGRAN) tablet [MULTIVITAMIN THERAPEUTIC (THERAGRAN) TABLET] Take 1 tablet by mouth daily.       nitroglycerin (NITROSTAT) 0.4 MG SL tablet [NITROGLYCERIN (NITROSTAT) 0.4 MG SL TABLET] Place 1 tablet (0.4 mg total) under the tongue every 5 (five) minutes as needed for chest pain. 90 tablet 0     omeprazole (PRILOSEC OTC) 20 MG EC tablet Take 1 tablet (20 mg) by mouth daily 90 tablet 0     rOPINIRole (REQUIP) 2 MG tablet [ROPINIROLE (REQUIP) 2 MG TABLET] Take 4 mg by mouth every evening. Patient takes 7pm       ergocalciferol (ERGOCALCIFEROL) 50,000 unit capsule [ERGOCALCIFEROL (ERGOCALCIFEROL) 50,000 UNIT CAPSULE] Take 50,000 Units by mouth once a week. (Patient not taking: Reported on 4/19/2022)       evolocumab (REPATHA) 140 MG/ML prefilled syringe Inject 1 mL (140 mg) Subcutaneous every 14 days (Patient not taking: Reported on 4/19/2022) 6 mL 1       Objective  /60 (BP Location: Right arm, Patient Position: Sitting, Cuff Size: Adult Regular)   Pulse 68   Resp 20   Wt 66.2 kg (146 lb)   SpO2 97%   BMI 26.70 kg/m      General Appearance:    Alert, cooperative, no distress, appears  stated age   Head:    Normocephalic, without obvious abnormality, atraumatic   Throat:   Lips, mucosa, and tongue normal; teeth and gums normal   Neck:   Supple, symmetrical, trachea midline, no adenopathy;        thyroid:  No enlargement/tenderness/nodules; no carotid    bruit or JVD   Back:     Symmetric, no curvature, ROM normal, no CVA tenderness   Lungs:     Clear to auscultation bilaterally, respirations unlabored   Chest wall:    No tenderness or deformity   Heart:    Regular rate and rhythm, S1 and S2 normal, 1/6 systolic ejection murmur, no rub   or gallop   Abdomen:     Soft, non-tender, bowel sounds active all four quadrants,     no masses, no organomegaly   Extremities:   Normal, atraumatic, no cyanosis or edema   Pulses:   2+ and symmetric all extremities   Skin:   Skin color, texture, turgor normal, no rashes or lesions     Results    Lab Results personally reviewed   Lab Results   Component Value Date    CHOL 226 (H) 03/30/2021    CHOL 146 06/04/2020     Lab Results   Component Value Date    HDL 76 03/30/2021    HDL 67 06/04/2020     No components found for: LDLCALC  Lab Results   Component Value Date    TRIG 89 03/30/2021    TRIG 78 06/04/2020     Lab Results   Component Value Date    WBC 7.7 03/03/2022    HGB 11.5 (L) 03/03/2022    HCT 35.7 03/03/2022     03/03/2022     Lab Results   Component Value Date    BUN 30 (H) 03/03/2022     03/03/2022    CO2 22 03/03/2022               Thank you for allowing me to participate in the care of your patient.      Sincerely,     NANCY FELDMAN MD     [facility]  cc:   Nancy Feldman MD  [address] [place] [address]

## 2022-04-19 NOTE — PROGRESS NOTES
[facility]  Heart Care Clinic Follow-up Note    Assessment & Plan        (I25.110) Coronary artery disease involving native coronary artery of native heart with unstable angina pectoris (H)  (primary encounter diagnosis)  Comment: Angiography April 2020 showed normal left main, proximal LAD 20% stenosis with patent stent, mid sequential 30% lesion with a distal 70% lesion, circumflex with a patent proximal stent with a third obtuse marginal artery 99% stenosis.  Right coronary artery patent stents with a mid 40% stenosis.  Attempted intervention on distal LAD as well as obtuse marginal artery which were unsuccessful and underwent nuclear stress test May 2021 which was normal.  Vague chest discomfort, and shortness of breath and significant anxiety so I will recheck stress test with exercise.      (I50.32) Chronic diastolic heart failure (H)  Comment: No significant signs or symptoms currently, ejection fraction greater than 70%, discussed fluid and salt restriction.    (E78.5) Dyslipidemia, goal LDL below 100  Comment: Cholesterol was 226 and LDL was 133, she tells me she was on a PCSK9 inhibitor but for some reason is not on it now.  We will look into this.    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      (R06.00) Dyspnea on exertion  Comment: Chronic, rule out ischemia, valve looks good before although we will check echo as well. Hemoglobin was normal in March.  If all checks out most likely is due to asthma.    (N18.30) Stage 3 chronic kidney disease, unspecified whether stage 3a or 3b CKD (H)  Comment: Creatinine minimally elevated at 1.21.    Plan  1.  Exercise stress nuclear, if significant ischemia pursue angiography.  2.  Echocardiogram, look at TAVR.  3.  If both check out okay suggest follow-up with primary concerning  shortness of breath and anxiety.  4.  Follow-up me in 1 year or sooner if needed.    Subjective  CC: 87-year-old white female here for follow-up visit.  She has numerous concerns.  She tells me she is extremely depressed over her 51-year-old adopted son who apparently is dying from multiple system abnormalities.  She tells me that because of this she is fatigued, she now has a pain and tingling in all 4 extremities.  She has increased shortness of breath with minimal activity with what sounds like a raspy voice.  This is not all the time.  There is no PND or orthopnea.  She has a vague tightness in her chest which is there all the time.  She tells me that she is able to function fairly well the majority of the time and is always running around.  There was some mild orthostatic lightheadedness but no true syncope.  She does have some mild bipedal edema but this is controlled with compression stockings.    Medications  Current Outpatient Medications   Medication Sig Dispense Refill     acetaminophen (TYLENOL) 325 MG tablet [ACETAMINOPHEN (TYLENOL) 325 MG TABLET] Take 3 tablets (975 mg total) by mouth every 8 (eight) hours. (Patient taking differently: Take 975 mg by mouth daily as needed) 60 tablet 0     albuterol (PROVENTIL HFA;VENTOLIN HFA) 90 mcg/actuation inhaler [ALBUTEROL (PROVENTIL HFA;VENTOLIN HFA) 90 MCG/ACTUATION INHALER] Inhale 1-2 puffs every 6 (six) hours as needed for wheezing.       amLODIPine (NORVASC) 2.5 MG tablet Take 1 tablet (2.5 mg) by mouth daily 90 tablet 3     aspirin (ASA) 81 MG EC tablet Take 81 mg by mouth daily       beclomethasone (QVAR) 80 mcg/actuation inhaler Inhale 1 puff into the lungs 2 times daily as needed        coenzyme Q10 100 mg capsule [COENZYME Q10 100 MG CAPSULE] Take 100 mg by mouth daily.        diclofenac (VOLTAREN) 1 % topical gel Apply 4 g topically as needed       ferrous sulfate 325 (65 FE) MG tablet [FERROUS SULFATE 325 (65 FE) MG TABLET] Take 1 tablet (325 mg  total) by mouth daily with breakfast. (Patient taking differently: Take 1 tablet by mouth once a week)  0     furosemide (LASIX) 20 MG tablet Take Lasix 40 mg in the morning and 20 mg in the afternoon. (Patient taking differently: Take 40-60 mg by mouth daily Take Lasix 40 mg in the morning and 20 mg in the afternoon prn.) 270 tablet 2     melatonin 5 mg Tab tablet [MELATONIN 5 MG TAB TABLET] Take 5 mg by mouth at bedtime as needed (for sleep).        metoprolol succinate ER (TOPROL-XL) 25 MG 24 hr tablet Take 1 tablet (25 mg) by mouth At Bedtime 90 tablet 3     multivitamin therapeutic (THERAGRAN) tablet [MULTIVITAMIN THERAPEUTIC (THERAGRAN) TABLET] Take 1 tablet by mouth daily.       nitroglycerin (NITROSTAT) 0.4 MG SL tablet [NITROGLYCERIN (NITROSTAT) 0.4 MG SL TABLET] Place 1 tablet (0.4 mg total) under the tongue every 5 (five) minutes as needed for chest pain. 90 tablet 0     omeprazole (PRILOSEC OTC) 20 MG EC tablet Take 1 tablet (20 mg) by mouth daily 90 tablet 0     rOPINIRole (REQUIP) 2 MG tablet [ROPINIROLE (REQUIP) 2 MG TABLET] Take 4 mg by mouth every evening. Patient takes 7pm       ergocalciferol (ERGOCALCIFEROL) 50,000 unit capsule [ERGOCALCIFEROL (ERGOCALCIFEROL) 50,000 UNIT CAPSULE] Take 50,000 Units by mouth once a week. (Patient not taking: Reported on 4/19/2022)       evolocumab (REPATHA) 140 MG/ML prefilled syringe Inject 1 mL (140 mg) Subcutaneous every 14 days (Patient not taking: Reported on 4/19/2022) 6 mL 1       Objective  /60 (BP Location: Right arm, Patient Position: Sitting, Cuff Size: Adult Regular)   Pulse 68   Resp 20   Wt 66.2 kg (146 lb)   SpO2 97%   BMI 26.70 kg/m      General Appearance:    Alert, cooperative, no distress, appears stated age   Head:    Normocephalic, without obvious abnormality, atraumatic   Throat:   Lips, mucosa, and tongue normal; teeth and gums normal   Neck:   Supple, symmetrical, trachea midline, no adenopathy;        thyroid:  No  enlargement/tenderness/nodules; no carotid    bruit or JVD   Back:     Symmetric, no curvature, ROM normal, no CVA tenderness   Lungs:     Clear to auscultation bilaterally, respirations unlabored   Chest wall:    No tenderness or deformity   Heart:    Regular rate and rhythm, S1 and S2 normal, 1/6 systolic ejection murmur, no rub   or gallop   Abdomen:     Soft, non-tender, bowel sounds active all four quadrants,     no masses, no organomegaly   Extremities:   Normal, atraumatic, no cyanosis or edema   Pulses:   2+ and symmetric all extremities   Skin:   Skin color, texture, turgor normal, no rashes or lesions     Results    Lab Results personally reviewed   Lab Results   Component Value Date    CHOL 226 (H) 03/30/2021    CHOL 146 06/04/2020     Lab Results   Component Value Date    HDL 76 03/30/2021    HDL 67 06/04/2020     No components found for: LDLCALC  Lab Results   Component Value Date    TRIG 89 03/30/2021    TRIG 78 06/04/2020     Lab Results   Component Value Date    WBC 7.7 03/03/2022    HGB 11.5 (L) 03/03/2022    HCT 35.7 03/03/2022     03/03/2022     Lab Results   Component Value Date    BUN 30 (H) 03/03/2022     03/03/2022    CO2 22 03/03/2022

## 2022-04-19 NOTE — PATIENT INSTRUCTIONS
Ms Sherice Alvarez,  I enjoyed visiting with you again today.  I am glad to hear you are doing well.  Per our conversation your heart looks good but let the stress test again.  I will plan on seeing you 1 year or sooner if needed.  Ulisses Feldman

## 2022-04-21 ENCOUNTER — TELEPHONE (OUTPATIENT)
Dept: CARDIOLOGY | Facility: CLINIC | Age: 87
End: 2022-04-21
Payer: COMMERCIAL

## 2022-04-21 DIAGNOSIS — I50.32 CHRONIC DIASTOLIC HEART FAILURE (H): Primary | ICD-10-CM

## 2022-04-21 DIAGNOSIS — R06.09 DYSPNEA ON EXERTION: ICD-10-CM

## 2022-04-21 NOTE — TELEPHONE ENCOUNTER
Message  Received: 2 days ago  Ulisses Feldman MD Peltier, Carol; Shelley Perez, RN  I saw her today, no real bad symptoms or signs of heart failure on exam.  I am setting up a stress test.   For my nurses, I meant to set her up for echo as well, can we set an echo look at TAVR?  When she comes in for the echo and the stress test can we also get BNP?   LF                     Noted message- echo order placed + BNP. -[facility] [place]   Msg to -[facility] [place]     Per 4/19 visit:  Plan  1.  Exercise stress nuclear, if significant ischemia pursue angiography.  2.  Echocardiogram, look at TAVR.  3.  If both check out okay suggest follow-up with primary concerning shortness of breath and anxiety.  4.  Follow-up me in 1 year or sooner if needed.

## 2022-05-03 ENCOUNTER — HOSPITAL ENCOUNTER (OUTPATIENT)
Dept: NUCLEAR MEDICINE | Facility: HOSPITAL | Age: 87
Discharge: HOME | End: 2022-05-03
Attending: INTERNAL MEDICINE
Payer: COMMERCIAL

## 2022-05-03 ENCOUNTER — HOSPITAL ENCOUNTER (OUTPATIENT)
Dept: CARDIOLOGY | Facility: HOSPITAL | Age: 87
Discharge: HOME | End: 2022-05-03
Attending: INTERNAL MEDICINE
Payer: COMMERCIAL

## 2022-05-03 ENCOUNTER — LAB (OUTPATIENT)
Dept: LAB | Facility: HOSPITAL | Age: 87
End: 2022-05-03
Attending: INTERNAL MEDICINE
Payer: COMMERCIAL

## 2022-05-03 DIAGNOSIS — Z95.2 S/P TAVR (TRANSCATHETER AORTIC VALVE REPLACEMENT): ICD-10-CM

## 2022-05-03 DIAGNOSIS — I50.32 CHRONIC DIASTOLIC HEART FAILURE (H): ICD-10-CM

## 2022-05-03 DIAGNOSIS — I25.110 CORONARY ARTERY DISEASE INVOLVING NATIVE CORONARY ARTERY OF NATIVE HEART WITH UNSTABLE ANGINA PECTORIS (H): ICD-10-CM

## 2022-05-03 DIAGNOSIS — R06.09 DYSPNEA ON EXERTION: ICD-10-CM

## 2022-05-03 LAB
BNP SERPL-MCNC: 127 PG/ML (ref 0–167)
CV STRESS CURRENT BP HE: NORMAL
CV STRESS CURRENT HR HE: 68
CV STRESS CURRENT HR HE: 71
CV STRESS CURRENT HR HE: 77
CV STRESS CURRENT HR HE: 78
CV STRESS CURRENT HR HE: 82
CV STRESS CURRENT HR HE: 85
CV STRESS CURRENT HR HE: 86
CV STRESS CURRENT HR HE: 88
CV STRESS CURRENT HR HE: 89
CV STRESS CURRENT HR HE: 92
CV STRESS CURRENT HR HE: 93
CV STRESS CURRENT HR HE: 94
CV STRESS CURRENT HR HE: 94
CV STRESS CURRENT HR HE: 95
CV STRESS CURRENT HR HE: 95
CV STRESS DEVIATION TIME HE: NORMAL
CV STRESS ECHO PERCENT HR HE: NORMAL
CV STRESS EXERCISE STAGE HE: NORMAL
CV STRESS FINAL RESTING BP HE: NORMAL
CV STRESS FINAL RESTING HR HE: 78
CV STRESS MAX HR HE: 95
CV STRESS MAX TREADMILL GRADE HE: 0
CV STRESS MAX TREADMILL SPEED HE: 0
CV STRESS PEAK DIA BP HE: NORMAL
CV STRESS PEAK SYS BP HE: NORMAL
CV STRESS PHASE HE: NORMAL
CV STRESS PROTOCOL HE: NORMAL
CV STRESS RESTING PT POSITION HE: NORMAL
CV STRESS ST DEVIATION AMOUNT HE: NORMAL
CV STRESS ST DEVIATION ELEVATION HE: NORMAL
CV STRESS ST EVELATION AMOUNT HE: NORMAL
CV STRESS TEST TYPE HE: NORMAL
CV STRESS TOTAL STAGE TIME MIN 1 HE: NORMAL
RATE PRESSURE PRODUCT: NORMAL
STRESS ECHO BASELINE DIASTOLIC HE: 84
STRESS ECHO BASELINE HR: 69
STRESS ECHO BASELINE SYSTOLIC BP: 186
STRESS ECHO CALCULATED PERCENT HR: 71 %
STRESS ECHO LAST STRESS DIASTOLIC BP: 67
STRESS ECHO LAST STRESS HR: 95
STRESS ECHO LAST STRESS SYSTOLIC BP: 144
STRESS ECHO TARGET HR: 133

## 2022-05-03 PROCEDURE — 343N000001 HC RX 343: Performed by: INTERNAL MEDICINE

## 2022-05-03 PROCEDURE — 93017 CV STRESS TEST TRACING ONLY: CPT

## 2022-05-03 PROCEDURE — 93017 CV STRESS TEST TRACING ONLY: CPT | Performed by: INTERNAL MEDICINE

## 2022-05-03 PROCEDURE — 78452 HT MUSCLE IMAGE SPECT MULT: CPT | Mod: 26 | Performed by: GENERAL ACUTE CARE HOSPITAL

## 2022-05-03 PROCEDURE — A9500 TC99M SESTAMIBI: HCPCS | Performed by: INTERNAL MEDICINE

## 2022-05-03 PROCEDURE — 250N000011 HC RX IP 250 OP 636: Performed by: INTERNAL MEDICINE

## 2022-05-03 PROCEDURE — 93018 CV STRESS TEST I&R ONLY: CPT | Performed by: GENERAL ACUTE CARE HOSPITAL

## 2022-05-03 PROCEDURE — 36415 COLL VENOUS BLD VENIPUNCTURE: CPT

## 2022-05-03 PROCEDURE — 78452 HT MUSCLE IMAGE SPECT MULT: CPT

## 2022-05-03 PROCEDURE — 83880 ASSAY OF NATRIURETIC PEPTIDE: CPT

## 2022-05-03 PROCEDURE — 93016 CV STRESS TEST SUPVJ ONLY: CPT | Performed by: INTERNAL MEDICINE

## 2022-05-03 RX ORDER — REGADENOSON 0.08 MG/ML
0.4 INJECTION, SOLUTION INTRAVENOUS ONCE
Status: COMPLETED | OUTPATIENT
Start: 2022-05-03 | End: 2022-05-03

## 2022-05-03 RX ORDER — AMINOPHYLLINE 25 MG/ML
50 INJECTION, SOLUTION INTRAVENOUS
Status: DISCONTINUED | OUTPATIENT
Start: 2022-05-03 | End: 2022-05-03 | Stop reason: HOSPADM

## 2022-05-03 RX ADMIN — AMINOPHYLLINE 50 MG: 25 INJECTION, SOLUTION INTRAVENOUS at 13:11

## 2022-05-03 RX ADMIN — Medication 30.8 MILLICURIE: at 14:34

## 2022-05-03 RX ADMIN — Medication 8.3 MCI.: at 11:44

## 2022-05-03 RX ADMIN — REGADENOSON 0.4 MG: 0.08 INJECTION, SOLUTION INTRAVENOUS at 13:07

## 2022-05-03 NOTE — PROGRESS NOTES
Pt has cardiac history.  Here for chest tightness and sever CHANEL with fatigue.  Increase sx over the last few months.  Hx of CHF.    Rosa Arciniega RN.

## 2022-05-17 ENCOUNTER — HOSPITAL ENCOUNTER (OUTPATIENT)
Dept: CARDIOLOGY | Facility: HOSPITAL | Age: 87
Discharge: HOME | End: 2022-05-17
Attending: INTERNAL MEDICINE | Admitting: INTERNAL MEDICINE
Payer: COMMERCIAL

## 2022-05-17 DIAGNOSIS — R06.09 DYSPNEA ON EXERTION: ICD-10-CM

## 2022-05-17 DIAGNOSIS — I50.32 CHRONIC DIASTOLIC HEART FAILURE (H): ICD-10-CM

## 2022-05-17 LAB — LVEF ECHO: NORMAL

## 2022-05-17 PROCEDURE — 93306 TTE W/DOPPLER COMPLETE: CPT | Mod: 26 | Performed by: INTERNAL MEDICINE

## 2022-05-17 PROCEDURE — 93306 TTE W/DOPPLER COMPLETE: CPT

## 2022-05-26 ENCOUNTER — OFFICE VISIT (OUTPATIENT)
Dept: CARDIOLOGY | Facility: CLINIC | Age: 87
End: 2022-05-26
Attending: NURSE PRACTITIONER
Payer: COMMERCIAL

## 2022-05-26 VITALS
RESPIRATION RATE: 20 BRPM | BODY MASS INDEX: 26.3 KG/M2 | DIASTOLIC BLOOD PRESSURE: 82 MMHG | WEIGHT: 143.8 LBS | SYSTOLIC BLOOD PRESSURE: 138 MMHG | HEART RATE: 82 BPM

## 2022-05-26 DIAGNOSIS — N18.31 STAGE 3A CHRONIC KIDNEY DISEASE (H): ICD-10-CM

## 2022-05-26 DIAGNOSIS — I50.32 CHRONIC HEART FAILURE WITH PRESERVED EJECTION FRACTION (H): ICD-10-CM

## 2022-05-26 DIAGNOSIS — I10 BENIGN ESSENTIAL HYPERTENSION: Primary | ICD-10-CM

## 2022-05-26 LAB
ANION GAP SERPL CALCULATED.3IONS-SCNC: 8 MMOL/L (ref 5–18)
BUN SERPL-MCNC: 23 MG/DL (ref 8–28)
CALCIUM SERPL-MCNC: 9.2 MG/DL (ref 8.5–10.5)
CHLORIDE BLD-SCNC: 106 MMOL/L (ref 98–107)
CO2 SERPL-SCNC: 25 MMOL/L (ref 22–31)
CREAT SERPL-MCNC: 1.17 MG/DL (ref 0.6–1.1)
GFR SERPL CREATININE-BSD FRML MDRD: 45 ML/MIN/1.73M2
GLUCOSE BLD-MCNC: 80 MG/DL (ref 70–125)
MAGNESIUM SERPL-MCNC: 2.2 MG/DL (ref 1.8–2.6)
POTASSIUM BLD-SCNC: 4.2 MMOL/L (ref 3.5–5)
SODIUM SERPL-SCNC: 139 MMOL/L (ref 136–145)

## 2022-05-26 PROCEDURE — 36415 COLL VENOUS BLD VENIPUNCTURE: CPT | Performed by: NURSE PRACTITIONER

## 2022-05-26 PROCEDURE — 83735 ASSAY OF MAGNESIUM: CPT | Performed by: NURSE PRACTITIONER

## 2022-05-26 PROCEDURE — 99214 OFFICE O/P EST MOD 30 MIN: CPT | Performed by: NURSE PRACTITIONER

## 2022-05-26 PROCEDURE — 80048 BASIC METABOLIC PNL TOTAL CA: CPT | Performed by: NURSE PRACTITIONER

## 2022-05-26 NOTE — PROGRESS NOTES
Assessment/Recommendations   Assessment:    1.  Heart failure with preserved ejection fraction, NYHA class III: Compensated.  She continues to have fatigue, dyspnea on exertion and occasional lower extremity edema.  Her weight has been stable.  She tries to follow a low-sodium diet.  2.  Hypertension: Controlled.  Blood pressure 138/82  3.  Chronic kidney disease stage III: She had labs early March which were stable with sodium 141, potassium 3.8, BUN 30 and creatinine 1.21    Plan:  1.  BMP and magnesium pending due to muscle leg cramps  2.  Continue current medications  3.  Continue monitoring weights and low-salt diet    Sherice Alvarez will follow up with Dr. Feldman April 2023 and in the heart failure clinic in 6 months.     History of Present Illness/Subjective    Ms. Sherice Alvarez is a 87 year old female seen at [facility] heart failure clinic today for continued follow-up.  She follows up for heart failure with preserved ejection fraction.  She had a recent echocardiogram which showed an ejection fraction of 60 to 65%.  She also had a nuclear stress test early May which was negative for inducible myocardial ischemia or infarction.  She has a past medical history significant for hypertension, TAVR, DVT, dyslipidemia and chronic kidney disease stage III.    Today, he continues to have fatigue, dyspnea on exertion, occasional lightheadedness and occasional lower extremity edema.  She denies orthopnea, PND, palpitations, chest pain and abdominal fullness/bloating.      She is monitoring home weights which are stable.  She is following a low sodium diet.  She is enrolled in [employer] service.       ECHOCARDIOGRAM: 5/17/2022  Interpretation Summary     Left ventricular size, wall motion and function are normal. The ejection  fraction is 60-65%.  Normal right ventricle size and systolic function.  There is a bioprosthetic aortic valve.  The prosthetic valve gradients are normal .  IVC  diameter <2.1 cm collapsing >50% with sniff suggests a normal RA pressure  of 3 mmHg.  ______________________________________________________________________________  NM MPI w lexiscan: 5/3/2022  Result Text        The regadenoson nuclear stress test is negative for inducible myocardial ischemia or infarction.     The left ventricular ejection fraction at stress is greater than 70%.     The patient is at a low risk of future cardiac ischemic events.     A prior study was conducted on 5/3/2021.  This study has no change when compared with the prior study     Physical Examination Review of Systems   /82   Pulse 82   Resp 20   Wt 65.2 kg (143 lb 12.8 oz)   BMI 26.30 kg/m    Body mass index is 26.3 kg/m .  Wt Readings from Last 3 Encounters:   05/26/22 65.2 kg (143 lb 12.8 oz)   04/19/22 66.2 kg (146 lb)   02/25/22 64.9 kg (143 lb)       General Appearance:   no acute distress   ENT/Mouth: Wearing mask   EYES:  no scleral icterus, normal conjunctivae   Neck: no thyromegaly   Chest/Lungs:   lungs are clear to auscultation, no rales or wheezing, equal chest wall expansion    Cardiovascular:   Regular. Normal first and second heart sounds with no murmurs, rubs, or gallops; Jugular venous pressure normal, no edema bilaterally    Abdomen:  no organomegaly, masses, bruits, or tenderness; bowel sounds are present   Extremities: no cyanosis or clubbing   Skin: no xanthelasma, warm.    Neurologic: normal  bilateral, no tremors     Psychiatric: alert and oriented x3                                              Medical History  Surgical History Family History Social History   Past Medical History:   Diagnosis Date     Arthritis      Asthma      CAD (coronary artery disease)      Cancer (H)     basal cell     Chronic kidney disease     ckd 3     Chronic pain syndrome      Congestive heart failure (H)      Crohn's disease (H)      GERD (gastroesophageal reflux disease)      Hx of heart artery stent 11/01/2016    1  stent R Proximal CA and 1 Stent L Proximal circumflex  2016 Stent proximal LAD     Hyperlipidemia      Hypertension      NSTEMI (non-ST elevated myocardial infarction) (H) 2016     PONV (postoperative nausea and vomiting)     severe povn with last knee surgery     Restless legs syndrome      Stroke (H) 2010    numbness rt jaw    Past Surgical History:   Procedure Laterality Date     APPENDECTOMY       CARDIAC CATHETERIZATION  2016    multiple vessel disease.  no intervention     CARDIAC CATHETERIZATION  2016     CARDIAC CATHETERIZATION N/A 2016    Procedure: Coronary Angiogram;  Surgeon: Sunil Moran MD;  Location: [facility] Cath Lab;  Service:      CAROTID ENDARTERECTOMY       CAROTID ENDARTERECTOMY Right 2010     CERVICAL LAMINECTOMY  1994      SECTION       CV CORONARY ANGIOGRAM N/A 2020    Procedure: Coronary Angiogram;  Surgeon: Vinita Ramos MD;  Location: [facility] Cath Lab;  Service: Cardiology     CV LEFT HEART CATHETERIZATION WITHOUT LEFT VENTRICULOGRAM Left 2020    Procedure: Left Heart Catheterization Without Left Ventriculogram;  Surgeon: Jerad Lerner MD;  Location: [facility] Cath Lab;  Service: Cardiology     CV TRANSCATHETER AORTIC VALVE REPLACEMENT - OTHER APPROACH N/A 2020    Procedure: CV TRANSCATHETER AORTIC VALVE REPLACEMENT - RIGHT SUBCLAVIAN APPROACH;  Surgeon: John Caceres MD;  Location: [facility] Cath Lab;  Service: Cardiology     HEMORRHOIDECTOMY EXTERNAL       IR LUMBAR EPIDURAL STEROID INJECTION  2014     IR LUMBAR NERVE ROOT INJECTION  10/1/2013     JOINT REPLACEMENT       PA ESOPHAGOGASTRODUODENOSCOPY TRANSORAL DIAGNOSTIC N/A 07/10/2019    Procedure: ESOPHAGOGASTRODUODENOSCOPY (EGD) with gastric biopsy;  Surgeon: Sunil Stuart MD;  Location: [facility] GI;  Service: Gastroenterology     PA REPLACE AORTIC VALVE OPEN AXILLRY ARTRY APPROACH N/A 2020    Procedure: OR TRANSCATHETER AORTIC  VALVE REPLACEMENT, SUBCLAVIAN APPROACH;  Surgeon: Bhavin Cuadra MD;  Location: [facility] Cath Lab;  Service: Cardiology     TONSILLECTOMY & ADENOIDECTOMY       TOTAL KNEE ARTHROPLASTY Right      ZZC TOTAL KNEE ARTHROPLASTY Left 2021    Procedure: LEFT TOTAL KNEE ARTHROPLASTY;  Surgeon: Doug Rhodes MD;  Location: [facility];  Service: Orthopedics    Family History   Problem Relation Age of Onset     Atrial fibrillation Mother      Parkinsonism Father     Social History     Socioeconomic History     Marital status:      Spouse name: Not on file     Number of children: Not on file     Years of education: Not on file     Highest education level: Not on file   Occupational History     Not on file   Tobacco Use     Smoking status: Former Smoker     Quit date: 1980     Years since quittin.4     Smokeless tobacco: Never Used   Substance and Sexual Activity     Alcohol use: No     Drug use: No     Sexual activity: Not on file   Other Topics Concern     Not on file   Social History Narrative     Not on file     Social Determinants of Health     Financial Resource Strain: Not on file   Food Insecurity: Not on file   Transportation Needs: Not on file   Physical Activity: Not on file   Stress: Not on file   Social Connections: Not on file   Intimate Partner Violence: Not on file   Housing Stability: Not on file          Medications  Allergies   Current Outpatient Medications   Medication Sig Dispense Refill     acetaminophen (TYLENOL) 325 MG tablet [ACETAMINOPHEN (TYLENOL) 325 MG TABLET] Take 3 tablets (975 mg total) by mouth every 8 (eight) hours. (Patient taking differently: Take 975 mg by mouth daily as needed) 60 tablet 0     albuterol (PROVENTIL HFA;VENTOLIN HFA) 90 mcg/actuation inhaler [ALBUTEROL (PROVENTIL HFA;VENTOLIN HFA) 90 MCG/ACTUATION INHALER] Inhale 1-2 puffs every 6 (six) hours as needed for wheezing.       amLODIPine (NORVASC) 2.5 MG tablet Take 1 tablet (2.5 mg) by  mouth daily 90 tablet 3     aspirin (ASA) 81 MG EC tablet Take 81 mg by mouth daily       beclomethasone (QVAR) 80 mcg/actuation inhaler Inhale 1 puff into the lungs 2 times daily as needed        coenzyme Q10 100 mg capsule [COENZYME Q10 100 MG CAPSULE] Take 100 mg by mouth daily.        diclofenac (VOLTAREN) 1 % topical gel Apply 4 g topically as needed       ergocalciferol (ERGOCALCIFEROL) 50,000 unit capsule [ERGOCALCIFEROL (ERGOCALCIFEROL) 50,000 UNIT CAPSULE] Take 50,000 Units by mouth once a week. (Patient not taking: Reported on 4/19/2022)       evolocumab (REPATHA) 140 MG/ML prefilled syringe Inject 1 mL (140 mg) Subcutaneous every 14 days (Patient not taking: Reported on 4/19/2022) 6 mL 1     ferrous sulfate 325 (65 FE) MG tablet [FERROUS SULFATE 325 (65 FE) MG TABLET] Take 1 tablet (325 mg total) by mouth daily with breakfast. (Patient taking differently: Take 1 tablet by mouth once a week)  0     furosemide (LASIX) 20 MG tablet Take Lasix 40 mg in the morning and 20 mg in the afternoon. (Patient taking differently: Take 40-60 mg by mouth daily Take Lasix 40 mg in the morning and 20 mg in the afternoon prn.) 270 tablet 2     melatonin 5 mg Tab tablet [MELATONIN 5 MG TAB TABLET] Take 5 mg by mouth at bedtime as needed (for sleep).        metoprolol succinate ER (TOPROL-XL) 25 MG 24 hr tablet Take 1 tablet (25 mg) by mouth At Bedtime 90 tablet 3     multivitamin therapeutic (THERAGRAN) tablet [MULTIVITAMIN THERAPEUTIC (THERAGRAN) TABLET] Take 1 tablet by mouth daily.       nitroglycerin (NITROSTAT) 0.4 MG SL tablet [NITROGLYCERIN (NITROSTAT) 0.4 MG SL TABLET] Place 1 tablet (0.4 mg total) under the tongue every 5 (five) minutes as needed for chest pain. 90 tablet 0     omeprazole (PRILOSEC OTC) 20 MG EC tablet Take 1 tablet (20 mg) by mouth daily 90 tablet 0     rOPINIRole (REQUIP) 2 MG tablet [ROPINIROLE (REQUIP) 2 MG TABLET] Take 4 mg by mouth every evening. Patient takes 7pm      Allergies   Allergen  Reactions     Amitriptyline Hcl [Amitriptyline] Other (See Comments)     Erythromycin Diarrhea and Other (See Comments)     Childhood reaction     Gabapentin Other (See Comments)     Jerking movements, swelling     Naproxen Unknown and Other (See Comments)     Other Environmental Allergy Unknown     Molds, dander     Pregabalin Other (See Comments)         Lab Results    Chemistry/lipid CBC Cardiac Enzymes/BNP/TSH/INR   Lab Results   Component Value Date    CHOL 226 (H) 03/30/2021    HDL 76 03/30/2021    TRIG 89 03/30/2021    BUN 30 (H) 03/03/2022     03/03/2022    CO2 22 03/03/2022    Lab Results   Component Value Date    WBC 7.7 03/03/2022    HGB 11.5 (L) 03/03/2022    HCT 35.7 03/03/2022    MCV 92 03/03/2022     03/03/2022    Lab Results   Component Value Date    TROPONINI 0.02 01/15/2022     05/03/2022    TSH 3.20 03/03/2022    INR 1.13 01/15/2022             This note has been dictated using voice recognition software. Any grammatical, typographical, or context distortions are unintentional and inherent to the software    30 minutes spent on the date of encounter doing chart review, review of outside records, review of test results, interpretation with above tests, patient visit and documentation.

## 2022-05-26 NOTE — PATIENT INSTRUCTIONS
Sherice Alvarez,    It was a pleasure to see you today at [facility].     My recommendations after this visit include:  - Please follow up with Dr Feldman April 2023   - Please follow up with Kim Arreola 6 months  - I have checked labs and will contact you with results  - Continue current medications    Kim Arreola, CNP

## 2022-05-26 NOTE — LETTER
5/26/2022    Anum Rosenberg NP  [address] [address]    RE: Sherice Alvarez       Dear Colleague,     I had the pleasure of seeing Sherice Alvarez in the [facility].        Assessment/Recommendations   Assessment:    1.  Heart failure with preserved ejection fraction, NYHA class III: Compensated.  She continues to have fatigue, dyspnea on exertion and occasional lower extremity edema.  Her weight has been stable.  She tries to follow a low-sodium diet.  2.  Hypertension: Controlled.  Blood pressure 138/82  3.  Chronic kidney disease stage III: She had labs early March which were stable with sodium 141, potassium 3.8, BUN 30 and creatinine 1.21    Plan:  1.  BMP and magnesium pending due to muscle leg cramps  2.  Continue current medications  3.  Continue monitoring weights and low-salt diet    Sherice Alvarez will follow up with Dr. Feldman April 2023 and in the heart failure clinic in 6 months.     History of Present Illness/Subjective    Ms. Sherice Alvarez is a 87 year old female seen at [facility] heart failure clinic today for continued follow-up.  She follows up for heart failure with preserved ejection fraction.  She had a recent echocardiogram which showed an ejection fraction of 60 to 65%.  She also had a nuclear stress test early May which was negative for inducible myocardial ischemia or infarction.  She has a past medical history significant for hypertension, TAVR, DVT, dyslipidemia and chronic kidney disease stage III.    Today, he continues to have fatigue, dyspnea on exertion, occasional lightheadedness and occasional lower extremity edema.  She denies orthopnea, PND, palpitations, chest pain and abdominal fullness/bloating.      She is monitoring home weights which are stable.  She is following a low sodium diet.  She is enrolled in [employer] service.       ECHOCARDIOGRAM: 5/17/2022  Interpretation Summary      Left ventricular size, wall motion and function are normal. The ejection  fraction is 60-65%.  Normal right ventricle size and systolic function.  There is a bioprosthetic aortic valve.  The prosthetic valve gradients are normal .  IVC diameter <2.1 cm collapsing >50% with sniff suggests a normal RA pressure  of 3 mmHg.  ______________________________________________________________________________  NM MPI w lexiscan: 5/3/2022  Result Text        The regadenoson nuclear stress test is negative for inducible myocardial ischemia or infarction.     The left ventricular ejection fraction at stress is greater than 70%.     The patient is at a low risk of future cardiac ischemic events.     A prior study was conducted on 5/3/2021.  This study has no change when compared with the prior study     Physical Examination Review of Systems   /82   Pulse 82   Resp 20   Wt 65.2 kg (143 lb 12.8 oz)   BMI 26.30 kg/m    Body mass index is 26.3 kg/m .  Wt Readings from Last 3 Encounters:   05/26/22 65.2 kg (143 lb 12.8 oz)   04/19/22 66.2 kg (146 lb)   02/25/22 64.9 kg (143 lb)       General Appearance:   no acute distress   ENT/Mouth: Wearing mask   EYES:  no scleral icterus, normal conjunctivae   Neck: no thyromegaly   Chest/Lungs:   lungs are clear to auscultation, no rales or wheezing, equal chest wall expansion    Cardiovascular:   Regular. Normal first and second heart sounds with no murmurs, rubs, or gallops; Jugular venous pressure normal, no edema bilaterally    Abdomen:  no organomegaly, masses, bruits, or tenderness; bowel sounds are present   Extremities: no cyanosis or clubbing   Skin: no xanthelasma, warm.    Neurologic: normal  bilateral, no tremors     Psychiatric: alert and oriented x3                                              Medical History  Surgical History Family History Social History   Past Medical History:   Diagnosis Date     Arthritis      Asthma      CAD (coronary artery disease)       Cancer (H)     basal cell     Chronic kidney disease     ckd 3     Chronic pain syndrome      Congestive heart failure (H)      Crohn's disease (H)      GERD (gastroesophageal reflux disease)      Hx of heart artery stent 2016    1 stent R Proximal CA and 1 Stent L Proximal circumflex  2016 Stent proximal LAD     Hyperlipidemia      Hypertension      NSTEMI (non-ST elevated myocardial infarction) (H) 2016     PONV (postoperative nausea and vomiting)     severe povn with last knee surgery     Restless legs syndrome      Stroke (H) 2010    numbness rt jaw    Past Surgical History:   Procedure Laterality Date     APPENDECTOMY       CARDIAC CATHETERIZATION  2016    multiple vessel disease.  no intervention     CARDIAC CATHETERIZATION  2016     CARDIAC CATHETERIZATION N/A 2016    Procedure: Coronary Angiogram;  Surgeon: Sunil Moran MD;  Location: [facility];  Service:      CAROTID ENDARTERECTOMY       CAROTID ENDARTERECTOMY Right 2010     CERVICAL LAMINECTOMY  1994      SECTION       CV CORONARY ANGIOGRAM N/A 2020    Procedure: Coronary Angiogram;  Surgeon: Vinita Ramos MD;  Location: [facility];  Service: Cardiology     CV LEFT HEART CATHETERIZATION WITHOUT LEFT VENTRICULOGRAM Left 2020    Procedure: Left Heart Catheterization Without Left Ventriculogram;  Surgeon: Jerad Lerner MD;  Location: [facility];  Service: Cardiology     CV TRANSCATHETER AORTIC VALVE REPLACEMENT - OTHER APPROACH N/A 2020    Procedure: CV TRANSCATHETER AORTIC VALVE REPLACEMENT - RIGHT SUBCLAVIAN APPROACH;  Surgeon: John Caceres MD;  Location: [facility];  Service: Cardiology     HEMORRHOIDECTOMY EXTERNAL       IR LUMBAR EPIDURAL STEROID INJECTION  2014     IR LUMBAR NERVE ROOT INJECTION  10/1/2013     JOINT REPLACEMENT       ND ESOPHAGOGASTRODUODENOSCOPY TRANSORAL DIAGNOSTIC N/A 07/10/2019    Procedure:  ESOPHAGOGASTRODUODENOSCOPY (EGD) with gastric biopsy;  Surgeon: Sunil Stuart MD;  Location: [facility] GI;  Service: Gastroenterology     MS REPLACE AORTIC VALVE OPEN AXILLRY ARTRY APPROACH N/A 2020    Procedure: OR TRANSCATHETER AORTIC VALVE REPLACEMENT, SUBCLAVIAN APPROACH;  Surgeon: Bhavin Cuadra MD;  Location: [facility] Lab;  Service: Cardiology     TONSILLECTOMY & ADENOIDECTOMY       TOTAL KNEE ARTHROPLASTY Right      ZZC TOTAL KNEE ARTHROPLASTY Left 2021    Procedure: LEFT TOTAL KNEE ARTHROPLASTY;  Surgeon: Doug Rhodes MD;  Location: [facility];  Service: Orthopedics    Family History   Problem Relation Age of Onset     Atrial fibrillation Mother      Parkinsonism Father     Social History     Socioeconomic History     Marital status:      Spouse name: Not on file     Number of children: Not on file     Years of education: Not on file     Highest education level: Not on file   Occupational History     Not on file   Tobacco Use     Smoking status: Former Smoker     Quit date: 1980     Years since quittin.4     Smokeless tobacco: Never Used   Substance and Sexual Activity     Alcohol use: No     Drug use: No     Sexual activity: Not on file   Other Topics Concern     Not on file   Social History Narrative     Not on file     Social Determinants of Health     Financial Resource Strain: Not on file   Food Insecurity: Not on file   Transportation Needs: Not on file   Physical Activity: Not on file   Stress: Not on file   Social Connections: Not on file   Intimate Partner Violence: Not on file   Housing Stability: Not on file          Medications  Allergies   Current Outpatient Medications   Medication Sig Dispense Refill     acetaminophen (TYLENOL) 325 MG tablet [ACETAMINOPHEN (TYLENOL) 325 MG TABLET] Take 3 tablets (975 mg total) by mouth every 8 (eight) hours. (Patient taking differently: Take 975 mg by mouth daily as needed) 60 tablet 0     albuterol  (PROVENTIL HFA;VENTOLIN HFA) 90 mcg/actuation inhaler [ALBUTEROL (PROVENTIL HFA;VENTOLIN HFA) 90 MCG/ACTUATION INHALER] Inhale 1-2 puffs every 6 (six) hours as needed for wheezing.       amLODIPine (NORVASC) 2.5 MG tablet Take 1 tablet (2.5 mg) by mouth daily 90 tablet 3     aspirin (ASA) 81 MG EC tablet Take 81 mg by mouth daily       beclomethasone (QVAR) 80 mcg/actuation inhaler Inhale 1 puff into the lungs 2 times daily as needed        coenzyme Q10 100 mg capsule [COENZYME Q10 100 MG CAPSULE] Take 100 mg by mouth daily.        diclofenac (VOLTAREN) 1 % topical gel Apply 4 g topically as needed       ergocalciferol (ERGOCALCIFEROL) 50,000 unit capsule [ERGOCALCIFEROL (ERGOCALCIFEROL) 50,000 UNIT CAPSULE] Take 50,000 Units by mouth once a week. (Patient not taking: Reported on 4/19/2022)       evolocumab (REPATHA) 140 MG/ML prefilled syringe Inject 1 mL (140 mg) Subcutaneous every 14 days (Patient not taking: Reported on 4/19/2022) 6 mL 1     ferrous sulfate 325 (65 FE) MG tablet [FERROUS SULFATE 325 (65 FE) MG TABLET] Take 1 tablet (325 mg total) by mouth daily with breakfast. (Patient taking differently: Take 1 tablet by mouth once a week)  0     furosemide (LASIX) 20 MG tablet Take Lasix 40 mg in the morning and 20 mg in the afternoon. (Patient taking differently: Take 40-60 mg by mouth daily Take Lasix 40 mg in the morning and 20 mg in the afternoon prn.) 270 tablet 2     melatonin 5 mg Tab tablet [MELATONIN 5 MG TAB TABLET] Take 5 mg by mouth at bedtime as needed (for sleep).        metoprolol succinate ER (TOPROL-XL) 25 MG 24 hr tablet Take 1 tablet (25 mg) by mouth At Bedtime 90 tablet 3     multivitamin therapeutic (THERAGRAN) tablet [MULTIVITAMIN THERAPEUTIC (THERAGRAN) TABLET] Take 1 tablet by mouth daily.       nitroglycerin (NITROSTAT) 0.4 MG SL tablet [NITROGLYCERIN (NITROSTAT) 0.4 MG SL TABLET] Place 1 tablet (0.4 mg total) under the tongue every 5 (five) minutes as needed for chest pain. 90 tablet  0     omeprazole (PRILOSEC OTC) 20 MG EC tablet Take 1 tablet (20 mg) by mouth daily 90 tablet 0     rOPINIRole (REQUIP) 2 MG tablet [ROPINIROLE (REQUIP) 2 MG TABLET] Take 4 mg by mouth every evening. Patient takes 7pm      Allergies   Allergen Reactions     Amitriptyline Hcl [Amitriptyline] Other (See Comments)     Erythromycin Diarrhea and Other (See Comments)     Childhood reaction     Gabapentin Other (See Comments)     Jerking movements, swelling     Naproxen Unknown and Other (See Comments)     Other Environmental Allergy Unknown     Molds, dander     Pregabalin Other (See Comments)         Lab Results    Chemistry/lipid CBC Cardiac Enzymes/BNP/TSH/INR   Lab Results   Component Value Date    CHOL 226 (H) 03/30/2021    HDL 76 03/30/2021    TRIG 89 03/30/2021    BUN 30 (H) 03/03/2022     03/03/2022    CO2 22 03/03/2022    Lab Results   Component Value Date    WBC 7.7 03/03/2022    HGB 11.5 (L) 03/03/2022    HCT 35.7 03/03/2022    MCV 92 03/03/2022     03/03/2022    Lab Results   Component Value Date    TROPONINI 0.02 01/15/2022     05/03/2022    TSH 3.20 03/03/2022    INR 1.13 01/15/2022             This note has been dictated using voice recognition software. Any grammatical, typographical, or context distortions are unintentional and inherent to the software    30 minutes spent on the date of encounter doing chart review, review of outside records, review of test results, interpretation with above tests, patient visit and documentation.                  Thank you for allowing me to participate in the care of your patient.      Sincerely,     TAVO Fernandez CNP     [facility]  cc:   TAVO Fernandez CNP  [address] [place] [address]

## 2022-06-10 ENCOUNTER — NURSE TRIAGE (OUTPATIENT)
Dept: CARDIOLOGY | Facility: CLINIC | Age: 87
End: 2022-06-10
Payer: COMMERCIAL

## 2022-06-10 NOTE — TELEPHONE ENCOUNTER
Patient called saying her legs have doubled in size in the last 4-5 days. She has chest tightness, she says it's a heaviness that she's never had before and she can feel her heart pounding. She also feels very weak and says her balance is off. I asked her if she had anyone who could take her to the ER and she said she can walk there because she lives just across the [place] from [facility]. I thought she should call an ambulance but she wants to walk. She asked if she should go over to the Clinic and I told her no, to go to the ER.   Reason for Disposition    Chest pain lasting longer than 5 minutes and ANY of the following:* Over 45 years old* Over 30 years old and at least one cardiac risk factor (e.g., diabetes, high blood pressure, high cholesterol, smoker, or strong family history of heart disease)* History of heart disease (i.e., angina, heart attack, heart failure, bypass surgery, takes nitroglycerin)* Pain is crushing, pressure-like, or heavy    History of prior 'blood clot' in leg or lungs (i.e., deep vein thrombosis, pulmonary embolism)    Chest pain lasting longer than 5 minutes and occurred in last 3 days (72 hours) (Exception: feels exactly the same as previously diagnosed heartburn and has accompanying sour taste in mouth)    Dizziness or lightheadedness    Patient sounds very sick or weak to the triager    Additional Information    Negative: Severe difficulty breathing (e.g., struggling for each breath, speaks in single words)    Negative: Passed out (i.e., fainted, collapsed and was not responding)    Negative: Difficult to awaken or acting confused (e.g., disoriented, slurred speech)    Negative: Shock suspected (e.g., cold/pale/clammy skin, too weak to stand, low BP, rapid pulse)    Negative: Heart beating < 50 beats per minute OR > 140 beats per minute    Negative: Visible sweat on face or sweat dripping down face    Negative: Sounds like a life-threatening emergency to the triager    Negative:  "Followed an injury to chest    Negative: SEVERE chest pain    Negative: Pain also in shoulder(s) or arm(s) or jaw    Negative: Difficulty breathing    Negative: Cocaine use within last 3 days    Negative: Major surgery in the past month    Negative: Hip or leg fracture (broken bone) in past month (or had cast on leg or ankle in past month)    Negative: Illness requiring prolonged bedrest in past month (e.g., immobilization, long hospital stay)    Negative: Long-distance travel in past month (e.g., car, bus, train, plane; with trip lasting 6 or more hours)    Negative: History of inherited increased risk of blood clots (e.g., Factor 5 Leiden, Anti-thrombin 3, Protein C or Protein S deficiency, Prothrombin mutation)    Negative: Heart beating irregularly or very rapidly    Negative: Chest pain or 'angina' comes and goes and is happening more often (increasing in frequency) or getting worse (increasing in severity) (Exception: chest pains that last only a few seconds)    Negative: Coughing up blood    Negative: Cancer treatment in the past two months (or has cancer now)    Negative: Patient says chest pain feels exactly the same as previously diagnosed 'heartburn'  and  describes burning in chest and accompanying sour taste in mouth    Negative: Fever > 100.4 F (38.0 C)    Negative: Chest pain(s) lasting a few seconds persists > 3 days    Negative: Rash in same area as pain (may be described as 'small blisters')    Negative: All other patients with chest pain (Exception: fleeting chest pain lasting a few seconds)    Negative: Patient wants to be seen    Negative: Chest pain(s) lasting a few seconds from coughing    Negative: Chest pain(s) lasting a few seconds    Answer Assessment - Initial Assessment Questions  1. LOCATION: \"Where does it hurt?\"        Chest tightness  2. RADIATION: \"Does the pain go anywhere else?\" (e.g., into neck, jaw, arms, back)     No  3. ONSET: \"When did the chest pain begin?\" (Minutes, hours " "or days)      Past few days  4. PATTERN \"Does the pain come and go, or has it been constant since it started?\"  \"Does it get worse with exertion?\"       Constant  5. DURATION: \"How long does it last\" (e.g., seconds, minutes, hours)      hours  6. SEVERITY: \"How bad is the pain?\"  (e.g., Scale 1-10; mild, moderate, or severe)     - MILD (1-3): doesn't interfere with normal activities      - MODERATE (4-7): interferes with normal activities or awakens from sleep     - SEVERE (8-10): excruciating pain, unable to do any normal activities        Moderate to severe  7. CARDIAC RISK FACTORS: \"Do you have any history of heart problems or risk factors for heart disease?\" (e.g., angina, prior heart attack; diabetes, high blood pressure, high cholesterol, smoker, or strong family history of heart disease)      yes  8. PULMONARY RISK FACTORS: \"Do you have any history of lung disease?\"  (e.g., blood clots in lung, asthma, emphysema, birth control pills)     no  9. CAUSE: \"What do you think is causing the chest pain?\"     Not sure  10. OTHER SYMPTOMS: \"Do you have any other symptoms?\" (e.g., dizziness, nausea, vomiting, sweating, fever, difficulty breathing, cough)       Legs have doubled in size in past 4-5 days, weak, balance is off,    Protocols used: CHEST PAIN-A-OH    "

## 2022-06-10 NOTE — TELEPHONE ENCOUNTER
Agree with asessment and instruction to go to the ER. FYI to [facility]. -[facility]        Dr. Feldman,  FYI only- pt called in and nurse triage took the call and recommended ER eval based on symptoms. Pt will present to [facility] per documentation.  Mal

## 2022-06-14 ENCOUNTER — LAB REQUISITION (OUTPATIENT)
Dept: LAB | Facility: CLINIC | Age: 87
End: 2022-06-14

## 2022-06-14 DIAGNOSIS — E55.9 VITAMIN D DEFICIENCY, UNSPECIFIED: ICD-10-CM

## 2022-06-14 DIAGNOSIS — R25.2 CRAMP AND SPASM: ICD-10-CM

## 2022-06-14 LAB
ANION GAP SERPL CALCULATED.3IONS-SCNC: 10 MMOL/L (ref 5–18)
BUN SERPL-MCNC: 32 MG/DL (ref 8–28)
CALCIUM SERPL-MCNC: 8.9 MG/DL (ref 8.5–10.5)
CHLORIDE BLD-SCNC: 107 MMOL/L (ref 98–107)
CO2 SERPL-SCNC: 25 MMOL/L (ref 22–31)
CREAT SERPL-MCNC: 1.21 MG/DL (ref 0.6–1.1)
GFR SERPL CREATININE-BSD FRML MDRD: 43 ML/MIN/1.73M2
GLUCOSE BLD-MCNC: 77 MG/DL (ref 70–125)
MAGNESIUM SERPL-MCNC: 2.1 MG/DL (ref 1.8–2.6)
PHOSPHATE SERPL-MCNC: 4 MG/DL (ref 2.5–4.5)
POTASSIUM BLD-SCNC: 4.3 MMOL/L (ref 3.5–5)
SODIUM SERPL-SCNC: 142 MMOL/L (ref 136–145)

## 2022-06-14 PROCEDURE — 84100 ASSAY OF PHOSPHORUS: CPT | Performed by: NURSE PRACTITIONER

## 2022-06-14 PROCEDURE — 80048 BASIC METABOLIC PNL TOTAL CA: CPT | Performed by: NURSE PRACTITIONER

## 2022-06-14 PROCEDURE — 82306 VITAMIN D 25 HYDROXY: CPT | Performed by: NURSE PRACTITIONER

## 2022-06-14 PROCEDURE — 83735 ASSAY OF MAGNESIUM: CPT | Performed by: NURSE PRACTITIONER

## 2022-06-15 LAB — DEPRECATED CALCIDIOL+CALCIFEROL SERPL-MC: 26 UG/L (ref 20–75)

## 2022-07-12 ENCOUNTER — OFFICE VISIT (OUTPATIENT)
Dept: CARDIOLOGY | Facility: CLINIC | Age: 87
End: 2022-07-12
Payer: COMMERCIAL

## 2022-07-12 VITALS
BODY MASS INDEX: 26.7 KG/M2 | HEART RATE: 66 BPM | DIASTOLIC BLOOD PRESSURE: 60 MMHG | WEIGHT: 146 LBS | SYSTOLIC BLOOD PRESSURE: 118 MMHG

## 2022-07-12 DIAGNOSIS — I10 BENIGN ESSENTIAL HYPERTENSION: ICD-10-CM

## 2022-07-12 DIAGNOSIS — I50.32 CHRONIC HEART FAILURE WITH PRESERVED EJECTION FRACTION (H): Primary | ICD-10-CM

## 2022-07-12 DIAGNOSIS — I25.110 CORONARY ARTERY DISEASE INVOLVING NATIVE CORONARY ARTERY OF NATIVE HEART WITH UNSTABLE ANGINA PECTORIS (H): ICD-10-CM

## 2022-07-12 DIAGNOSIS — N18.31 STAGE 3A CHRONIC KIDNEY DISEASE (H): ICD-10-CM

## 2022-07-12 LAB
ALBUMIN SERPL-MCNC: 3.5 G/DL (ref 3.5–5)
ALP SERPL-CCNC: 64 U/L (ref 45–120)
ALT SERPL W P-5'-P-CCNC: 15 U/L (ref 0–45)
ANION GAP SERPL CALCULATED.3IONS-SCNC: 5 MMOL/L (ref 5–18)
AST SERPL W P-5'-P-CCNC: 24 U/L (ref 0–40)
BILIRUB SERPL-MCNC: 0.3 MG/DL (ref 0–1)
BUN SERPL-MCNC: 37 MG/DL (ref 8–28)
CALCIUM SERPL-MCNC: 8.7 MG/DL (ref 8.5–10.5)
CHLORIDE BLD-SCNC: 110 MMOL/L (ref 98–107)
CHOLEST SERPL-MCNC: 222 MG/DL
CO2 SERPL-SCNC: 27 MMOL/L (ref 22–31)
CREAT SERPL-MCNC: 1.3 MG/DL (ref 0.6–1.1)
FASTING STATUS PATIENT QL REPORTED: YES
GFR SERPL CREATININE-BSD FRML MDRD: 40 ML/MIN/1.73M2
GLUCOSE BLD-MCNC: 102 MG/DL (ref 70–125)
HDLC SERPL-MCNC: 59 MG/DL
LDLC SERPL CALC-MCNC: 148 MG/DL
POTASSIUM BLD-SCNC: 4 MMOL/L (ref 3.5–5)
PROT SERPL-MCNC: 5.9 G/DL (ref 6–8)
SODIUM SERPL-SCNC: 142 MMOL/L (ref 136–145)
TRIGL SERPL-MCNC: 75 MG/DL

## 2022-07-12 PROCEDURE — 80053 COMPREHEN METABOLIC PANEL: CPT | Performed by: NURSE PRACTITIONER

## 2022-07-12 PROCEDURE — 99214 OFFICE O/P EST MOD 30 MIN: CPT | Performed by: NURSE PRACTITIONER

## 2022-07-12 PROCEDURE — 80061 LIPID PANEL: CPT | Performed by: NURSE PRACTITIONER

## 2022-07-12 PROCEDURE — 36415 COLL VENOUS BLD VENIPUNCTURE: CPT | Performed by: NURSE PRACTITIONER

## 2022-07-12 NOTE — PROGRESS NOTES
Assessment/Recommendations   Assessment:    1.  Heart failure with preserved ejection fraction, NYHA class III: Compensated.  She continues to have fatigue, dyspnea on exertion and trace edema.  Her weight has been stable and she follows a low-sodium diet.  She is enrolled in open [employer] meal service.  She states she feels dehydrated today.  We will check labs.  2.  Hypertension: Controlled.  Blood pressure 118/60  3.  CAD: Denies chest pain.  Recent Lexiscan in early May was negative for inducible myocardial ischemia or infarction.  She does state her Repatha supply was canceled.  She is not sure why this occurred.  We will check a lipid profile which has not been checked in over a year and she is fasting  4.  CKD stage III: She had labs mid June which were stable.  CMP pending    Plan:  1.  CMP and lipid profile pending  2.  Continue current medications.  Will discuss Repatha with Dr. Feldman as she states she has not been on this and not quite sure why it was stopped  3.  Continue monitoring weights and low-salt diet.  Continue local [employer] meal service    Sherice Alvarez will follow up with Dr. Feldman April 2023 and in the heart failure clinic in 4 months.     History of Present Illness/Subjective    Ms. Sherice Alvarez is a 87 year old female seen at [facility] heart failure clinic today for continued follow-up.  She follows up for heart failure with preserved ejection fraction.  She had a recent echocardiogram which showed an ejection fraction of 60 to 65%.  She also had a Lexiscan early May which was negative for inducible myocardial ischemia or infarction.  She has a past medical history significant for hypertension, TAVR, DVT, dyslipidemia, CAD, and chronic kidney disease stage III.  Her last coronary angiogram was in 2020 which showed patent stents in her RCA, proximal LAD and circumflex.  It was noted she had severe disease in the distal LAD.  She is also dealing with a lot of family  stress and has anxiety and depression regarding these issues.    Today, she comes in with many concerns.  Many of which are not heart related.  She feels she states she feels dehydrated.  She feels weakness, fatigue and dizzy.  She complains of muscle cramps.  She has mild dyspnea on exertion. she denies shortness of breath, orthopnea, PND, palpitations, chest pain and abdominal fullness/bloating.      She is monitoring home weights which are stable between 141-144 pounds.  She is following a low sodium diet.  She is enrolled in [employer].  She participates in regular physical activity including walking.      ECHOCARDIOGRAM: 5/17/2022  Interpretation Summary     Left ventricular size, wall motion and function are normal. The ejection  fraction is 60-65%.  Normal right ventricle size and systolic function.  There is a bioprosthetic aortic valve.  The prosthetic valve gradients are normal .  IVC diameter <2.1 cm collapsing >50% with sniff suggests a normal RA pressure  of 3 mmHg.    NM lexiscan: 5/3/2022  Result Text        The regadenoson nuclear stress test is negative for inducible myocardial ischemia or infarction.     The left ventricular ejection fraction at stress is greater than 70%.     The patient is at a low risk of future cardiac ischemic events.     A prior study was conducted on 5/3/2021.  This study has no change when compared with the prior study.        Physical Examination Review of Systems   /60 (BP Location: Left arm, Patient Position: Sitting, Cuff Size: Adult Regular)   Pulse 66   Wt 66.2 kg (146 lb)   BMI 26.70 kg/m    Body mass index is 26.7 kg/m .  Wt Readings from Last 3 Encounters:   07/12/22 66.2 kg (146 lb)   05/26/22 65.2 kg (143 lb 12.8 oz)   04/19/22 66.2 kg (146 lb)       General Appearance:   no acute distress   ENT/Mouth: Wearing mask   EYES:  no scleral icterus, normal conjunctivae   Neck: no thyromegaly   Chest/Lungs:   lungs are clear to auscultation, no  rales or wheezing, equal chest wall expansion    Cardiovascular:   Regular. Normal first and second heart sounds with no murmurs, rubs, or gallops; Jugular venous pressure normal, trace edema bilaterally    Abdomen:  no organomegaly, masses, bruits, or tenderness; bowel sounds are present   Extremities: no cyanosis or clubbing   Skin: no xanthelasma, warm.    Neurologic: normal  bilateral, no tremors     Psychiatric: alert and oriented x3    Enc Vitals  BP: 118/60  Pulse: 66  Weight: 66.2 kg (146 lb)                                         Medical History  Surgical History Family History Social History   Past Medical History:   Diagnosis Date     Arthritis      Asthma      CAD (coronary artery disease)      Cancer (H)     basal cell     Chronic kidney disease     ckd 3     Chronic pain syndrome      Congestive heart failure (H)      Crohn's disease (H)      GERD (gastroesophageal reflux disease)      Hx of heart artery stent 2016    1 stent R Proximal CA and 1 Stent L Proximal circumflex  2016 Stent proximal LAD     Hyperlipidemia      Hypertension      NSTEMI (non-ST elevated myocardial infarction) (H) 2016     PONV (postoperative nausea and vomiting)     severe povn with last knee surgery     Restless legs syndrome      Stroke (H) 2010    numbness rt jaw    Past Surgical History:   Procedure Laterality Date     APPENDECTOMY       CARDIAC CATHETERIZATION  2016    multiple vessel disease.  no intervention     CARDIAC CATHETERIZATION  2016     CARDIAC CATHETERIZATION N/A 2016    Procedure: Coronary Angiogram;  Surgeon: Sunil Moran MD;  Location: [facility];  Service:      CAROTID ENDARTERECTOMY       CAROTID ENDARTERECTOMY Right 2010     CERVICAL LAMINECTOMY  1994      SECTION       CV CORONARY ANGIOGRAM N/A 2020    Procedure: Coronary Angiogram;  Surgeon: Vinita Ramos MD;  Location: [facility];  Service: Cardiology      CV LEFT HEART CATHETERIZATION WITHOUT LEFT VENTRICULOGRAM Left 2020    Procedure: Left Heart Catheterization Without Left Ventriculogram;  Surgeon: Jerad Lerner MD;  Location: [facility];  Service: Cardiology     CV TRANSCATHETER AORTIC VALVE REPLACEMENT - OTHER APPROACH N/A 2020    Procedure: CV TRANSCATHETER AORTIC VALVE REPLACEMENT - RIGHT SUBCLAVIAN APPROACH;  Surgeon: John Caceres MD;  Location: [facility];  Service: Cardiology     HEMORRHOIDECTOMY EXTERNAL       IR LUMBAR EPIDURAL STEROID INJECTION  2014     IR LUMBAR NERVE ROOT INJECTION  10/01/2013     JOINT REPLACEMENT       AL ESOPHAGOGASTRODUODENOSCOPY TRANSORAL DIAGNOSTIC N/A 07/10/2019    Procedure: ESOPHAGOGASTRODUODENOSCOPY (EGD) with gastric biopsy;  Surgeon: Sunil Stuart MD;  Location: [facility];  Service: Gastroenterology     AL REPLACE AORTIC VALVE OPEN AXILLRY ARTRY APPROACH N/A 2020    Procedure: OR TRANSCATHETER AORTIC VALVE REPLACEMENT, SUBCLAVIAN APPROACH;  Surgeon: Bhavin Cuadra MD;  Location: [facility];  Service: Cardiology     TONSILLECTOMY & ADENOIDECTOMY       TOTAL KNEE ARTHROPLASTY Right      TRANSCATHETER AORTIC-VALVE REPLACEMENT       ZZC TOTAL KNEE ARTHROPLASTY Left 2021    Procedure: LEFT TOTAL KNEE ARTHROPLASTY;  Surgeon: Doug Rhodes MD;  Location: [facility];  Service: Orthopedics    Family History   Problem Relation Age of Onset     Atrial fibrillation Mother      Parkinsonism Father     Social History     Socioeconomic History     Marital status:      Spouse name: Not on file     Number of children: Not on file     Years of education: Not on file     Highest education level: Not on file   Occupational History     Not on file   Tobacco Use     Smoking status: Former Smoker     Quit date: 1980     Years since quittin.5     Smokeless tobacco: Never Used   Substance and Sexual Activity     Alcohol use: No     Drug  use: No     Sexual activity: Not on file   Other Topics Concern     Not on file   Social History Narrative     Not on file     Social Determinants of Health     Financial Resource Strain: Not on file   Food Insecurity: Not on file   Transportation Needs: Not on file   Physical Activity: Not on file   Stress: Not on file   Social Connections: Not on file   Intimate Partner Violence: Not on file   Housing Stability: Not on file          Medications  Allergies   Current Outpatient Medications   Medication Sig Dispense Refill     acetaminophen (TYLENOL) 325 MG tablet [ACETAMINOPHEN (TYLENOL) 325 MG TABLET] Take 3 tablets (975 mg total) by mouth every 8 (eight) hours. (Patient taking differently: Take 975 mg by mouth daily as needed) 60 tablet 0     albuterol (PROVENTIL HFA;VENTOLIN HFA) 90 mcg/actuation inhaler [ALBUTEROL (PROVENTIL HFA;VENTOLIN HFA) 90 MCG/ACTUATION INHALER] Inhale 1-2 puffs every 6 (six) hours as needed for wheezing.       aspirin (ASA) 81 MG EC tablet Take 81 mg by mouth daily       beclomethasone (QVAR) 80 mcg/actuation inhaler Inhale 1 puff into the lungs 2 times daily as needed        coenzyme Q10 100 mg capsule [COENZYME Q10 100 MG CAPSULE] Take 100 mg by mouth daily.        diclofenac (VOLTAREN) 1 % topical gel Apply 4 g topically as needed       ferrous sulfate 325 (65 FE) MG tablet [FERROUS SULFATE 325 (65 FE) MG TABLET] Take 1 tablet (325 mg total) by mouth daily with breakfast. (Patient taking differently: Take 1 tablet by mouth once a week)  0     furosemide (LASIX) 20 MG tablet Take Lasix 40 mg in the morning and 20 mg in the afternoon. (Patient taking differently: Take 40-60 mg by mouth daily Take Lasix 40 mg in the morning and 20 mg in the afternoon prn.) 270 tablet 2     melatonin 5 mg Tab tablet [MELATONIN 5 MG TAB TABLET] Take 5 mg by mouth at bedtime as needed (for sleep).        metoprolol succinate ER (TOPROL-XL) 25 MG 24 hr tablet Take 1 tablet (25 mg) by mouth At Bedtime 90  tablet 3     multivitamin therapeutic (THERAGRAN) tablet [MULTIVITAMIN THERAPEUTIC (THERAGRAN) TABLET] Take 1 tablet by mouth daily.       omeprazole (PRILOSEC OTC) 20 MG EC tablet Take 1 tablet (20 mg) by mouth daily 90 tablet 0     rOPINIRole (REQUIP) 2 MG tablet [ROPINIROLE (REQUIP) 2 MG TABLET] Take 4 mg by mouth every evening. Patient takes 7pm       amLODIPine (NORVASC) 2.5 MG tablet Take 1 tablet (2.5 mg) by mouth daily (Patient not taking: Reported on 7/12/2022) 90 tablet 3     ergocalciferol (ERGOCALCIFEROL) 50,000 unit capsule [ERGOCALCIFEROL (ERGOCALCIFEROL) 50,000 UNIT CAPSULE] Take 50,000 Units by mouth once a week. (Patient not taking: No sig reported)       evolocumab (REPATHA) 140 MG/ML prefilled syringe Inject 1 mL (140 mg) Subcutaneous every 14 days (Patient not taking: No sig reported) 6 mL 1     nitroglycerin (NITROSTAT) 0.4 MG SL tablet [NITROGLYCERIN (NITROSTAT) 0.4 MG SL TABLET] Place 1 tablet (0.4 mg total) under the tongue every 5 (five) minutes as needed for chest pain. (Patient not taking: Reported on 7/12/2022) 90 tablet 0    Allergies   Allergen Reactions     Amitriptyline Hcl [Amitriptyline] Other (See Comments)     Erythromycin Diarrhea and Other (See Comments)     Childhood reaction     Gabapentin Other (See Comments)     Jerking movements, swelling     Naproxen Unknown and Other (See Comments)     Other Environmental Allergy Unknown     Molds, dander     Pregabalin Other (See Comments)         Lab Results    Chemistry/lipid CBC Cardiac Enzymes/BNP/TSH/INR   Lab Results   Component Value Date    CHOL 226 (H) 03/30/2021    HDL 76 03/30/2021    TRIG 89 03/30/2021    BUN 32 (H) 06/14/2022     06/14/2022    CO2 25 06/14/2022    Lab Results   Component Value Date    WBC 7.7 03/03/2022    HGB 11.5 (L) 03/03/2022    HCT 35.7 03/03/2022    MCV 92 03/03/2022     03/03/2022    Lab Results   Component Value Date    TROPONINI 0.02 01/15/2022     05/03/2022    TSH 3.20  03/03/2022    INR 1.13 01/15/2022             This note has been dictated using voice recognition software. Any grammatical, typographical, or context distortions are unintentional and inherent to the software    30 minutes spent on the date of encounter doing chart review, review of outside records, review of test results, interpretation with above tests, patient visit and documentation.

## 2022-07-12 NOTE — PATIENT INSTRUCTIONS
Sherice Alvarez,    It was a pleasure to see you today at [facility].     My recommendations after this visit include:  - Please follow up with Dr Feldman April 2023   - Please follow up with Kim Arreola in 4 months  - I have checked labs and will call you with results  - Continue current medications    Kim Arreola, CNP

## 2022-07-12 NOTE — LETTER
7/12/2022    Anum Rosenberg NP  [place] [address] [address] [facility] [address]    RE: Sherice Alvarez       Dear Colleague,     I had the pleasure of seeing Sherice Alvarez in the [facility].        Assessment/Recommendations   Assessment:    1.  Heart failure with preserved ejection fraction, NYHA class III: Compensated.  She continues to have fatigue, dyspnea on exertion and trace edema.  Her weight has been stable and she follows a low-sodium diet.  She is enrolled in [employer] service.  She states she feels dehydrated today.  We will check labs.  2.  Hypertension: Controlled.  Blood pressure 118/60  3.  CAD: Denies chest pain.  Recent Lexiscan in early May was negative for inducible myocardial ischemia or infarction.  She does state her Repatha supply was canceled.  She is not sure why this occurred.  We will check a lipid profile which has not been checked in over a year and she is fasting  4.  CKD stage III: She had labs mid June which were stable.  CMP pending    Plan:  1.  CMP and lipid profile pending  2.  Continue current medications.  Will discuss Repatha with Dr. Feldman as she states she has not been on this and not quite sure why it was stopped  3.  Continue monitoring weights and low-salt diet.  Continue [employer] service    Sherice Alvarez will follow up with Dr. Feldman April 2023 and in the heart failure clinic in 4 months.     History of Present Illness/Subjective    Ms. Sherice Alvarez is a 87 year old female seen at [facility] heart failure clinic today for continued follow-up.  She follows up for heart failure with preserved ejection fraction.  She had a recent echocardiogram which showed an ejection fraction of 60 to 65%.  She also had a Lexiscan early May which was negative for inducible myocardial ischemia or infarction.  She has a past medical history significant for hypertension, TAVR, DVT, dyslipidemia, CAD, and chronic  kidney disease stage III.  Her last coronary angiogram was in 2020 which showed patent stents in her RCA, proximal LAD and circumflex.  It was noted she had severe disease in the distal LAD.  She is also dealing with a lot of family stress and has anxiety and depression regarding these issues.    Today, she comes in with many concerns.  Many of which are not heart related.  She feels she states she feels dehydrated.  She feels weakness, fatigue and dizzy.  She complains of muscle cramps.  She has mild dyspnea on exertion. she denies shortness of breath, orthopnea, PND, palpitations, chest pain and abdominal fullness/bloating.      She is monitoring home weights which are stable between 141-144 pounds.  She is following a low sodium diet.  She is enrolled in [employer] service.  She participates in regular physical activity including walking.      ECHOCARDIOGRAM: 5/17/2022  Interpretation Summary     Left ventricular size, wall motion and function are normal. The ejection  fraction is 60-65%.  Normal right ventricle size and systolic function.  There is a bioprosthetic aortic valve.  The prosthetic valve gradients are normal .  IVC diameter <2.1 cm collapsing >50% with sniff suggests a normal RA pressure  of 3 mmHg.    NM lexiscan: 5/3/2022  Result Text        The regadenoson nuclear stress test is negative for inducible myocardial ischemia or infarction.     The left ventricular ejection fraction at stress is greater than 70%.     The patient is at a low risk of future cardiac ischemic events.     A prior study was conducted on 5/3/2021.  This study has no change when compared with the prior study.        Physical Examination Review of Systems   /60 (BP Location: Left arm, Patient Position: Sitting, Cuff Size: Adult Regular)   Pulse 66   Wt 66.2 kg (146 lb)   BMI 26.70 kg/m    Body mass index is 26.7 kg/m .  Wt Readings from Last 3 Encounters:   07/12/22 66.2 kg (146 lb)   05/26/22 65.2 kg (143 lb 12.8  oz)   04/19/22 66.2 kg (146 lb)       General Appearance:   no acute distress   ENT/Mouth: Wearing mask   EYES:  no scleral icterus, normal conjunctivae   Neck: no thyromegaly   Chest/Lungs:   lungs are clear to auscultation, no rales or wheezing, equal chest wall expansion    Cardiovascular:   Regular. Normal first and second heart sounds with no murmurs, rubs, or gallops; Jugular venous pressure normal, trace edema bilaterally    Abdomen:  no organomegaly, masses, bruits, or tenderness; bowel sounds are present   Extremities: no cyanosis or clubbing   Skin: no xanthelasma, warm.    Neurologic: normal  bilateral, no tremors     Psychiatric: alert and oriented x3    Enc Vitals  BP: 118/60  Pulse: 66  Weight: 66.2 kg (146 lb)                                         Medical History  Surgical History Family History Social History   Past Medical History:   Diagnosis Date     Arthritis      Asthma      CAD (coronary artery disease)      Cancer (H)     basal cell     Chronic kidney disease     ckd 3     Chronic pain syndrome      Congestive heart failure (H)      Crohn's disease (H)      GERD (gastroesophageal reflux disease)      Hx of heart artery stent 11/01/2016    1 stent R Proximal CA and 1 Stent L Proximal circumflex  12/2016 Stent proximal LAD     Hyperlipidemia      Hypertension      NSTEMI (non-ST elevated myocardial infarction) (H) 11/01/2016     PONV (postoperative nausea and vomiting)     severe povn with last knee surgery     Restless legs syndrome      Stroke (H) 01/01/2010    numbness rt jaw    Past Surgical History:   Procedure Laterality Date     APPENDECTOMY       CARDIAC CATHETERIZATION  11/04/2016    multiple vessel disease.  no intervention     CARDIAC CATHETERIZATION  11/07/2016     CARDIAC CATHETERIZATION N/A 12/27/2016    Procedure: Coronary Angiogram;  Surgeon: Sunil Moran MD;  Location: [facility] Cath Lab;  Service:      CAROTID ENDARTERECTOMY       CAROTID ENDARTERECTOMY Right  2010     CERVICAL LAMINECTOMY  1994      SECTION       CV CORONARY ANGIOGRAM N/A 2020    Procedure: Coronary Angiogram;  Surgeon: Vinita Ramos MD;  Location: [facility] Cath Lab;  Service: Cardiology     CV LEFT HEART CATHETERIZATION WITHOUT LEFT VENTRICULOGRAM Left 2020    Procedure: Left Heart Catheterization Without Left Ventriculogram;  Surgeon: Jerad Lerner MD;  Location: [facility] Cath Lab;  Service: Cardiology     CV TRANSCATHETER AORTIC VALVE REPLACEMENT - OTHER APPROACH N/A 2020    Procedure: CV TRANSCATHETER AORTIC VALVE REPLACEMENT - RIGHT SUBCLAVIAN APPROACH;  Surgeon: John Caceres MD;  Location: [facility] Cath Lab;  Service: Cardiology     HEMORRHOIDECTOMY EXTERNAL       IR LUMBAR EPIDURAL STEROID INJECTION  2014     IR LUMBAR NERVE ROOT INJECTION  10/01/2013     JOINT REPLACEMENT       AZ ESOPHAGOGASTRODUODENOSCOPY TRANSORAL DIAGNOSTIC N/A 07/10/2019    Procedure: ESOPHAGOGASTRODUODENOSCOPY (EGD) with gastric biopsy;  Surgeon: Sunil Stuart MD;  Location: [facility];  Service: Gastroenterology     AZ REPLACE AORTIC VALVE OPEN AXILLRY ARTRY APPROACH N/A 2020    Procedure: OR TRANSCATHETER AORTIC VALVE REPLACEMENT, SUBCLAVIAN APPROACH;  Surgeon: Bhavin Cuadra MD;  Location: [facility];  Service: Cardiology     TONSILLECTOMY & ADENOIDECTOMY       TOTAL KNEE ARTHROPLASTY Right      TRANSCATHETER AORTIC-VALVE REPLACEMENT       ZZC TOTAL KNEE ARTHROPLASTY Left 2021    Procedure: LEFT TOTAL KNEE ARTHROPLASTY;  Surgeon: Doug Rhodes MD;  Location: [facility];  Service: Orthopedics    Family History   Problem Relation Age of Onset     Atrial fibrillation Mother      Parkinsonism Father     Social History     Socioeconomic History     Marital status:      Spouse name: Not on file     Number of children: Not on file     Years of education: Not on file     Highest education level: Not on file    Occupational History     Not on file   Tobacco Use     Smoking status: Former Smoker     Quit date: 1980     Years since quittin.5     Smokeless tobacco: Never Used   Substance and Sexual Activity     Alcohol use: No     Drug use: No     Sexual activity: Not on file   Other Topics Concern     Not on file   Social History Narrative     Not on file     Social Determinants of Health     Financial Resource Strain: Not on file   Food Insecurity: Not on file   Transportation Needs: Not on file   Physical Activity: Not on file   Stress: Not on file   Social Connections: Not on file   Intimate Partner Violence: Not on file   Housing Stability: Not on file          Medications  Allergies   Current Outpatient Medications   Medication Sig Dispense Refill     acetaminophen (TYLENOL) 325 MG tablet [ACETAMINOPHEN (TYLENOL) 325 MG TABLET] Take 3 tablets (975 mg total) by mouth every 8 (eight) hours. (Patient taking differently: Take 975 mg by mouth daily as needed) 60 tablet 0     albuterol (PROVENTIL HFA;VENTOLIN HFA) 90 mcg/actuation inhaler [ALBUTEROL (PROVENTIL HFA;VENTOLIN HFA) 90 MCG/ACTUATION INHALER] Inhale 1-2 puffs every 6 (six) hours as needed for wheezing.       aspirin (ASA) 81 MG EC tablet Take 81 mg by mouth daily       beclomethasone (QVAR) 80 mcg/actuation inhaler Inhale 1 puff into the lungs 2 times daily as needed        coenzyme Q10 100 mg capsule [COENZYME Q10 100 MG CAPSULE] Take 100 mg by mouth daily.        diclofenac (VOLTAREN) 1 % topical gel Apply 4 g topically as needed       ferrous sulfate 325 (65 FE) MG tablet [FERROUS SULFATE 325 (65 FE) MG TABLET] Take 1 tablet (325 mg total) by mouth daily with breakfast. (Patient taking differently: Take 1 tablet by mouth once a week)  0     furosemide (LASIX) 20 MG tablet Take Lasix 40 mg in the morning and 20 mg in the afternoon. (Patient taking differently: Take 40-60 mg by mouth daily Take Lasix 40 mg in the morning and 20 mg in the afternoon prn.)  270 tablet 2     melatonin 5 mg Tab tablet [MELATONIN 5 MG TAB TABLET] Take 5 mg by mouth at bedtime as needed (for sleep).        metoprolol succinate ER (TOPROL-XL) 25 MG 24 hr tablet Take 1 tablet (25 mg) by mouth At Bedtime 90 tablet 3     multivitamin therapeutic (THERAGRAN) tablet [MULTIVITAMIN THERAPEUTIC (THERAGRAN) TABLET] Take 1 tablet by mouth daily.       omeprazole (PRILOSEC OTC) 20 MG EC tablet Take 1 tablet (20 mg) by mouth daily 90 tablet 0     rOPINIRole (REQUIP) 2 MG tablet [ROPINIROLE (REQUIP) 2 MG TABLET] Take 4 mg by mouth every evening. Patient takes 7pm       amLODIPine (NORVASC) 2.5 MG tablet Take 1 tablet (2.5 mg) by mouth daily (Patient not taking: Reported on 7/12/2022) 90 tablet 3     ergocalciferol (ERGOCALCIFEROL) 50,000 unit capsule [ERGOCALCIFEROL (ERGOCALCIFEROL) 50,000 UNIT CAPSULE] Take 50,000 Units by mouth once a week. (Patient not taking: No sig reported)       evolocumab (REPATHA) 140 MG/ML prefilled syringe Inject 1 mL (140 mg) Subcutaneous every 14 days (Patient not taking: No sig reported) 6 mL 1     nitroglycerin (NITROSTAT) 0.4 MG SL tablet [NITROGLYCERIN (NITROSTAT) 0.4 MG SL TABLET] Place 1 tablet (0.4 mg total) under the tongue every 5 (five) minutes as needed for chest pain. (Patient not taking: Reported on 7/12/2022) 90 tablet 0    Allergies   Allergen Reactions     Amitriptyline Hcl [Amitriptyline] Other (See Comments)     Erythromycin Diarrhea and Other (See Comments)     Childhood reaction     Gabapentin Other (See Comments)     Jerking movements, swelling     Naproxen Unknown and Other (See Comments)     Other Environmental Allergy Unknown     Molds, dander     Pregabalin Other (See Comments)         Lab Results    Chemistry/lipid CBC Cardiac Enzymes/BNP/TSH/INR   Lab Results   Component Value Date    CHOL 226 (H) 03/30/2021    HDL 76 03/30/2021    TRIG 89 03/30/2021    BUN 32 (H) 06/14/2022     06/14/2022    CO2 25 06/14/2022    Lab Results   Component  Value Date    WBC 7.7 03/03/2022    HGB 11.5 (L) 03/03/2022    HCT 35.7 03/03/2022    MCV 92 03/03/2022     03/03/2022    Lab Results   Component Value Date    TROPONINI 0.02 01/15/2022     05/03/2022    TSH 3.20 03/03/2022    INR 1.13 01/15/2022             This note has been dictated using voice recognition software. Any grammatical, typographical, or context distortions are unintentional and inherent to the software    30 minutes spent on the date of encounter doing chart review, review of outside records, review of test results, interpretation with above tests, patient visit and documentation.                  Thank you for allowing me to participate in the care of your patient.      Sincerely,     TAVO Fernandez [facility]  cc:   No referring provider defined for this encounter.

## 2022-07-12 NOTE — Clinical Note
Saw Sherice today for follow-up.  She stated that her Repatha had been stopped due to supply she states.  She is not quite sure how long she has not been taking it.  I see she has not had a lipid profile for over a year so I checked that today since she was fasting.  Not sure if you want her back on repatha Dr. Feldman? Thank you

## 2022-07-15 ENCOUNTER — TELEPHONE (OUTPATIENT)
Dept: CARDIOLOGY | Facility: CLINIC | Age: 87
End: 2022-07-15

## 2022-07-15 DIAGNOSIS — E78.5 DYSLIPIDEMIA, GOAL LDL BELOW 100: ICD-10-CM

## 2022-07-15 NOTE — TELEPHONE ENCOUNTER
Kim Arreola APRN CNP Caswell, Mallory J, RN  Mal,     Will you please help patient get started back on Repatha per Dr Feldman. Thanks so much.             Previous Messages       ----- Message -----   From: Ulisses Feldman MD   Sent: 7/12/2022   4:09 PM CDT   To: TAVO Fernandez CNP     If possible would like her back on Repatha, given her age might be able to get away with only 1 injection every 4 weeks of 140 mg subcu.   LF   ----- Message -----   From: Kim Arreola APRN CNP   Sent: 7/12/2022   9:19 AM CDT   To: Shelley Perez RN, Ulisses Feldman MD     Saw Sherice today for follow-up.  She stated that her Repatha had been stopped due to supply she states.  She is not quite sure how long she has not been taking it.  I see she has not had a lipid profile for over a year so I checked that today since she was fasting.  Not sure if you want her back on repatha Dr. Feldman? Thank you

## 2022-07-18 RX ORDER — EVOLOCUMAB 140 MG/ML
140 INJECTION, SOLUTION SUBCUTANEOUS
Qty: 2 ML | Refills: 11 | Status: SHIPPED | OUTPATIENT
Start: 2022-07-18 | End: 2022-08-04

## 2022-07-18 NOTE — TELEPHONE ENCOUNTER
Left a detailed message that rx had been sent to local pharmacy- repatha is no longer specialty. If she wants it sent to [facility] specialty, she just needs to call [facility] and set up delivery monthly. -brooke

## 2022-08-01 PROCEDURE — 99285 EMERGENCY DEPT VISIT HI MDM: CPT | Mod: 25

## 2022-08-02 ENCOUNTER — APPOINTMENT (OUTPATIENT)
Dept: PHYSICAL THERAPY | Facility: HOSPITAL | Age: 87
End: 2022-08-02
Attending: INTERNAL MEDICINE
Payer: COMMERCIAL

## 2022-08-02 ENCOUNTER — APPOINTMENT (OUTPATIENT)
Dept: RADIOLOGY | Facility: HOSPITAL | Age: 87
End: 2022-08-02
Attending: EMERGENCY MEDICINE
Payer: COMMERCIAL

## 2022-08-02 ENCOUNTER — HOSPITAL ENCOUNTER (OUTPATIENT)
Facility: HOSPITAL | Age: 87
Setting detail: OBSERVATION
Discharge: HOME | End: 2022-08-03
Attending: EMERGENCY MEDICINE | Admitting: EMERGENCY MEDICINE
Payer: COMMERCIAL

## 2022-08-02 ENCOUNTER — APPOINTMENT (OUTPATIENT)
Dept: ULTRASOUND IMAGING | Facility: HOSPITAL | Age: 87
End: 2022-08-02
Attending: EMERGENCY MEDICINE
Payer: COMMERCIAL

## 2022-08-02 DIAGNOSIS — I50.32 CHRONIC HEART FAILURE WITH PRESERVED EJECTION FRACTION (H): Primary | ICD-10-CM

## 2022-08-02 DIAGNOSIS — G47.09 OTHER INSOMNIA: ICD-10-CM

## 2022-08-02 DIAGNOSIS — I10 ACCELERATED HYPERTENSION: ICD-10-CM

## 2022-08-02 DIAGNOSIS — F43.23 ADJUSTMENT DISORDER WITH MIXED ANXIETY AND DEPRESSED MOOD: ICD-10-CM

## 2022-08-02 DIAGNOSIS — R07.9 CHEST PAIN, UNSPECIFIED TYPE: ICD-10-CM

## 2022-08-02 LAB
ANION GAP SERPL CALCULATED.3IONS-SCNC: 9 MMOL/L (ref 5–18)
ANION GAP SERPL CALCULATED.3IONS-SCNC: 9 MMOL/L (ref 5–18)
BASOPHILS # BLD AUTO: 0.1 10E3/UL (ref 0–0.2)
BASOPHILS NFR BLD AUTO: 1 %
BNP SERPL-MCNC: 62 PG/ML (ref 0–167)
BUN SERPL-MCNC: 28 MG/DL (ref 8–28)
BUN SERPL-MCNC: 34 MG/DL (ref 8–28)
CALCIUM SERPL-MCNC: 8.8 MG/DL (ref 8.5–10.5)
CALCIUM SERPL-MCNC: 9 MG/DL (ref 8.5–10.5)
CHLORIDE BLD-SCNC: 106 MMOL/L (ref 98–107)
CHLORIDE BLD-SCNC: 111 MMOL/L (ref 98–107)
CO2 SERPL-SCNC: 22 MMOL/L (ref 22–31)
CO2 SERPL-SCNC: 25 MMOL/L (ref 22–31)
CREAT SERPL-MCNC: 1.29 MG/DL (ref 0.6–1.1)
CREAT SERPL-MCNC: 1.7 MG/DL (ref 0.6–1.1)
EOSINOPHIL # BLD AUTO: 0.4 10E3/UL (ref 0–0.7)
EOSINOPHIL NFR BLD AUTO: 4 %
ERYTHROCYTE [DISTWIDTH] IN BLOOD BY AUTOMATED COUNT: 18.9 % (ref 10–15)
GFR SERPL CREATININE-BSD FRML MDRD: 29 ML/MIN/1.73M2
GFR SERPL CREATININE-BSD FRML MDRD: 40 ML/MIN/1.73M2
GLUCOSE BLD-MCNC: 83 MG/DL (ref 70–125)
GLUCOSE BLD-MCNC: 94 MG/DL (ref 70–125)
HCT VFR BLD AUTO: 36.9 % (ref 35–47)
HGB BLD-MCNC: 12.2 G/DL (ref 11.7–15.7)
HOLD SPECIMEN: NORMAL
IMM GRANULOCYTES # BLD: 0.1 10E3/UL
IMM GRANULOCYTES NFR BLD: 1 %
LYMPHOCYTES # BLD AUTO: 1.7 10E3/UL (ref 0.8–5.3)
LYMPHOCYTES NFR BLD AUTO: 17 %
MAGNESIUM SERPL-MCNC: 2.2 MG/DL (ref 1.8–2.6)
MCH RBC QN AUTO: 30.7 PG (ref 26.5–33)
MCHC RBC AUTO-ENTMCNC: 33.1 G/DL (ref 31.5–36.5)
MCV RBC AUTO: 93 FL (ref 78–100)
MONOCYTES # BLD AUTO: 1 10E3/UL (ref 0–1.3)
MONOCYTES NFR BLD AUTO: 10 %
NEUTROPHILS # BLD AUTO: 6.9 10E3/UL (ref 1.6–8.3)
NEUTROPHILS NFR BLD AUTO: 67 %
NRBC # BLD AUTO: 0 10E3/UL
NRBC BLD AUTO-RTO: 0 /100
PLATELET # BLD AUTO: 221 10E3/UL (ref 150–450)
POTASSIUM BLD-SCNC: 3.7 MMOL/L (ref 3.5–5)
POTASSIUM BLD-SCNC: 4 MMOL/L (ref 3.5–5)
RBC # BLD AUTO: 3.98 10E6/UL (ref 3.8–5.2)
SARS-COV-2 RNA RESP QL NAA+PROBE: NEGATIVE
SODIUM SERPL-SCNC: 140 MMOL/L (ref 136–145)
SODIUM SERPL-SCNC: 142 MMOL/L (ref 136–145)
TROPONIN I SERPL-MCNC: 0.02 NG/ML (ref 0–0.29)
TROPONIN I SERPL-MCNC: 0.03 NG/ML (ref 0–0.29)
TROPONIN I SERPL-MCNC: 0.03 NG/ML (ref 0–0.29)
WBC # BLD AUTO: 10 10E3/UL (ref 4–11)

## 2022-08-02 PROCEDURE — 999N000157 HC STATISTIC RCP TIME EA 10 MIN

## 2022-08-02 PROCEDURE — 250N000011 HC RX IP 250 OP 636: Performed by: INTERNAL MEDICINE

## 2022-08-02 PROCEDURE — 97116 GAIT TRAINING THERAPY: CPT | Mod: GP

## 2022-08-02 PROCEDURE — 999N000043 HC STATISTIC CTO2 CONT OXYGEN TECH TIME EA 90 MIN

## 2022-08-02 PROCEDURE — 80048 BASIC METABOLIC PNL TOTAL CA: CPT | Performed by: INTERNAL MEDICINE

## 2022-08-02 PROCEDURE — 250N000013 HC RX MED GY IP 250 OP 250 PS 637: Performed by: NURSE PRACTITIONER

## 2022-08-02 PROCEDURE — 36415 COLL VENOUS BLD VENIPUNCTURE: CPT | Performed by: HOSPITALIST

## 2022-08-02 PROCEDURE — 87635 SARS-COV-2 COVID-19 AMP PRB: CPT | Performed by: EMERGENCY MEDICINE

## 2022-08-02 PROCEDURE — 84484 ASSAY OF TROPONIN QUANT: CPT | Performed by: HOSPITALIST

## 2022-08-02 PROCEDURE — 99220 PR INITIAL OBSERVATION CARE,LEVEL III: CPT | Performed by: INTERNAL MEDICINE

## 2022-08-02 PROCEDURE — G0378 HOSPITAL OBSERVATION PER HR: HCPCS

## 2022-08-02 PROCEDURE — 97530 THERAPEUTIC ACTIVITIES: CPT | Mod: GP

## 2022-08-02 PROCEDURE — 83735 ASSAY OF MAGNESIUM: CPT | Performed by: INTERNAL MEDICINE

## 2022-08-02 PROCEDURE — 36415 COLL VENOUS BLD VENIPUNCTURE: CPT | Mod: 59 | Performed by: EMERGENCY MEDICINE

## 2022-08-02 PROCEDURE — 84484 ASSAY OF TROPONIN QUANT: CPT | Mod: 91 | Performed by: EMERGENCY MEDICINE

## 2022-08-02 PROCEDURE — 99204 OFFICE O/P NEW MOD 45 MIN: CPT | Performed by: NURSE PRACTITIONER

## 2022-08-02 PROCEDURE — 94002 VENT MGMT INPAT INIT DAY: CPT

## 2022-08-02 PROCEDURE — 97161 PT EVAL LOW COMPLEX 20 MIN: CPT | Mod: GP

## 2022-08-02 PROCEDURE — 250N000013 HC RX MED GY IP 250 OP 250 PS 637: Performed by: EMERGENCY MEDICINE

## 2022-08-02 PROCEDURE — 36415 COLL VENOUS BLD VENIPUNCTURE: CPT | Performed by: INTERNAL MEDICINE

## 2022-08-02 PROCEDURE — 93005 ELECTROCARDIOGRAM TRACING: CPT | Performed by: EMERGENCY MEDICINE

## 2022-08-02 PROCEDURE — 85004 AUTOMATED DIFF WBC COUNT: CPT | Performed by: EMERGENCY MEDICINE

## 2022-08-02 PROCEDURE — 93971 EXTREMITY STUDY: CPT | Mod: LT

## 2022-08-02 PROCEDURE — 80048 BASIC METABOLIC PNL TOTAL CA: CPT | Performed by: EMERGENCY MEDICINE

## 2022-08-02 PROCEDURE — 96372 THER/PROPH/DIAG INJ SC/IM: CPT | Performed by: INTERNAL MEDICINE

## 2022-08-02 PROCEDURE — 93005 ELECTROCARDIOGRAM TRACING: CPT | Performed by: INTERNAL MEDICINE

## 2022-08-02 PROCEDURE — 71045 X-RAY EXAM CHEST 1 VIEW: CPT

## 2022-08-02 PROCEDURE — 83880 ASSAY OF NATRIURETIC PEPTIDE: CPT | Performed by: EMERGENCY MEDICINE

## 2022-08-02 PROCEDURE — 99214 OFFICE O/P EST MOD 30 MIN: CPT | Performed by: INTERNAL MEDICINE

## 2022-08-02 PROCEDURE — 999N000009 HC STATISTIC AIRWAY CARE

## 2022-08-02 PROCEDURE — 250N000013 HC RX MED GY IP 250 OP 250 PS 637: Performed by: INTERNAL MEDICINE

## 2022-08-02 RX ORDER — ACETAMINOPHEN 500 MG
500-1000 TABLET ORAL EVERY 8 HOURS PRN
COMMUNITY
End: 2023-10-13

## 2022-08-02 RX ORDER — HYDRALAZINE HYDROCHLORIDE 20 MG/ML
10 INJECTION INTRAMUSCULAR; INTRAVENOUS EVERY 4 HOURS PRN
Status: DISCONTINUED | OUTPATIENT
Start: 2022-08-02 | End: 2022-08-03 | Stop reason: HOSPADM

## 2022-08-02 RX ORDER — IPRATROPIUM BROMIDE AND ALBUTEROL SULFATE 2.5; .5 MG/3ML; MG/3ML
3 SOLUTION RESPIRATORY (INHALATION) ONCE
Status: DISCONTINUED | OUTPATIENT
Start: 2022-08-02 | End: 2022-08-03 | Stop reason: HOSPADM

## 2022-08-02 RX ORDER — OLANZAPINE 2.5 MG/1
2.5 TABLET, FILM COATED ORAL 2 TIMES DAILY
Status: DISCONTINUED | OUTPATIENT
Start: 2022-08-02 | End: 2022-08-03

## 2022-08-02 RX ORDER — ROPINIROLE 1 MG/1
1 TABLET, FILM COATED ORAL 2 TIMES DAILY
Status: DISCONTINUED | OUTPATIENT
Start: 2022-08-02 | End: 2022-08-03 | Stop reason: HOSPADM

## 2022-08-02 RX ORDER — HEPARIN SODIUM 5000 [USP'U]/.5ML
5000 INJECTION, SOLUTION INTRAVENOUS; SUBCUTANEOUS EVERY 12 HOURS
Status: DISCONTINUED | OUTPATIENT
Start: 2022-08-02 | End: 2022-08-03 | Stop reason: HOSPADM

## 2022-08-02 RX ORDER — ROPINIROLE 1 MG/1
2 TABLET, FILM COATED ORAL AT BEDTIME
Status: DISCONTINUED | OUTPATIENT
Start: 2022-08-02 | End: 2022-08-03 | Stop reason: HOSPADM

## 2022-08-02 RX ORDER — ROPINIROLE 1 MG/1
1 TABLET, FILM COATED ORAL 2 TIMES DAILY
Status: DISCONTINUED | OUTPATIENT
Start: 2022-08-02 | End: 2022-08-02

## 2022-08-02 RX ORDER — METOPROLOL SUCCINATE 25 MG/1
25 TABLET, EXTENDED RELEASE ORAL 2 TIMES DAILY
Status: DISCONTINUED | OUTPATIENT
Start: 2022-08-02 | End: 2022-08-03 | Stop reason: HOSPADM

## 2022-08-02 RX ORDER — ASPIRIN 81 MG/1
162 TABLET, CHEWABLE ORAL ONCE
Status: COMPLETED | OUTPATIENT
Start: 2022-08-02 | End: 2022-08-02

## 2022-08-02 RX ORDER — FUROSEMIDE 20 MG
20 TABLET ORAL
Status: DISCONTINUED | OUTPATIENT
Start: 2022-08-02 | End: 2022-08-03 | Stop reason: HOSPADM

## 2022-08-02 RX ORDER — HYDRALAZINE HYDROCHLORIDE 20 MG/ML
10 INJECTION INTRAMUSCULAR; INTRAVENOUS EVERY 6 HOURS PRN
Status: DISCONTINUED | OUTPATIENT
Start: 2022-08-02 | End: 2022-08-02

## 2022-08-02 RX ORDER — ACETAMINOPHEN 500 MG
500-1000 TABLET ORAL EVERY 8 HOURS PRN
Status: DISCONTINUED | OUTPATIENT
Start: 2022-08-02 | End: 2022-08-03 | Stop reason: HOSPADM

## 2022-08-02 RX ORDER — ASPIRIN 81 MG/1
81 TABLET ORAL DAILY
Status: DISCONTINUED | OUTPATIENT
Start: 2022-08-02 | End: 2022-08-03 | Stop reason: HOSPADM

## 2022-08-02 RX ORDER — METOPROLOL SUCCINATE 25 MG/1
25 TABLET, EXTENDED RELEASE ORAL AT BEDTIME
Status: DISCONTINUED | OUTPATIENT
Start: 2022-08-02 | End: 2022-08-02

## 2022-08-02 RX ORDER — OLANZAPINE 2.5 MG/1
2.5 TABLET, FILM COATED ORAL 2 TIMES DAILY PRN
Status: DISCONTINUED | OUTPATIENT
Start: 2022-08-02 | End: 2022-08-03 | Stop reason: ALTCHOICE

## 2022-08-02 RX ORDER — ROPINIROLE 1 MG/1
4 TABLET, FILM COATED ORAL EVERY EVENING
Status: DISCONTINUED | OUTPATIENT
Start: 2022-08-02 | End: 2022-08-02

## 2022-08-02 RX ORDER — RAMELTEON 8 MG/1
8 TABLET ORAL AT BEDTIME
Status: DISCONTINUED | OUTPATIENT
Start: 2022-08-02 | End: 2022-08-03 | Stop reason: HOSPADM

## 2022-08-02 RX ORDER — NITROGLYCERIN 0.4 MG/1
0.4 TABLET SUBLINGUAL EVERY 5 MIN PRN
Status: DISCONTINUED | OUTPATIENT
Start: 2022-08-02 | End: 2022-08-03 | Stop reason: HOSPADM

## 2022-08-02 RX ADMIN — NITROGLYCERIN 0.4 MG: 0.4 TABLET, ORALLY DISINTEGRATING SUBLINGUAL at 03:25

## 2022-08-02 RX ADMIN — HEPARIN SODIUM 5000 UNITS: 10000 INJECTION, SOLUTION INTRAVENOUS; SUBCUTANEOUS at 20:44

## 2022-08-02 RX ADMIN — OLANZAPINE 2.5 MG: 2.5 TABLET, FILM COATED ORAL at 13:05

## 2022-08-02 RX ADMIN — ROPINIROLE HYDROCHLORIDE 1 MG: 1 TABLET, FILM COATED ORAL at 15:39

## 2022-08-02 RX ADMIN — Medication 81 MG: at 09:56

## 2022-08-02 RX ADMIN — OLANZAPINE 2.5 MG: 2.5 TABLET, FILM COATED ORAL at 18:59

## 2022-08-02 RX ADMIN — HYDRALAZINE HYDROCHLORIDE 10 MG: 20 INJECTION INTRAMUSCULAR; INTRAVENOUS at 10:51

## 2022-08-02 RX ADMIN — ASPIRIN 162 MG: 81 TABLET, CHEWABLE ORAL at 01:28

## 2022-08-02 RX ADMIN — METOPROLOL SUCCINATE 25 MG: 25 TABLET, EXTENDED RELEASE ORAL at 20:49

## 2022-08-02 RX ADMIN — FUROSEMIDE 20 MG: 20 TABLET ORAL at 15:39

## 2022-08-02 RX ADMIN — ROPINIROLE HYDROCHLORIDE 1 MG: 1 TABLET, FILM COATED ORAL at 12:33

## 2022-08-02 RX ADMIN — FUROSEMIDE 20 MG: 20 TABLET ORAL at 09:56

## 2022-08-02 RX ADMIN — NITROGLYCERIN 0.4 MG: 0.4 TABLET, ORALLY DISINTEGRATING SUBLINGUAL at 01:29

## 2022-08-02 RX ADMIN — METOPROLOL SUCCINATE 25 MG: 25 TABLET, EXTENDED RELEASE ORAL at 09:56

## 2022-08-02 ASSESSMENT — ACTIVITIES OF DAILY LIVING (ADL): DEPENDENT_IADLS:: INDEPENDENT

## 2022-08-02 NOTE — CONSULTS
Cardiology Consult Note    Thank you, Dr. Brian Michaud, for asking the [facility] Heart [facility] team to see Sherice Alvarez in consultation at [facility] emergency department to evaluate accelerated hypertension, chronic diastolic heart failure.      Assessment:   1.  Accelerated hypertension, likely precipitated by stress of family social situation.  We will attempt to increase metoprolol succinate to 25 mg twice daily to see if we can get more smoothing control of her blood pressure.  We will need to monitor for low blood pressures as she reports her blood pressures sometimes drop low.  If so, may go back to her usual dose of metoprolol succinate but have her take half tablet twice daily.  2.  Chronic diastolic heart failure, currently well compensated.  No evidence of volume overload on her current examination.  We will resume her usual dose of furosemide 20 mg twice daily.  3.  Restless leg syndrome.  We will restart her ropinirole.  4.  Anxiety.  Patient under significant amount of stress as her son is on hospice care and her daughter-in-law in chemical dependency treatment.  5.  Chest tightness, likely due to anxiety and accelerated blood pressure as recent nuclear stress study demonstrated no evidence of ischemia or infarct.     Plan:   1.  Attempt to increase metoprolol succinate to 25 mg twice daily with close observation on blood pressure for the next 24 hours  2.  Resume usual dose of furosemide since she appears well compensated.    Primary cardiologist: Ulisses Feldman MD     Current History:   Ms. Sherice Alvarez is a 87 year old female with history of chronic diastolic heart failure, aortic valve stenosis status post TAVR, coronary artery disease with previous stents to the proximal LAD, circumflex and right coronary artery which were widely patent in April 2020 who presented to the ED last night for complaints of chest tightness and shortness of breath.  States she has had fluctuations  in blood pressure at home as well as fluctuations in weight and peripheral edema.  States she has been following her low sodium diet as recommended although been snacking a little bit more due to significant emotional distress as her son is on hospice care and her daughter-in-law is currently in inpatient treatment for alcoholism.  Last night, noted severe chest tightness and palpitations and took a sublingual nitroglycerin without benefit.  Subsequently came to the ED for evaluation.  ECG showed normal sinus rhythm without acute ischemic changes.  Initial troponin and subsequent troponins within normal limits.  Was noted to be quite hypertensive on arrival with blood pressures remaining high overnight.  Does report chronic symptoms of orthopnea as well as postnasal drainage which prompts her to sleep on 2 pillows.  Denies michael PND.    Past Medical History:     Past Medical History:   Diagnosis Date     Arthritis      Asthma      CAD (coronary artery disease)      Cancer (H)     basal cell     Chronic kidney disease     ckd 3     Chronic pain syndrome      Congestive heart failure (H)      Crohn's disease (H)      GERD (gastroesophageal reflux disease)      Hx of heart artery stent 11/01/2016    1 stent R Proximal CA and 1 Stent L Proximal circumflex  12/2016 Stent proximal LAD     Hyperlipidemia      Hypertension      NSTEMI (non-ST elevated myocardial infarction) (H) 11/01/2016     PONV (postoperative nausea and vomiting)     severe povn with last knee surgery     Restless legs syndrome      Stroke (H) 01/01/2010    numbness rt jaw       Past Surgical History:     Past Surgical History:   Procedure Laterality Date     APPENDECTOMY       CARDIAC CATHETERIZATION  11/04/2016    multiple vessel disease.  no intervention     CARDIAC CATHETERIZATION  11/07/2016     CARDIAC CATHETERIZATION N/A 12/27/2016    Procedure: Coronary Angiogram;  Surgeon: Sunil Moran MD;  Location: [facility] Cath Lab;  Service:       CAROTID ENDARTERECTOMY       CAROTID ENDARTERECTOMY Right 2010     CERVICAL LAMINECTOMY  1994      SECTION       CV CORONARY ANGIOGRAM N/A 2020    Procedure: Coronary Angiogram;  Surgeon: Vinita Ramos MD;  Location: [facility] Cath Lab;  Service: Cardiology     CV LEFT HEART CATHETERIZATION WITHOUT LEFT VENTRICULOGRAM Left 2020    Procedure: Left Heart Catheterization Without Left Ventriculogram;  Surgeon: Jerad Lerner MD;  Location: [facility] Cath Lab;  Service: Cardiology     CV TRANSCATHETER AORTIC VALVE REPLACEMENT - OTHER APPROACH N/A 2020    Procedure: CV TRANSCATHETER AORTIC VALVE REPLACEMENT - RIGHT SUBCLAVIAN APPROACH;  Surgeon: John Caceres MD;  Location: [facility] Cath Lab;  Service: Cardiology     HEMORRHOIDECTOMY EXTERNAL       IR LUMBAR EPIDURAL STEROID INJECTION  2014     IR LUMBAR NERVE ROOT INJECTION  10/01/2013     JOINT REPLACEMENT       FL ESOPHAGOGASTRODUODENOSCOPY TRANSORAL DIAGNOSTIC N/A 07/10/2019    Procedure: ESOPHAGOGASTRODUODENOSCOPY (EGD) with gastric biopsy;  Surgeon: Sunil Stuart MD;  Location: [facility] GI;  Service: Gastroenterology     FL REPLACE AORTIC VALVE OPEN AXILLRY ARTRY APPROACH N/A 2020    Procedure: OR TRANSCATHETER AORTIC VALVE REPLACEMENT, SUBCLAVIAN APPROACH;  Surgeon: Bhavin Cuadra MD;  Location: [facility] Cath Lab;  Service: Cardiology     TONSILLECTOMY & ADENOIDECTOMY       TOTAL KNEE ARTHROPLASTY Right      TRANSCATHETER AORTIC-VALVE REPLACEMENT       ZZC TOTAL KNEE ARTHROPLASTY Left 2021    Procedure: LEFT TOTAL KNEE ARTHROPLASTY;  Surgeon: Doug Rhodes MD;  Location: [facility];  Service: Orthopedics       Family History:     Family History   Problem Relation Age of Onset     Atrial fibrillation Mother      Parkinsonism Father        Social History:    reports that she quit smoking about 42 years ago. She has never used smokeless tobacco. She reports that she  does not drink alcohol and does not use drugs.    Meds:     Current Facility-Administered Medications:      acetaminophen (TYLENOL) tablet 500-1,000 mg, 500-1,000 mg, Oral, Q8H PRN, Christina Hopper MD     aspirin EC tablet 81 mg, 81 mg, Oral, Daily, Christina Hopper MD     diclofenac (VOLTAREN) 1 % topical gel 2-4 g, 2-4 g, Topical, 4x Daily PRN, Christina Hopper MD     evolocumab (REPATHA) prefilled syringe 140 mg, 140 mg, Subcutaneous, Q14 Days, Christina Hopper MD     furosemide (LASIX) tablet 20 mg, 20 mg, Oral, BID, Christina Hopper MD     hydrALAZINE (APRESOLINE) injection 10 mg, 10 mg, Intravenous, Q6H PRN, Brian Michaud MD     ipratropium - albuterol 0.5 mg/2.5 mg/3 mL (DUONEB) neb solution 3 mL, 3 mL, Nebulization, Once, Doug Leal DO     metoprolol succinate ER (TOPROL XL) 24 hr tablet 25 mg, 25 mg, Oral, BID, Christina Hopper MD     nitroGLYcerin (NITROSTAT) sublingual tablet 0.4 mg, 0.4 mg, Sublingual, Q5 Min PRN, Doug Leal DO, 0.4 mg at 08/02/22 0325     rOPINIRole (REQUIP) tablet 4 mg, 4 mg, Oral, QPM, Christina Hopper MD    Current Outpatient Medications:      acetaminophen (TYLENOL) 500 MG tablet, Take 500-1,000 mg by mouth every 8 hours as needed for mild pain, Disp: , Rfl:      albuterol (PROVENTIL HFA;VENTOLIN HFA) 90 mcg/actuation inhaler, [ALBUTEROL (PROVENTIL HFA;VENTOLIN HFA) 90 MCG/ACTUATION INHALER] Inhale 1-2 puffs every 6 (six) hours as needed for wheezing., Disp: , Rfl:      aspirin (ASA) 81 MG EC tablet, Take 81 mg by mouth daily, Disp: , Rfl:      beclomethasone (QVAR) 80 mcg/actuation inhaler, Inhale 1 puff into the lungs 2 times daily as needed , Disp: , Rfl:      coenzyme Q10 100 mg capsule, [COENZYME Q10 100 MG CAPSULE] Take 100 mg by mouth daily. , Disp: , Rfl:      diclofenac (VOLTAREN) 1 % topical gel, Apply 2-4 g topically 4 times daily as needed APPLY TO LEFT SHOULDER AND LEFT KNEE., Disp: , Rfl:      evolocumab (REPATHA) 140 MG/ML prefilled syringe, Inject 1 mL (140 mg)  Subcutaneous every 14 days, Disp: 2 mL, Rfl: 11     ferrous sulfate 325 (65 FE) MG tablet, [FERROUS SULFATE 325 (65 FE) MG TABLET] Take 1 tablet (325 mg total) by mouth daily with breakfast. (Patient taking differently: Take 1 tablet by mouth twice a week), Disp: , Rfl: 0     furosemide (LASIX) 20 MG tablet, Take Lasix 40 mg in the morning and 20 mg in the afternoon. (Patient taking differently: Take 20 mg by mouth 3 times daily 20 mg two to three times a day), Disp: 270 tablet, Rfl: 2     melatonin 5 mg Tab tablet, [MELATONIN 5 MG TAB TABLET] Take 5 mg by mouth at bedtime as needed (for sleep). , Disp: , Rfl:      metoprolol succinate ER (TOPROL-XL) 25 MG 24 hr tablet, Take 1 tablet (25 mg) by mouth At Bedtime, Disp: 90 tablet, Rfl: 3     Multiple Vitamins-Minerals (SENIOR MULTIVITAMIN PLUS PO), Take 1 tablet by mouth daily, Disp: , Rfl:      nitroglycerin (NITROSTAT) 0.4 MG SL tablet, [NITROGLYCERIN (NITROSTAT) 0.4 MG SL TABLET] Place 1 tablet (0.4 mg total) under the tongue every 5 (five) minutes as needed for chest pain., Disp: 90 tablet, Rfl: 0     rOPINIRole (REQUIP) 2 MG tablet, [ROPINIROLE (REQUIP) 2 MG TABLET] Take 4 mg by mouth every evening. Patient takes 7pm, Disp: , Rfl:      Vitamin D3 (CHOLECALCIFEROL) 125 MCG (5000 UT) tablet, Take 1 tablet by mouth daily, Disp: , Rfl:     aspirin  81 mg Oral Daily     evolocumab  140 mg Subcutaneous Q14 Days     furosemide  20 mg Oral BID     ipratropium - albuterol 0.5 mg/2.5 mg/3 mL  3 mL Nebulization Once     metoprolol succinate ER  25 mg Oral BID     rOPINIRole  4 mg Oral QPM       Allergies:   Amitriptyline hcl [amitriptyline], Erythromycin, Gabapentin, Naproxen, Other environmental allergy, and Pregabalin    Review of Systems:   A 12 point comprehensive review of systems was negative except as noted in the current history.    Objective:      Physical Exam  Body mass index is 26.16 kg/m .  BP (!) 184/84   Pulse 81   Temp 98  F (36.7  C) (Temporal)   Resp  "(!) 34   Ht 1.575 m (5' 2\")   Wt 64.9 kg (143 lb)   SpO2 97%   BMI 26.16 kg/m      General Appearance:    Well-developed, well-nourished elderly female who appears in no acute distress   HEENT:   Normocephalic, atraumatic.  Sclera nonicteric.  Mucous membranes moist.   Neck:  No jugular venous distention or carotid bruits   Chest:  Symmetric with normal AP diameter.  Mild kyphosis.   Lungs:    Clear to auscultation percussion bilaterally   Cardiovascular:    Regular rate and rhythm.  S1, S2 normal.  No murmur or gallop   Abdomen:   Obese, soft, nondistended, nontender.  No organomegaly or mass.   Extremities:  No peripheral edema, clubbing or cyanosis.  Distal pulses full and symmetric   Skin:  No rash or lesions   Neurologic:  Alert and oriented x3.  No gross focal deficits             Cardiographics (personally reviewed)  ECG demonstrated normal sinus rhythm without acute ischemic changes.  Repeat ECG unchanged.    Imaging (personally reviewed)  Chest x-ray without evidence of acute congestive failure.    Lab Review (personally reviewed all results)  Recent Labs   Lab Test 07/12/22  0915   CHOL 222*   HDL 59   *   TRIG 75     Recent Labs   Lab Test 07/12/22  0915 03/30/21  1507 06/04/20  0814   * 132* 63     Recent Labs   Lab Test 08/02/22  0009      POTASSIUM 3.7   CHLORIDE 106   CO2 25   GLC 94   BUN 34*   CR 1.70*   GFRESTIMATED 29*   BESS 9.0     Recent Labs   Lab Test 08/02/22  0009 07/12/22  0915 06/14/22  1452   CR 1.70* 1.30* 1.21*     Recent Labs   Lab Test 11/06/16  0555   A1C 5.6          Recent Labs   Lab Test 08/02/22  0009   WBC 10.0   HGB 12.2   HCT 36.9   MCV 93        Recent Labs   Lab Test 08/02/22  0009 03/03/22  1200 01/15/22  0248   HGB 12.2 11.5* 11.1*    Recent Labs   Lab Test 08/02/22  0727 08/02/22  0009 01/15/22  0556   TROPONINI 0.03 0.02 0.02     Recent Labs   Lab Test 08/02/22  0009 05/03/22  1509 01/15/22  0248 10/11/21  1154 09/20/21  1328 09/08/21  1346 "   BNP 62 127 80   < >  --   --    NTBNP  --   --   --   --  539* 481*    < > = values in this interval not displayed.     Recent Labs   Lab Test 03/03/22  1200   TSH 3.20     Recent Labs   Lab Test 01/15/22  0248 08/19/21  1021 05/11/21  1323   INR 1.13 1.15 1.19*

## 2022-08-02 NOTE — PHARMACY-ADMISSION MEDICATION HISTORY
Pharmacy Note - Admission Medication History    Pertinent Provider Information: Consider new RX for SL nitro     ______________________________________________________________________    Prior To Admission (PTA) med list completed and updated in EMR.       PTA Med List   Medication Sig Note Last Dose     acetaminophen (TYLENOL) 500 MG tablet Take 500-1,000 mg by mouth every 8 hours as needed for mild pain  Unknown at Unknown time     albuterol (PROVENTIL HFA;VENTOLIN HFA) 90 mcg/actuation inhaler [ALBUTEROL (PROVENTIL HFA;VENTOLIN HFA) 90 MCG/ACTUATION INHALER] Inhale 1-2 puffs every 6 (six) hours as needed for wheezing. 2022: Inhaler with in ER  2016 - pt sure she as newer at home Unknown at Unknown time     aspirin (ASA) 81 MG EC tablet Take 81 mg by mouth daily  2022 at Unknown time     beclomethasone (QVAR) 80 mcg/actuation inhaler Inhale 1 puff into the lungs 2 times daily as needed   Unknown at Unknown time     coenzyme Q10 100 mg capsule [COENZYME Q10 100 MG CAPSULE] Take 100 mg by mouth daily.   Past Week at Unknown time     diclofenac (VOLTAREN) 1 % topical gel Apply 2-4 g topically 4 times daily as needed APPLY TO LEFT SHOULDER AND LEFT KNEE.  Unknown at Unknown time     evolocumab (REPATHA) 140 MG/ML prefilled syringe Inject 1 mL (140 mg) Subcutaneous every 14 days  Past Month at Unknown time     ferrous sulfate 325 (65 FE) MG tablet [FERROUS SULFATE 325 (65 FE) MG TABLET] Take 1 tablet (325 mg total) by mouth daily with breakfast. (Patient taking differently: Take 1 tablet by mouth twice a week)  Past Week at Unknown time     furosemide (LASIX) 20 MG tablet Take Lasix 40 mg in the morning and 20 mg in the afternoon. (Patient taking differently: Take 20 mg by mouth 3 times daily 20 mg two to three times a day)  2022 at Unknown time     melatonin 5 mg Tab tablet [MELATONIN 5 MG TAB TABLET] Take 5 mg by mouth at bedtime as needed (for sleep).   Past Week at Unknown time     metoprolol  succinate ER (TOPROL-XL) 25 MG 24 hr tablet Take 1 tablet (25 mg) by mouth At Bedtime  Past Week at Unknown time     Multiple Vitamins-Minerals (SENIOR MULTIVITAMIN PLUS PO) Take 1 tablet by mouth daily  2022 at Unknown time     nitroglycerin (NITROSTAT) 0.4 MG SL tablet [NITROGLYCERIN (NITROSTAT) 0.4 MG SL TABLET] Place 1 tablet (0.4 mg total) under the tongue every 5 (five) minutes as needed for chest pain. 2022: Requests new supply 2022 at Unknown time     rOPINIRole (REQUIP) 2 MG tablet [ROPINIROLE (REQUIP) 2 MG TABLET] Take 4 mg by mouth every evening. Patient takes 7pm  Past Week at Unknown time     Vitamin D3 (CHOLECALCIFEROL) 125 MCG (5000 UT) tablet Take 1 tablet by mouth daily  Past Week at Unknown time       Information source(s): Patient and CareEverywhere/SureScripts  Method of interview communication: in-person    Summary of Changes to PTA Med List  New: none  Discontinued: zoloft, omeprazole  Changed: vit d, Lasix    Patient was asked about OTC/herbal products specifically.  PTA med list reflects this.    In the past week, patient estimated taking medication this percent of the time:  greater than 90%.    Medications available for use during hospital stay: Qvar with exp  but albuterol with already .     The information provided in this note is only as accurate as the sources available at the time of the update(s).    Thank you for the opportunity to participate in the care of this patient.    Nelly Rangel [facility]  2022 8:11 AM

## 2022-08-02 NOTE — ED PROVIDER NOTES
EMERGENCY DEPARTMENT ENCOUNTER      NAME: Sherice Alvarez  AGE: 87 year old female  YOB: 1934  MRN: [id number]  EVALUATION DATE & TIME: No admission date for patient encounter.    PCP: Anum Rosenberg    ED PROVIDER: Doug Leal D.O.      Chief Complaint   Patient presents with     Chest Pain       FINAL IMPRESSION:  1. Chest pain, unspecified type        ED COURSE & MEDICAL DECISION MAKIN:11 AM I met with the patient to gather history and to perform my initial exam. I discussed the plan for care while in the Emergency Department.  1:50 AM Nitroglycerin improved patients symptoms.   2:29 AM I rechecked and updated the patient. Spoke about plan for admission.   2:38 AM I spoke with Dr. Michaud, hospitalist.   3:22 AM Nurse reports patient is having pain currently.          Pertinent Labs & Imaging studies reviewed. (See chart for details)  87 year old female presents to the Emergency Department for evaluation of chest pain shortness of breath.  Patient does have improvement with nitro, and had taken nitro at home prior to coming to the emergency department.  After arrival to the emergency department her symptoms had returned, and she was also complaining of swelling of her lower extremities.  BNP is not elevated tonight therefore I do not believe this represents a CHF exacerbation.  EKG did not show any evidence of ischemia or arrhythmia, and her first troponin is negative.  Nitro here has improved her symptoms significantly.  She did complain of some left inner thigh pain, however ultrasound does not show any evidence of DVT.  Clinically I do not see findings by history or exam that would be concerning for PE, dissection, pneumothorax.  Patient will be admitted for cardiac rule out.  Discussed with the hospitalist who agreed to the observation.    At the conclusion of the encounter I discussed the results of all of the tests and the disposition. The questions were answered. The  patient or family acknowledged understanding and was agreeable with the care plan.      HPI    Patient information was obtained from: Patient    Use of : N/A      Sherice Alvarez is a 87 year old female with a history of TAVR, heart failure, hypertension, and CKD who presents with chest pain.     Patient reports that she has stage three heart failure. She has not felt good for the last few days and has had chest tightness and heart pounding rapidly on and off all day today. Today her blood pressure was increasing and took a metoprolol and nitroglycerin. Tonight her symptoms were prolonged longer than usual prompting an ED visit. She additionally has shortness of breath, lightheadedness, leg swelling, cough, nausea, diarrhea, and a minimal sore throat. Patient currently feels better after the nitroglycerin. Patient drinks alcohol occasionally and does not smoke. She is on Lasix. Patient denies any fever, abdominal pain, dysuria, hematuria, or any other complaints at this time.     REVIEW OF SYSTEMS  Constitutional:  Denies fever, chills, weight loss or weakness  Eyes:  No pain, discharge, redness  HENT: Positive for sore throat. Denies ear pain, congestion  Respiratory: Positive for shortness of breath and cough. No wheeze  Cardiovascular:  Positive for chest pain, palpitations, and leg swelling.   GI: Positive for nausea and diarrhea. Denies abdominal pain, vomiting  : Denies dysuria, hematuria  Musculoskeletal:  Denies any new muscle/joint pain, swelling or loss of function.  Skin:  Denies rash, pallor  Neurologic: Positive for lightheadedness. Denies headache, focal weakness or sensory changes  Lymph: Denies swollen nodes    All other systems negative unless noted in HPI.    PAST MEDICAL HISTORY:  Past Medical History:   Diagnosis Date     Arthritis      Asthma      CAD (coronary artery disease)      Cancer (H)     basal cell     Chronic kidney disease     ckd 3     Chronic pain syndrome       Congestive heart failure (H)      Crohn's disease (H)      GERD (gastroesophageal reflux disease)      Hx of heart artery stent 2016    1 stent R Proximal CA and 1 Stent L Proximal circumflex  2016 Stent proximal LAD     Hyperlipidemia      Hypertension      NSTEMI (non-ST elevated myocardial infarction) (H) 2016     PONV (postoperative nausea and vomiting)     severe povn with last knee surgery     Restless legs syndrome      Stroke (H) 2010    numbness rt jaw       PAST SURGICAL HISTORY:  Past Surgical History:   Procedure Laterality Date     APPENDECTOMY       CARDIAC CATHETERIZATION  2016    multiple vessel disease.  no intervention     CARDIAC CATHETERIZATION  2016     CARDIAC CATHETERIZATION N/A 2016    Procedure: Coronary Angiogram;  Surgeon: Sunil Moran MD;  Location: [facility] Cath Lab;  Service:      CAROTID ENDARTERECTOMY       CAROTID ENDARTERECTOMY Right 2010     CERVICAL LAMINECTOMY  1994      SECTION       CV CORONARY ANGIOGRAM N/A 2020    Procedure: Coronary Angiogram;  Surgeon: Vinita Ramos MD;  Location: [facility] Cath Lab;  Service: Cardiology     CV LEFT HEART CATHETERIZATION WITHOUT LEFT VENTRICULOGRAM Left 2020    Procedure: Left Heart Catheterization Without Left Ventriculogram;  Surgeon: Jerad Lerner MD;  Location: [facility] Cath Lab;  Service: Cardiology     CV TRANSCATHETER AORTIC VALVE REPLACEMENT - OTHER APPROACH N/A 2020    Procedure: CV TRANSCATHETER AORTIC VALVE REPLACEMENT - RIGHT SUBCLAVIAN APPROACH;  Surgeon: John Caceres MD;  Location: [facility] Cath Lab;  Service: Cardiology     HEMORRHOIDECTOMY EXTERNAL       IR LUMBAR EPIDURAL STEROID INJECTION  2014     IR LUMBAR NERVE ROOT INJECTION  10/01/2013     JOINT REPLACEMENT       MS ESOPHAGOGASTRODUODENOSCOPY TRANSORAL DIAGNOSTIC N/A 07/10/2019    Procedure: ESOPHAGOGASTRODUODENOSCOPY (EGD) with gastric biopsy;  Surgeon:  Sunil Stuart MD;  Location: [facility] GI;  Service: Gastroenterology     GA REPLACE AORTIC VALVE OPEN AXILLRY ARTRY APPROACH N/A 06/02/2020    Procedure: OR TRANSCATHETER AORTIC VALVE REPLACEMENT, SUBCLAVIAN APPROACH;  Surgeon: Bhavin Cuadra MD;  Location: [facility] Cath Lab;  Service: Cardiology     TONSILLECTOMY & ADENOIDECTOMY       TOTAL KNEE ARTHROPLASTY Right      TRANSCATHETER AORTIC-VALVE REPLACEMENT       ZZC TOTAL KNEE ARTHROPLASTY Left 05/11/2021    Procedure: LEFT TOTAL KNEE ARTHROPLASTY;  Surgeon: Doug Rhodes MD;  Location: [facility];  Service: Orthopedics         CURRENT MEDICATIONS:    Current Facility-Administered Medications   Medication     hydrALAZINE (APRESOLINE) injection 10 mg     ipratropium - albuterol 0.5 mg/2.5 mg/3 mL (DUONEB) neb solution 3 mL     nitroGLYcerin (NITROSTAT) sublingual tablet 0.4 mg     Current Outpatient Medications   Medication     acetaminophen (TYLENOL) 325 MG tablet     albuterol (PROVENTIL HFA;VENTOLIN HFA) 90 mcg/actuation inhaler     amLODIPine (NORVASC) 2.5 MG tablet     aspirin (ASA) 81 MG EC tablet     beclomethasone (QVAR) 80 mcg/actuation inhaler     coenzyme Q10 100 mg capsule     diclofenac (VOLTAREN) 1 % topical gel     ergocalciferol (ERGOCALCIFEROL) 50,000 unit capsule     evolocumab (REPATHA) 140 MG/ML prefilled syringe     ferrous sulfate 325 (65 FE) MG tablet     furosemide (LASIX) 20 MG tablet     melatonin 5 mg Tab tablet     metoprolol succinate ER (TOPROL-XL) 25 MG 24 hr tablet     multivitamin therapeutic (THERAGRAN) tablet     nitroglycerin (NITROSTAT) 0.4 MG SL tablet     omeprazole (PRILOSEC OTC) 20 MG EC tablet     rOPINIRole (REQUIP) 2 MG tablet         ALLERGIES:  Allergies   Allergen Reactions     Amitriptyline Hcl [Amitriptyline] Other (See Comments)     Erythromycin Diarrhea and Other (See Comments)     Childhood reaction     Gabapentin Other (See Comments)     Jerking movements, swelling     Naproxen  "Unknown and Other (See Comments)     Other Environmental Allergy Unknown     Molds, dander     Pregabalin Other (See Comments)       FAMILY HISTORY:  Family History   Problem Relation Age of Onset     Atrial fibrillation Mother      Parkinsonism Father        SOCIAL HISTORY:  Social History     Socioeconomic History     Marital status:    Tobacco Use     Smoking status: Former Smoker     Quit date: 1980     Years since quittin.6     Smokeless tobacco: Never Used   Substance and Sexual Activity     Alcohol use: No     Drug use: No       VITALS:  Patient Vitals for the past 24 hrs:   BP Temp Temp src Pulse Resp SpO2 Height Weight   22 0400 (!) 184/84 -- -- 81 (!) 34 97 % -- --   22 0345 -- -- -- 85 28 95 % -- --   22 0335 -- -- -- 85 29 94 % -- --   22 0330 (!) 185/85 -- -- 86 (!) 40 95 % -- --   22 0300 (!) 163/71 -- -- 78 (!) 35 95 % -- --   22 0230 (!) 159/72 -- -- 79 28 94 % -- --   22 0200 (!) 178/74 -- -- 84 30 94 % -- --   22 0130 (!) 189/88 -- -- 82 -- 98 % -- --   22 0100 (!) 182/85 -- -- 77 -- 97 % -- --   22 0035 (!) 197/83 -- -- 77 -- 98 % -- --   22 2349 (!) 174/84 98  F (36.7  C) Temporal 83 22 98 % 1.575 m (5' 2\") 64.9 kg (143 lb)       PHYSICAL EXAM    VITAL SIGNS: BP (!) 184/84   Pulse 81   Temp 98  F (36.7  C) (Temporal)   Resp (!) 34   Ht 1.575 m (5' 2\")   Wt 64.9 kg (143 lb)   SpO2 97%   BMI 26.16 kg/m      General Appearance: Well-appearing, well-nourished, no acute distress   Head:  Normocephalic, without obvious abnormality, atraumatic  Eyes:  PERRL, conjunctiva/corneas clear, EOM's intact,  ENT:  Lips, mucosa, and tongue normal, membranes are moist without pallor  Neck:  Normal ROM, symmetrical, trachea midline    Chest:  No tenderness or deformity, no crepitus  Cardio:  Regular rate and rhythm,rub or gallop, 2+ pulses symmetric in all extremities. 2/6 murmur. 3+ pitting edema.   Pulm:  Clear to " auscultation bilaterally, respirations unlabored,  Back:  ROM normal, no CVA tenderness, no spinal tenderness, no paraspinal tenderness  Abdomen:  Soft, non-tender, no rebound or guarding.  Musculoskeletal: Full ROM, no edema, no cyanosis, good ROM of major joints  Integument:  Warm, Dry, No erythema, No rash.    Neurologic:  Alert & oriented.  No focal deficits appreciated.  Ambulatory.  Psychiatric:  Affect normal, Judgment normal, Mood normal.      LABS  Results for orders placed or performed during the hospital encounter of 08/02/22 (from the past 24 hour(s))   CBC with platelets + differential    Narrative    The following orders were created for panel order CBC with platelets + differential.  Procedure                               Abnormality         Status                     ---------                               -----------         ------                     CBC with platelets and d...[[id number]]  Abnormal            Final result                 Please view results for these tests on the individual orders.   Basic metabolic panel   Result Value Ref Range    Sodium 140 136 - 145 mmol/L    Potassium 3.7 3.5 - 5.0 mmol/L    Chloride 106 98 - 107 mmol/L    Carbon Dioxide (CO2) 25 22 - 31 mmol/L    Anion Gap 9 5 - 18 mmol/L    Urea Nitrogen 34 (H) 8 - 28 mg/dL    Creatinine 1.70 (H) 0.60 - 1.10 mg/dL    Calcium 9.0 8.5 - 10.5 mg/dL    Glucose 94 70 - 125 mg/dL    GFR Estimate 29 (L) >60 mL/min/1.73m2   B-Type Natriuretic Peptide ([facility] Only)   Result Value Ref Range    BNP 62 0 - 167 pg/mL   Troponin I (now)   Result Value Ref Range    Troponin I 0.02 0.00 - 0.29 ng/mL   [place] Draw    Narrative    The following orders were created for panel order [place] Draw.  Procedure                               Abnormality         Status                     ---------                               -----------         ------                     Extra Red Top Tube[[id number]]                               Final result                Extra Green Top (Lithium...[[id number]]                      Final result               Extra Purple Top Tube[[id number]]                            Final result                 Please view results for these tests on the individual orders.   CBC with platelets and differential   Result Value Ref Range    WBC Count 10.0 4.0 - 11.0 10e3/uL    RBC Count 3.98 3.80 - 5.20 10e6/uL    Hemoglobin 12.2 11.7 - 15.7 g/dL    Hematocrit 36.9 35.0 - 47.0 %    MCV 93 78 - 100 fL    MCH 30.7 26.5 - 33.0 pg    MCHC 33.1 31.5 - 36.5 g/dL    RDW 18.9 (H) 10.0 - 15.0 %    Platelet Count 221 150 - 450 10e3/uL    % Neutrophils 67 %    % Lymphocytes 17 %    % Monocytes 10 %    % Eosinophils 4 %    % Basophils 1 %    % Immature Granulocytes 1 %    NRBCs per 100 WBC 0 <1 /100    Absolute Neutrophils 6.9 1.6 - 8.3 10e3/uL    Absolute Lymphocytes 1.7 0.8 - 5.3 10e3/uL    Absolute Monocytes 1.0 0.0 - 1.3 10e3/uL    Absolute Eosinophils 0.4 0.0 - 0.7 10e3/uL    Absolute Basophils 0.1 0.0 - 0.2 10e3/uL    Absolute Immature Granulocytes 0.1 <=0.4 10e3/uL    Absolute NRBCs 0.0 10e3/uL   Extra Red Top Tube   Result Value Ref Range    Hold Specimen JIC    Extra Green Top (Lithium Heparin) Tube   Result Value Ref Range    Hold Specimen JIC    Extra Purple Top Tube   Result Value Ref Range    Hold Specimen JIC    [place] Draw    Narrative    The following orders were created for panel order [place] Draw.  Procedure                               Abnormality         Status                     ---------                               -----------         ------                     Extra Blue Top Tube[[id number]]                              Final result                 Please view results for these tests on the individual orders.   Extra Blue Top Tube   Result Value Ref Range    Hold Specimen JIC    XR Chest Port 1 View    Narrative    EXAM: XR CHEST PORT 1 VIEW  LOCATION: [facility]  DATE/TIME: 8/2/2022 12:45  AM    INDICATION: Chest pain.  COMPARISON: Portable chest single view 1/15/2022 at 0252 hours.      Impression    IMPRESSION: A few strands of linear atelectasis in the lower lungs. No adenopathy or effusion. Cardiac size approaches upper limits of normal with normal pulmonary vascularity. Transcatheter aortic valve replacement. Atherosclerotic aorta. Degenerative   changes both shoulders and the spine. Surgical clips overlying the right axilla. Monitoring leads overlying the chest. No significant interval change.   Asymptomatic COVID-19 Virus (Coronavirus) by PCR Nasopharyngeal    Specimen: Nasopharyngeal; Swab   Result Value Ref Range    SARS CoV2 PCR Negative Negative    Narrative    Testing was performed using the yanni  SARS-CoV-2 & Influenza A/B Assay on the yanni  [place]  System.  This test should be ordered for the detection of SARS-COV-2 in individuals who meet SARS-CoV-2 clinical and/or epidemiological criteria. Test performance is unknown in asymptomatic patients.  This test is for in vitro diagnostic use under the FDA EUA for laboratories certified under CLIA to perform moderate and/or high complexity testing. This test has not been FDA cleared or approved.  A negative test does not rule out the presence of PCR inhibitors in the specimen or target RNA in concentration below the limit of detection for the assay. The possibility of a false negative should be considered if the patient's recent exposure or clinical presentation suggests COVID-19.  [facility] are certified under the Clinical Laboratory Improvement Amendments of 1988 (CLIA-88) as qualified to perform moderate and/or high complexity laboratory testing.   US Lower Extremity Venous Duplex Left    Narrative    EXAM: ULTRASOUND VENOUS LEFT LOWER EXTREMITY WITH DOPPLER  LOCATION: [facility]  DATE/TIME: 8/2/2022 5:00 AM    INDICATION: Left leg pain and swelling.  COMPARISON: None.    TECHNIQUE: Venous Duplex  ultrasound of the left lower extremity with and without compression, augmentation and duplex. Color flow and spectral Doppler with waveform analysis performed.    FINDINGS: Exam includes the common femoral, femoral, popliteal veins as well as segmentally visualized deep calf veins and greater saphenous vein.    LEFT: Normal compressibility of the common femoral, femoral, popliteal, posterior tibial, peroneal and great saphenous veins. Unremarkable Doppler waveform evaluation of the common femoral, femoral and popliteal veins.      Impression    IMPRESSION: No evidence of thrombus in the major veins of the left lower extremity.                     RADIOLOGY  US Lower Extremity Venous Duplex Left   Final Result   IMPRESSION: No evidence of thrombus in the major veins of the left lower extremity.                      XR Chest Port 1 View   Final Result   IMPRESSION: A few strands of linear atelectasis in the lower lungs. No adenopathy or effusion. Cardiac size approaches upper limits of normal with normal pulmonary vascularity. Transcatheter aortic valve replacement. Atherosclerotic aorta. Degenerative    changes both shoulders and the spine. Surgical clips overlying the right axilla. Monitoring leads overlying the chest. No significant interval change.           EKG:    Rate: 83 bpm  Rhythm: Normal Sinus Rhythm  Axis: Normal  Interval: Normal  Conduction: Normal  QRS: Normal  ST: Normal  T-wave: Normal  QT: Not prolonged  Comparison EKG: No significant change compared to 15 January 2022  Impression:  No acute ischemic change   I have independently reviewed and interpreted today's EKG, pending Cardiologist read    MEDICATIONS GIVEN IN THE EMERGENCY:  Medications   nitroGLYcerin (NITROSTAT) sublingual tablet 0.4 mg (0.4 mg Sublingual Given 8/2/22 0596)   ipratropium - albuterol 0.5 mg/2.5 mg/3 mL (DUONEB) neb solution 3 mL (3 mLs Nebulization Not Given 8/2/22 0105)   hydrALAZINE (APRESOLINE) injection 10 mg (has no  administration in time range)   aspirin (ASA) chewable tablet 162 mg (162 mg Oral Given 8/2/22 0128)       NEW PRESCRIPTIONS STARTED AT TODAY'S ER VISIT  New Prescriptions    No medications on file      I, Ankit Patel, am serving as a scribe to document services personally performed by Doug Leal DO, based on my observations and the provider's statements to me.  I, Doug Leal DO, attest that Ankit Patel is acting in a scribe capacity, has observed my performance of the services and has documented them in accordance with my direction.    Doug Leal D.O.  Emergency Medicine  [facility] EMERGENCY DEPARTMENT  [address] [place] [id number]  [phone]  Dept: [phone]     Doug Leal DO  08/02/22 0628

## 2022-08-02 NOTE — PROGRESS NOTES
[facility]      OUTPATIENT PHYSICAL THERAPY EVALUATION  PLAN OF TREATMENT FOR OUTPATIENT REHABILITATION  (COMPLETE FOR INITIAL CLAIMS ONLY)  Patient's Last Name, First Name, M.I.  YOB: 1934  Sherice Alvarez                        Provider's Name  [facility] Medical Record No.  [id number]                               Onset Date:  08/02/22   Start of Care Date:  08/02/22      Type:     _X_PT   ___OT   ___SLP Medical Diagnosis:  Chest pain                        PT Diagnosis:  Impaired functional mobility   Visits from SOC:  1   _________________________________________________________________________________  Plan of Treatment/Functional Goals    Planned Interventions: balance training, bed mobility training, gait training, home exercise program, strengthening, transfer training     Goals: See Physical Therapy Goals on Care Plan in [employer] electronic health record.    Therapy Frequency: Daily  Predicted Duration of Therapy Intervention: 08/05/22  _________________________________________________________________________________    I CERTIFY THE NEED FOR THESE SERVICES FURNISHED UNDER        THIS PLAN OF TREATMENT AND WHILE UNDER MY CARE     (Physician co-signature of this document indicates review and certification of the therapy plan).              Certification date from: 08/02/22, Certification date to: 08/09/22    Referring Physician: Diana Morales MD            Initial Assessment        See Physical Therapy evaluation dated 08/02/22 in [employer] electronic health record.

## 2022-08-02 NOTE — PROGRESS NOTES
08/02/22 1445   Quick Adds   Type of Visit Initial PT Evaluation   Living Environment   People in Home alone   Current Living Arrangements independent living facility   Home Accessibility no concerns   Transportation Anticipated family or friend will provide   Self-Care   Usual Activity Tolerance good   Current Activity Tolerance moderate   Equipment Currently Used at Home none  (owns [employer])   General Information   Onset of Illness/Injury or Date of Surgery 08/02/22   Referring Physician Diana Morales MD   Patient/Family Therapy Goals Statement (PT) return home   Pertinent History of Current Problem (include personal factors and/or comorbidities that impact the POC) 87 year old female presenting with CP and SOB   Existing Precautions/Restrictions no known precautions/restrictions   Strength (Manual Muscle Testing)   Strength (Manual Muscle Testing) strength is WFL   Bed Mobility   Bed Mobility supine-sit   Supine-Sit [place] (Bed Mobility) supervision;verbal cues   Transfers   Transfers sit-stand transfer   Sit-Stand Transfer   Sit-Stand [place] (Transfers) contact guard   Assistive Device (Sit-Stand Transfers) walker, front-wheeled   Gait/Stairs (Locomotion)   [place] Level (Gait) contact guard   Assistive Device (Gait) walker, front-wheeled   Distance in Feet (Required for LE Total Joints) 75'   Pattern (Gait) step-through   Deviations/Abnormal Patterns (Gait) gait speed decreased   Clinical Impression   Criteria for Skilled Therapeutic Intervention Yes, treatment indicated   PT Diagnosis (PT) Impaired functional mobility   Influenced by the following impairments Impaired balance, fatigue   Functional limitations due to impairments Impaired transfers, impaired gait   Clinical Presentation (PT Evaluation Complexity) Stable/Uncomplicated   Clinical Presentation Rationale Presents as diagnosed   Clinical Decision Making (Complexity) low complexity   Planned Therapy Interventions (PT) balance  training;bed mobility training;gait training;home exercise program;strengthening;transfer training   Anticipated Equipment Needs at Discharge (PT) walker, rolling   Risk & Benefits of therapy have been explained patient   PT Discharge Planning   PT Discharge Recommendation (DC Rec) home with assist;home with home care physical therapy   PT Rationale for DC Rec Home PT pending progress.   Plan of Care Review   Plan of Care Reviewed With patient   Total Evaluation Time   Total Evaluation Time (Minutes) 10   Physical Therapy Goals   PT Frequency Daily   PT Predicted Duration/Target Date for Goal Attainment 08/05/22   PT Goals Bed Mobility;Transfers;Gait   PT: Bed Mobility Independent;Supine to/from sit   PT: Transfers Modified independent;Sit to/from stand;Bed to/from chair;Assistive device   PT: Gait Modified independent;Rolling walker;100 feet       Ora Young, PT, DPT  8/2/2022

## 2022-08-02 NOTE — PROGRESS NOTES
Pt. Denies pain at this time. She states she just has a little bit of numbness in right hand. She has been able to take some naps this evening.     Vinita Hobson RN

## 2022-08-02 NOTE — CONSULTS
Care Management Initial Consult    General Information  Assessment completed with: Patient, pt  Type of CM/SW Visit: Initial Assessment    Primary Care Provider verified and updated as needed: Yes   Readmission within the last 30 days: no previous admission in last 30 days   Return Category: Progression of disease  Reason for Consult: discharge planning  Advance Care Planning: Advance Care Planning Reviewed: verified with patient     General Information Comments: lives alone and independent at [place] and no svcs, still drives.    Communication Assessment  Patient's communication style: spoken language (English or Bilingual)             Cognitive  Cognitive/Neuro/Behavioral: WDL           Mood/Behavior: anxious          Living Environment:   People in home: alone     Current living Arrangements: apartment      Able to return to prior arrangements: yes       Family/Social Support:  Care provided by: self  Provides care for: no one  Marital Status: Single  Children          Description of Support System: Supportive, Involved    Support Assessment: Adequate family and caregiver support, Adequate social supports    Current Resources:   Patient receiving home care services: No     Community Resources: None  Equipment currently used at home: none (owns [employer])  Supplies currently used at home: None    Employment/Financial:  Employment Status:          Financial Concerns: No concerns identified   Referral to Financial Worker: No       Lifestyle & Psychosocial Needs:  Social Determinants of Health     Tobacco Use: Medium Risk     Smoking Tobacco Use: Former Smoker     Smokeless Tobacco Use: Never Used   Alcohol Use: Not on file   Financial Resource Strain: Not on file   Food Insecurity: Not on file   Transportation Needs: Not on file   Physical Activity: Not on file   Stress: Not on file   Social Connections: Not on file   Intimate Partner Violence: Not on file   Depression: Not on file   Housing Stability: Not on file        Functional Status:  Prior to admission patient needed assistance:   Dependent ADLs:: Independent  Dependent IADLs:: Independent  Assesssment of Functional Status: Not at baseline with ADL Functioning    Mental Health Status:  Mental Health Status: No Current Concerns       Chemical Dependency Status:                Values/Beliefs:  Spiritual, Cultural Beliefs, Yazidism Practices, Values that affect care:                 Additional Information:  Assessed, lives alone and independent at [place] and no svcs, still drives. Daughter was here and will transport at discharge. CM to follow for needs.      Hardeep Cortés RN

## 2022-08-02 NOTE — ED TRIAGE NOTES
Pt presents with chest tightness which started over an hour ago.  Pt took one NTG with relief and now pressure is back.  Pt states she is short of breath and has significant swelling in bilateral lower extremeties.     Triage Assessment     Row Name 08/01/22 2497       Respiratory WDL    Respiratory WDL rhythm/pattern    Rhythm/Pattern, Respiratory dyspnea on exertion;tachypneic       Skin Circulation/Temperature WDL    Skin Circulation/Temperature WDL WDL       Cardiac WDL    Cardiac WDL WDL       Peripheral/Neurovascular WDL    Peripheral Neurovascular WDL WDL       Cognitive/Neuro/Behavioral WDL    Cognitive/Neuro/Behavioral WDL WDL

## 2022-08-02 NOTE — CONSULTS
"  INITIAL PSYCHIATRIC CONSULTATION                  REASON FOR REQUEST: Medications management for depression anxiety     ASSESSMENT/RECOMMENDATIONS/PLAN :   Adjustment reaction with anxiety and an depression due to psychosocial stressors.  Sleep difficulties likely exacerbated the above and restless leg.  Anxiety reaction      Recommendations:  Patient is struggling with anxiety, nervousness and restlessness affecting her sleep and ability to perform daily routines thus would benefit from pharmaceutical intervention.  Discussed different medications, patient is amenable to trying something that works quickly and can work on as-needed basis.    Rozerem 8 mg at bedtime for sleep.  Change ropinirole 4 mg at bedtime to 1 mg twice daily and 2 mg at bedtime.  Olanzapine 2.5 mg twice daily for anxiety and sleep.   to provide information psychotherapy on an outpatient basis.  Patient is stable to discharge from psychiatric standpoint.  Case discussed with the hospitalist and bedside RN in the ER.      MENTAL STATUS EXAMINATION:   General Appearance: Not in acute distress, Mild anxiousness.   Behavior: Good eye contact, no bizarre ideations  Speech: Coherent.  Thought Process: Linear, clear.  Thought content: No evidence of hallucinations, delusions or paranoia.    Thought Formation: Associations are connected  Judgment: Fair  Insight : Intact  Attention : Adequate  Memory: Sufficient  Fund Of Knowledge: Average  Affect: Neutral  Mood: Congruent  Alert : Awake  Suicidal ideation: Absent  Homicidal ideation: Absent  Orientation: X 4  Comprehension: Sufficient pertaining to current medical needs  Generative thought content: Adequate.  Spontaneous conversation  Language: Intact  Gait and Ambulation: Gait and ambulation at baseline.    Musculoskeletal: No tonal abnormalities      /60   Pulse 81   Temp 98  F (36.7  C) (Temporal)   Resp 23   Ht 1.575 m (5' 2\")   Wt 64.9 kg (143 lb)   SpO2 98%   BMI " 26.16 kg/m      HISTORY OF PRESENT ILLNESS:   Presenting history to include: Per [facility] [place]:    HPI: Sherice Alvarez is a 87 year old old female with h/o chronic diastolic heart failure, aortic valve stenosis status post TAVR, coronary artery disease with previous stents to the proximal LAD, circumflex and right coronary artery which were widely patent in April 2020 who presented to the ED last night for complaints of chest tightness and shortness of breath.  States she has had fluctuations in blood pressure at home as well as fluctuations in weight and peripheral edema.  States she has been following her low sodium diet as recommended although been snacking a little bit more due to significant emotional distress as her son is on hospice care and her daughter-in-law is currently in inpatient treatment for alcoholism.  Last night, noted severe chest tightness and palpitations and took a sublingual nitroglycerin without benefit.  Subsequently came to the ED for evaluation.  ECG showed normal sinus rhythm without acute ischemic changes.  Initial troponin and subsequent troponins within normal limits.  Was noted to be quite hypertensive on arrival with blood pressures remaining high overnight.  Does report chronic symptoms of orthopnea as well as postnasal drainage which prompts her to sleep on 2 pillows.  Denies michael PND.       Upon assessment patient was noted to be resting in bed mildly anxious and difficulty falling asleep due to restless leg.  She engages in a coherent and reliable conversation.  Endorsing of multiple stressors, psychosocial, family, and her struggles with sleep affecting her day-to-day functionality as her anxiety becomes debilitating during daytime.  She wants to get a good night sleep.  She denies any thoughts of self-harm or suicidality.  She denies any hopelessness, helplessness or apathy.  Denies any symptoms consistent with maxi or hypomania.  In the past she had a trial of  "sertraline which she did not tolerate, had to stop the medication due to it causing nosebleeds.  She is not interested in any medication that she would need to take on a regular basis \"I do not think I need anything that strong\" referring to scheduled SSRI or SNRI.  However she is willing to give any anxiolytic agent I tried to help calm \"my nerves and sleep\".  She denies any visual or auditory hallucinations or delusions.  Review of Systems:As per HPI. Remainders of 12 point review of systems negative.  Psychiatric ROS:    Endorsing of anxiety, restlessness, nervousness and sleep difficulties.        PFSH reviewed  and not pertinent to chief complaint/reason for visit  /60   Pulse 81   Temp 98  F (36.7  C) (Temporal)   Resp 23   Ht 1.575 m (5' 2\")   Wt 64.9 kg (143 lb)   SpO2 98%   BMI 26.16 kg/m    No results found for: AMPHET, PCP, BARBIT, OXYCODONE, THC, ETOH  @24HOURRESULTS@  Recent Results (from the past 72 hour(s))   Basic metabolic panel    Collection Time: 08/02/22 12:09 AM   Result Value Ref Range    Sodium 140 136 - 145 mmol/L    Potassium 3.7 3.5 - 5.0 mmol/L    Chloride 106 98 - 107 mmol/L    Carbon Dioxide (CO2) 25 22 - 31 mmol/L    Anion Gap 9 5 - 18 mmol/L    Urea Nitrogen 34 (H) 8 - 28 mg/dL    Creatinine 1.70 (H) 0.60 - 1.10 mg/dL    Calcium 9.0 8.5 - 10.5 mg/dL    Glucose 94 70 - 125 mg/dL    GFR Estimate 29 (L) >60 mL/min/1.73m2   B-Type Natriuretic Peptide ([facility] Only)    Collection Time: 08/02/22 12:09 AM   Result Value Ref Range    BNP 62 0 - 167 pg/mL   Troponin I (now)    Collection Time: 08/02/22 12:09 AM   Result Value Ref Range    Troponin I 0.02 0.00 - 0.29 ng/mL   CBC with platelets and differential    Collection Time: 08/02/22 12:09 AM   Result Value Ref Range    WBC Count 10.0 4.0 - 11.0 10e3/uL    RBC Count 3.98 3.80 - 5.20 10e6/uL    Hemoglobin 12.2 11.7 - 15.7 g/dL    Hematocrit 36.9 35.0 - 47.0 %    MCV 93 78 - 100 fL    MCH 30.7 26.5 - 33.0 pg    MCHC 33.1 31.5 - " 36.5 g/dL    RDW 18.9 (H) 10.0 - 15.0 %    Platelet Count 221 150 - 450 10e3/uL    % Neutrophils 67 %    % Lymphocytes 17 %    % Monocytes 10 %    % Eosinophils 4 %    % Basophils 1 %    % Immature Granulocytes 1 %    NRBCs per 100 WBC 0 <1 /100    Absolute Neutrophils 6.9 1.6 - 8.3 10e3/uL    Absolute Lymphocytes 1.7 0.8 - 5.3 10e3/uL    Absolute Monocytes 1.0 0.0 - 1.3 10e3/uL    Absolute Eosinophils 0.4 0.0 - 0.7 10e3/uL    Absolute Basophils 0.1 0.0 - 0.2 10e3/uL    Absolute Immature Granulocytes 0.1 <=0.4 10e3/uL    Absolute NRBCs 0.0 10e3/uL   Extra Red Top Tube    Collection Time: 08/02/22 12:09 AM   Result Value Ref Range    Hold Specimen JIC    Extra Green Top (Lithium Heparin) Tube    Collection Time: 08/02/22 12:09 AM   Result Value Ref Range    Hold Specimen JIC    Extra Purple Top Tube    Collection Time: 08/02/22 12:09 AM   Result Value Ref Range    Hold Specimen JIC    Extra Blue Top Tube    Collection Time: 08/02/22 12:11 AM   Result Value Ref Range    Hold Specimen JIC    Asymptomatic COVID-19 Virus (Coronavirus) by PCR Nasopharyngeal    Collection Time: 08/02/22  4:18 AM    Specimen: Nasopharyngeal; Swab   Result Value Ref Range    SARS CoV2 PCR Negative Negative   Troponin I    Collection Time: 08/02/22  7:27 AM   Result Value Ref Range    Troponin I 0.03 0.00 - 0.29 ng/mL       PMH:   Past Medical History:   Diagnosis Date     Arthritis      Asthma      CAD (coronary artery disease)      Cancer (H)     basal cell     Chronic kidney disease     ckd 3     Chronic pain syndrome      Congestive heart failure (H)      Crohn's disease (H)      GERD (gastroesophageal reflux disease)      Hx of heart artery stent 11/01/2016    1 stent R Proximal CA and 1 Stent L Proximal circumflex  12/2016 Stent proximal LAD     Hyperlipidemia      Hypertension      NSTEMI (non-ST elevated myocardial infarction) (H) 11/01/2016     PONV (postoperative nausea and vomiting)     severe povn with last knee surgery      Restless legs syndrome      Stroke (H) 2010    numbness rt jaw           Current Medications:Scheduled Meds:    aspirin  81 mg Oral Daily     furosemide  20 mg Oral BID     heparin ANTICOAGULANT  5,000 Units Subcutaneous Q12H     ipratropium - albuterol 0.5 mg/2.5 mg/3 mL  3 mL Nebulization Once     metoprolol succinate ER  25 mg Oral BID     OLANZapine  2.5 mg Oral BID     ramelteon  8 mg Oral At Bedtime     rOPINIRole  1 mg Oral BID     rOPINIRole  2 mg Oral At Bedtime     Continuous Infusions:  PRN Meds:.acetaminophen, diclofenac, hydrALAZINE, nitroGLYcerin, OLANZapine                Family History: PERSONALLY REVIEWED.  Family History   Problem Relation Age of Onset     Atrial fibrillation Mother      Parkinsonism Father      Pertinent Family hx not pertinent to Chief Complaint or reason for visit.     Social History:  PERSONALLY REVIEWED.  Social History     Socioeconomic History     Marital status:      Spouse name: Not on file     Number of children: Not on file     Years of education: Not on file     Highest education level: Not on file   Occupational History     Not on file   Tobacco Use     Smoking status: Former Smoker     Quit date: 1980     Years since quittin.6     Smokeless tobacco: Never Used   Substance and Sexual Activity     Alcohol use: No     Drug use: No     Sexual activity: Not on file   Other Topics Concern     Not on file   Social History Narrative     Not on file     Social Determinants of Health     Financial Resource Strain: Not on file   Food Insecurity: Not on file   Transportation Needs: Not on file   Physical Activity: Not on file   Stress: Not on file   Social Connections: Not on file   Intimate Partner Violence: Not on file   Housing Stability: Not on file    not pertinent to Chief Complaint or reason for visit.             Allergies as of 2014 Reviewed     Review of Systems:As per HPI. Remainders of 12 point review of systems negative.    Review of  Pertinent Laboratory:      PERSONALLY REVIEWED.    Physical Exam: Temp:  [98  F (36.7  C)] 98  F (36.7  C)  Pulse:  [71-93] 81  Resp:  [22-40] 23  BP: (132-199)/(60-92) 132/60  SpO2:  [94 %-99 %] 98 %   Vitals: reviewed in chart     Physical exam as per medical team: reviewed in chart      diagnoses, risk and benefits of medications discussed with staff. Care coordination with care management team.   Thank you for this consultation.       Ángela Dinero; NP  [facility]        This note was created with the help of Dragon dictation system. Grammatical and typing errors are not intentional.

## 2022-08-02 NOTE — ED NOTES
Pt stated that she is experiencing more pain and tightness in her chest. EKG done, provider notified, and another dose of sublingual nitroglycerin given.

## 2022-08-02 NOTE — PROGRESS NOTES
duoneb not given at this time, pt was heading to ultrasound.  She is not in any current respiratory distress.  Sats were 97%.  RR WNL.  BS clear/dim.  Told pt if she is feeling short of breath in future we will come give neb.  She stated she was in for cardiac reasons

## 2022-08-02 NOTE — ED NOTES
"Patient put call light on because she said she feels \"like her throat is full of mucus and I can't get it out so it's putting pressure on my breathing. I can breath okay, but it feels like it is closing.\"    After review of O2 saturation, the patient's O2 sats have dropped down to 94-95%, from 97-98% upon arrival to her room. 1L of oxygen via nasal cannula administered. MD notified.   "

## 2022-08-02 NOTE — H&P
Admission History and Physical   Sherice Alvarez, 8/21/1934, [id number]  [facility]  Chest pain, unspecified type [R07.9]  PCP:Anum Rosenberg, [phone]   Admitting provider: Diana Morales MD.    Code status:  Full Code          Extended Emergency Contact Information  Primary Emergency Contact: Gulshan Renteria   [employer]  Work Phone: [phone]  Mobile Phone: [phone]  Relation: Daughter  Secondary Emergency Contact: Carly Velázquez   [place]  Mobile Phone: [phone]  Relation: Daughter       Assessment and Plan  Active Problems:    Benign essential hypertension    Stage 3 chronic kidney disease (H)    (HFpEF) heart failure with preserved ejection fraction (H)    Chest pain, unspecified type    Other insomnia    Adjustment disorder with mixed anxiety and depressed mood    Sherice Alvarez is a 87 year old female presenting with CP and SOB     accelerated HTN likely cause for CP, due to stressors   - Recent NM stress test negative   -  appreciate cards consult   - increased metoprolol by cards to 25mg BID   - telemetry     Chronic HF compensated   - continue Metoprolol and home lasix  - I/Os    Anxiety and depression patients son is currently on hospice and her DIL has issues with chemical dependency patient was very emotional when seen. Also insomnia is issue   - Psych consulted and patient in agreement  - medications added by psych greatly appreciated   - better sleep cycle   - may benefit from Psychology, therapy?    RLS  - continue Requip and given extra dose, psych added more doses for the day     Nursing called due to patient restless and complaining of burning and pins and needles but no focal deficits on exam and she did not get her requip last night and has not been sleeping.      COVID-19 PCR Results    COVID-19 PCR Results 5/8/21 1/15/22 8/2/22   SARS-CoV-2 Virus Specimen Source Nasopharyngeal     SARS-CoV-2 PCR Result NEGATIVE     SARS CoV2 PCR  Negative  Negative      Comments are available for some flowsheets but are not being displayed.         COVID-19 Antibody Results, Testing for Immunity    COVID-19 Antibody Results, Testing for Immunity   No data to display.           VTE prophylaxis:  Heparin SQ  DIET: Orders Placed This Encounter      Combination Diet 2 gm NA Diet; Low Saturated Fat Diet    Drains/Lines: none  Weight bearing status: WBAT  Disposition/Barriers to discharge: anticipate discharge tomorrow if pressures better and no further CP symptoms   Code Status:Full Code    HPI: Sherice Alvarez is a 87 year old old female with h/o chronic diastolic heart failure, aortic valve stenosis status post TAVR, coronary artery disease with previous stents to the proximal LAD, circumflex and right coronary artery which were widely patent in April 2020 who presented to the ED last night for complaints of chest tightness and shortness of breath.  States she has had fluctuations in blood pressure at home as well as fluctuations in weight and peripheral edema.  States she has been following her low sodium diet as recommended although been snacking a little bit more due to significant emotional distress as her son is on hospice care and her daughter-in-law is currently in inpatient treatment for alcoholism.  Last night, noted severe chest tightness and palpitations and took a sublingual nitroglycerin without benefit.  Subsequently came to the ED for evaluation.  ECG showed normal sinus rhythm without acute ischemic changes.  Initial troponin and subsequent troponins within normal limits.  Was noted to be quite hypertensive on arrival with blood pressures remaining high overnight.  Does report chronic symptoms of orthopnea as well as postnasal drainage which prompts her to sleep on 2 pillows.  Denies michael PND.    Past Medical History:   Diagnosis Date     Arthritis      Asthma      CAD (coronary artery disease)      Cancer (H)     basal cell     Chronic kidney  disease     ckd 3     Chronic pain syndrome      Congestive heart failure (H)      Crohn's disease (H)      GERD (gastroesophageal reflux disease)      Hx of heart artery stent 2016    1 stent R Proximal CA and 1 Stent L Proximal circumflex  2016 Stent proximal LAD     Hyperlipidemia      Hypertension      NSTEMI (non-ST elevated myocardial infarction) (H) 2016     PONV (postoperative nausea and vomiting)     severe povn with last knee surgery     Restless legs syndrome      Stroke (H) 2010    numbness rt jaw     Past Surgical History:   Procedure Laterality Date     APPENDECTOMY       CARDIAC CATHETERIZATION  2016    multiple vessel disease.  no intervention     CARDIAC CATHETERIZATION  2016     CARDIAC CATHETERIZATION N/A 2016    Procedure: Coronary Angiogram;  Surgeon: Sunil Moran MD;  Location: [facility] Cath Lab;  Service:      CAROTID ENDARTERECTOMY       CAROTID ENDARTERECTOMY Right 2010     CERVICAL LAMINECTOMY  1994      SECTION       CV CORONARY ANGIOGRAM N/A 2020    Procedure: Coronary Angiogram;  Surgeon: Vinita Ramos MD;  Location: [facility] Cath Lab;  Service: Cardiology     CV LEFT HEART CATHETERIZATION WITHOUT LEFT VENTRICULOGRAM Left 2020    Procedure: Left Heart Catheterization Without Left Ventriculogram;  Surgeon: Jerad Lerner MD;  Location: [facility] Cath Lab;  Service: Cardiology     CV TRANSCATHETER AORTIC VALVE REPLACEMENT - OTHER APPROACH N/A 2020    Procedure: CV TRANSCATHETER AORTIC VALVE REPLACEMENT - RIGHT SUBCLAVIAN APPROACH;  Surgeon: John Caceres MD;  Location: [facility] Cath Lab;  Service: Cardiology     HEMORRHOIDECTOMY EXTERNAL       IR LUMBAR EPIDURAL STEROID INJECTION  2014     IR LUMBAR NERVE ROOT INJECTION  10/01/2013     JOINT REPLACEMENT       CT ESOPHAGOGASTRODUODENOSCOPY TRANSORAL DIAGNOSTIC N/A 07/10/2019    Procedure: ESOPHAGOGASTRODUODENOSCOPY (EGD) with gastric  biopsy;  Surgeon: Sunil Stuart MD;  Location: [facility] GI;  Service: Gastroenterology     WA REPLACE AORTIC VALVE OPEN AXILLRY ARTRY APPROACH N/A 2020    Procedure: OR TRANSCATHETER AORTIC VALVE REPLACEMENT, SUBCLAVIAN APPROACH;  Surgeon: Bhavin Cuadra MD;  Location: [facility] Cath Lab;  Service: Cardiology     TONSILLECTOMY & ADENOIDECTOMY       TOTAL KNEE ARTHROPLASTY Right      TRANSCATHETER AORTIC-VALVE REPLACEMENT       ZZC TOTAL KNEE ARTHROPLASTY Left 2021    Procedure: LEFT TOTAL KNEE ARTHROPLASTY;  Surgeon: Doug Rhodes MD;  Location: [facility];  Service: Orthopedics     Family History   Problem Relation Age of Onset     Atrial fibrillation Mother      Parkinsonism Father      Social History     Socioeconomic History     Marital status:      Spouse name: Not on file     Number of children: Not on file     Years of education: Not on file     Highest education level: Not on file   Occupational History     Not on file   Tobacco Use     Smoking status: Former Smoker     Quit date: 1980     Years since quittin.6     Smokeless tobacco: Never Used   Substance and Sexual Activity     Alcohol use: No     Drug use: No     Sexual activity: Not on file   Other Topics Concern     Not on file   Social History Narrative     Not on file     Social Determinants of Health     Financial Resource Strain: Not on file   Food Insecurity: Not on file   Transportation Needs: Not on file   Physical Activity: Not on file   Stress: Not on file   Social Connections: Not on file   Intimate Partner Violence: Not on file   Housing Stability: Not on file     Allergies   Allergen Reactions     Amitriptyline Hcl [Amitriptyline] Other (See Comments)     Erythromycin Diarrhea and Other (See Comments)     Childhood reaction     Gabapentin Other (See Comments)     Jerking movements, swelling     Naproxen Unknown and Other (See Comments)     Other Environmental Allergy Unknown     Molds,  benjamin     Pregabalin Other (See Comments)       PRIOR TO ADMISSION MEDICATIONS   (Not in a hospital admission)       REVIEW OF SYSTEMS:  12 point reviewed pertinent negatives and positives in HPI all others negative     PHYSICAL EXAM  Temp:  [98  F (36.7  C)] 98  F (36.7  C)  Pulse:  [71-93] 93  Resp:  [22-40] 23  BP: (146-199)/(62-92) 146/62  SpO2:  [94 %-99 %] 98 %  Wt Readings from Last 1 Encounters:   08/01/22 64.9 kg (143 lb)     Body mass index is 26.16 kg/m .  Physical Exam  Constitutional:       Comments: Elderly restless female, weeping and very anxious    HENT:      Head: Normocephalic and atraumatic.   Cardiovascular:      Rate and Rhythm: Regular rhythm. Tachycardia present.      Pulses: Normal pulses.      Comments: Soft murmur  Pulmonary:      Effort: Pulmonary effort is normal.      Breath sounds: Normal breath sounds.   Abdominal:      General: Bowel sounds are normal.      Palpations: Abdomen is soft.   Musculoskeletal:      Comments: BLE ankle edema    Skin:     General: Skin is warm and dry.      Capillary Refill: Capillary refill takes less than 2 seconds.   Neurological:      General: No focal deficit present.      Mental Status: She is alert and oriented to person, place, and time. Mental status is at baseline.   Psychiatric:         Mood and Affect: Mood is anxious.         PERTINENT LABS and RADIOLOGY   Results for orders placed or performed during the hospital encounter of 08/02/22   XR Chest Port 1 View    Impression    IMPRESSION: A few strands of linear atelectasis in the lower lungs. No adenopathy or effusion. Cardiac size approaches upper limits of normal with normal pulmonary vascularity. Transcatheter aortic valve replacement. Atherosclerotic aorta. Degenerative   changes both shoulders and the spine. Surgical clips overlying the right axilla. Monitoring leads overlying the chest. No significant interval change.   US Lower Extremity Venous Duplex Left    Impression    IMPRESSION: No  evidence of thrombus in the major veins of the left lower extremity.                   Recent Labs   Lab 08/02/22  0009   WBC 10.0   HGB 12.2   MCV 93         POTASSIUM 3.7   CHLORIDE 106   CO2 25   BUN 34*   CR 1.70*   ANIONGAP 9   BESS 9.0   GLC 94     EKG:NSR no acute ST changes       Diana Morales MD  [facility] Medicine Service  [phone]

## 2022-08-02 NOTE — ED NOTES
Patient stated that she was experiencing sharp pain in her left leg intermittently, mostly on the medial side left knee, but slightly above and below that area too. Patient's left leg is slightly more edematous and extends higher than the right leg. The area is slightly warmer to the touch than the right leg. Patient stated that the pain does not feel the same as her typical restless leg syndrome symptoms, and also does not feel the same as when she accumulates fluid in her legs. Dr. Leal notified and said he would confer with the hospitalist that just took over care.

## 2022-08-03 ENCOUNTER — APPOINTMENT (OUTPATIENT)
Dept: PHYSICAL THERAPY | Facility: HOSPITAL | Age: 87
End: 2022-08-03
Payer: COMMERCIAL

## 2022-08-03 ENCOUNTER — APPOINTMENT (OUTPATIENT)
Dept: OCCUPATIONAL THERAPY | Facility: HOSPITAL | Age: 87
End: 2022-08-03
Attending: INTERNAL MEDICINE
Payer: COMMERCIAL

## 2022-08-03 VITALS
SYSTOLIC BLOOD PRESSURE: 139 MMHG | WEIGHT: 143 LBS | OXYGEN SATURATION: 94 % | HEART RATE: 73 BPM | RESPIRATION RATE: 18 BRPM | HEIGHT: 62 IN | DIASTOLIC BLOOD PRESSURE: 60 MMHG | BODY MASS INDEX: 26.31 KG/M2 | TEMPERATURE: 98.1 F

## 2022-08-03 LAB
ANION GAP SERPL CALCULATED.3IONS-SCNC: 7 MMOL/L (ref 5–18)
ATRIAL RATE - MUSE: 83 BPM
ATRIAL RATE - MUSE: 85 BPM
BUN SERPL-MCNC: 27 MG/DL (ref 8–28)
CALCIUM SERPL-MCNC: 8.8 MG/DL (ref 8.5–10.5)
CHLORIDE BLD-SCNC: 110 MMOL/L (ref 98–107)
CO2 SERPL-SCNC: 26 MMOL/L (ref 22–31)
CREAT SERPL-MCNC: 1.15 MG/DL (ref 0.6–1.1)
DIASTOLIC BLOOD PRESSURE - MUSE: 62 MMHG
DIASTOLIC BLOOD PRESSURE - MUSE: NORMAL MMHG
GFR SERPL CREATININE-BSD FRML MDRD: 46 ML/MIN/1.73M2
GLUCOSE BLD-MCNC: 80 MG/DL (ref 70–125)
INTERPRETATION ECG - MUSE: NORMAL
INTERPRETATION ECG - MUSE: NORMAL
MAGNESIUM SERPL-MCNC: 2.3 MG/DL (ref 1.8–2.6)
P AXIS - MUSE: 63 DEGREES
P AXIS - MUSE: 73 DEGREES
POTASSIUM BLD-SCNC: 3.6 MMOL/L (ref 3.5–5)
PR INTERVAL - MUSE: 200 MS
PR INTERVAL - MUSE: 210 MS
QRS DURATION - MUSE: 70 MS
QRS DURATION - MUSE: 76 MS
QT - MUSE: 388 MS
QT - MUSE: 396 MS
QTC - MUSE: 455 MS
QTC - MUSE: 471 MS
R AXIS - MUSE: 61 DEGREES
R AXIS - MUSE: 76 DEGREES
SODIUM SERPL-SCNC: 143 MMOL/L (ref 136–145)
SYSTOLIC BLOOD PRESSURE - MUSE: 146 MMHG
SYSTOLIC BLOOD PRESSURE - MUSE: NORMAL MMHG
T AXIS - MUSE: 65 DEGREES
T AXIS - MUSE: 74 DEGREES
VENTRICULAR RATE- MUSE: 83 BPM
VENTRICULAR RATE- MUSE: 85 BPM

## 2022-08-03 PROCEDURE — 97116 GAIT TRAINING THERAPY: CPT | Mod: GP

## 2022-08-03 PROCEDURE — 250N000013 HC RX MED GY IP 250 OP 250 PS 637: Performed by: NURSE PRACTITIONER

## 2022-08-03 PROCEDURE — 96374 THER/PROPH/DIAG INJ IV PUSH: CPT

## 2022-08-03 PROCEDURE — 96372 THER/PROPH/DIAG INJ SC/IM: CPT | Performed by: INTERNAL MEDICINE

## 2022-08-03 PROCEDURE — 999N000009 HC STATISTIC AIRWAY CARE

## 2022-08-03 PROCEDURE — 36415 COLL VENOUS BLD VENIPUNCTURE: CPT | Performed by: INTERNAL MEDICINE

## 2022-08-03 PROCEDURE — 99217 PR OBSERVATION CARE DISCHARGE: CPT | Performed by: INTERNAL MEDICINE

## 2022-08-03 PROCEDURE — 97165 OT EVAL LOW COMPLEX 30 MIN: CPT | Mod: GO

## 2022-08-03 PROCEDURE — G0378 HOSPITAL OBSERVATION PER HR: HCPCS

## 2022-08-03 PROCEDURE — 80048 BASIC METABOLIC PNL TOTAL CA: CPT | Performed by: INTERNAL MEDICINE

## 2022-08-03 PROCEDURE — 97535 SELF CARE MNGMENT TRAINING: CPT | Mod: GO

## 2022-08-03 PROCEDURE — 250N000011 HC RX IP 250 OP 636: Performed by: INTERNAL MEDICINE

## 2022-08-03 PROCEDURE — 999N000158 HC STATISTIC RCP TIME ED VENT EA 10 MIN

## 2022-08-03 PROCEDURE — 250N000013 HC RX MED GY IP 250 OP 250 PS 637: Performed by: INTERNAL MEDICINE

## 2022-08-03 PROCEDURE — 999N000178 HC STATISTIC SUCTION SPUTUM

## 2022-08-03 PROCEDURE — 97110 THERAPEUTIC EXERCISES: CPT | Mod: GP

## 2022-08-03 PROCEDURE — 83735 ASSAY OF MAGNESIUM: CPT | Performed by: INTERNAL MEDICINE

## 2022-08-03 RX ORDER — METOPROLOL SUCCINATE 25 MG/1
25 TABLET, EXTENDED RELEASE ORAL 2 TIMES DAILY
Qty: 60 TABLET | Refills: 0 | Status: SHIPPED | OUTPATIENT
Start: 2022-08-03 | End: 2022-09-20

## 2022-08-03 RX ORDER — ROPINIROLE 1 MG/1
1 TABLET, FILM COATED ORAL 2 TIMES DAILY
Qty: 60 TABLET | Refills: 0 | Status: SHIPPED | OUTPATIENT
Start: 2022-08-03 | End: 2022-12-10

## 2022-08-03 RX ORDER — RAMELTEON 8 MG/1
8 TABLET ORAL AT BEDTIME
Qty: 30 TABLET | Refills: 0 | Status: SHIPPED | OUTPATIENT
Start: 2022-08-03 | End: 2022-09-02

## 2022-08-03 RX ORDER — OLANZAPINE 2.5 MG/1
2.5 TABLET, FILM COATED ORAL 2 TIMES DAILY PRN
Qty: 14 TABLET | Refills: 0 | Status: SHIPPED | OUTPATIENT
Start: 2022-08-03 | End: 2024-02-09

## 2022-08-03 RX ORDER — OLANZAPINE 2.5 MG/1
2.5 TABLET, FILM COATED ORAL 2 TIMES DAILY PRN
Status: DISCONTINUED | OUTPATIENT
Start: 2022-08-03 | End: 2022-08-03 | Stop reason: HOSPADM

## 2022-08-03 RX ORDER — ROPINIROLE 2 MG/1
2 TABLET, FILM COATED ORAL AT BEDTIME
Qty: 30 TABLET | Refills: 0 | Status: SHIPPED | OUTPATIENT
Start: 2022-08-03 | End: 2023-05-18

## 2022-08-03 RX ORDER — FUROSEMIDE 20 MG
20 TABLET ORAL
Qty: 60 TABLET | Refills: 0 | Status: SHIPPED | OUTPATIENT
Start: 2022-08-03 | End: 2022-12-10

## 2022-08-03 RX ADMIN — RAMELTEON 8 MG: 8 TABLET ORAL at 00:23

## 2022-08-03 RX ADMIN — FUROSEMIDE 20 MG: 20 TABLET ORAL at 09:49

## 2022-08-03 RX ADMIN — ROPINIROLE HYDROCHLORIDE 2 MG: 1 TABLET, FILM COATED ORAL at 00:22

## 2022-08-03 RX ADMIN — ROPINIROLE HYDROCHLORIDE 1 MG: 1 TABLET, FILM COATED ORAL at 11:16

## 2022-08-03 RX ADMIN — Medication 81 MG: at 09:48

## 2022-08-03 RX ADMIN — DICLOFENAC SODIUM 2 G: 10 GEL TOPICAL at 13:18

## 2022-08-03 RX ADMIN — METOPROLOL SUCCINATE 25 MG: 25 TABLET, EXTENDED RELEASE ORAL at 09:49

## 2022-08-03 RX ADMIN — ACETAMINOPHEN 1000 MG: 500 TABLET ORAL at 13:17

## 2022-08-03 RX ADMIN — HEPARIN SODIUM 5000 UNITS: 10000 INJECTION, SOLUTION INTRAVENOUS; SUBCUTANEOUS at 09:49

## 2022-08-03 NOTE — PROGRESS NOTES
Occupational Therapy       08/03/22 1000   Quick Adds   Quick Adds Certification   Type of Visit Initial Occupational Therapy Evaluation   Living Environment   People in Home alone   Current Living Arrangements independent living facility   Home Accessibility no concerns   Living Environment Comments Patient independent with ADLs at baseline   Self-Care   Usual Activity Tolerance good   Equipment Currently Used at Home none   General Information   Onset of Illness/Injury or Date of Surgery 08/02/22   Referring Physician Diana Morales MD   Patient/Family Therapy Goal Statement (OT) get home today   Additional Occupational Profile Info/Pertinent History of Current Problem Sherice Alvarez is a 87 year old female presenting with CP and SOB   Existing Precautions/Restrictions no known precautions/restrictions   Cognitive Status Examination   Orientation Status orientation to person, place and time   Range of Motion Comprehensive   General Range of Motion no range of motion deficits identified   Strength Comprehensive (MMT)   General Manual Muscle Testing (MMT) Assessment no strength deficits identified   Bed Mobility   Bed Mobility sit-supine;supine-sit   Supine-Sit Kirksey (Bed Mobility) independent   Sit-Supine Kirk[place] (Bed Mobility) independent   Transfers   Transfers sit-stand transfer   Sit-Stand Transfer   Sit-Stand Kirksey (Transfers) independent   Clinical Impression   Criteria for Skilled Therapeutic Interventions Met (OT) Yes, treatment indicated   OT Diagnosis decreased endurance for ADLs   OT Problem List-Impairments impacting ADL problems related to;activity tolerance impaired   Assessment of Occupational Performance 1-3 Performance Deficits   Identified Performance Deficits endurance for ADLs   Planned Therapy Interventions (OT) home program guidelines;progressive activity/exercise   Clinical Decision Making Complexity (OT) low complexity   Risk & Benefits of therapy have been  explained evaluation/treatment results reviewed;care plan/treatment goals reviewed   OT Discharge Planning   OT Discharge Recommendation (DC Rec) home with home care occupational therapy   Therapy Certification   Start of Care Date 08/03/22   Certification date from 08/03/22   Certification date to 08/04/22   Medical Diagnosis chest pain   Total Evaluation Time (Minutes)   Total Evaluation Time (Minutes) 10   OT Goals   Therapy Frequency (OT) One time eval and treatment   OT Predicted Duration/Target Date for Goal Attainment 08/04/22   OT Goals OT Goal 1   OT: Goal 1 Patient will verbalize 2 energy conservation strategies

## 2022-08-03 NOTE — DISCHARGE INSTRUCTIONS
Follow up with primary care provider, Anum Rosenberg, within 3-5 days, to evaluate medication change and hospital follow-up.  The following labs/tests are recommended:  BMP and monitor blood pressure.

## 2022-08-03 NOTE — DISCHARGE SUMMARY
[facility]  Hospitalist Discharge Summary      Date of Admission:  8/2/2022  Date of Discharge:  8/3/2022  Discharging Provider: Diana Morales MD  Discharge Service: Hospitalist Service    Discharge Diagnoses   Active Problems:    Accelerated hypertension    Stage 3 chronic kidney disease (H)    (HFpEF) heart failure with preserved ejection fraction (H)    Chest pain, unspecified type    Other insomnia    Adjustment disorder with mixed anxiety and depressed mood    Restless leg syndrome      Follow-ups Needed After Discharge   Follow-up Appointments     Follow-up and recommended labs and tests       Follow up with primary care provider, Anum Rosenberg, within 3-5days, to   evaluate medication change and for hospital follow- up. The following   labs/tests are recommended: BMP and monitor Blood pressure.             Unresulted Labs Ordered in the Past 30 Days of this Admission     No orders found for last 31 day(s).          Discharge Disposition   Discharged to home  Condition at discharge: Stable      Hospital Course   Sherice Alvarez is a 87 year old female presenting with CP and SOB      accelerated HTN likely cause for CP, due to stressors none further   - Recent NM stress test negative   -  appreciate cards consult   - increased metoprolol by cards to 25mg BID      Chronic HF compensated   - continue Metoprolol and home lasix     Anxiety and depression patients son is currently on hospice and her DIL has issues with chemical dependency patient was very emotional when seen. Also insomnia is issue   - Psych consulted   - medications added by psych greatly appreciated   - better sleep cycle   - may benefit from Psychology, therapy follow-up with PCP and discuss,   - Olanzapine BID prn and rozerem 8mg at bedtime      RLS  - continue Requip at bedtime dose decreased to 2mg and , psych added 1mg BID        Consultations This Hospital Stay   CARDIOLOGY IP CONSULT  PSYCHIATRY IP  CONSULT  OCCUPATIONAL THERAPY ADULT IP CONSULT  PHYSICAL THERAPY ADULT IP CONSULT  CARE MANAGEMENT / SOCIAL WORK IP CONSULT    Code Status   Full Code    Time Spent on this Encounter          Diana Morales MD  [facility] EMERGENCY DEPARTMENT  [facility] [facility] [address]  Phone: [phone]  ______________________________________________________________________    Physical Exam   Vital Signs: Temp: 98.1  F (36.7  C) Temp src: Oral BP: 139/60 Pulse: 73   Resp: 18 SpO2: 94 % O2 Device: None (Room air)    Weight: 143 lbs 0 oz  Physical Exam  Constitutional:       Appearance: Normal appearance.      Comments: In better spirits today    HENT:      Head: Normocephalic and atraumatic.   Cardiovascular:      Rate and Rhythm: Normal rate and regular rhythm.      Pulses: Normal pulses.   Pulmonary:      Breath sounds: Normal breath sounds.   Abdominal:      General: Bowel sounds are normal.      Palpations: Abdomen is soft.   Musculoskeletal:      Comments: Trace ankle edema    Skin:     General: Skin is warm and dry.      Capillary Refill: Capillary refill takes less than 2 seconds.   Neurological:      General: No focal deficit present.      Mental Status: She is alert and oriented to person, place, and time. Mental status is at baseline.   Psychiatric:         Attention and Perception: Attention normal.         Mood and Affect: Mood and affect normal.         Speech: Speech normal.         Behavior: Behavior is cooperative.         Thought Content: Thought content normal.         Cognition and Memory: Cognition and memory normal.         Judgment: Judgment normal.            Primary Care Physician   Anum Rosenberg    Discharge Orders      Reason for your hospital stay    Active Problems:    Accelerated hypertension    Stage 3 chronic kidney disease (H)    (HFpEF) heart failure with preserved ejection fraction (H)    Chest pain, unspecified type    Other insomnia    Adjustment  disorder with mixed anxiety and depressed mood     Follow-up and recommended labs and tests     Follow up with primary care provider, Anum Rosenberg, within 3-5days, to evaluate medication change and for hospital follow- up. The following labs/tests are recommended: BMP and monitor Blood pressure.     Activity    Your activity upon discharge: activity as tolerated     Diet    Follow this diet upon discharge: Orders Placed This Encounter      Combination Diet 2 gm NA Diet; Low Saturated Fat Diet       Significant Results and Procedures   Most Recent 3 CBC's:Recent Labs   Lab Test 08/02/22  0009 03/03/22  1200 01/15/22  0248   WBC 10.0 7.7 8.4   HGB 12.2 11.5* 11.1*   MCV 93 92 93    222 206     Most Recent 3 BMP's:Recent Labs   Lab Test 08/03/22  0753 08/02/22  0727 08/02/22  0009    142 140   POTASSIUM 3.6 4.0 3.7   CHLORIDE 110* 111* 106   CO2 26 22 25   BUN 27 28 34*   CR 1.15* 1.29* 1.70*   ANIONGAP 7 9 9   BESS 8.8 8.8 9.0   GLC 80 83 94   ,   Results for orders placed or performed during the hospital encounter of 08/02/22   XR Chest Port 1 View    Narrative    EXAM: XR CHEST PORT 1 VIEW  LOCATION: [facility]  DATE/TIME: 8/2/2022 12:45 AM    INDICATION: Chest pain.  COMPARISON: Portable chest single view 1/15/2022 at 0252 hours.      Impression    IMPRESSION: A few strands of linear atelectasis in the lower lungs. No adenopathy or effusion. Cardiac size approaches upper limits of normal with normal pulmonary vascularity. Transcatheter aortic valve replacement. Atherosclerotic aorta. Degenerative   changes both shoulders and the spine. Surgical clips overlying the right axilla. Monitoring leads overlying the chest. No significant interval change.   US Lower Extremity Venous Duplex Left    Narrative    EXAM: ULTRASOUND VENOUS LEFT LOWER EXTREMITY WITH DOPPLER  LOCATION: [facility]  DATE/TIME: 8/2/2022 5:00 AM    INDICATION: Left leg pain and  swelling.  COMPARISON: None.    TECHNIQUE: Venous Duplex ultrasound of the left lower extremity with and without compression, augmentation and duplex. Color flow and spectral Doppler with waveform analysis performed.    FINDINGS: Exam includes the common femoral, femoral, popliteal veins as well as segmentally visualized deep calf veins and greater saphenous vein.    LEFT: Normal compressibility of the common femoral, femoral, popliteal, posterior tibial, peroneal and great saphenous veins. Unremarkable Doppler waveform evaluation of the common femoral, femoral and popliteal veins.      Impression    IMPRESSION: No evidence of thrombus in the major veins of the left lower extremity.                     Discharge Medications   Current Discharge Medication List      START taking these medications    Details   OLANZapine (ZYPREXA) 2.5 MG tablet Take 1 tablet (2.5 mg) by mouth 2 times daily as needed (anxiety)  Qty: 14 tablet, Refills: 0    Associated Diagnoses: Adjustment disorder with mixed anxiety and depressed mood      ramelteon (ROZEREM) 8 MG tablet Take 1 tablet (8 mg) by mouth At Bedtime for 30 days  Qty: 30 tablet, Refills: 0    Associated Diagnoses: Adjustment disorder with mixed anxiety and depressed mood; Other insomnia         CONTINUE these medications which have CHANGED    Details   furosemide (LASIX) 20 MG tablet Take 1 tablet (20 mg) by mouth 2 times daily for 30 days  Qty: 60 tablet, Refills: 0    Associated Diagnoses: Chronic heart failure with preserved ejection fraction (H)      metoprolol succinate ER (TOPROL XL) 25 MG 24 hr tablet Take 1 tablet (25 mg) by mouth 2 times daily for 30 days  Qty: 60 tablet, Refills: 0    Comments: Hold for Systolic blood pressure less then 100 or heart rate less then 50  Associated Diagnoses: Chronic heart failure with preserved ejection fraction (H); Accelerated hypertension      !! rOPINIRole (REQUIP) 1 MG tablet Take 1 tablet (1 mg) by mouth 2 times daily for 30  days  Qty: 60 tablet, Refills: 0    Associated Diagnoses: Adjustment disorder with mixed anxiety and depressed mood      !! rOPINIRole (REQUIP) 2 MG tablet Take 1 tablet (2 mg) by mouth At Bedtime for 30 days  Qty: 30 tablet, Refills: 0    Associated Diagnoses: Adjustment disorder with mixed anxiety and depressed mood       !! - Potential duplicate medications found. Please discuss with provider.      CONTINUE these medications which have NOT CHANGED    Details   acetaminophen (TYLENOL) 500 MG tablet Take 500-1,000 mg by mouth every 8 hours as needed for mild pain      albuterol (PROVENTIL HFA;VENTOLIN HFA) 90 mcg/actuation inhaler [ALBUTEROL (PROVENTIL HFA;VENTOLIN HFA) 90 MCG/ACTUATION INHALER] Inhale 1-2 puffs every 6 (six) hours as needed for wheezing.      aspirin (ASA) 81 MG EC tablet Take 81 mg by mouth daily      beclomethasone (QVAR) 80 mcg/actuation inhaler Inhale 1 puff into the lungs 2 times daily as needed       coenzyme Q10 100 mg capsule [COENZYME Q10 100 MG CAPSULE] Take 100 mg by mouth daily.       diclofenac (VOLTAREN) 1 % topical gel Apply 2-4 g topically 4 times daily as needed APPLY TO LEFT SHOULDER AND LEFT KNEE.      evolocumab (REPATHA) 140 MG/ML prefilled syringe Inject 1 mL (140 mg) Subcutaneous every 14 days  Qty: 2 mL, Refills: 11    Associated Diagnoses: Dyslipidemia, goal LDL below 100      ferrous sulfate 325 (65 FE) MG tablet [FERROUS SULFATE 325 (65 FE) MG TABLET] Take 1 tablet (325 mg total) by mouth daily with breakfast.  Refills: 0    Associated Diagnoses: Anemia, unspecified type      melatonin 5 mg Tab tablet [MELATONIN 5 MG TAB TABLET] Take 5 mg by mouth at bedtime as needed (for sleep).       Multiple Vitamins-Minerals (SENIOR MULTIVITAMIN PLUS PO) Take 1 tablet by mouth daily      nitroglycerin (NITROSTAT) 0.4 MG SL tablet [NITROGLYCERIN (NITROSTAT) 0.4 MG SL TABLET] Place 1 tablet (0.4 mg total) under the tongue every 5 (five) minutes as needed for chest pain.  Qty: 90  tablet, Refills: 0    Comments: This new medication is for treatment of cardiac chest pain.  Associated Diagnoses: NSTEMI (non-ST elevated myocardial infarction) (H)      Vitamin D3 (CHOLECALCIFEROL) 125 MCG (5000 UT) tablet Take 1 tablet by mouth daily           Allergies   Allergies   Allergen Reactions     Amitriptyline Hcl [Amitriptyline] Other (See Comments)     Erythromycin Diarrhea and Other (See Comments)     Childhood reaction     Gabapentin Other (See Comments)     Jerking movements, swelling     Naproxen Unknown and Other (See Comments)     Other Environmental Allergy Unknown     Molds, dander     Pregabalin Other (See Comments)

## 2022-08-03 NOTE — PROGRESS NOTES
[facility]      OUTPATIENT OCCUPATIONAL THERAPY  EVALUATION  PLAN OF TREATMENT FOR OUTPATIENT REHABILITATION  (COMPLETE FOR INITIAL CLAIMS ONLY)  Patient's Last Name, First Name, M.I.  YOB: 1934  Sherice Alvarez                          Provider's Name  [facility] Medical Record No.  [id number]                               Onset Date:  08/02/22   Start of Care Date:  (P) 08/03/22     Type:     ___PT   _X_OT   ___SLP Medical Diagnosis:  (P) chest pain                        OT Diagnosis:  (P) decreased endurance for ADLs   Visits from SOC:  1   _________________________________________________________________________________  Plan of Treatment/Functional Goals    Planned Interventions: (P) home program guidelines, progressive activity/exercise   Goals: See Occupational Therapy Goals on Care Plan in [facility] electronic health record.    Therapy Frequency: (P) One time eval and treatment  Predicted Duration of Therapy Intervention: (P) 08/04/22  _________________________________________________________________________________    I CERTIFY THE NEED FOR THESE SERVICES FURNISHED UNDER        THIS PLAN OF TREATMENT AND WHILE UNDER MY CARE     (Physician co-signature of this document indicates review and certification of the therapy plan).              Certification date from: (P) 08/03/22, Certification date to: (P) 08/04/22    Referring Physician: Diana Morales MD            Initial Assessment        See Occupational Therapy evaluation dated (P) 08/03/22 in Epic electronic health record.

## 2022-08-04 DIAGNOSIS — Z71.89 OTHER SPECIFIED COUNSELING: ICD-10-CM

## 2022-08-04 DIAGNOSIS — E78.5 DYSLIPIDEMIA, GOAL LDL BELOW 100: ICD-10-CM

## 2022-08-04 RX ORDER — EVOLOCUMAB 140 MG/ML
140 INJECTION, SOLUTION SUBCUTANEOUS
Qty: 2 ML | Refills: 11 | Status: SHIPPED | OUTPATIENT
Start: 2022-08-04 | End: 2022-12-10

## 2022-08-09 ENCOUNTER — PATIENT OUTREACH (OUTPATIENT)
Dept: CARE COORDINATION | Facility: CLINIC | Age: 87
End: 2022-08-09

## 2022-08-09 NOTE — PROGRESS NOTES
Clinic Care Coordination Contact  Care Team Conversations    ISABEL ROSALES performed chart review and noted the pt's PCP is aware of the pt's recent hospitalization. ISABEL ROSALES will not be outreaching to the pt for follow up.     TERE Ziegler  Social Work Care Coordinator - [facility]  Care Coordination  [email or web address]  [email or web address]  Cell Phone: [phone]  Gender pronouns: she/her  Employed by [employer]

## 2022-09-20 ENCOUNTER — OFFICE VISIT (OUTPATIENT)
Dept: CARDIOLOGY | Facility: CLINIC | Age: 87
End: 2022-09-20
Attending: NURSE PRACTITIONER
Payer: COMMERCIAL

## 2022-09-20 VITALS
WEIGHT: 144 LBS | BODY MASS INDEX: 26.34 KG/M2 | RESPIRATION RATE: 14 BRPM | SYSTOLIC BLOOD PRESSURE: 128 MMHG | HEART RATE: 64 BPM | DIASTOLIC BLOOD PRESSURE: 74 MMHG

## 2022-09-20 DIAGNOSIS — I50.32 CHRONIC HEART FAILURE WITH PRESERVED EJECTION FRACTION (H): Primary | ICD-10-CM

## 2022-09-20 DIAGNOSIS — I25.110 CORONARY ARTERY DISEASE INVOLVING NATIVE CORONARY ARTERY OF NATIVE HEART WITH UNSTABLE ANGINA PECTORIS (H): ICD-10-CM

## 2022-09-20 DIAGNOSIS — I10 ACCELERATED HYPERTENSION: ICD-10-CM

## 2022-09-20 LAB
ANION GAP SERPL CALCULATED.3IONS-SCNC: 12 MMOL/L (ref 7–15)
BUN SERPL-MCNC: 21.2 MG/DL (ref 8–23)
CALCIUM SERPL-MCNC: 8.8 MG/DL (ref 8.8–10.2)
CHLORIDE SERPL-SCNC: 107 MMOL/L (ref 98–107)
CREAT SERPL-MCNC: 1.26 MG/DL (ref 0.51–0.95)
DEPRECATED HCO3 PLAS-SCNC: 23 MMOL/L (ref 22–29)
GFR SERPL CREATININE-BSD FRML MDRD: 41 ML/MIN/1.73M2
GLUCOSE SERPL-MCNC: 84 MG/DL (ref 70–99)
MAGNESIUM SERPL-MCNC: 2.1 MG/DL (ref 1.7–2.3)
POTASSIUM SERPL-SCNC: 4.4 MMOL/L (ref 3.4–5.3)
SODIUM SERPL-SCNC: 142 MMOL/L (ref 136–145)

## 2022-09-20 PROCEDURE — 83735 ASSAY OF MAGNESIUM: CPT | Performed by: NURSE PRACTITIONER

## 2022-09-20 PROCEDURE — 80048 BASIC METABOLIC PNL TOTAL CA: CPT | Performed by: NURSE PRACTITIONER

## 2022-09-20 PROCEDURE — 99214 OFFICE O/P EST MOD 30 MIN: CPT | Performed by: NURSE PRACTITIONER

## 2022-09-20 PROCEDURE — 36415 COLL VENOUS BLD VENIPUNCTURE: CPT | Performed by: NURSE PRACTITIONER

## 2022-09-20 RX ORDER — METOPROLOL SUCCINATE 25 MG/1
25 TABLET, EXTENDED RELEASE ORAL 2 TIMES DAILY
Qty: 180 TABLET | Refills: 3 | Status: SHIPPED | OUTPATIENT
Start: 2022-09-20 | End: 2022-12-28

## 2022-09-20 RX ORDER — AMLODIPINE BESYLATE 2.5 MG/1
2.5 TABLET ORAL DAILY
COMMUNITY
Start: 2022-08-14 | End: 2022-11-15

## 2022-09-20 NOTE — PROGRESS NOTES
Assessment/Recommendations   Assessment:    1.  Heart failure with preserved ejection fraction, NYHA class II: Compensated.  She has mild fatigue, dyspnea on exertion and trace edema.  Her weight has been stable.  She does follow a low-sodium diet and is enrolled in [facility].  2.  Hypertension: Controlled.  Blood pressure 128/74.  She states her blood pressure today was 170/80 at home.  She did not increase her Metroprolol which was instructed at discharge from the hospital.  3.  CAD: CAD: Denies chest pain.  Recent Lexiscan in early May was negative for inducible myocardial ischemia or infarction.  She continues Repatha and aspirin    Plan:  1.  Increase metoprolol to 25 mg twice a day per discharge notes  2.  Continue to monitor blood pressure and record readings and call clinic next week  3.  Continue monitoring daily weights  4.  Continue low-sodium diet.  Renew [facility] enrollment  5.  BMP and magnesium pending    Sherice Alvarez will follow up with Dr Feldman in 8 weeks and in the heart failure clinic in 3 months.     History of Present Illness/Subjective    Ms. Sherice Alvarez is a 88 year old female seen at [facility] heart failure clinic today for continued follow-up.   She follows up for heart failure with preserved ejection fraction.  She had a recent echocardiogram which showed an ejection fraction of 60 to 65%.  She also had a Lexiscan early May which was negative for inducible myocardial ischemia or infarction.  She has a past medical history significant for hypertension, TAVR, DVT, dyslipidemia, CAD, and chronic kidney disease stage III.  Her last coronary angiogram was in 2020 which showed patent stents in her RCA, proximal LAD and circumflex.  It was noted she had severe disease in the distal LAD.  She is also dealing with a lot of family stress and has anxiety and depression regarding these issues.    She was hospitalized August 1 with chest pain, shortness of breath and  hypertensive urgency.  Her blood pressure was 170-190/80.  Her metoprolol was increased.  Her discharge weight was 143 pounds.    Today, she has complaints of lightheadedness, fatigue, dyspnea on exertion and trace edema.  She also has many other complaints including numbness and tingling in her feet and hands, problems with her scalp and hair, and anxiety.  She denies orthopnea, PND, palpitations, chest pain and abdominal fullness/bloating.      She is monitoring home weights which are stable.  She is following a low sodium diet.  She is enrolled in [facility] meal service     Physical Examination Review of Systems   /74 (BP Location: Right arm, Patient Position: Sitting, Cuff Size: Adult Regular)   Pulse 64   Resp 14   Wt 65.3 kg (144 lb)   BMI 26.34 kg/m    Body mass index is 26.34 kg/m .  Wt Readings from Last 3 Encounters:   09/20/22 65.3 kg (144 lb)   08/01/22 64.9 kg (143 lb)   07/12/22 66.2 kg (146 lb)       General Appearance:   no acute distress   ENT/Mouth: Wearing mask   EYES:  no scleral icterus, normal conjunctivae   Neck: no thyromegaly   Chest/Lungs:   lungs are clear to auscultation, no rales or wheezing, equal chest wall expansion    Cardiovascular:   Regular. Normal first and second heart sounds with no murmurs, rubs, or gallops; Jugular venous pressure normal, trace edema bilaterally    Abdomen:  no organomegaly, masses, bruits, or tenderness; bowel sounds are present   Extremities: no cyanosis or clubbing   Skin: no xanthelasma, warm.    Neurologic: normal  bilateral, no tremors     Psychiatric: alert and oriented x3                                              Medical History  Surgical History Family History Social History   Past Medical History:   Diagnosis Date     Arthritis      Asthma      CAD (coronary artery disease)      Cancer (H)     basal cell     Chronic kidney disease     ckd 3     Chronic pain syndrome      Congestive heart failure (H)      Crohn's disease (H)       GERD (gastroesophageal reflux disease)      Hx of heart artery stent 2016    1 stent R Proximal CA and 1 Stent L Proximal circumflex  2016 Stent proximal LAD     Hyperlipidemia      Hypertension      NSTEMI (non-ST elevated myocardial infarction) (H) 2016     PONV (postoperative nausea and vomiting)     severe povn with last knee surgery     Restless legs syndrome      Stroke (H) 2010    numbness rt jaw    Past Surgical History:   Procedure Laterality Date     APPENDECTOMY       CARDIAC CATHETERIZATION  2016    multiple vessel disease.  no intervention     CARDIAC CATHETERIZATION  2016     CARDIAC CATHETERIZATION N/A 2016    Procedure: Coronary Angiogram;  Surgeon: Sunil Moran MD;  Location: [facility] Cath Lab;  Service:      CAROTID ENDARTERECTOMY       CAROTID ENDARTERECTOMY Right 2010     CERVICAL LAMINECTOMY  1994      SECTION       CV CORONARY ANGIOGRAM N/A 2020    Procedure: Coronary Angiogram;  Surgeon: Vinita Ramos MD;  Location: [facility] Cath Lab;  Service: Cardiology     CV LEFT HEART CATHETERIZATION WITHOUT LEFT VENTRICULOGRAM Left 2020    Procedure: Left Heart Catheterization Without Left Ventriculogram;  Surgeon: eJrad Lerner MD;  Location: [facility] Cath Lab;  Service: Cardiology     CV TRANSCATHETER AORTIC VALVE REPLACEMENT - OTHER APPROACH N/A 2020    Procedure: CV TRANSCATHETER AORTIC VALVE REPLACEMENT - RIGHT SUBCLAVIAN APPROACH;  Surgeon: John Caceres MD;  Location: [facility] Cath Lab;  Service: Cardiology     HEMORRHOIDECTOMY EXTERNAL       IR LUMBAR EPIDURAL STEROID INJECTION  2014     IR LUMBAR NERVE ROOT INJECTION  10/01/2013     JOINT REPLACEMENT       IL ESOPHAGOGASTRODUODENOSCOPY TRANSORAL DIAGNOSTIC N/A 07/10/2019    Procedure: ESOPHAGOGASTRODUODENOSCOPY (EGD) with gastric biopsy;  Surgeon: Sunil Stuart MD;  Location: [facility] GI;  Service: Gastroenterology     IL  REPLACE AORTIC VALVE OPEN AXILLRY ARTRY APPROACH N/A 2020    Procedure: OR TRANSCATHETER AORTIC VALVE REPLACEMENT, SUBCLAVIAN APPROACH;  Surgeon: Bhavin Cuadra MD;  Location: [facility] Cath Lab;  Service: Cardiology     TONSILLECTOMY & ADENOIDECTOMY       TOTAL KNEE ARTHROPLASTY Right      TRANSCATHETER AORTIC-VALVE REPLACEMENT       ZZC TOTAL KNEE ARTHROPLASTY Left 2021    Procedure: LEFT TOTAL KNEE ARTHROPLASTY;  Surgeon: Doug Rhodes MD;  Location: [facility] OR;  Service: Orthopedics    Family History   Problem Relation Age of Onset     Atrial fibrillation Mother      Parkinsonism Father     Social History     Socioeconomic History     Marital status:      Spouse name: Not on file     Number of children: Not on file     Years of education: Not on file     Highest education level: Not on file   Occupational History     Not on file   Tobacco Use     Smoking status: Former Smoker     Quit date: 1980     Years since quittin.7     Smokeless tobacco: Never Used   Substance and Sexual Activity     Alcohol use: No     Drug use: No     Sexual activity: Not on file   Other Topics Concern     Not on file   Social History Narrative     Not on file     Social Determinants of Health     Financial Resource Strain: Not on file   Food Insecurity: Not on file   Transportation Needs: Not on file   Physical Activity: Not on file   Stress: Not on file   Social Connections: Not on file   Intimate Partner Violence: Not on file   Housing Stability: Not on file          Medications  Allergies   Current Outpatient Medications   Medication Sig Dispense Refill     acetaminophen (TYLENOL) 500 MG tablet Take 500-1,000 mg by mouth every 8 hours as needed for mild pain       albuterol (PROVENTIL HFA;VENTOLIN HFA) 90 mcg/actuation inhaler [ALBUTEROL (PROVENTIL HFA;VENTOLIN HFA) 90 MCG/ACTUATION INHALER] Inhale 1-2 puffs every 6 (six) hours as needed for wheezing.       aspirin (ASA) 81 MG EC tablet  Take 81 mg by mouth daily       beclomethasone (QVAR) 80 mcg/actuation inhaler Inhale 1 puff into the lungs 2 times daily as needed        coenzyme Q10 100 mg capsule [COENZYME Q10 100 MG CAPSULE] Take 100 mg by mouth daily.        diclofenac (VOLTAREN) 1 % topical gel Apply 2-4 g topically 4 times daily as needed APPLY TO LEFT SHOULDER AND LEFT KNEE.       ferrous sulfate 325 (65 FE) MG tablet [FERROUS SULFATE 325 (65 FE) MG TABLET] Take 1 tablet (325 mg total) by mouth daily with breakfast. (Patient taking differently: Take 1 tablet by mouth twice a week)  0     melatonin 5 mg Tab tablet [MELATONIN 5 MG TAB TABLET] Take 5 mg by mouth at bedtime as needed (for sleep).        metoprolol succinate ER (TOPROL XL) 25 MG 24 hr tablet Take 1 tablet (25 mg) by mouth 2 times daily 180 tablet 3     Multiple Vitamins-Minerals (SENIOR MULTIVITAMIN PLUS PO) Take 1 tablet by mouth daily       nitroglycerin (NITROSTAT) 0.4 MG SL tablet [NITROGLYCERIN (NITROSTAT) 0.4 MG SL TABLET] Place 1 tablet (0.4 mg total) under the tongue every 5 (five) minutes as needed for chest pain. 90 tablet 0     OLANZapine (ZYPREXA) 2.5 MG tablet Take 1 tablet (2.5 mg) by mouth 2 times daily as needed (anxiety) 14 tablet 0     omeprazole (PRILOSEC) 20 MG DR capsule Take 20 mg by mouth daily       rOPINIRole (REQUIP) 1 MG tablet Take 1 tablet (1 mg) by mouth 2 times daily for 30 days (Patient taking differently: Take 2 mg by mouth At Bedtime) 60 tablet 0     Vitamin D3 (CHOLECALCIFEROL) 125 MCG (5000 UT) tablet Take 1 tablet by mouth daily       amLODIPine (NORVASC) 2.5 MG tablet Take 2.5 mg by mouth daily       evolocumab (REPATHA) 140 MG/ML prefilled syringe Inject 1 mL (140 mg) Subcutaneous every 14 days (Patient not taking: Reported on 9/20/2022) 2 mL 11     furosemide (LASIX) 20 MG tablet Take 1 tablet (20 mg) by mouth 2 times daily for 30 days 60 tablet 0     rOPINIRole (REQUIP) 2 MG tablet Take 1 tablet (2 mg) by mouth At Bedtime for 30 days 30  tablet 0    Allergies   Allergen Reactions     Amitriptyline Hcl [Amitriptyline] Other (See Comments)     Erythromycin Diarrhea and Other (See Comments)     Childhood reaction     Gabapentin Other (See Comments)     Jerking movements, swelling     Naproxen Unknown and Other (See Comments)     Other Environmental Allergy Unknown     Molds, dander     Pregabalin Other (See Comments)         Lab Results    Chemistry/lipid CBC Cardiac Enzymes/BNP/TSH/INR   Lab Results   Component Value Date    CHOL 222 (H) 07/12/2022    HDL 59 07/12/2022    TRIG 75 07/12/2022    BUN 27 08/03/2022     08/03/2022    CO2 26 08/03/2022    Lab Results   Component Value Date    WBC 10.0 08/02/2022    HGB 12.2 08/02/2022    HCT 36.9 08/02/2022    MCV 93 08/02/2022     08/02/2022    Lab Results   Component Value Date    TROPONINI 0.03 08/02/2022    BNP 62 08/02/2022    TSH 3.20 03/03/2022    INR 1.13 01/15/2022             This note has been dictated using voice recognition software. Any grammatical, typographical, or context distortions are unintentional and inherent to the software    30 minutes spent on the date of encounter doing chart review, review of outside records, review of test results, interpretation with above tests, patient visit and documentation.

## 2022-09-20 NOTE — LETTER
9/20/2022    Anum Rosenberg NP  [address] [address]    RE: Sherice Alvarez       Dear Colleague,     I had the pleasure of seeing Sherice Alvarez in the [facility].        Assessment/Recommendations   Assessment:    1.  Heart failure with preserved ejection fraction, NYHA class II: Compensated.  She has mild fatigue, dyspnea on exertion and trace edema.  Her weight has been stable.  She does follow a low-sodium diet and is enrolled in [employer].  2.  Hypertension: Controlled.  Blood pressure 128/74.  She states her blood pressure today was 170/80 at home.  She did not increase her Metroprolol which was instructed at discharge from the hospital.  3.  CAD: CAD: Denies chest pain.  Recent Lexiscan in early May was negative for inducible myocardial ischemia or infarction.  She continues Repatha and aspirin    Plan:  1.  Increase metoprolol to 25 mg twice a day per discharge notes  2.  Continue to monitor blood pressure and record readings and call clinic next week  3.  Continue monitoring daily weights  4.  Continue low-sodium diet.  Renew [employer] enrollment  5.  BMP and magnesium pending    Sherice Alvaerz will follow up with Dr Feldman in 8 weeks and in the heart failure clinic in 3 months.     History of Present Illness/Subjective    Ms. Sherice Alvarez is a 88 year old female seen at [facility] heart failure clinic today for continued follow-up.   She follows up for heart failure with preserved ejection fraction.  She had a recent echocardiogram which showed an ejection fraction of 60 to 65%.  She also had a Lexiscan early May which was negative for inducible myocardial ischemia or infarction.  She has a past medical history significant for hypertension, TAVR, DVT, dyslipidemia, CAD, and chronic kidney disease stage III.  Her last coronary angiogram was in 2020 which showed patent stents in her RCA, proximal LAD and circumflex.   It was noted she had severe disease in the distal LAD.  She is also dealing with a lot of family stress and has anxiety and depression regarding these issues.    She was hospitalized August 1 with chest pain, shortness of breath and hypertensive urgency.  Her blood pressure was 170-190/80.  Her metoprolol was increased.  Her discharge weight was 143 pounds.    Today, she has complaints of lightheadedness, fatigue, dyspnea on exertion and trace edema.  She also has many other complaints including numbness and tingling in her feet and hands, problems with her scalp and hair, and anxiety.  She denies orthopnea, PND, palpitations, chest pain and abdominal fullness/bloating.      She is monitoring home weights which are stable.  She is following a low sodium diet.  She is enrolled in [employer] meal service     Physical Examination Review of Systems   /74 (BP Location: Right arm, Patient Position: Sitting, Cuff Size: Adult Regular)   Pulse 64   Resp 14   Wt 65.3 kg (144 lb)   BMI 26.34 kg/m    Body mass index is 26.34 kg/m .  Wt Readings from Last 3 Encounters:   09/20/22 65.3 kg (144 lb)   08/01/22 64.9 kg (143 lb)   07/12/22 66.2 kg (146 lb)       General Appearance:   no acute distress   ENT/Mouth: Wearing mask   EYES:  no scleral icterus, normal conjunctivae   Neck: no thyromegaly   Chest/Lungs:   lungs are clear to auscultation, no rales or wheezing, equal chest wall expansion    Cardiovascular:   Regular. Normal first and second heart sounds with no murmurs, rubs, or gallops; Jugular venous pressure normal, trace edema bilaterally    Abdomen:  no organomegaly, masses, bruits, or tenderness; bowel sounds are present   Extremities: no cyanosis or clubbing   Skin: no xanthelasma, warm.    Neurologic: normal  bilateral, no tremors     Psychiatric: alert and oriented x3                                              Medical History  Surgical History Family History Social History   Past Medical History:    Diagnosis Date     Arthritis      Asthma      CAD (coronary artery disease)      Cancer (H)     basal cell     Chronic kidney disease     ckd 3     Chronic pain syndrome      Congestive heart failure (H)      Crohn's disease (H)      GERD (gastroesophageal reflux disease)      Hx of heart artery stent 2016    1 stent R Proximal CA and 1 Stent L Proximal circumflex  2016 Stent proximal LAD     Hyperlipidemia      Hypertension      NSTEMI (non-ST elevated myocardial infarction) (H) 2016     PONV (postoperative nausea and vomiting)     severe povn with last knee surgery     Restless legs syndrome      Stroke (H) 2010    numbness rt jaw    Past Surgical History:   Procedure Laterality Date     APPENDECTOMY       CARDIAC CATHETERIZATION  2016    multiple vessel disease.  no intervention     CARDIAC CATHETERIZATION  2016     CARDIAC CATHETERIZATION N/A 2016    Procedure: Coronary Angiogram;  Surgeon: Sunil Moran MD;  Location: [facility];  Service:      CAROTID ENDARTERECTOMY       CAROTID ENDARTERECTOMY Right 2010     CERVICAL LAMINECTOMY  1994      SECTION       CV CORONARY ANGIOGRAM N/A 2020    Procedure: Coronary Angiogram;  Surgeon: Vinita Ramos MD;  Location: [facility];  Service: Cardiology     CV LEFT HEART CATHETERIZATION WITHOUT LEFT VENTRICULOGRAM Left 2020    Procedure: Left Heart Catheterization Without Left Ventriculogram;  Surgeon: Jerad Lerner MD;  Location: [facility];  Service: Cardiology     CV TRANSCATHETER AORTIC VALVE REPLACEMENT - OTHER APPROACH N/A 2020    Procedure: CV TRANSCATHETER AORTIC VALVE REPLACEMENT - RIGHT SUBCLAVIAN APPROACH;  Surgeon: John Caceres MD;  Location: [facility];  Service: Cardiology     HEMORRHOIDECTOMY EXTERNAL       IR LUMBAR EPIDURAL STEROID INJECTION  2014     IR LUMBAR NERVE ROOT INJECTION  10/01/2013     JOINT REPLACEMENT        MT ESOPHAGOGASTRODUODENOSCOPY TRANSORAL DIAGNOSTIC N/A 07/10/2019    Procedure: ESOPHAGOGASTRODUODENOSCOPY (EGD) with gastric biopsy;  Surgeon: Sunil Stuart MD;  Location: [facility];  Service: Gastroenterology     MT REPLACE AORTIC VALVE OPEN AXILLRY ARTRY APPROACH N/A 2020    Procedure: OR TRANSCATHETER AORTIC VALVE REPLACEMENT, SUBCLAVIAN APPROACH;  Surgeon: Bhavin Cuadra MD;  Location: [facility];  Service: Cardiology     TONSILLECTOMY & ADENOIDECTOMY       TOTAL KNEE ARTHROPLASTY Right      TRANSCATHETER AORTIC-VALVE REPLACEMENT       ZZC TOTAL KNEE ARTHROPLASTY Left 2021    Procedure: LEFT TOTAL KNEE ARTHROPLASTY;  Surgeon: Doug Rhodes MD;  Location: [facility];  Service: Orthopedics    Family History   Problem Relation Age of Onset     Atrial fibrillation Mother      Parkinsonism Father     Social History     Socioeconomic History     Marital status:      Spouse name: Not on file     Number of children: Not on file     Years of education: Not on file     Highest education level: Not on file   Occupational History     Not on file   Tobacco Use     Smoking status: Former Smoker     Quit date: 1980     Years since quittin.7     Smokeless tobacco: Never Used   Substance and Sexual Activity     Alcohol use: No     Drug use: No     Sexual activity: Not on file   Other Topics Concern     Not on file   Social History Narrative     Not on file     Social Determinants of Health     Financial Resource Strain: Not on file   Food Insecurity: Not on file   Transportation Needs: Not on file   Physical Activity: Not on file   Stress: Not on file   Social Connections: Not on file   Intimate Partner Violence: Not on file   Housing Stability: Not on file          Medications  Allergies   Current Outpatient Medications   Medication Sig Dispense Refill     acetaminophen (TYLENOL) 500 MG tablet Take 500-1,000 mg by mouth every 8 hours as needed for mild pain        albuterol (PROVENTIL HFA;VENTOLIN HFA) 90 mcg/actuation inhaler [ALBUTEROL (PROVENTIL HFA;VENTOLIN HFA) 90 MCG/ACTUATION INHALER] Inhale 1-2 puffs every 6 (six) hours as needed for wheezing.       aspirin (ASA) 81 MG EC tablet Take 81 mg by mouth daily       beclomethasone (QVAR) 80 mcg/actuation inhaler Inhale 1 puff into the lungs 2 times daily as needed        coenzyme Q10 100 mg capsule [COENZYME Q10 100 MG CAPSULE] Take 100 mg by mouth daily.        diclofenac (VOLTAREN) 1 % topical gel Apply 2-4 g topically 4 times daily as needed APPLY TO LEFT SHOULDER AND LEFT KNEE.       ferrous sulfate 325 (65 FE) MG tablet [FERROUS SULFATE 325 (65 FE) MG TABLET] Take 1 tablet (325 mg total) by mouth daily with breakfast. (Patient taking differently: Take 1 tablet by mouth twice a week)  0     melatonin 5 mg Tab tablet [MELATONIN 5 MG TAB TABLET] Take 5 mg by mouth at bedtime as needed (for sleep).        metoprolol succinate ER (TOPROL XL) 25 MG 24 hr tablet Take 1 tablet (25 mg) by mouth 2 times daily 180 tablet 3     Multiple Vitamins-Minerals (SENIOR MULTIVITAMIN PLUS PO) Take 1 tablet by mouth daily       nitroglycerin (NITROSTAT) 0.4 MG SL tablet [NITROGLYCERIN (NITROSTAT) 0.4 MG SL TABLET] Place 1 tablet (0.4 mg total) under the tongue every 5 (five) minutes as needed for chest pain. 90 tablet 0     OLANZapine (ZYPREXA) 2.5 MG tablet Take 1 tablet (2.5 mg) by mouth 2 times daily as needed (anxiety) 14 tablet 0     omeprazole (PRILOSEC) 20 MG DR capsule Take 20 mg by mouth daily       rOPINIRole (REQUIP) 1 MG tablet Take 1 tablet (1 mg) by mouth 2 times daily for 30 days (Patient taking differently: Take 2 mg by mouth At Bedtime) 60 tablet 0     Vitamin D3 (CHOLECALCIFEROL) 125 MCG (5000 UT) tablet Take 1 tablet by mouth daily       amLODIPine (NORVASC) 2.5 MG tablet Take 2.5 mg by mouth daily       evolocumab (REPATHA) 140 MG/ML prefilled syringe Inject 1 mL (140 mg) Subcutaneous every 14 days (Patient not  taking: Reported on 9/20/2022) 2 mL 11     furosemide (LASIX) 20 MG tablet Take 1 tablet (20 mg) by mouth 2 times daily for 30 days 60 tablet 0     rOPINIRole (REQUIP) 2 MG tablet Take 1 tablet (2 mg) by mouth At Bedtime for 30 days 30 tablet 0    Allergies   Allergen Reactions     Amitriptyline Hcl [Amitriptyline] Other (See Comments)     Erythromycin Diarrhea and Other (See Comments)     Childhood reaction     Gabapentin Other (See Comments)     Jerking movements, swelling     Naproxen Unknown and Other (See Comments)     Other Environmental Allergy Unknown     Molds, dander     Pregabalin Other (See Comments)         Lab Results    Chemistry/lipid CBC Cardiac Enzymes/BNP/TSH/INR   Lab Results   Component Value Date    CHOL 222 (H) 07/12/2022    HDL 59 07/12/2022    TRIG 75 07/12/2022    BUN 27 08/03/2022     08/03/2022    CO2 26 08/03/2022    Lab Results   Component Value Date    WBC 10.0 08/02/2022    HGB 12.2 08/02/2022    HCT 36.9 08/02/2022    MCV 93 08/02/2022     08/02/2022    Lab Results   Component Value Date    TROPONINI 0.03 08/02/2022    BNP 62 08/02/2022    TSH 3.20 03/03/2022    INR 1.13 01/15/2022             This note has been dictated using voice recognition software. Any grammatical, typographical, or context distortions are unintentional and inherent to the software    30 minutes spent on the date of encounter doing chart review, review of outside records, review of test results, interpretation with above tests, patient visit and documentation.                  Thank you for allowing me to participate in the care of your patient.      Sincerely,     TAVO Fernandez CNP     [facility]  cc:   TAVO Fernandez CNP  [address], [address]  [place] [phone]

## 2022-09-20 NOTE — PATIENT INSTRUCTIONS
Sherice Alvarez,    It was a pleasure to see you today at [facility].     My recommendations after this visit include:  - Please follow up with Dr Feldman in 8 weeks   - Please follow up with Kim Arreola in 3 months  - Start taking metoprolol twice a day  - I have checked labs and will contact you with results  - Check your blood pressure a few times a day and call us in a week with readings    Kim Arreola, CNP

## 2022-09-20 NOTE — LETTER
September 23, 2022      Sherice Alvarez  [address]    [place] [address]        Dear ,    We are writing to inform you of your test results.    Your kidney function and electrolytes are all stable.  There is no indication of dehydration.  Please continue your medications as prescribed. Call [phone] with any questions or concerns.      Resulted Orders   Basic metabolic panel   Result Value Ref Range    Sodium 142 136 - 145 mmol/L    Potassium 4.4 3.4 - 5.3 mmol/L    Chloride 107 98 - 107 mmol/L    Carbon Dioxide (CO2) 23 22 - 29 mmol/L    Anion Gap 12 7 - 15 mmol/L    Urea Nitrogen 21.2 8.0 - 23.0 mg/dL    Creatinine 1.26 (H) 0.51 - 0.95 mg/dL    Calcium 8.8 8.8 - 10.2 mg/dL    Glucose 84 70 - 99 mg/dL    GFR Estimate 41 (L) >60 mL/min/1.73m2      Comment:      Effective December 21, 2021 eGFRcr in adults is calculated using the 2021 CKD-EPI creatinine equation which includes age and gender (Zan et al., NEJM, DOI: 10.1056/JMVCps5492427)   Magnesium   Result Value Ref Range    Magnesium 2.1 1.7 - 2.3 mg/dL       If you have any questions or concerns, please call the clinic at the number listed above.       Sincerely,      TAVO Fernandez CNP

## 2022-09-21 ENCOUNTER — HOSPITAL ENCOUNTER (EMERGENCY)
Facility: HOSPITAL | Age: 87
Discharge: HOME | End: 2022-09-21
Attending: EMERGENCY MEDICINE | Admitting: EMERGENCY MEDICINE
Payer: COMMERCIAL

## 2022-09-21 ENCOUNTER — APPOINTMENT (OUTPATIENT)
Dept: RADIOLOGY | Facility: HOSPITAL | Age: 87
End: 2022-09-21
Attending: EMERGENCY MEDICINE
Payer: COMMERCIAL

## 2022-09-21 ENCOUNTER — DOCUMENTATION ONLY (OUTPATIENT)
Dept: CARDIOLOGY | Facility: CLINIC | Age: 87
End: 2022-09-21

## 2022-09-21 VITALS
DIASTOLIC BLOOD PRESSURE: 86 MMHG | WEIGHT: 143 LBS | OXYGEN SATURATION: 94 % | HEIGHT: 61 IN | BODY MASS INDEX: 27 KG/M2 | SYSTOLIC BLOOD PRESSURE: 183 MMHG | TEMPERATURE: 98.6 F | HEART RATE: 61 BPM | RESPIRATION RATE: 20 BRPM

## 2022-09-21 DIAGNOSIS — I10 HYPERTENSION, UNSPECIFIED TYPE: ICD-10-CM

## 2022-09-21 DIAGNOSIS — F41.9 ANXIETY: ICD-10-CM

## 2022-09-21 LAB
ALBUMIN SERPL BCG-MCNC: 4 G/DL (ref 3.5–5.2)
ALP SERPL-CCNC: 73 U/L (ref 35–104)
ALT SERPL W P-5'-P-CCNC: 18 U/L (ref 10–35)
ANION GAP SERPL CALCULATED.3IONS-SCNC: 9 MMOL/L (ref 7–15)
AST SERPL W P-5'-P-CCNC: 30 U/L (ref 10–35)
BASOPHILS # BLD AUTO: 0.1 10E3/UL (ref 0–0.2)
BASOPHILS NFR BLD AUTO: 1 %
BILIRUB SERPL-MCNC: 0.5 MG/DL
BUN SERPL-MCNC: 21 MG/DL (ref 8–23)
CALCIUM SERPL-MCNC: 9.1 MG/DL (ref 8.8–10.2)
CHLORIDE SERPL-SCNC: 108 MMOL/L (ref 98–107)
CREAT SERPL-MCNC: 1.17 MG/DL (ref 0.51–0.95)
DEPRECATED HCO3 PLAS-SCNC: 25 MMOL/L (ref 22–29)
EOSINOPHIL # BLD AUTO: 0.3 10E3/UL (ref 0–0.7)
EOSINOPHIL NFR BLD AUTO: 3 %
ERYTHROCYTE [DISTWIDTH] IN BLOOD BY AUTOMATED COUNT: 19.1 % (ref 10–15)
GFR SERPL CREATININE-BSD FRML MDRD: 45 ML/MIN/1.73M2
GLUCOSE SERPL-MCNC: 84 MG/DL (ref 70–99)
HCT VFR BLD AUTO: 35 % (ref 35–47)
HGB BLD-MCNC: 11.6 G/DL (ref 11.7–15.7)
HOLD SPECIMEN: NORMAL
IMM GRANULOCYTES # BLD: 0.1 10E3/UL
IMM GRANULOCYTES NFR BLD: 1 %
LYMPHOCYTES # BLD AUTO: 1.6 10E3/UL (ref 0.8–5.3)
LYMPHOCYTES NFR BLD AUTO: 18 %
MAGNESIUM SERPL-MCNC: 2.2 MG/DL (ref 1.7–2.3)
MCH RBC QN AUTO: 30.7 PG (ref 26.5–33)
MCHC RBC AUTO-ENTMCNC: 33.1 G/DL (ref 31.5–36.5)
MCV RBC AUTO: 93 FL (ref 78–100)
MONOCYTES # BLD AUTO: 0.8 10E3/UL (ref 0–1.3)
MONOCYTES NFR BLD AUTO: 9 %
NEUTROPHILS # BLD AUTO: 6.2 10E3/UL (ref 1.6–8.3)
NEUTROPHILS NFR BLD AUTO: 68 %
NRBC # BLD AUTO: 0 10E3/UL
NRBC BLD AUTO-RTO: 0 /100
NT-PROBNP SERPL-MCNC: 575 PG/ML (ref 0–450)
PLATELET # BLD AUTO: 234 10E3/UL (ref 150–450)
POTASSIUM SERPL-SCNC: 4.1 MMOL/L (ref 3.4–5.3)
PROT SERPL-MCNC: 6.7 G/DL (ref 6.4–8.3)
RBC # BLD AUTO: 3.78 10E6/UL (ref 3.8–5.2)
SODIUM SERPL-SCNC: 142 MMOL/L (ref 136–145)
TROPONIN T SERPL HS-MCNC: 29 NG/L
TROPONIN T SERPL HS-MCNC: 30 NG/L
WBC # BLD AUTO: 8.9 10E3/UL (ref 4–11)

## 2022-09-21 PROCEDURE — 93005 ELECTROCARDIOGRAM TRACING: CPT | Performed by: FAMILY MEDICINE

## 2022-09-21 PROCEDURE — 83735 ASSAY OF MAGNESIUM: CPT | Performed by: EMERGENCY MEDICINE

## 2022-09-21 PROCEDURE — 85025 COMPLETE CBC W/AUTO DIFF WBC: CPT | Performed by: EMERGENCY MEDICINE

## 2022-09-21 PROCEDURE — 84484 ASSAY OF TROPONIN QUANT: CPT | Mod: 91 | Performed by: EMERGENCY MEDICINE

## 2022-09-21 PROCEDURE — 93005 ELECTROCARDIOGRAM TRACING: CPT | Mod: 76

## 2022-09-21 PROCEDURE — 93005 ELECTROCARDIOGRAM TRACING: CPT | Performed by: EMERGENCY MEDICINE

## 2022-09-21 PROCEDURE — 99285 EMERGENCY DEPT VISIT HI MDM: CPT | Mod: 25

## 2022-09-21 PROCEDURE — 80053 COMPREHEN METABOLIC PANEL: CPT | Performed by: EMERGENCY MEDICINE

## 2022-09-21 PROCEDURE — 71045 X-RAY EXAM CHEST 1 VIEW: CPT

## 2022-09-21 PROCEDURE — 36415 COLL VENOUS BLD VENIPUNCTURE: CPT | Performed by: EMERGENCY MEDICINE

## 2022-09-21 PROCEDURE — 83880 ASSAY OF NATRIURETIC PEPTIDE: CPT | Performed by: EMERGENCY MEDICINE

## 2022-09-21 ASSESSMENT — ACTIVITIES OF DAILY LIVING (ADL)
ADLS_ACUITY_SCORE: 35
ADLS_ACUITY_SCORE: 33
ADLS_ACUITY_SCORE: 35
ADLS_ACUITY_SCORE: 35

## 2022-09-21 NOTE — ED TRIAGE NOTES
SOB and heaviness in chest, tight. H/o asthma. BP elevated, took metoprolol x 2 this am     Triage Assessment     Row Name 09/21/22 1313       Triage Assessment (Adult)    Airway WDL WDL       Respiratory WDL    Respiratory WDL WDL       Skin Circulation/Temperature WDL    Skin Circulation/Temperature WDL WDL       Cardiac WDL    Cardiac WDL chest pain       Chest Pain Assessment    Chest Pain Location substernal    Chest Pain Radiation other (see comments);neck;back    Character other (see comments)  tight , heavy       Peripheral/Neurovascular WDL    Peripheral Neurovascular WDL WDL       Cognitive/Neuro/Behavioral WDL    Cognitive/Neuro/Behavioral WDL WDL

## 2022-09-21 NOTE — ED PROVIDER NOTES
EMERGENCY DEPARTMENT NOTE     Name: Sherice Alvarez    Age/Sex: 88 year old female   MRN: [id number]   Evaluation Date & Time:  9/21/2022  2:24 PM    PCP:    Anum Rosenberg   ED Provider: Marko Mckeon D.O.       CHIEF COMPLAINT    SOB       DIAGNOSIS & DISPOSITION     1. Hypertension, unspecified type    2. Anxiety      DISPOSITION: Home    At the conclusion of the encounter I discussed the results of all of the tests and the disposition. The questions were answered. The patient or family acknowledged understanding and was agreeable with the care plan.    TOTAL CRITICAL CARE TIME (EXCLUDING PROCEDURES): Not applicable    PROCEDURES:   None    EMERGENCY DEPARTMENT COURSE/MEDICAL DECISION MAKING   3:07 PM I met with the patient to gather history and to perform my initial exam.  We discussed treatment options and the plan for care while in the Emergency Department.    7:30 PM I rechecked the patient. We discussed the plan for discharge and the patient is agreeable. Reviewed supportive cares, symptomatic treatment, outpatient follow up, and reasons to return to the Emergency Department. Patient to be discharged by ED RN.     Sherice Alvarez is a 88 year old female with relevant past history of who presents to the emergency department for evaluation of shortness of breath.     Triage note reviewed:  SOB and heaviness in chest, tight. H/o asthma. BP elevated, took metoprolol x 2 this am     Triage Assessment     Row Name 09/21/22 1313       Triage Assessment (Adult)    Airway WDL WDL       Respiratory WDL    Respiratory WDL WDL       Skin Circulation/Temperature WDL    Skin Circulation/Temperature WDL WDL       Cardiac WDL    Cardiac WDL chest pain       Chest Pain Assessment    Chest Pain Location substernal    Chest Pain Radiation other (see comments);neck;back    Character other (see comments)  tight , heavy       Peripheral/Neurovascular WDL    Peripheral Neurovascular WDL WDL        "Cognitive/Neuro/Behavioral WDL    Cognitive/Neuro/Behavioral WDL WDL                Vital signs:BP (!) 183/86   Pulse 61   Temp 98.6  F (37  C) (Oral)   Resp 20   Ht 1.549 m (5' 1\")   Wt 64.9 kg (143 lb)   SpO2 94%   BMI 27.02 kg/m    Pertinent physical exam findings:  General: Alert, mildly anxious  Cardiac: Regular rate and rhythm S1-S2 with valve click no murmur  Pulmonary: Lungs are clear to ascultation bilaterally with good breath sounds, few crackles at the lung base  Extremities: Minimal edema  Diagnostic studies:  Imaging:  XR Chest Port 1 View   Final Result   IMPRESSION: Lungs are clear. No pleural effusion or pneumothorax. Stable cardiomediastinal silhouette. TAVR. No significant change.               Lab:  Labs Ordered and Resulted from Time of ED Arrival to Time of ED Departure   N TERMINAL PRO BNP OUTPATIENT - Abnormal       Result Value    N Terminal Pro BNP Outpatient 575 (*)    COMPREHENSIVE METABOLIC PANEL - Abnormal    Sodium 142      Potassium 4.1      Chloride 108 (*)     Carbon Dioxide (CO2) 25      Anion Gap 9      Urea Nitrogen 21.0      Creatinine 1.17 (*)     Calcium 9.1      Glucose 84      Alkaline Phosphatase 73      AST 30      ALT 18      Protein Total 6.7      Albumin 4.0      Bilirubin Total 0.5      GFR Estimate 45 (*)    CBC WITH PLATELETS AND DIFFERENTIAL - Abnormal    WBC Count 8.9      RBC Count 3.78 (*)     Hemoglobin 11.6 (*)     Hematocrit 35.0      MCV 93      MCH 30.7      MCHC 33.1      RDW 19.1 (*)     Platelet Count 234      % Neutrophils 68      % Lymphocytes 18      % Monocytes 9      % Eosinophils 3      % Basophils 1      % Immature Granulocytes 1      NRBCs per 100 WBC 0      Absolute Neutrophils 6.2      Absolute Lymphocytes 1.6      Absolute Monocytes 0.8      Absolute Eosinophils 0.3      Absolute Basophils 0.1      Absolute Immature Granulocytes 0.1      Absolute NRBCs 0.0     TROPONIN T, HIGH SENSITIVITY - Abnormal    Troponin T, High Sensitivity 29 (*)  "   TROPONIN T, HIGH SENSITIVITY - Abnormal    Troponin T, High Sensitivity 30 (*)    MAGNESIUM - Normal    Magnesium 2.2        Interventions:None  Medical decision makin-year-old female history of coronary artery disease, valvular heart disease with aortic stenosis status post TAVR, CHF with diastolic failure who presented the emergency department mainly for concern over elevated blood pressure that she had noted at home check this a.m. when she was feeling transiently lightheaded, nauseous and generally weak without focal neurologic symptoms.  Patient has history of diastolic heart failure.  She has chronic exertional dyspnea and also reports chest discomfort as tightness that is present on daily basis for many months and she does not report any acute change.  She did report some weight gain over the past several months 10 pounds but has not noted acute change.  EKG was nonischemic, troponin T intermediate but unchanged on delta.  Blood pressures remain elevated but appears to be asymptomatic.  Seemed anxious and is under increasing stress over her terminal illness of her son also concerned of daughter-in-law's alcohol use.  I discussed medications for anxiety but the patient states she has these medications at home and did not wish any medications for anxiety here in the emergency department.  Patient does not appear to be in overt congestive heart failure and history does not suggest ACS and EKG appears to be nonischemic.  After discussion we will refer patient to the cardiology congestive heart failure clinic for follow-up.  She will continue previously prescribed medications for hypertension including metoprolol and Lasix if blood pressure persistently elevated will discuss adjustments in medications at follow-up.  Return criteria are discussed and if the patient were to develop acute chest pain over baseline, acute shortness of breath or have any focal neurologic symptoms will return to the emergency  department.    ED INTERVENTIONS   Medications - No data to display    DISCHARGE MEDICATIONS        Review of your medicines      UNREVIEWED medicines. Ask your doctor about these medicines      Dose / Directions   acetaminophen 500 MG tablet  Commonly known as: TYLENOL      Dose: 500-1,000 mg  Take 500-1,000 mg by mouth every 8 hours as needed for mild pain  Refills: 0     albuterol 108 (90 Base) MCG/ACT inhaler  Commonly known as: PROAIR HFA/PROVENTIL HFA/VENTOLIN HFA      Dose: 1-2 puff  [ALBUTEROL (PROVENTIL HFA;VENTOLIN HFA) 90 MCG/ACTUATION INHALER] Inhale 1-2 puffs every 6 (six) hours as needed for wheezing.  Refills: 0     amLODIPine 2.5 MG tablet  Commonly known as: NORVASC      Dose: 2.5 mg  Take 2.5 mg by mouth daily  Refills: 0     aspirin 81 MG EC tablet  Commonly known as: ASA      Dose: 81 mg  Take 81 mg by mouth daily  Refills: 0     beclomethasone 80 MCG/ACT Aers IS A DISCONTINUED MEDICATION  Commonly known as: QVAR      Dose: 1 puff  Inhale 1 puff into the lungs 2 times daily as needed  Refills: 0     diclofenac 1 % topical gel  Commonly known as: VOLTAREN      Dose: 2-4 g  Apply 2-4 g topically 4 times daily as needed APPLY TO LEFT SHOULDER AND LEFT KNEE.  Refills: 0     ferrous sulfate 325 (65 Fe) MG tablet  Commonly known as: FEROSUL  Used for: Anemia, unspecified type      Dose: 1 tablet  [FERROUS SULFATE 325 (65 FE) MG TABLET] Take 1 tablet (325 mg total) by mouth daily with breakfast.  Refills: 0     furosemide 20 MG tablet  Commonly known as: LASIX  Used for: Chronic heart failure with preserved ejection fraction (H)      Dose: 20 mg  Take 1 tablet (20 mg) by mouth 2 times daily for 30 days  Quantity: 60 tablet  Refills: 0     metoprolol succinate ER 25 MG 24 hr tablet  Commonly known as: TOPROL XL  Used for: Chronic heart failure with preserved ejection fraction (H), Accelerated hypertension      Dose: 25 mg  Take 1 tablet (25 mg) by mouth 2 times daily  Quantity: 180 tablet  Refills: 3      nitroGLYcerin 0.4 MG sublingual tablet  Commonly known as: NITROSTAT  Used for: NSTEMI (non-ST elevated myocardial infarction) (H)      Dose: 0.4 mg  [NITROGLYCERIN (NITROSTAT) 0.4 MG SL TABLET] Place 1 tablet (0.4 mg total) under the tongue every 5 (five) minutes as needed for chest pain.  Quantity: 90 tablet  Refills: 0     OLANZapine 2.5 MG tablet  Commonly known as: zyPREXA  Used for: Adjustment disorder with mixed anxiety and depressed mood      Dose: 2.5 mg  Take 1 tablet (2.5 mg) by mouth 2 times daily as needed (anxiety)  Quantity: 14 tablet  Refills: 0     omeprazole 20 MG DR capsule  Commonly known as: priLOSEC      Dose: 20 mg  Take 20 mg by mouth daily  Refills: 0     Repatha 140 MG/ML prefilled syringe  Used for: Dyslipidemia, goal LDL below 100  Generic drug: evolocumab      Dose: 140 mg  Inject 1 mL (140 mg) Subcutaneous every 14 days  Quantity: 2 mL  Refills: 11     * rOPINIRole 1 MG tablet  Commonly known as: REQUIP  Used for: Adjustment disorder with mixed anxiety and depressed mood      Dose: 1 mg  Take 1 tablet (1 mg) by mouth 2 times daily for 30 days  Quantity: 60 tablet  Refills: 0     * rOPINIRole 2 MG tablet  Commonly known as: REQUIP  Used for: Adjustment disorder with mixed anxiety and depressed mood      Dose: 2 mg  Take 1 tablet (2 mg) by mouth At Bedtime for 30 days  Quantity: 30 tablet  Refills: 0     SENIOR MULTIVITAMIN PLUS PO      Dose: 1 tablet  Take 1 tablet by mouth daily  Refills: 0     Vitamin D3 125 MCG (5000 UT) tablet  Commonly known as: CHOLECALCIFEROL      Dose: 1 tablet  Take 1 tablet by mouth daily  Refills: 0         * This list has 2 medication(s) that are the same as other medications prescribed for you. Read the directions carefully, and ask your doctor or other care provider to review them with you.            CONTINUE these medicines which have NOT CHANGED      Dose / Directions   co-enzyme Q-10 100 MG Caps capsule      Dose: 100 mg  [COENZYME Q10 100 MG CAPSULE]  "Take 100 mg by mouth daily.  Refills: 0     melatonin 5 MG tablet      Dose: 5 mg  [MELATONIN 5 MG TAB TABLET] Take 5 mg by mouth at bedtime as needed (for sleep).  Refills: 0              INFORMATION SOURCE AND LIMITATIONS    History/Exam limitations: None  Patient information was obtained from: Patient  Use of : N/A    HISTORY OF PRESENT ILLNESS   Sherice Alvarez is a 88 year old year old female with a relevant past history of NSTEMI, asthma, CHF, TAVR, DVT, stroke, and hypertension who presents to this ED for evaluation of lightheadedness and shortness of breath.    Starting around 11:00 this morning the patient reports that she had developed lightheadedness, nausea, weakness.  Patient has chronic chest discomfort on a daily basis described as tightness and also shortness of breath with exertion both of which are chronic and present for many months and not changed.. She denies any runny nose, sore throat, or trouble urinating.  She states that she checked her blood pressure and it read 186 and after some time it read 192. She had seen her doctor yesterday (9/20) and was told to take metoprolol, however it has provided no relief. Her hypertension had not caused her any troubles before. Patient was told by her doctor to take metoprolol twice a day but she hasn't been taking them. She also is currently on lasix. She also endorses leg cramps that \"feels like she is walking on jennifer\", as well as tingliness in hands and a burning sensation in her right hand.    Patient also reports a personal history of asthma and notes some baseline chest tightness. Also reports a personal history of stroke. She had also been seen for neuropathy and her provider cleared her from the diagnosis.       REVIEW OF SYSTEMS:   Constitutional: Negative for  fever.   HENT: Negative for URI symptoms or sore throat.    Cardiac: Negative for  chest pain, palpitations, near syncope or syncope. Positive for chest " discomfort/tightness (chronic and no change over baseline).  Respiratory: Negative for cough. Positive for shortness of breath. (chronic)  Gastrointestinal: Negative for abdominal pain, vomiting, constipation,  diarrhea, rectal bleeding or melena. Positive for nausea.  Genitourinary: Negative for dysuria, flank pain and hematuria.   Musculoskeletal: Negative for back pain. Positive for leg cramps.  Skin: Negative for rash  Neurological: Negative for dizziness, headache, syncope, speech difficulty, unilateral weakness or imbalance with walking. Positive for lightheadedness. Positive for tingliness in hands bilaterally and burning sensation in right  hand.   Hematological: Negative for adenopathy. Does not bruise/bleed easily.   Psychiatric/Behavioral: Negative for confusion.       PATIENT HISTORY     Past Medical History:   Diagnosis Date     Arthritis      Asthma      CAD (coronary artery disease)      Cancer (H)      Chronic kidney disease      Chronic pain syndrome      Congestive heart failure (H)      Crohn's disease (H)      GERD (gastroesophageal reflux disease)      Hx of heart artery stent 11/01/2016     Hyperlipidemia      Hypertension      NSTEMI (non-ST elevated myocardial infarction) (H) 11/01/2016     PONV (postoperative nausea and vomiting)      Restless legs syndrome      Stroke (H) 01/01/2010     Patient Active Problem List   Diagnosis     Constipation     Cancer (H)     Gastroesophageal reflux disease with esophagitis     Dyslipidemia, goal LDL below 100     Accelerated hypertension     Abnormal chest CT     Anterior epistaxis     Stage 3 chronic kidney disease (H)     Asthma     Coronary artery disease involving native coronary artery of native heart with unstable angina pectoris (H)     Severe calcific aortic valve stenosis     Dyspnea     S/P TAVR (transcatheter aortic valve replacement)     Leukocytosis, unspecified type     S/P total knee arthroplasty, left     Acute deep vein thrombosis (DVT)  of lower extremity (H)     (HFpEF) heart failure with preserved ejection fraction (H)     Chest pain, unspecified type     Other insomnia     Adjustment disorder with mixed anxiety and depressed mood     Restless leg syndrome     Past Surgical History:   Procedure Laterality Date     APPENDECTOMY       CARDIAC CATHETERIZATION  2016    multiple vessel disease.  no intervention     CARDIAC CATHETERIZATION  2016     CARDIAC CATHETERIZATION N/A 2016    Procedure: Coronary Angiogram;  Surgeon: Sunil Moran MD;  Location: [facility];  Service:      CAROTID ENDARTERECTOMY       CAROTID ENDARTERECTOMY Right 2010     CERVICAL LAMINECTOMY  1994      SECTION       CV CORONARY ANGIOGRAM N/A 2020    Procedure: Coronary Angiogram;  Surgeon: Vinita Ramos MD;  Location: [facility] Lab;  Service: Cardiology     CV LEFT HEART CATHETERIZATION WITHOUT LEFT VENTRICULOGRAM Left 2020    Procedure: Left Heart Catheterization Without Left Ventriculogram;  Surgeon: Jerad Lerner MD;  Location: [facility];  Service: Cardiology     CV TRANSCATHETER AORTIC VALVE REPLACEMENT - OTHER APPROACH N/A 2020    Procedure: CV TRANSCATHETER AORTIC VALVE REPLACEMENT - RIGHT SUBCLAVIAN APPROACH;  Surgeon: John Caceres MD;  Location: [facility];  Service: Cardiology     HEMORRHOIDECTOMY EXTERNAL       IR LUMBAR EPIDURAL STEROID INJECTION  2014     IR LUMBAR NERVE ROOT INJECTION  10/01/2013     JOINT REPLACEMENT       IL ESOPHAGOGASTRODUODENOSCOPY TRANSORAL DIAGNOSTIC N/A 07/10/2019    Procedure: ESOPHAGOGASTRODUODENOSCOPY (EGD) with gastric biopsy;  Surgeon: Sunil Stuart MD;  Location: [facility];  Service: Gastroenterology     IL REPLACE AORTIC VALVE OPEN AXILLRY ARTRY APPROACH N/A 2020    Procedure: OR TRANSCATHETER AORTIC VALVE REPLACEMENT, SUBCLAVIAN APPROACH;  Surgeon: Bhavin Cuadra MD;  Location: [facility]  Lab;  Service: Cardiology     TONSILLECTOMY & ADENOIDECTOMY       TOTAL KNEE ARTHROPLASTY Right      TRANSCATHETER AORTIC-VALVE REPLACEMENT       ZZC TOTAL KNEE ARTHROPLASTY Left 05/11/2021    Procedure: LEFT TOTAL KNEE ARTHROPLASTY;  Surgeon: Doug Rhodes MD;  Location: [facility] OR;  Service: Orthopedics     Social Histrory  Smoking:None  Alcohol Use:None  Allergies   Allergen Reactions     Amitriptyline Hcl [Amitriptyline] Other (See Comments)     Erythromycin Diarrhea and Other (See Comments)     Childhood reaction     Gabapentin Other (See Comments)     Jerking movements, swelling     Naproxen Unknown and Other (See Comments)     Other Environmental Allergy Unknown     Molds, dander     Pregabalin Other (See Comments)         OUTPATIENT MEDICATIONS     Discharge Medication List as of 9/21/2022  9:33 PM         Vitals:    09/21/22 2045 09/21/22 2100 09/21/22 2115 09/21/22 2130   BP: (!) 170/78 (!) 188/84 (!) 187/90 (!) 183/86   Pulse: 56 58 58 61   Resp: 16 21 19 20   Temp:       TempSrc:       SpO2: 95% 96% 96% 94%   Weight:       Height:           Physical Exam   Constitutional: Oriented to person, place, and time. Appears well-developed and well-nourished.   HEENT:    Head: Atraumatic.   Neck: Normal range of motion. Neck supple.   Cardiovascular: Normal rate, regular rhythm.  Mechanical heart valve sounds.  Pulmonary/Chest: Clear to auscultation bilaterally except for few small crackles in the lung bases.  Breath sounds equal  Abdominal: Soft. Bowel sounds are normal.   Musculoskeletal: Normal range of motion.   Neurological: Alert and oriented to person, place, and time. Normal strength.No sensory deficit. No cranial nerve deficit . Skin: Skin is warm and dry.   Psychiatric: Normal mood and affect. Behavior is normal. Thought content normal.       DIAGNOSTICS    LABORATORY FINDINGS (REVIEWED AND INTERPRETED):  Labs Ordered and Resulted from Time of ED Arrival to Time of ED Departure   N TERMINAL  PRO BNP OUTPATIENT - Abnormal       Result Value    N Terminal Pro BNP Outpatient 575 (*)    COMPREHENSIVE METABOLIC PANEL - Abnormal    Sodium 142      Potassium 4.1      Chloride 108 (*)     Carbon Dioxide (CO2) 25      Anion Gap 9      Urea Nitrogen 21.0      Creatinine 1.17 (*)     Calcium 9.1      Glucose 84      Alkaline Phosphatase 73      AST 30      ALT 18      Protein Total 6.7      Albumin 4.0      Bilirubin Total 0.5      GFR Estimate 45 (*)    CBC WITH PLATELETS AND DIFFERENTIAL - Abnormal    WBC Count 8.9      RBC Count 3.78 (*)     Hemoglobin 11.6 (*)     Hematocrit 35.0      MCV 93      MCH 30.7      MCHC 33.1      RDW 19.1 (*)     Platelet Count 234      % Neutrophils 68      % Lymphocytes 18      % Monocytes 9      % Eosinophils 3      % Basophils 1      % Immature Granulocytes 1      NRBCs per 100 WBC 0      Absolute Neutrophils 6.2      Absolute Lymphocytes 1.6      Absolute Monocytes 0.8      Absolute Eosinophils 0.3      Absolute Basophils 0.1      Absolute Immature Granulocytes 0.1      Absolute NRBCs 0.0     TROPONIN T, HIGH SENSITIVITY - Abnormal    Troponin T, High Sensitivity 29 (*)    TROPONIN T, HIGH SENSITIVITY - Abnormal    Troponin T, High Sensitivity 30 (*)    MAGNESIUM - Normal    Magnesium 2.2           IMAGING (REVIEWED AND INTERPRETED):  XR Chest Port 1 View   Final Result   IMPRESSION: Lungs are clear. No pleural effusion or pneumothorax. Stable cardiomediastinal silhouette. TAVR. No significant change.                    ECG (REVIEWED AND INTERPRETED):   ECG:   Performed at: 16:17  HR:  55 bpm  Rhythm: Sinus  Axis: 9  QRS duration: 74 ms  QTC: 429 ms  ST changes: No ST segment elevation or depression, no T wave inversion,Q wave V2  Interpretation: Sinus bradycardia without acute ischemic changes  Compared to most recent ECG from: September 2022 no acute change    I have reviewed the patient's ECG, with comments made as listed above. Please see scanned image for full  interpretation.         I, Brandi Billings, am serving as a scribe to document services personally performed by Marko Mckeon D.O., based on my observation and the provider s statements to me.    I, Marko Mckeon D.O., attest that Brandi Billigns is acting in a scribe capacity, has observed my performance of the services and has documented them in accordance with my direction.    Marko Mckeon D.O.  EMERGENCY MEDICINE   09/21/22  [facility] EMERGENCY DEPARTMENT  [address] [place] [id number]  [phone]  Dept: [phone]     Marko Mckeon,   09/22/22 0037

## 2022-09-22 LAB
ATRIAL RATE - MUSE: 55 BPM
ATRIAL RATE - MUSE: 75 BPM
DIASTOLIC BLOOD PRESSURE - MUSE: 85 MMHG
DIASTOLIC BLOOD PRESSURE - MUSE: NORMAL MMHG
INTERPRETATION ECG - MUSE: NORMAL
INTERPRETATION ECG - MUSE: NORMAL
P AXIS - MUSE: 60 DEGREES
P AXIS - MUSE: 67 DEGREES
PR INTERVAL - MUSE: 218 MS
PR INTERVAL - MUSE: 234 MS
QRS DURATION - MUSE: 74 MS
QRS DURATION - MUSE: 90 MS
QT - MUSE: 406 MS
QT - MUSE: 440 MS
QTC - MUSE: 420 MS
QTC - MUSE: 453 MS
R AXIS - MUSE: 41 DEGREES
R AXIS - MUSE: 9 DEGREES
SYSTOLIC BLOOD PRESSURE - MUSE: 198 MMHG
SYSTOLIC BLOOD PRESSURE - MUSE: NORMAL MMHG
T AXIS - MUSE: 60 DEGREES
T AXIS - MUSE: 74 DEGREES
VENTRICULAR RATE- MUSE: 55 BPM
VENTRICULAR RATE- MUSE: 75 BPM

## 2022-09-22 NOTE — DISCHARGE INSTRUCTIONS
You have been given a referral to the cardiology clinic and they should call you for follow-up next week.  Continue medications as needed for anxiety.  If you have progressive symptoms including acute chest pain or shortness of breath of your baseline return to the emergency department.

## 2022-11-06 ENCOUNTER — TRANSFERRED RECORDS (OUTPATIENT)
Dept: HEALTH INFORMATION MANAGEMENT | Facility: CLINIC | Age: 87
End: 2022-11-06

## 2022-11-09 ENCOUNTER — APPOINTMENT (OUTPATIENT)
Dept: RADIOLOGY | Facility: HOSPITAL | Age: 87
End: 2022-11-09
Attending: STUDENT IN AN ORGANIZED HEALTH CARE EDUCATION/TRAINING PROGRAM
Payer: COMMERCIAL

## 2022-11-09 ENCOUNTER — HOSPITAL ENCOUNTER (EMERGENCY)
Facility: HOSPITAL | Age: 87
Discharge: HOME | End: 2022-11-09
Attending: EMERGENCY MEDICINE | Admitting: EMERGENCY MEDICINE
Payer: COMMERCIAL

## 2022-11-09 VITALS
SYSTOLIC BLOOD PRESSURE: 152 MMHG | TEMPERATURE: 98.7 F | RESPIRATION RATE: 24 BRPM | WEIGHT: 142 LBS | HEIGHT: 62 IN | OXYGEN SATURATION: 95 % | HEART RATE: 64 BPM | BODY MASS INDEX: 26.13 KG/M2 | DIASTOLIC BLOOD PRESSURE: 70 MMHG

## 2022-11-09 DIAGNOSIS — J06.9 VIRAL UPPER RESPIRATORY TRACT INFECTION: ICD-10-CM

## 2022-11-09 LAB
ALBUMIN SERPL BCG-MCNC: 3.8 G/DL (ref 3.5–5.2)
ALP SERPL-CCNC: 76 U/L (ref 35–104)
ALT SERPL W P-5'-P-CCNC: 16 U/L (ref 10–35)
ANION GAP SERPL CALCULATED.3IONS-SCNC: 9 MMOL/L (ref 7–15)
APTT PPP: 29 SECONDS (ref 22–38)
AST SERPL W P-5'-P-CCNC: 22 U/L (ref 10–35)
BASOPHILS # BLD AUTO: 0.1 10E3/UL (ref 0–0.2)
BASOPHILS NFR BLD AUTO: 1 %
BILIRUB SERPL-MCNC: 0.5 MG/DL
BUN SERPL-MCNC: 17.3 MG/DL (ref 8–23)
CALCIUM SERPL-MCNC: 8.8 MG/DL (ref 8.8–10.2)
CHLORIDE SERPL-SCNC: 108 MMOL/L (ref 98–107)
CREAT SERPL-MCNC: 1.17 MG/DL (ref 0.51–0.95)
DEPRECATED HCO3 PLAS-SCNC: 25 MMOL/L (ref 22–29)
EOSINOPHIL # BLD AUTO: 0.2 10E3/UL (ref 0–0.7)
EOSINOPHIL NFR BLD AUTO: 3 %
ERYTHROCYTE [DISTWIDTH] IN BLOOD BY AUTOMATED COUNT: 19.1 % (ref 10–15)
FLUAV RNA SPEC QL NAA+PROBE: NEGATIVE
FLUBV RNA RESP QL NAA+PROBE: NEGATIVE
GFR SERPL CREATININE-BSD FRML MDRD: 45 ML/MIN/1.73M2
GLUCOSE SERPL-MCNC: 87 MG/DL (ref 70–99)
HCT VFR BLD AUTO: 33.8 % (ref 35–47)
HGB BLD-MCNC: 10.8 G/DL (ref 11.7–15.7)
IMM GRANULOCYTES # BLD: 0.1 10E3/UL
IMM GRANULOCYTES NFR BLD: 1 %
INR PPP: 1.13 (ref 0.85–1.15)
LYMPHOCYTES # BLD AUTO: 1.4 10E3/UL (ref 0.8–5.3)
LYMPHOCYTES NFR BLD AUTO: 17 %
MAGNESIUM SERPL-MCNC: 2.1 MG/DL (ref 1.7–2.3)
MCH RBC QN AUTO: 29.8 PG (ref 26.5–33)
MCHC RBC AUTO-ENTMCNC: 32 G/DL (ref 31.5–36.5)
MCV RBC AUTO: 93 FL (ref 78–100)
MONOCYTES # BLD AUTO: 0.7 10E3/UL (ref 0–1.3)
MONOCYTES NFR BLD AUTO: 8 %
NEUTROPHILS # BLD AUTO: 5.8 10E3/UL (ref 1.6–8.3)
NEUTROPHILS NFR BLD AUTO: 70 %
NRBC # BLD AUTO: 0 10E3/UL
NRBC BLD AUTO-RTO: 0 /100
NT-PROBNP SERPL-MCNC: 728 PG/ML (ref 0–1800)
PLATELET # BLD AUTO: 217 10E3/UL (ref 150–450)
POTASSIUM SERPL-SCNC: 4 MMOL/L (ref 3.4–5.3)
PROT SERPL-MCNC: 6.3 G/DL (ref 6.4–8.3)
RBC # BLD AUTO: 3.62 10E6/UL (ref 3.8–5.2)
RSV RNA SPEC NAA+PROBE: NEGATIVE
SARS-COV-2 RNA RESP QL NAA+PROBE: NEGATIVE
SODIUM SERPL-SCNC: 142 MMOL/L (ref 136–145)
TROPONIN T SERPL HS-MCNC: 30 NG/L
TROPONIN T SERPL HS-MCNC: 34 NG/L
WBC # BLD AUTO: 8.2 10E3/UL (ref 4–11)

## 2022-11-09 PROCEDURE — 83735 ASSAY OF MAGNESIUM: CPT | Performed by: EMERGENCY MEDICINE

## 2022-11-09 PROCEDURE — 87637 SARSCOV2&INF A&B&RSV AMP PRB: CPT | Performed by: EMERGENCY MEDICINE

## 2022-11-09 PROCEDURE — 83880 ASSAY OF NATRIURETIC PEPTIDE: CPT | Performed by: EMERGENCY MEDICINE

## 2022-11-09 PROCEDURE — 71046 X-RAY EXAM CHEST 2 VIEWS: CPT

## 2022-11-09 PROCEDURE — 85610 PROTHROMBIN TIME: CPT | Performed by: EMERGENCY MEDICINE

## 2022-11-09 PROCEDURE — 99285 EMERGENCY DEPT VISIT HI MDM: CPT | Mod: 25

## 2022-11-09 PROCEDURE — 36415 COLL VENOUS BLD VENIPUNCTURE: CPT | Performed by: EMERGENCY MEDICINE

## 2022-11-09 PROCEDURE — 84484 ASSAY OF TROPONIN QUANT: CPT | Performed by: EMERGENCY MEDICINE

## 2022-11-09 PROCEDURE — 250N000009 HC RX 250: Performed by: EMERGENCY MEDICINE

## 2022-11-09 PROCEDURE — 85730 THROMBOPLASTIN TIME PARTIAL: CPT | Performed by: EMERGENCY MEDICINE

## 2022-11-09 PROCEDURE — 93005 ELECTROCARDIOGRAM TRACING: CPT

## 2022-11-09 PROCEDURE — C9803 HOPD COVID-19 SPEC COLLECT: HCPCS

## 2022-11-09 PROCEDURE — 84484 ASSAY OF TROPONIN QUANT: CPT | Mod: 91 | Performed by: EMERGENCY MEDICINE

## 2022-11-09 PROCEDURE — 94640 AIRWAY INHALATION TREATMENT: CPT

## 2022-11-09 PROCEDURE — 80053 COMPREHEN METABOLIC PANEL: CPT | Performed by: EMERGENCY MEDICINE

## 2022-11-09 PROCEDURE — 85004 AUTOMATED DIFF WBC COUNT: CPT | Performed by: EMERGENCY MEDICINE

## 2022-11-09 PROCEDURE — 93005 ELECTROCARDIOGRAM TRACING: CPT | Performed by: EMERGENCY MEDICINE

## 2022-11-09 RX ORDER — IPRATROPIUM BROMIDE AND ALBUTEROL SULFATE 2.5; .5 MG/3ML; MG/3ML
3 SOLUTION RESPIRATORY (INHALATION) ONCE
Status: COMPLETED | OUTPATIENT
Start: 2022-11-09 | End: 2022-11-09

## 2022-11-09 RX ADMIN — IPRATROPIUM BROMIDE AND ALBUTEROL SULFATE 3 ML: 2.5; .5 SOLUTION RESPIRATORY (INHALATION) at 19:48

## 2022-11-09 ASSESSMENT — ACTIVITIES OF DAILY LIVING (ADL)
ADLS_ACUITY_SCORE: 35
ADLS_ACUITY_SCORE: 33

## 2022-11-09 NOTE — ED NOTES
"ED Provider In Triage Note  [facility]  Encounter Date: Nov 9, 2022    Chief Complaint   Patient presents with     chest wall pain       Brief HPI:   Sherice Alvarez is a 88 year old female presenting to the Emergency Department with a chief complaint of cough and chest wall pain for the last 3 weeks.  The patient was seen in an outside clinic on two separate occasions.  Patient given prednisone and antibiotic which she finished two weeks ago.  Despite this her symptoms have persisted.      Brief Physical Exam:  /61   Pulse 70   Temp 98.6  F (37  C)   Resp 18   Ht 1.575 m (5' 2\")   Wt 64.4 kg (142 lb)   SpO2 95%   BMI 25.97 kg/m    General: Non-toxic appearing  HEENT: Atraumatic  Resp: No respiratory distress  Abdomen: Non-peritoneal  Neuro: Alert, oriented, answers questions appropriately  Psych: Behavior appropriate    Plan Initiated in Triage:  CXR, labs, Covid/Influenza, EKG.       PIT Dispo:   Return to lobby while awaiting workup and ED bed availability    Marshal Díaz DO on 11/9/2022 at 3:11 PM    Patient was evaluated by the Physician in Triage due to a limitation of available rooms in the Emergency Department. A plan of care was discussed based on the information obtained on the initial evaluation and patient was consuled to return back to the Emergency Department lobby after this initial evalutaiton until results were obtained or a room became available in the Emergency Department. Patient was counseled not to leave prior to receiving the results of their workup.     Marshal Díaz DO  [facility] EMERGENCY DEPARTMENT  [address] [place] [id number]  [phone]     Marshal Díaz DO  11/09/22 1515    "

## 2022-11-09 NOTE — ED TRIAGE NOTES
Patient here for chest wall pain with cough, for 3 weeks. Has gone to urgent care and EKG done sent home on prednisone and atibiotic and she has completed it. Continues with cough, chest wall pain no fever

## 2022-11-10 ENCOUNTER — TELEPHONE (OUTPATIENT)
Dept: CARDIOLOGY | Facility: CLINIC | Age: 87
End: 2022-11-10

## 2022-11-10 NOTE — TELEPHONE ENCOUNTER
[facility] Call Center    Phone Message    May a detailed message be left on voicemail: yes     Reason for Call: Symptoms or Concerns     If patient has red-flag symptoms, warm transfer to triage line    Current symptom or concern: Congestion, dizziness, weakness.    Symptoms have been present for:  A few day(s)    Has patient previously been seen for this? Yes    By : Pt was in ER last night          Action Taken: Other: Cardiology    Travel Screening: Not Applicable

## 2022-11-10 NOTE — ED PROVIDER NOTES
"EMERGENCY DEPARTMENT ENCOUNTER      NAME: Sherice Alvarez  AGE: 88 year old female  YOB: 1934  MRN: [id number]  EVALUATION DATE & TIME: 11/9/2022  6:43 PM    PCP: Anum Rosenberg    ED PROVIDER: Marko Zuniga M.D.      Chief Complaint   Patient presents with     chest wall pain         FINAL IMPRESSION:  Dyspnea  Anxiety    ED COURSE & MEDICAL DECISION MAKING:    Pertinent Labs & Imaging studies reviewed. (See chart for details)  88 year old female presents to the Emergency Department for evaluation of continued shortness of breath and tightness in her chest.  Symptoms ongoing for 3 weeks.  Patient with multiple evaluations.  No evidence of infectious process.  Chest x-ray and COVID/flu swabs negative.  Nonetheless started on doxycycline prednisone albuterol without obvious improvement.  She is worried that she may be \"too dehydrated\".  However then relates her blood pressures been up and so is her weight.  On exam she is slightly anxious.  Lungs are clear.  Cardiac exam unremarkable.  Vital signs normal.  Baseline blood work being repeated to assess for signs of cardiac injury.  Baseline blood work being obtained assess for anemia or acute kidney injury which be contributory.  We will treat symptomatically with nebulization.  Chest x-ray also been obtained.  Patient appears non toxic with stable vitals signs. Overall exam is benign.    6:50 PM I met with the patient for the initial interview and physical examination. Discussed plan for treatment and workup in the ED.    7:32 PM I rechecked the patient.  Troponin minimally elevated.  Will repeat troponin.  Patient also awaiting nebulization to assess for improvement.  BNP also added to assess for potential pulmonary edema.  9 PM.  Repeat troponin is lower.  Awaiting BNP.  9:52 PM.  BNP normal at 728.  Patient reassured in regards of findings.  We will continue with outpatient management.  At the conclusion of the encounter I discussed the " "results of all of the tests and the disposition. The questions were answered and return precautions provided. The patient or family acknowledged understanding and was agreeable with the care plan.       PPE: Provider wore N95 mask    MEDICATIONS GIVEN IN THE EMERGENCY:  Medications - No data to display    NEW PRESCRIPTIONS STARTED AT TODAY'S ER VISIT  New Prescriptions    No medications on file          =================================================================    HPI    Patient information was obtained from: Patient     Use of Intrepreter: N/A       Sherice Alvarez is a 88 year old female with a pertient medical history of heart failure with preserved ejection fraction, s/p TAVR, CAD, CKD stage 3, GERD, and DVT who presents to the ED for evaluation of chest pain     Per chart review: The patient was seen at [facility] on 10/21/22 for shortness of breath and cough. Patient's EKG was normal. Strep, COVID, and flu tests came back negative. A chest xray found no signs of acute disease. The patient was treated with a duoneb. The patient was diagnosed with an upper respiratory tract infection and discharged with doxycycline, prednisone, and albuterol.     Patient reports chest pain, shortness of breath, and cough on and off for the past 3 weeks. They state that when they breathe it \"feels like it's closing up\" and that their throat is swelling. The patient has been seen for this before and was given prednisone and antibiotics. They report that the medication worked for \"a little bit\". The patient has also been using their albuterol, but it \"doesn't do any good\". The patient also complains of feeling weak and dizzy. They note that their weight has increased and they are having a hard time lowering their blood pressure. The patient thinks they are dehydrated and feel as though they are \"losing a lot of urine\". The patient notes that they are on lasix for heart failure.     REVIEW OF SYSTEMS "   Constitutional:  Denies fever, chills. Positive for weight increase  Respiratory:  Positive for chest pain and shortness of breath, throat tightness   Cardiovascular:  Denies chest pain, palpitations  GI:  Denies abdominal pain, nausea, vomiting, or change in bowel. Positive for increased urination   Musculoskeletal:  Denies any new muscle/joint swelling  Skin:  Denies rash   Neurologic:  Positive for weakness and dizziness  All systems negative except as marked.     PAST MEDICAL HISTORY:  Past Medical History:   Diagnosis Date     Arthritis      Asthma      CAD (coronary artery disease)      Cancer (H)     basal cell     Chronic kidney disease     ckd 3     Chronic pain syndrome      Congestive heart failure (H)      Crohn's disease (H)      GERD (gastroesophageal reflux disease)      Hx of heart artery stent 2016    1 stent R Proximal CA and 1 Stent L Proximal circumflex  2016 Stent proximal LAD     Hyperlipidemia      Hypertension      NSTEMI (non-ST elevated myocardial infarction) (H) 2016     PONV (postoperative nausea and vomiting)     severe povn with last knee surgery     Restless legs syndrome      Stroke (H) 2010    numbness rt jaw       PAST SURGICAL HISTORY:  Past Surgical History:   Procedure Laterality Date     APPENDECTOMY       CARDIAC CATHETERIZATION  2016    multiple vessel disease.  no intervention     CARDIAC CATHETERIZATION  2016     CARDIAC CATHETERIZATION N/A 2016    Procedure: Coronary Angiogram;  Surgeon: Sunil Moran MD;  Location: [facility] Lab;  Service:      CAROTID ENDARTERECTOMY       CAROTID ENDARTERECTOMY Right 2010     CERVICAL LAMINECTOMY  1994      SECTION       CV CORONARY ANGIOGRAM N/A 2020    Procedure: Coronary Angiogram;  Surgeon: Vinita Ramos MD;  Location: [facility] Lab;  Service: Cardiology     CV LEFT HEART CATHETERIZATION WITHOUT LEFT VENTRICULOGRAM Left 2020    Procedure:  Left Heart Catheterization Without Left Ventriculogram;  Surgeon: Jerad Lerner MD;  Location: [facility] Lab;  Service: Cardiology     CV TRANSCATHETER AORTIC VALVE REPLACEMENT - OTHER APPROACH N/A 06/02/2020    Procedure: CV TRANSCATHETER AORTIC VALVE REPLACEMENT - RIGHT SUBCLAVIAN APPROACH;  Surgeon: John Caceres MD;  Location: [facility] Cath Lab;  Service: Cardiology     HEMORRHOIDECTOMY EXTERNAL       IR LUMBAR EPIDURAL STEROID INJECTION  12/30/2014     IR LUMBAR NERVE ROOT INJECTION  10/01/2013     JOINT REPLACEMENT       FL ESOPHAGOGASTRODUODENOSCOPY TRANSORAL DIAGNOSTIC N/A 07/10/2019    Procedure: ESOPHAGOGASTRODUODENOSCOPY (EGD) with gastric biopsy;  Surgeon: Sunil Stuart MD;  Location: [facility];  Service: Gastroenterology     FL REPLACE AORTIC VALVE OPEN AXILLRY ARTRY APPROACH N/A 06/02/2020    Procedure: OR TRANSCATHETER AORTIC VALVE REPLACEMENT, SUBCLAVIAN APPROACH;  Surgeon: Bhavin Cuadra MD;  Location: [facility] Lab;  Service: Cardiology     TONSILLECTOMY & ADENOIDECTOMY       TOTAL KNEE ARTHROPLASTY Right      TRANSCATHETER AORTIC-VALVE REPLACEMENT       ZZC TOTAL KNEE ARTHROPLASTY Left 05/11/2021    Procedure: LEFT TOTAL KNEE ARTHROPLASTY;  Surgeon: Doug Rhodes MD;  Location: [facility];  Service: Orthopedics         CURRENT MEDICATIONS:    No current facility-administered medications for this encounter.    Current Outpatient Medications:      acetaminophen (TYLENOL) 500 MG tablet, Take 500-1,000 mg by mouth every 8 hours as needed for mild pain, Disp: , Rfl:      albuterol (PROVENTIL HFA;VENTOLIN HFA) 90 mcg/actuation inhaler, [ALBUTEROL (PROVENTIL HFA;VENTOLIN HFA) 90 MCG/ACTUATION INHALER] Inhale 1-2 puffs every 6 (six) hours as needed for wheezing., Disp: , Rfl:      amLODIPine (NORVASC) 2.5 MG tablet, Take 2.5 mg by mouth daily, Disp: , Rfl:      aspirin (ASA) 81 MG EC tablet, Take 81 mg by mouth daily, Disp: , Rfl:      beclomethasone  (QVAR) 80 mcg/actuation inhaler, Inhale 1 puff into the lungs 2 times daily as needed , Disp: , Rfl:      coenzyme Q10 100 mg capsule, [COENZYME Q10 100 MG CAPSULE] Take 100 mg by mouth daily. , Disp: , Rfl:      diclofenac (VOLTAREN) 1 % topical gel, Apply 2-4 g topically 4 times daily as needed APPLY TO LEFT SHOULDER AND LEFT KNEE., Disp: , Rfl:      evolocumab (REPATHA) 140 MG/ML prefilled syringe, Inject 1 mL (140 mg) Subcutaneous every 14 days (Patient not taking: Reported on 9/20/2022), Disp: 2 mL, Rfl: 11     ferrous sulfate 325 (65 FE) MG tablet, [FERROUS SULFATE 325 (65 FE) MG TABLET] Take 1 tablet (325 mg total) by mouth daily with breakfast. (Patient taking differently: Take 1 tablet by mouth twice a week), Disp: , Rfl: 0     furosemide (LASIX) 20 MG tablet, Take 1 tablet (20 mg) by mouth 2 times daily for 30 days, Disp: 60 tablet, Rfl: 0     melatonin 5 mg Tab tablet, [MELATONIN 5 MG TAB TABLET] Take 5 mg by mouth at bedtime as needed (for sleep). , Disp: , Rfl:      metoprolol succinate ER (TOPROL XL) 25 MG 24 hr tablet, Take 1 tablet (25 mg) by mouth 2 times daily, Disp: 180 tablet, Rfl: 3     Multiple Vitamins-Minerals (SENIOR MULTIVITAMIN PLUS PO), Take 1 tablet by mouth daily, Disp: , Rfl:      nitroglycerin (NITROSTAT) 0.4 MG SL tablet, [NITROGLYCERIN (NITROSTAT) 0.4 MG SL TABLET] Place 1 tablet (0.4 mg total) under the tongue every 5 (five) minutes as needed for chest pain., Disp: 90 tablet, Rfl: 0     OLANZapine (ZYPREXA) 2.5 MG tablet, Take 1 tablet (2.5 mg) by mouth 2 times daily as needed (anxiety), Disp: 14 tablet, Rfl: 0     omeprazole (PRILOSEC) 20 MG DR capsule, Take 20 mg by mouth daily, Disp: , Rfl:      rOPINIRole (REQUIP) 1 MG tablet, Take 1 tablet (1 mg) by mouth 2 times daily for 30 days (Patient taking differently: Take 2 mg by mouth At Bedtime), Disp: 60 tablet, Rfl: 0     rOPINIRole (REQUIP) 2 MG tablet, Take 1 tablet (2 mg) by mouth At Bedtime for 30 days, Disp: 30 tablet, Rfl:  "0     Vitamin D3 (CHOLECALCIFEROL) 125 MCG (5000 UT) tablet, Take 1 tablet by mouth daily, Disp: , Rfl:     ALLERGIES:  Allergies   Allergen Reactions     Amitriptyline Hcl [Amitriptyline] Other (See Comments)     Erythromycin Diarrhea and Other (See Comments)     Childhood reaction     Gabapentin Other (See Comments)     Jerking movements, swelling     Naproxen Unknown and Other (See Comments)     Other Environmental Allergy Unknown     Molds, dander     Pregabalin Other (See Comments)       FAMILY HISTORY:  Family History   Problem Relation Age of Onset     Atrial fibrillation Mother      Parkinsonism Father        SOCIAL HISTORY:   Social History     Socioeconomic History     Marital status:    Tobacco Use     Smoking status: Former     Types: Cigarettes     Quit date: 1980     Years since quittin.8     Smokeless tobacco: Never   Substance and Sexual Activity     Alcohol use: No     Drug use: No       VITALS:  Patient Vitals for the past 24 hrs:   BP Temp Pulse Resp SpO2 Height Weight   22 1507 139/61 98.6  F (37  C) 70 18 95 % 1.575 m (5' 2\") 64.4 kg (142 lb)        PHYSICAL EXAM    Constitutional:  Awake, alert, in no apparent distress  HENT:  Normocephalic, Atraumatic. Bilateral external ears normal. Oropharynx moist. Nose normal. Neck- Normal range of motion  Eyes:  PERRL, EOMI with no signs of entrapment, Conjunctiva normal, No discharge.   Respiratory:  Normal breath sounds, No respiratory distress, No wheezing.    Cardiovascular: No appreciable rubs or gallops. 2/6 systolic ejection murmur, trace lower extremity edema   GI:  Soft, No distension, No palpable masses. Mild upper abdominal tenderness   Musculoskeletal: Trace lower extremity edema. Good range of motion in all major joints. No tenderness to palpation or major deformities noted.  Integument:  Warm, Dry, No erythema, No rash.   Neurologic:  Alert & oriented, Normal motor function, Normal sensory function, No focal deficits " noted.   Psychiatric:  Affect normal, Judgment normal, Mood normal.     LAB:  All pertinent labs reviewed and interpreted.  Results for orders placed or performed during the hospital encounter of 11/09/22   XR Chest 2 Views     Status: None    Narrative    EXAM: XR CHEST 2 VIEWS  LOCATION: [facility]  DATE/TIME: 11/9/2022 6:49 PM    INDICATION: shortness of breath  COMPARISON: Chest radiograph 10/21/2022.      Impression    IMPRESSION: Stable cardiomediastinal silhouette status post aortic valve replacement. No focal airspace disease, pleural effusion or pneumothorax. No acute bony abnormality.   INR     Status: Normal   Result Value Ref Range    INR 1.13 0.85 - 1.15   Partial thromboplastin time     Status: Normal   Result Value Ref Range    aPTT 29 22 - 38 Seconds   Comprehensive metabolic panel     Status: Abnormal   Result Value Ref Range    Sodium 142 136 - 145 mmol/L    Potassium 4.0 3.4 - 5.3 mmol/L    Chloride 108 (H) 98 - 107 mmol/L    Carbon Dioxide (CO2) 25 22 - 29 mmol/L    Anion Gap 9 7 - 15 mmol/L    Urea Nitrogen 17.3 8.0 - 23.0 mg/dL    Creatinine 1.17 (H) 0.51 - 0.95 mg/dL    Calcium 8.8 8.8 - 10.2 mg/dL    Glucose 87 70 - 99 mg/dL    Alkaline Phosphatase 76 35 - 104 U/L    AST 22 10 - 35 U/L    ALT 16 10 - 35 U/L    Protein Total 6.3 (L) 6.4 - 8.3 g/dL    Albumin 3.8 3.5 - 5.2 g/dL    Bilirubin Total 0.5 <=1.2 mg/dL    GFR Estimate 45 (L) >60 mL/min/1.73m2   Magnesium     Status: Normal   Result Value Ref Range    Magnesium 2.1 1.7 - 2.3 mg/dL   Troponin T, High Sensitivity     Status: Abnormal   Result Value Ref Range    Troponin T, High Sensitivity 34 (H) <=14 ng/L   Symptomatic; Yes; 10/26/2022 Influenza A/B & SARS-CoV2 (COVID-19) Virus PCR Multiplex Nasopharyngeal     Status: Normal    Specimen: Nasopharyngeal; Swab   Result Value Ref Range    Influenza A PCR Negative Negative    Influenza B PCR Negative Negative    RSV PCR Negative Negative    SARS CoV2 PCR Negative  Negative    Narrative    Testing was performed using the Xpert Xpress CoV2/Flu/RSV Assay on the [employer] GeneXpert Instrument. This test should be ordered for the detection of SARS-CoV-2 and influenza viruses in individuals who meet clinical and/or epidemiological criteria. Test performance is unknown in asymptomatic patients. This test is for in vitro diagnostic use under the FDA EUA for laboratories certified under CLIA to perform high or moderate complexity testing. This test has not been FDA cleared or approved. A negative result does not rule out the presence of PCR inhibitors in the specimen or target RNA in concentration below the limit of detection for the assay. If only one viral target is positive but coinfection with multiple targets is suspected, the sample should be re-tested with another FDA cleared, approved, or authorized test, if coinfection would change clinical management. This test was validated by the [facility]. These laboratories are certified under the Clinical Laboratory Improvement Amendments of 1988 (CLIA-88) as qualified to perform high complexity laboratory testing.   CBC with platelets and differential     Status: Abnormal   Result Value Ref Range    WBC Count 8.2 4.0 - 11.0 10e3/uL    RBC Count 3.62 (L) 3.80 - 5.20 10e6/uL    Hemoglobin 10.8 (L) 11.7 - 15.7 g/dL    Hematocrit 33.8 (L) 35.0 - 47.0 %    MCV 93 78 - 100 fL    MCH 29.8 26.5 - 33.0 pg    MCHC 32.0 31.5 - 36.5 g/dL    RDW 19.1 (H) 10.0 - 15.0 %    Platelet Count 217 150 - 450 10e3/uL    % Neutrophils 70 %    % Lymphocytes 17 %    % Monocytes 8 %    % Eosinophils 3 %    % Basophils 1 %    % Immature Granulocytes 1 %    NRBCs per 100 WBC 0 <1 /100    Absolute Neutrophils 5.8 1.6 - 8.3 10e3/uL    Absolute Lymphocytes 1.4 0.8 - 5.3 10e3/uL    Absolute Monocytes 0.7 0.0 - 1.3 10e3/uL    Absolute Eosinophils 0.2 0.0 - 0.7 10e3/uL    Absolute Basophils 0.1 0.0 - 0.2 10e3/uL    Absolute Immature Granulocytes 0.1  <=0.4 10e3/uL    Absolute NRBCs 0.0 10e3/uL   Troponin T, High Sensitivity (now)     Status: Abnormal   Result Value Ref Range    Troponin T, High Sensitivity 30 (H) <=14 ng/L   Nt probnp inpatient     Status: Normal   Result Value Ref Range    N terminal Pro BNP Inpatient 728 0 - 1,800 pg/mL   CBC with platelets differential     Status: Abnormal    Narrative    The following orders were created for panel order CBC with platelets differential.  Procedure                               Abnormality         Status                     ---------                               -----------         ------                     CBC with platelets and d...[[id number]]  Abnormal            Final result                 Please view results for these tests on the individual orders.       RADIOLOGY:  Reviewed all pertinent imaging. Please see official radiology report.  XR Chest 2 Views   Final Result   IMPRESSION: Stable cardiomediastinal silhouette status post aortic valve replacement. No focal airspace disease, pleural effusion or pneumothorax. No acute bony abnormality.          EKG:    Normal sinus rhythm with first-degree AV block.  Poor R wave progression anteriorly.  Normal QRS.  Normal ST segments.  Unchanged compared to September 21, 2022  I have independently reviewed and interpreted the EKG(s) documented above.      I, Anum Martínez, am serving as a scribe to document services personally performed by Marko Zuniga MD, based on my observation and the provider's statements to me. I, Marko Zuniga MD attest that Anum Martínez is acting in a scribe capacity, has observed my performance of the services and has documented them in accordance with my direction.    Marko Zuniga M.D.  Emergency Medicine  [facility] EMERGENCY DEPARTMENT     Marko Zuniga MD  11/09/22 [id number]

## 2022-11-10 NOTE — TELEPHONE ENCOUNTER
Chart reviewed + labs from ED visit.       LM with patient to address any concerns or questions prior to her visit with Kim on 11/15. She was given direct line to call back with questions, otherwise, she was also recommended to schedule a follow up with her PMD in the interim of seeing cardiology. -[facility]

## 2022-11-11 LAB
ATRIAL RATE - MUSE: 67 BPM
DIASTOLIC BLOOD PRESSURE - MUSE: NORMAL MMHG
INTERPRETATION ECG - MUSE: NORMAL
P AXIS - MUSE: 70 DEGREES
PR INTERVAL - MUSE: 214 MS
QRS DURATION - MUSE: 86 MS
QT - MUSE: 424 MS
QTC - MUSE: 448 MS
R AXIS - MUSE: 62 DEGREES
SYSTOLIC BLOOD PRESSURE - MUSE: NORMAL MMHG
T AXIS - MUSE: 63 DEGREES
VENTRICULAR RATE- MUSE: 67 BPM

## 2022-11-15 ENCOUNTER — OFFICE VISIT (OUTPATIENT)
Dept: CARDIOLOGY | Facility: CLINIC | Age: 87
End: 2022-11-15
Attending: NURSE PRACTITIONER
Payer: COMMERCIAL

## 2022-11-15 VITALS
WEIGHT: 144 LBS | RESPIRATION RATE: 20 BRPM | OXYGEN SATURATION: 98 % | SYSTOLIC BLOOD PRESSURE: 148 MMHG | HEART RATE: 70 BPM | DIASTOLIC BLOOD PRESSURE: 70 MMHG | BODY MASS INDEX: 26.34 KG/M2

## 2022-11-15 DIAGNOSIS — I10 ACCELERATED HYPERTENSION: ICD-10-CM

## 2022-11-15 DIAGNOSIS — I50.32 CHRONIC HEART FAILURE WITH PRESERVED EJECTION FRACTION (H): Primary | ICD-10-CM

## 2022-11-15 DIAGNOSIS — I25.110 CORONARY ARTERY DISEASE INVOLVING NATIVE CORONARY ARTERY OF NATIVE HEART WITH UNSTABLE ANGINA PECTORIS (H): ICD-10-CM

## 2022-11-15 LAB
ANION GAP SERPL CALCULATED.3IONS-SCNC: 11 MMOL/L (ref 7–15)
BUN SERPL-MCNC: 18.6 MG/DL (ref 8–23)
CALCIUM SERPL-MCNC: 8.8 MG/DL (ref 8.8–10.2)
CHLORIDE SERPL-SCNC: 109 MMOL/L (ref 98–107)
CREAT SERPL-MCNC: 1.13 MG/DL (ref 0.51–0.95)
DEPRECATED HCO3 PLAS-SCNC: 24 MMOL/L (ref 22–29)
GFR SERPL CREATININE-BSD FRML MDRD: 47 ML/MIN/1.73M2
GLUCOSE SERPL-MCNC: 79 MG/DL (ref 70–99)
POTASSIUM SERPL-SCNC: 4 MMOL/L (ref 3.4–5.3)
SODIUM SERPL-SCNC: 144 MMOL/L (ref 136–145)

## 2022-11-15 PROCEDURE — 99215 OFFICE O/P EST HI 40 MIN: CPT | Performed by: NURSE PRACTITIONER

## 2022-11-15 PROCEDURE — 80048 BASIC METABOLIC PNL TOTAL CA: CPT | Performed by: NURSE PRACTITIONER

## 2022-11-15 PROCEDURE — 36415 COLL VENOUS BLD VENIPUNCTURE: CPT | Performed by: NURSE PRACTITIONER

## 2022-11-15 RX ORDER — AMLODIPINE BESYLATE 2.5 MG/1
5 TABLET ORAL DAILY
Qty: 180 TABLET | Refills: 1 | Status: SHIPPED | OUTPATIENT
Start: 2022-11-15 | End: 2023-01-25

## 2022-11-15 NOTE — PATIENT INSTRUCTIONS
Sherice Alvarez,    It was a pleasure to see you today at [facility] HEART [facility].     My recommendations after this visit include:  - Please follow up with Dr Feldman in 4-6 weeks   - I have checked labs and will contact you with results  - Increase amlodipine to 5 mg daily      Kim Arreola, CNP

## 2022-11-15 NOTE — PROGRESS NOTES
Assessment/Recommendations   Assessment:    1.  Heart failure with preserved ejection fraction, NYHA class II: Compensated.  She has fatigue and mild dyspnea on exertion.  Her weight has been stable.  She denies orthopnea or PND.  She is enrolled in [facility] meal service and watches her salt intake  2.  Hypertension: Elevated.  Blood pressure 188/78 with a recheck of 148/70.  She states her blood pressure has been elevated at home but cannot give me any specific readings.  3.  CAD: She does have some chest tightness when her blood pressure is elevated she states.  Recent Lexiscan in early May was negative for inducible myocardial ischemia or infarction.    She continues aspirin.  She states she has not been taking Repatha due to insurance not covering it.  I will inform Dr. Feldman of this    Plan:  1.  BMP pending  2.  Increase amlodipine to 5 mg daily  3.  Continue monitoring blood pressure and weights  4.  Continue low-sodium diet  5.  Follow-up with primary provider regarding many other health concerns    Sherice Alvarez will follow up with Dr. Feldman in 4-6 weeks.     History of Present Illness/Subjective    Ms. Sherice Alvarez is a 88 year old female seen at [facility] heart failure clinic today for continued follow-up.  She follows up for heart failure with preserved ejection fraction.  She had a recent echocardiogram which showed an ejection fraction of 60 to 65%.  She also had a Lexiscan early May which was negative for inducible myocardial ischemia or infarction.  She has a past medical history significant for hypertension, TAVR, DVT, dyslipidemia, CAD, and chronic kidney disease stage III.  Her last coronary angiogram was in 2020 which showed patent stents in her RCA, proximal LAD and circumflex.  It was noted she had severe disease in the distal LAD.  She is also dealing with a lot of family stress and has anxiety and depression regarding these issues.    She has had multiple  visits to the ED regarding chest pain.  There has been no EKG changes.  She had a Lexiscan in May which was negative.  Today, she continues to have fatigue, dyspnea exertion, chest tightness, headaches, hoarseness, postnasal drip, plugged ears.  She has many concerns today.  She denies orthopnea, PND, palpitations, abdominal fullness/bloating and lower extremity edema.      She is monitoring home weights which are stable around 44 pounds.  She receives [facility] meals.  She is      Physical Examination Review of Systems   BP (!) 148/70   Pulse 70   Resp 20   Wt 65.3 kg (144 lb)   SpO2 98%   BMI 26.34 kg/m    Body mass index is 26.34 kg/m .  Wt Readings from Last 3 Encounters:   11/15/22 65.3 kg (144 lb)   11/09/22 64.4 kg (142 lb)   09/21/22 64.9 kg (143 lb)       General Appearance:   no acute distress   ENT/Mouth: Wearing mask   EYES:  no scleral icterus, normal conjunctivae   Neck: no thyromegaly   Chest/Lungs:   lungs are clear to auscultation, no rales or wheezing, equal chest wall expansion    Cardiovascular:   Regular. Normal first and second heart sounds with no murmurs, rubs, or gallops, no edema bilaterally    Abdomen:  bowel sounds are present   Extremities: no cyanosis or clubbing   Skin: no xanthelasma, warm.    Neurologic: normal  bilateral, no tremors     Psychiatric: alert and oriented x3                                              Medical History  Surgical History Family History Social History   Past Medical History:   Diagnosis Date     Arthritis      Asthma      CAD (coronary artery disease)      Cancer (H)     basal cell     Chronic kidney disease     ckd 3     Chronic pain syndrome      Congestive heart failure (H)      Crohn's disease (H)      GERD (gastroesophageal reflux disease)      Hx of heart artery stent 11/01/2016    1 stent R Proximal CA and 1 Stent L Proximal circumflex  12/2016 Stent proximal LAD     Hyperlipidemia      Hypertension      NSTEMI (non-ST elevated myocardial  infarction) (H) 2016     PONV (postoperative nausea and vomiting)     severe povn with last knee surgery     Restless legs syndrome      Stroke (H) 2010    numbness rt jaw    Past Surgical History:   Procedure Laterality Date     APPENDECTOMY       CARDIAC CATHETERIZATION  2016    multiple vessel disease.  no intervention     CARDIAC CATHETERIZATION  2016     CARDIAC CATHETERIZATION N/A 2016    Procedure: Coronary Angiogram;  Surgeon: Sunil Moran MD;  Location: [facility];  Service:      CAROTID ENDARTERECTOMY       CAROTID ENDARTERECTOMY Right 2010     CERVICAL LAMINECTOMY  1994      SECTION       CV CORONARY ANGIOGRAM N/A 2020    Procedure: Coronary Angiogram;  Surgeon: Vinita Ramos MD;  Location: [facility] Lab;  Service: Cardiology     CV LEFT HEART CATHETERIZATION WITHOUT LEFT VENTRICULOGRAM Left 2020    Procedure: Left Heart Catheterization Without Left Ventriculogram;  Surgeon: Jerad Lerner MD;  Location: [facility];  Service: Cardiology     CV TRANSCATHETER AORTIC VALVE REPLACEMENT - OTHER APPROACH N/A 2020    Procedure: CV TRANSCATHETER AORTIC VALVE REPLACEMENT - RIGHT SUBCLAVIAN APPROACH;  Surgeon: John Caceres MD;  Location: [facility];  Service: Cardiology     HEART CATH, ANGIOPLASTY       HEMORRHOIDECTOMY EXTERNAL       IR LUMBAR EPIDURAL STEROID INJECTION  2014     IR LUMBAR NERVE ROOT INJECTION  10/01/2013     JOINT REPLACEMENT       VT ESOPHAGOGASTRODUODENOSCOPY TRANSORAL DIAGNOSTIC N/A 07/10/2019    Procedure: ESOPHAGOGASTRODUODENOSCOPY (EGD) with gastric biopsy;  Surgeon: Sunil Stuart MD;  Location: [facility];  Service: Gastroenterology     VT REPLACE AORTIC VALVE OPEN AXILLRY ARTRY APPROACH N/A 2020    Procedure: OR TRANSCATHETER AORTIC VALVE REPLACEMENT, SUBCLAVIAN APPROACH;  Surgeon: Bhavin Cuadra MD;  Location: [facility];   Service: Cardiology     TONSILLECTOMY & ADENOIDECTOMY       TOTAL KNEE ARTHROPLASTY Right      TRANSCATHETER AORTIC-VALVE REPLACEMENT       ZZC TOTAL KNEE ARTHROPLASTY Left 2021    Procedure: LEFT TOTAL KNEE ARTHROPLASTY;  Surgeon: Doug Rhodes MD;  Location: [facility];  Service: Orthopedics    Family History   Problem Relation Age of Onset     Atrial fibrillation Mother      Parkinsonism Father     Social History     Socioeconomic History     Marital status:      Spouse name: Not on file     Number of children: Not on file     Years of education: Not on file     Highest education level: Not on file   Occupational History     Not on file   Tobacco Use     Smoking status: Former     Types: Cigarettes     Quit date: 1980     Years since quittin.9     Smokeless tobacco: Never   Substance and Sexual Activity     Alcohol use: No     Drug use: No     Sexual activity: Not on file   Other Topics Concern     Not on file   Social History Narrative     Not on file     Social Determinants of Health     Financial Resource Strain: Not on file   Food Insecurity: Not on file   Transportation Needs: Not on file   Physical Activity: Not on file   Stress: Not on file   Social Connections: Not on file   Intimate Partner Violence: Not on file   Housing Stability: Not on file          Medications  Allergies   Current Outpatient Medications   Medication Sig Dispense Refill     acetaminophen (TYLENOL) 500 MG tablet Take 500-1,000 mg by mouth every 8 hours as needed for mild pain       albuterol (PROVENTIL HFA;VENTOLIN HFA) 90 mcg/actuation inhaler [ALBUTEROL (PROVENTIL HFA;VENTOLIN HFA) 90 MCG/ACTUATION INHALER] Inhale 1-2 puffs every 6 (six) hours as needed for wheezing.       amLODIPine (NORVASC) 2.5 MG tablet Take 2 tablets (5 mg) by mouth daily 180 tablet 1     aspirin (ASA) 81 MG EC tablet Take 81 mg by mouth daily       beclomethasone (QVAR) 80 mcg/actuation inhaler Inhale 1 puff into the lungs 2  times daily as needed        ferrous sulfate 325 (65 FE) MG tablet [FERROUS SULFATE 325 (65 FE) MG TABLET] Take 1 tablet (325 mg total) by mouth daily with breakfast. (Patient taking differently: Take 1 tablet by mouth twice a week)  0     melatonin 5 mg Tab tablet [MELATONIN 5 MG TAB TABLET] Take 5 mg by mouth at bedtime as needed (for sleep).        metoprolol succinate ER (TOPROL XL) 25 MG 24 hr tablet Take 1 tablet (25 mg) by mouth 2 times daily 180 tablet 3     Multiple Vitamins-Minerals (SENIOR MULTIVITAMIN PLUS PO) Take 1 tablet by mouth daily       nitroglycerin (NITROSTAT) 0.4 MG SL tablet [NITROGLYCERIN (NITROSTAT) 0.4 MG SL TABLET] Place 1 tablet (0.4 mg total) under the tongue every 5 (five) minutes as needed for chest pain. 90 tablet 0     OLANZapine (ZYPREXA) 2.5 MG tablet Take 1 tablet (2.5 mg) by mouth 2 times daily as needed (anxiety) 14 tablet 0     Vitamin D3 (CHOLECALCIFEROL) 125 MCG (5000 UT) tablet Take 1 tablet by mouth daily       coenzyme Q10 100 mg capsule [COENZYME Q10 100 MG CAPSULE] Take 100 mg by mouth daily.  (Patient not taking: Reported on 11/15/2022)       diclofenac (VOLTAREN) 1 % topical gel Apply 2-4 g topically 4 times daily as needed APPLY TO LEFT SHOULDER AND LEFT KNEE. (Patient not taking: Reported on 11/15/2022)       evolocumab (REPATHA) 140 MG/ML prefilled syringe Inject 1 mL (140 mg) Subcutaneous every 14 days (Patient not taking: Reported on 9/20/2022) 2 mL 11     furosemide (LASIX) 20 MG tablet Take 1 tablet (20 mg) by mouth 2 times daily for 30 days 60 tablet 0     omeprazole (PRILOSEC) 20 MG DR capsule Take 20 mg by mouth daily (Patient not taking: Reported on 11/15/2022)       rOPINIRole (REQUIP) 1 MG tablet Take 1 tablet (1 mg) by mouth 2 times daily for 30 days (Patient taking differently: Take 2 mg by mouth At Bedtime) 60 tablet 0     rOPINIRole (REQUIP) 2 MG tablet Take 1 tablet (2 mg) by mouth At Bedtime for 30 days 30 tablet 0    Allergies   Allergen Reactions      Amitriptyline Hcl [Amitriptyline] Other (See Comments)     Erythromycin Diarrhea and Other (See Comments)     Childhood reaction     Gabapentin Other (See Comments)     Jerking movements, swelling     Naproxen Unknown and Other (See Comments)     Other Environmental Allergy Unknown     Molds, dander     Pregabalin Other (See Comments)         Lab Results    Chemistry/lipid CBC Cardiac Enzymes/BNP/TSH/INR   Lab Results   Component Value Date    CHOL 222 (H) 07/12/2022    HDL 59 07/12/2022    TRIG 75 07/12/2022    BUN 17.3 11/09/2022     11/09/2022    CO2 25 11/09/2022    Lab Results   Component Value Date    WBC 8.2 11/09/2022    HGB 10.8 (L) 11/09/2022    HCT 33.8 (L) 11/09/2022    MCV 93 11/09/2022     11/09/2022    Lab Results   Component Value Date    TROPONINI 0.03 08/02/2022    BNP 62 08/02/2022    TSH 3.20 03/03/2022    INR 1.13 11/09/2022             This note has been dictated using voice recognition software. Any grammatical, typographical, or context distortions are unintentional and inherent to the software    40 minutes spent on the date of encounter doing chart review, review of outside records, review of test results, interpretation with above tests, patient visit and documentation.

## 2022-11-15 NOTE — Clinical Note
MAXI, not sure you knew but patient not taking repatha anymore as her insurance will not cover it she stated today.

## 2022-11-15 NOTE — LETTER
11/15/2022    Anum Rosenberg, CINDI  [address] [address]    RE: Sherice Alvarez       Dear Colleague,     I had the pleasure of seeing Sherice Alvarez in the [facility].        Assessment/Recommendations   Assessment:    1.  Heart failure with preserved ejection fraction, NYHA class II: Compensated.  She has fatigue and mild dyspnea on exertion.  Her weight has been stable.  She denies orthopnea or PND.  She is enrolled in [facility] meal service and watches her salt intake  2.  Hypertension: Elevated.  Blood pressure 188/78 with a recheck of 148/70.  She states her blood pressure has been elevated at home but cannot give me any specific readings.  3.  CAD: She does have some chest tightness when her blood pressure is elevated she states.  Recent Lexiscan in early May was negative for inducible myocardial ischemia or infarction.    She continues aspirin.  She states she has not been taking Repatha due to insurance not covering it.  I will inform Dr. Feldman of this    Plan:  1.  BMP pending  2.  Increase amlodipine to 5 mg daily  3.  Continue monitoring blood pressure and weights  4.  Continue low-sodium diet  5.  Follow-up with primary provider regarding many other health concerns    Sherice Alvarez will follow up with Dr. Feldman in 4-6 weeks.     History of Present Illness/Subjective    Ms. Sherice Alvarez is a 88 year old female seen at [facility] heart failure clinic today for continued follow-up.  She follows up for heart failure with preserved ejection fraction.  She had a recent echocardiogram which showed an ejection fraction of 60 to 65%.  She also had a Lexiscan early May which was negative for inducible myocardial ischemia or infarction.  She has a past medical history significant for hypertension, TAVR, DVT, dyslipidemia, CAD, and chronic kidney disease stage III.  Her last coronary angiogram was in 2020 which showed patent stents in her RCA,  proximal LAD and circumflex.  It was noted she had severe disease in the distal LAD.  She is also dealing with a lot of family stress and has anxiety and depression regarding these issues.    She has had multiple visits to the ED regarding chest pain.  There has been no EKG changes.  She had a Lexiscan in May which was negative.  Today, she continues to have fatigue, dyspnea exertion, chest tightness, headaches, hoarseness, postnasal drip, plugged ears.  She has many concerns today.  She denies orthopnea, PND, palpitations, abdominal fullness/bloating and lower extremity edema.      She is monitoring home weights which are stable around 44 pounds.  She receives [facility] meals.  She is      Physical Examination Review of Systems   BP (!) 148/70   Pulse 70   Resp 20   Wt 65.3 kg (144 lb)   SpO2 98%   BMI 26.34 kg/m    Body mass index is 26.34 kg/m .  Wt Readings from Last 3 Encounters:   11/15/22 65.3 kg (144 lb)   11/09/22 64.4 kg (142 lb)   09/21/22 64.9 kg (143 lb)       General Appearance:   no acute distress   ENT/Mouth: Wearing mask   EYES:  no scleral icterus, normal conjunctivae   Neck: no thyromegaly   Chest/Lungs:   lungs are clear to auscultation, no rales or wheezing, equal chest wall expansion    Cardiovascular:   Regular. Normal first and second heart sounds with no murmurs, rubs, or gallops, no edema bilaterally    Abdomen:  bowel sounds are present   Extremities: no cyanosis or clubbing   Skin: no xanthelasma, warm.    Neurologic: normal  bilateral, no tremors     Psychiatric: alert and oriented x3                                              Medical History  Surgical History Family History Social History   Past Medical History:   Diagnosis Date     Arthritis      Asthma      CAD (coronary artery disease)      Cancer (H)     basal cell     Chronic kidney disease     ckd 3     Chronic pain syndrome      Congestive heart failure (H)      Crohn's disease (H)      GERD (gastroesophageal reflux  disease)      Hx of heart artery stent 2016    1 stent R Proximal CA and 1 Stent L Proximal circumflex  2016 Stent proximal LAD     Hyperlipidemia      Hypertension      NSTEMI (non-ST elevated myocardial infarction) (H) 2016     PONV (postoperative nausea and vomiting)     severe povn with last knee surgery     Restless legs syndrome      Stroke (H) 2010    numbness rt jaw    Past Surgical History:   Procedure Laterality Date     APPENDECTOMY       CARDIAC CATHETERIZATION  2016    multiple vessel disease.  no intervention     CARDIAC CATHETERIZATION  2016     CARDIAC CATHETERIZATION N/A 2016    Procedure: Coronary Angiogram;  Surgeon: Sunil Moran MD;  Location: [facility];  Service:      CAROTID ENDARTERECTOMY       CAROTID ENDARTERECTOMY Right 2010     CERVICAL LAMINECTOMY  1994      SECTION       CV CORONARY ANGIOGRAM N/A 2020    Procedure: Coronary Angiogram;  Surgeon: Vinita Ramos MD;  Location: [facility];  Service: Cardiology     CV LEFT HEART CATHETERIZATION WITHOUT LEFT VENTRICULOGRAM Left 2020    Procedure: Left Heart Catheterization Without Left Ventriculogram;  Surgeon: Jerad Lerner MD;  Location: [facility];  Service: Cardiology     CV TRANSCATHETER AORTIC VALVE REPLACEMENT - OTHER APPROACH N/A 2020    Procedure: CV TRANSCATHETER AORTIC VALVE REPLACEMENT - RIGHT SUBCLAVIAN APPROACH;  Surgeon: John Caceres MD;  Location: [facility];  Service: Cardiology     HEART CATH, ANGIOPLASTY       HEMORRHOIDECTOMY EXTERNAL       IR LUMBAR EPIDURAL STEROID INJECTION  2014     IR LUMBAR NERVE ROOT INJECTION  10/01/2013     JOINT REPLACEMENT       RI ESOPHAGOGASTRODUODENOSCOPY TRANSORAL DIAGNOSTIC N/A 07/10/2019    Procedure: ESOPHAGOGASTRODUODENOSCOPY (EGD) with gastric biopsy;  Surgeon: Sunil Stuart MD;  Location: [facility];  Service: Gastroenterology     RI  REPLACE AORTIC VALVE OPEN AXILLRY ARTRY APPROACH N/A 2020    Procedure: OR TRANSCATHETER AORTIC VALVE REPLACEMENT, SUBCLAVIAN APPROACH;  Surgeon: Bhavin Cuadra MD;  Location: [facility];  Service: Cardiology     TONSILLECTOMY & ADENOIDECTOMY       TOTAL KNEE ARTHROPLASTY Right      TRANSCATHETER AORTIC-VALVE REPLACEMENT       ZZC TOTAL KNEE ARTHROPLASTY Left 2021    Procedure: LEFT TOTAL KNEE ARTHROPLASTY;  Surgeon: Doug Rhodes MD;  Location: [facility];  Service: Orthopedics    Family History   Problem Relation Age of Onset     Atrial fibrillation Mother      Parkinsonism Father     Social History     Socioeconomic History     Marital status:      Spouse name: Not on file     Number of children: Not on file     Years of education: Not on file     Highest education level: Not on file   Occupational History     Not on file   Tobacco Use     Smoking status: Former     Types: Cigarettes     Quit date: 1980     Years since quittin.9     Smokeless tobacco: Never   Substance and Sexual Activity     Alcohol use: No     Drug use: No     Sexual activity: Not on file   Other Topics Concern     Not on file   Social History Narrative     Not on file     Social Determinants of Health     Financial Resource Strain: Not on file   Food Insecurity: Not on file   Transportation Needs: Not on file   Physical Activity: Not on file   Stress: Not on file   Social Connections: Not on file   Intimate Partner Violence: Not on file   Housing Stability: Not on file          Medications  Allergies   Current Outpatient Medications   Medication Sig Dispense Refill     acetaminophen (TYLENOL) 500 MG tablet Take 500-1,000 mg by mouth every 8 hours as needed for mild pain       albuterol (PROVENTIL HFA;VENTOLIN HFA) 90 mcg/actuation inhaler [ALBUTEROL (PROVENTIL HFA;VENTOLIN HFA) 90 MCG/ACTUATION INHALER] Inhale 1-2 puffs every 6 (six) hours as needed for wheezing.       amLODIPine  (NORVASC) 2.5 MG tablet Take 2 tablets (5 mg) by mouth daily 180 tablet 1     aspirin (ASA) 81 MG EC tablet Take 81 mg by mouth daily       beclomethasone (QVAR) 80 mcg/actuation inhaler Inhale 1 puff into the lungs 2 times daily as needed        ferrous sulfate 325 (65 FE) MG tablet [FERROUS SULFATE 325 (65 FE) MG TABLET] Take 1 tablet (325 mg total) by mouth daily with breakfast. (Patient taking differently: Take 1 tablet by mouth twice a week)  0     melatonin 5 mg Tab tablet [MELATONIN 5 MG TAB TABLET] Take 5 mg by mouth at bedtime as needed (for sleep).        metoprolol succinate ER (TOPROL XL) 25 MG 24 hr tablet Take 1 tablet (25 mg) by mouth 2 times daily 180 tablet 3     Multiple Vitamins-Minerals (SENIOR MULTIVITAMIN PLUS PO) Take 1 tablet by mouth daily       nitroglycerin (NITROSTAT) 0.4 MG SL tablet [NITROGLYCERIN (NITROSTAT) 0.4 MG SL TABLET] Place 1 tablet (0.4 mg total) under the tongue every 5 (five) minutes as needed for chest pain. 90 tablet 0     OLANZapine (ZYPREXA) 2.5 MG tablet Take 1 tablet (2.5 mg) by mouth 2 times daily as needed (anxiety) 14 tablet 0     Vitamin D3 (CHOLECALCIFEROL) 125 MCG (5000 UT) tablet Take 1 tablet by mouth daily       coenzyme Q10 100 mg capsule [COENZYME Q10 100 MG CAPSULE] Take 100 mg by mouth daily.  (Patient not taking: Reported on 11/15/2022)       diclofenac (VOLTAREN) 1 % topical gel Apply 2-4 g topically 4 times daily as needed APPLY TO LEFT SHOULDER AND LEFT KNEE. (Patient not taking: Reported on 11/15/2022)       evolocumab (REPATHA) 140 MG/ML prefilled syringe Inject 1 mL (140 mg) Subcutaneous every 14 days (Patient not taking: Reported on 9/20/2022) 2 mL 11     furosemide (LASIX) 20 MG tablet Take 1 tablet (20 mg) by mouth 2 times daily for 30 days 60 tablet 0     omeprazole (PRILOSEC) 20 MG DR capsule Take 20 mg by mouth daily (Patient not taking: Reported on 11/15/2022)       rOPINIRole (REQUIP) 1 MG tablet Take 1 tablet (1 mg) by mouth 2 times daily  for 30 days (Patient taking differently: Take 2 mg by mouth At Bedtime) 60 tablet 0     rOPINIRole (REQUIP) 2 MG tablet Take 1 tablet (2 mg) by mouth At Bedtime for 30 days 30 tablet 0    Allergies   Allergen Reactions     Amitriptyline Hcl [Amitriptyline] Other (See Comments)     Erythromycin Diarrhea and Other (See Comments)     Childhood reaction     Gabapentin Other (See Comments)     Jerking movements, swelling     Naproxen Unknown and Other (See Comments)     Other Environmental Allergy Unknown     Molds, dander     Pregabalin Other (See Comments)         Lab Results    Chemistry/lipid CBC Cardiac Enzymes/BNP/TSH/INR   Lab Results   Component Value Date    CHOL 222 (H) 07/12/2022    HDL 59 07/12/2022    TRIG 75 07/12/2022    BUN 17.3 11/09/2022     11/09/2022    CO2 25 11/09/2022    Lab Results   Component Value Date    WBC 8.2 11/09/2022    HGB 10.8 (L) 11/09/2022    HCT 33.8 (L) 11/09/2022    MCV 93 11/09/2022     11/09/2022    Lab Results   Component Value Date    TROPONINI 0.03 08/02/2022    BNP 62 08/02/2022    TSH 3.20 03/03/2022    INR 1.13 11/09/2022             This note has been dictated using voice recognition software. Any grammatical, typographical, or context distortions are unintentional and inherent to the software    40 minutes spent on the date of encounter doing chart review, review of outside records, review of test results, interpretation with above tests, patient visit and documentation.                Thank you for allowing me to participate in the care of your patient.      Sincerely,     TAVO Fernandez CNP     [facility]  cc:   TAVO Fernandez CNP  [address], [address]  [place] [address]

## 2022-11-29 ENCOUNTER — TELEPHONE (OUTPATIENT)
Dept: CARDIOLOGY | Facility: CLINIC | Age: 87
End: 2022-11-29

## 2022-11-29 NOTE — TELEPHONE ENCOUNTER
Message  Received: 6 days ago  Ulisses Feldman MD [place], Shelley AHN RN  Can we please contact this 87-year-old lady with a history of heart failure?   Based on this data she is retaining water with increased weight and blood pressure is up.   Can we confirm she is taking Lasix 40 in the morning and 20 in the afternoon?   If short of breath I would suggest she maybe go to 40 twice a day for the next 3 days.   LF           Previous Messages     ----- Message -----   From: Marie Ruff   Sent: 11/23/2022   8:23 AM CST   To: Ulisses Feldman MD            ==LM with patient to discuss her blood pressures and determine if she is retaining any fluid. Direct line provided for call back. -[facility]

## 2022-12-06 NOTE — TELEPHONE ENCOUNTER
No call back received. Follow up scheduled. Encounter closed.-[facility]

## 2022-12-09 ENCOUNTER — LAB REQUISITION (OUTPATIENT)
Dept: LAB | Facility: CLINIC | Age: 87
End: 2022-12-09

## 2022-12-09 DIAGNOSIS — F01.A4: ICD-10-CM

## 2022-12-09 DIAGNOSIS — I50.9 HEART FAILURE, UNSPECIFIED (H): ICD-10-CM

## 2022-12-09 LAB — T4 FREE SERPL-MCNC: 1.09 NG/DL (ref 0.9–1.7)

## 2022-12-09 PROCEDURE — 80048 BASIC METABOLIC PNL TOTAL CA: CPT | Performed by: NURSE PRACTITIONER

## 2022-12-09 PROCEDURE — 84443 ASSAY THYROID STIM HORMONE: CPT | Performed by: NURSE PRACTITIONER

## 2022-12-09 PROCEDURE — 82306 VITAMIN D 25 HYDROXY: CPT | Performed by: NURSE PRACTITIONER

## 2022-12-09 PROCEDURE — 84439 ASSAY OF FREE THYROXINE: CPT | Performed by: NURSE PRACTITIONER

## 2022-12-10 ENCOUNTER — HOSPITAL ENCOUNTER (OUTPATIENT)
Facility: HOSPITAL | Age: 87
Setting detail: OBSERVATION
Discharge: HOME | End: 2022-12-10
Attending: EMERGENCY MEDICINE | Admitting: EMERGENCY MEDICINE
Payer: COMMERCIAL

## 2022-12-10 ENCOUNTER — APPOINTMENT (OUTPATIENT)
Dept: CARDIOLOGY | Facility: HOSPITAL | Age: 87
End: 2022-12-10
Attending: HOSPITALIST
Payer: COMMERCIAL

## 2022-12-10 ENCOUNTER — APPOINTMENT (OUTPATIENT)
Dept: RADIOLOGY | Facility: HOSPITAL | Age: 87
End: 2022-12-10
Attending: EMERGENCY MEDICINE
Payer: COMMERCIAL

## 2022-12-10 VITALS
DIASTOLIC BLOOD PRESSURE: 69 MMHG | HEIGHT: 62 IN | WEIGHT: 144 LBS | BODY MASS INDEX: 26.5 KG/M2 | HEART RATE: 61 BPM | SYSTOLIC BLOOD PRESSURE: 152 MMHG | RESPIRATION RATE: 22 BRPM | TEMPERATURE: 97.8 F | OXYGEN SATURATION: 94 %

## 2022-12-10 DIAGNOSIS — I50.33 ACUTE ON CHRONIC HEART FAILURE WITH PRESERVED EJECTION FRACTION (H): Primary | ICD-10-CM

## 2022-12-10 DIAGNOSIS — R07.9 CHEST PAIN, UNSPECIFIED TYPE: ICD-10-CM

## 2022-12-10 LAB
ANION GAP SERPL CALCULATED.3IONS-SCNC: 10 MMOL/L (ref 7–15)
ANION GAP SERPL CALCULATED.3IONS-SCNC: 18 MMOL/L (ref 7–15)
BASE EXCESS BLDV CALC-SCNC: -0.4 MMOL/L
BUN SERPL-MCNC: 28.6 MG/DL (ref 8–23)
BUN SERPL-MCNC: 29.5 MG/DL (ref 8–23)
CALCIUM SERPL-MCNC: 8.7 MG/DL (ref 8.8–10.2)
CALCIUM SERPL-MCNC: 9.2 MG/DL (ref 8.8–10.2)
CHLORIDE SERPL-SCNC: 106 MMOL/L (ref 98–107)
CHLORIDE SERPL-SCNC: 107 MMOL/L (ref 98–107)
CREAT SERPL-MCNC: 1.35 MG/DL (ref 0.51–0.95)
CREAT SERPL-MCNC: 1.43 MG/DL (ref 0.51–0.95)
DEPRECATED HCO3 PLAS-SCNC: 20 MMOL/L (ref 22–29)
DEPRECATED HCO3 PLAS-SCNC: 24 MMOL/L (ref 22–29)
ERYTHROCYTE [DISTWIDTH] IN BLOOD BY AUTOMATED COUNT: 19.9 % (ref 10–15)
GFR SERPL CREATININE-BSD FRML MDRD: 35 ML/MIN/1.73M2
GFR SERPL CREATININE-BSD FRML MDRD: 38 ML/MIN/1.73M2
GLUCOSE SERPL-MCNC: 77 MG/DL (ref 70–99)
GLUCOSE SERPL-MCNC: 96 MG/DL (ref 70–99)
HCO3 BLDV-SCNC: 25 MMOL/L (ref 24–30)
HCT VFR BLD AUTO: 32.5 % (ref 35–47)
HGB BLD-MCNC: 10.5 G/DL (ref 11.7–15.7)
LVEF ECHO: NORMAL
MAGNESIUM SERPL-MCNC: 2.2 MG/DL (ref 1.7–2.3)
MCH RBC QN AUTO: 29.7 PG (ref 26.5–33)
MCHC RBC AUTO-ENTMCNC: 32.3 G/DL (ref 31.5–36.5)
MCV RBC AUTO: 92 FL (ref 78–100)
NT-PROBNP SERPL-MCNC: 374 PG/ML (ref 0–1800)
OXYHGB MFR BLDV: 85.4 % (ref 70–75)
PCO2 BLDV: 45 MM HG (ref 35–50)
PH BLDV: 7.35 [PH] (ref 7.35–7.45)
PLATELET # BLD AUTO: 213 10E3/UL (ref 150–450)
PO2 BLDV: 52 MM HG (ref 25–47)
POTASSIUM SERPL-SCNC: 4 MMOL/L (ref 3.4–5.3)
POTASSIUM SERPL-SCNC: 4.1 MMOL/L (ref 3.4–5.3)
PROCALCITONIN SERPL IA-MCNC: 0.06 NG/ML
RBC # BLD AUTO: 3.53 10E6/UL (ref 3.8–5.2)
SAO2 % BLDV: 86.6 % (ref 70–75)
SODIUM SERPL-SCNC: 141 MMOL/L (ref 136–145)
SODIUM SERPL-SCNC: 144 MMOL/L (ref 136–145)
TROPONIN T SERPL HS-MCNC: 37 NG/L
TROPONIN T SERPL HS-MCNC: 38 NG/L
TSH SERPL DL<=0.005 MIU/L-ACNC: 5.17 UIU/ML (ref 0.3–4.2)
WBC # BLD AUTO: 8.8 10E3/UL (ref 4–11)

## 2022-12-10 PROCEDURE — 82805 BLOOD GASES W/O2 SATURATION: CPT | Performed by: INTERNAL MEDICINE

## 2022-12-10 PROCEDURE — 99214 OFFICE O/P EST MOD 30 MIN: CPT | Performed by: INTERNAL MEDICINE

## 2022-12-10 PROCEDURE — 250N000011 HC RX IP 250 OP 636: Performed by: INTERNAL MEDICINE

## 2022-12-10 PROCEDURE — 84484 ASSAY OF TROPONIN QUANT: CPT | Performed by: EMERGENCY MEDICINE

## 2022-12-10 PROCEDURE — 71046 X-RAY EXAM CHEST 2 VIEWS: CPT

## 2022-12-10 PROCEDURE — 93306 TTE W/DOPPLER COMPLETE: CPT | Mod: 26 | Performed by: INTERNAL MEDICINE

## 2022-12-10 PROCEDURE — 99219 PR INITIAL OBSERVATION CARE,LEVEL II: CPT | Performed by: INTERNAL MEDICINE

## 2022-12-10 PROCEDURE — 250N000013 HC RX MED GY IP 250 OP 250 PS 637: Performed by: INTERNAL MEDICINE

## 2022-12-10 PROCEDURE — 93306 TTE W/DOPPLER COMPLETE: CPT

## 2022-12-10 PROCEDURE — 36415 COLL VENOUS BLD VENIPUNCTURE: CPT | Performed by: INTERNAL MEDICINE

## 2022-12-10 PROCEDURE — 99285 EMERGENCY DEPT VISIT HI MDM: CPT | Mod: 25

## 2022-12-10 PROCEDURE — 85027 COMPLETE CBC AUTOMATED: CPT | Performed by: EMERGENCY MEDICINE

## 2022-12-10 PROCEDURE — 36415 COLL VENOUS BLD VENIPUNCTURE: CPT | Performed by: EMERGENCY MEDICINE

## 2022-12-10 PROCEDURE — 83880 ASSAY OF NATRIURETIC PEPTIDE: CPT | Performed by: EMERGENCY MEDICINE

## 2022-12-10 PROCEDURE — 99217 PR OBSERVATION CARE DISCHARGE: CPT | Performed by: HOSPITALIST

## 2022-12-10 PROCEDURE — 84484 ASSAY OF TROPONIN QUANT: CPT | Performed by: INTERNAL MEDICINE

## 2022-12-10 PROCEDURE — 84145 PROCALCITONIN (PCT): CPT | Performed by: INTERNAL MEDICINE

## 2022-12-10 PROCEDURE — 93005 ELECTROCARDIOGRAM TRACING: CPT | Performed by: EMERGENCY MEDICINE

## 2022-12-10 PROCEDURE — 83735 ASSAY OF MAGNESIUM: CPT | Performed by: INTERNAL MEDICINE

## 2022-12-10 PROCEDURE — G0378 HOSPITAL OBSERVATION PER HR: HCPCS

## 2022-12-10 PROCEDURE — 96374 THER/PROPH/DIAG INJ IV PUSH: CPT | Mod: 59

## 2022-12-10 PROCEDURE — 80048 BASIC METABOLIC PNL TOTAL CA: CPT | Performed by: EMERGENCY MEDICINE

## 2022-12-10 RX ORDER — ACETAMINOPHEN AND CODEINE PHOSPHATE 300; 30 MG/1; MG/1
1 TABLET ORAL DAILY PRN
COMMUNITY
Start: 2022-12-09 | End: 2023-01-25

## 2022-12-10 RX ORDER — FUROSEMIDE 20 MG
20 TABLET ORAL EVERY EVENING
COMMUNITY
Start: 2022-07-21 | End: 2022-12-10

## 2022-12-10 RX ORDER — ACETAMINOPHEN 325 MG/1
650 TABLET ORAL EVERY 4 HOURS PRN
Status: CANCELLED | OUTPATIENT
Start: 2022-12-10

## 2022-12-10 RX ORDER — QUETIAPINE FUMARATE 25 MG/1
25 TABLET, FILM COATED ORAL AT BEDTIME
COMMUNITY
Start: 2022-12-09 | End: 2024-02-09

## 2022-12-10 RX ORDER — ROPINIROLE 1 MG/1
1 TABLET, FILM COATED ORAL ONCE
Status: COMPLETED | OUTPATIENT
Start: 2022-12-10 | End: 2022-12-10

## 2022-12-10 RX ORDER — DONEPEZIL HYDROCHLORIDE 5 MG/1
5 TABLET, FILM COATED ORAL DAILY
COMMUNITY
Start: 2022-12-09 | End: 2024-01-17

## 2022-12-10 RX ORDER — FUROSEMIDE 10 MG/ML
20 INJECTION INTRAMUSCULAR; INTRAVENOUS
Status: DISCONTINUED | OUTPATIENT
Start: 2022-12-10 | End: 2022-12-10 | Stop reason: HOSPADM

## 2022-12-10 RX ORDER — ISOSORBIDE MONONITRATE 30 MG/1
30 TABLET, EXTENDED RELEASE ORAL DAILY
Status: DISCONTINUED | OUTPATIENT
Start: 2022-12-10 | End: 2022-12-10 | Stop reason: HOSPADM

## 2022-12-10 RX ORDER — FUROSEMIDE 40 MG
40 TABLET ORAL 2 TIMES DAILY
Qty: 60 TABLET | Refills: 0 | Status: SHIPPED | OUTPATIENT
Start: 2022-12-10 | End: 2023-01-25

## 2022-12-10 RX ORDER — LIDOCAINE 40 MG/G
CREAM TOPICAL
Status: DISCONTINUED | OUTPATIENT
Start: 2022-12-10 | End: 2022-12-10 | Stop reason: HOSPADM

## 2022-12-10 RX ORDER — FUROSEMIDE 20 MG
40 TABLET ORAL EVERY MORNING
COMMUNITY
End: 2022-12-10

## 2022-12-10 RX ORDER — METOPROLOL SUCCINATE 25 MG/1
25 TABLET, EXTENDED RELEASE ORAL DAILY
Status: COMPLETED | OUTPATIENT
Start: 2022-12-10 | End: 2022-12-10

## 2022-12-10 RX ORDER — NITROGLYCERIN 0.4 MG/1
0.4 TABLET SUBLINGUAL EVERY 5 MIN PRN
Status: DISCONTINUED | OUTPATIENT
Start: 2022-12-10 | End: 2022-12-10 | Stop reason: HOSPADM

## 2022-12-10 RX ORDER — FLUTICASONE PROPIONATE 50 MCG
2 SPRAY, SUSPENSION (ML) NASAL DAILY
Status: DISCONTINUED | OUTPATIENT
Start: 2022-12-10 | End: 2022-12-10 | Stop reason: HOSPADM

## 2022-12-10 RX ORDER — ISOSORBIDE MONONITRATE 30 MG/1
30 TABLET, EXTENDED RELEASE ORAL DAILY
Qty: 30 TABLET | Refills: 0 | Status: SHIPPED | OUTPATIENT
Start: 2022-12-10 | End: 2023-01-25

## 2022-12-10 RX ADMIN — ISOSORBIDE MONONITRATE 30 MG: 30 TABLET, EXTENDED RELEASE ORAL at 14:43

## 2022-12-10 RX ADMIN — ROPINIROLE HYDROCHLORIDE 1 MG: 1 TABLET, FILM COATED ORAL at 08:56

## 2022-12-10 RX ADMIN — FLUTICASONE PROPIONATE 2 SPRAY: 50 SPRAY, METERED NASAL at 08:57

## 2022-12-10 RX ADMIN — METOPROLOL SUCCINATE 25 MG: 25 TABLET, EXTENDED RELEASE ORAL at 08:51

## 2022-12-10 RX ADMIN — FUROSEMIDE 20 MG: 10 INJECTION, SOLUTION INTRAMUSCULAR; INTRAVENOUS at 08:51

## 2022-12-10 ASSESSMENT — ACTIVITIES OF DAILY LIVING (ADL)
ADLS_ACUITY_SCORE: 35
ADLS_ACUITY_SCORE: 35
ADLS_ACUITY_SCORE: 47
ADLS_ACUITY_SCORE: 35
DEPENDENT_IADLS:: INDEPENDENT
ADLS_ACUITY_SCORE: 35

## 2022-12-10 NOTE — DISCHARGE SUMMARY
Pt has discharge order. Writer text paged Dr. Zhou to confirm that it was ok to discharge pt because she had room assigned in the hospital too. Dr. Zhou said ok to discharge pt. Pt alert and oriented times 4. Denied any pain. Daughter was at bedside. IV taken out. Discharge instruction given to pt and daughter. She acknowledged understanding. Daughter said she already picked up pt's medication from pharmacy. All belonging sent with pt and daughter. Pt left ED at 1632 with daughter.

## 2022-12-10 NOTE — PHARMACY-ADMISSION MEDICATION HISTORY
Pharmacy Note - Admission Medication History    Pertinent Provider Information:   -Patient reports daughter manages all medications, however daughter has not been there in over a month and is unsure exactly what patient is taking. Daughter did set up months worth of medications prior to Thanksgiving, but does not remember exactly what was set up   -Daughter states the patient has not been taking her medications and she will find them's spread out around the house; inquiring about home health nurse or other additional cares at discharge as patient does not take medications as prescribed  -Tylenol #3, Donepezil, Seroquel are all new starts from yesterday (12/9) and the patient has not yet started (added into PTA med list but noted as not yet started)   ______________________________________________________________________    Prior To Admission (PTA) med list completed and updated in EMR.       PTA Med List   Medication Sig Note Last Dose     acetaminophen (TYLENOL) 500 MG tablet Take 500-1,000 mg by mouth every 8 hours as needed for mild pain  Past Week     ACETAMINOPHEN-CODEINE 300-30 MG per tablet Take 1 tablet by mouth daily as needed 12/10/2022: New start 12/9/22; not yet started      albuterol (PROVENTIL HFA;VENTOLIN HFA) 90 mcg/actuation inhaler [ALBUTEROL (PROVENTIL HFA;VENTOLIN HFA) 90 MCG/ACTUATION INHALER] Inhale 1-2 puffs every 6 (six) hours as needed for wheezing.  Unknown at PRN     amLODIPine (NORVASC) 2.5 MG tablet Take 2 tablets (5 mg) by mouth daily  Past Week     aspirin (ASA) 81 MG EC tablet Take 81 mg by mouth daily  Past Week     beclomethasone (QVAR) 80 mcg/actuation inhaler Inhale 1 puff into the lungs 2 times daily as needed   Unknown at PRN     diclofenac (VOLTAREN) 1 % topical gel Apply 2-4 g topically 4 times daily as needed APPLY TO LEFT SHOULDER AND LEFT KNEE.  Unknown at PRN     donepezil (ARICEPT) 5 MG tablet Take 5 mg by mouth daily 12/10/2022: New start 12/9/22; not yet started       ferrous sulfate 325 (65 FE) MG tablet [FERROUS SULFATE 325 (65 FE) MG TABLET] Take 1 tablet (325 mg total) by mouth daily with breakfast. 12/10/2022: Dosing unknown (maybe once a week; potentially twice a week) Past Week     furosemide (LASIX) 20 MG tablet Take 20 mg by mouth every evening  Past Month     furosemide (LASIX) 20 MG tablet Take 40 mg by mouth every morning  Past Week     melatonin 5 mg Tab tablet [MELATONIN 5 MG TAB TABLET] Take 5 mg by mouth at bedtime as needed (for sleep).   Past Week     metoprolol succinate ER (TOPROL XL) 25 MG 24 hr tablet Take 1 tablet (25 mg) by mouth 2 times daily  Past Week at x1     nitroglycerin (NITROSTAT) 0.4 MG SL tablet [NITROGLYCERIN (NITROSTAT) 0.4 MG SL TABLET] Place 1 tablet (0.4 mg total) under the tongue every 5 (five) minutes as needed for chest pain. 12/10/2022: In ambulance en route to ED 12/9/2022     OLANZapine (ZYPREXA) 2.5 MG tablet Take 1 tablet (2.5 mg) by mouth 2 times daily as needed (anxiety) 12/10/2022: Unknown if patient is still taking  Unknown at PRN     omeprazole (PRILOSEC) 20 MG DR capsule Take 20 mg by mouth daily 12/10/2022: Unknown if twice daily or every day  12/9/2022     rOPINIRole (REQUIP) 2 MG tablet Take 1 tablet (2 mg) by mouth At Bedtime for 30 days  Past Week     Vitamin D3 (CHOLECALCIFEROL) 125 MCG (5000 UT) tablet Take 1 tablet by mouth daily  Past Week       Information source(s): Patient, Family member, Clinic records and [facility]  Method of interview communication: in-person and phone    Summary of Changes to PTA Med List  New: Tylenol #3, donepezil, seroquel   Discontinued: coQ10, Repatha, MVT  Changed: n/a    Patient was asked about OTC/herbal products specifically.  PTA med list reflects this.    In the past week, patient estimated taking medication this percent of the time:  less than 50% due to other.    Allergies were reviewed, assessed, and updated with the patient.      Patient did not bring any  medications to the hospital and can't retrieve from home. No multi-dose medications are available for use during hospital stay.     The information provided in this note is only as accurate as the sources available at the time of the update(s).    Thank you for the opportunity to participate in the care of this patient.    Katt Navas [facility]  12/10/2022 10:42 AM

## 2022-12-10 NOTE — H&P
[facility]    History and Physical - Hospitalist Service       Date of Admission:  12/10/2022    Assessment & Plan      Sherice Alvarez is admitted on 12/10/2022. She is 88-year-old woman with PMH of HFpEF, hypertension, CKD stage III, anxiety and restless leg syndrome who presented with chest tightness.  Patient has been seen for similar presentation several times in the past and recent cardiology evaluation and recommendation appreciated on trunk. Stat investigations showed slightly elevated troponin and mild pulmonary congestion.    She is admitted for further evaluation and management of chest pain rule out ACS + evolving acute on chronic HFpEF.     # Chest Pain + Acute on Chronic HFpEF   Complete serial cardiac enzymes + cardiac monitoring  Will order TTE/stress test if second troponin trends up  Strict I's and O's + daily weights  Furosemide IV for diuresis to achieve euvolemia  To resume GDMT medication + optimal blood pressure control  Possible cardiology evaluation as per primary team based on results of investigations above  [Recent cardiology evaluation outpatient showed normal echocardiogram and stress test, intermittent chest pain associated with anxiety at that time]    # Hypertension + CKD stage III  Optimal blood pressure control as stated above  Serial renal function monitoring  Electrolyte monitoring and replacement  Avoid nephrotoxic medications    #Anxiety/depression + restless leg syndrome  To resume home anxiolytic medication following medication reconciliation  To resume ropinirole home dosage following medication reconciliation    Resume other home medication following medication reconciliation         Diet: 2 Gram Sodium Diet No Caffeine for 24 hours (once tests completed, may have caffeine)  DVT Prophylaxis: SCD  Tyson Catheter: Not present  Central Lines: None  Cardiac Monitoring: None  Code Status: Full Code    Clinically Significant Risk Factors Present on  "Admission                        # Overweight: Estimated body mass index is 26.34 kg/m  as calculated from the following:    Height as of this encounter: 1.575 m (5' 2\").    Weight as of this encounter: 65.3 kg (144 lb).           Disposition Plan      Expected Discharge Date: 12/11/2022                The patient's care was discussed with the Patient.    Brianne Andrea MD  Hospitalist Service  [facility]  Securely message with the Vocera Web Console (learn more here)  Text page via [employer] Paging/Directory         ______________________________________________________________________    Chief Complaint   Chest tightness     History is obtained from the patient    History of Present Illness   Sherice Alvarez is a 88-year-old woman with PMH of HFpEF, hypertension, CKD stage III, anxiety and restless leg syndrome who presented with chest tightness.  Chest tightness has been ongoing for several weeks, it is retrosternal, nonradiating, intermittent and dull in nature with each episode lasting less than 5-minute.  Chest pain is also aggravated and precipitated by anxiety and elevated blood pressure.     Chest tightness associated with mild shortness of breath and bilateral leg swelling.  She endorsed compliance with her medications although stated timing of taking medications may be off.  She denies cough, fever, palpitation, upper respiratory tract infection symptoms, abdominal pain, dysuria or signs suggestive of evolving infection.  She also denies recent change in home medication but endorses poor sleep.  Also, patient endorses ongoing acute stressors at present [son presently on hospice care], she also endorses associated generalized anxiety for multiple day-to-day activity.    Review of other system grossly at baseline.      Review of Systems    The 10 point Review of Systems is negative other than noted in the HPI or here.     Past Medical History    I have reviewed this patient's " medical history and updated it with pertinent information if needed.   Past Medical History:   Diagnosis Date     Arthritis      Asthma      CAD (coronary artery disease)      Cancer (H)     basal cell     Chronic kidney disease     ckd 3     Chronic pain syndrome      Congestive heart failure (H)      Crohn's disease (H)      GERD (gastroesophageal reflux disease)      Hx of heart artery stent 2016    1 stent R Proximal CA and 1 Stent L Proximal circumflex  2016 Stent proximal LAD     Hyperlipidemia      Hypertension      NSTEMI (non-ST elevated myocardial infarction) (H) 2016     PONV (postoperative nausea and vomiting)     severe povn with last knee surgery     Restless legs syndrome      Stroke (H) 2010    numbness rt jaw       Past Surgical History   I have reviewed this patient's surgical history and updated it with pertinent information if needed.  Past Surgical History:   Procedure Laterality Date     APPENDECTOMY       CARDIAC CATHETERIZATION  2016    multiple vessel disease.  no intervention     CARDIAC CATHETERIZATION  2016     CARDIAC CATHETERIZATION N/A 2016    Procedure: Coronary Angiogram;  Surgeon: Sunil Moran MD;  Location: [facility] Cath Lab;  Service:      CAROTID ENDARTERECTOMY       CAROTID ENDARTERECTOMY Right 2010     CERVICAL LAMINECTOMY  1994      SECTION       CV CORONARY ANGIOGRAM N/A 2020    Procedure: Coronary Angiogram;  Surgeon: Vinita Ramos MD;  Location: [facility] Cath Lab;  Service: Cardiology     CV LEFT HEART CATHETERIZATION WITHOUT LEFT VENTRICULOGRAM Left 2020    Procedure: Left Heart Catheterization Without Left Ventriculogram;  Surgeon: Jerad Lerner MD;  Location: [facility] Cath Lab;  Service: Cardiology     CV TRANSCATHETER AORTIC VALVE REPLACEMENT - OTHER APPROACH N/A 2020    Procedure: CV TRANSCATHETER AORTIC VALVE REPLACEMENT - RIGHT SUBCLAVIAN APPROACH;  Surgeon: Nicki  MD John;  Location: [facility] Cath Lab;  Service: Cardiology     HEART CATH, ANGIOPLASTY       HEMORRHOIDECTOMY EXTERNAL       IR LUMBAR EPIDURAL STEROID INJECTION  2014     IR LUMBAR NERVE ROOT INJECTION  10/01/2013     JOINT REPLACEMENT       CT ESOPHAGOGASTRODUODENOSCOPY TRANSORAL DIAGNOSTIC N/A 07/10/2019    Procedure: ESOPHAGOGASTRODUODENOSCOPY (EGD) with gastric biopsy;  Surgeon: Sunil Stuart MD;  Location: [facility];  Service: Gastroenterology     CT REPLACE AORTIC VALVE OPEN AXILLRY ARTRY APPROACH N/A 2020    Procedure: OR TRANSCATHETER AORTIC VALVE REPLACEMENT, SUBCLAVIAN APPROACH;  Surgeon: Bhavin Cuadra MD;  Location: [facility] Cath Lab;  Service: Cardiology     TONSILLECTOMY & ADENOIDECTOMY       TOTAL KNEE ARTHROPLASTY Right      TRANSCATHETER AORTIC-VALVE REPLACEMENT       ZZC TOTAL KNEE ARTHROPLASTY Left 2021    Procedure: LEFT TOTAL KNEE ARTHROPLASTY;  Surgeon: Doug Rhodes MD;  Location: [facility];  Service: Orthopedics       Social History   I have reviewed this patient's social history and updated it with pertinent information if needed.  Social History     Tobacco Use     Smoking status: Former     Types: Cigarettes     Quit date: 1980     Years since quittin.9     Smokeless tobacco: Never   Substance Use Topics     Alcohol use: No     Drug use: No       Family History   I have reviewed this patient's family history and updated it with pertinent information if needed.  Family History   Problem Relation Age of Onset     Atrial fibrillation Mother      Parkinsonism Father        Prior to Admission Medications   Prior to Admission Medications   Prescriptions Last Dose Informant Patient Reported? Taking?   Multiple Vitamins-Minerals (SENIOR MULTIVITAMIN PLUS PO)   Yes No   Sig: Take 1 tablet by mouth daily   OLANZapine (ZYPREXA) 2.5 MG tablet   No No   Sig: Take 1 tablet (2.5 mg) by mouth 2 times daily as needed (anxiety)   Vitamin  D3 (CHOLECALCIFEROL) 125 MCG (5000 UT) tablet   Yes No   Sig: Take 1 tablet by mouth daily   acetaminophen (TYLENOL) 500 MG tablet   Yes No   Sig: Take 500-1,000 mg by mouth every 8 hours as needed for mild pain   albuterol (PROVENTIL HFA;VENTOLIN HFA) 90 mcg/actuation inhaler   Yes No   Sig: [ALBUTEROL (PROVENTIL HFA;VENTOLIN HFA) 90 MCG/ACTUATION INHALER] Inhale 1-2 puffs every 6 (six) hours as needed for wheezing.   amLODIPine (NORVASC) 2.5 MG tablet   No No   Sig: Take 2 tablets (5 mg) by mouth daily   aspirin (ASA) 81 MG EC tablet   Yes No   Sig: Take 81 mg by mouth daily   beclomethasone (QVAR) 80 mcg/actuation inhaler   Yes No   Sig: Inhale 1 puff into the lungs 2 times daily as needed    coenzyme Q10 100 mg capsule   Yes No   Sig: [COENZYME Q10 100 MG CAPSULE] Take 100 mg by mouth daily.    Patient not taking: Reported on 11/15/2022   diclofenac (VOLTAREN) 1 % topical gel   Yes No   Sig: Apply 2-4 g topically 4 times daily as needed APPLY TO LEFT SHOULDER AND LEFT KNEE.   Patient not taking: Reported on 11/15/2022   evolocumab (REPATHA) 140 MG/ML prefilled syringe   No No   Sig: Inject 1 mL (140 mg) Subcutaneous every 14 days   Patient not taking: Reported on 9/20/2022   ferrous sulfate 325 (65 FE) MG tablet   No No   Sig: [FERROUS SULFATE 325 (65 FE) MG TABLET] Take 1 tablet (325 mg total) by mouth daily with breakfast.   Patient taking differently: Take 1 tablet by mouth twice a week   furosemide (LASIX) 20 MG tablet   No No   Sig: Take 1 tablet (20 mg) by mouth 2 times daily for 30 days   melatonin 5 mg Tab tablet   Yes No   Sig: [MELATONIN 5 MG TAB TABLET] Take 5 mg by mouth at bedtime as needed (for sleep).    metoprolol succinate ER (TOPROL XL) 25 MG 24 hr tablet   No No   Sig: Take 1 tablet (25 mg) by mouth 2 times daily   nitroglycerin (NITROSTAT) 0.4 MG SL tablet   No No   Sig: [NITROGLYCERIN (NITROSTAT) 0.4 MG SL TABLET] Place 1 tablet (0.4 mg total) under the tongue every 5 (five) minutes as  needed for chest pain.   omeprazole (PRILOSEC) 20 MG DR capsule   Yes No   Sig: Take 20 mg by mouth daily   Patient not taking: Reported on 11/15/2022   rOPINIRole (REQUIP) 1 MG tablet   No No   Sig: Take 1 tablet (1 mg) by mouth 2 times daily for 30 days   Patient taking differently: Take 2 mg by mouth At Bedtime   rOPINIRole (REQUIP) 2 MG tablet   No No   Sig: Take 1 tablet (2 mg) by mouth At Bedtime for 30 days      Facility-Administered Medications: None     Allergies   Allergies   Allergen Reactions     Amitriptyline Hcl [Amitriptyline] Other (See Comments)     Erythromycin Diarrhea and Other (See Comments)     Childhood reaction     Gabapentin Other (See Comments)     Jerking movements, swelling     Naproxen Unknown and Other (See Comments)     Other Environmental Allergy Unknown     Molds, dander     Pregabalin Other (See Comments)       Physical Exam   Vital Signs: Temp: 98.2  F (36.8  C) Temp src: Oral BP: (!) 144/66 Pulse: 62   Resp: 29 SpO2: 95 % O2 Device: None (Room air)    Weight: 144 lbs 0 oz    Constitutional: Alert and oriented, anxious, anicteric, in no acute distress, afebrile, acyanotic  Respiratory: Adequate air entry in most lung sounds, scattered rales in the lower lung zones  Cardiovascular: S1-S2 heard  GI: Soft, nontender, round, BS +  Skin: no bruising or bleeding and normal skin color, texture, turgor  Musculoskeletal: Mild bilateral pitting edema noted  Neurologic: Awake, alert, oriented to name, place and time.  Cranial nerves II-XII are grossly intact.  Motor is 5 out of 5 bilaterally.   Neuropsychiatric: General: normal, calm and normal eye contact  Level of consciousness: alert / normal  Affect: anxious    Data   Data reviewed today: I reviewed all medications, new labs and imaging results over the last 24 hours. I personally reviewed the EKG tracing showing Sinus rhythm with first AV block + possible LAE.    Recent Labs   Lab 12/10/22  0231 12/09/22  1417   WBC 8.8  --    HGB 10.5*   --    MCV 92  --      --     144   POTASSIUM 4.0 4.1   CHLORIDE 107 106   CO2 24 20*   BUN 29.5* 28.6*   CR 1.43* 1.35*   ANIONGAP 10 18*   BESS 8.7* 9.2   GLC 96 77     8.8    \    10.5 (L)    /    213   N N/A    L N/A    141    107    29.5 (H) /   ------------------------------------ 96   ALT N/A   AST N/A   AP N/A   ALB N/A   Ca 8.7 (L)  4.0    24    1.43 (H) \    % RETIC N/A    LDH N/A  Troponin N/A    BNP N/A    CK N/A  INR N/A   PTT N/A    D-dimer N/A    Fibrinogen N/A    Antithrombin N/A  Ferritin N/A  CRP N/A    IL-6 N/A  Recent Results (from the past 24 hour(s))   XR Chest 2 Views    Narrative    EXAM: XR CHEST 2 VIEWS  LOCATION: [facility]  DATE/TIME: 12/10/2022 2:43 AM    INDICATION: dyspnea  COMPARISON: 11/9/2022      Impression    IMPRESSION: Previous silhouette is unchanged in size and contour. A area aortic endograft is again seen in place, unchanged from prior exam. There is mild prominence of the central pulmonary venous markings, correlate for signs of volume overload.

## 2022-12-10 NOTE — PROGRESS NOTES
Patient seen and examined/chart reviewed.  Patient admitted earlier with chest pain to rule out ACS for further details and specifics please refer to the HPI dictated by Dr. Andrea.  Patient continues to complain of chest pressure troponin was elevated 38 NG per mL, will plan on checking an echocardiogram and consulting cardiology.  Continue with current plan of care

## 2022-12-10 NOTE — ED NOTES
"Pt ambulated to bathroom, SBA. Pt had auditory wheezes when ambulating. Pt stated that was her normal and that she feels \"congested\" when she ambulates. O2 sats stayed at 96%+. Breakfast ordered.   "

## 2022-12-10 NOTE — CONSULTS
Care Management Initial Consult    General Information  Assessment completed with: Patient,    Type of CM/SW Visit: Initial Assessment    Primary Care Provider verified and updated as needed: Yes   Readmission within the last 30 days: no previous admission in last 30 days      Reason for Consult: discharge planning  Advance Care Planning: Advance Care Planning Reviewed: verified with patient          Communication Assessment  Patient's communication style: spoken language (English or Bilingual)             Cognitive  Cognitive/Neuro/Behavioral: WDL                      Living Environment:   People in home: alone     Current living Arrangements: independent living facility      Able to return to prior arrangements: yes       Family/Social Support:  Care provided by: self  Provides care for: no one  Marital Status:   Children          Description of Support System: Supportive, Involved    Support Assessment: Adequate family and caregiver support, Adequate social supports, Patient communicates needs well met    Current Resources:   Patient receiving home care services: No     Community Resources: Housekeeping/[employer] (house cleaning service 1x/mo)  Equipment currently used at home: none  Supplies currently used at home: None    Employment/Financial:  Employment Status:          Financial Concerns:             Lifestyle & Psychosocial Needs:  Social Determinants of Health     Tobacco Use: Medium Risk     Smoking Tobacco Use: Former     Smokeless Tobacco Use: Never     Passive Exposure: Not on file   Alcohol Use: Not on file   Financial Resource Strain: Not on file   Food Insecurity: Not on file   Transportation Needs: Not on file   Physical Activity: Not on file   Stress: Not on file   Social Connections: Not on file   Intimate Partner Violence: Not on file   Depression: Not on file   Housing Stability: Not on file       Functional Status:  Prior to admission patient needed assistance:   Dependent ADLs::  Independent  Dependent IADLs:: Independent       Mental Health Status:          Chemical Dependency Status:                Values/Beliefs:  Spiritual, Cultural Beliefs, Alevism Practices, Values that affect care:                 Additional Information:  Lives indep at [facility]. No DME, no svcs, still drives. No apparent CM needs at this time. Family to transport at discharge.    Cherelle Thurston RN

## 2022-12-10 NOTE — PLAN OF CARE
Goal Outcome Evaluation:      Plan of Care Reviewed With: patient, child          Outcome Evaluation: pt given first dose isosorbide. up with assist after a post lasix dose incontinence. minimal upper right chest discomfort. appetite good. daughter here assisting pt to discharge home this afternoon

## 2022-12-10 NOTE — PROGRESS NOTES
Pt up to bathroom with assist, did have mild pressure right upper chest after returning to bed. Dyspnea and audible wheezes present. Pt quite forgetful about how she manages at home & admits she often forgets that she should take her medications when & how she manages her status at home. Did speak to daughter Flora this am and she had concerns regarding needing home care to help manage medications at home

## 2022-12-10 NOTE — CONSULTS
HEART CARE CONSULTATON NOTE        Assessment/Recommendations   Assessment:   1.  Chest pain in the setting of severely elevated blood pressure.  2.  Coronary disease status post prior coronary angiogram and stenting  3.  Aortic stenosis status post aortic valve replacement, normal function on review of echo from today  4.  Hypertension  5.  Anxiety, significant anxiety secondary to his son being on hospice  6.  Elevated troponin, demand ischemia from hypertension.  Repeat had no significant change from admission.   7.  Acute on chronic congestive heart failure secondary to heart failure with preserved ejection fraction, diastolic heart failure    Plan:   1.  Recommend starting Imdur 30 mg daily  2.  Continue home antihypertensives  3.  Resume home Lasix higher dose of 40 mg twice daily, mild CHF exacerbation.  Lower extremity edema which improved with IV Lasix  4.  Echocardiogram demonstrated no focal regional wall motion O'Neela  5.  Continue aspirin for coronary disease    Patient be discharged from cardiology standpoint today.  Follow-up with Dr. Feldman on 1/25/2023     History of Present Illness/Subjective    HPI: Sherice Alvarez is a 88 year old female hypertension, hyperlipidemia, coronary disease, aortic valve replacement who presents to [facility] for episode of chest pressure associated with uncontrolled hypertension with systolic blood pressure over 220 mmHg.    Patient was given glycerin in the emergency department which improved her chest pain.  She describes her pain as a pressure sensation across her chest.  She states she woke up yesterday evening with chest discomfort.  She felt vertigo-like symptoms.  Given this she felt very anxious and her chest pain increase.  Given she lives across [address] from [facility] emergency department she presented to the emergency department.    Twelve-lead EKG on arrival demonstrated sinus rhythm with first-degree AV block.  Echocardiogram performed  "today demonstrated no regional wall motion O'Neela normal bioprosthetic aortic valve function.    ECHO: 12/10/22  Left ventricular size, wall motion and function are normal. The ejection  fraction is 60-65%.  Diastolic Doppler findings (E/E' ratio and/or other parameters) suggest left  ventricular filling pressures are increased.  Normal right ventricle size and systolic function.  The left atrium is mild to moderately dilated.  Normal function of bioprosthetic aortic valve.    ECHO: 5/17/2022  Left ventricular size, wall motion and function are normal. The ejection  fraction is 60-65%.  Normal right ventricle size and systolic function.  There is a bioprosthetic aortic valve.  The prosthetic valve gradients are normal .  IVC diameter <2.1 cm collapsing >50% with sniff suggests a normal RA pressure  of 3 mmHg.    NM STRESS: 5/3/2022     The regadenoson nuclear stress test is negative for inducible myocardial ischemia or infarction.     The left ventricular ejection fraction at stress is greater than 70%.     The patient is at a low risk of future cardiac ischemic events.     A prior study was conducted on 5/3/2021.  This study has no change when compared with the prior study.           Physical Examination  Review of Systems   VITALS: BP (!) 152/69   Pulse 61   Temp 97.8  F (36.6  C)   Resp 22   Ht 1.575 m (5' 2\")   Wt 65.3 kg (144 lb)   SpO2 92%   BMI 26.34 kg/m    BMI: Body mass index is 26.34 kg/m .  Wt Readings from Last 3 Encounters:   12/10/22 65.3 kg (144 lb)   11/15/22 65.3 kg (144 lb)   11/09/22 64.4 kg (142 lb)     No intake or output data in the 24 hours ending 12/10/22 1242  General Appearance:   no distress, normal body habitus   ENT/Mouth: membranes moist, no oral lesions or bleeding gums.      EYES:  no scleral icterus, normal conjunctivae   Neck: no carotid bruits or thyromegaly   Chest/Lungs:   lungs are clear to auscultation, no rales or wheezing, equal chest wall expansion  "   Cardiovascular:   Regular. Normal first and second heart sounds with faint systolic murmurs, rubs, or gallops; the carotid, radial and posterior tibial pulses are intact, Jugular venous pressure 8 cm, patel edema bilaterally    Abdomen:  no organomegaly, masses, bruits, or tenderness; bowel sounds are present   Extremities: no cyanosis or clubbing   Skin: no xanthelasma, warm.    Neurologic: normal  bilateral, no tremors     Psychiatric: alert and oriented x3, calm     Review Of Systems  Skin: negative  Eyes: negative  Ears/Nose/Throat: negative  Respiratory:=+ Dyspnea  Cardiovascular: Chest pain    Gastrointestinal: negative  Genitourinary: negative  Musculoskeletal: negative  Neurologic: negative  Psychiatric: negative  Hematologic/Lymphatic/Immunologic: negative  Endocrine: negative          Lab Results    Chemistry/lipid CBC Cardiac Enzymes/BNP/TSH/INR   Recent Labs   Lab Test 07/12/22  0915   CHOL 222*   HDL 59   *   TRIG 75     Recent Labs   Lab Test 07/12/22  0915 03/30/21  1507 06/04/20  0814   * 132* 63     Recent Labs   Lab Test 12/10/22  0231      POTASSIUM 4.0   CHLORIDE 107   CO2 24   GLC 96   BUN 29.5*   CR 1.43*   GFRESTIMATED 35*   BESS 8.7*     Recent Labs   Lab Test 12/10/22  0231 12/09/22  1417 11/15/22  1508   CR 1.43* 1.35* 1.13*     Recent Labs   Lab Test 11/06/16  0555   A1C 5.6          Recent Labs   Lab Test 12/10/22  0231   WBC 8.8   HGB 10.5*   HCT 32.5*   MCV 92        Recent Labs   Lab Test 12/10/22  0231 11/09/22  1532 09/21/22  1517   HGB 10.5* 10.8* 11.6*    Recent Labs   Lab Test 08/02/22  1258 08/02/22  0727 08/02/22  0009   TROPONINI 0.03 0.03 0.02     Recent Labs   Lab Test 12/10/22  0231 11/09/22  1854 09/21/22  1517 08/02/22  0009 05/03/22  1509 01/15/22  0248 10/11/21  1154 09/20/21  1328 09/08/21  1346   BNP  --   --   --  62 127 80   < >  --   --    NTBNPI 374 [id number]  --   --   --   --   --   --   --    JESUS ALBERTO  --   --  575*  --   --   --   --   539* 481*    < > = values in this interval not displayed.     Recent Labs   Lab Test 22  1200   TSH 3.20     Recent Labs   Lab Test 22  1532 01/15/22  0248 21  1021   INR 1.13 1.13 1.15        Medical History  Surgical History Family History Social History   Past Medical History:   Diagnosis Date     Arthritis      Asthma      CAD (coronary artery disease)      Cancer (H)     basal cell     Chronic kidney disease     ckd 3     Chronic pain syndrome      Congestive heart failure (H)      Crohn's disease (H)      GERD (gastroesophageal reflux disease)      Hx of heart artery stent 2016    1 stent R Proximal CA and 1 Stent L Proximal circumflex  2016 Stent proximal LAD     Hyperlipidemia      Hypertension      NSTEMI (non-ST elevated myocardial infarction) (H) 2016     PONV (postoperative nausea and vomiting)     severe povn with last knee surgery     Restless legs syndrome      Stroke (H) 2010    numbness rt jaw     Past Surgical History:   Procedure Laterality Date     APPENDECTOMY       CARDIAC CATHETERIZATION  2016    multiple vessel disease.  no intervention     CARDIAC CATHETERIZATION  2016     CARDIAC CATHETERIZATION N/A 2016    Procedure: Coronary Angiogram;  Surgeon: Sunil Moran MD;  Location: [facility] Lab;  Service:      CAROTID ENDARTERECTOMY       CAROTID ENDARTERECTOMY Right 2010     CERVICAL LAMINECTOMY  1994      SECTION       CV CORONARY ANGIOGRAM N/A 2020    Procedure: Coronary Angiogram;  Surgeon: Vinita Ramos MD;  Location: [facility] Lab;  Service: Cardiology     CV LEFT HEART CATHETERIZATION WITHOUT LEFT VENTRICULOGRAM Left 2020    Procedure: Left Heart Catheterization Without Left Ventriculogram;  Surgeon: Jerad Lerner MD;  Location: [facility];  Service: Cardiology     CV TRANSCATHETER AORTIC VALVE REPLACEMENT - OTHER APPROACH N/A 2020    Procedure: CV  TRANSCATHETER AORTIC VALVE REPLACEMENT - RIGHT SUBCLAVIAN APPROACH;  Surgeon: John Caceres MD;  Location: [facility] Lab;  Service: Cardiology     HEART CATH, ANGIOPLASTY       HEMORRHOIDECTOMY EXTERNAL       IR LUMBAR EPIDURAL STEROID INJECTION  2014     IR LUMBAR NERVE ROOT INJECTION  10/01/2013     JOINT REPLACEMENT       NC ESOPHAGOGASTRODUODENOSCOPY TRANSORAL DIAGNOSTIC N/A 07/10/2019    Procedure: ESOPHAGOGASTRODUODENOSCOPY (EGD) with gastric biopsy;  Surgeon: Sunil Stuart MD;  Location: [facility];  Service: Gastroenterology     NC REPLACE AORTIC VALVE OPEN AXILLRY ARTRY APPROACH N/A 2020    Procedure: OR TRANSCATHETER AORTIC VALVE REPLACEMENT, SUBCLAVIAN APPROACH;  Surgeon: Bhavin Cuadra MD;  Location: [facility] Lab;  Service: Cardiology     TONSILLECTOMY & ADENOIDECTOMY       TOTAL KNEE ARTHROPLASTY Right      TRANSCATHETER AORTIC-VALVE REPLACEMENT       ZZC TOTAL KNEE ARTHROPLASTY Left 2021    Procedure: LEFT TOTAL KNEE ARTHROPLASTY;  Surgeon: Doug Rhodes MD;  Location: [facility];  Service: Orthopedics     Family History   Problem Relation Age of Onset     Atrial fibrillation Mother      Parkinsonism Father         Social History     Socioeconomic History     Marital status:      Spouse name: Not on file     Number of children: Not on file     Years of education: Not on file     Highest education level: Not on file   Occupational History     Not on file   Tobacco Use     Smoking status: Former     Types: Cigarettes     Quit date: 1980     Years since quittin.9     Smokeless tobacco: Never   Substance and Sexual Activity     Alcohol use: No     Drug use: No     Sexual activity: Not on file   Other Topics Concern     Not on file   Social History Narrative     Not on file     Social Determinants of Health     Financial Resource Strain: Not on file   Food Insecurity: Not on file   Transportation Needs: Not on file   Physical  Activity: Not on file   Stress: Not on file   Social Connections: Not on file   Intimate Partner Violence: Not on file   Housing Stability: Not on file         Medications  Allergies   Current Outpatient Medications   Medication Sig Dispense Refill     acetaminophen (TYLENOL) 500 MG tablet Take 500-1,000 mg by mouth every 8 hours as needed for mild pain       ACETAMINOPHEN-CODEINE 300-30 MG per tablet Take 1 tablet by mouth daily as needed       albuterol (PROVENTIL HFA;VENTOLIN HFA) 90 mcg/actuation inhaler [ALBUTEROL (PROVENTIL HFA;VENTOLIN HFA) 90 MCG/ACTUATION INHALER] Inhale 1-2 puffs every 6 (six) hours as needed for wheezing.       amLODIPine (NORVASC) 2.5 MG tablet Take 2 tablets (5 mg) by mouth daily 180 tablet 1     aspirin (ASA) 81 MG EC tablet Take 81 mg by mouth daily       beclomethasone (QVAR) 80 mcg/actuation inhaler Inhale 1 puff into the lungs 2 times daily as needed        diclofenac (VOLTAREN) 1 % topical gel Apply 2-4 g topically 4 times daily as needed APPLY TO LEFT SHOULDER AND LEFT KNEE.       donepezil (ARICEPT) 5 MG tablet Take 5 mg by mouth daily       ferrous sulfate 325 (65 FE) MG tablet [FERROUS SULFATE 325 (65 FE) MG TABLET] Take 1 tablet (325 mg total) by mouth daily with breakfast.  0     furosemide (LASIX) 20 MG tablet Take 20 mg by mouth every evening       furosemide (LASIX) 20 MG tablet Take 40 mg by mouth every morning       melatonin 5 mg Tab tablet [MELATONIN 5 MG TAB TABLET] Take 5 mg by mouth at bedtime as needed (for sleep).        metoprolol succinate ER (TOPROL XL) 25 MG 24 hr tablet Take 1 tablet (25 mg) by mouth 2 times daily 180 tablet 3     nitroglycerin (NITROSTAT) 0.4 MG SL tablet [NITROGLYCERIN (NITROSTAT) 0.4 MG SL TABLET] Place 1 tablet (0.4 mg total) under the tongue every 5 (five) minutes as needed for chest pain. 90 tablet 0     OLANZapine (ZYPREXA) 2.5 MG tablet Take 1 tablet (2.5 mg) by mouth 2 times daily as needed (anxiety) 14 tablet 0     omeprazole  (PRILOSEC) 20 MG DR capsule Take 20 mg by mouth daily       rOPINIRole (REQUIP) 2 MG tablet Take 1 tablet (2 mg) by mouth At Bedtime for 30 days 30 tablet 0     Vitamin D3 (CHOLECALCIFEROL) 125 MCG (5000 UT) tablet Take 1 tablet by mouth daily       QUEtiapine (SEROQUEL) 25 MG tablet Take 25 mg by mouth daily          Allergies   Allergen Reactions     Amitriptyline Hcl [Amitriptyline] Other (See Comments)     Erythromycin Diarrhea and Other (See Comments)     Childhood reaction     Gabapentin Other (See Comments)     Jerking movements, swelling     Naproxen Unknown and Other (See Comments)     Other Environmental Allergy Unknown     Molds, dander     Pregabalin Other (See Comments)         John Carrillo, DO

## 2022-12-10 NOTE — DISCHARGE SUMMARY
[facility] MEDICINE  DISCHARGE SUMMARY     Primary Care Physician: Anum Rosenberg  Admission Date: 12/10/2022   Discharge Provider: Dawood Zhou MD Discharge Date: 12/10/2022   Diet:   Active Diet and Nourishment Order   Procedures     2 Gram Sodium Diet No Caffeine for 24 hours (once tests completed, may have caffeine)       Code Status: Full Code   Activity: DCACTIVITY: Activity as tolerated        Condition at Discharge: Stable     REASON FOR PRESENTATION(See Admission Note for Details)   Chest pain    PRINCIPAL & ACTIVE DISCHARGE DIAGNOSES     Active Problems:    * No active hospital problems. *    Chest pain.   Acute on chronic CHFpEF (mild)  elevated troponin  Uncontrolled hypertension  Anxiety disorder    PENDING LABS     Unresulted Labs Ordered in the Past 30 Days of this Admission     Date and Time Order Name Status Description    12/9/2022  3:40 PM TSH In process     12/9/2022  3:40 PM VITAMIN D DEFICIENCY SCREENING In process             PROCEDURES ( this hospitalization only)          RECOMMENDATIONS TO OUTPATIENT PROVIDER FOR F/U VISIT             DISPOSITION     Home    SUMMARY OF HOSPITAL COURSE:      The patient is a 88-year-old female with a history of CAD, AVR, CAD, essential hypertension, dyslipidemia who presented to the ER at [facility] with chief complaint of chest pain, work-up was performed initial EKG showed sinus rhythm with first-degree AV block which showed normal bilateral prosthetic aortic valve without any regional wall motion abnormality and preserved EF of 60 to 65%.  Troponin elevated peaked at 38 NG/DL.  Patient was started on IV Lasix twice daily and diuresed and transition back to oral Lasix. Cardiology was consulted and was determined that her chest discomfort was secondary to uncontrolled hypertension possibly due to stress as the patient's son is on hospice.  Patient was continued on her home antihypertensives and started on Imdur 30  mg dose of Lasix was increased to 40 mg p.o. twice daily    Discharge Medications with Med changes:     Current Discharge Medication List      CONTINUE these medications which have NOT CHANGED    Details   acetaminophen (TYLENOL) 500 MG tablet Take 500-1,000 mg by mouth every 8 hours as needed for mild pain      ACETAMINOPHEN-CODEINE 300-30 MG per tablet Take 1 tablet by mouth daily as needed      albuterol (PROVENTIL HFA;VENTOLIN HFA) 90 mcg/actuation inhaler [ALBUTEROL (PROVENTIL HFA;VENTOLIN HFA) 90 MCG/ACTUATION INHALER] Inhale 1-2 puffs every 6 (six) hours as needed for wheezing.      amLODIPine (NORVASC) 2.5 MG tablet Take 2 tablets (5 mg) by mouth daily  Qty: 180 tablet, Refills: 1    Associated Diagnoses: Accelerated hypertension      aspirin (ASA) 81 MG EC tablet Take 81 mg by mouth daily      beclomethasone (QVAR) 80 mcg/actuation inhaler Inhale 1 puff into the lungs 2 times daily as needed       diclofenac (VOLTAREN) 1 % topical gel Apply 2-4 g topically 4 times daily as needed APPLY TO LEFT SHOULDER AND LEFT KNEE.      donepezil (ARICEPT) 5 MG tablet Take 5 mg by mouth daily      ferrous sulfate 325 (65 FE) MG tablet [FERROUS SULFATE 325 (65 FE) MG TABLET] Take 1 tablet (325 mg total) by mouth daily with breakfast.  Refills: 0    Associated Diagnoses: Anemia, unspecified type      !! furosemide (LASIX) 20 MG tablet Take 20 mg by mouth every evening      !! furosemide (LASIX) 20 MG tablet Take 40 mg by mouth every morning      melatonin 5 mg Tab tablet [MELATONIN 5 MG TAB TABLET] Take 5 mg by mouth at bedtime as needed (for sleep).       metoprolol succinate ER (TOPROL XL) 25 MG 24 hr tablet Take 1 tablet (25 mg) by mouth 2 times daily  Qty: 180 tablet, Refills: 3    Associated Diagnoses: Chronic heart failure with preserved ejection fraction (H); Accelerated hypertension      nitroglycerin (NITROSTAT) 0.4 MG SL tablet [NITROGLYCERIN (NITROSTAT) 0.4 MG SL TABLET] Place 1 tablet (0.4 mg total) under the  tongue every 5 (five) minutes as needed for chest pain.  Qty: 90 tablet, Refills: 0    Comments: This new medication is for treatment of cardiac chest pain.  Associated Diagnoses: NSTEMI (non-ST elevated myocardial infarction) (H)      OLANZapine (ZYPREXA) 2.5 MG tablet Take 1 tablet (2.5 mg) by mouth 2 times daily as needed (anxiety)  Qty: 14 tablet, Refills: 0    Associated Diagnoses: Adjustment disorder with mixed anxiety and depressed mood      omeprazole (PRILOSEC) 20 MG DR capsule Take 20 mg by mouth daily      rOPINIRole (REQUIP) 2 MG tablet Take 1 tablet (2 mg) by mouth At Bedtime for 30 days  Qty: 30 tablet, Refills: 0    Associated Diagnoses: Adjustment disorder with mixed anxiety and depressed mood      Vitamin D3 (CHOLECALCIFEROL) 125 MCG (5000 UT) tablet Take 1 tablet by mouth daily      QUEtiapine (SEROQUEL) 25 MG tablet Take 25 mg by mouth daily       !! - Potential duplicate medications found. Please discuss with provider.                Rationale for medication changes:              Consults       CORE CLINIC EVALUATION IP CONSULT  CARE MANAGEMENT / SOCIAL WORK IP CONSULT  NUTRITION SERVICES ADULT IP CONSULT  CARDIOLOGY IP CONSULT    Immunizations given this encounter     Most Recent Immunizations   Administered Date(s) Administered     COVID-19 Vaccine 18+ (Moderna) 03/03/2021     Flu, Unspecified 09/19/2016           Anticoagulation Information      Recent INR results: No results for input(s): INR in the last 168 hours.  Warfarin doses (if applicable) or name of other anticoagulant:       SIGNIFICANT IMAGING FINDINGS     Results for orders placed or performed during the hospital encounter of 12/10/22   XR Chest 2 Views    Impression    IMPRESSION: Previous silhouette is unchanged in size and contour. A area aortic endograft is again seen in place, unchanged from prior exam. There is mild prominence of the central pulmonary venous markings, correlate for signs of volume overload.   Echocardiogram  Complete   Result Value Ref Range    LVEF  60-65%        SIGNIFICANT LABORATORY FINDINGS     Most Recent 3 BMP's:Recent Labs   Lab Test 12/10/22  0231 12/09/22  1417 11/15/22  1508    144 144   POTASSIUM 4.0 4.1 4.0   CHLORIDE 107 106 109*   CO2 24 20* 24   BUN 29.5* 28.6* 18.6   CR 1.43* 1.35* 1.13*   ANIONGAP 10 18* 11   BESS 8.7* 9.2 8.8   GLC 96 77 79           Discharge Orders     No discharge procedures on file.    Examination   Physical Exam   Temp:  [97.8  F (36.6  C)-98.2  F (36.8  C)] 97.8  F (36.6  C)  Pulse:  [54-70] 61  Resp:  [13-29] 22  BP: (125-182)/(56-82) 152/69  SpO2:  [92 %-97 %] 94 %  Wt Readings from Last 1 Encounters:   12/10/22 65.3 kg (144 lb)       Constitutional: awake, alert, cooperative and no apparent distress  Respiratory: no increased work of breathing, good air exchange, no retractions and clear to auscultation  Cardiovascular: Normal apical impulse, regular rate and rhythm, normal S1 and S2, no S3 or S4, and no murmur noted  GI: normal bowel sounds, soft, non-distended and non-tender  Skin: no bruising or bleeding  Musculoskeletal: no lower extremity pitting edema present  Neurologic: Mental Status Exam:  Level of Alertness:   awake  Neuropsychiatric: General: normal, calm and normal eye contact    Please see EMR for more detailed significant labs, imaging, consultant notes etc.    IDawood MD, personally saw the patient today and spent less than or equal to 30 minutes discharging this patient.    Dawood Zhou MD  [facility] [place]    CC:Anum Rosenberg

## 2022-12-10 NOTE — ED PROVIDER NOTES
EMERGENCY DEPARTMENT ENCOUnter      NAME: Sherice Alvarez  AGE: 88 year old female  YOB: 1934  MRN: [id number]  EVALUATION DATE & TIME: 12/10/2022  2:12 AM    PCP: Anum Rosenberg    ED PROVIDER: Ranjeet Valdez DO      Chief Complaint   Patient presents with     Chest Pain         FINAL IMPRESSION:  1. Chest pain, unspecified type          ED COURSE & MEDICAL DECISION MAKIN:12 AM Patient was placed in room 3. I met with the patient, obtained history, performed an initial exam, and discussed options and plan for diagnostics and treatment here in the ED.   4:02 AM Spoke to hospitalist, Dr. Andrea, who accepts the patient.      The patient presented to the emergency department today complaining of chest discomfort.  She has a history of CHF.  No concerning findings on physical exam.  Initial laboratory testing along with EKG is unremarkable but given her history and her symptoms tonight we will plan to watch her overnight in the hospital for further monitoring.  She is comfortable with this plan.      At the conclusion of the encounter I discussed the results of all of the tests and the disposition. The questions were answered. The patient or family acknowledged understanding and was agreeable with the care plan.       =================================================================    HPI        Sherice Alvarez is a 88 year old female with a pertinent history of GERD, hyperlipidemia, hypertension, CKD stage 3, asthma, CAD, DVT, stroke, NSTEMI, CHF, and s/pTAVR who presents to this ED by EMS for evaluation of chest pain.     Per EMS, around 0100, the patient woke up with chest pain. She was given aspirin and 1 nitroglycerin. She has had similar chest pain in the past, but this time is more severe. History of CHF, hypertension, and anxiety.     Per patient, she woke up with the chest pain and was unable to get up due to dizziness. It felt like the room was spinning, and she did not  want to  case she fell. She was also feeling nausea. In the ED, the patient notes that she pain and dizziness are better after getting medication en route. Patient has a history of CHF stage 3 and aortic valve replacement. She takes baby aspirin daily but is not on any blood thinners. She denies vomiting or any other complaints at this time.       REVIEW OF SYSTEMS     Constitutional:  Denies fever or chills  HENT:  Denies sore throat   Respiratory:  Denies cough or shortness of breath   Cardiovascular:  Endorses chest pain. Denies palpitations  GI:  Endorses nausea. Denies abdominal pain or vomiting  Musculoskeletal:  Denies any new extremity pain   Skin:  Denies rash   Neurologic:  Endorses dizziness. Denies headache, focal weakness or sensory changes    All other systems reviewed and are negative      PAST MEDICAL HISTORY:  Past Medical History:   Diagnosis Date     Arthritis      Asthma      CAD (coronary artery disease)      Cancer (H)     basal cell     Chronic kidney disease     ckd 3     Chronic pain syndrome      Congestive heart failure (H)      Crohn's disease (H)      GERD (gastroesophageal reflux disease)      Hx of heart artery stent 11/01/2016    1 stent R Proximal CA and 1 Stent L Proximal circumflex  12/2016 Stent proximal LAD     Hyperlipidemia      Hypertension      NSTEMI (non-ST elevated myocardial infarction) (H) 11/01/2016     PONV (postoperative nausea and vomiting)     severe povn with last knee surgery     Restless legs syndrome      Stroke (H) 01/01/2010    numbness rt jaw       PAST SURGICAL HISTORY:  Past Surgical History:   Procedure Laterality Date     APPENDECTOMY       CARDIAC CATHETERIZATION  11/04/2016    multiple vessel disease.  no intervention     CARDIAC CATHETERIZATION  11/07/2016     CARDIAC CATHETERIZATION N/A 12/27/2016    Procedure: Coronary Angiogram;  Surgeon: Sunil Moran MD;  Location: [facility] Cath Lab;  Service:      CAROTID ENDARTERECTOMY        CAROTID ENDARTERECTOMY Right 2010     CERVICAL LAMINECTOMY  1994      SECTION       CV CORONARY ANGIOGRAM N/A 2020    Procedure: Coronary Angiogram;  Surgeon: Vinita Ramos MD;  Location: [facility] Cath Lab;  Service: Cardiology     CV LEFT HEART CATHETERIZATION WITHOUT LEFT VENTRICULOGRAM Left 2020    Procedure: Left Heart Catheterization Without Left Ventriculogram;  Surgeon: Jerad Lerner MD;  Location: [facility] Cath Lab;  Service: Cardiology     CV TRANSCATHETER AORTIC VALVE REPLACEMENT - OTHER APPROACH N/A 2020    Procedure: CV TRANSCATHETER AORTIC VALVE REPLACEMENT - RIGHT SUBCLAVIAN APPROACH;  Surgeon: John Caceres MD;  Location: [facility] Cath Lab;  Service: Cardiology     HEART CATH, ANGIOPLASTY       HEMORRHOIDECTOMY EXTERNAL       IR LUMBAR EPIDURAL STEROID INJECTION  2014     IR LUMBAR NERVE ROOT INJECTION  10/01/2013     JOINT REPLACEMENT       VT ESOPHAGOGASTRODUODENOSCOPY TRANSORAL DIAGNOSTIC N/A 07/10/2019    Procedure: ESOPHAGOGASTRODUODENOSCOPY (EGD) with gastric biopsy;  Surgeon: Sunil Stuart MD;  Location: [facility] GI;  Service: Gastroenterology     VT REPLACE AORTIC VALVE OPEN AXILLRY ARTRY APPROACH N/A 2020    Procedure: OR TRANSCATHETER AORTIC VALVE REPLACEMENT, SUBCLAVIAN APPROACH;  Surgeon: Bhavin Cuadra MD;  Location: [facility] Cath Lab;  Service: Cardiology     TONSILLECTOMY & ADENOIDECTOMY       TOTAL KNEE ARTHROPLASTY Right      TRANSCATHETER AORTIC-VALVE REPLACEMENT       ZZC TOTAL KNEE ARTHROPLASTY Left 2021    Procedure: LEFT TOTAL KNEE ARTHROPLASTY;  Surgeon: Doug Rhodes MD;  Location: [facility];  Service: Orthopedics           CURRENT MEDICATIONS:    acetaminophen (TYLENOL) 500 MG tablet  albuterol (PROVENTIL HFA;VENTOLIN HFA) 90 mcg/actuation inhaler  amLODIPine (NORVASC) 2.5 MG tablet  aspirin (ASA) 81 MG EC tablet  beclomethasone (QVAR) 80 mcg/actuation inhaler  coenzyme  "Q10 100 mg capsule  diclofenac (VOLTAREN) 1 % topical gel  evolocumab (REPATHA) 140 MG/ML prefilled syringe  ferrous sulfate 325 (65 FE) MG tablet  furosemide (LASIX) 20 MG tablet  melatonin 5 mg Tab tablet  metoprolol succinate ER (TOPROL XL) 25 MG 24 hr tablet  Multiple Vitamins-Minerals (SENIOR MULTIVITAMIN PLUS PO)  nitroglycerin (NITROSTAT) 0.4 MG SL tablet  OLANZapine (ZYPREXA) 2.5 MG tablet  omeprazole (PRILOSEC) 20 MG DR capsule  rOPINIRole (REQUIP) 1 MG tablet  rOPINIRole (REQUIP) 2 MG tablet  Vitamin D3 (CHOLECALCIFEROL) 125 MCG (5000 UT) tablet        ALLERGIES:  Allergies   Allergen Reactions     Amitriptyline Hcl [Amitriptyline] Other (See Comments)     Erythromycin Diarrhea and Other (See Comments)     Childhood reaction     Gabapentin Other (See Comments)     Jerking movements, swelling     Naproxen Unknown and Other (See Comments)     Other Environmental Allergy Unknown     Molds, dander     Pregabalin Other (See Comments)       FAMILY HISTORY:  Family History   Problem Relation Age of Onset     Atrial fibrillation Mother      Parkinsonism Father        SOCIAL HISTORY:   Social History     Socioeconomic History     Marital status:      Spouse name: None     Number of children: None     Years of education: None     Highest education level: None   Tobacco Use     Smoking status: Former     Types: Cigarettes     Quit date: 1980     Years since quittin.9     Smokeless tobacco: Never   Substance and Sexual Activity     Alcohol use: No     Drug use: No       VITALS:  Patient Vitals for the past 24 hrs:   BP Temp Temp src Pulse Resp SpO2 Height Weight   12/10/22 0315 125/63 -- -- 59 17 95 % -- --   12/10/22 0245 (!) 152/72 -- -- 61 24 97 % -- --   12/10/22 0225 -- 98.2  F (36.8  C) Oral -- -- -- -- --   12/10/22 0216 (!) 178/82 -- -- 70 22 97 % 1.575 m (5' 2\") 65.3 kg (144 lb)       PHYSICAL EXAM    Constitutional:  Well developed, Well nourished,  HENT:  Normocephalic, Atraumatic, Bilateral " external ears normal, Oropharynx moist, Nose normal.   Neck:  Normal range of motion, No meningismus, No stridor.   Eyes:  EOMI, Conjunctiva normal, No discharge.   Respiratory:  Normal breath sounds, No respiratory distress, No wheezing, No chest tenderness.   Cardiovascular:  Normal heart rate, Normal rhythm, No murmurs  GI:  Soft, No tenderness, No guarding, No CVA tenderness.   Musculoskeletal:   No tenderness to palpation or major deformities noted.   Integument:  Warm, Dry, No erythema, No rash.   Neurologic:  Alert & oriented x 3, No focal deficits noted.   Psychiatric:  Affect normal, Judgment normal, Mood normal.      LAB:  All pertinent labs reviewed and interpreted.  Results for orders placed or performed during the hospital encounter of 12/10/22              CBC with platelets   Result Value Ref Range    WBC Count 8.8 4.0 - 11.0 10e3/uL    RBC Count 3.53 (L) 3.80 - 5.20 10e6/uL    Hemoglobin 10.5 (L) 11.7 - 15.7 g/dL    Hematocrit 32.5 (L) 35.0 - 47.0 %    MCV 92 78 - 100 fL    MCH 29.7 26.5 - 33.0 pg    MCHC 32.3 31.5 - 36.5 g/dL    RDW 19.9 (H) 10.0 - 15.0 %    Platelet Count 213 150 - 450 10e3/uL   Basic metabolic panel   Result Value Ref Range    Sodium 141 136 - 145 mmol/L    Potassium 4.0 3.4 - 5.3 mmol/L    Chloride 107 98 - 107 mmol/L    Carbon Dioxide (CO2) 24 22 - 29 mmol/L    Anion Gap 10 7 - 15 mmol/L    Urea Nitrogen 29.5 (H) 8.0 - 23.0 mg/dL    Creatinine 1.43 (H) 0.51 - 0.95 mg/dL    Calcium 8.7 (L) 8.8 - 10.2 mg/dL    Glucose 96 70 - 99 mg/dL    GFR Estimate 35 (L) >60 mL/min/1.73m2   Result Value Ref Range    Troponin T, High Sensitivity 37 (H) <=14 ng/L   Nt probnp inpatient   Result Value Ref Range    N terminal Pro BNP Inpatient 374 0 - 1,800 pg/mL       RADIOLOGY:  I have independently reviewed and interpreted the above imaging, pending the final radiology read.  XR Chest 2 Views   Final Result   IMPRESSION: Previous silhouette is unchanged in size and contour. A area aortic  endograft is again seen in place, unchanged from prior exam. There is mild prominence of the central pulmonary venous markings, correlate for signs of volume overload.          EKG:    Normal sinus rhythm at 65 bpm.  Normal axis.  No signs of acute ischemia.  QRS 78 ms,  ms.    I have independently reviewed and interpreted this EKG          I, Iva Iglesias, am serving as a scribe to document services personally performed by Dr. Valdez based on my observation and the provider's statements to me. I, Ranjeet Valdez, DO attest that Iva Iglesias is acting in a scribe capacity, has observed my performance of the services and has documented them in accordance with my direction.    Ranjeet Valdez DO  Emergency Medicine  [facility] EMERGENCY DEPARTMENT  [facility] [id number] [phone]  Dept: [phone]     Ranjeet Valdez MD  12/10/22 2422

## 2022-12-10 NOTE — ED NOTES
Bed: JNED-03  Expected date: 12/10/22  Expected time: 1:59 AM  Means of arrival: Ambulance  Comments:  LÓPEZ

## 2022-12-10 NOTE — ED TRIAGE NOTES
[facility] with complaints of chest tightness that started around 0100 today. Pt lives independently. Hx of anxiety, CHF, HTN, CVA. Pt reports she felt dizzy and felt like she was going to pass out, only complaining of lightheadedness now. Pt reports she took melatonin and an antianxiety medication before the chest pain started. EMS gave 324 Asprin and a ODT nitroglycerin on route. A&O     Triage Assessment     Row Name 12/10/22 0220       Triage Assessment (Adult)    Airway WDL WDL       Respiratory WDL    Respiratory WDL WDL       Skin Circulation/Temperature WDL    Skin Circulation/Temperature WDL WDL       Cardiac WDL    Cardiac WDL chest pain       Chest Pain Assessment    Chest Pain Location midsternal    Chest Pain Radiation shoulder    Character tightness    Associated Signs/Symptoms anxiety    Chest Pain Intervention 12-lead ECG obtained;cardiac monitoring continued       Peripheral/Neurovascular WDL    Peripheral Neurovascular WDL WDL       Cognitive/Neuro/Behavioral WDL    Cognitive/Neuro/Behavioral WDL WDL

## 2022-12-11 LAB — DEPRECATED CALCIDIOL+CALCIFEROL SERPL-MC: 34 UG/L (ref 20–75)

## 2022-12-19 ENCOUNTER — PATIENT OUTREACH (OUTPATIENT)
Dept: CARE COORDINATION | Facility: CLINIC | Age: 87
End: 2022-12-19

## 2022-12-19 NOTE — PROGRESS NOTES
Clinic Care Coordination Contact  Care Team Conversations    Patient identified for care management outreach, however Master RN staff has already followed up with patient to ensure they are following up with PCP and have needs and resources met. Clinic RN will refer back to CONNIE HALL if needed/appropriate.    Tatum Cee, [facility]  Social Work Care Coordinator - [facility] [place]  Care Coordination  [email or web address]  Cell Phone: [phone]  Gender pronouns: she/her  Employed by [employer]

## 2022-12-27 DIAGNOSIS — I10 ACCELERATED HYPERTENSION: ICD-10-CM

## 2022-12-27 DIAGNOSIS — I50.32 CHRONIC HEART FAILURE WITH PRESERVED EJECTION FRACTION (H): ICD-10-CM

## 2022-12-28 ENCOUNTER — HOSPITAL ENCOUNTER (EMERGENCY)
Facility: HOSPITAL | Age: 87
Discharge: HOME | End: 2022-12-28
Attending: EMERGENCY MEDICINE | Admitting: EMERGENCY MEDICINE
Payer: COMMERCIAL

## 2022-12-28 ENCOUNTER — APPOINTMENT (OUTPATIENT)
Dept: RADIOLOGY | Facility: HOSPITAL | Age: 87
End: 2022-12-28
Attending: EMERGENCY MEDICINE
Payer: COMMERCIAL

## 2022-12-28 VITALS
BODY MASS INDEX: 26.13 KG/M2 | OXYGEN SATURATION: 95 % | HEIGHT: 62 IN | DIASTOLIC BLOOD PRESSURE: 73 MMHG | WEIGHT: 142 LBS | TEMPERATURE: 97.9 F | RESPIRATION RATE: 16 BRPM | HEART RATE: 62 BPM | SYSTOLIC BLOOD PRESSURE: 141 MMHG

## 2022-12-28 DIAGNOSIS — N30.00 ACUTE CYSTITIS WITHOUT HEMATURIA: ICD-10-CM

## 2022-12-28 DIAGNOSIS — R07.9 CHEST PAIN, UNSPECIFIED TYPE: ICD-10-CM

## 2022-12-28 LAB
ALBUMIN SERPL BCG-MCNC: 3.7 G/DL (ref 3.5–5.2)
ALBUMIN UR-MCNC: 30 MG/DL
ALP SERPL-CCNC: 70 U/L (ref 35–104)
ALT SERPL W P-5'-P-CCNC: 17 U/L (ref 10–35)
ANION GAP SERPL CALCULATED.3IONS-SCNC: 8 MMOL/L (ref 7–15)
APPEARANCE UR: ABNORMAL
AST SERPL W P-5'-P-CCNC: 24 U/L (ref 10–35)
BASOPHILS # BLD AUTO: 0.1 10E3/UL (ref 0–0.2)
BASOPHILS NFR BLD AUTO: 1 %
BILIRUB DIRECT SERPL-MCNC: <0.2 MG/DL (ref 0–0.3)
BILIRUB SERPL-MCNC: 0.5 MG/DL
BILIRUB UR QL STRIP: NEGATIVE
BUN SERPL-MCNC: 19.7 MG/DL (ref 8–23)
CALCIUM SERPL-MCNC: 9 MG/DL (ref 8.8–10.2)
CHLORIDE SERPL-SCNC: 107 MMOL/L (ref 98–107)
COLOR UR AUTO: YELLOW
CREAT SERPL-MCNC: 1.13 MG/DL (ref 0.51–0.95)
DEPRECATED HCO3 PLAS-SCNC: 23 MMOL/L (ref 22–29)
EOSINOPHIL # BLD AUTO: 0.3 10E3/UL (ref 0–0.7)
EOSINOPHIL NFR BLD AUTO: 3 %
ERYTHROCYTE [DISTWIDTH] IN BLOOD BY AUTOMATED COUNT: 19.9 % (ref 10–15)
GFR SERPL CREATININE-BSD FRML MDRD: 47 ML/MIN/1.73M2
GLUCOSE SERPL-MCNC: 94 MG/DL (ref 70–99)
GLUCOSE UR STRIP-MCNC: NEGATIVE MG/DL
HCT VFR BLD AUTO: 32.3 % (ref 35–47)
HGB BLD-MCNC: 10.4 G/DL (ref 11.7–15.7)
HGB UR QL STRIP: ABNORMAL
HOLD SPECIMEN: NORMAL
HOLD SPECIMEN: NORMAL
IMM GRANULOCYTES # BLD: 0.1 10E3/UL
IMM GRANULOCYTES NFR BLD: 1 %
KETONES UR STRIP-MCNC: NEGATIVE MG/DL
LEUKOCYTE ESTERASE UR QL STRIP: ABNORMAL
LIPASE SERPL-CCNC: 54 U/L (ref 13–60)
LYMPHOCYTES # BLD AUTO: 1.7 10E3/UL (ref 0.8–5.3)
LYMPHOCYTES NFR BLD AUTO: 16 %
MCH RBC QN AUTO: 29.5 PG (ref 26.5–33)
MCHC RBC AUTO-ENTMCNC: 32.2 G/DL (ref 31.5–36.5)
MCV RBC AUTO: 92 FL (ref 78–100)
MONOCYTES # BLD AUTO: 1 10E3/UL (ref 0–1.3)
MONOCYTES NFR BLD AUTO: 9 %
MUCOUS THREADS #/AREA URNS LPF: PRESENT /LPF
NEUTROPHILS # BLD AUTO: 7.3 10E3/UL (ref 1.6–8.3)
NEUTROPHILS NFR BLD AUTO: 70 %
NITRATE UR QL: NEGATIVE
NRBC # BLD AUTO: 0 10E3/UL
NRBC BLD AUTO-RTO: 0 /100
NT-PROBNP SERPL-MCNC: 634 PG/ML (ref 0–1800)
PH UR STRIP: 5.5 [PH] (ref 5–7)
PLATELET # BLD AUTO: 201 10E3/UL (ref 150–450)
POTASSIUM SERPL-SCNC: 3.8 MMOL/L (ref 3.4–5.3)
PROT SERPL-MCNC: 6 G/DL (ref 6.4–8.3)
RBC # BLD AUTO: 3.52 10E6/UL (ref 3.8–5.2)
RBC URINE: 2 /HPF
SODIUM SERPL-SCNC: 138 MMOL/L (ref 136–145)
SP GR UR STRIP: 1.03 (ref 1–1.03)
SQUAMOUS EPITHELIAL: 2 /HPF
TROPONIN T SERPL HS-MCNC: 33 NG/L
TROPONIN T SERPL HS-MCNC: 34 NG/L
UROBILINOGEN UR STRIP-MCNC: <2 MG/DL
WBC # BLD AUTO: 10.4 10E3/UL (ref 4–11)
WBC URINE: 7 /HPF

## 2022-12-28 PROCEDURE — 85004 AUTOMATED DIFF WBC COUNT: CPT | Performed by: EMERGENCY MEDICINE

## 2022-12-28 PROCEDURE — 81001 URINALYSIS AUTO W/SCOPE: CPT | Performed by: EMERGENCY MEDICINE

## 2022-12-28 PROCEDURE — 84484 ASSAY OF TROPONIN QUANT: CPT | Performed by: EMERGENCY MEDICINE

## 2022-12-28 PROCEDURE — 80053 COMPREHEN METABOLIC PANEL: CPT | Performed by: EMERGENCY MEDICINE

## 2022-12-28 PROCEDURE — 36415 COLL VENOUS BLD VENIPUNCTURE: CPT | Performed by: EMERGENCY MEDICINE

## 2022-12-28 PROCEDURE — 99285 EMERGENCY DEPT VISIT HI MDM: CPT | Mod: 25

## 2022-12-28 PROCEDURE — 83690 ASSAY OF LIPASE: CPT | Performed by: EMERGENCY MEDICINE

## 2022-12-28 PROCEDURE — 250N000011 HC RX IP 250 OP 636: Performed by: EMERGENCY MEDICINE

## 2022-12-28 PROCEDURE — 82248 BILIRUBIN DIRECT: CPT | Performed by: EMERGENCY MEDICINE

## 2022-12-28 PROCEDURE — 93005 ELECTROCARDIOGRAM TRACING: CPT | Performed by: EMERGENCY MEDICINE

## 2022-12-28 PROCEDURE — 250N000013 HC RX MED GY IP 250 OP 250 PS 637: Performed by: EMERGENCY MEDICINE

## 2022-12-28 PROCEDURE — 71046 X-RAY EXAM CHEST 2 VIEWS: CPT

## 2022-12-28 PROCEDURE — 83880 ASSAY OF NATRIURETIC PEPTIDE: CPT | Performed by: EMERGENCY MEDICINE

## 2022-12-28 PROCEDURE — 96374 THER/PROPH/DIAG INJ IV PUSH: CPT

## 2022-12-28 RX ORDER — CEFDINIR 300 MG/1
300 CAPSULE ORAL DAILY
Qty: 7 CAPSULE | Refills: 0 | Status: SHIPPED | OUTPATIENT
Start: 2022-12-28 | End: 2023-01-04

## 2022-12-28 RX ORDER — METOPROLOL SUCCINATE 25 MG/1
TABLET, EXTENDED RELEASE ORAL
Qty: 90 TABLET | Refills: 3 | Status: SHIPPED | OUTPATIENT
Start: 2022-12-28 | End: 2024-02-09

## 2022-12-28 RX ORDER — CEFDINIR 300 MG/1
300 CAPSULE ORAL ONCE
Status: COMPLETED | OUTPATIENT
Start: 2022-12-28 | End: 2022-12-28

## 2022-12-28 RX ORDER — ASPIRIN 81 MG/1
324 TABLET, CHEWABLE ORAL ONCE
Status: COMPLETED | OUTPATIENT
Start: 2022-12-28 | End: 2022-12-28

## 2022-12-28 RX ORDER — FUROSEMIDE 10 MG/ML
40 INJECTION INTRAMUSCULAR; INTRAVENOUS ONCE
Status: COMPLETED | OUTPATIENT
Start: 2022-12-28 | End: 2022-12-28

## 2022-12-28 RX ADMIN — FUROSEMIDE 40 MG: 10 INJECTION, SOLUTION INTRAMUSCULAR; INTRAVENOUS at 10:43

## 2022-12-28 RX ADMIN — CEFDINIR 300 MG: 300 CAPSULE ORAL at 10:40

## 2022-12-28 RX ADMIN — ASPIRIN 81 MG CHEWABLE TABLET 324 MG: 81 TABLET CHEWABLE at 07:25

## 2022-12-28 ASSESSMENT — ACTIVITIES OF DAILY LIVING (ADL)
ADLS_ACUITY_SCORE: 35
ADLS_ACUITY_SCORE: 33
ADLS_ACUITY_SCORE: 35

## 2022-12-28 NOTE — DISCHARGE INSTRUCTIONS
You do indeed have a urinary tract or bladder infection today.  Take the antibiotics as prescribed.  The EKG of the heart looks normal.  The troponin is slightly elevated.  This has been elevated previously and it is not changing or evolving.  Your echocardiogram when you were hospitalized recently was reassuring.  We discussed hospital admission here, but with reassuring serial troponin, plan for discharged home and follow-up with cardiology as previously scheduled in January.  Take the antibiotics as prescribed.  Return to ER for the warning signs discussed.

## 2022-12-28 NOTE — ED NOTES
"Dr. Crockett aware of current c/o upper, bilateral chest pressure that radiates to abdomen at 7/10 with \"difficulty breathing\"  "

## 2022-12-28 NOTE — ED PROVIDER NOTES
EMERGENCY DEPARTMENT ENCOUNTER      NAME: Sherice Alvarez  AGE: 88 year old female  YOB: 1934  MRN: [id number]  EVALUATION DATE & TIME: 12/28/2022  6:42 AM    PCP: Anum Rosenberg    ED PROVIDER: Ericka Crockett MD    Chief Complaint   Patient presents with     Chest Pain         FINAL IMPRESSION:  1. Acute cystitis without hematuria    2. Chest pain, unspecified type          ED COURSE & MEDICAL DECISION MAKING:    Pertinent Labs & Imaging studies reviewed. (See chart for details)  88 year old female with history of HTN, HLD, CAD, previous TAVR who presents to the Emergency Department for evaluation of urinary symptoms since yesterday with generalized weakness, nausea.  Also has some chest pain, constant, substernal since yesterday.  Weight gain gradually over the course of the last 1 to 2 months.  Differential includes UTI, cystitis.  No flank pain fevers or systemic symptoms to suggest a significant pyelonephritis.  No lateralizing symptoms suspect a ureterolithiasis.  With the chest pain concern for ACS, atypical chest pain.  My suspicion overall for PE or dissection is low.    Per chart review patient was just hospitalized in early December with hypertension and chest pain.  Mild troponin elevation thought to be related to demand ischemia from her hypertension and CHF exacerbation.  Echocardiogram performed at that time unremarkable and they started her on Imdur and increased her Lasix.  Patient has been compliant with these increased doses.    Patient placed on monitor, IV established and blood obtained.  A twelve-lead EKG was obtained showing sinus rhythm.  Patient was given aspirin as well as her a.m. Lasix which she had not yet taken.  CBC, BMP, BNP, troponin, urinalysis were obtained for minimally elevated troponin at 34.  This is in the indeterminate range and is very similar to when she was just hospitalized here at the beginning of December.  , up minimally from 374 when  she was recently hospitalized.  Evidence of UTI.  Treated with cefdinir.  A repeat troponin is unchanged.  I do not think patient warrants additional cardiac evaluation at this time given stable cardiac enzymes.  This was discussed with patient.  She is comfortable with plan for home.  Discussed close monitoring of her weight, medication compliance.  Warning signs return to ED discussed, and will be discharged home.        ED Course as of 12/28/22 1418   Wed Dec 28, 2022   0635 I met with the patient, obtained history, performed an initial exam, and discussed options and plan for diagnostics and treatment here in the ED.   0724 Hemoglobin(!): 10.4  chronic   0733 Creatinine(!): 1.13  chronic   0756 Troponin T, High Sensitivity(!): 33  Elevated, but down from 2wk ago   0756 N-Terminal Pro BNP Inpatient: 634  Up from 374   0958 UA with Microscopic reflex to Culture(!)  +UTI   1000 Troponin T, High Sensitivity(!): 34  unchanged   1005 Checked in on and updated patient. We discussed the plan for discharge and the patient is agreeable. Reviewed supportive cares, symptomatic treatment, outpatient follow up, and reasons to return to the Emergency Department. Patient to be discharged by ED RN.        Medical Decision Making    History:    Supplemental history from: Chronic Kidney Disease, Heart Disease, Hyperlipidemia and Hypertension    External Record(s) reviewed: Documented in HPI, if applicable.    Work Up:    Chart documentation includes differential considered and any EKGs or imaging independently interpreted by provider.    In additional to work up documented, I considered the following work up: See chart documentation, if applicable.    External consultation:    Discussion of management with another provider: See chart documentation, if applicable    Complicating factors:    Care impacted by chronic illness: Chronic Kidney Disease, Heart Disease, Hyperlipidemia and Hypertension    Care affected by social  determinants of health: N/A    Disposition considerations: Discharge. I prescribed additional prescription strength medication(s) as charted. I considered admission, but discharged the patient after share decision making conversation.        At the conclusion of the encounter I discussed the results of all of the tests and the disposition. The questions were answered. The patient or family acknowledged understanding and was agreeable with the care plan.      MEDICATIONS GIVEN IN THE EMERGENCY:  Medications   aspirin (ASA) chewable tablet 324 mg (324 mg Oral Given 12/28/22 0725)   cefdinir (OMNICEF) capsule 300 mg (300 mg Oral Given 12/28/22 1040)   furosemide (LASIX) injection 40 mg (40 mg Intravenous Given 12/28/22 1043)       NEW PRESCRIPTIONS STARTED AT TODAY'S ER VISIT  Discharge Medication List as of 12/28/2022 11:43 AM      START taking these medications    Details   cefdinir (OMNICEF) 300 MG capsule Take 1 capsule (300 mg) by mouth daily for 7 days, Disp-7 capsule, R-0, Local Print                =================================================================    HPI    Patient information was obtained from: patient    Use of Intrepreter: N/A       Sherice Alvarez is a 88 year old female with pertinent medical history of GERD, hypertension, hyperlipidemia, CKD stage 3, asthma, DVT, CAD, NSTEMI, CHF, and s/p TAVR who presents to the ED by walk in for evaluation of chest pain.     Per chart review, the patient was admitted to [facility] on 12/10/2022 for treatment of acute on chronic heart failure after initially presenting to the ED for evaluation of chest pain with associated dizziness and nausea. In ED, troponin was elevated, EKG unremarkable. Chest XR showed the following result: Previous silhouette is unchanged in size and contour. A area aortic endograft is again seen in place, unchanged from prior exam. There is mild prominence of the central pulmonary venous markings, correlate for signs of volume  overload.   Patient was admitted for observation overnight. Treated with lasix. She was started on Imdur and Lasix was increased. Patient discharged home.     Since the afternoon of 12/27/22, the patient has been experiencing constant pressure-like pain in her chest with associated nausea, shakiness, dyspnea, and bilateral edema. She first noticed shortness of breath this morning and it is exacerbated with the shakiness. She has taken her inhaler this morning. She endorses significant weight gain (+15 pounds) over the past few weeks, but notes that it fluctuates. She weighs herself every morning. Baseline weight is around 138 lbs and this morning the patient weighed 145 lbs. Patient also reports increased blood pressure. At baseline, her blood pressure is 170-190 systolic. However, yesterday is was 220. Today and yesterday she has been taking extra Metoprolol. Patient was seen in the ED on 12/10/2022 for similar symptoms. She was started on Imdur. Patient denies any medication changes since then.     The patient also thinks that she has a bladder infection. Since yesterday, the patient has been having dark brown urine and abdominal pressure. She also reports decreased urine whenever she has the urge to urinate. Normal bowel movements.     The patient denies fever, chills, rhinorrhea, sore throat, vomiting, or any other complaints at this time.     REVIEW OF SYSTEMS  Constitutional:  Endorses weight gain. Denies fever, chills, weight loss or weakness  Eyes:  No pain, discharge, redness  HENT:  Denies sore throat, ear pain, congestion, rhinorrhea  Respiratory: Endorses dyspnea. No wheeze or cough  Cardiovascular:  Endorses chest pain. No palpitations  GI:  Endorses nausea. Denies abdominal pain, vomiting, diarrhea  : Denies dysuria, denies hematuria  Musculoskeletal: Endorses lower extremity edema. Denies any new muscle/joint pain, or loss of function.  Skin:  Denies rash, pallor  Neurologic:  Endorses tremors  (feeling shaky). Denies headache, focal weakness or sensory changes  Lymph: Denies swollen nodes    All other systems negative unless noted in HPI.      PAST MEDICAL HISTORY:  Past Medical History:   Diagnosis Date     Arthritis      Asthma      CAD (coronary artery disease)      Cancer (H)     basal cell     Chronic kidney disease     ckd 3     Chronic pain syndrome      Congestive heart failure (H)      Crohn's disease (H)      GERD (gastroesophageal reflux disease)      Hx of heart artery stent 2016    1 stent R Proximal CA and 1 Stent L Proximal circumflex  2016 Stent proximal LAD     Hyperlipidemia      Hypertension      NSTEMI (non-ST elevated myocardial infarction) (H) 2016     PONV (postoperative nausea and vomiting)     severe povn with last knee surgery     Restless legs syndrome      Stroke (H) 2010    numbness rt jaw       PAST SURGICAL HISTORY:  Past Surgical History:   Procedure Laterality Date     APPENDECTOMY       CARDIAC CATHETERIZATION  2016    multiple vessel disease.  no intervention     CARDIAC CATHETERIZATION  2016     CARDIAC CATHETERIZATION N/A 2016    Procedure: Coronary Angiogram;  Surgeon: Sunil Moran MD;  Location: [facility] Cath Lab;  Service:      CAROTID ENDARTERECTOMY       CAROTID ENDARTERECTOMY Right 2010     CERVICAL LAMINECTOMY  1994      SECTION       CV CORONARY ANGIOGRAM N/A 2020    Procedure: Coronary Angiogram;  Surgeon: Vinita Ramos MD;  Location: [facility] Cath Lab;  Service: Cardiology     CV LEFT HEART CATHETERIZATION WITHOUT LEFT VENTRICULOGRAM Left 2020    Procedure: Left Heart Catheterization Without Left Ventriculogram;  Surgeon: Jerad Lerner MD;  Location: [facility] Cath Lab;  Service: Cardiology     CV TRANSCATHETER AORTIC VALVE REPLACEMENT - OTHER APPROACH N/A 2020    Procedure: CV TRANSCATHETER AORTIC VALVE REPLACEMENT - RIGHT SUBCLAVIAN APPROACH;  Surgeon: Nicki  MD John;  Location: [facility] Cath Lab;  Service: Cardiology     HEART CATH, ANGIOPLASTY       HEMORRHOIDECTOMY EXTERNAL       IR LUMBAR EPIDURAL STEROID INJECTION  12/30/2014     IR LUMBAR NERVE ROOT INJECTION  10/01/2013     JOINT REPLACEMENT       TN ESOPHAGOGASTRODUODENOSCOPY TRANSORAL DIAGNOSTIC N/A 07/10/2019    Procedure: ESOPHAGOGASTRODUODENOSCOPY (EGD) with gastric biopsy;  Surgeon: Sunil Stuart MD;  Location: [facility];  Service: Gastroenterology     TN REPLACE AORTIC VALVE OPEN AXILLRY ARTRY APPROACH N/A 06/02/2020    Procedure: OR TRANSCATHETER AORTIC VALVE REPLACEMENT, SUBCLAVIAN APPROACH;  Surgeon: Bhavin Cuadra MD;  Location: [facility] Cath Lab;  Service: Cardiology     TONSILLECTOMY & ADENOIDECTOMY       TOTAL KNEE ARTHROPLASTY Right      TRANSCATHETER AORTIC-VALVE REPLACEMENT       ZZC TOTAL KNEE ARTHROPLASTY Left 05/11/2021    Procedure: LEFT TOTAL KNEE ARTHROPLASTY;  Surgeon: Doug Rhodes MD;  Location: [facility];  Service: Orthopedics       CURRENT MEDICATIONS:    Prior to Admission Medications   Prescriptions Last Dose Informant Patient Reported? Taking?   ACETAMINOPHEN-CODEINE 300-30 MG per tablet   Yes No   Sig: Take 1 tablet by mouth daily as needed   OLANZapine (ZYPREXA) 2.5 MG tablet   No No   Sig: Take 1 tablet (2.5 mg) by mouth 2 times daily as needed (anxiety)   QUEtiapine (SEROQUEL) 25 MG tablet   Yes No   Sig: Take 25 mg by mouth daily   Vitamin D3 (CHOLECALCIFEROL) 125 MCG (5000 UT) tablet   Yes No   Sig: Take 1 tablet by mouth daily   acetaminophen (TYLENOL) 500 MG tablet   Yes No   Sig: Take 500-1,000 mg by mouth every 8 hours as needed for mild pain   albuterol (PROVENTIL HFA;VENTOLIN HFA) 90 mcg/actuation inhaler   Yes No   Sig: [ALBUTEROL (PROVENTIL HFA;VENTOLIN HFA) 90 MCG/ACTUATION INHALER] Inhale 1-2 puffs every 6 (six) hours as needed for wheezing.   amLODIPine (NORVASC) 2.5 MG tablet   No No   Sig: Take 2 tablets (5 mg) by mouth  daily   aspirin (ASA) 81 MG EC tablet   Yes No   Sig: Take 81 mg by mouth daily   beclomethasone (QVAR) 80 mcg/actuation inhaler   Yes No   Sig: Inhale 1 puff into the lungs 2 times daily as needed    diclofenac (VOLTAREN) 1 % topical gel   Yes No   Sig: Apply 2-4 g topically 4 times daily as needed APPLY TO LEFT SHOULDER AND LEFT KNEE.   donepezil (ARICEPT) 5 MG tablet   Yes No   Sig: Take 5 mg by mouth daily   ferrous sulfate 325 (65 FE) MG tablet   No No   Sig: [FERROUS SULFATE 325 (65 FE) MG TABLET] Take 1 tablet (325 mg total) by mouth daily with breakfast.   furosemide (LASIX) 40 MG tablet   No No   Sig: Take 1 tablet (40 mg) by mouth 2 times daily for 30 days   isosorbide mononitrate (IMDUR) 30 MG 24 hr tablet   No No   Sig: Take 1 tablet (30 mg) by mouth daily for 30 days   melatonin 5 mg Tab tablet   Yes No   Sig: [MELATONIN 5 MG TAB TABLET] Take 5 mg by mouth at bedtime as needed (for sleep).    metoprolol succinate ER (TOPROL XL) 25 MG 24 hr tablet   No No   Sig: TAKE 1 TABLET BY MOUTH AT BEDTIME   nitroglycerin (NITROSTAT) 0.4 MG SL tablet   No No   Sig: [NITROGLYCERIN (NITROSTAT) 0.4 MG SL TABLET] Place 1 tablet (0.4 mg total) under the tongue every 5 (five) minutes as needed for chest pain.   omeprazole (PRILOSEC) 20 MG DR capsule   Yes No   Sig: Take 20 mg by mouth daily   rOPINIRole (REQUIP) 2 MG tablet   No No   Sig: Take 1 tablet (2 mg) by mouth At Bedtime for 30 days      Facility-Administered Medications: None       ALLERGIES:  Allergies   Allergen Reactions     Amitriptyline Hcl [Amitriptyline] Other (See Comments)     Erythromycin Diarrhea and Other (See Comments)     Childhood reaction     Gabapentin Other (See Comments)     Jerking movements, swelling     Naproxen Unknown and Other (See Comments)     Other Environmental Allergy Unknown     Molds, dander     Pregabalin Other (See Comments)       FAMILY HISTORY:  Family History   Problem Relation Age of Onset     Atrial fibrillation Mother       "Parkinsonism Father        SOCIAL HISTORY:  Social History     Tobacco Use     Smoking status: Former     Types: Cigarettes     Quit date: 1980     Years since quittin.0     Smokeless tobacco: Never   Substance Use Topics     Alcohol use: No     Drug use: No        VITALS:  Patient Vitals for the past 24 hrs:   BP Temp Temp src Pulse Resp SpO2 Height Weight   22 1202 (!) 141/73 97.9  F (36.6  C) Oral 62 16 95 % -- --   22 1030 120/57 -- -- 60 -- 97 % -- --   22 1004 129/60 -- -- 58 17 93 % -- --   22 0930 138/66 -- -- 57 20 94 % -- --   22 0915 135/70 -- -- 56 25 93 % -- --   22 0842 127/56 -- -- 78 18 96 % -- --   22 0617 (!) 146/63 97.5  F (36.4  C) Temporal 85 22 94 % 1.575 m (5' 2\") 64.4 kg (142 lb)       PHYSICAL EXAM    General Appearance: Well-appearing, well-nourished, no acute distress.   Head:  Normocephalic, atraumatic  Eyes:  PERRL, conjunctiva/corneas clear, EOM's intact  ENT:  Lips, mucosa, and tongue normal; membranes are moist without pallor  Neck:  Supple, symmetrical, trachea midline, no adenopathy  Chest:  No tenderness or deformity, no crepitus  Cardio:  Regular rate and rhythm, no murmur/gallop/rub, 2+ pulses symmetric in all extremities  Pulm:  Mild conversational dyspnea. No respiratory distress, clear to auscultation bilaterally  Back:  No midline tenderness to palpation, no paraspinal tenderness, No CVA tenderness, normal ROM  Abdomen:  Soft, non-tender, non distended,no rebound or guarding.  Extremities: 1+ edema around sockline bilaterally. Extremities normal, atraumatic, no cyanosis, full ROM and motor tone intact, bilateral pulses intact upper and lower  Skin:  Skin warm, dry, no rashes  Neuro:  Alert and oriented ×3, moving all extremities, no gross sensory defects     RADIOLOGY/LABS:  Reviewed all pertinent imaging. Please see official radiology report. All pertinent labs reviewed and interpreted.    Results for orders placed or " performed during the hospital encounter of 12/28/22   Chest XR,  PA & LAT    Impression    IMPRESSION: The lungs are clear. There is no pneumothorax or pleural effusion. Heart size and pulmonary vascularity are normal. There is a transcatheter aortic valve replacement which appears in the expected position. No evidence for congestive heart   failure. Mild degenerative change in the thoracic spine and shoulders.   Comprehensive metabolic panel   Result Value Ref Range    Sodium 138 136 - 145 mmol/L    Potassium 3.8 3.4 - 5.3 mmol/L    Chloride 107 98 - 107 mmol/L    Carbon Dioxide (CO2) 23 22 - 29 mmol/L    Anion Gap 8 7 - 15 mmol/L    Urea Nitrogen 19.7 8.0 - 23.0 mg/dL    Creatinine 1.13 (H) 0.51 - 0.95 mg/dL    Calcium 9.0 8.8 - 10.2 mg/dL    Glucose 94 70 - 99 mg/dL    Alkaline Phosphatase 70 35 - 104 U/L    AST 24 10 - 35 U/L    ALT 17 10 - 35 U/L    Protein Total 6.0 (L) 6.4 - 8.3 g/dL    Albumin 3.7 3.5 - 5.2 g/dL    Bilirubin Total 0.5 <=1.2 mg/dL    GFR Estimate 47 (L) >60 mL/min/1.73m2   Result Value Ref Range    Troponin T, High Sensitivity 33 (H) <=14 ng/L   Result Value Ref Range    Lipase 54 13 - 60 U/L   Nt probnp inpatient (BNP)   Result Value Ref Range    N terminal Pro BNP Inpatient 634 0 - 1,800 pg/mL   Result Value Ref Range    Bilirubin Direct <0.20 0.00 - 0.30 mg/dL   UA with Microscopic reflex to Culture    Specimen: Urine, Midstream   Result Value Ref Range    Color Urine Yellow Colorless, Straw, Light Yellow, Yellow    Appearance Urine Turbid (A) Clear    Glucose Urine Negative Negative mg/dL    Bilirubin Urine Negative Negative    Ketones Urine Negative Negative mg/dL    Specific Gravity Urine 1.029 1.001 - 1.030    Blood Urine 0.03 mg/dL (A) Negative    pH Urine 5.5 5.0 - 7.0    Protein Albumin Urine 30 (A) Negative mg/dL    Urobilinogen Urine <2.0 <2.0 mg/dL    Nitrite Urine Negative Negative    Leukocyte Esterase Urine 25 [place]/uL (A) Negative    Mucus Urine Present (A) None Seen /LPF     RBC Urine 2 <=2 /HPF    WBC Urine 7 (H) <=5 /HPF    Squamous Epithelials Urine 2 (H) <=1 /HPF   CBC with platelets and differential   Result Value Ref Range    WBC Count 10.4 4.0 - 11.0 10e3/uL    RBC Count 3.52 (L) 3.80 - 5.20 10e6/uL    Hemoglobin 10.4 (L) 11.7 - 15.7 g/dL    Hematocrit 32.3 (L) 35.0 - 47.0 %    MCV 92 78 - 100 fL    MCH 29.5 26.5 - 33.0 pg    MCHC 32.2 31.5 - 36.5 g/dL    RDW 19.9 (H) 10.0 - 15.0 %    Platelet Count 201 150 - 450 10e3/uL    % Neutrophils 70 %    % Lymphocytes 16 %    % Monocytes 9 %    % Eosinophils 3 %    % Basophils 1 %    % Immature Granulocytes 1 %    NRBCs per 100 WBC 0 <1 /100    Absolute Neutrophils 7.3 1.6 - 8.3 10e3/uL    Absolute Lymphocytes 1.7 0.8 - 5.3 10e3/uL    Absolute Monocytes 1.0 0.0 - 1.3 10e3/uL    Absolute Eosinophils 0.3 0.0 - 0.7 10e3/uL    Absolute Basophils 0.1 0.0 - 0.2 10e3/uL    Absolute Immature Granulocytes 0.1 <=0.4 10e3/uL    Absolute NRBCs 0.0 10e3/uL   Extra Blue Top Tube   Result Value Ref Range    Hold Specimen JIC    Extra Red Top Tube   Result Value Ref Range    Hold Specimen JIC    Troponin T, High Sensitivity (now)   Result Value Ref Range    Troponin T, High Sensitivity 34 (H) <=14 ng/L       EKG:  Performed at: 0631    Impression: Normal sinus rhythm. Possible left atrial enlargement. Borderline EKG67    Rate: 67  Rhythm: sinus  Axis: 49  NH Interval: 204  QRS Interval: 76  QTc Interval: 452  ST Changes: none  Comparison: 10-Dec-2022: no changes   I have independently reviewed and interpreted the EKG(s) documented above.        The creation of this record is based on the scribe s observations of the work being performed by Ericka Crockett MD and the provider s statements to them. It was created on her behalf by Iva Iglesias, a trained medical scribe. This document has been checked and approved by the attending provider.    Ericka Crockett MD  Emergency Medicine  [facility] EMERGENCY  DEPARTMENT  [address] [place] [id number]  [phone]  Dept: [phone]       Ericka Crockett MD  12/28/22 141

## 2022-12-28 NOTE — ED TRIAGE NOTES
Pt here from home d/t chest tightness. Pain does not radiate. Does   Noticed today her urine was brownish and that her BP at home was in 200s. Pt also states she is more swollen in BLE and hands and is shaky when she usually isnt. PMH: asthma, cardiac.      Triage Assessment     Row Name 12/28/22 0620       Triage Assessment (Adult)    Airway WDL WDL

## 2023-01-12 ENCOUNTER — TELEPHONE (OUTPATIENT)
Dept: CARDIOLOGY | Facility: CLINIC | Age: 88
End: 2023-01-12

## 2023-01-12 NOTE — TELEPHONE ENCOUNTER
"  ----- Message -----  From: Ulisses Feldman MD  Sent: 1/11/2023   3:05 PM CST  To: Shelley Perez RN    Can we please contact this lady, find out what meds she is taking, blood pressures are unacceptable.  Let me know and we may need to increase something.  LF      Metoprolol succinate 25 mg daily   Isosorbide 30 mg daily  Furosemide 40 mg bid  amlopidine 5 mg daily           ==Called Sherice and updated on results faxed over from [facility]. She verbalized understanding and her medication list is up to date. The issue she is having is that her son is currently in hospice. She is spending most of her days there. She is delaying her doses of her medication and sometimes even missing them. She realizes that she \"needs to get back on track\". Sympathized and reassured her. She admits to struggling right now and letting her own health take the back burner. Will update Dr. Feldman on this and [place] back with Sherice. -[facility]      Dr. Feldman,  See above circumstances involving blood pressures. She has been missing doses.. spacing them out too far.. etc. Her son is on hospice. She is scheduled to see you 1/25. Recommendations prior?  Thanks,  López   "

## 2023-01-15 ENCOUNTER — HOSPITAL ENCOUNTER (EMERGENCY)
Facility: HOSPITAL | Age: 88
Discharge: HOME | End: 2023-01-15
Attending: EMERGENCY MEDICINE | Admitting: EMERGENCY MEDICINE
Payer: COMMERCIAL

## 2023-01-15 VITALS
OXYGEN SATURATION: 97 % | DIASTOLIC BLOOD PRESSURE: 67 MMHG | TEMPERATURE: 97.8 F | HEART RATE: 56 BPM | SYSTOLIC BLOOD PRESSURE: 153 MMHG | RESPIRATION RATE: 29 BRPM | WEIGHT: 141 LBS | HEIGHT: 61 IN | BODY MASS INDEX: 26.62 KG/M2

## 2023-01-15 DIAGNOSIS — I15.9 SECONDARY HYPERTENSION: ICD-10-CM

## 2023-01-15 PROCEDURE — 99282 EMERGENCY DEPT VISIT SF MDM: CPT

## 2023-01-15 ASSESSMENT — ACTIVITIES OF DAILY LIVING (ADL): ADLS_ACUITY_SCORE: 35

## 2023-01-15 NOTE — ED TRIAGE NOTES
The pt states that she has CHF. The pt states that she too her metoprolol and 3 hours later her BP was still in the 200's.  The pt than realized she took metoprolol twice in the same day. The patient states that she than realized that she took her BB twice in one day and so she was feeling nervous. Pt is worried that it will have a large effect on her BP. The pt states that she did not call her PMD bout her BP being higher.

## 2023-01-15 NOTE — ED PROVIDER NOTES
EMERGENCY DEPARTMENT ENCOUNTER            IMPRESSION:  Hypertension      MEDICAL DECISION MAKING:  Patient evaluated for hypertension.  She has history of multiple medical problems.  Earlier today she noted high blood pressure and took additional tablets of her metoprolol.  She was concerned about hypotension.  Currently she feels fine    On her exam blood pressure is normal.  Cardiopulmonary exam is normal.    Patient was observed over multiple blood pressure readings have stabilized.    No evidence of hypertension.    Patient stable for discharge home      =================================================================  CHIEF COMPLAINT:  Chief Complaint   Patient presents with     Hypertension         HPI  Sherice Alvarez is a 88 year old female with a history of CAD, NSTEMI, CVA, hyperlipidemia, HTN, CHF, CKD, Crohn's disease, basal cell cancer, DVT, HFpEF, dyspnea, dyslipidemia, GERD, s/p cervical laminectomy, s/p angioplasty, s/p appendectomy, s/p right carotid endarterectomy, and s/p aortic valve replacement who presents to the ED by walk in for evaluation of HTN.    Per patient, she states she is in a third stage of heart failure, and one of the issues she often has is changes in her blood pressure secondary to dehydration. She has difficulty replenishing her fluids with the Lasix she is taking.    This afternoon, she noticed that her systolic blood pressure went up to 244, so she took 1 metoprolol. Upon rechecking her blood pressure three hours later, it was still elevated, so she took another metoprolol. Now, she is concerned that her blood pressure could drop too low. Her last metoprolol dose was about 2 hours ago.    Patient also endorses some occasional chest tightness.    Patient lives at [facility].    I, Rosa Brooks am serving as a scribe to document services personally performed by Dr. Bogdan Robertson MD, based on my observation and the provider's statements to me. I, Dr. Bogdan Robertson,  MD attest that Rosa Brooks is acting in a scribe capacity, has observed my performance of the services and has documented them in accordance with my direction.      REVIEW OF SYSTEMS   Constitutional: Denies fever, chills, unintentional weight loss or fatigue   Eyes: Denies visual changes or discharge    HENT: Denies sore throat, ear pain or neck pain  Respiratory: Denies cough or shortness of breath. Positive for chest tightness.   Cardiovascular: Denies chest pain, palpitations or leg swelling  GI: Denies abdominal pain, nausea, vomiting, or dark, bloody stools.    : Denies hematuria, dysuria, or flank pain  Musculoskeletal: Denies any new back pain or new muscle/joint pains  Skin: Denies rash or wound  Neurologic: Denies current headache, new weakness, focal weakness, or sensory changes    Lymphatic: Denies swollen glands    Psychiatric: Denies depression, suicidal ideation or homicidal ideation.    Remainder of systems reviewed, unless noted in HPI all others negative.      PAST MEDICAL HISTORY:  Past Medical History:   Diagnosis Date     Arthritis      Asthma      CAD (coronary artery disease)      Cancer (H)     basal cell     Chronic kidney disease     ckd 3     Chronic pain syndrome      Congestive heart failure (H)      Crohn's disease (H)      GERD (gastroesophageal reflux disease)      Hx of heart artery stent 11/01/2016    1 stent R Proximal CA and 1 Stent L Proximal circumflex  12/2016 Stent proximal LAD     Hyperlipidemia      Hypertension      NSTEMI (non-ST elevated myocardial infarction) (H) 11/01/2016     PONV (postoperative nausea and vomiting)     severe povn with last knee surgery     Restless legs syndrome      Stroke (H) 01/01/2010    numbness rt jaw       PAST SURGICAL HISTORY:  Past Surgical History:   Procedure Laterality Date     APPENDECTOMY       CARDIAC CATHETERIZATION  11/04/2016    multiple vessel disease.  no intervention     CARDIAC CATHETERIZATION  11/07/2016      CARDIAC CATHETERIZATION N/A 2016    Procedure: Coronary Angiogram;  Surgeon: Sunil Moran MD;  Location: [facility] Cath Lab;  Service:      CAROTID ENDARTERECTOMY       CAROTID ENDARTERECTOMY Right 2010     CERVICAL LAMINECTOMY  1994      SECTION       CV CORONARY ANGIOGRAM N/A 2020    Procedure: Coronary Angiogram;  Surgeon: Vinita Ramos MD;  Location: [facility] Cath Lab;  Service: Cardiology     CV LEFT HEART CATHETERIZATION WITHOUT LEFT VENTRICULOGRAM Left 2020    Procedure: Left Heart Catheterization Without Left Ventriculogram;  Surgeon: Jerad Lerner MD;  Location: [facility] Cath Lab;  Service: Cardiology     CV TRANSCATHETER AORTIC VALVE REPLACEMENT - OTHER APPROACH N/A 2020    Procedure: CV TRANSCATHETER AORTIC VALVE REPLACEMENT - RIGHT SUBCLAVIAN APPROACH;  Surgeon: John Caceres MD;  Location: [facility] Cath Lab;  Service: Cardiology     HEART CATH, ANGIOPLASTY       HEMORRHOIDECTOMY EXTERNAL       IR LUMBAR EPIDURAL STEROID INJECTION  2014     IR LUMBAR NERVE ROOT INJECTION  10/01/2013     JOINT REPLACEMENT       IL ESOPHAGOGASTRODUODENOSCOPY TRANSORAL DIAGNOSTIC N/A 07/10/2019    Procedure: ESOPHAGOGASTRODUODENOSCOPY (EGD) with gastric biopsy;  Surgeon: Sunil Stuart MD;  Location: [facility];  Service: Gastroenterology     IL REPLACE AORTIC VALVE OPEN AXILLRY ARTRY APPROACH N/A 2020    Procedure: OR TRANSCATHETER AORTIC VALVE REPLACEMENT, SUBCLAVIAN APPROACH;  Surgeon: Bhavin Cuadra MD;  Location: [facility] Cath Lab;  Service: Cardiology     TONSILLECTOMY & ADENOIDECTOMY       TOTAL KNEE ARTHROPLASTY Right      TRANSCATHETER AORTIC-VALVE REPLACEMENT       ZZC TOTAL KNEE ARTHROPLASTY Left 2021    Procedure: LEFT TOTAL KNEE ARTHROPLASTY;  Surgeon: Doug Rhodes MD;  Location: [facility];  Service: Orthopedics         CURRENT MEDICATIONS:    acetaminophen (TYLENOL) 500 MG  "tablet  ACETAMINOPHEN-CODEINE 300-30 MG per tablet  albuterol (PROVENTIL HFA;VENTOLIN HFA) 90 mcg/actuation inhaler  amLODIPine (NORVASC) 2.5 MG tablet  aspirin (ASA) 81 MG EC tablet  beclomethasone (QVAR) 80 mcg/actuation inhaler  diclofenac (VOLTAREN) 1 % topical gel  donepezil (ARICEPT) 5 MG tablet  ferrous sulfate 325 (65 FE) MG tablet  furosemide (LASIX) 40 MG tablet  isosorbide mononitrate (IMDUR) 30 MG 24 hr tablet  melatonin 5 mg Tab tablet  metoprolol succinate ER (TOPROL XL) 25 MG 24 hr tablet  nitroglycerin (NITROSTAT) 0.4 MG SL tablet  OLANZapine (ZYPREXA) 2.5 MG tablet  omeprazole (PRILOSEC) 20 MG DR capsule  QUEtiapine (SEROQUEL) 25 MG tablet  rOPINIRole (REQUIP) 2 MG tablet  Vitamin D3 (CHOLECALCIFEROL) 125 MCG (5000 UT) tablet        ALLERGIES:  Allergies   Allergen Reactions     Amitriptyline Hcl [Amitriptyline] Other (See Comments)     Erythromycin Diarrhea and Other (See Comments)     Childhood reaction     Gabapentin Other (See Comments)     Jerking movements, swelling     Naproxen Unknown and Other (See Comments)     Other Environmental Allergy Unknown     Molds, dander     Pregabalin Other (See Comments)       FAMILY HISTORY:  Family History   Problem Relation Age of Onset     Atrial fibrillation Mother      Parkinsonism Father        SOCIAL HISTORY:   Social History     Socioeconomic History     Marital status:    Tobacco Use     Smoking status: Former     Types: Cigarettes     Quit date: 1980     Years since quittin.0     Smokeless tobacco: Never   Substance and Sexual Activity     Alcohol use: No     Drug use: No       PHYSICAL EXAM:    BP (!) 153/67   Pulse 56   Temp 97.8  F (36.6  C) (Temporal)   Resp 29   Ht 1.549 m (5' 1\")   Wt 64 kg (141 lb)   SpO2 97%   BMI 26.64 kg/m      Constitutional: Awake, alert, in no acute distress  Head: Normocephalic, atraumatic.  ENT: Mucous membranes moist. Posterior oropharynx appears normal.  Eyes: Pupils midrange and reactive ,no " conjunctival discharge  Neck: No lymphadenopathy, no stridor, supple, no soft tissue swelling  Chest: No tenderness   Respiratory: Respirations even, unlabored. Lungs clear to ascultation bilaterally, in no acute respiratory distress.  Cardiovascular: Regular rate and rhythm.+2 radial pulses, equal bilaterally.  No murmurs.   GI: Abdomen soft, non-tender to palpation in all 4 quadrants. No guarding or rebound. Bowel sounds intact on all 4 quadrants.   Back: No CVA tenderness.    Musculoskeletal: Moves all 4 extremities equally, strength symmetrical on bilateral uppers and lowers.  No peripheral edema  Integument: Warm, dry. No rash. No bruising or petechiae.  Lymphatic: No cervical lymphadenopathy  Neurologic: Alert & oriented x 3. Normal speech. Grossly normal motor and sensory function. No focal deficits noted.  NIHSS = 0  Psychiatric: Normal mood and affect. Normal judgement.    ED COURSE:    3:47 PM I introduced myself to the patient, obtained patient history, performed a physical exam, and discussed plan for ED workup including potential diagnostic laboratory/imaging studies and interventions.        Medical Decision Making    History:    Supplemental history from: Documented in HPI, if applicable    External Record(s) reviewed: Documented in HPI, if applicable.    Work Up:    Chart documentation includes differential considered and any EKGs or imaging independently interpreted by provider.    In additional to work up documented, I considered the following work up: See chart documentation, if applicable.    External consultation:    Discussion of management with another provider: See chart documentation, if applicable    Complicating factors:    Care impacted by chronic illness: Cancer/Chemotherapy, Chronic Kidney Disease, Heart Disease, Hyperlipidemia and Hypertension    Care affected by social determinants of health: N/A    Disposition considerations: Discharge. No recommendations on prescription strength  medication(s). Admission consideration documented above, if applicable.             NEW PRESCRIPTIONS STARTED AT TODAY'S ER VISIT:  Discharge Medication List as of 1/15/2023  5:42 PM             FINAL DIAGNOSIS:    ICD-10-CM    1. Secondary hypertension  I15.9                At the conclusion of the encounter I discussed the results of all of the tests and the disposition. The questions were answered. The patient or family acknowledged understanding and was agreeable with the care plan.     NAME: Sherice Alvarez  AGE: 88 year old female  YOB: 1934  MRN: [id number]  EVALUATION DATE & TIME: No admission date for patient encounter.    PCP: Anum Rosenberg    ED PROVIDER: Bogdan Robertson M.D.      I, Rosa Brooks, am serving as a scribe to document services personally performed by Dr. Bogdan Robertson based on my observation and the provider's statements to me. I, Bogdan Robertson MD attest that Rosa Brooks is acting in a scribe capacity, has observed my performance of the services and has documented them in accordance with my direction.    Bogdan Robertson M.D.  Emergency Medicine  [facility] EMERGENCY DEPARTMENT  [facility] [facility] [id number] [phone]  Dept: [phone]  1/15/2023       Bogdan Robertson MD  01/15/23 [id number]

## 2023-01-16 NOTE — TELEPHONE ENCOUNTER
From: Ulisses Feldman MD  Sent: 1/13/2023   2:13 PM CST  To: Shelley Perez RN    Thank you for that.  Could we may be asked her to take an additional amlodipine half or 2.5 mg  in the evening.  However, if she does admit to taking her meds and will be compliant with them let us just continue that for the time being.  Stressed that even though she is worried about her son she still needs to take care of herself.  LF        ==left a detailed message outlining above. She is scheduled 1/25. Direct line provided for call back should she need it. -[facility] [place]

## 2023-01-19 ENCOUNTER — TELEPHONE (OUTPATIENT)
Dept: CARDIOLOGY | Facility: CLINIC | Age: 88
End: 2023-01-19
Payer: COMMERCIAL

## 2023-01-19 NOTE — TELEPHONE ENCOUNTER
Ulisses Feldman MD Caswell, Mallory J, RN  Yes, 7.5 mg a day.   LF           Previous Messages     ----- Message -----   From: Shelley Perez RN   Sent: 1/19/2023   9:46 AM CST   To: Ulisses Feldman MD     She is already on 5 mg. Med list dose shows 2.5 mg but instructions are take 2 to make 5 mg daily. 7.5? 10?   ----- Message -----   From: Ulisses Feldman MD   Sent: 1/18/2023   4:54 PM CST   To: Shelley Perez, RN     I misspoke, can we confirm that she is taking her metoprolol 25 mg, Imdur 30 mg, and now amlodipine 2 and half milligrams?  Even though she is concerned about her son these are unacceptable numbers and I would like to increase the amlodipine up to 5 mg.   LF   ----- Message -----   From: Marie Ruff   Sent: 1/18/2023   3:53 PM CST   To: Ulisses Feldman MD

## 2023-01-19 NOTE — TELEPHONE ENCOUNTER
LM with patient to discuss increasing Amlodipine up to 7.5 mg daily. Direct line provided for call back. -[facility] [place]

## 2023-01-25 ENCOUNTER — OFFICE VISIT (OUTPATIENT)
Dept: CARDIOLOGY | Facility: CLINIC | Age: 88
End: 2023-01-25
Payer: COMMERCIAL

## 2023-01-25 VITALS
SYSTOLIC BLOOD PRESSURE: 112 MMHG | RESPIRATION RATE: 18 BRPM | HEART RATE: 66 BPM | DIASTOLIC BLOOD PRESSURE: 62 MMHG | BODY MASS INDEX: 25.76 KG/M2 | HEIGHT: 62 IN | WEIGHT: 140 LBS

## 2023-01-25 DIAGNOSIS — I25.83 CORONARY ARTERIOSCLEROSIS DUE TO LIPID RICH PLAQUE: Primary | ICD-10-CM

## 2023-01-25 DIAGNOSIS — F43.23 ADJUSTMENT DISORDER WITH MIXED ANXIETY AND DEPRESSED MOOD: ICD-10-CM

## 2023-01-25 DIAGNOSIS — I10 ACCELERATED HYPERTENSION: ICD-10-CM

## 2023-01-25 DIAGNOSIS — N18.31 STAGE 3A CHRONIC KIDNEY DISEASE (H): ICD-10-CM

## 2023-01-25 DIAGNOSIS — E78.5 DYSLIPIDEMIA, GOAL LDL BELOW 100: ICD-10-CM

## 2023-01-25 DIAGNOSIS — I50.33 ACUTE ON CHRONIC HEART FAILURE WITH PRESERVED EJECTION FRACTION (H): ICD-10-CM

## 2023-01-25 DIAGNOSIS — Z95.2 S/P TAVR (TRANSCATHETER AORTIC VALVE REPLACEMENT): ICD-10-CM

## 2023-01-25 PROCEDURE — 99214 OFFICE O/P EST MOD 30 MIN: CPT | Performed by: INTERNAL MEDICINE

## 2023-01-25 RX ORDER — MULTIVITAMIN,THERAPEUTIC
1 TABLET ORAL DAILY PRN
COMMUNITY

## 2023-01-25 RX ORDER — SERTRALINE HYDROCHLORIDE 25 MG/1
50 TABLET, FILM COATED ORAL DAILY
Status: ON HOLD | COMMUNITY
Start: 2023-01-11 | End: 2023-05-20

## 2023-01-25 NOTE — LETTER
1/25/2023    Anum Rosenberg, NP  [address] [address]    RE: Sherice Alvarez       Dear Colleague,     I had the pleasure of seeing Sherice Alvarez in the [facility].      [facility]  Heart Care Clinic Follow-up Note    Assessment & Plan        (I25.10,  I25.83) Coronary arteriosclerosis due to lipid rich plaque  (primary encounter diagnosis)  Comment:  Angiography April 2020 showed normal left main, proximal LAD 20% stenosis with patent stent, mid sequential 30% lesion with a distal 70% lesion, circumflex with a patent proximal stent with a third obtuse marginal artery 99% stenosis.  Right coronary artery patent stents with a mid 40% stenosis. Attempted intervention on distal LAD as well as obtuse marginal artery which were unsuccessful and underwent nuclear stress test May 2021 which was normal.  Presented again to the hospital and was admitted for chest pain with rule out an unremarkable nuclear stress test 2022.  Currently work on preventive therapy.    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  Recheck echo in about a year.    (I10) Accelerated hypertension  Comment: Biggest issue, she has high blood pressures at home, she is not very compliant with medicines.  She admits she is forgetful.  When I review her medications with her today it appears she is only taking metoprolol 25 mg a day.  I have asked her to check her list of medicines and to call and confirm us and if need be restart amlodipine 2.5 mg a day.    (I50.33) Acute on chronic heart failure with preserved ejection fraction (H)  Comment: No significant signs or symptoms currently.  She stopped furosemide on her own.  She admits to sodium restriction but does not admit to fluid restriction.  She seems to  never have heard this before thus we discussed fluid restriction to no more than 6 to 8 glasses of everything a day.    (N18.31) Stage 3a chronic kidney disease (H)  Comment: Mildly increased creatinine of 1.13 with GFR 47.    (E78.5) Dyslipidemia, goal LDL below 100  Comment: Total cholesterol 222 with LDL of 148, as above does not appear to be taking statin anymore.  We will need to address.    (F43.23) Adjustment disorder with mixed anxiety and depressed mood  Comment: Biggest issue, seems fixated on her son's eminent demise due to multisystem organ failure, he is in hospice.  It is an adopted son but her legal son.  In addition, she seems and admits to being forgetful, she is telling me she is looking at signing papers for assisted living later on this week and plans to move in there within 2 to 3 months.  She is currently on Aricept.    Plan  1.  List of medications given to her, she will call in today to confirm and if need be restart amlodipine 2.5 mg a day.  2.  Fluid and salt restriction which she has not been doing.  3.  Agree with enrollment in assisted living as this will help with medication compliance which she admits to not being good at.  4.  Follow-up with me in 3 to 4 months given this situation.    Subjective  CC: 88-year-old white female here for follow-up visit.  Since I seen her she was admitted to the hospital plus she presented to the emergency department on several occasions.  She has been monitored by [employer] for blood pressure issues.  She tells me her blood pressure gets up she gets short of breath and she gets a tightness in her chest on activity.  As long as her blood pressure is under control she is good.  She suspects her blood pressure is elevated due to discovery that her daughter-in-law is an alcoholic.  She admits to now being able to cope with her sons imminent demise.  In general, she denies any syncope or dizziness or PND or orthopnea or peripheral  edema.    Medications  Current Outpatient Medications   Medication Sig Dispense Refill     acetaminophen (TYLENOL) 500 MG tablet Take 500-1,000 mg by mouth every 8 hours as needed for mild pain       albuterol (PROVENTIL HFA;VENTOLIN HFA) 90 mcg/actuation inhaler [ALBUTEROL (PROVENTIL HFA;VENTOLIN HFA) 90 MCG/ACTUATION INHALER] Inhale 1-2 puffs every 6 (six) hours as needed for wheezing.       aspirin (ASA) 81 MG EC tablet Take 81 mg by mouth daily       donepezil (ARICEPT) 5 MG tablet Take 5 mg by mouth daily       ferrous sulfate 325 (65 FE) MG tablet [FERROUS SULFATE 325 (65 FE) MG TABLET] Take 1 tablet (325 mg total) by mouth daily with breakfast.  0     melatonin 5 mg Tab tablet [MELATONIN 5 MG TAB TABLET] Take 5 mg by mouth at bedtime as needed (for sleep).        metoprolol succinate ER (TOPROL XL) 25 MG 24 hr tablet TAKE 1 TABLET BY MOUTH AT BEDTIME 90 tablet 3     Multiple Vitamin (THERA-TABS) TABS Take 1 tablet by mouth       OLANZapine (ZYPREXA) 2.5 MG tablet Take 1 tablet (2.5 mg) by mouth 2 times daily as needed (anxiety) 14 tablet 0     QUEtiapine (SEROQUEL) 25 MG tablet Take 25 mg by mouth daily       rOPINIRole (REQUIP) 2 MG tablet Take 1 tablet (2 mg) by mouth At Bedtime for 30 days 30 tablet 0     sertraline (ZOLOFT) 25 MG tablet Take 25 mg by mouth every morning       Vitamin D3 (CHOLECALCIFEROL) 125 MCG (5000 UT) tablet Take 1 tablet by mouth daily       beclomethasone (QVAR) 80 mcg/actuation inhaler Inhale 1 puff into the lungs 2 times daily as needed  (Patient not taking: Reported on 1/25/2023)       diclofenac (VOLTAREN) 1 % topical gel Apply 2-4 g topically 4 times daily as needed APPLY TO LEFT SHOULDER AND LEFT KNEE. (Patient not taking: Reported on 1/25/2023)       nitroglycerin (NITROSTAT) 0.4 MG SL tablet [NITROGLYCERIN (NITROSTAT) 0.4 MG SL TABLET] Place 1 tablet (0.4 mg total) under the tongue every 5 (five) minutes as needed for chest pain. (Patient not taking: Reported on 1/25/2023)  "90 tablet 0       Objective  /62 (BP Location: Left arm, Patient Position: Sitting, Cuff Size: Adult Regular)   Pulse 66   Resp 18   Ht 1.575 m (5' 2\")   Wt 63.5 kg (140 lb)   BMI 25.61 kg/m      General Appearance:    Alert, cooperative, no distress, appears stated age   Head:    Normocephalic, without obvious abnormality, atraumatic   Throat:   Lips, mucosa, and tongue normal; teeth and gums normal   Neck:   Supple, symmetrical, trachea midline, no adenopathy;        thyroid:  No enlargement/tenderness/nodules; no carotid    bruit or JVD   Back:     Symmetric, no curvature, ROM normal, no CVA tenderness   Lungs:     Clear to auscultation bilaterally, respirations unlabored   Chest wall:    No tenderness or deformity   Heart:    Regular rate and rhythm, S1 and S2 normal, 1/6 systolic ejection murmur, no rub   or gallop   Abdomen:     Soft, non-tender, bowel sounds active all four quadrants,     no masses, no organomegaly   Extremities:   Normal, atraumatic, no cyanosis minimal, left greater than right edema   Pulses:   2+ and symmetric all extremities   Skin:   Skin color, texture, turgor normal, no rashes or lesions     Results    Lab Results personally reviewed   Lab Results   Component Value Date    CHOL 222 (H) 07/12/2022    CHOL 226 (H) 03/30/2021     Lab Results   Component Value Date    HDL 59 07/12/2022    HDL 76 03/30/2021     No components found for: LDLCALC  Lab Results   Component Value Date    TRIG 75 07/12/2022    TRIG 89 03/30/2021     Lab Results   Component Value Date    WBC 10.4 12/28/2022    HGB 10.4 (L) 12/28/2022    HCT 32.3 (L) 12/28/2022     12/28/2022     Lab Results   Component Value Date    BUN 19.7 12/28/2022     12/28/2022    CO2 23 12/28/2022               Thank you for allowing me to participate in the care of your patient.      Sincerely,     NANCY NAVARRO MD     [facility]  cc:   No referring provider " defined for this encounter.

## 2023-01-25 NOTE — TELEPHONE ENCOUNTER
Pt on her way to appt with Dr Feldman.  -rah    ===View-only below this line===  ----- Message -----  From: Ulisses Feldman MD  Sent: 1/25/2023   9:28 AM CST  To: Shelley Perez RN    Can we please check with this lady and see what her most recent blood pressures are, this data is about a week old.  If still elevated please confirm what medicine she is taking at home.  LF

## 2023-01-25 NOTE — PROGRESS NOTES
[facility]  Heart Care Clinic Follow-up Note    Assessment & Plan        (I25.10,  I25.83) Coronary arteriosclerosis due to lipid rich plaque  (primary encounter diagnosis)  Comment:  Angiography April 2020 showed normal left main, proximal LAD 20% stenosis with patent stent, mid sequential 30% lesion with a distal 70% lesion, circumflex with a patent proximal stent with a third obtuse marginal artery 99% stenosis.  Right coronary artery patent stents with a mid 40% stenosis. Attempted intervention on distal LAD as well as obtuse marginal artery which were unsuccessful and underwent nuclear stress test May 2021 which was normal.  Presented again to the hospital and was admitted for chest pain with rule out an unremarkable nuclear stress test 2022.  Currently work on preventive therapy.    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  Recheck echo in about a year.    (I10) Accelerated hypertension  Comment: Biggest issue, she has high blood pressures at home, she is not very compliant with medicines.  She admits she is forgetful.  When I review her medications with her today it appears she is only taking metoprolol 25 mg a day.  I have asked her to check her list of medicines and to call and confirm us and if need be restart amlodipine 2.5 mg a day.    (I50.33) Acute on chronic heart failure with preserved ejection fraction (H)  Comment: No significant signs or symptoms currently.  She stopped furosemide on her own.  She admits to sodium restriction but does not admit to fluid restriction.  She seems to never have heard this before thus we discussed fluid restriction to no more than 6 to 8 glasses of everything a day.    (N18.31) Stage 3a chronic kidney disease (H)  Comment: Mildly increased creatinine of 1.13  with GFR 47.    (E78.5) Dyslipidemia, goal LDL below 100  Comment: Total cholesterol 222 with LDL of 148, as above does not appear to be taking statin anymore.  We will need to address.    (F43.23) Adjustment disorder with mixed anxiety and depressed mood  Comment: Biggest issue, seems fixated on her son's eminent demise due to multisystem organ failure, he is in hospice.  It is an adopted son but her legal son.  In addition, she seems and admits to being forgetful, she is telling me she is looking at signing papers for assisted living later on this week and plans to move in there within 2 to 3 months.  She is currently on Aricept.    Plan  1.  List of medications given to her, she will call in today to confirm and if need be restart amlodipine 2.5 mg a day.  2.  Fluid and salt restriction which she has not been doing.  3.  Agree with enrollment in assisted living as this will help with medication compliance which she admits to not being good at.  4.  Follow-up with me in 3 to 4 months given this situation.    Subjective  CC: 88-year-old white female here for follow-up visit.  Since I seen her she was admitted to the hospital plus she presented to the emergency department on several occasions.  She has been monitored by [facility] for blood pressure issues.  She tells me her blood pressure gets up she gets short of breath and she gets a tightness in her chest on activity.  As long as her blood pressure is under control she is good.  She suspects her blood pressure is elevated due to discovery that her daughter-in-law is an alcoholic.  She admits to now being able to cope with her sons imminent demise.  In general, she denies any syncope or dizziness or PND or orthopnea or peripheral edema.    Medications  Current Outpatient Medications   Medication Sig Dispense Refill     acetaminophen (TYLENOL) 500 MG tablet Take 500-1,000 mg by mouth every 8 hours as needed for mild pain       albuterol (PROVENTIL HFA;VENTOLIN HFA)  "90 mcg/actuation inhaler [ALBUTEROL (PROVENTIL HFA;VENTOLIN HFA) 90 MCG/ACTUATION INHALER] Inhale 1-2 puffs every 6 (six) hours as needed for wheezing.       aspirin (ASA) 81 MG EC tablet Take 81 mg by mouth daily       donepezil (ARICEPT) 5 MG tablet Take 5 mg by mouth daily       ferrous sulfate 325 (65 FE) MG tablet [FERROUS SULFATE 325 (65 FE) MG TABLET] Take 1 tablet (325 mg total) by mouth daily with breakfast.  0     melatonin 5 mg Tab tablet [MELATONIN 5 MG TAB TABLET] Take 5 mg by mouth at bedtime as needed (for sleep).        metoprolol succinate ER (TOPROL XL) 25 MG 24 hr tablet TAKE 1 TABLET BY MOUTH AT BEDTIME 90 tablet 3     Multiple Vitamin (THERA-TABS) TABS Take 1 tablet by mouth       OLANZapine (ZYPREXA) 2.5 MG tablet Take 1 tablet (2.5 mg) by mouth 2 times daily as needed (anxiety) 14 tablet 0     QUEtiapine (SEROQUEL) 25 MG tablet Take 25 mg by mouth daily       rOPINIRole (REQUIP) 2 MG tablet Take 1 tablet (2 mg) by mouth At Bedtime for 30 days 30 tablet 0     sertraline (ZOLOFT) 25 MG tablet Take 25 mg by mouth every morning       Vitamin D3 (CHOLECALCIFEROL) 125 MCG (5000 UT) tablet Take 1 tablet by mouth daily       beclomethasone (QVAR) 80 mcg/actuation inhaler Inhale 1 puff into the lungs 2 times daily as needed  (Patient not taking: Reported on 1/25/2023)       diclofenac (VOLTAREN) 1 % topical gel Apply 2-4 g topically 4 times daily as needed APPLY TO LEFT SHOULDER AND LEFT KNEE. (Patient not taking: Reported on 1/25/2023)       nitroglycerin (NITROSTAT) 0.4 MG SL tablet [NITROGLYCERIN (NITROSTAT) 0.4 MG SL TABLET] Place 1 tablet (0.4 mg total) under the tongue every 5 (five) minutes as needed for chest pain. (Patient not taking: Reported on 1/25/2023) 90 tablet 0       Objective  /62 (BP Location: Left arm, Patient Position: Sitting, Cuff Size: Adult Regular)   Pulse 66   Resp 18   Ht 1.575 m (5' 2\")   Wt 63.5 kg (140 lb)   BMI 25.61 kg/m      General Appearance:    Alert, " cooperative, no distress, appears stated age   Head:    Normocephalic, without obvious abnormality, atraumatic   Throat:   Lips, mucosa, and tongue normal; teeth and gums normal   Neck:   Supple, symmetrical, trachea midline, no adenopathy;        thyroid:  No enlargement/tenderness/nodules; no carotid    bruit or JVD   Back:     Symmetric, no curvature, ROM normal, no CVA tenderness   Lungs:     Clear to auscultation bilaterally, respirations unlabored   Chest wall:    No tenderness or deformity   Heart:    Regular rate and rhythm, S1 and S2 normal, 1/6 systolic ejection murmur, no rub   or gallop   Abdomen:     Soft, non-tender, bowel sounds active all four quadrants,     no masses, no organomegaly   Extremities:   Normal, atraumatic, no cyanosis minimal, left greater than right edema   Pulses:   2+ and symmetric all extremities   Skin:   Skin color, texture, turgor normal, no rashes or lesions     Results    Lab Results personally reviewed   Lab Results   Component Value Date    CHOL 222 (H) 07/12/2022    CHOL 226 (H) 03/30/2021     Lab Results   Component Value Date    HDL 59 07/12/2022    HDL 76 03/30/2021     No components found for: LDLCALC  Lab Results   Component Value Date    TRIG 75 07/12/2022    TRIG 89 03/30/2021     Lab Results   Component Value Date    WBC 10.4 12/28/2022    HGB 10.4 (L) 12/28/2022    HCT 32.3 (L) 12/28/2022     12/28/2022     Lab Results   Component Value Date    BUN 19.7 12/28/2022     12/28/2022    CO2 23 12/28/2022

## 2023-01-25 NOTE — PATIENT INSTRUCTIONS
Ms Sherice Alvarez,  I enjoyed visiting with you again today.  I am concerned with the blood pressures and breathing and chest discomfort.  I think going into assisted living will help with the medications.  Per our conversation limit fluids to no more then 6-8 glasses a day.  Limit all of your salt.  Please check this list of medications and call us at [phone] to confirm accuracy.  I will plan on seeing you 3-4 months.  Ulisses Feldman

## 2023-02-09 ENCOUNTER — NURSE TRIAGE (OUTPATIENT)
Dept: CARDIOLOGY | Facility: CLINIC | Age: 88
End: 2023-02-09
Payer: COMMERCIAL

## 2023-02-09 NOTE — TELEPHONE ENCOUNTER
Agree with nurse triage assessment. Called patient and left a detailed message that reiterated that she needs to call 911 and go to the ER for further evaluation. Clinic number provided for call back but she was advised to call 911. -[facility]

## 2023-02-09 NOTE — TELEPHONE ENCOUNTER
"Received a call from the call center to discuss chest tightness and breathing difficulty.  Spoke to Sherice who says she is having trouble with her blood pressure and breathing. She says she also has chest discomfort which is a constant pressure, and says she is having difficulty with her breathing even during conversation. She says she can't stop shaking and thinks she is dehydrated because her urine is dark, and is nauseated, and feels weak. She says she has had these symptoms before, but not quite this bad.  Advised it is recommended to go to the ED for an evaluation. She verbalized agreement and understanding and says she lives across the street from the hospital, and does have someone she can ask to take her.  Will send a message to [place] for an update.    1. RESPIRATORY STATUS: \"Describe your breathing?\" (e.g., wheezing, shortness of breath, unable to speak, severe coughing) short of breath  2. ONSET: \"When did this breathing problem begin?\"   3. PATTERN \"Does the difficult breathing come and go, or has it been constant since it started?\"   4. SEVERITY: \"How bad is your breathing?\" (e.g., mild, moderate, severe) moderate  - MILD: No SOB at rest, mild SOB with walking, speaks normally in sentences, can lie down, no retractions, pulse < 100.   - MODERATE: SOB at rest, SOB with minimal exertion and prefers to sit, cannot lie down flat, speaks in phrases, mild retractions, audible wheezing, pulse 100-120.   - SEVERE: Very SOB at rest, speaks in single words, struggling to breathe, sitting hunched forward, retractions, pulse > 120   5. RECURRENT SYMPTOM: \"Have you had difficulty breathing before?\" If Yes, ask: \"When was the last time?\" and \"What happened that time?\"   6. CARDIAC HISTORY: \"Do you have any history of heart disease?\" (e.g., heart attack, angina, bypass surgery, angioplasty) CHF, CAD  7. LUNG HISTORY: \"Do you have any history of lung disease?\" (e.g., pulmonary embolus, asthma, " "emphysema)  8. CAUSE: \"What do you think is causing the breathing problem?\"   9. OTHER SYMPTOMS: \"Do you have any other symptoms? (e.g., dizziness, runny nose, cough, chest pain, fever) chest discomfort, shakiness, nausea, weakness, dark urine  10. O2 SATURATION MONITOR: \"Do you use an oxygen saturation monitor (pulse oximeter) at home?\" If Yes, \"What is your reading (oxygen level) today?\" \"What is your usual oxygen saturation reading?\" (e.g., 95%)      Reason for Disposition    Patient sounds very sick or weak to the triager    MODERATE difficulty breathing (e.g., speaks in phrases, SOB even at rest, pulse 100-120) of new-onset or worse than normal    Protocols used: BREATHING DIFFICULTY-A-OH      "

## 2023-02-10 ENCOUNTER — NURSE TRIAGE (OUTPATIENT)
Dept: CARDIOLOGY | Facility: CLINIC | Age: 88
End: 2023-02-10
Payer: COMMERCIAL

## 2023-02-10 NOTE — TELEPHONE ENCOUNTER
"Called Sherice to discuss her symptoms further. She notes that her blood pressure goes up and she has tightness in her chest when she is anxious or worked up. This has been on-going for over a month. Writer discussed how her blood pressure is much too high. She back tracks frequently blaming this on dehydration and taking her medication too late or off schedule.     She seems a little repetitive at times during conversation. She mentions dehydration but also that \"urine just pours out of her\" and she doesn't need the ER and they won't \"be happy to see me\". She then in another sentence states that she believes she needs IV hydration and last time she was there she had a UTI. Writer explained that we cannot do this in the cardiology clinic and if she is concerned about a UTI and dehydration, this is very serious as it could lead to systemic infection if left untreated. She verbalized understanding at this time. She also endorses weakness. She was told on numerous occasions that she needs to go to the ER. She verbalized understanding and will present to [facility]. -[facility]  "

## 2023-02-10 NOTE — TELEPHONE ENCOUNTER
"Received a call from the call center to discuss high blood pressure, dehydration, chest pain.  I had spoken to patient yesterday for symptoms of chest pain, breathing, difficulty, weakness, and recommended she go to ED, but do not see any ED encounters.  Spoke to Sherice who says she was feeling better yesterday after we had spoken, so decided to wait and see how she felt throughout the night and into today.  She says she has so much fluid coming out of her during the night, meaning her urine, and it is a dark yellow to brown color. She thinks she is dehydrated because she has more fluid leaving her body than going in because she knows she does not drink enough water. She is asking to come in and have some lab work done.   She also says her blood pressure was high this morning 223 systolic, but had just taken metoprolol 5 minutes ago.   She says she has constant chest tightness since yesterday and has lightheadedness that comes and goes. She denies shortness of breath today.   BP on the call 162/84 HR 76 checking on her upper arm.   Advised a message will be sent to [facility] for follow up on her symptoms from yesterday and today.  1. LOCATION: \"Where does it hurt?\" chest  2. RADIATION: \"Does the pain go anywhere else?\" (e.g., into neck, jaw, arms, back)  3. ONSET: \"When did the chest pain begin?\" (Minutes, hours or days) days  4. PATTERN \"Does the pain come and go, or has it been constant since it started?\" \"Does it get worse with exertion?\" constant mostly  5. DURATION: \"How long does it last\" (e.g., seconds, minutes, hours)  6. SEVERITY: \"How bad is the pain?\" (e.g., Scale 1-10; mild, moderate, or severe)  - MILD (1-3): doesn't interfere with normal activities   - MODERATE (4-7): interferes with normal activities or awakens from sleep  - SEVERE (8-10): excruciating pain, unable to do any normal activities   7. CARDIAC RISK FACTORS: \"Do you have any history of heart problems or risk factors for heart " "disease?\" (e.g., angina, prior heart attack; diabetes, high blood pressure, high cholesterol, smoker, or strong family history of heart disease)  8. PULMONARY RISK FACTORS: \"Do you have any history of lung disease?\" (e.g., blood clots in lung, asthma, emphysema, birth control pills)  9. CAUSE: \"What do you think is causing the chest pain?\"  10. OTHER SYMPTOMS: \"Do you have any other symptoms?\" (e.g., dizziness, nausea, vomiting, sweating, fever, difficulty breathing, cough) intermittent lightheadedness. Had shortness of breath yesterday.  Has concerns for possible dehydration.      Additional Information    Negative: Difficulty breathing    Negative: SEVERE chest pain    Protocols used: CHEST PAIN-A-OH      "

## 2023-02-11 DIAGNOSIS — I35.0 NONRHEUMATIC AORTIC VALVE STENOSIS: ICD-10-CM

## 2023-02-13 ENCOUNTER — TRANSFERRED RECORDS (OUTPATIENT)
Dept: HEALTH INFORMATION MANAGEMENT | Facility: CLINIC | Age: 88
End: 2023-02-13
Payer: COMMERCIAL

## 2023-02-13 ENCOUNTER — TELEPHONE (OUTPATIENT)
Dept: CARDIOLOGY | Facility: CLINIC | Age: 88
End: 2023-02-13
Payer: COMMERCIAL

## 2023-02-13 RX ORDER — AMLODIPINE BESYLATE 2.5 MG/1
TABLET ORAL
Qty: 90 TABLET | Refills: 3 | Status: SHIPPED | OUTPATIENT
Start: 2023-02-13 | End: 2023-04-17

## 2023-02-13 NOTE — TELEPHONE ENCOUNTER
[facility] Call Center    Phone Message    May a detailed message be left on voicemail: yes     Reason for Call: Other: .  [employer] RN will be faxing over information. She is sending the patient to the ER. Please call with questions    Action Taken: Other: Cardiology    Travel Screening: Not Applicable

## 2023-02-14 ENCOUNTER — APPOINTMENT (OUTPATIENT)
Dept: RADIOLOGY | Facility: HOSPITAL | Age: 88
End: 2023-02-14
Attending: EMERGENCY MEDICINE
Payer: COMMERCIAL

## 2023-02-14 ENCOUNTER — HOSPITAL ENCOUNTER (EMERGENCY)
Facility: HOSPITAL | Age: 88
Discharge: HOME | End: 2023-02-14
Attending: EMERGENCY MEDICINE | Admitting: EMERGENCY MEDICINE
Payer: COMMERCIAL

## 2023-02-14 VITALS
DIASTOLIC BLOOD PRESSURE: 83 MMHG | HEART RATE: 64 BPM | WEIGHT: 140 LBS | OXYGEN SATURATION: 94 % | RESPIRATION RATE: 20 BRPM | SYSTOLIC BLOOD PRESSURE: 188 MMHG | TEMPERATURE: 98.7 F | BODY MASS INDEX: 25.76 KG/M2 | HEIGHT: 62 IN

## 2023-02-14 DIAGNOSIS — I15.9 SECONDARY HYPERTENSION: ICD-10-CM

## 2023-02-14 LAB
ANION GAP SERPL CALCULATED.3IONS-SCNC: 10 MMOL/L (ref 7–15)
BUN SERPL-MCNC: 19.1 MG/DL (ref 8–23)
CALCIUM SERPL-MCNC: 9.4 MG/DL (ref 8.8–10.2)
CHLORIDE SERPL-SCNC: 103 MMOL/L (ref 98–107)
CREAT SERPL-MCNC: 1.44 MG/DL (ref 0.51–0.95)
DEPRECATED HCO3 PLAS-SCNC: 25 MMOL/L (ref 22–29)
ERYTHROCYTE [DISTWIDTH] IN BLOOD BY AUTOMATED COUNT: 19.6 % (ref 10–15)
GFR SERPL CREATININE-BSD FRML MDRD: 35 ML/MIN/1.73M2
GLUCOSE SERPL-MCNC: 119 MG/DL (ref 70–99)
HCT VFR BLD AUTO: 37.3 % (ref 35–47)
HGB BLD-MCNC: 12.2 G/DL (ref 11.7–15.7)
MCH RBC QN AUTO: 30 PG (ref 26.5–33)
MCHC RBC AUTO-ENTMCNC: 32.7 G/DL (ref 31.5–36.5)
MCV RBC AUTO: 92 FL (ref 78–100)
PLATELET # BLD AUTO: 227 10E3/UL (ref 150–450)
POTASSIUM SERPL-SCNC: 4.8 MMOL/L (ref 3.4–5.3)
RBC # BLD AUTO: 4.07 10E6/UL (ref 3.8–5.2)
SODIUM SERPL-SCNC: 138 MMOL/L (ref 136–145)
TROPONIN T SERPL HS-MCNC: 36 NG/L
TROPONIN T SERPL HS-MCNC: 38 NG/L
WBC # BLD AUTO: 8.9 10E3/UL (ref 4–11)

## 2023-02-14 PROCEDURE — 99285 EMERGENCY DEPT VISIT HI MDM: CPT | Mod: 25

## 2023-02-14 PROCEDURE — 93005 ELECTROCARDIOGRAM TRACING: CPT | Performed by: STUDENT IN AN ORGANIZED HEALTH CARE EDUCATION/TRAINING PROGRAM

## 2023-02-14 PROCEDURE — 84484 ASSAY OF TROPONIN QUANT: CPT | Performed by: EMERGENCY MEDICINE

## 2023-02-14 PROCEDURE — 84484 ASSAY OF TROPONIN QUANT: CPT | Mod: 91 | Performed by: EMERGENCY MEDICINE

## 2023-02-14 PROCEDURE — 80048 BASIC METABOLIC PNL TOTAL CA: CPT | Performed by: EMERGENCY MEDICINE

## 2023-02-14 PROCEDURE — 71046 X-RAY EXAM CHEST 2 VIEWS: CPT

## 2023-02-14 PROCEDURE — 36415 COLL VENOUS BLD VENIPUNCTURE: CPT | Performed by: EMERGENCY MEDICINE

## 2023-02-14 PROCEDURE — 85027 COMPLETE CBC AUTOMATED: CPT | Performed by: EMERGENCY MEDICINE

## 2023-02-14 NOTE — TELEPHONE ENCOUNTER
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   I called to see what I could learn , and sounds like she needs to be seen and evaluated in ED.   [employer] RN is calling her again to direct her to go in for evaluation.     MOE ORTEGA RN BSN, CHFN, PCCN-K          ==noted that [employer] RN advised ER. This writer called and spoke with Sherice twice last week reiterating the same information. No repeat call placed. -[facility] [place] [facility]

## 2023-02-15 NOTE — ED TRIAGE NOTES
Patient reports she has been high blood pressures for the past week and was told to come to the ER by her cardiologist. She reports she took an extra dose of her metoprolol today. She also reports SOB and wheezing. History of asthma. Provider assessing pt in triage.    Triage Assessment     Row Name 02/14/23 0903       Triage Assessment (Adult)    Airway WDL WDL       Respiratory WDL    Respiratory WDL X  SOB and wheezing       Cardiac WDL    Cardiac WDL X  chest tightness       Cognitive/Neuro/Behavioral WDL    Cognitive/Neuro/Behavioral WDL WDL

## 2023-02-15 NOTE — ED PROVIDER NOTES
EMERGENCY DEPARTMENT ENCOUnter      NAME: Sherice Alvarez  AGE: 88 year old female  YOB: 1934  MRN: [id number]  EVALUATION DATE & TIME: No admission date for patient encounter.    PCP: Anum Rosenberg    ED PROVIDER: Ranjeet Valdez DO      Chief Complaint   Patient presents with     hypertension      Wheezing     chest tightness          FINAL IMPRESSION:  1. Secondary hypertension          ED COURSE & MEDICAL DECISION MAKIN:35 PM I met with patient for initial interview and encounter.      The patient presented to the emergency department today after she was found to be quite hypertensive at home.  She talked with her cardiologist who recommended that she come in.  She denied any other specific symptoms.  Laboratory testing today has been unremarkable.  2-hour delta troponin is normal.  EKG is nonischemic and chest x-ray is unremarkable.  Her blood pressure has been stable during her ER stay and at this time I feel that she can be safely discharged home to continue her current blood pressure regiment.  She is comfortable with this plan.      Medical Decision Making    History:    Supplemental history from: Documented in chart, if applicable    External Record(s) reviewed: Documented in chart, if applicable.    Work Up:    Chart documentation includes differential considered and any EKGs or imaging independently interpreted by provider, where specified.    In additional to work up documented, I considered the following work up: Documented in chart, if applicable.    External consultation:    Discussion of management with another provider: Documented in chart, if applicable    Complicating factors:    Care impacted by chronic illness: N/A    Care affected by social determinants of health: N/A    Disposition considerations: Discharge. I recommended the patient continue their current prescription strength medication(s): blood pressure medications. I considered admission, but discharged  the patient after share decision making conversation.        At the conclusion of the encounter I discussed the results of all of the tests and the disposition. The questions were answered. The patient or family acknowledged understanding and was agreeable with the care plan.       =================================================================    HPI        hSerice Alvarez is a 88 year old female with a pertinent history of stroke, hypertension, asthma, and heart failure who presents to this ED for evaluation of hypertension.    Patient reports high blood pressures for the past week around the 230s. She saw her cardiologist and was told to come to the ED. She reports chest tightness, wheezing, and shortness of breath from her asthma. She took an extra dose of Metropalol today. Patient denies any other complaints.       REVIEW OF SYSTEMS     Constitutional:  Denies fever or chills  HENT:  Denies sore throat   Respiratory:  Positive for chest tightness. shortness of breath and wheezing. Denies cough   Cardiovascular:  Denies chest pain or palpitations  GI:  Denies abdominal pain, nausea, or vomiting  Musculoskeletal:  Denies any new extremity pain   Skin:  Denies rash   Neurologic:  Denies headache, focal weakness or sensory changes    All other systems reviewed and are negative      PAST MEDICAL HISTORY:  Past Medical History:   Diagnosis Date     Arthritis      Asthma      CAD (coronary artery disease)      Cancer (H)     basal cell     Chronic kidney disease     ckd 3     Chronic pain syndrome      Congestive heart failure (H)      Crohn's disease (H)      GERD (gastroesophageal reflux disease)      Hx of heart artery stent 11/01/2016    1 stent R Proximal CA and 1 Stent L Proximal circumflex  12/2016 Stent proximal LAD     Hyperlipidemia      Hypertension      NSTEMI (non-ST elevated myocardial infarction) (H) 11/01/2016     PONV (postoperative nausea and vomiting)     severe povn with last knee surgery      Restless legs syndrome      Stroke (H) 2010    numbness rt jaw       PAST SURGICAL HISTORY:  Past Surgical History:   Procedure Laterality Date     APPENDECTOMY       CARDIAC CATHETERIZATION  2016    multiple vessel disease.  no intervention     CARDIAC CATHETERIZATION  2016     CARDIAC CATHETERIZATION N/A 2016    Procedure: Coronary Angiogram;  Surgeon: Sunil Moran MD;  Location: [facility] Cath Lab;  Service:      CAROTID ENDARTERECTOMY       CAROTID ENDARTERECTOMY Right 2010     CERVICAL LAMINECTOMY  1994      SECTION       CV CORONARY ANGIOGRAM N/A 2020    Procedure: Coronary Angiogram;  Surgeon: Vinita Ramos MD;  Location: [facility];  Service: Cardiology     CV LEFT HEART CATHETERIZATION WITHOUT LEFT VENTRICULOGRAM Left 2020    Procedure: Left Heart Catheterization Without Left Ventriculogram;  Surgeon: Jerad Lerner MD;  Location: [facility];  Service: Cardiology     CV TRANSCATHETER AORTIC VALVE REPLACEMENT - OTHER APPROACH N/A 2020    Procedure: CV TRANSCATHETER AORTIC VALVE REPLACEMENT - RIGHT SUBCLAVIAN APPROACH;  Surgeon: John Caceres MD;  Location: [facility] Cath Lab;  Service: Cardiology     HEART CATH, ANGIOPLASTY       HEMORRHOIDECTOMY EXTERNAL       IR LUMBAR EPIDURAL STEROID INJECTION  2014     IR LUMBAR NERVE ROOT INJECTION  10/01/2013     JOINT REPLACEMENT       DE ESOPHAGOGASTRODUODENOSCOPY TRANSORAL DIAGNOSTIC N/A 07/10/2019    Procedure: ESOPHAGOGASTRODUODENOSCOPY (EGD) with gastric biopsy;  Surgeon: Sunil Stuart MD;  Location: [facility];  Service: Gastroenterology     DE REPLACE AORTIC VALVE OPEN AXILLRY ARTRY APPROACH N/A 2020    Procedure: OR TRANSCATHETER AORTIC VALVE REPLACEMENT, SUBCLAVIAN APPROACH;  Surgeon: Bhavin Cuadra MD;  Location: [facility];  Service: Cardiology     TONSILLECTOMY & ADENOIDECTOMY       TOTAL KNEE ARTHROPLASTY Right       TRANSCATHETER AORTIC-VALVE REPLACEMENT       ZZC TOTAL KNEE ARTHROPLASTY Left 2021    Procedure: LEFT TOTAL KNEE ARTHROPLASTY;  Surgeon: Doug Rhodes MD;  Location: [facility] OR;  Service: Orthopedics           CURRENT MEDICATIONS:    acetaminophen (TYLENOL) 500 MG tablet  albuterol (PROVENTIL HFA;VENTOLIN HFA) 90 mcg/actuation inhaler  amLODIPine (NORVASC) 2.5 MG tablet  aspirin (ASA) 81 MG EC tablet  beclomethasone (QVAR) 80 mcg/actuation inhaler  diclofenac (VOLTAREN) 1 % topical gel  donepezil (ARICEPT) 5 MG tablet  ferrous sulfate 325 (65 FE) MG tablet  melatonin 5 mg Tab tablet  metoprolol succinate ER (TOPROL XL) 25 MG 24 hr tablet  Multiple Vitamin (THERA-TABS) TABS  nitroglycerin (NITROSTAT) 0.4 MG SL tablet  OLANZapine (ZYPREXA) 2.5 MG tablet  QUEtiapine (SEROQUEL) 25 MG tablet  rOPINIRole (REQUIP) 2 MG tablet  sertraline (ZOLOFT) 25 MG tablet  Vitamin D3 (CHOLECALCIFEROL) 125 MCG (5000 UT) tablet        ALLERGIES:  Allergies   Allergen Reactions     Amitriptyline Hcl [Amitriptyline] Other (See Comments)     Erythromycin Diarrhea and Other (See Comments)     Childhood reaction     Gabapentin Other (See Comments)     Jerking movements, swelling     Naproxen Unknown and Other (See Comments)     Other Environmental Allergy Unknown     Molds, dander     Pregabalin Other (See Comments)       FAMILY HISTORY:  Family History   Problem Relation Age of Onset     Atrial fibrillation Mother      Parkinsonism Father        SOCIAL HISTORY:   Social History     Socioeconomic History     Marital status:    Tobacco Use     Smoking status: Former     Types: Cigarettes     Quit date: 1980     Years since quittin.1     Smokeless tobacco: Never   Substance and Sexual Activity     Alcohol use: No     Drug use: No       VITALS:  Patient Vitals for the past 24 hrs:   BP Temp Temp src Pulse Resp SpO2 Height Weight   23 2206 (!) 188/83 -- -- 64 20 94 % -- --   23 2152 (!) 192/79 98.7  F  "(37.1  C) -- 64 -- 95 % -- --   02/14/23 1833 -- -- -- -- -- -- -- 63.5 kg (140 lb)   02/14/23 1827 (!) 143/64 98.8  F (37.1  C) Temporal 70 20 94 % 1.575 m (5' 2\") --       PHYSICAL EXAM    Constitutional:  Well developed, Well nourished,  HENT:  Normocephalic, Atraumatic, Oropharynx moist, Nose normal.   Eyes:  EOMI, Conjunctiva normal, No discharge.   Respiratory:  Normal breath sounds, No respiratory distress, No wheezing, No chest tenderness.   Cardiovascular:  Normal heart rate, Normal rhythm, No murmurs  GI:  Soft, No tenderness, No guarding, No CVA tenderness.   Musculoskeletal:  No tenderness to palpation or major deformities noted.   Extremities: No lower extremity edema.  Neurologic:  Alert & oriented x 3, No focal deficits noted.   Psychiatric:  Affect normal, Judgment normal, Mood normal.        LAB:  All pertinent labs reviewed and interpreted.  Results for orders placed or performed during the hospital encounter of 02/14/23              CBC with platelets   Result Value Ref Range    WBC Count 8.9 4.0 - 11.0 10e3/uL    RBC Count 4.07 3.80 - 5.20 10e6/uL    Hemoglobin 12.2 11.7 - 15.7 g/dL    Hematocrit 37.3 35.0 - 47.0 %    MCV 92 78 - 100 fL    MCH 30.0 26.5 - 33.0 pg    MCHC 32.7 31.5 - 36.5 g/dL    RDW 19.6 (H) 10.0 - 15.0 %    Platelet Count 227 150 - 450 10e3/uL   Basic metabolic panel   Result Value Ref Range    Sodium 138 136 - 145 mmol/L    Potassium 4.8 3.4 - 5.3 mmol/L    Chloride 103 98 - 107 mmol/L    Carbon Dioxide (CO2) 25 22 - 29 mmol/L    Anion Gap 10 7 - 15 mmol/L    Urea Nitrogen 19.1 8.0 - 23.0 mg/dL    Creatinine 1.44 (H) 0.51 - 0.95 mg/dL    Calcium 9.4 8.8 - 10.2 mg/dL    Glucose 119 (H) 70 - 99 mg/dL    GFR Estimate 35 (L) >60 mL/min/1.73m2   Result Value Ref Range    Troponin T, High Sensitivity 38 (H) <=14 ng/L   Result Value Ref Range    Troponin T, High Sensitivity 36 (H) <=14 ng/L       RADIOLOGY:  I have independently reviewed and interpreted the above imaging, pending the " final radiology read.  XR Chest 2 Views   Final Result   IMPRESSION: Aortic valve prosthesis. Some stable slight atelectasis right lung base. Lungs otherwise remain clear and expanded.          EKG:    Normal sinus rhythm at 64 bpm.  No signs of acute ischemia.  QRS 86 ms, QTc 445 ms.    I have independently reviewed and interpreted this EKG          I, Liz Sorensen, am serving as a scribe to document services personally performed by Dr. Valdez based on my observation and the provider's statements to me. I, Ranjeet Valdez, DO attest that Liz Sorensen is acting in a scribe capacity, has observed my performance of the services and has documented them in accordance with my direction.    Ranjeet Valdez DO  Emergency Medicine  [facility] EMERGENCY DEPARTMENT  [facility] [facility] [address] [phone]  Dept: [phone]     Ranjeet Valdez MD  02/14/23 2584

## 2023-04-07 ENCOUNTER — TRANSFERRED RECORDS (OUTPATIENT)
Dept: HEALTH INFORMATION MANAGEMENT | Facility: CLINIC | Age: 88
End: 2023-04-07
Payer: COMMERCIAL

## 2023-04-14 ENCOUNTER — TELEPHONE (OUTPATIENT)
Dept: CARDIOLOGY | Facility: CLINIC | Age: 88
End: 2023-04-14
Payer: COMMERCIAL

## 2023-04-14 NOTE — TELEPHONE ENCOUNTER
Ulisses Feldman MD Caswell, Mallory J, RN  Let her know that I reviewed bp and p and they look overall acceptable.   LF           Previous Messages       ----- Message -----   From: Marie Ruff   Sent: 4/13/2023  11:30 AM CDT   To: Ulisses Feldman MD         ==noted stable blood pressures reviewed by MD. Pt has a scheduled follow up visit on 4/17. Stable BP report to be discussed during scheduled MD visit. -[facility] [place]

## 2023-04-17 ENCOUNTER — OFFICE VISIT (OUTPATIENT)
Dept: CARDIOLOGY | Facility: CLINIC | Age: 88
End: 2023-04-17
Attending: INTERNAL MEDICINE
Payer: COMMERCIAL

## 2023-04-17 VITALS
HEART RATE: 70 BPM | RESPIRATION RATE: 24 BRPM | BODY MASS INDEX: 24.14 KG/M2 | DIASTOLIC BLOOD PRESSURE: 60 MMHG | WEIGHT: 132 LBS | SYSTOLIC BLOOD PRESSURE: 100 MMHG

## 2023-04-17 DIAGNOSIS — I50.33 ACUTE ON CHRONIC HEART FAILURE WITH PRESERVED EJECTION FRACTION (H): ICD-10-CM

## 2023-04-17 DIAGNOSIS — I25.83 CORONARY ARTERIOSCLEROSIS DUE TO LIPID RICH PLAQUE: Primary | ICD-10-CM

## 2023-04-17 DIAGNOSIS — I10 ACCELERATED HYPERTENSION: ICD-10-CM

## 2023-04-17 DIAGNOSIS — N18.31 STAGE 3A CHRONIC KIDNEY DISEASE (H): ICD-10-CM

## 2023-04-17 DIAGNOSIS — I35.0 SEVERE CALCIFIC AORTIC VALVE STENOSIS: ICD-10-CM

## 2023-04-17 DIAGNOSIS — E78.5 DYSLIPIDEMIA, GOAL LDL BELOW 100: ICD-10-CM

## 2023-04-17 PROCEDURE — 99214 OFFICE O/P EST MOD 30 MIN: CPT | Performed by: INTERNAL MEDICINE

## 2023-04-17 RX ORDER — NICOTINE POLACRILEX 4 MG/1
20 GUM, CHEWING ORAL DAILY PRN
COMMUNITY
End: 2024-02-09

## 2023-04-17 RX ORDER — FUROSEMIDE 20 MG
20 TABLET ORAL DAILY PRN
COMMUNITY
End: 2024-01-17

## 2023-04-17 NOTE — PROGRESS NOTES
[facility]  Heart Care Clinic Follow-up Note    Assessment & Plan        (I25.10,  I25.83) Coronary arteriosclerosis due to lipid rich plaque  (primary encounter diagnosis)  Comment:  Angiography April 2020 showed normal left main, proximal LAD 20% stenosis with patent stent, mid sequential 30% lesion with a distal 70% lesion, circumflex with a patent proximal stent with a third obtuse marginal artery 99% stenosis.  Right coronary artery patent stents with a mid 40% stenosis. Attempted intervention on distal LAD as well as obtuse marginal artery which were unsuccessful and underwent nuclear stress test May 2021 which was normal.  Presented again to the hospital and was admitted for chest pain with rule out an unremarkable nuclear stress test 2022.  Currently work on preventive therapy as she is not having any significant symptoms.    (I35.0) Severe calcific aortic valve stenosis  Comment: Given this she had a 23 mm DANIEL 3 valve placed June 2020 with echo showing mean gradient of 7 mmHg and peak velocity 1.9 m/s.  Recheck echo December 2023.  She is complaining of profound bruising, shows me some ecchymoses, given this we will change aspirin to every other day.    (I50.33) Acute on chronic heart failure with preserved ejection fraction (H)  Comment: No significant signs or symptoms currently, on furosemide every day, given her lightheadedness and feeling of being dry will back off to every other day    (E78.5) Dyslipidemia, goal LDL below 100  Comment: Total cholesterol 222 with LDL of 148, as above does not appear to be taking statin anymore. We will need to address.    (I10) Accelerated hypertension  Comment: Under good control currently only on metoprolol, no longer on amlodipine.    (N18.31) Stage 3a chronic kidney disease (H)  Comment: Creatinine increased to 1.44 with a GFR 35 and defer to primary.    (F43.23) Adjustment disorder with mixed anxiety and depressed mood  Comment: Biggest issue, seems  fixated on her son's eminent demise due to multisystem organ failure, he is in hospice.  It is an adopted son but her legal son.  In addition, she seems and admits to being forgetful, she is currently on Aricept.    Plan  1.  Given bruising we will back off on aspirin every other day.  2.  Would change Lasix to every other day.  3.  Follow-up in about 6 months given all these issues or sooner if needed.    Subjective  CC: 88-year-old white female being seen in 6-month follow-up today.  Still living at home independently, still driving, still concerned about her son who is in hospice, and apparently has only about 3 to 4 months left to live.  She tells me some days she feels extremely fatigued, extremely sick from profound nausea that lasts all day long.  She tells me she feels like she has bruising significantly over her body.  She tells me that she has definitely dry skin, as well as some dry hair.  She is concerned about whiteness of her hands all of a sudden,  There is no significant chest pains, palpitations, PND, orthopnea, shortness of breath or peripheral edema.    Medications  Current Outpatient Medications   Medication Sig Dispense Refill     acetaminophen (TYLENOL) 500 MG tablet Take 500-1,000 mg by mouth every 8 hours as needed for mild pain       albuterol (PROVENTIL HFA;VENTOLIN HFA) 90 mcg/actuation inhaler [ALBUTEROL (PROVENTIL HFA;VENTOLIN HFA) 90 MCG/ACTUATION INHALER] Inhale 1-2 puffs every 6 (six) hours as needed for wheezing.       aspirin (ASA) 81 MG EC tablet Take 81 mg by mouth daily       donepezil (ARICEPT) 5 MG tablet Take 5 mg by mouth daily       ferrous sulfate 325 (65 FE) MG tablet [FERROUS SULFATE 325 (65 FE) MG TABLET] Take 1 tablet (325 mg total) by mouth daily with breakfast.  0     furosemide (LASIX) 20 MG tablet Take 20 mg by mouth daily       melatonin 5 mg Tab tablet [MELATONIN 5 MG TAB TABLET] Take 5 mg by mouth at bedtime as needed (for sleep).        metoprolol succinate ER  (TOPROL XL) 25 MG 24 hr tablet TAKE 1 TABLET BY MOUTH AT BEDTIME 90 tablet 3     Multiple Vitamin (THERA-TABS) TABS Take 1 tablet by mouth       nitroglycerin (NITROSTAT) 0.4 MG SL tablet [NITROGLYCERIN (NITROSTAT) 0.4 MG SL TABLET] Place 1 tablet (0.4 mg total) under the tongue every 5 (five) minutes as needed for chest pain. 90 tablet 0     omeprazole 20 MG tablet Take 20 mg by mouth daily       sertraline (ZOLOFT) 25 MG tablet Take 25 mg by mouth every morning       Vitamin D3 (CHOLECALCIFEROL) 125 MCG (5000 UT) tablet Take 1 tablet by mouth daily       beclomethasone (QVAR) 80 mcg/actuation inhaler Inhale 1 puff into the lungs 2 times daily as needed  (Patient not taking: Reported on 1/25/2023)       diclofenac (VOLTAREN) 1 % topical gel Apply 2-4 g topically 4 times daily as needed APPLY TO LEFT SHOULDER AND LEFT KNEE. (Patient not taking: Reported on 1/25/2023)       OLANZapine (ZYPREXA) 2.5 MG tablet Take 1 tablet (2.5 mg) by mouth 2 times daily as needed (anxiety) (Patient not taking: Reported on 4/17/2023) 14 tablet 0     QUEtiapine (SEROQUEL) 25 MG tablet Take 25 mg by mouth daily (Patient not taking: Reported on 4/17/2023)       rOPINIRole (REQUIP) 2 MG tablet Take 1 tablet (2 mg) by mouth At Bedtime for 30 days 30 tablet 0       Objective  /60 (BP Location: Left arm, Patient Position: Sitting, Cuff Size: Adult Regular)   Pulse 70   Resp 24   Wt 59.9 kg (132 lb)   BMI 24.14 kg/m      General Appearance:    Alert, cooperative, no distress, appears stated age   Head:    Normocephalic, without obvious abnormality, atraumatic   Throat:   Lips, mucosa, and tongue normal; teeth and gums normal   Neck:   Supple, symmetrical, trachea midline, no adenopathy;        thyroid:  No enlargement/tenderness/nodules; no carotid    bruit or JVD   Back:     Symmetric, no curvature, ROM normal, no CVA tenderness   Lungs:     Clear to auscultation bilaterally, respirations unlabored   Chest wall:    No tenderness or  deformity   Heart:    Regular rate and rhythm, S1 and S2 normal, 1/6 systolic ejection murmur, no rub   or gallop   Abdomen:     Soft, non-tender, bowel sounds active all four quadrants,     no masses, no organomegaly   Extremities:   Normal, atraumatic, no cyanosis or edema   Pulses:   2+ and symmetric all extremities   Skin:   Skin color, texture, turgor normal, no rashes or lesions     Results    Lab Results personally reviewed   Lab Results   Component Value Date    CHOL 222 (H) 07/12/2022    CHOL 226 (H) 03/30/2021     Lab Results   Component Value Date    HDL 59 07/12/2022    HDL 76 03/30/2021     No components found for: LDLCALC  Lab Results   Component Value Date    TRIG 75 07/12/2022    TRIG 89 03/30/2021     Lab Results   Component Value Date    WBC 8.9 02/14/2023    HGB 12.2 02/14/2023    HCT 37.3 02/14/2023     02/14/2023     Lab Results   Component Value Date    BUN 19.1 02/14/2023     02/14/2023    CO2 25 02/14/2023

## 2023-04-17 NOTE — LETTER
4/17/2023    Anum Rosenberg, NP  [address] [facility] [address]    RE: Sherice Alvarez       Dear Colleague,     I had the pleasure of seeing Sherice Alvarez in the [facility].      [facility]  Heart Care Clinic Follow-up Note    Assessment & Plan        (I25.10,  I25.83) Coronary arteriosclerosis due to lipid rich plaque  (primary encounter diagnosis)  Comment:  Angiography April 2020 showed normal left main, proximal LAD 20% stenosis with patent stent, mid sequential 30% lesion with a distal 70% lesion, circumflex with a patent proximal stent with a third obtuse marginal artery 99% stenosis.  Right coronary artery patent stents with a mid 40% stenosis. Attempted intervention on distal LAD as well as obtuse marginal artery which were unsuccessful and underwent nuclear stress test May 2021 which was normal.  Presented again to the hospital and was admitted for chest pain with rule out an unremarkable nuclear stress test 2022.  Currently work on preventive therapy as she is not having any significant symptoms.    (I35.0) Severe calcific aortic valve stenosis  Comment: Given this she had a 23 mm DANIEL 3 valve placed June 2020 with echo showing mean gradient of 7 mmHg and peak velocity 1.9 m/s.  Recheck echo December 2023.  She is complaining of profound bruising, shows me some ecchymoses, given this we will change aspirin to every other day.    (I50.33) Acute on chronic heart failure with preserved ejection fraction (H)  Comment: No significant signs or symptoms currently, on furosemide every day, given her lightheadedness and feeling of being dry will back off to every other day    (E78.5) Dyslipidemia, goal LDL below 100  Comment: Total cholesterol 222 with LDL of 148, as above does not appear to be taking statin anymore. We will need to address.    (I10) Accelerated hypertension  Comment: Under good control currently only on metoprolol, no longer on amlodipine.    (N18.31)  Stage 3a chronic kidney disease (H)  Comment: Creatinine increased to 1.44 with a GFR 35 and defer to primary.    (F43.23) Adjustment disorder with mixed anxiety and depressed mood  Comment: Biggest issue, seems fixated on her son's eminent demise due to multisystem organ failure, he is in hospice.  It is an adopted son but her legal son.  In addition, she seems and admits to being forgetful, she is currently on Aricept.    Plan  1.  Given bruising we will back off on aspirin every other day.  2.  Would change Lasix to every other day.  3.  Follow-up in about 6 months given all these issues or sooner if needed.    Subjective  CC: 88-year-old white female being seen in 6-month follow-up today.  Still living at home independently, still driving, still concerned about her son who is in hospice, and apparently has only about 3 to 4 months left to live.  She tells me some days she feels extremely fatigued, extremely sick from profound nausea that lasts all day long.  She tells me she feels like she has bruising significantly over her body.  She tells me that she has definitely dry skin, as well as some dry hair.  She is concerned about whiteness of her hands all of a sudden,  There is no significant chest pains, palpitations, PND, orthopnea, shortness of breath or peripheral edema.    Medications  Current Outpatient Medications   Medication Sig Dispense Refill    acetaminophen (TYLENOL) 500 MG tablet Take 500-1,000 mg by mouth every 8 hours as needed for mild pain      albuterol (PROVENTIL HFA;VENTOLIN HFA) 90 mcg/actuation inhaler [ALBUTEROL (PROVENTIL HFA;VENTOLIN HFA) 90 MCG/ACTUATION INHALER] Inhale 1-2 puffs every 6 (six) hours as needed for wheezing.      aspirin (ASA) 81 MG EC tablet Take 81 mg by mouth daily      donepezil (ARICEPT) 5 MG tablet Take 5 mg by mouth daily      ferrous sulfate 325 (65 FE) MG tablet [FERROUS SULFATE 325 (65 FE) MG TABLET] Take 1 tablet (325 mg total) by mouth daily with breakfast.  0     furosemide (LASIX) 20 MG tablet Take 20 mg by mouth daily      melatonin 5 mg Tab tablet [MELATONIN 5 MG TAB TABLET] Take 5 mg by mouth at bedtime as needed (for sleep).       metoprolol succinate ER (TOPROL XL) 25 MG 24 hr tablet TAKE 1 TABLET BY MOUTH AT BEDTIME 90 tablet 3    Multiple Vitamin (THERA-TABS) TABS Take 1 tablet by mouth      nitroglycerin (NITROSTAT) 0.4 MG SL tablet [NITROGLYCERIN (NITROSTAT) 0.4 MG SL TABLET] Place 1 tablet (0.4 mg total) under the tongue every 5 (five) minutes as needed for chest pain. 90 tablet 0    omeprazole 20 MG tablet Take 20 mg by mouth daily      sertraline (ZOLOFT) 25 MG tablet Take 25 mg by mouth every morning      Vitamin D3 (CHOLECALCIFEROL) 125 MCG (5000 UT) tablet Take 1 tablet by mouth daily      beclomethasone (QVAR) 80 mcg/actuation inhaler Inhale 1 puff into the lungs 2 times daily as needed  (Patient not taking: Reported on 1/25/2023)      diclofenac (VOLTAREN) 1 % topical gel Apply 2-4 g topically 4 times daily as needed APPLY TO LEFT SHOULDER AND LEFT KNEE. (Patient not taking: Reported on 1/25/2023)      OLANZapine (ZYPREXA) 2.5 MG tablet Take 1 tablet (2.5 mg) by mouth 2 times daily as needed (anxiety) (Patient not taking: Reported on 4/17/2023) 14 tablet 0    QUEtiapine (SEROQUEL) 25 MG tablet Take 25 mg by mouth daily (Patient not taking: Reported on 4/17/2023)      rOPINIRole (REQUIP) 2 MG tablet Take 1 tablet (2 mg) by mouth At Bedtime for 30 days 30 tablet 0       Objective  /60 (BP Location: Left arm, Patient Position: Sitting, Cuff Size: Adult Regular)   Pulse 70   Resp 24   Wt 59.9 kg (132 lb)   BMI 24.14 kg/m      General Appearance:    Alert, cooperative, no distress, appears stated age   Head:    Normocephalic, without obvious abnormality, atraumatic   Throat:   Lips, mucosa, and tongue normal; teeth and gums normal   Neck:   Supple, symmetrical, trachea midline, no adenopathy;        thyroid:  No enlargement/tenderness/nodules; no  carotid    bruit or JVD   Back:     Symmetric, no curvature, ROM normal, no CVA tenderness   Lungs:     Clear to auscultation bilaterally, respirations unlabored   Chest wall:    No tenderness or deformity   Heart:    Regular rate and rhythm, S1 and S2 normal, 1/6 systolic ejection murmur, no rub   or gallop   Abdomen:     Soft, non-tender, bowel sounds active all four quadrants,     no masses, no organomegaly   Extremities:   Normal, atraumatic, no cyanosis or edema   Pulses:   2+ and symmetric all extremities   Skin:   Skin color, texture, turgor normal, no rashes or lesions     Results    Lab Results personally reviewed   Lab Results   Component Value Date    CHOL 222 (H) 07/12/2022    CHOL 226 (H) 03/30/2021     Lab Results   Component Value Date    HDL 59 07/12/2022    HDL 76 03/30/2021     No components found for: LDLCALC  Lab Results   Component Value Date    TRIG 75 07/12/2022    TRIG 89 03/30/2021     Lab Results   Component Value Date    WBC 8.9 02/14/2023    HGB 12.2 02/14/2023    HCT 37.3 02/14/2023     02/14/2023     Lab Results   Component Value Date    BUN 19.1 02/14/2023     02/14/2023    CO2 25 02/14/2023               Thank you for allowing me to participate in the care of your patient.      Sincerely,     NANCY FELDMAN MD     [facility]  cc:   Nancy Feldman MD  [address], [address]  [place] [address]

## 2023-04-17 NOTE — PATIENT INSTRUCTIONS
Ms Sherice Alvarez,  I enjoyed visiting with you again today.  I am concerned with the episodic sickness and the feeling dryness.  Per our conversation try the ASPIRIN three times a week. If bruising does not get better go back to every day.  In about a week take the FUROSEMIDE only every other day.  I will plan on seeing you 6 months.  Ulisses Feldman

## 2023-05-11 ENCOUNTER — TELEPHONE (OUTPATIENT)
Dept: CARDIOLOGY | Facility: CLINIC | Age: 88
End: 2023-05-11

## 2023-05-11 NOTE — TELEPHONE ENCOUNTER
[facility] Call Center    Phone Message    May a detailed message be left on voicemail: yes     Reason for Call: Symptoms or Concerns     If patient has red-flag symptoms, warm transfer to triage line    Current symptom or concern: Questioning about dehydration due to dark urine and she has been light headed, She is also has been dry heaving     The patient said she does not want to go to the ER  But she would like to come in and get a lab test to see if she is dehydrated       Action Taken: Cardiology    Travel Screening: Not Applicable     Thank you!  [facility]

## 2023-05-11 NOTE — TELEPHONE ENCOUNTER
Return call to pt.    Suggested pt follow-up with PCP, or Urgent Care.  Pt verbalized understanding and had no questions.  She is trying to drink more water, but struggles.  alanna

## 2023-05-18 ENCOUNTER — HOSPITAL ENCOUNTER (OUTPATIENT)
Facility: HOSPITAL | Age: 88
Setting detail: OBSERVATION
Discharge: HOME | End: 2023-05-22
Attending: EMERGENCY MEDICINE | Admitting: HOSPITALIST
Payer: COMMERCIAL

## 2023-05-18 ENCOUNTER — APPOINTMENT (OUTPATIENT)
Dept: MRI IMAGING | Facility: HOSPITAL | Age: 88
End: 2023-05-18
Attending: EMERGENCY MEDICINE
Payer: COMMERCIAL

## 2023-05-18 DIAGNOSIS — M54.2 CERVICAL SPINE PAIN: ICD-10-CM

## 2023-05-18 DIAGNOSIS — M54.2 NECK PAIN: ICD-10-CM

## 2023-05-18 DIAGNOSIS — K59.00 CONSTIPATION, UNSPECIFIED CONSTIPATION TYPE: Primary | ICD-10-CM

## 2023-05-18 LAB
ANION GAP SERPL CALCULATED.3IONS-SCNC: 10 MMOL/L (ref 7–15)
BASOPHILS # BLD AUTO: 0.1 10E3/UL (ref 0–0.2)
BASOPHILS NFR BLD AUTO: 1 %
BUN SERPL-MCNC: 30.1 MG/DL (ref 8–23)
CALCIUM SERPL-MCNC: 9.4 MG/DL (ref 8.8–10.2)
CHLORIDE SERPL-SCNC: 107 MMOL/L (ref 98–107)
CREAT SERPL-MCNC: 1.35 MG/DL (ref 0.51–0.95)
DEPRECATED HCO3 PLAS-SCNC: 24 MMOL/L (ref 22–29)
EOSINOPHIL # BLD AUTO: 0.4 10E3/UL (ref 0–0.7)
EOSINOPHIL NFR BLD AUTO: 3 %
ERYTHROCYTE [DISTWIDTH] IN BLOOD BY AUTOMATED COUNT: 19.3 % (ref 10–15)
GFR SERPL CREATININE-BSD FRML MDRD: 38 ML/MIN/1.73M2
GLUCOSE SERPL-MCNC: 123 MG/DL (ref 70–99)
HCT VFR BLD AUTO: 35.6 % (ref 35–47)
HGB BLD-MCNC: 11.9 G/DL (ref 11.7–15.7)
HOLD SPECIMEN: NORMAL
IMM GRANULOCYTES # BLD: 0.1 10E3/UL
IMM GRANULOCYTES NFR BLD: 1 %
LYMPHOCYTES # BLD AUTO: 2 10E3/UL (ref 0.8–5.3)
LYMPHOCYTES NFR BLD AUTO: 15 %
MAGNESIUM SERPL-MCNC: 2.1 MG/DL (ref 1.7–2.3)
MCH RBC QN AUTO: 30.8 PG (ref 26.5–33)
MCHC RBC AUTO-ENTMCNC: 33.4 G/DL (ref 31.5–36.5)
MCV RBC AUTO: 92 FL (ref 78–100)
MONOCYTES # BLD AUTO: 1.3 10E3/UL (ref 0–1.3)
MONOCYTES NFR BLD AUTO: 10 %
NEUTROPHILS # BLD AUTO: 9.8 10E3/UL (ref 1.6–8.3)
NEUTROPHILS NFR BLD AUTO: 70 %
NRBC # BLD AUTO: 0 10E3/UL
NRBC BLD AUTO-RTO: 0 /100
PLATELET # BLD AUTO: 245 10E3/UL (ref 150–450)
POTASSIUM SERPL-SCNC: 4.1 MMOL/L (ref 3.4–5.3)
RBC # BLD AUTO: 3.86 10E6/UL (ref 3.8–5.2)
SODIUM SERPL-SCNC: 141 MMOL/L (ref 136–145)
WBC # BLD AUTO: 13.7 10E3/UL (ref 4–11)

## 2023-05-18 PROCEDURE — G0378 HOSPITAL OBSERVATION PER HR: HCPCS

## 2023-05-18 PROCEDURE — 36415 COLL VENOUS BLD VENIPUNCTURE: CPT | Performed by: EMERGENCY MEDICINE

## 2023-05-18 PROCEDURE — 83735 ASSAY OF MAGNESIUM: CPT | Performed by: EMERGENCY MEDICINE

## 2023-05-18 PROCEDURE — 72141 MRI NECK SPINE W/O DYE: CPT

## 2023-05-18 PROCEDURE — 85025 COMPLETE CBC W/AUTO DIFF WBC: CPT | Performed by: EMERGENCY MEDICINE

## 2023-05-18 PROCEDURE — 96375 TX/PRO/DX INJ NEW DRUG ADDON: CPT

## 2023-05-18 PROCEDURE — 250N000013 HC RX MED GY IP 250 OP 250 PS 637: Performed by: HOSPITALIST

## 2023-05-18 PROCEDURE — 250N000013 HC RX MED GY IP 250 OP 250 PS 637: Performed by: EMERGENCY MEDICINE

## 2023-05-18 PROCEDURE — 82310 ASSAY OF CALCIUM: CPT | Performed by: EMERGENCY MEDICINE

## 2023-05-18 PROCEDURE — 96374 THER/PROPH/DIAG INJ IV PUSH: CPT

## 2023-05-18 PROCEDURE — 99285 EMERGENCY DEPT VISIT HI MDM: CPT | Mod: 25

## 2023-05-18 PROCEDURE — 96376 TX/PRO/DX INJ SAME DRUG ADON: CPT

## 2023-05-18 PROCEDURE — 250N000011 HC RX IP 250 OP 636: Performed by: EMERGENCY MEDICINE

## 2023-05-18 PROCEDURE — 99223 1ST HOSP IP/OBS HIGH 75: CPT | Performed by: HOSPITALIST

## 2023-05-18 PROCEDURE — 250N000011 HC RX IP 250 OP 636: Performed by: HOSPITALIST

## 2023-05-18 PROCEDURE — 258N000003 HC RX IP 258 OP 636: Performed by: EMERGENCY MEDICINE

## 2023-05-18 PROCEDURE — 96361 HYDRATE IV INFUSION ADD-ON: CPT

## 2023-05-18 RX ORDER — HYDROMORPHONE HYDROCHLORIDE 1 MG/ML
0.5 INJECTION, SOLUTION INTRAMUSCULAR; INTRAVENOUS; SUBCUTANEOUS ONCE
Status: COMPLETED | OUTPATIENT
Start: 2023-05-18 | End: 2023-05-18

## 2023-05-18 RX ORDER — AMLODIPINE BESYLATE 5 MG/1
5 TABLET ORAL DAILY
COMMUNITY
End: 2024-02-09

## 2023-05-18 RX ORDER — LIDOCAINE 4 G/G
1 PATCH TOPICAL ONCE
Status: COMPLETED | OUTPATIENT
Start: 2023-05-18 | End: 2023-05-19

## 2023-05-18 RX ORDER — ASPIRIN 81 MG/1
81 TABLET ORAL DAILY
Status: DISCONTINUED | OUTPATIENT
Start: 2023-05-18 | End: 2023-05-22 | Stop reason: HOSPADM

## 2023-05-18 RX ORDER — FERROUS SULFATE 325(65) MG
325 TABLET ORAL
Status: DISCONTINUED | OUTPATIENT
Start: 2023-05-19 | End: 2023-05-22 | Stop reason: HOSPADM

## 2023-05-18 RX ORDER — METHOCARBAMOL 500 MG/1
500 TABLET, FILM COATED ORAL 4 TIMES DAILY PRN
Status: ON HOLD | COMMUNITY
End: 2024-02-14

## 2023-05-18 RX ORDER — METOPROLOL SUCCINATE 25 MG/1
25 TABLET, EXTENDED RELEASE ORAL AT BEDTIME
Status: DISCONTINUED | OUTPATIENT
Start: 2023-05-18 | End: 2023-05-22 | Stop reason: HOSPADM

## 2023-05-18 RX ORDER — METHOCARBAMOL 500 MG/1
500 TABLET, FILM COATED ORAL 4 TIMES DAILY PRN
Status: DISCONTINUED | OUTPATIENT
Start: 2023-05-18 | End: 2023-05-22 | Stop reason: HOSPADM

## 2023-05-18 RX ORDER — ROPINIROLE 2 MG/1
2 TABLET, FILM COATED ORAL AT BEDTIME
COMMUNITY

## 2023-05-18 RX ORDER — LIDOCAINE 40 MG/G
CREAM TOPICAL
Status: DISCONTINUED | OUTPATIENT
Start: 2023-05-18 | End: 2023-05-22 | Stop reason: HOSPADM

## 2023-05-18 RX ORDER — POLYETHYLENE GLYCOL 3350 17 G/17G
17 POWDER, FOR SOLUTION ORAL DAILY
Status: DISCONTINUED | OUTPATIENT
Start: 2023-05-18 | End: 2023-05-22 | Stop reason: HOSPADM

## 2023-05-18 RX ORDER — OLANZAPINE 2.5 MG/1
2.5 TABLET, FILM COATED ORAL 2 TIMES DAILY PRN
Status: DISCONTINUED | OUTPATIENT
Start: 2023-05-18 | End: 2023-05-22 | Stop reason: HOSPADM

## 2023-05-18 RX ORDER — ONDANSETRON 2 MG/ML
4 INJECTION INTRAMUSCULAR; INTRAVENOUS ONCE
Status: COMPLETED | OUTPATIENT
Start: 2023-05-18 | End: 2023-05-18

## 2023-05-18 RX ORDER — ALBUTEROL SULFATE 90 UG/1
1-2 AEROSOL, METERED RESPIRATORY (INHALATION) EVERY 6 HOURS PRN
Status: DISCONTINUED | OUTPATIENT
Start: 2023-05-18 | End: 2023-05-22 | Stop reason: HOSPADM

## 2023-05-18 RX ORDER — ACETAMINOPHEN 325 MG/1
975 TABLET ORAL ONCE
Status: COMPLETED | OUTPATIENT
Start: 2023-05-18 | End: 2023-05-18

## 2023-05-18 RX ORDER — ONDANSETRON 2 MG/ML
4 INJECTION INTRAMUSCULAR; INTRAVENOUS EVERY 6 HOURS PRN
Status: DISCONTINUED | OUTPATIENT
Start: 2023-05-18 | End: 2023-05-22 | Stop reason: HOSPADM

## 2023-05-18 RX ORDER — FENTANYL CITRATE 50 UG/ML
25 INJECTION, SOLUTION INTRAMUSCULAR; INTRAVENOUS EVERY 30 MIN PRN
Status: DISCONTINUED | OUTPATIENT
Start: 2023-05-18 | End: 2023-05-18

## 2023-05-18 RX ORDER — HYDROMORPHONE HYDROCHLORIDE 1 MG/ML
0.5 INJECTION, SOLUTION INTRAMUSCULAR; INTRAVENOUS; SUBCUTANEOUS
Status: DISCONTINUED | OUTPATIENT
Start: 2023-05-18 | End: 2023-05-19

## 2023-05-18 RX ORDER — ONDANSETRON 4 MG/1
4 TABLET, ORALLY DISINTEGRATING ORAL EVERY 6 HOURS PRN
Status: DISCONTINUED | OUTPATIENT
Start: 2023-05-18 | End: 2023-05-22 | Stop reason: HOSPADM

## 2023-05-18 RX ORDER — HYDROMORPHONE HCL IN WATER/PF 6 MG/30 ML
0.2 PATIENT CONTROLLED ANALGESIA SYRINGE INTRAVENOUS ONCE
Status: COMPLETED | OUTPATIENT
Start: 2023-05-18 | End: 2023-05-18

## 2023-05-18 RX ORDER — SODIUM CHLORIDE 9 MG/ML
INJECTION, SOLUTION INTRAVENOUS ONCE
Status: COMPLETED | OUTPATIENT
Start: 2023-05-18 | End: 2023-05-19

## 2023-05-18 RX ORDER — ACETAMINOPHEN 500 MG
500-1000 TABLET ORAL EVERY 8 HOURS PRN
Status: DISCONTINUED | OUTPATIENT
Start: 2023-05-18 | End: 2023-05-20

## 2023-05-18 RX ORDER — LABETALOL HYDROCHLORIDE 5 MG/ML
20 INJECTION, SOLUTION INTRAVENOUS EVERY 4 HOURS PRN
Status: DISCONTINUED | OUTPATIENT
Start: 2023-05-18 | End: 2023-05-22 | Stop reason: HOSPADM

## 2023-05-18 RX ORDER — FUROSEMIDE 20 MG
20 TABLET ORAL DAILY PRN
Status: DISCONTINUED | OUTPATIENT
Start: 2023-05-18 | End: 2023-05-22 | Stop reason: HOSPADM

## 2023-05-18 RX ORDER — DONEPEZIL HYDROCHLORIDE 5 MG/1
5 TABLET, FILM COATED ORAL DAILY
Status: DISCONTINUED | OUTPATIENT
Start: 2023-05-18 | End: 2023-05-22 | Stop reason: HOSPADM

## 2023-05-18 RX ORDER — AMLODIPINE BESYLATE 5 MG/1
5 TABLET ORAL DAILY
Status: DISCONTINUED | OUTPATIENT
Start: 2023-05-18 | End: 2023-05-22 | Stop reason: HOSPADM

## 2023-05-18 RX ORDER — MORPHINE SULFATE 4 MG/ML
4 INJECTION, SOLUTION INTRAMUSCULAR; INTRAVENOUS ONCE
Status: COMPLETED | OUTPATIENT
Start: 2023-05-18 | End: 2023-05-18

## 2023-05-18 RX ORDER — NITROGLYCERIN 0.4 MG/1
0.4 TABLET SUBLINGUAL EVERY 5 MIN PRN
Status: DISCONTINUED | OUTPATIENT
Start: 2023-05-18 | End: 2023-05-22 | Stop reason: HOSPADM

## 2023-05-18 RX ADMIN — ONDANSETRON 4 MG: 2 INJECTION INTRAMUSCULAR; INTRAVENOUS at 12:30

## 2023-05-18 RX ADMIN — SODIUM CHLORIDE: 9 INJECTION, SOLUTION INTRAVENOUS at 13:25

## 2023-05-18 RX ADMIN — MORPHINE SULFATE 4 MG: 4 INJECTION, SOLUTION INTRAMUSCULAR; INTRAVENOUS at 12:31

## 2023-05-18 RX ADMIN — ACETAMINOPHEN 1000 MG: 500 TABLET ORAL at 17:40

## 2023-05-18 RX ADMIN — AMLODIPINE BESYLATE 5 MG: 5 TABLET ORAL at 17:40

## 2023-05-18 RX ADMIN — Medication 81 MG: at 17:40

## 2023-05-18 RX ADMIN — POLYETHYLENE GLYCOL 3350 17 G: 17 POWDER, FOR SOLUTION ORAL at 17:40

## 2023-05-18 RX ADMIN — ACETAMINOPHEN 975 MG: 325 TABLET ORAL at 11:15

## 2023-05-18 RX ADMIN — HYDROMORPHONE HYDROCHLORIDE 0.5 MG: 1 INJECTION, SOLUTION INTRAMUSCULAR; INTRAVENOUS; SUBCUTANEOUS at 22:49

## 2023-05-18 RX ADMIN — DONEPEZIL HYDROCHLORIDE 5 MG: 5 TABLET, FILM COATED ORAL at 17:39

## 2023-05-18 RX ADMIN — LIDOCAINE 1 PATCH: 4 PATCH TOPICAL at 15:42

## 2023-05-18 RX ADMIN — HYDROMORPHONE HYDROCHLORIDE 0.2 MG: 0.2 INJECTION, SOLUTION INTRAMUSCULAR; INTRAVENOUS; SUBCUTANEOUS at 11:40

## 2023-05-18 RX ADMIN — HYDROMORPHONE HYDROCHLORIDE 0.5 MG: 1 INJECTION, SOLUTION INTRAMUSCULAR; INTRAVENOUS; SUBCUTANEOUS at 13:25

## 2023-05-18 RX ADMIN — HYDROMORPHONE HYDROCHLORIDE 0.5 MG: 1 INJECTION, SOLUTION INTRAMUSCULAR; INTRAVENOUS; SUBCUTANEOUS at 16:12

## 2023-05-18 RX ADMIN — FENTANYL CITRATE 25 MCG: 50 INJECTION, SOLUTION INTRAMUSCULAR; INTRAVENOUS at 11:13

## 2023-05-18 ASSESSMENT — ENCOUNTER SYMPTOMS
NUMBNESS: 0
DIARRHEA: 0
WEAKNESS: 0
LIGHT-HEADEDNESS: 0
FEVER: 0
ABDOMINAL PAIN: 0
NECK PAIN: 1
COUGH: 0
NAUSEA: 0
DIZZINESS: 0
CONFUSION: 0
HEADACHES: 0
SHORTNESS OF BREATH: 0
VOMITING: 0

## 2023-05-18 ASSESSMENT — ACTIVITIES OF DAILY LIVING (ADL)
ADLS_ACUITY_SCORE: 33
ADLS_ACUITY_SCORE: 35
DEPENDENT_IADLS:: INDEPENDENT
ADLS_ACUITY_SCORE: 35
ADLS_ACUITY_SCORE: 37

## 2023-05-18 NOTE — PHARMACY-ADMISSION MEDICATION HISTORY
"Pharmacist Admission Medication History    Admission medication history is complete. The information provided in this note is only as accurate as the sources available at the time of the update.    Medication reconciliation/reorder completed by provider prior to medication history? No    Information Source(s): Patient and CareEverywhere/SureScripts via in-person    Pertinent Information: Patient reports taking most medications \"PRN\" or when she remembers. Adherence appears to be an issue.    Changes made to PTA medication list:    Added: Amlodipine, methocarbamol    Deleted: Qvar, melatonin    Changed: Furosemide every day to PRN    Medication Affordability:  Not including over the counter (OTC) medications, was there a time in the past 3 months when you did not take your medications as prescribed because of cost?: No    Allergies reviewed with patient and updates made in EHR: no    Medication History Completed By: Alina Trevino [facility] 5/18/2023 4:32 PM    Prior to Admission medications    Medication Sig Last Dose Taking? Auth Provider Long Term End Date   acetaminophen (TYLENOL) 500 MG tablet Take 500-1,000 mg by mouth every 8 hours as needed for mild pain  Yes Unknown, Entered By History     albuterol (PROVENTIL HFA;VENTOLIN HFA) 90 mcg/actuation inhaler [ALBUTEROL (PROVENTIL HFA;VENTOLIN HFA) 90 MCG/ACTUATION INHALER] Inhale 1-2 puffs every 6 (six) hours as needed for wheezing.  Yes Provider, Historical     amLODIPine (NORVASC) 5 MG tablet Take 5 mg by mouth daily Unknown Yes Unknown, Entered By History No    aspirin (ASA) 81 MG EC tablet Take 81 mg by mouth daily Unknown Yes Reported, Patient     diclofenac (VOLTAREN) 1 % topical gel Apply 2-4 g topically 4 times daily as needed APPLY TO LEFT SHOULDER AND LEFT KNEE.  Yes Reported, Patient     donepezil (ARICEPT) 5 MG tablet Take 5 mg by mouth daily Unknown Yes Unknown, Entered By History     ferrous sulfate 325 (65 FE) MG tablet [FERROUS SULFATE 325 (65 FE) MG " TABLET] Take 1 tablet (325 mg total) by mouth daily with breakfast. Unknown Yes Viv Tolbert PA-C     furosemide (LASIX) 20 MG tablet Take 20 mg by mouth daily as needed Unknown Yes Reported, Patient No    methocarbamol (ROBAXIN) 500 MG tablet Take 500 mg by mouth 4 times daily as needed for muscle spasms Unknown Yes Unknown, Entered By History     metoprolol succinate ER (TOPROL XL) 25 MG 24 hr tablet TAKE 1 TABLET BY MOUTH AT BEDTIME Unknown Yes Kim Arreola, APRN CNP Yes    Multiple Vitamin (THERA-TABS) TABS Take 1 tablet by mouth Unknown Yes Reported, Patient     nitroglycerin (NITROSTAT) 0.4 MG SL tablet [NITROGLYCERIN (NITROSTAT) 0.4 MG SL TABLET] Place 1 tablet (0.4 mg total) under the tongue every 5 (five) minutes as needed for chest pain.  Yes Ino Prieto MD Yes    OLANZapine (ZYPREXA) 2.5 MG tablet Take 1 tablet (2.5 mg) by mouth 2 times daily as needed (anxiety) Unknown Yes Diana Morales MD Yes    omeprazole 20 MG tablet Take 20 mg by mouth daily as needed Unknown Yes Reported, Patient     QUEtiapine (SEROQUEL) 25 MG tablet Take 25 mg by mouth daily Unknown Yes Unknown, Entered By History Yes    rOPINIRole (REQUIP) 2 MG tablet Take 2 mg by mouth nightly as needed Unknown Yes Unknown, Entered By History No    sertraline (ZOLOFT) 25 MG tablet Take 50 mg by mouth daily Unknown Yes Reported, Patient Yes    Vitamin D3 (CHOLECALCIFEROL) 125 MCG (5000 UT) tablet Take 1 tablet by mouth daily Unknown Yes Unknown, Entered By History

## 2023-05-18 NOTE — ED TRIAGE NOTES
Pt presents to triage in w/c for neck pain. Pt reports hx of back pain. Pain now in neck. Denies any new injuries or falls. Pt took a tramadol four hrs ago.

## 2023-05-18 NOTE — ED PROVIDER NOTES
"    EMERGENCY DEPARTMENT ENCOUNTER      NAME: Sherice Alvarez  AGE: 88 year old female  YOB: 1934  MRN: [id number]  EVALUATION DATE & TIME: 5/18/2023 10:29 AM    PCP: Anum Rosenberg    ED PROVIDER: Josey Myers M.D.        Chief Complaint   Patient presents with     Neck Pain         FINAL IMPRESSION:    1. Neck pain    2. Cervical spine pain            MEDICAL DECISION MAKING:    Sherice Alvarez is a 88 year old female with history of back pain, dyslipidemia, CKD 3, CAD, s/p TAVR, acute DVT, HFpEF, prior stroke, NSTEMI, hypertension who presents to the ER with complaints of neck pain. Patient states she started to develop left sided neck pain yesterday which currently is severe. Her pain onset was spontaneous and she denies any sudden neck movements or other provoking factors at the time of her pain onset. She describes her current pain as \"horrible spasms\".  At times patient is able to find a comfortable position but anytime she then tries to move her head or neck, especially if she turns her head, the pain becomes more severe.  Neuro exam unremarkable.  MRI imaging was somewhat limited due to patient's pain and she was unable to complete the full study but does show findings consistent with severe stenosis at C3-C4 which is certainly where she is having pain.  I do not think this represents an acute carotid, cardiac, aortic, CVA type process.  She has received numerous doses of opiates with increasing doses without control of her pain.  Plan at this time is admission to the hospital for ongoing pain control.  She was excepted to the hospital and the hospitalist.  Patient agrees with this plan and all of her questions have been answered.      ED COURSE:  10:35 AM  I met with the patient to gather history and perform my exam. ED course and treatment discussed.    11:34 AM I reevaluated the patient and updated her on results.  She states she did not receive a whole lot of relief " from that fentanyl.  On repeat exam it really seems to be the trapezius muscle and a little bit of the C-spine.  No tenderness over the carotids or in that area in general.  No chest pain.  She does have known spine abnormalities and has had injections to this area, most recently a month ago.  Plan at this time is to do MRI imaging of this area, switch her to Dilaudid for pain control.    12:00 PM  Pt continues to deny much pain relief with dilaudid and is requesting morphine.  She is also requesting a little bit of IV fluids as she is concerned that she may be dehydrated.    12:40 PM  I reevaluated and updated the patient.    3:47 PM  I spoke with the hospitalist Dr. Looney who accepts the patient.  Will go to [facility] under observation status.  I do not think this represents an acute cardiac, aortic, or carotid catastrophe.  I do not think this represents acute spinal infection.  When she finds a certain position the pain will be significantly better but any movement the pain becomes more severe.    I do not think that this represents ACS, PE, ruptured AAA, aortic or carotid dissection, cauda equina syndrome, discitis, epidural abscess,  or other such etiologies at this time.      COVID-19 PPE worn during patient evaluation:  Mask: surgical  Eye Protection: none   Gown: none   Hair cover: yes  Face shield: none  Patient wearing a mask: none    At the conclusion of the encounter I discussed the results of all of the tests and the disposition. Their questions were answered. The patient (and any family present) acknowledged understanding and were agreeable with the care plan.      CONSULTANTS:  Hospitalist - Dr. Kb Looney        MEDICATIONS GIVEN IN THE EMERGENCY:  Medications   Lidocaine (LIDOCARE) 4 % Patch 1 patch (1 patch Transdermal $Patch/Med Applied 5/18/23 1488)     And   lidocaine patch in PLACE (has no administration in time range)   acetaminophen (TYLENOL) tablet 975 mg (975 mg Oral $Given 5/18/23 1115)  "  HYDROmorphone (DILAUDID) injection 0.2 mg (0.2 mg Intravenous $Given 5/18/23 1140)   sodium chloride 0.9% infusion ( Intravenous $New Bag 5/18/23 1325)   morphine (PF) injection 4 mg (4 mg Intravenous $Given 5/18/23 1231)   ondansetron (ZOFRAN) injection 4 mg (4 mg Intravenous $Given 5/18/23 1230)   HYDROmorphone (PF) (DILAUDID) injection 0.5 mg (0.5 mg Intravenous $Given 5/18/23 1325)           NEW PRESCRIPTIONS STARTED AT TODAY'S ER VISIT     Medication List      There are no discharge medications for this visit.             CONDITION:  stable        DISPOSITION:  Med surg obs as accepted by Dr. Kb Looney, hospitalist         =================================================================  =================================================================  TRIAGE ASSESSMENT:  Pt presents to triage in w/c for neck pain. Pt reports hx of back pain. Pain now in neck. Denies any new injuries or falls. Pt took a tramadol four hrs ago.        ED Triage Vitals [05/18/23 1026]   Enc Vitals Group      BP (!) 170/88      Pulse 110      Resp 20      Temp 98.1  F (36.7  C)      Temp src Oral      SpO2 97 %          ================================================================  ================================================================    HPI    Patient information was obtained from: Patient    Use of Intrepreter: N/A     Sherice Alvarez is a 88 year old female with history of back pain, dyslipidemia, CKD 3, CAD, s/p TAVR, acute DVT, HFpEF, prior stroke, NSTEMI, hypertension who presents to the ER with complaints of neck pain. Patient states she started to develop left sided neck pain yesterday which currently is severe. Her pain onset was spontaneous and she denies any sudden neck movements or other provoking factors at the time of her pain onset. She describes her current pain as \"horrible spasms\". She attempted to take a \"low dose\" of tramadol 4-5 hours prior to ED arrival. She reports associated " "palpitations and feeling \"shaky\", but states she does not feel her symptoms are cardiac in nature.    Patient notes she is fluid restricted due to CHF and feels dehydrated \"all the time\". She reports she is set to receive an injection in her upper cervical discs tomorrow, but feels her current pain is different from this. Patient estimates her last injection was ~1 month ago.    She endorses an allergy to naprosyn, but states this was 40 years ago and does not remember the effect it had on her then. She reports her last neck MRI was in 2016.    Patient denies a fever, cough, chest pain, shortness of breath, headache, vision changes, numbness or weakness in arms or legs. No other reported complaints at this time.      REVIEW OF SYSTEMS  Review of Systems   Constitutional: Negative for fever.   Respiratory: Negative for cough and shortness of breath.    Cardiovascular: Negative for chest pain.   Gastrointestinal: Negative for abdominal pain, diarrhea, nausea and vomiting.   Musculoskeletal: Positive for neck pain.   Neurological: Negative for dizziness, weakness, light-headedness, numbness and headaches.   Psychiatric/Behavioral: Negative for confusion.   All other systems reviewed and are negative.        PAST MEDICAL HISTORY:  Past Medical History:   Diagnosis Date     Arthritis      Asthma      CAD (coronary artery disease)      Cancer (H)     basal cell     Chronic kidney disease     ckd 3     Chronic pain syndrome      Congestive heart failure (H)      Crohn's disease (H)      GERD (gastroesophageal reflux disease)      Hx of heart artery stent 11/01/2016    1 stent R Proximal CA and 1 Stent L Proximal circumflex  12/2016 Stent proximal LAD     Hyperlipidemia      Hypertension      NSTEMI (non-ST elevated myocardial infarction) (H) 11/01/2016     PONV (postoperative nausea and vomiting)     severe povn with last knee surgery     Restless legs syndrome      Stroke (H) 01/01/2010    numbness rt jaw         PAST " SURGICAL HISTORY:  Past Surgical History:   Procedure Laterality Date     APPENDECTOMY       CARDIAC CATHETERIZATION  2016    multiple vessel disease.  no intervention     CARDIAC CATHETERIZATION  2016     CARDIAC CATHETERIZATION N/A 2016    Procedure: Coronary Angiogram;  Surgeon: Sunil Moran MD;  Location: [facility];  Service:      CAROTID ENDARTERECTOMY       CAROTID ENDARTERECTOMY Right 2010     CERVICAL LAMINECTOMY  1994      SECTION       CV CORONARY ANGIOGRAM N/A 2020    Procedure: Coronary Angiogram;  Surgeon: Vinita Ramos MD;  Location: [facility];  Service: Cardiology     CV LEFT HEART CATHETERIZATION WITHOUT LEFT VENTRICULOGRAM Left 2020    Procedure: Left Heart Catheterization Without Left Ventriculogram;  Surgeon: Jerad Lerner MD;  Location: [facility];  Service: Cardiology     CV TRANSCATHETER AORTIC VALVE REPLACEMENT - OTHER APPROACH N/A 2020    Procedure: CV TRANSCATHETER AORTIC VALVE REPLACEMENT - RIGHT SUBCLAVIAN APPROACH;  Surgeon: John Caceres MD;  Location: [facility];  Service: Cardiology     HEART CATH, ANGIOPLASTY       HEMORRHOIDECTOMY EXTERNAL       IR LUMBAR EPIDURAL STEROID INJECTION  2014     IR LUMBAR NERVE ROOT INJECTION  10/01/2013     JOINT REPLACEMENT       MO ESOPHAGOGASTRODUODENOSCOPY TRANSORAL DIAGNOSTIC N/A 07/10/2019    Procedure: ESOPHAGOGASTRODUODENOSCOPY (EGD) with gastric biopsy;  Surgeon: Sunil Stuart MD;  Location: [facility];  Service: Gastroenterology     MO REPLACE AORTIC VALVE OPEN AXILLRY ARTRY APPROACH N/A 2020    Procedure: OR TRANSCATHETER AORTIC VALVE REPLACEMENT, SUBCLAVIAN APPROACH;  Surgeon: Bhavin Cuadra MD;  Location: [facility];  Service: Cardiology     TONSILLECTOMY & ADENOIDECTOMY       TOTAL KNEE ARTHROPLASTY Right      TRANSCATHETER AORTIC-VALVE REPLACEMENT       ZZC TOTAL KNEE ARTHROPLASTY Left  05/11/2021    Procedure: LEFT TOTAL KNEE ARTHROPLASTY;  Surgeon: Doug Rhodes MD;  Location: [facility];  Service: Orthopedics         CURRENT MEDICATIONS:    Prior to Admission medications    Medication Sig Start Date End Date Taking? Authorizing Provider   acetaminophen (TYLENOL) 500 MG tablet Take 500-1,000 mg by mouth every 8 hours as needed for mild pain    Unknown, Entered By History   albuterol (PROVENTIL HFA;VENTOLIN HFA) 90 mcg/actuation inhaler [ALBUTEROL (PROVENTIL HFA;VENTOLIN HFA) 90 MCG/ACTUATION INHALER] Inhale 1-2 puffs every 6 (six) hours as needed for wheezing. 11/3/16   Provider, Historical   aspirin (ASA) 81 MG EC tablet Take 81 mg by mouth daily    Reported, Patient   beclomethasone (QVAR) 80 mcg/actuation inhaler Inhale 1 puff into the lungs 2 times daily as needed   Patient not taking: Reported on 1/25/2023 5/6/21   Provider, Historical   diclofenac (VOLTAREN) 1 % topical gel Apply 2-4 g topically 4 times daily as needed APPLY TO LEFT SHOULDER AND LEFT KNEE.  Patient not taking: Reported on 1/25/2023 8/5/21   Reported, Patient   donepezil (ARICEPT) 5 MG tablet Take 5 mg by mouth daily 12/9/22   Unknown, Entered By History   ferrous sulfate 325 (65 FE) MG tablet [FERROUS SULFATE 325 (65 FE) MG TABLET] Take 1 tablet (325 mg total) by mouth daily with breakfast. 6/6/20   Viv Tolbert PA-C   furosemide (LASIX) 20 MG tablet Take 20 mg by mouth daily    Reported, Patient   melatonin 5 mg Tab tablet [MELATONIN 5 MG TAB TABLET] Take 5 mg by mouth at bedtime as needed (for sleep).  4/15/20   Provider, Historical   metoprolol succinate ER (TOPROL XL) 25 MG 24 hr tablet TAKE 1 TABLET BY MOUTH AT BEDTIME 12/28/22   Kim Arreola APRN CNP   Multiple Vitamin (THERA-TABS) TABS Take 1 tablet by mouth    Reported, Patient   nitroglycerin (NITROSTAT) 0.4 MG SL tablet [NITROGLYCERIN (NITROSTAT) 0.4 MG SL TABLET] Place 1 tablet (0.4 mg total) under the tongue every 5 (five)  minutes as needed for chest pain. 16   Ino Prieto MD   OLANZapine (ZYPREXA) 2.5 MG tablet Take 1 tablet (2.5 mg) by mouth 2 times daily as needed (anxiety)  Patient not taking: Reported on 2023 8/3/22   Diana Morales MD   omeprazole 20 MG tablet Take 20 mg by mouth daily    Reported, Patient   QUEtiapine (SEROQUEL) 25 MG tablet Take 25 mg by mouth daily  Patient not taking: Reported on 2023   Unknown, Entered By History   rOPINIRole (REQUIP) 2 MG tablet Take 1 tablet (2 mg) by mouth At Bedtime for 30 days 8/3/22 1/25/23  Diana Morales MD   sertraline (ZOLOFT) 25 MG tablet Take 25 mg by mouth every morning 23   Reported, Patient   Vitamin D3 (CHOLECALCIFEROL) 125 MCG (5000 UT) tablet Take 1 tablet by mouth daily    Unknown, Entered By History         ALLERGIES:  Allergies   Allergen Reactions     Amitriptyline Hcl [Amitriptyline] Other (See Comments)     Erythromycin Diarrhea and Other (See Comments)     Childhood reaction     Gabapentin Other (See Comments)     Jerking movements, swelling     Naproxen Unknown and Other (See Comments)     Other Environmental Allergy Unknown     Molds, dander     Pregabalin Other (See Comments)         FAMILY HISTORY:  Family History   Problem Relation Age of Onset     Atrial fibrillation Mother      Parkinsonism Father          SOCIAL HISTORY:  Social History     Socioeconomic History     Marital status:    Tobacco Use     Smoking status: Former     Types: Cigarettes     Quit date: 1980     Years since quittin.4     Smokeless tobacco: Never   Substance and Sexual Activity     Alcohol use: No     Drug use: No         VITALS:  Patient Vitals for the past 24 hrs:   BP Temp Temp src Pulse Resp SpO2   23 1530 (!) 204/90 -- -- 77 -- 90 %   23 1520 -- -- -- -- -- 94 %   23 1330 (!) 190/82 -- -- 86 -- (!) 88 %   23 1315 (!) 130/102 -- -- 91 -- 96 %   23 1215 (!) 142/92 -- -- 90 -- 97 %   23 1145  (!) 211/106 -- -- 88 -- 95 %   05/18/23 1130 (!) 208/94 -- -- 91 -- 93 %   05/18/23 1115 (!) 214/101 -- -- -- -- --   05/18/23 1026 (!) 170/88 98.1  F (36.7  C) Oral 110 20 97 %       Wt Readings from Last 3 Encounters:   04/17/23 59.9 kg (132 lb)   02/14/23 63.5 kg (140 lb)   01/25/23 63.5 kg (140 lb)       Estimated Creatinine Clearance: 27.2 mL/min (A) (based on SCr of 1.35 mg/dL (H)).    PHYSICAL EXAM    Constitutional:  Well developed, Well nourished, NAD, GCS 15  HENT:  Normocephalic, Atraumatic, Bilateral external ears normal, Nose normal. Neck- Supple, No stridor.  No tenderness to the carotid area.  No bruit appreciated.  She does have focal tenderness to the sternocleidomastoid and trapezius muscle area with palpation.  Eyes:  PERRL, EOMI, Conjunctiva normal, No discharge.  Respiratory:  Normal breath sounds, No respiratory distress, No wheezing, Speaks full sentences easily. No cough.   Cardiovascular:  Normal heart rate, Regular rhythm, No rubs, No gallops. Chest wall nontender.  GI:  No excessive obesity.  Bowel sounds normal, Soft, No tenderness, No masses, No flank tenderness. No rebound or guarding.   : deferred  Musculoskeletal: No cyanosis, No clubbing. Good range of motion in all major joints. No tenderness to palpation or major deformities noted. No tenderness of the TLS spine.+well healed midline c-spine incision with mild tendneress to mid cervical spine and laterally.  Integument:  Warm, Dry, No erythema, No rash.  No petechiae.  Neurologic:  Alert & oriented x 3, Normal motor function, Normal sensory function, No focal deficits noted. Normal gait.   Psychiatric:  Affect normal, Cooperative         LAB:  All pertinent labs reviewed and interpreted.  Recent Results (from the past 24 hour(s))   Basic metabolic panel    Collection Time: 05/18/23 11:06 AM   Result Value Ref Range    Sodium 141 136 - 145 mmol/L    Potassium 4.1 3.4 - 5.3 mmol/L    Chloride 107 98 - 107 mmol/L    Carbon Dioxide  (CO2) 24 22 - 29 mmol/L    Anion Gap 10 7 - 15 mmol/L    Urea Nitrogen 30.1 (H) 8.0 - 23.0 mg/dL    Creatinine 1.35 (H) 0.51 - 0.95 mg/dL    Calcium 9.4 8.8 - 10.2 mg/dL    Glucose 123 (H) 70 - 99 mg/dL    GFR Estimate 38 (L) >60 mL/min/1.73m2   Magnesium    Collection Time: 05/18/23 11:06 AM   Result Value Ref Range    Magnesium 2.1 1.7 - 2.3 mg/dL   Extra Red Top Tube    Collection Time: 05/18/23 11:06 AM   Result Value Ref Range    Hold Specimen J[facility]    Extra Green Top (Lithium Heparin) Tube    Collection Time: 05/18/23 11:06 AM   Result Value Ref Range    Hold Specimen JIC    Extra Purple Top Tube    Collection Time: 05/18/23 11:06 AM   Result Value Ref Range    Hold Specimen J[facility]    CBC with platelets and differential    Collection Time: 05/18/23 11:06 AM   Result Value Ref Range    WBC Count 13.7 (H) 4.0 - 11.0 10e3/uL    RBC Count 3.86 3.80 - 5.20 10e6/uL    Hemoglobin 11.9 11.7 - 15.7 g/dL    Hematocrit 35.6 35.0 - 47.0 %    MCV 92 78 - 100 fL    MCH 30.8 26.5 - 33.0 pg    MCHC 33.4 31.5 - 36.5 g/dL    RDW 19.3 (H) 10.0 - 15.0 %    Platelet Count 245 150 - 450 10e3/uL    % Neutrophils 70 %    % Lymphocytes 15 %    % Monocytes 10 %    % Eosinophils 3 %    % Basophils 1 %    % Immature Granulocytes 1 %    NRBCs per 100 WBC 0 <1 /100    Absolute Neutrophils 9.8 (H) 1.6 - 8.3 10e3/uL    Absolute Lymphocytes 2.0 0.8 - 5.3 10e3/uL    Absolute Monocytes 1.3 0.0 - 1.3 10e3/uL    Absolute Eosinophils 0.4 0.0 - 0.7 10e3/uL    Absolute Basophils 0.1 0.0 - 0.2 10e3/uL    Absolute Immature Granulocytes 0.1 <=0.4 10e3/uL    Absolute NRBCs 0.0 10e3/uL   Extra Blue Top Tube    Collection Time: 05/18/23 11:06 AM   Result Value Ref Range    Hold Specimen [facility]        Lab Results   Component Value Date    ABORH A POS 06/02/2020           RADIOLOGY:  Reviewed all pertinent imaging. Please see official radiology report.    Cervical spine MRI w/o contrast   Final Result   IMPRESSION:   1.  Sagittal sequences only, see note in  technique above with patient motion artifact.      2.  No definite abnormal intramedullary signal. Severe canal compromise is present at C3-C4 with no additional severe spinal stenosis. Probable moderate-severe foraminal stenosis is also present C3-C4. No additional severe areas of foraminal compromise    are suspected.      3.  Low-grade-intermediate grade degenerative anterior subluxations and multiple levels including C3-C4, C7-T1 and T1-T2.      4.  See above for description/details.               ==============================================  CHART REVIEW:  [facility]  MR CERVICAL SPINE WO CONTRAST  8/1/2016 9:12 AM     INDICATION: Neck pain. Bilateral hand pain, numbness and weakness. Right shoulder pain.  TECHNIQUE: Performed without IV contrast.  SEDATION: None.  COMPARISON: Cervical spine MRI 03/19/2012    IMPRESSION:  CONCLUSION:  1.  Moderate degenerative changes of the cervical spine, mildly progressed since prior cervical spine MRI 03/19/2012.  2.  Mild to moderate spinal canal narrowing at C3-C4, slightly increased.  3.  Moderate right and moderate to severe left neuroforaminal narrowing at C3-C4, not significantly changed compared to cervical spine MRI 03/19/2012. The moderate left and mild to moderate right neuroforaminal narrowing at C4-C5 has slightly   progressed.   The mild to moderate left-sided neuroforaminal narrowing at C2-C3 and mild to moderate right-sided neuroforaminal narrowing at C7-T1 are unchanged.  4.  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 Suggestion of ankylosis of the facets from C4 to C6.  5.  Redemonstration of mild reversal of the normal cervical lordosis centered at C5-C6.  6.  Stable degenerative 3 mm spondylolisthesis of C7 on T1 and 2 mm spondylolisthesis of T1 on T2.  =============================================      EKG:    none    PROCEDURES:  none    Medical Decision Making    History:    Supplemental history from: Documented in chart, if  applicable    External Record(s) reviewed: Documented in chart, if applicable. and Prior Imaging: MRI C-spine 08/01/16    Work Up:    Chart documentation includes differential considered and any EKGs or imaging independently interpreted by provider, where specified.    In additional to work up documented, I considered the following work up: Documented in chart, if applicable.    External consultation:    Discussion of management with another provider: Documented in chart, if applicable    Complicating factors:    Care impacted by chronic illness: Cerebrovascular Disease, Chronic Kidney Disease, Heart Disease, Hyperlipidemia and Hypertension    Care affected by social determinants of health: N/A    Disposition considerations: Admit.        I, Jesus Lara, am serving as a scribe to document services personally performed by Dr. Josey Myers based on my observation and the provider's statements to me. I, Dr. Josey Myers MD attest that Jesus Lara is acting in a scribe capacity, has observed my performance of the services and has documented them in accordance with my direction.        Josey Myers M.D. [facility]  Emergency Medicine and Medical Toxicology  [facility] EMERGENCY DEPARTMENT  [facility] [facility] [address]  [phone]  Dept: [phone]           Josey Myers MD  05/18/23 2689

## 2023-05-18 NOTE — ED NOTES
irretractable pain; pt given a multitude of pain meds and is still crying about her shoulder/neck trap area despite ice packs, lidocaine patch, and adjusted position.

## 2023-05-18 NOTE — ED NOTES
Pt is finally feeling well enough to rest after last administration of dilaudid; oxygen saturtation dropped so we plaed pt on 2 liters N/C and her sats are back up.

## 2023-05-18 NOTE — H&P
[facility]    History and Physical - Hospitalist Service       Date of Admission:  5/18/2023    Assessment & Plan    Patient is an 88-year-old female with history of hypertension, CHF, TAVR and chronic back pain who presents to the emergency department with left neck pain and found to have severe cervical stenosis at C3-4.    #Neck pain, posterior left-sided  #Severe cervical stenosis C3-4  Ordered additional IV Dilaudid, titrate as needed  Consult neurosurgery    #Hypertensive urgency  #Essential hypertension  Likely 2/2 severe pain  SBP up to 204  Blood pressure now down to 169 systolically after IV Dilaudid  Resume home amlodipine  Monitor hemodynamics and add additional agents if needed    #Chronic HFpEF  #History of aortic stenosis status post TAVR  Resume home medications      DVT prophylaxis: PCDs     Diet: Regular Diet Adult    Tyson Catheter: Not present  Lines: None     Cardiac Monitoring: None  Code Status: Full Code      Clinically Significant Risk Factors Present on Admission                # Drug Induced Platelet Defect: home medication list includes an antiplatelet medication   # Hypertension: Noted on problem list               Disposition Plan      Expected Discharge Date: 05/19/2023                  Kb Looney DO  Hospitalist Service  [facility]  Securely message with [employer] (more info)  Text page via [facility] Paging/Directory     ______________________________________________________________________    Chief Complaint   Neck pain    History is obtained from the patient    History of Present Illness   Patient is an 88-year-old female with history of chronic back pain who presents emergency department with neck and upper back pain.  Patient says she developed left-sided neck pain yesterday which has been severe and radiates to the upper back.  Pain is described as spasms and is exacerbated with movement.  She has tingling in both of her hands and  feet which she says is chronic.  No new numbness or tingling and no radiation of pain down the arms.  She has not noticed any weakness.  No fevers, chills, nausea, vomiting, syncope.      Past Medical History    Past Medical History:   Diagnosis Date     Arthritis      Asthma      CAD (coronary artery disease)      Cancer (H)     basal cell     Chronic kidney disease     ckd 3     Chronic pain syndrome      Congestive heart failure (H)      Crohn's disease (H)      GERD (gastroesophageal reflux disease)      Hx of heart artery stent 2016    1 stent R Proximal CA and 1 Stent L Proximal circumflex  2016 Stent proximal LAD     Hyperlipidemia      Hypertension      NSTEMI (non-ST elevated myocardial infarction) (H) 2016     PONV (postoperative nausea and vomiting)     severe povn with last knee surgery     Restless legs syndrome      Stroke (H) 2010    numbness rt jaw       Past Surgical History   Past Surgical History:   Procedure Laterality Date     APPENDECTOMY       CARDIAC CATHETERIZATION  2016    multiple vessel disease.  no intervention     CARDIAC CATHETERIZATION  2016     CARDIAC CATHETERIZATION N/A 2016    Procedure: Coronary Angiogram;  Surgeon: Sunil Moran MD;  Location: [facility] Cath Lab;  Service:      CAROTID ENDARTERECTOMY       CAROTID ENDARTERECTOMY Right 2010     CERVICAL LAMINECTOMY  1994      SECTION       CV CORONARY ANGIOGRAM N/A 2020    Procedure: Coronary Angiogram;  Surgeon: Vinita Ramos MD;  Location: [facility] Cath Lab;  Service: Cardiology     CV LEFT HEART CATHETERIZATION WITHOUT LEFT VENTRICULOGRAM Left 2020    Procedure: Left Heart Catheterization Without Left Ventriculogram;  Surgeon: Jerad Lerner MD;  Location: [facility];  Service: Cardiology     CV TRANSCATHETER AORTIC VALVE REPLACEMENT - OTHER APPROACH N/A 2020    Procedure: CV TRANSCATHETER AORTIC VALVE REPLACEMENT - RIGHT  SUBCLAVIAN APPROACH;  Surgeon: John Caceres MD;  Location: [facility] Cath Lab;  Service: Cardiology     HEART CATH, ANGIOPLASTY       HEMORRHOIDECTOMY EXTERNAL       IR LUMBAR EPIDURAL STEROID INJECTION  12/30/2014     IR LUMBAR NERVE ROOT INJECTION  10/01/2013     JOINT REPLACEMENT       KS ESOPHAGOGASTRODUODENOSCOPY TRANSORAL DIAGNOSTIC N/A 07/10/2019    Procedure: ESOPHAGOGASTRODUODENOSCOPY (EGD) with gastric biopsy;  Surgeon: Sunil Stuart MD;  Location: [facility];  Service: Gastroenterology     KS REPLACE AORTIC VALVE OPEN AXILLRY ARTRY APPROACH N/A 06/02/2020    Procedure: OR TRANSCATHETER AORTIC VALVE REPLACEMENT, SUBCLAVIAN APPROACH;  Surgeon: Bhavin Cuadra MD;  Location: [facility] Cath Lab;  Service: Cardiology     TONSILLECTOMY & ADENOIDECTOMY       TOTAL KNEE ARTHROPLASTY Right      TRANSCATHETER AORTIC-VALVE REPLACEMENT       ZZC TOTAL KNEE ARTHROPLASTY Left 05/11/2021    Procedure: LEFT TOTAL KNEE ARTHROPLASTY;  Surgeon: Doug Rhodes MD;  Location: [facility];  Service: Orthopedics       Prior to Admission Medications   Prior to Admission Medications   Prescriptions Last Dose Informant Patient Reported? Taking?   Multiple Vitamin (THERA-TABS) TABS Unknown  Yes Yes   Sig: Take 1 tablet by mouth   OLANZapine (ZYPREXA) 2.5 MG tablet Unknown  No Yes   Sig: Take 1 tablet (2.5 mg) by mouth 2 times daily as needed (anxiety)   QUEtiapine (SEROQUEL) 25 MG tablet Unknown  Yes Yes   Sig: Take 25 mg by mouth daily   Vitamin D3 (CHOLECALCIFEROL) 125 MCG (5000 UT) tablet Unknown  Yes Yes   Sig: Take 1 tablet by mouth daily   acetaminophen (TYLENOL) 500 MG tablet   Yes Yes   Sig: Take 500-1,000 mg by mouth every 8 hours as needed for mild pain   albuterol (PROVENTIL HFA;VENTOLIN HFA) 90 mcg/actuation inhaler   Yes Yes   Sig: [ALBUTEROL (PROVENTIL HFA;VENTOLIN HFA) 90 MCG/ACTUATION INHALER] Inhale 1-2 puffs every 6 (six) hours as needed for wheezing.   amLODIPine (NORVASC) 5 MG  tablet Unknown  Yes Yes   Sig: Take 5 mg by mouth daily   aspirin (ASA) 81 MG EC tablet Unknown  Yes Yes   Sig: Take 81 mg by mouth daily   diclofenac (VOLTAREN) 1 % topical gel   Yes Yes   Sig: Apply 2-4 g topically 4 times daily as needed APPLY TO LEFT SHOULDER AND LEFT KNEE.   donepezil (ARICEPT) 5 MG tablet Unknown  Yes Yes   Sig: Take 5 mg by mouth daily   ferrous sulfate 325 (65 FE) MG tablet Unknown  No Yes   Sig: [FERROUS SULFATE 325 (65 FE) MG TABLET] Take 1 tablet (325 mg total) by mouth daily with breakfast.   furosemide (LASIX) 20 MG tablet Unknown  Yes Yes   Sig: Take 20 mg by mouth daily as needed   methocarbamol (ROBAXIN) 500 MG tablet Unknown  Yes Yes   Sig: Take 500 mg by mouth 4 times daily as needed for muscle spasms   metoprolol succinate ER (TOPROL XL) 25 MG 24 hr tablet Unknown  No Yes   Sig: TAKE 1 TABLET BY MOUTH AT BEDTIME   nitroglycerin (NITROSTAT) 0.4 MG SL tablet   No Yes   Sig: [NITROGLYCERIN (NITROSTAT) 0.4 MG SL TABLET] Place 1 tablet (0.4 mg total) under the tongue every 5 (five) minutes as needed for chest pain.   omeprazole 20 MG tablet Unknown  Yes Yes   Sig: Take 20 mg by mouth daily as needed   rOPINIRole (REQUIP) 2 MG tablet Unknown  Yes Yes   Sig: Take 2 mg by mouth nightly as needed   sertraline (ZOLOFT) 25 MG tablet Unknown  Yes Yes   Sig: Take 50 mg by mouth daily      Facility-Administered Medications: None        Review of Systems    CONSTITUTIONAL: NEGATIVE for fever, chills, change in weight  ENT/MOUTH: NEGATIVE for ear, mouth and throat problems  RESP: NEGATIVE for significant cough or SOB  CV: NEGATIVE for chest pain, palpitations or peripheral edema     Physical Exam   Vital Signs: Temp: 98.1  F (36.7  C) Temp src: Oral BP: (!) 204/90 Pulse: 77   Resp: 20 SpO2: 90 % O2 Device: None (Room air)    Weight: 0 lbs 0 oz    General Appearance:  No acute distress  Respiratory: Clear to auscultation bilaterally  Cardiovascular: Regular rate and rhythm  Neuro: Alert and  oriented x3, normal speech, motor 5/5 throughout, sensory intact    Medical Decision Making             Data     I have personally reviewed the following data over the past 24 hrs:    13.7 (H)  \   11.9   / 245     141 107 30.1 (H) /  123 (H)   4.1 24 1.35 (H) \       Imaging results reviewed over the past 24 hrs:   Recent Results (from the past 24 hour(s))   Cervical spine MRI w/o contrast    Narrative    EXAM: MR CERVICAL SPINE W/O CONTRAST  LOCATION: [facility]  DATE/TIME: 5/18/2023 2:51 PM CDT    INDICATION: Midline and left sided neck pain.  COMPARISON: None.  TECHNIQUE: MRI Cervical Spine without IV contrast. Sagittal T1-T2 and IR sequences only performed due to patient difficulty/movement disorder to extend length of the scan time.    FINDINGS:   Satisfactory vertebral body height with no compression fractures. Degenerative anterior subluxations multiple levels measures about 4-5 mm at C3-C4, C7-T1 and T1-T2. Appropriate appearance to the airway and cervical prevertebral soft tissues. No   significant marrow or ligamentous edema with mild motion artifact noted. Straightening of the expected cervical lordosis. No acute process in the posterior fossa. Position of the cerebral tonsils is satisfactory. Vertebrobasilar flow voids are patent.   Sella and sphenoid sinus are within normal limits. No definite evidence for adenopathy within the neck soft tissues. Nothing for a marrow infiltrative process. Marked interspace narrowing C5-C7. No definite abnormal intramedullary signal with some cord   flattening and volume loss suggested ventrally at C3-C4 due to severe spinal stenosis. This is described below. Multilevel facet joint arthropathy most marked at mid and lower cervical levels.     Please note assessments for canal compromise/foraminal narrowing are estimated based upon sagittal imaging only. Axial sequences are not able to be obtained due to technical limitation as above in  technique.    Craniovertebral junction and C1-C2: Degenerative changes without canal compromise.    C2-C3: Mild loss of disc height with annular bulge. No disc herniation. Mild spinal stenosis. Mild foraminal narrowing suggested bilaterally.     C3-C4: Anterior subluxation. Mild loss of disc height. Uncovering of disc material superiorly. Broad-based central disc extrusion with severe canal stenosis. Moderate to severe foraminal stenosis bilaterally.     C4-C5: Marked loss of disc height. Degenerative intradiscal high T2 signal without disc herniation or spinal stenosis. No definite foraminal narrowing.     C5-C6: Marked loss of disc height. Annular bulge. There is a small paracentral disc extrusion present which does not contribute to significant canal compromise. No significant foraminal stenosis.     C6-C7: Marked loss of disc height without disc herniation, or spinal stenosis. No foraminal narrowing.     C7-T1: Moderate loss of disc height. Degenerative anterior subluxation with uncovering of disc material superiorly. Mild spinal stenosis and mild to moderate foraminal narrowing are suspected bilaterally. No disc herniation.      Impression    IMPRESSION:  1.  Sagittal sequences only, see note in technique above with patient motion artifact.    2.  No definite abnormal intramedullary signal. Severe canal compromise is present at C3-C4 with no additional severe spinal stenosis. Probable moderate-severe foraminal stenosis is also present C3-C4. No additional severe areas of foraminal compromise   are suspected.    3.  Low-grade-intermediate grade degenerative anterior subluxations and multiple levels including C3-C4, C7-T1 and T1-T2.    4.  See above for description/details.

## 2023-05-18 NOTE — ED NOTES
Bed: [place] [address]  Expected date:   Expected time:   Means of arrival:   Comments:  [facility] ED [facility]

## 2023-05-19 PROBLEM — R35.89 POLYURIA: Status: ACTIVE | Noted: 2023-05-19

## 2023-05-19 PROBLEM — G25.81 RESTLESS LEG SYNDROME: Status: ACTIVE | Noted: 2022-08-03

## 2023-05-19 PROBLEM — G31.84 MILD COGNITIVE IMPAIRMENT: Status: ACTIVE | Noted: 2023-05-19

## 2023-05-19 LAB
ALBUMIN UR-MCNC: 70 MG/DL
AMORPH CRY #/AREA URNS HPF: ABNORMAL /HPF
ANION GAP SERPL CALCULATED.3IONS-SCNC: 9 MMOL/L (ref 7–15)
APPEARANCE UR: ABNORMAL
BACTERIA #/AREA URNS HPF: ABNORMAL /HPF
BILIRUB UR QL STRIP: NEGATIVE
BUN SERPL-MCNC: 24.7 MG/DL (ref 8–23)
CALCIUM SERPL-MCNC: 8.7 MG/DL (ref 8.8–10.2)
CHLORIDE SERPL-SCNC: 108 MMOL/L (ref 98–107)
COLOR UR AUTO: YELLOW
CREAT SERPL-MCNC: 1.15 MG/DL (ref 0.51–0.95)
DEPRECATED HCO3 PLAS-SCNC: 24 MMOL/L (ref 22–29)
GFR SERPL CREATININE-BSD FRML MDRD: 46 ML/MIN/1.73M2
GLUCOSE SERPL-MCNC: 79 MG/DL (ref 70–99)
GLUCOSE UR STRIP-MCNC: NEGATIVE MG/DL
HBA1C MFR BLD: 4.8 %
HGB UR QL STRIP: ABNORMAL
HOLD SPECIMEN: NORMAL
KETONES UR STRIP-MCNC: NEGATIVE MG/DL
LEUKOCYTE ESTERASE UR QL STRIP: ABNORMAL
NITRATE UR QL: NEGATIVE
PH UR STRIP: 6 [PH] (ref 5–7)
POTASSIUM SERPL-SCNC: 4.3 MMOL/L (ref 3.4–5.3)
RBC URINE: 5 /HPF
SODIUM SERPL-SCNC: 141 MMOL/L (ref 136–145)
SP GR UR STRIP: >1.03 (ref 1–1.03)
SQUAMOUS EPITHELIAL: 6 /HPF
UROBILINOGEN UR STRIP-MCNC: <2 MG/DL
WBC URINE: 45 /HPF

## 2023-05-19 PROCEDURE — G0378 HOSPITAL OBSERVATION PER HR: HCPCS

## 2023-05-19 PROCEDURE — 83036 HEMOGLOBIN GLYCOSYLATED A1C: CPT | Performed by: HOSPITALIST

## 2023-05-19 PROCEDURE — 99232 SBSQ HOSP IP/OBS MODERATE 35: CPT | Performed by: HOSPITALIST

## 2023-05-19 PROCEDURE — 250N000013 HC RX MED GY IP 250 OP 250 PS 637: Performed by: HOSPITALIST

## 2023-05-19 PROCEDURE — 80048 BASIC METABOLIC PNL TOTAL CA: CPT | Performed by: HOSPITALIST

## 2023-05-19 PROCEDURE — 81001 URINALYSIS AUTO W/SCOPE: CPT | Performed by: INTERNAL MEDICINE

## 2023-05-19 PROCEDURE — 87086 URINE CULTURE/COLONY COUNT: CPT | Performed by: INTERNAL MEDICINE

## 2023-05-19 PROCEDURE — 36415 COLL VENOUS BLD VENIPUNCTURE: CPT | Performed by: HOSPITALIST

## 2023-05-19 PROCEDURE — 250N000013 HC RX MED GY IP 250 OP 250 PS 637: Performed by: INTERNAL MEDICINE

## 2023-05-19 RX ORDER — ROPINIROLE 1 MG/1
2 TABLET, FILM COATED ORAL
Status: DISCONTINUED | OUTPATIENT
Start: 2023-05-19 | End: 2023-05-22 | Stop reason: HOSPADM

## 2023-05-19 RX ORDER — NALOXONE HYDROCHLORIDE 0.4 MG/ML
0.4 INJECTION, SOLUTION INTRAMUSCULAR; INTRAVENOUS; SUBCUTANEOUS
Status: DISCONTINUED | OUTPATIENT
Start: 2023-05-19 | End: 2023-05-22 | Stop reason: HOSPADM

## 2023-05-19 RX ORDER — FUROSEMIDE 20 MG
20 TABLET ORAL ONCE
Status: COMPLETED | OUTPATIENT
Start: 2023-05-19 | End: 2023-05-19

## 2023-05-19 RX ORDER — QUETIAPINE FUMARATE 25 MG/1
25 TABLET, FILM COATED ORAL AT BEDTIME
Status: DISCONTINUED | OUTPATIENT
Start: 2023-05-19 | End: 2023-05-22 | Stop reason: HOSPADM

## 2023-05-19 RX ORDER — NALOXONE HYDROCHLORIDE 0.4 MG/ML
0.2 INJECTION, SOLUTION INTRAMUSCULAR; INTRAVENOUS; SUBCUTANEOUS
Status: DISCONTINUED | OUTPATIENT
Start: 2023-05-19 | End: 2023-05-22 | Stop reason: HOSPADM

## 2023-05-19 RX ORDER — HYDROCODONE BITARTRATE AND ACETAMINOPHEN 5; 325 MG/1; MG/1
1 TABLET ORAL EVERY 6 HOURS PRN
Status: DISCONTINUED | OUTPATIENT
Start: 2023-05-19 | End: 2023-05-20

## 2023-05-19 RX ADMIN — AMLODIPINE BESYLATE 5 MG: 5 TABLET ORAL at 07:52

## 2023-05-19 RX ADMIN — METOPROLOL SUCCINATE 25 MG: 25 TABLET, EXTENDED RELEASE ORAL at 21:14

## 2023-05-19 RX ADMIN — POLYETHYLENE GLYCOL 3350 17 G: 17 POWDER, FOR SOLUTION ORAL at 07:53

## 2023-05-19 RX ADMIN — DONEPEZIL HYDROCHLORIDE 5 MG: 5 TABLET, FILM COATED ORAL at 07:52

## 2023-05-19 RX ADMIN — Medication 81 MG: at 07:52

## 2023-05-19 RX ADMIN — METHOCARBAMOL 500 MG: 500 TABLET, FILM COATED ORAL at 21:14

## 2023-05-19 RX ADMIN — HYDROCODONE BITARTRATE AND ACETAMINOPHEN 1 TABLET: 5; 325 TABLET ORAL at 17:24

## 2023-05-19 RX ADMIN — METHOCARBAMOL 500 MG: 500 TABLET, FILM COATED ORAL at 07:53

## 2023-05-19 RX ADMIN — ROPINIROLE HYDROCHLORIDE 2 MG: 1 TABLET, FILM COATED ORAL at 21:14

## 2023-05-19 RX ADMIN — FERROUS SULFATE TAB 325 MG (65 MG ELEMENTAL FE) 325 MG: 325 (65 FE) TAB at 07:52

## 2023-05-19 RX ADMIN — FUROSEMIDE 20 MG: 20 TABLET ORAL at 17:24

## 2023-05-19 RX ADMIN — METHOCARBAMOL 500 MG: 500 TABLET, FILM COATED ORAL at 12:43

## 2023-05-19 RX ADMIN — QUETIAPINE FUMARATE 25 MG: 25 TABLET ORAL at 21:14

## 2023-05-19 ASSESSMENT — ACTIVITIES OF DAILY LIVING (ADL)
ADLS_ACUITY_SCORE: 37
ADLS_ACUITY_SCORE: 37
ADLS_ACUITY_SCORE: 41
ADLS_ACUITY_SCORE: 37
ADLS_ACUITY_SCORE: 41
ADLS_ACUITY_SCORE: 37

## 2023-05-19 NOTE — PROGRESS NOTES
"[facility]    Medicine Progress Note - Hospitalist Service    Date of Admission:  5/18/2023    Assessment & Plan                Sherice Alvarez is a 88 year old female with history of hypertension, CHF, TAVR and chronic back pain who presents to the emergency department with left neck pain and found to have severe cervical stenosis at C3-4. Hospital Day: 2          #Neck pain, posterior left-sided  #Severe cervical stenosis C3-4  De-escalate pain meds to just Norco if pain severe  Tylenol, Robaxin as needed  Lidocaine patch  Neurosurgery evaluated, recommended outpatient follow-up for now, patient is not sure she would want to pursue surgery anyway  PT/OT     #Hypertensive urgency  #Essential hypertension  Likely 2/2 severe pain  SBP up to 204 on admit  BP mow 101/56 on home metoprolol and amlodipine  Monitor     #Acute on chronic HFpEF  Supposed to take lasix every other day but only takes PRN  Complains of feeling dehydrated. Complains of polyuria. Feels she needs to drink a lot of water \"for [her] heart\"  Does not appear volume overloaded, but is requiring some O2 here (89% RA)  Give PO lasix x1  Wean O2  If unable to wean may need further workup like CXR, echo    #History of aortic stenosis status post TAVR  Valve looked ok on echo in December  Follows with Dr Feldman    #Polyuria  Sounds like this is because she is pushing PO fluids?  Can check A1c though as don't see one recently  UA with specific grav actually high  Maybe could see Urology about bladder/urodynamics? Check bladder scan here to rule out retention    #MIld cognitive impairment  Home Aricept  Resume home Seroquel which apparently she is not remembering to take regularly.  Give this at bedtime tonight to help with any possible sundowning  Hold home Zoloft which apparently she is not taking regularly    #RLS  Home Requip if needed    #CAD  S/p stents  Home ASA       DVT Prophylaxis: Moderate risk. SCDs  Diet: Regular " Diet Adult    Tyson Catheter: Not present  Lines: None     Cardiac Monitoring: None  Code Status: Full Code      Clinically Significant Risk Factors Present on Admission                # Drug Induced Platelet Defect: home medication list includes an antiplatelet medication   # Hypertension: Noted on problem list               Disposition Plan   Disposition: Home     Expected Discharge Date: 05/20/2023        Discharge Comments: O2 need, symptom control, workup     Medically ready to discharge today: No     The patient's care was discussed with the Patient.    Kaycee Lunsford MD  Hospitalist Service  [facility]  Securely message with [employer] (more info)  Text page via [employer] Paging/Directory   ______________________________________________________________________      Physical Exam   Vital Signs: Temp: 98  F (36.7  C) Temp src: Oral BP: 101/56 Pulse: 79   Resp: 20 SpO2: 99 % O2 Device: Nasal cannula Oxygen Delivery: 2 LPM  Weight: 0 lbs 0 oz  General: in no apparent distress, non-toxic and alert female lying in hospital bed oriented x3  HEENT: Head normocephalic atraumatic, oral mucosa moist. Sclerae anicteric  CV: Regular rhythm, normal rate, soft holosystolic murmur heard best LUSB  Resp: No wheezes, no rales or rhonchi, no focal consolidations  GI: Belly soft, nondistended, nontender, bowel sounds present  Skin: No rashes or lesions  Extremities: Trace ankle edema bilaterally  Psych: Normal affect, anxious mood. A bit tearful. Repeats herself  Neuro: CNII-XII grossly intact, moving all 4 extremities        Medical Decision Making               Data   Recent Results (from the past 12 hour(s))   UA Macroscopic with reflex to Microscopic and Culture    Collection Time: 05/19/23  6:07 AM    Specimen: Urine, Clean Catch   Result Value Ref Range    Color Urine Yellow Colorless, Straw, Light Yellow, Yellow    Appearance Urine Cloudy (A) Clear    Glucose Urine Negative Negative mg/dL    Bilirubin  Urine Negative Negative    Ketones Urine Negative Negative mg/dL    Specific Gravity Urine >1.030 1.003 - 1.035    Blood Urine 1.0 mg/dL (A) Negative    pH Urine 6.0 5.0 - 7.0    Protein Albumin Urine 70 (A) Negative mg/dL    Urobilinogen Urine <2.0 <2.0 mg/dL    Nitrite Urine Negative Negative    Leukocyte Esterase Urine 500 [place]/uL (A) Negative    Bacteria Urine Many (A) None Seen /HPF    Amorphous Crystals Urine Few (A) None Seen /HPF    RBC Urine 5 (H) <=2 /HPF    WBC Urine 45 (H) <=5 /HPF    Squamous Epithelials Urine 6 (H) <=1 /HPF   Extra Purple Top Tube    Collection Time: 05/19/23  8:01 AM   Result Value Ref Range    Hold Specimen [facility]    Basic metabolic panel    Collection Time: 05/19/23  8:02 AM   Result Value Ref Range    Sodium 141 136 - 145 mmol/L    Potassium 4.3 3.4 - 5.3 mmol/L    Chloride 108 (H) 98 - 107 mmol/L    Carbon Dioxide (CO2) 24 22 - 29 mmol/L    Anion Gap 9 7 - 15 mmol/L    Urea Nitrogen 24.7 (H) 8.0 - 23.0 mg/dL    Creatinine 1.15 (H) 0.51 - 0.95 mg/dL    Calcium 8.7 (L) 8.8 - 10.2 mg/dL    Glucose 79 70 - 99 mg/dL    GFR Estimate 46 (L) >60 mL/min/1.73m2     Interval History     Patient states she is not feeling any better than when she came in.  Complains of a pain in her left neck that is new.  Has chronic issues with burning/tingling in her hands as well as jerking of her legs.  Complains of body aches.  Complains of excessive urination.  Also drinks lots of fluids as has been told she needs to do this for her heart.  Urinary incontinence.  Currently on 2 L nasal cannula, does not utilize oxygen at home.  She wants to be sure we do not simply blame her symptoms on grief, her son is currently on hospice.  Reassured her we will give her a full work-up.  Not ready for discharge.  Awaiting neurosurgery input regarding her cervical spinal stenosis.

## 2023-05-19 NOTE — CONSULTS
"[facility]    Neurosurgery Consultation     Date of Admission:  5/18/2023  Date of Consult (When I saw the patient): 05/19/23    Assessment & Plan   Sherice Alvarez is a 88 year old female who was admitted on 5/18/2023.Sherice Alvarez is a 88 year old female who presents with chronic back pain, lumbar radiculopathy from herniated discs managed at [facility], prior cervical surgery (unknown where, when per patient) presented with acute onset left sided paraspinous neck pain with imaging findings as demonstrated below. Patient notes left sided neck pain started a few days ago. Denies recent falls or trauma. Pain aggravated with movement and alleviated with rest. She denies radiation of pain into her upper extremities, paresthesias, weakness. She wears briefs at baseline. She has baseline left lower extremity weakness due to her herniated lumbar discs per patient. She notes balance issues over the last 1 year, no new falls. She will have intermittent \"tingles\" in her hands and her feet over the last 1 year as well.     EXAM: MR CERVICAL SPINE W/O CONTRAST  LOCATION: [facility]  DATE/TIME: 5/18/2023 2:51 PM CDT     INDICATION: Midline and left sided neck pain.  COMPARISON: None.  TECHNIQUE: MRI Cervical Spine without IV contrast. Sagittal T1-T2 and IR sequences only performed due to patient difficulty/movement disorder to extend length of the scan time.     FINDINGS:   Satisfactory vertebral body height with no compression fractures. Degenerative anterior subluxations multiple levels measures about 4-5 mm at C3-C4, C7-T1 and T1-T2. Appropriate appearance to the airway and cervical prevertebral soft tissues. No   significant marrow or ligamentous edema with mild motion artifact noted. Straightening of the expected cervical lordosis. No acute process in the posterior fossa. Position of the cerebral tonsils is satisfactory. Vertebrobasilar flow voids are patent. "   Sella and sphenoid sinus are within normal limits. No definite evidence for adenopathy within the neck soft tissues. Nothing for a marrow infiltrative process. Marked interspace narrowing C5-C7. No definite abnormal intramedullary signal with some cord   flattening and volume loss suggested ventrally at C3-C4 due to severe spinal stenosis. This is described below. Multilevel facet joint arthropathy most marked at mid and lower cervical levels.      Please note assessments for canal compromise/foraminal narrowing are estimated based upon sagittal imaging only. Axial sequences are not able to be obtained due to technical limitation as above in technique.     Craniovertebral junction and C1-C2: Degenerative changes without canal compromise.     C2-C3: Mild loss of disc height with annular bulge. No disc herniation. Mild spinal stenosis. Mild foraminal narrowing suggested bilaterally.      C3-C4: Anterior subluxation. Mild loss of disc height. Uncovering of disc material superiorly. Broad-based central disc extrusion with severe canal stenosis. Moderate to severe foraminal stenosis bilaterally.      C4-C5: Marked loss of disc height. Degenerative intradiscal high T2 signal without disc herniation or spinal stenosis. No definite foraminal narrowing.      C5-C6: Marked loss of disc height. Annular bulge. There is a small paracentral disc extrusion present which does not contribute to significant canal compromise. No significant foraminal stenosis.      C6-C7: Marked loss of disc height without disc herniation, or spinal stenosis. No foraminal narrowing.      C7-T1: Moderate loss of disc height. Degenerative anterior subluxation with uncovering of disc material superiorly. Mild spinal stenosis and mild to moderate foraminal narrowing are suspected bilaterally. No disc herniation.                                                                      IMPRESSION:  1.  Sagittal sequences only, see note in technique above with  patient motion artifact.     2.  No definite abnormal intramedullary signal. Severe canal compromise is present at C3-C4 with no additional severe spinal stenosis. Probable moderate-severe foraminal stenosis is also present C3-C4. No additional severe areas of foraminal compromise   are suspected.     3.  Low-grade-intermediate grade degenerative anterior subluxations and multiple levels including C3-C4, C7-T1 and T1-T2.     4.  See above for description/details.   Clinical history, imaging and plans reviewed myself as well as with Dr. Handy. No immediate surgical intervention. Recommend admission, pain management consult, follow up outpatient in our clinic should symptoms persist or worsen. Patient sees [facility] for lumbar spine and injections and would be reasonable to continue seeing [facility] for cervical issues as well- up to patient. Our office will call to coordinate a follow up appt with patient. Patient notes she would be reluctant to consider surgical intervention at her age. Reviewed we will still keep follow up to check in how she is doing.     I have discussed the following assessment and plan with Dr. Handy who is in agreement with the initial plan and will follow up with further consultation recommendations.    Delilah Minor PA-C  [facility] Neurosurgery  [place] [address]    Tel [phone]  Pager [phone]        Code Status    Full Code    Reason for Consult   Reason for consult: I was asked by Aayush to evaluate this patient for cervical severe stenosis C3-4.    Primary Care Physician   Anum Rosenberg    Chief Complaint   Neck pain     History is obtained from the patient, electronic health record and emergency department physician    History of Present Illness   Sherice Alvarez is a 88 year old female who presents with chronic back pain, lumbar radiculopathy from herniated discs managed at [facility], prior cervical  "surgery (unknown where, when per patient) presented with acute onset left sided paraspinous neck pain with imaging findings as demonstrated below. Patient notes left sided neck pain started a few days ago. Denies recent falls or trauma. Pain aggravated with movement and alleviated with rest. She denies radiation of pain into her upper extremities, paresthesias, weakness. She wears briefs at baseline. She has baseline left lower extremity weakness due to her herniated lumbar discs per patient. She notes balance issues over the last 1 year, no new falls. She will have intermittent \"tingles\" in her hands and her feet over the last 1 year as well.     EXAM: MR CERVICAL SPINE W/O CONTRAST  LOCATION: [facility]  DATE/TIME: 5/18/2023 2:51 PM CDT     INDICATION: Midline and left sided neck pain.  COMPARISON: None.  TECHNIQUE: MRI Cervical Spine without IV contrast. Sagittal T1-T2 and IR sequences only performed due to patient difficulty/movement disorder to extend length of the scan time.     FINDINGS:   Satisfactory vertebral body height with no compression fractures. Degenerative anterior subluxations multiple levels measures about 4-5 mm at C3-C4, C7-T1 and T1-T2. Appropriate appearance to the airway and cervical prevertebral soft tissues. No   significant marrow or ligamentous edema with mild motion artifact noted. Straightening of the expected cervical lordosis. No acute process in the posterior fossa. Position of the cerebral tonsils is satisfactory. Vertebrobasilar flow voids are patent.   Sella and sphenoid sinus are within normal limits. No definite evidence for adenopathy within the neck soft tissues. Nothing for a marrow infiltrative process. Marked interspace narrowing C5-C7. No definite abnormal intramedullary signal with some cord   flattening and volume loss suggested ventrally at C3-C4 due to severe spinal stenosis. This is described below. Multilevel facet joint arthropathy most " marked at mid and lower cervical levels.      Please note assessments for canal compromise/foraminal narrowing are estimated based upon sagittal imaging only. Axial sequences are not able to be obtained due to technical limitation as above in technique.     Craniovertebral junction and C1-C2: Degenerative changes without canal compromise.     C2-C3: Mild loss of disc height with annular bulge. No disc herniation. Mild spinal stenosis. Mild foraminal narrowing suggested bilaterally.      C3-C4: Anterior subluxation. Mild loss of disc height. Uncovering of disc material superiorly. Broad-based central disc extrusion with severe canal stenosis. Moderate to severe foraminal stenosis bilaterally.      C4-C5: Marked loss of disc height. Degenerative intradiscal high T2 signal without disc herniation or spinal stenosis. No definite foraminal narrowing.      C5-C6: Marked loss of disc height. Annular bulge. There is a small paracentral disc extrusion present which does not contribute to significant canal compromise. No significant foraminal stenosis.      C6-C7: Marked loss of disc height without disc herniation, or spinal stenosis. No foraminal narrowing.      C7-T1: Moderate loss of disc height. Degenerative anterior subluxation with uncovering of disc material superiorly. Mild spinal stenosis and mild to moderate foraminal narrowing are suspected bilaterally. No disc herniation.                                                                      IMPRESSION:  1.  Sagittal sequences only, see note in technique above with patient motion artifact.     2.  No definite abnormal intramedullary signal. Severe canal compromise is present at C3-C4 with no additional severe spinal stenosis. Probable moderate-severe foraminal stenosis is also present C3-C4. No additional severe areas of foraminal compromise   are suspected.     3.  Low-grade-intermediate grade degenerative anterior subluxations and multiple levels including C3-C4,  C7-T1 and T1-T2.     4.  See above for description/details.       Past Medical History   I have reviewed this patient's medical history and updated it with pertinent information if needed.   Past Medical History:   Diagnosis Date     Arthritis      Asthma      CAD (coronary artery disease)      Cancer (H)     basal cell     Chronic kidney disease     ckd 3     Chronic pain syndrome      Congestive heart failure (H)      Crohn's disease (H)      GERD (gastroesophageal reflux disease)      Hx of heart artery stent 2016    1 stent R Proximal CA and 1 Stent L Proximal circumflex  2016 Stent proximal LAD     Hyperlipidemia      Hypertension      NSTEMI (non-ST elevated myocardial infarction) (H) 2016     PONV (postoperative nausea and vomiting)     severe povn with last knee surgery     Restless legs syndrome      Stroke (H) 2010    numbness rt jaw       Past Surgical History   I have reviewed this patient's surgical history and updated it with pertinent information if needed.  Past Surgical History:   Procedure Laterality Date     APPENDECTOMY       CARDIAC CATHETERIZATION  2016    multiple vessel disease.  no intervention     CARDIAC CATHETERIZATION  2016     CARDIAC CATHETERIZATION N/A 2016    Procedure: Coronary Angiogram;  Surgeon: Sunil Moran MD;  Location: [facility] Cath Lab;  Service:      CAROTID ENDARTERECTOMY       CAROTID ENDARTERECTOMY Right 2010     CERVICAL LAMINECTOMY  1994      SECTION       CV CORONARY ANGIOGRAM N/A 2020    Procedure: Coronary Angiogram;  Surgeon: Vinita Ramos MD;  Location: [facility];  Service: Cardiology     CV LEFT HEART CATHETERIZATION WITHOUT LEFT VENTRICULOGRAM Left 2020    Procedure: Left Heart Catheterization Without Left Ventriculogram;  Surgeon: Jerad Lerner MD;  Location: [facility];  Service: Cardiology     CV TRANSCATHETER AORTIC VALVE REPLACEMENT - OTHER APPROACH  N/A 06/02/2020    Procedure: CV TRANSCATHETER AORTIC VALVE REPLACEMENT - RIGHT SUBCLAVIAN APPROACH;  Surgeon: John Caceres MD;  Location: [facility] Lab;  Service: Cardiology     HEART CATH, ANGIOPLASTY       HEMORRHOIDECTOMY EXTERNAL       IR LUMBAR EPIDURAL STEROID INJECTION  12/30/2014     IR LUMBAR NERVE ROOT INJECTION  10/01/2013     JOINT REPLACEMENT       CT ESOPHAGOGASTRODUODENOSCOPY TRANSORAL DIAGNOSTIC N/A 07/10/2019    Procedure: ESOPHAGOGASTRODUODENOSCOPY (EGD) with gastric biopsy;  Surgeon: Sunil Stuart MD;  Location: [facility] GI;  Service: Gastroenterology     CT REPLACE AORTIC VALVE OPEN AXILLRY ARTRY APPROACH N/A 06/02/2020    Procedure: OR TRANSCATHETER AORTIC VALVE REPLACEMENT, SUBCLAVIAN APPROACH;  Surgeon: Bhavin Cuadra MD;  Location: [facility];  Service: Cardiology     TONSILLECTOMY & ADENOIDECTOMY       TOTAL KNEE ARTHROPLASTY Right      TRANSCATHETER AORTIC-VALVE REPLACEMENT       ZZC TOTAL KNEE ARTHROPLASTY Left 05/11/2021    Procedure: LEFT TOTAL KNEE ARTHROPLASTY;  Surgeon: Doug Rhodes MD;  Location: [facility];  Service: Orthopedics       Prior to Admission Medications   Prior to Admission Medications   Prescriptions Last Dose Informant Patient Reported? Taking?   Multiple Vitamin (THERA-TABS) TABS Unknown  Yes Yes   Sig: Take 1 tablet by mouth   OLANZapine (ZYPREXA) 2.5 MG tablet Unknown  No Yes   Sig: Take 1 tablet (2.5 mg) by mouth 2 times daily as needed (anxiety)   QUEtiapine (SEROQUEL) 25 MG tablet Unknown  Yes Yes   Sig: Take 25 mg by mouth daily   Vitamin D3 (CHOLECALCIFEROL) 125 MCG (5000 UT) tablet Unknown  Yes Yes   Sig: Take 1 tablet by mouth daily   acetaminophen (TYLENOL) 500 MG tablet   Yes Yes   Sig: Take 500-1,000 mg by mouth every 8 hours as needed for mild pain   albuterol (PROVENTIL HFA;VENTOLIN HFA) 90 mcg/actuation inhaler   Yes Yes   Sig: [ALBUTEROL (PROVENTIL HFA;VENTOLIN HFA) 90 MCG/ACTUATION INHALER] Inhale 1-2  puffs every 6 (six) hours as needed for wheezing.   amLODIPine (NORVASC) 5 MG tablet Unknown  Yes Yes   Sig: Take 5 mg by mouth daily   aspirin (ASA) 81 MG EC tablet Unknown  Yes Yes   Sig: Take 81 mg by mouth daily   diclofenac (VOLTAREN) 1 % topical gel   Yes Yes   Sig: Apply 2-4 g topically 4 times daily as needed APPLY TO LEFT SHOULDER AND LEFT KNEE.   donepezil (ARICEPT) 5 MG tablet Unknown  Yes Yes   Sig: Take 5 mg by mouth daily   ferrous sulfate 325 (65 FE) MG tablet Unknown  No Yes   Sig: [FERROUS SULFATE 325 (65 FE) MG TABLET] Take 1 tablet (325 mg total) by mouth daily with breakfast.   furosemide (LASIX) 20 MG tablet Unknown  Yes Yes   Sig: Take 20 mg by mouth daily as needed   methocarbamol (ROBAXIN) 500 MG tablet Unknown  Yes Yes   Sig: Take 500 mg by mouth 4 times daily as needed for muscle spasms   metoprolol succinate ER (TOPROL XL) 25 MG 24 hr tablet Unknown  No Yes   Sig: TAKE 1 TABLET BY MOUTH AT BEDTIME   nitroglycerin (NITROSTAT) 0.4 MG SL tablet   No Yes   Sig: [NITROGLYCERIN (NITROSTAT) 0.4 MG SL TABLET] Place 1 tablet (0.4 mg total) under the tongue every 5 (five) minutes as needed for chest pain.   omeprazole 20 MG tablet Unknown  Yes Yes   Sig: Take 20 mg by mouth daily as needed   rOPINIRole (REQUIP) 2 MG tablet Unknown  Yes Yes   Sig: Take 2 mg by mouth nightly as needed   sertraline (ZOLOFT) 25 MG tablet Unknown  Yes Yes   Sig: Take 50 mg by mouth daily      Facility-Administered Medications: None     Allergies   Allergies   Allergen Reactions     Amitriptyline Hcl [Amitriptyline] Other (See Comments)     Erythromycin Diarrhea and Other (See Comments)     Childhood reaction     Gabapentin Other (See Comments)     Jerking movements, swelling     Naproxen Unknown and Other (See Comments)     Other Environmental Allergy Unknown     Molds, dander     Pregabalin Other (See Comments)       Social History   I have reviewed this patient's social history and updated it with pertinent information  if needed. Sherice Alvarez  reports that she quit smoking about 43 years ago. Her smoking use included cigarettes. She has never used smokeless tobacco. She reports that she does not drink alcohol and does not use drugs.    Family History   I have reviewed this patient's family history and updated it with pertinent information if needed.   Family History   Problem Relation Age of Onset     Atrial fibrillation Mother      Parkinsonism Father        Review of Systems    ROS: 10 point ROS neg other than the symptoms noted above in the HPI.      Physical Exam   Temp: 98  F (36.7  C) Temp src: Oral BP: 101/56 Pulse: 79   Resp: 20 SpO2: 99 % O2 Device: Nasal cannula Oxygen Delivery: 2 LPM  Vital Signs with Ranges  Temp:  [97.7  F (36.5  C)-98  F (36.7  C)] 98  F (36.7  C)  Pulse:  [57-79] 79  Resp:  [20-22] 20  BP: (101-204)/(54-90) 101/56  SpO2:  [89 %-99 %] 99 %  0 lbs 0 oz     , Blood pressure 101/56, pulse 79, temperature 98  F (36.7  C), temperature source Oral, resp. rate 20, SpO2 99 %.  0 lbs 0 oz    NEUROLOGICAL EXAMINATION:     Mental status:  Awake, alert, follows commands   Cranial nerves:  II-XII intact.   Motor:    Shoulder Abduction:  Right:  5/5   Left:  5/5  Biceps:                      Right:  5/5   Left:  5/5  Triceps:                     Right:  5/5   Left:  5/5  Wrist Extensors:       Right:  5/5   Left:  5/5  Wrist Flexors:           Right:  5/5   Left:  5/5  interosseus :            Right:  5/5   Left:  5/5   Hip Flexor:                Right: 5/5  Left:  5/5  Hip Adductor:             Right:  5/5  Left:  5/5  Hip Abductor:             Right:  5/5  Left:  5/5  Gastroc Soleus:        Right:  5/5  Left:  3/5  Tib/Ant:                      Right:  5/5  Left:  3/5  EHL:                     Right:  5/5  Left:  3/5    Patient notes baseline weakness LLE with her lumber issues managed at [place]     Sensation:  Intact BUE  Reflexes:   Negative Hoffmanns. Negative Clonus.      Cervical examination reveals  limited range of motion in all directions 2/2 pain. +tight left trapezius muscle and tenderness upon palpation. No midline TTP      Data   All new lab and imaging data was personally reviewed by me.  EXAM: MR CERVICAL SPINE W/O CONTRAST  LOCATION: [facility]  DATE/TIME: 5/18/2023 2:51 PM CDT     INDICATION: Midline and left sided neck pain.  COMPARISON: None.  TECHNIQUE: MRI Cervical Spine without IV contrast. Sagittal T1-T2 and IR sequences only performed due to patient difficulty/movement disorder to extend length of the scan time.     FINDINGS:   Satisfactory vertebral body height with no compression fractures. Degenerative anterior subluxations multiple levels measures about 4-5 mm at C3-C4, C7-T1 and T1-T2. Appropriate appearance to the airway and cervical prevertebral soft tissues. No   significant marrow or ligamentous edema with mild motion artifact noted. Straightening of the expected cervical lordosis. No acute process in the posterior fossa. Position of the cerebral tonsils is satisfactory. Vertebrobasilar flow voids are patent.   Sella and sphenoid sinus are within normal limits. No definite evidence for adenopathy within the neck soft tissues. Nothing for a marrow infiltrative process. Marked interspace narrowing C5-C7. No definite abnormal intramedullary signal with some cord   flattening and volume loss suggested ventrally at C3-C4 due to severe spinal stenosis. This is described below. Multilevel facet joint arthropathy most marked at mid and lower cervical levels.      Please note assessments for canal compromise/foraminal narrowing are estimated based upon sagittal imaging only. Axial sequences are not able to be obtained due to technical limitation as above in technique.     Craniovertebral junction and C1-C2: Degenerative changes without canal compromise.     C2-C3: Mild loss of disc height with annular bulge. No disc herniation. Mild spinal stenosis. Mild foraminal  narrowing suggested bilaterally.      C3-C4: Anterior subluxation. Mild loss of disc height. Uncovering of disc material superiorly. Broad-based central disc extrusion with severe canal stenosis. Moderate to severe foraminal stenosis bilaterally.      C4-C5: Marked loss of disc height. Degenerative intradiscal high T2 signal without disc herniation or spinal stenosis. No definite foraminal narrowing.      C5-C6: Marked loss of disc height. Annular bulge. There is a small paracentral disc extrusion present which does not contribute to significant canal compromise. No significant foraminal stenosis.      C6-C7: Marked loss of disc height without disc herniation, or spinal stenosis. No foraminal narrowing.      C7-T1: Moderate loss of disc height. Degenerative anterior subluxation with uncovering of disc material superiorly. Mild spinal stenosis and mild to moderate foraminal narrowing are suspected bilaterally. No disc herniation.                                                                      IMPRESSION:  1.  Sagittal sequences only, see note in technique above with patient motion artifact.     2.  No definite abnormal intramedullary signal. Severe canal compromise is present at C3-C4 with no additional severe spinal stenosis. Probable moderate-severe foraminal stenosis is also present C3-C4. No additional severe areas of foraminal compromise   are suspected.     3.  Low-grade-intermediate grade degenerative anterior subluxations and multiple levels including C3-C4, C7-T1 and T1-T2.     4.  See above for description/details.     CBC RESULTS:   Recent Labs   Lab Test 05/18/23  1106   WBC 13.7*   RBC 3.86   HGB 11.9   HCT 35.6   MCV 92   MCH 30.8   MCHC 33.4   RDW 19.3*        Basic Metabolic Panel:  Lab Results   Component Value Date     05/19/2023      Lab Results   Component Value Date    POTASSIUM 4.3 05/19/2023    POTASSIUM 3.6 08/03/2022     Lab Results   Component Value Date    CHLORIDE 108  05/19/2023    CHLORIDE 110 08/03/2022     Lab Results   Component Value Date    BESS 8.7 05/19/2023     Lab Results   Component Value Date    CO2 24 05/19/2023    CO2 26 08/03/2022     Lab Results   Component Value Date    BUN 24.7 05/19/2023    BUN 27 08/03/2022     Lab Results   Component Value Date    CR 1.15 05/19/2023     Lab Results   Component Value Date    GLC 79 05/19/2023    GLC 80 08/03/2022     INR:  Lab Results   Component Value Date    INR 1.13 11/09/2022    INR 1.13 01/15/2022    INR 1.15 08/19/2021    INR 1.19 05/11/2021    INR 1.34 06/02/2020    INR 1.22 06/02/2020    INR 1.11 05/06/2020    INR 1.08 04/23/2020    INR 1.22 05/29/2019

## 2023-05-19 NOTE — PLAN OF CARE
PRIMARY DIAGNOSIS: ACUTE PAIN  OUTPATIENT/OBSERVATION GOALS TO BE MET BEFORE DISCHARGE:  1. Pain Status: No PRN medications given since 2230.     2. Return to near baseline physical activity: Yes    3. Cleared for discharge by consultants (if involved): No    Discharge Planner Nurse   Safe discharge environment identified: No  Barriers to discharge: Yes       Entered by: Wendy Garcia RN 05/19/2023 6:45 AM   Pt wears brief for urge incontinence. However, pt stated that she has noticed increased frequency and slight dysuria. Notified provider, UA ordered. Sample sent. UC pending.   Awaiting neurosurgery consult. PT/OT to see.   Please review provider order for any additional goals.   Nurse to notify provider when observation goals have been met and patient is ready for discharge.Goal Outcome Evaluation:

## 2023-05-19 NOTE — PLAN OF CARE
PRIMARY DIAGNOSIS: ACUTE PAIN  OUTPATIENT/OBSERVATION GOALS TO BE MET BEFORE DISCHARGE:  1. Pain Status: Improved but still requiring IV narcotics.    2. Return to near baseline physical activity: Yes    3. Cleared for discharge by consultants (if involved): No    Discharge Planner Nurse   Safe discharge environment identified: No  Barriers to discharge: Yes       Entered by: Wendy Garcia RN 05/19/2023 4:52 AM   Awaiting neurosurgery consult. Denied pain overnight. Pt had received PRN IV dilaudid prior to RN change of shift; medication was effective. Resting comfortably.   Please review provider order for any additional goals.   Nurse to notify provider when observation goals have been met and patient is ready for discharge.Goal Outcome Evaluation:

## 2023-05-19 NOTE — CONSULTS
Care Management Initial Consult    General Information  Assessment completed with: Patient, pt  Type of CM/SW Visit: Initial Assessment    Primary Care Provider verified and updated as needed: Yes   Readmission within the last 30 days: no previous admission in last 30 days      Reason for Consult: discharge planning  Advance Care Planning: Advance Care Planning Reviewed: verified with patient, no concerns identified          Communication Assessment  Patient's communication style: spoken language (English or Bilingual)             Cognitive  Cognitive/Neuro/Behavioral: WDL                      Living Environment:   People in home: alone     Current living Arrangements: apartment, independent living facility ([facility])      Able to return to prior arrangements: yes       Family/Social Support:  Care provided by: self  Provides care for: no one  Marital Status: Single  Children          Description of Support System: Supportive, Involved    Support Assessment: Adequate family and caregiver support, Adequate social supports    Current Resources:   Patient receiving home care services: No     Community Resources: Housekeeping/Chore Agency  Equipment currently used at home:    Supplies currently used at home: None    Employment/Financial:  Employment Status:          Financial Concerns: No concerns identified   Referral to Financial Worker: No       Does the patient's insurance plan have a 3 day qualifying hospital stay waiver?  No    Lifestyle & Psychosocial Needs:  Social Determinants of Health     Tobacco Use: Medium Risk (5/18/2023)    Patient History      Smoking Tobacco Use: Former      Smokeless Tobacco Use: Never      Passive Exposure: Not on file   Alcohol Use: Not on file   Financial Resource Strain: Not on file   Food Insecurity: Not on file   Transportation Needs: Not on file   Physical Activity: Not on file   Stress: Not on file   Social Connections: Not on file   Intimate Partner  Violence: Not on file   Depression: Not on file   Housing Stability: Not on file       Functional Status:  Prior to admission patient needed assistance:   Dependent ADLs:: Independent  Dependent IADLs:: Independent  Assesssment of Functional Status: Not at  functional baseline    Mental Health Status:  Mental Health Status: No Current Concerns       Chemical Dependency Status:                Values/Beliefs:  Spiritual, Cultural Beliefs, [employer] Practices, Values that affect care:                 Additional Information:  Assessed, lives alone at [facility] and drove herself, no svcs and independent at baseline, discussed OKEEFE. No CM needs.      Hardeep Cortés RN

## 2023-05-19 NOTE — PROGRESS NOTES
Pt is alert and oriented, VSS. BP improved after giving amlodipine. Pt reported pain was better and slept after dinner. PRN tylenol given. O2 sats >95% with 2L of O2 NC. Pt has been sleeping but arounsable with light touch.

## 2023-05-20 ENCOUNTER — APPOINTMENT (OUTPATIENT)
Dept: OCCUPATIONAL THERAPY | Facility: HOSPITAL | Age: 88
End: 2023-05-20
Attending: HOSPITALIST
Payer: COMMERCIAL

## 2023-05-20 LAB
ANION GAP SERPL CALCULATED.3IONS-SCNC: 11 MMOL/L (ref 7–15)
BACTERIA UR CULT: NORMAL
BUN SERPL-MCNC: 25.5 MG/DL (ref 8–23)
CALCIUM SERPL-MCNC: 8.6 MG/DL (ref 8.8–10.2)
CHLORIDE SERPL-SCNC: 105 MMOL/L (ref 98–107)
CREAT SERPL-MCNC: 1.15 MG/DL (ref 0.51–0.95)
DEPRECATED HCO3 PLAS-SCNC: 19 MMOL/L (ref 22–29)
ERYTHROCYTE [DISTWIDTH] IN BLOOD BY AUTOMATED COUNT: 19.9 % (ref 10–15)
GFR SERPL CREATININE-BSD FRML MDRD: 46 ML/MIN/1.73M2
GLUCOSE SERPL-MCNC: 86 MG/DL (ref 70–99)
HCT VFR BLD AUTO: 33.1 % (ref 35–47)
HGB BLD-MCNC: 10.2 G/DL (ref 11.7–15.7)
MCH RBC QN AUTO: 30.1 PG (ref 26.5–33)
MCHC RBC AUTO-ENTMCNC: 30.8 G/DL (ref 31.5–36.5)
MCV RBC AUTO: 98 FL (ref 78–100)
PLATELET # BLD AUTO: 185 10E3/UL (ref 150–450)
POTASSIUM SERPL-SCNC: 4.3 MMOL/L (ref 3.4–5.3)
RBC # BLD AUTO: 3.39 10E6/UL (ref 3.8–5.2)
SODIUM SERPL-SCNC: 135 MMOL/L (ref 136–145)
WBC # BLD AUTO: 8.2 10E3/UL (ref 4–11)

## 2023-05-20 PROCEDURE — G0378 HOSPITAL OBSERVATION PER HR: HCPCS

## 2023-05-20 PROCEDURE — 250N000013 HC RX MED GY IP 250 OP 250 PS 637: Performed by: PAIN MEDICINE

## 2023-05-20 PROCEDURE — 85027 COMPLETE CBC AUTOMATED: CPT | Performed by: HOSPITALIST

## 2023-05-20 PROCEDURE — 99417 PROLNG OP E/M EACH 15 MIN: CPT | Performed by: PAIN MEDICINE

## 2023-05-20 PROCEDURE — 250N000013 HC RX MED GY IP 250 OP 250 PS 637: Performed by: HOSPITALIST

## 2023-05-20 PROCEDURE — 97535 SELF CARE MNGMENT TRAINING: CPT | Mod: GO

## 2023-05-20 PROCEDURE — 250N000013 HC RX MED GY IP 250 OP 250 PS 637: Performed by: INTERNAL MEDICINE

## 2023-05-20 PROCEDURE — 97165 OT EVAL LOW COMPLEX 30 MIN: CPT | Mod: GO

## 2023-05-20 PROCEDURE — 99215 OFFICE O/P EST HI 40 MIN: CPT | Performed by: PAIN MEDICINE

## 2023-05-20 PROCEDURE — 82310 ASSAY OF CALCIUM: CPT | Performed by: HOSPITALIST

## 2023-05-20 PROCEDURE — 250N000009 HC RX 250: Performed by: PAIN MEDICINE

## 2023-05-20 PROCEDURE — 99232 SBSQ HOSP IP/OBS MODERATE 35: CPT | Performed by: STUDENT IN AN ORGANIZED HEALTH CARE EDUCATION/TRAINING PROGRAM

## 2023-05-20 PROCEDURE — 36415 COLL VENOUS BLD VENIPUNCTURE: CPT | Performed by: HOSPITALIST

## 2023-05-20 RX ORDER — ACETAMINOPHEN 500 MG
1000 TABLET ORAL 3 TIMES DAILY
Status: DISCONTINUED | OUTPATIENT
Start: 2023-05-20 | End: 2023-05-22 | Stop reason: HOSPADM

## 2023-05-20 RX ORDER — HYDROCODONE BITARTRATE AND ACETAMINOPHEN 5; 325 MG/1; MG/1
1 TABLET ORAL EVERY 8 HOURS PRN
Status: DISCONTINUED | OUTPATIENT
Start: 2023-05-20 | End: 2023-05-22 | Stop reason: HOSPADM

## 2023-05-20 RX ORDER — LIDOCAINE 50 MG/G
OINTMENT TOPICAL 3 TIMES DAILY
Status: DISCONTINUED | OUTPATIENT
Start: 2023-05-20 | End: 2023-05-22 | Stop reason: HOSPADM

## 2023-05-20 RX ORDER — HYDROCODONE BITARTRATE AND ACETAMINOPHEN 5; 325 MG/1; MG/1
1 TABLET ORAL EVERY 12 HOURS PRN
Status: DISCONTINUED | OUTPATIENT
Start: 2023-05-20 | End: 2023-05-20

## 2023-05-20 RX ADMIN — DICLOFENAC SODIUM 2 G: 10 GEL TOPICAL at 14:55

## 2023-05-20 RX ADMIN — METOPROLOL SUCCINATE 25 MG: 25 TABLET, EXTENDED RELEASE ORAL at 20:48

## 2023-05-20 RX ADMIN — ACETAMINOPHEN 1000 MG: 500 TABLET ORAL at 20:48

## 2023-05-20 RX ADMIN — HYDROCODONE BITARTRATE AND ACETAMINOPHEN 1 TABLET: 5; 325 TABLET ORAL at 10:01

## 2023-05-20 RX ADMIN — METHOCARBAMOL 500 MG: 500 TABLET, FILM COATED ORAL at 10:02

## 2023-05-20 RX ADMIN — LIDOCAINE: 50 OINTMENT TOPICAL at 20:47

## 2023-05-20 RX ADMIN — QUETIAPINE FUMARATE 25 MG: 25 TABLET ORAL at 20:48

## 2023-05-20 RX ADMIN — Medication 81 MG: at 10:50

## 2023-05-20 RX ADMIN — ACETAMINOPHEN 1000 MG: 500 TABLET ORAL at 14:55

## 2023-05-20 RX ADMIN — DONEPEZIL HYDROCHLORIDE 5 MG: 5 TABLET, FILM COATED ORAL at 10:02

## 2023-05-20 RX ADMIN — ROPINIROLE HYDROCHLORIDE 2 MG: 1 TABLET, FILM COATED ORAL at 20:51

## 2023-05-20 RX ADMIN — FERROUS SULFATE TAB 325 MG (65 MG ELEMENTAL FE) 325 MG: 325 (65 FE) TAB at 10:03

## 2023-05-20 RX ADMIN — DICLOFENAC SODIUM 2 G: 10 GEL TOPICAL at 20:46

## 2023-05-20 RX ADMIN — AMLODIPINE BESYLATE 5 MG: 5 TABLET ORAL at 10:03

## 2023-05-20 ASSESSMENT — ACTIVITIES OF DAILY LIVING (ADL)
ADLS_ACUITY_SCORE: 41

## 2023-05-20 NOTE — CONSULTS
[facility] ACUTE PAIN SERVICE CONSULTATION ([facility], [facility], [facility])     Date of Admission:  5/18/2023  Date of Consult (When I saw the patient): 05/20/23  Physician requesting consult: Dr. Mcmillan   Reason for consult: Acute neck pain     Assessment/Plan:     Sherice Alvarez is a 88 year old female who was admitted on 5/18/2023.   . I was asked to see the patient for neck pain. Admitted for posterior neck pain and MRI revealed severe spinal stenosis- Severe canal compromise is present at C3-C4 with no additional severe spinal stenosis. Probable moderate-severe foraminal stenosis is also present C3-C4.. History of HTN, chronic heart failure, and previous laminectomy of the cervical region. Pain began many months ago. Describes pain as 0-4/10 and reporting tingling in both hands, feet, and neck pain. Pain is ok with rest, any movement of the neck is painful. The patient denies tingling currently, but the pain comes and goes. The patient does not smoke and denies chemical dependency history.     PLAN:   1) Chronic neck pain with radicular features secondary to Severe canal compromise is present at C3-4. Would suggest lumbar MRI (outpatient) to rule lumbar disease given LE numbness and tingling. Has filled tramadol for the pain, would avoid Tramadol with zoloft and aricept. Suggest TENS unit, massage, and injections with [place].  Concern that she is not actually taking zoloft at home. Could consider adding gabapentin for pain. Family declines for now, given slower onset of action. If she decides to start gabapentin in the future- would suggest 200mg at HS to start.   2)Multimodal Medication Therapy  Topical:  Lidocaine ointment 5% and Diclofenac ointment  Adjuvants:  Tyenol. - schedule, on aricept at baseline & seroquel at HS- would not resume home dose of sertraline as she is not taking it at home & could consider gabapentin although already has polypharmacy - and would not add  "more, already on PRN robaxin with unclear benefit.   Opioids: not recommended - can offer 8 tabs of Norco at discharge   3)Non-medication interventions- Ice, Heat, physical therapy & TENS unit   4)Constipation Prophylaxis- senna and miralax     5) Follow up   -Opioid prescriber has been Dr. Rodriguez in [facility]   -Discharge Recommendations - We recommend prescribing the following at the time of discharge:  Suggest outpatient MRI to evaluate LE tingling, then EMG, could follow up with [facility] or [facility] (had an appointment yesterday). Would suggest that she stop driving given diagnosis of dementia.          History of Present Illness (HPI):       Sherice Alvraez is a 88 year old old female who presents with a 1 year history of tingling in the daniele and feet. She has been following with [facility] for injections. .  The patient reports that the pain is old. Some neck stiffness. Current pain is rated at 0/10 and goal is 0/10. The patient is with ongoing chronic pain. Per [place]  review- has filled tramadol. The patient has a mild opioid tolerance.  Norco is causing some confusion. She is having many stressors and her son is on Hospice. Emir would like to get home to plant her flowers. She plays Bridge and is on different committees.     Called daughter Jacqueline per pt request. Carries worries that Emir's pain has been serve and she just wants her mom to be comfortable. Jacqueline is frustrated that there has not been more of a focus on comfort for her mom. She notes that her mom is prescribed zoloft- but always leaves the pills out every week. (Jacqueline states \"I put the zoloft in the contained, but the green pills are always left over\"). Emir states she would prefer not to be on that anymore. We talked about drug interaction between tramadol and zoloft. We talked about no longer driving. Jacqueline reports that she also has chronic pain and is frustrated about the lack of pain attention at times. Jacqueline would " like Stephanie to be on an opioid for her daughter wedding next .   Discussed multimodal interventions, interventions other than systemic pharmacologic treatments for chronic pain including: TENS unit, PT, ice, and injections   Call placed to attending MD.   Review of medical record/Summary of labs and care everywhere.      Past pain treatments have included tramadol     Last UA: none on file        [place] -pulled from system on 23. Last refill on . This indicated intermittent opioid use. 2 total number of prescribing providers noted.       Tele-Visit Details    Type of service:  Video Visit    Time Service Began (time 1st connected with pt): 1300    Time Service Ended (time completely finished with pt): 1416    Video Start Time (time video started): 1315    Video End Time (time video stopped): 1400    Originating Location (pt. Location): Patient Hospital Room     Distant Location (provider location): [facility]     Reason for Televisit: Pain Consult     Mode of Communication:  Video Conference via [email or web address]    Physician has received verbal consent for a video visit from the patient? Yes      TAVO Flores CNP          Medical History   has a past medical history of Arthritis, Asthma, CAD (coronary artery disease), Cancer (H), Chronic kidney disease, Chronic pain syndrome, Congestive heart failure (H), Crohn's disease (H), GERD (gastroesophageal reflux disease), heart artery stent (2016), Hyperlipidemia, Hypertension, NSTEMI (non-ST elevated myocardial infarction) (H) (2016), PONV (postoperative nausea and vomiting), Restless legs syndrome, and Stroke (H) (2010).       Surgical History   has a past surgical history that includes IR Lumbar Nerve Root Injection (10/01/2013); IR Lumbar Epidural Steroid Injection (2014); Total Knee Arthroplasty (Right);  Section; carotid endarterectomy; Hemorrhoidectomy external; carotid endarterectomy (Right, 2010);  appendectomy; tonsillectomy & adenoidectomy; Cardiac catheterization (11/04/2016); Cardiac catheterization (11/07/2016); Cardiac catheterization (N/A, 12/27/2016); Pr Esophagogastroduodenoscopy Transoral Diagnostic (N/A, 07/10/2019); Cv Coronary Angiogram (N/A, 04/16/2020); Cv Left Heart Catheterization Without Left Ventriculogram (Left, 04/17/2020); Pr Replace Aortic Valve Open Axillry Artry Approach (N/A, 06/02/2020); Cv Transcatheter Aortic Valve Replacement - Other Approach (N/A, 06/02/2020); joint replacement; Cervical Laminectomy (01/01/1994); TOTAL KNEE ARTHROPLASTY (Left, 05/11/2021); Transcatheter Aortic-Valve Replacement; and Heart Cath, Angioplasty.     Allergies     Allergies   Allergen Reactions     Amitriptyline Hcl [Amitriptyline] Other (See Comments)     Erythromycin Diarrhea and Other (See Comments)     Childhood reaction     Gabapentin Other (See Comments)     Jerking movements, swelling     Naproxen Unknown and Other (See Comments)     Other Environmental Allergy Unknown     Molds, dander     Pregabalin Other (See Comments)        Current Home Medications   Prior to Admission medications    Medication Sig Start Date End Date Taking? Authorizing Provider   acetaminophen (TYLENOL) 500 MG tablet Take 500-1,000 mg by mouth every 8 hours as needed for mild pain   Yes Unknown, Entered By History   albuterol (PROVENTIL HFA;VENTOLIN HFA) 90 mcg/actuation inhaler [ALBUTEROL (PROVENTIL HFA;VENTOLIN HFA) 90 MCG/ACTUATION INHALER] Inhale 1-2 puffs every 6 (six) hours as needed for wheezing. 11/3/16  Yes Provider, Historical   amLODIPine (NORVASC) 5 MG tablet Take 5 mg by mouth daily   Yes Unknown, Entered By History   aspirin (ASA) 81 MG EC tablet Take 81 mg by mouth daily   Yes Reported, Patient   diclofenac (VOLTAREN) 1 % topical gel Apply 2-4 g topically 4 times daily as needed APPLY TO LEFT SHOULDER AND LEFT KNEE. 8/5/21  Yes Reported, Patient   donepezil (ARICEPT) 5 MG tablet Take 5 mg by mouth daily  12/9/22  Yes Unknown, Entered By History   ferrous sulfate 325 (65 FE) MG tablet [FERROUS SULFATE 325 (65 FE) MG TABLET] Take 1 tablet (325 mg total) by mouth daily with breakfast. 6/6/20  Yes Viv Tolbert PA-C   furosemide (LASIX) 20 MG tablet Take 20 mg by mouth daily as needed   Yes Reported, Patient   methocarbamol (ROBAXIN) 500 MG tablet Take 500 mg by mouth 4 times daily as needed for muscle spasms   Yes Unknown, Entered By History   metoprolol succinate ER (TOPROL XL) 25 MG 24 hr tablet TAKE 1 TABLET BY MOUTH AT BEDTIME 12/28/22  Yes Kim Arreola APRN CNP   Multiple Vitamin (THERA-TABS) TABS Take 1 tablet by mouth   Yes Reported, Patient   nitroglycerin (NITROSTAT) 0.4 MG SL tablet [NITROGLYCERIN (NITROSTAT) 0.4 MG SL TABLET] Place 1 tablet (0.4 mg total) under the tongue every 5 (five) minutes as needed for chest pain. 11/8/16  Yes Ino Prieto MD   OLANZapine (ZYPREXA) 2.5 MG tablet Take 1 tablet (2.5 mg) by mouth 2 times daily as needed (anxiety) 8/3/22  Yes Diana Morales MD   omeprazole 20 MG tablet Take 20 mg by mouth daily as needed   Yes Reported, Patient   QUEtiapine (SEROQUEL) 25 MG tablet Take 25 mg by mouth daily 12/9/22  Yes Unknown, Entered By History   rOPINIRole (REQUIP) 2 MG tablet Take 2 mg by mouth nightly as needed   Yes Unknown, Entered By History   sertraline (ZOLOFT) 25 MG tablet Take 50 mg by mouth daily 1/11/23  Yes Reported, Patient   Vitamin D3 (CHOLECALCIFEROL) 125 MCG (5000 UT) tablet Take 1 tablet by mouth daily   Yes Unknown, Entered By History          Social History  Reviewed, and she  reports that she quit smoking about 43 years ago. Her smoking use included cigarettes. She has never used smokeless tobacco. She reports that she does not drink alcohol and does not use drugs.      Family History- Reviewed care everywhere to find family history- Nothing relevant to pain consult    Reviewed, and family history includes Atrial fibrillation in  her mother; Parkinsonism in her father.    Review of Systems  Complete ROS reviewed, unless noted  , all other systems reviewed (with patient) and all others found to be negative.         Objective:     Vitals:  B/P: 160/69, T: 98.2, P: 60, R: 16     Weight:   0 lbs 0 oz  There is no height or weight on file to calculate BMI.      Physical Exam:     Patient is seen on video visit and is sitting up in the chair     Imaging Reviewed Personally By Myself      Results for orders placed or performed during the hospital encounter of 05/18/23   Cervical spine MRI w/o contrast    Impression    IMPRESSION:  1.  Sagittal sequences only, see note in technique above with patient motion artifact.    2.  No definite abnormal intramedullary signal. Severe canal compromise is present at C3-C4 with no additional severe spinal stenosis. Probable moderate-severe foraminal stenosis is also present C3-C4. No additional severe areas of foraminal compromise   are suspected.    3.  Low-grade-intermediate grade degenerative anterior subluxations and multiple levels including C3-C4, C7-T1 and T1-T2.    4.  See above for description/details.          Labs Reviewed Personally By Myself    Sodium   Date Value Ref Range Status   05/20/2023 135 (L) 136 - 145 mmol/L Final     Potassium   Date Value Ref Range Status   05/20/2023 4.3 3.4 - 5.3 mmol/L Final     Comment:     Specimen slightly hemolyzed, potassium may be falsely elevated.   08/03/2022 3.6 3.5 - 5.0 mmol/L Final     Chloride   Date Value Ref Range Status   05/20/2023 105 98 - 107 mmol/L Final   08/03/2022 110 (H) 98 - 107 mmol/L Final     Carbon Dioxide (CO2)   Date Value Ref Range Status   05/20/2023 19 (L) 22 - 29 mmol/L Final   08/03/2022 26 22 - 31 mmol/L Final     Anion Gap   Date Value Ref Range Status   05/20/2023 11 7 - 15 mmol/L Final   08/03/2022 7 5 - 18 mmol/L Final     Glucose   Date Value Ref Range Status   05/20/2023 86 70 - 99 mg/dL Final   08/03/2022 80 70 - 125 mg/dL  Final     Urea Nitrogen   Date Value Ref Range Status   05/20/2023 25.5 (H) 8.0 - 23.0 mg/dL Final   08/03/2022 27 8 - 28 mg/dL Final     Creatinine   Date Value Ref Range Status   05/20/2023 1.15 (H) 0.51 - 0.95 mg/dL Final     GFR Estimate   Date Value Ref Range Status   05/20/2023 46 (L) >60 mL/min/1.73m2 Final     Comment:     eGFR calculated using 2021 CKD-EPI equation.   06/07/2021 41 (L) >60 mL/min/1.73m2 Final     Calcium   Date Value Ref Range Status   05/20/2023 8.6 (L) 8.8 - 10.2 mg/dL Final            76 MINUTES SPENT BY ME on the date of service doing chart review, history, exam, documentation & further activities per the note.    Thank you for this consultation.          Blanquita Zimmerman APRN, CNS-BC, CNP, ACHPN  Acute Care Pain Management Program   Hours of pain coverage 7a-1700- after 1700 please call the house officer   [facility] (Patricio ROMAN, [place], [place], [place])   Page via [employer]- Click HERE to page Blanquita or Sj text web console

## 2023-05-20 NOTE — PROGRESS NOTES
Occupational Therapy        05/20/23 0855   Appointment Info   Signing Clinician's Name / Credentials (OT) Kaya Tipton OTR/L   Quick Adds   Quick Adds Certification   Living Environment   People in Home alone   Current Living Arrangements independent living facility   Home Accessibility no concerns  (elevator, has 1 flgiht of stairs to garage)   Living Environment Comments Patient independent with ADLs, IADLs and drives.   Self-Care   Usual Activity Tolerance good   Fall history within last six months no   Activity/Exercise/Self-Care Comment Walks most days   General Information   Onset of Illness/Injury or Date of Surgery 05/18/23   Referring Physician Kaycee Lunsford MD   Patient/Family Therapy Goal Statement (OT) Get home today   Additional Occupational Profile Info/Pertinent History of Current Problem Sherice Alvarez is a 88 year old female with history of hypertension, CHF, TAVR and chronic back pain who presents to the emergency department with left neck pain and found to have severe cervical stenosis at C3-4.   Existing Precautions/Restrictions no known precautions/restrictions   Cognitive Status Examination   Orientation Status orientation to person, place and time   Range of Motion Comprehensive   General Range of Motion no range of motion deficits identified   Strength Comprehensive (MMT)   General Manual Muscle Testing (MMT) Assessment no strength deficits identified   Bed Mobility   Bed Mobility supine-sit   Comment (Bed Mobility) Initially, unable due to pain, OT provided treatment, see below.   Transfers   Transfers sit-stand transfer   Sit-Stand Transfer   Sit-Stand [place] (Transfers) independent   Activities of Daily Living   BADL Assessment/Intervention lower body dressing;grooming;toileting   Lower Body Dressing Assessment/Training   [place] Level (Lower Body Dressing) maximum assist (25% patient effort)   Grooming Assessment/Training   [place] Level (Grooming) independent    Comment, (Grooming) Patient ambulated in hallway with SBA no device, no LOB ~300 feet   Toileting   [place] Level (Toileting) independent   Clinical Impression   Criteria for Skilled Therapeutic Interventions Met (OT) Yes, treatment indicated   OT Diagnosis Decreased ADL independence   OT Problem List-Impairments impacting ADL problems related to;pain   Assessment of Occupational Performance 1-3 Performance Deficits   Identified Performance Deficits dressing, bed mob   Planned Therapy Interventions (OT) ADL retraining   Clinical Decision Making Complexity (OT) low complexity   Risk & Benefits of therapy have been explained evaluation/treatment results reviewed;care plan/treatment goals reviewed   OT Total Evaluation Time   OT Eval, Low Complexity Minutes (13538) 15   Therapy Certification   Medical Diagnosis cervical spine pain   Start of Care Date 05/20/23   Certification date from 05/20/23   Certification date to 05/21/23   OT Goals   Therapy Frequency (OT) One time eval and treatment   OT Predicted Duration/Target Date for Goal Attainment   (t+1)   OT Goals Lower Body Dressing;Bed Mobility   OT: Lower Body Dressing Independent   OT: Bed Mobility Independent   Self-Care/Home Management   Self-Care/Home Mgmt/ADL, Compensatory, Meal Prep Minutes (86040) 10   Treatment Detail/Skilled Intervention OT verbalized log roll technique for bed mobility to reduce pain with mvmt, patient able to complete after OT educated independently using log roll technique. Patient able to complete lower extremity dressing using figure-four technique.   OT Discharge Planning   OT Plan DC OT   OT Discharge Recommendation (DC Rec) home   OT Rationale for DC Rec Recommend outpatient physical therapy for neck pain   OT Brief overview of current status independent with ADLs   Total Session Time   Timed Code Treatment Minutes 10   Total Session Time (sum of timed and untimed services) 25      [facility]  Services  OUTPATIENT OCCUPATIONAL THERAPY  EVALUATION  PLAN OF TREATMENT FOR OUTPATIENT REHABILITATION  (COMPLETE FOR INITIAL CLAIMS ONLY)  Patient's Last Name, First Name, M.I.  YOB: 1934  Sherice Alvarez                          Provider's Name  [facility] Medical Record No.  [id number]                             Onset Date:  05/18/23   Start of Care Date:  05/20/23   Type:     ___PT   _X_OT   ___SLP Medical Diagnosis:  cervical spine pain                    OT Diagnosis:  Decreased ADL independence Visits from SOC:  1     See note for plan of treatment, functional goals and certification details    I CERTIFY THE NEED FOR THESE SERVICES FURNISHED UNDER        THIS PLAN OF TREATMENT AND WHILE UNDER MY CARE     (Physician co-signature of this document indicates review and certification of the therapy plan).

## 2023-05-20 NOTE — PROGRESS NOTES
"[facility]    Medicine Progress Note - Hospitalist Service    Date of Admission:  5/18/2023    Assessment & Plan    Sherice Alvarez is a 88 year old female with history of hypertension, CHF, TAVR and chronic back pain who presents to the emergency department with left neck pain and found to have severe cervical stenosis at C3-4. Hospital Day: 3     Neck pain, posterior left-sided  Severe cervical stenosis C3-4  De-escalated pain meds to just Norco if pain severe  Tylenol, Robaxin as needed  Lidocaine patch and diclofenac ointment  Neurosurgery evaluated, recommended outpatient follow-up for now, patient is not sure she would want to pursue surgery anyway  PT/OT     Hypertensive urgency  Essential hypertension  Likely 2/2 severe pain  SBP up to 204 on admit  BP mow 101/56 on home metoprolol and amlodipine  Monitor     Acute on chronic HFpEF  Supposed to take lasix every other day but only takes PRN  Complains of feeling dehydrated. Complains of polyuria. Feels she needs to drink a lot of water \"for [her] heart\"  Does not appear volume overloaded, but was  requiring some O2 here (89% RA).      History of aortic stenosis status post TAVR  Valve looked ok on echo in December  Follows with Dr Feldman     Polyuria  Sounds like this is because she is pushing PO fluids?  Can check A1c though as don't see one recently  UA with specific grav actually high  Maybe could see Urology about bladder/urodynamics? Check bladder scan here to rule out retention     MIld cognitive impairment  Home Aricept  Resume home Seroquel which apparently she is not remembering to take regularly.  Give this at bedtime tonight to help with any possible sundowning  Hold home Zoloft which apparently she is not taking regularly     RLS  Home Requip if needed     CAD  S/p stents  Home ASA       Diet: Regular Diet Adult    DVT Prophylaxis: Pneumatic Compression Devices  Tyson Catheter: Not present  Lines: None     Cardiac " Monitoring: None  Code Status: Full Code      Clinically Significant Risk Factors Present on Admission                # Drug Induced Platelet Defect: home medication list includes an antiplatelet medication   # Hypertension: Noted on problem list               Disposition Plan     Expected Discharge Date: 05/20/2023        Discharge Comments: O2 need, symptom control, workup          Miki Magaña MD  Hospitalist Service  [facility]  Securely message with [employer] (more info)  Text page via [employer] Paging/Directory   ______________________________________________________________________    Interval History   And sitting at the bedside.  Describing having significant pain and stiffness over her neck.  No distress noted.  Management plan discussed with the patient and she expressed understanding.  Discussed about some of her personal and family related stresses. She was very tearful.    Physical Exam   Vital Signs: Temp: 99  F (37.2  C) Temp src: Oral BP: 112/50 Pulse: 65   Resp: 16 SpO2: 92 % O2 Device: None (Room air)    Weight: 0 lbs 0 oz    General Appearance: No distress noted, appears to be in significant pain  Respiratory: Good air entry bilaterally  Cardiovascular: S1 and S2 heard, no murmur or gallop  GI: Soft abdomen, no tenderness, normoactive bowel sounds  Skin: Intact and warm      Medical Decision Making       45 MINUTES SPENT BY ME on the date of service doing chart review, history, exam, documentation & further activities per the note.      Data

## 2023-05-20 NOTE — PLAN OF CARE
Goal Outcome Evaluation:                      PRIMARY DIAGNOSIS: ACUTE PAIN  OUTPATIENT/OBSERVATION GOALS TO BE MET BEFORE DISCHARGE:  1. Pain Status: Improved-controlled with oral pain medications.    2. Return to near baseline physical activity: No    3. Cleared for discharge by consultants (if involved): No    Discharge Planner Nurse   Safe discharge environment identified: No  Barriers to discharge: Yes, pain management       Entered by: Yana Valles RN 05/20/2023 3:22 AM     Please review provider order for any additional goals.   Nurse to notify provider when observation goals have been met and patient is ready for discharge.

## 2023-05-20 NOTE — PLAN OF CARE
Goal Outcome Evaluation:                      PRIMARY DIAGNOSIS: ACUTE PAIN  OUTPATIENT/OBSERVATION GOALS TO BE MET BEFORE DISCHARGE:  1. Pain Status: Improved-controlled with oral pain medications.    2. Return to near baseline physical activity: No    3. Cleared for discharge by consultants (if involved): No    Discharge Planner Nurse   Safe discharge environment identified: No  Barriers to discharge: Yes, pain management       Entered by: Yana Valles RN 05/20/2023 4:33 AM     Please review provider order for any additional goals.   Nurse to notify provider when observation goals have been met and patient is ready for discharge.

## 2023-05-20 NOTE — PROGRESS NOTES
Physical Therapy: Orders received. Chart reviewed and discussed with care team.? Physical Therapy not indicated due to patient is independent with mobility per OT. No acute PT needs. Patient may benefit from OP PT for cervical pain.? Defer discharge recommendations to OT.? Will complete orders.     Bree Patel, PT, DPT

## 2023-05-20 NOTE — PROGRESS NOTES
Care Management Follow Up    Length of Stay (days): 0    Expected Discharge Date: 05/20/2023     Concerns to be Addressed:     None noted  Patient plan of care discussed at interdisciplinary rounds: Yes    Anticipated Discharge Disposition:  Return home to independent living     Anticipated Discharge Services:  none  Anticipated Discharge DME:  none    Patient/family educated on Medicare website which has current facility and service quality ratings:  N/A  Education Provided on the Discharge Plan:    Patient/Family in Agreement with the Plan:      Referrals Placed by CM/SW:  none  Private pay costs discussed: Not applicable    Additional Information:  Patient lives independently and has good family support. CM does not anticipated any needs upon discharge but will follow. Anticipate family will transport home on discharge.    LOVE SotoSW

## 2023-05-20 NOTE — PLAN OF CARE
Occupational Therapy Discharge Summary    Reason for therapy discharge:    Discharged to patient safe to discharge back to independent living    Progress towards therapy goal(s). See goals on Care Plan in [facility] electronic health record for goal details.  Goals met    Therapy recommendation(s):    Recommend outpatient physical therapy at discharge

## 2023-05-20 NOTE — PLAN OF CARE
PRIMARY DIAGNOSIS: ACUTE PAIN  OUTPATIENT/OBSERVATION GOALS TO BE MET BEFORE DISCHARGE:  1. Pain Status: Improved-controlled with oral pain medications.    2. Return to near baseline physical activity: No    3. Cleared for discharge by consultants (if involved): No    Discharge Planner Nurse   Safe discharge environment identified: No  Barriers to discharge: Yes       Entered by: Mellissa Patterson RN 05/19/2023 10:38 PM     Please review provider order for any additional goals.   Nurse to notify provider when observation goals have been met and patient is ready for discharge.Goal Outcome Evaluation:

## 2023-05-21 PROCEDURE — 250N000013 HC RX MED GY IP 250 OP 250 PS 637: Performed by: PAIN MEDICINE

## 2023-05-21 PROCEDURE — 250N000013 HC RX MED GY IP 250 OP 250 PS 637: Performed by: HOSPITALIST

## 2023-05-21 PROCEDURE — 250N000013 HC RX MED GY IP 250 OP 250 PS 637: Performed by: INTERNAL MEDICINE

## 2023-05-21 PROCEDURE — 99232 SBSQ HOSP IP/OBS MODERATE 35: CPT | Performed by: STUDENT IN AN ORGANIZED HEALTH CARE EDUCATION/TRAINING PROGRAM

## 2023-05-21 PROCEDURE — G0378 HOSPITAL OBSERVATION PER HR: HCPCS

## 2023-05-21 RX ADMIN — METOPROLOL SUCCINATE 25 MG: 25 TABLET, EXTENDED RELEASE ORAL at 22:33

## 2023-05-21 RX ADMIN — DICLOFENAC SODIUM 2 G: 10 GEL TOPICAL at 19:44

## 2023-05-21 RX ADMIN — FERROUS SULFATE TAB 325 MG (65 MG ELEMENTAL FE) 325 MG: 325 (65 FE) TAB at 09:49

## 2023-05-21 RX ADMIN — DICLOFENAC SODIUM 2 G: 10 GEL TOPICAL at 12:23

## 2023-05-21 RX ADMIN — METHOCARBAMOL 500 MG: 500 TABLET, FILM COATED ORAL at 19:58

## 2023-05-21 RX ADMIN — DONEPEZIL HYDROCHLORIDE 5 MG: 5 TABLET, FILM COATED ORAL at 09:49

## 2023-05-21 RX ADMIN — LIDOCAINE: 50 OINTMENT TOPICAL at 22:40

## 2023-05-21 RX ADMIN — ACETAMINOPHEN 1000 MG: 500 TABLET ORAL at 22:33

## 2023-05-21 RX ADMIN — HYDROCODONE BITARTRATE AND ACETAMINOPHEN 1 TABLET: 5; 325 TABLET ORAL at 16:37

## 2023-05-21 RX ADMIN — AMLODIPINE BESYLATE 5 MG: 5 TABLET ORAL at 09:49

## 2023-05-21 RX ADMIN — ACETAMINOPHEN 1000 MG: 500 TABLET ORAL at 09:49

## 2023-05-21 RX ADMIN — LIDOCAINE: 50 OINTMENT TOPICAL at 14:12

## 2023-05-21 RX ADMIN — QUETIAPINE FUMARATE 25 MG: 25 TABLET ORAL at 22:33

## 2023-05-21 RX ADMIN — DICLOFENAC SODIUM 2 G: 10 GEL TOPICAL at 14:12

## 2023-05-21 RX ADMIN — Medication 81 MG: at 09:49

## 2023-05-21 RX ADMIN — LIDOCAINE: 50 OINTMENT TOPICAL at 09:50

## 2023-05-21 RX ADMIN — ACETAMINOPHEN 1000 MG: 500 TABLET ORAL at 14:16

## 2023-05-21 RX ADMIN — DICLOFENAC SODIUM 2 G: 10 GEL TOPICAL at 09:49

## 2023-05-21 RX ADMIN — ROPINIROLE HYDROCHLORIDE 2 MG: 1 TABLET, FILM COATED ORAL at 23:13

## 2023-05-21 ASSESSMENT — ACTIVITIES OF DAILY LIVING (ADL)
ADLS_ACUITY_SCORE: 41
ADLS_ACUITY_SCORE: 45
ADLS_ACUITY_SCORE: 41
ADLS_ACUITY_SCORE: 45
ADLS_ACUITY_SCORE: 46
ADLS_ACUITY_SCORE: 45
ADLS_ACUITY_SCORE: 41
ADLS_ACUITY_SCORE: 45
ADLS_ACUITY_SCORE: 46
ADLS_ACUITY_SCORE: 45

## 2023-05-21 NOTE — PLAN OF CARE
Goal Outcome Evaluation:      Pt is A&Ox4, able to make needs known and ambulates in room independently. Up in chair all day. Pt is incontinent. VSS. Reported pain in neck 10/10 this am shortly after getting into the chair. Received prn robaxin and norco. Pt was able to sleep in chair, reported pain improved. Pain consult this afternoon, pt is eating/drinking well.  Problem: Plan of Care - These are the overarching goals to be used throughout the patient stay.    Goal: Optimal Comfort and Wellbeing  Outcome: Progressing     Problem: Pain Acute  Goal: Optimal Pain Control and Function  Outcome: Progressing     Problem: Plan of Care - These are the overarching goals to be used throughout the patient stay.    Goal: Absence of Hospital-Acquired Illness or Injury  Intervention: Identify and Manage Fall Risk  Recent Flowsheet Documentation  Taken 5/20/2023 1528 by Martha López RN  Safety Promotion/Fall Prevention:   patient and family education   nonskid shoes/slippers when out of bed  Taken 5/20/2023 0854 by Martha López RN  Safety Promotion/Fall Prevention:   patient and family education   nonskid shoes/slippers when out of bed  Intervention: Prevent Skin Injury  Recent Flowsheet Documentation  Taken 5/20/2023 1528 by Martha López RN  Body Position: position changed independently  Taken 5/20/2023 0854 by Martha López RN  Body Position: position changed independently

## 2023-05-21 NOTE — PLAN OF CARE
Goal Outcome Evaluation:                      PRIMARY DIAGNOSIS: ACUTE PAIN  OUTPATIENT/OBSERVATION GOALS TO BE MET BEFORE DISCHARGE:  1. Pain Status: Pain free.    2. Return to near baseline physical activity: Yes    3. Cleared for discharge by consultants (if involved): Yes    Discharge Planner Nurse   Safe discharge environment identified: Yes  Barriers to discharge: No       Entered by: Yana Valles RN 05/21/2023 5:41 AM     Please review provider order for any additional goals.   Nurse to notify provider when observation goals have been met and patient is ready for discharge.

## 2023-05-21 NOTE — PROGRESS NOTES
[facility]    Medicine Progress Note - Hospitalist Service    Date of Admission:  5/18/2023    Assessment & Plan    Sherice Alvarez is a 88 year old female with history of hypertension, CHF, TAVR and chronic back pain who presents to the emergency department with left neck pain and found to have severe cervical stenosis at C3-4. Hospital Day: 4     Neck pain, posterior left-sided  Severe cervical stenosis C3-4  De-escalated pain meds to just Norco if pain severe  Tylenol, Robaxin as needed  Lidocaine patch and diclofenac ointment  Neurosurgery evaluated, recommended outpatient follow-up for now, patient is not sure she would want to pursue surgery anyway  Appreciate pain medicine team assistance.  They are recommending against her periods.  Patient declined trial of gabapentin.   PT/OT     Hypertensive urgency  Essential hypertension  Likely 2/2 severe pain  Improving   Monitor     Acute on chronic HFpEF  Supposed to take lasix every other day but only takes PRN  Does not appear volume overloaded, but was  requiring some O2 here (89% RA).      History of aortic stenosis status post TAVR  Valve looked ok on echo in December  Follows with Dr Feldman     Polyuria  Sounds like this is because she is pushing PO fluids?  Can check A1c though as don't see one recently  UA with specific grav actually high  Maybe could see Urology about bladder/urodynamics? Check bladder scan here to rule out retention     MIld cognitive impairment  Home Aricept  Resume home Seroquel which apparently she is not remembering to take regularly.  Give this at bedtime tonight to help with any possible sundowning  Hold home Zoloft which apparently she is not taking regularly     RLS  Home Requip if needed     CAD  S/p stents  Home ASA       Diet: Regular Diet Adult    DVT Prophylaxis: Pneumatic Compression Devices  Tyson Catheter: Not present  Lines: None     Cardiac Monitoring: None  Code Status: Full Code       Clinically Significant Risk Factors Present on Admission                # Drug Induced Platelet Defect: home medication list includes an antiplatelet medication   # Hypertension: Noted on problem list               Disposition Plan      Expected Discharge Date: 05/22/2023        Discharge Comments: O2 need, symptom control, workup          Miki Magaña MD  Hospitalist Service  [facility]  Securely message with [employer] (more info)  Text page via [employer] Paging/Directory   ______________________________________________________________________    Interval History   Pain is improving today.  Patient requested that she states 1 more day to make sure her pain is reasonably controlled.  Management plan discussed with the patient and she expressed understanding.    Physical Exam   Vital Signs: Temp: 97.9  F (36.6  C) Temp src: Oral BP: 129/65 Pulse: 64   Resp: 16 SpO2: 94 % O2 Device: None (Room air)    Weight: 0 lbs 0 oz    General Appearance: No distress noted, appears to be in significant pain  Respiratory: Good air entry bilaterally  Cardiovascular: S1 and S2 heard, no murmur or gallop  GI: Soft abdomen, no tenderness, normoactive bowel sounds  Skin: Intact and warm      Medical Decision Making       45 MINUTES SPENT BY ME on the date of service doing chart review, history, exam, documentation & further activities per the note.      Data

## 2023-05-21 NOTE — PROGRESS NOTES
Care Management Follow Up     Length of Stay (days): 0     Expected Discharge Date: 05/20/2023     Concerns to be Addressed:     No new events noted overnight. Patient had Pain Consult yesterday.  Patient plan of care discussed at interdisciplinary rounds: Yes     Anticipated Discharge Disposition:  Return home to independent living     Anticipated Discharge Services:  none  Anticipated Discharge DME:  none     Patient/family educated on Medicare website which has current facility and service quality ratings:  N/A  Education Provided on the Discharge Plan:    Patient/Family in Agreement with the Plan:       Referrals Placed by CM/SW:  none  Private pay costs discussed: Not applicable     Additional Information:  Patient lives independently and has good family support. CM does not anticipated any needs upon discharge but will follow. PT and OT have assessed patient and they indicate no further services are needed. Anticipate family will transport home on discharge.     LOVE SotoSW

## 2023-05-21 NOTE — UTILIZATION REVIEW
Concurrent stay review; Secondary Review Determination     Under the authority of the Utilization Management Committee, the utilization review process indicated a secondary review on the above patient.  The review outcome is based on review of the medical records, discussions with staff, and applying clinical experience noted on the date of the review.          (x) Observation Status Appropriate - Concurrent stay review    RATIONALE FOR DETERMINATION     Ms. Alvarez is a 87 yo female with mild cognitive dysfunction, HTN and chronic back pain who presents to the ED with left neck pain and found to have severe cervical stenosis on imaging.  Neurosurgery consult requested; recommended supportive care and outpt f/up.  Pain team has evaluated and recommending outpt f/up with pain clinic; currently is using min po narcotics.  Vitals are stable and she is no longer hypertensive or hypoxic. Likely will be able to discharge home later today.       Patient is clinically improving and there is no clear indication to change patient's status to inpatient. The severity of illness, intensity of service provided, expected LOS and risk for adverse outcome make the care appropriate for observation.      The information on this document is developed by the utilization review team in order for the business office to ensure compliance.  This only denotes the appropriateness of proper admission status and does not reflect the quality of care rendered.         The definitions of Inpatient Status and Observation Status used in making the determination above are those provided in the CMS Coverage Manual, Chapter 1 and Chapter 6, section 70.4.          Sincerely,       Viv Burger, DO  Utilization Review  Physician Advisor  [facility].

## 2023-05-21 NOTE — PROGRESS NOTES
Will not see today:  PAIN MANAGEMENT SERVICE CHART CHECK  This patient's chart has been reviewed by the Pain Service. It has been determined that no change is necessary to the pain management regimen at this time. The chart will be reviewed regularly and the patient will be seen if necessary. If you would like the patient to be seen, please contact the service at [phone] and ask to have the patient seen.  1. Please have the patient follow up with [facility]  2. Opioids are not recommended  3. Consider gabapentin - can be initiated when patient desires (declined yesterday).   4. Consider soft cervical collar - for comfort - defer to Neurosurgery  5. Continue topical lidocaine and diclofenac.   6. Also suggest TENS unit- DME order    Thank you!      Blanquita Zimmerman APRN, CNS-BC, CNP, ACHPN  Acute Care Pain Management Program   Hours of pain coverage 7a-1700- after 1700 please call the house officer   [facility] (Patricio ROMAN, [place], [place], [place])   Page via [employer]- Click HERE to page Blanquita or Sj text web console

## 2023-05-21 NOTE — PLAN OF CARE
Goal Outcome Evaluation:       Pt is A&Ox4, able to make needs known and is independent in room. Pt is incontinent. Up in chair most of the shift. VSS. Reported pain in neck was better this evening, received scheduled topicals and scheduled tylenol which have been effective. Pt also received prn requip at HS for restless legs. Purewick in place for overnight. pt is eating and drinking well  Problem: Plan of Care - These are the overarching goals to be used throughout the patient stay.    Goal: Optimal Comfort and Wellbeing  5/20/2023 2233 by Martha López RN  Outcome: Progressing  5/20/2023 2230 by Martha López RN  Outcome: Progressing     Problem: Pain Acute  Goal: Optimal Pain Control and Function  5/20/2023 2233 by Martha López RN  Outcome: Progressing  5/20/2023 2230 by Martha López RN  Outcome: Progressing     Problem: Plan of Care - These are the overarching goals to be used throughout the patient stay.    Goal: Absence of Hospital-Acquired Illness or Injury  Intervention: Identify and Manage Fall Risk  Recent Flowsheet Documentation  Taken 5/20/2023 1528 by Martha López RN  Safety Promotion/Fall Prevention:   patient and family education   nonskid shoes/slippers when out of bed  Taken 5/20/2023 0854 by Martha López RN  Safety Promotion/Fall Prevention:   patient and family education   nonskid shoes/slippers when out of bed  Intervention: Prevent Skin Injury  Recent Flowsheet Documentation  Taken 5/20/2023 1528 by Martha López RN  Body Position: position changed independently  Taken 5/20/2023 0854 by Martha López RN  Body Position: position changed independently

## 2023-05-21 NOTE — PLAN OF CARE
Goal Outcome Evaluation:     Pt is A&Ox4, up in room independently. Reports improvement in pain, received scheduled topicals as well as scheduled tylenol which has been effective. Eating/drinking well. Good urine output  Problem: Plan of Care - These are the overarching goals to be used throughout the patient stay.    Goal: Optimal Comfort and Wellbeing  Outcome: Progressing  Intervention: Monitor Pain and Promote Comfort  Recent Flowsheet Documentation  Taken 5/21/2023 0949 by Martha López, RN  Pain Management Interventions:   medication (see MAR)   ambulation/increased activity   breathing exercises   distraction   repositioned   relaxation techniques promoted     Problem: Pain Acute  Goal: Optimal Pain Control and Function  Outcome: Progressing  Intervention: Develop Pain Management Plan  Recent Flowsheet Documentation  Taken 5/21/2023 0949 by Martha López, RN  Pain Management Interventions:   medication (see MAR)   ambulation/increased activity   breathing exercises   distraction   repositioned   relaxation techniques promoted

## 2023-05-21 NOTE — PLAN OF CARE
Goal Outcome Evaluation:                      PRIMARY DIAGNOSIS: ACUTE PAIN  OUTPATIENT/OBSERVATION GOALS TO BE MET BEFORE DISCHARGE:  1. Pain Status: Pain free.    2. Return to near baseline physical activity: Yes    3. Cleared for discharge by consultants (if involved): Yes    Discharge Planner Nurse   Safe discharge environment identified: No  Barriers to discharge: No       Entered by: Yana Valles RN 05/21/2023 12:39 AM     Please review provider order for any additional goals.   Nurse to notify provider when observation goals have been met and patient is ready for discharge.

## 2023-05-22 VITALS
RESPIRATION RATE: 16 BRPM | SYSTOLIC BLOOD PRESSURE: 115 MMHG | HEART RATE: 70 BPM | TEMPERATURE: 97.6 F | OXYGEN SATURATION: 96 % | DIASTOLIC BLOOD PRESSURE: 64 MMHG

## 2023-05-22 PROCEDURE — 250N000013 HC RX MED GY IP 250 OP 250 PS 637: Performed by: HOSPITALIST

## 2023-05-22 PROCEDURE — 250N000013 HC RX MED GY IP 250 OP 250 PS 637: Performed by: PAIN MEDICINE

## 2023-05-22 PROCEDURE — 99239 HOSP IP/OBS DSCHRG MGMT >30: CPT | Performed by: STUDENT IN AN ORGANIZED HEALTH CARE EDUCATION/TRAINING PROGRAM

## 2023-05-22 PROCEDURE — G0378 HOSPITAL OBSERVATION PER HR: HCPCS

## 2023-05-22 RX ORDER — HYDROCODONE BITARTRATE AND ACETAMINOPHEN 5; 325 MG/1; MG/1
1 TABLET ORAL EVERY 8 HOURS PRN
Qty: 7 TABLET | Refills: 0 | Status: SHIPPED | OUTPATIENT
Start: 2023-05-22 | End: 2024-02-09

## 2023-05-22 RX ORDER — LIDOCAINE 50 MG/G
OINTMENT TOPICAL 3 TIMES DAILY
Qty: 50 G | Refills: 0 | Status: ON HOLD | OUTPATIENT
Start: 2023-05-22 | End: 2024-04-04

## 2023-05-22 RX ORDER — SENNA AND DOCUSATE SODIUM 50; 8.6 MG/1; MG/1
1 TABLET, FILM COATED ORAL 2 TIMES DAILY
Qty: 28 TABLET | Refills: 0 | Status: SHIPPED | OUTPATIENT
Start: 2023-05-22 | End: 2023-06-05

## 2023-05-22 RX ADMIN — Medication 81 MG: at 09:19

## 2023-05-22 RX ADMIN — DONEPEZIL HYDROCHLORIDE 5 MG: 5 TABLET, FILM COATED ORAL at 09:19

## 2023-05-22 RX ADMIN — LIDOCAINE: 50 OINTMENT TOPICAL at 09:20

## 2023-05-22 RX ADMIN — POLYETHYLENE GLYCOL 3350 17 G: 17 POWDER, FOR SOLUTION ORAL at 09:18

## 2023-05-22 RX ADMIN — FERROUS SULFATE TAB 325 MG (65 MG ELEMENTAL FE) 325 MG: 325 (65 FE) TAB at 09:19

## 2023-05-22 RX ADMIN — LIDOCAINE: 50 OINTMENT TOPICAL at 13:28

## 2023-05-22 RX ADMIN — DICLOFENAC SODIUM 2 G: 10 GEL TOPICAL at 09:20

## 2023-05-22 RX ADMIN — ACETAMINOPHEN 1000 MG: 500 TABLET ORAL at 13:28

## 2023-05-22 RX ADMIN — DICLOFENAC SODIUM 2 G: 10 GEL TOPICAL at 11:34

## 2023-05-22 RX ADMIN — ACETAMINOPHEN 1000 MG: 500 TABLET ORAL at 09:18

## 2023-05-22 RX ADMIN — AMLODIPINE BESYLATE 5 MG: 5 TABLET ORAL at 09:19

## 2023-05-22 RX ADMIN — METHOCARBAMOL 500 MG: 500 TABLET, FILM COATED ORAL at 09:31

## 2023-05-22 ASSESSMENT — ACTIVITIES OF DAILY LIVING (ADL)
ADLS_ACUITY_SCORE: 46
ADLS_ACUITY_SCORE: 45
ADLS_ACUITY_SCORE: 46
ADLS_ACUITY_SCORE: 45

## 2023-05-22 NOTE — DISCHARGE SUMMARY
[facility]  Hospitalist Discharge Summary      Date of Admission:  5/18/2023  Date of Discharge:  5/22/2023  2:06 PM  Discharging Provider: Miki Magaña MD  Discharge Service: Hospitalist Service    Discharge Diagnoses   Neck pain, posterior left side  Severe cervical stenosis C3-4  Hypertensive urgency  Acute on chronic HFpEF  History of aortic stenosis S/P TAVR  Mild cognitive impairment  RLS  CAD    Clinically Significant Risk Factors          Follow-ups Needed After Discharge   Follow-up Appointments     Follow-up and recommended labs and tests       Follow up with primary care provider, Anum Rosenberg, within 7 days for   hospital follow- up.  No follow up labs or test are needed.             Unresulted Labs Ordered in the Past 30 Days of this Admission     No orders found from 4/18/2023 to 5/19/2023.      These results will be followed up by     Discharge Disposition   Discharged to home  Condition at discharge: Stable    Hospital Course   Sherice Alvarez is a 88 year old female with history of hypertension, CHF, TAVR and chronic back pain who presents to the emergency department with left neck pain and found to have severe cervical stenosis at C3-4.   Neck pain, posterior left-sided  Severe cervical stenosis C3-4  De-escalate pain meds to just Norco if pain severe  Tylenol, Robaxin as needed  Lidocaine patch and diclofenac ointment  Neurosurgery evaluated, recommended outpatient follow-up for now, patient is not sure she would want to pursue surgery anyway  Appreciate pain medicine team assistance.  They are recommending against her periods.  Patient declined trial of gabapentin.   PT/OT   Hypertensive urgency  Essential hypertension  Likely 2/2 severe pain  Improving   Monitor   Acute on chronic HFpEF  Supposed to take lasix every other day but only takes PRN  Does not appear volume overloaded, but was  requiring some O2 here (89% RA).    History of aortic stenosis  status post TAVR  Valve looked ok on echo in December  Follows with Dr Feldman   MIld cognitive impairment  Home Aricept  Resume home Seroquel which apparently she is not remembering to take regularly.  Give this at bedtime tonight to help with any possible sundowning  Hold home Zoloft which apparently she is not taking regularly   RLS  Home Requip if needed   CAD  S/p stents  Home ASA    Patient clinically stable enough to be discharged home today.  Advised to follow with her primary care provider and establish care with pain clinic at [facility].  Medication reconciliation was done and prescription for [place] sent to her pharmacy.    Consultations This Hospital Stay   NEUROSURGERY IP CONSULT  CARE MANAGEMENT / SOCIAL WORK IP CONSULT  OCCUPATIONAL THERAPY ADULT IP CONSULT  PHYSICAL THERAPY ADULT IP CONSULT  PAIN MANAGEMENT ADULT IP CONSULT    Code Status   Full Code    Time Spent on this Encounter   I, Miki Magaña MD, personally saw the patient today and spent greater than 30 minutes discharging this patient.       Miki Magaña MD  [address] [address]  Phone: [phone]  Fax: [phone]  ______________________________________________________________________    Physical Exam   Vital Signs: Temp: 97.6  F (36.4  C) Temp src: Oral BP: (!) 167/74 (RN notified) Pulse: 64   Resp: 16 SpO2: 95 % O2 Device: None (Room air)    Weight: 0 lbs 0 oz       General Appearance:  No distress noted, appears to be in significant pain  Respiratory: Good air entry bilaterally  Cardiovascular: S1 and S2 heard, no murmur or gallop  GI: Soft abdomen, no tenderness, normoactive bowel sounds  Skin: Intact and warm    Primary Care Physician   Anum Rosenberg    Discharge Orders      Pain Management  Referral      Reason for your hospital stay    Neck pain     Follow-up and recommended labs and tests     Follow up with primary care provider, Anum WATERS  Ratleonid, within 7 days for hospital follow- up.  No follow up labs or test are needed.     Activity    Your activity upon discharge: activity as tolerated     Diet    Follow this diet upon discharge: Orders Placed This Encounter      Regular Diet Adult       Significant Results and Procedures   Most Recent 3 CBC's:Recent Labs   Lab Test 05/20/23  0452 05/18/23  1106 02/14/23  1856   WBC 8.2 13.7* 8.9   HGB 10.2* 11.9 12.2   MCV 98 92 92    245 227     Most Recent 3 BMP's:Recent Labs   Lab Test 05/20/23  0452 05/19/23  0802 05/18/23  1106   * 141 141   POTASSIUM 4.3 4.3 4.1   CHLORIDE 105 108* 107   CO2 19* 24 24   BUN 25.5* 24.7* 30.1*   CR 1.15* 1.15* 1.35*   ANIONGAP 11 9 10   BESS 8.6* 8.7* 9.4   GLC 86 79 123*     Most Recent 2 LFT's:Recent Labs   Lab Test 12/28/22  0707 11/09/22  1532   AST 24 22   ALT 17 16   ALKPHOS 70 76   BILITOTAL 0.5 0.5     Most Recent 3 INR's:Recent Labs   Lab Test 11/09/22  1532 01/15/22  0248 08/19/21  1021   INR 1.13 1.13 1.15     Most Recent INR's and Anticoagulation Dosing History:  Anticoagulation Dose History         Latest Ref Rng & Units 4/23/2020 5/6/2020 6/2/2020 5/11/2021   Recent Dosing and Labs   INR 0.85 - 1.15 1.08   1.11   1.34      1.22   1.19         8/19/2021 1/15/2022 11/9/2022   Recent Dosing and Labs   INR 1.15   1.13   1.13           Multiple values from one day are sorted in reverse-chronological order           Most Recent 3 Creatinines:Recent Labs   Lab Test 05/20/23  0452 05/19/23  0802 05/18/23  1106   CR 1.15* 1.15* 1.35*     Most Recent 3 Hemoglobins:Recent Labs   Lab Test 05/20/23  0452 05/18/23  1106 02/14/23  1856   HGB 10.2* 11.9 12.2     Most Recent 3 Troponin's:No lab results found.  Most Recent 3 BNP's:Recent Labs   Lab Test 12/28/22  0707 12/10/22  0231 11/09/22  1854 09/21/22  1517 09/20/21  1328 09/08/21  1346   NTBNPI [phone]  --   --   --    NTBNP  --   --   --  575* 539* 481*     Most Recent D-dimer:Recent Labs   Lab Test  01/15/22  0248   DD 1.56*       Discharge Medications   Current Discharge Medication List      START taking these medications    Details   HYDROcodone-acetaminophen (NORCO) 5-325 MG tablet Take 1 tablet by mouth every 8 hours as needed for severe pain  Qty: 7 tablet, Refills: 0    Associated Diagnoses: Cervical spine pain      lidocaine (XYLOCAINE) 5 % external ointment Apply topically 3 times daily  Qty: 50 g, Refills: 0    Associated Diagnoses: Cervical spine pain      SENNA-docusate sodium (SENNA S) 8.6-50 MG tablet Take 1 tablet by mouth 2 times daily for 14 days  Qty: 28 tablet, Refills: 0    Comments: Hold for loose stools  Associated Diagnoses: Constipation, unspecified constipation type         CONTINUE these medications which have NOT CHANGED    Details   acetaminophen (TYLENOL) 500 MG tablet Take 500-1,000 mg by mouth every 8 hours as needed for mild pain      albuterol (PROVENTIL HFA;VENTOLIN HFA) 90 mcg/actuation inhaler [ALBUTEROL (PROVENTIL HFA;VENTOLIN HFA) 90 MCG/ACTUATION INHALER] Inhale 1-2 puffs every 6 (six) hours as needed for wheezing.      amLODIPine (NORVASC) 5 MG tablet Take 5 mg by mouth daily      aspirin (ASA) 81 MG EC tablet Take 81 mg by mouth daily      diclofenac (VOLTAREN) 1 % topical gel Apply 2-4 g topically 4 times daily as needed APPLY TO LEFT SHOULDER AND LEFT KNEE.      donepezil (ARICEPT) 5 MG tablet Take 5 mg by mouth daily      ferrous sulfate 325 (65 FE) MG tablet [FERROUS SULFATE 325 (65 FE) MG TABLET] Take 1 tablet (325 mg total) by mouth daily with breakfast.  Refills: 0    Associated Diagnoses: Anemia, unspecified type      furosemide (LASIX) 20 MG tablet Take 20 mg by mouth daily as needed      methocarbamol (ROBAXIN) 500 MG tablet Take 500 mg by mouth 4 times daily as needed for muscle spasms      metoprolol succinate ER (TOPROL XL) 25 MG 24 hr tablet TAKE 1 TABLET BY MOUTH AT BEDTIME  Qty: 90 tablet, Refills: 3    Associated Diagnoses: Chronic heart failure with  preserved ejection fraction (H); Accelerated hypertension      Multiple Vitamin (THERA-TABS) TABS Take 1 tablet by mouth      nitroglycerin (NITROSTAT) 0.4 MG SL tablet [NITROGLYCERIN (NITROSTAT) 0.4 MG SL TABLET] Place 1 tablet (0.4 mg total) under the tongue every 5 (five) minutes as needed for chest pain.  Qty: 90 tablet, Refills: 0    Comments: This new medication is for treatment of cardiac chest pain.  Associated Diagnoses: NSTEMI (non-ST elevated myocardial infarction) (H)      OLANZapine (ZYPREXA) 2.5 MG tablet Take 1 tablet (2.5 mg) by mouth 2 times daily as needed (anxiety)  Qty: 14 tablet, Refills: 0    Associated Diagnoses: Adjustment disorder with mixed anxiety and depressed mood      omeprazole 20 MG tablet Take 20 mg by mouth daily as needed      QUEtiapine (SEROQUEL) 25 MG tablet Take 25 mg by mouth daily      rOPINIRole (REQUIP) 2 MG tablet Take 2 mg by mouth nightly as needed      Vitamin D3 (CHOLECALCIFEROL) 125 MCG (5000 UT) tablet Take 1 tablet by mouth daily         STOP taking these medications       sertraline (ZOLOFT) 25 MG tablet Comments:   Reason for Stopping:             Allergies   Allergies   Allergen Reactions     Amitriptyline Hcl [Amitriptyline] Other (See Comments)     Erythromycin Diarrhea and Other (See Comments)     Childhood reaction     Gabapentin Other (See Comments)     Jerking movements, swelling     Naproxen Unknown and Other (See Comments)     Other Environmental Allergy Unknown     Molds, dander     Pregabalin Other (See Comments)

## 2023-05-22 NOTE — PLAN OF CARE
PRIMARY DIAGNOSIS: ACUTE PAIN  OUTPATIENT/OBSERVATION GOALS TO BE MET BEFORE DISCHARGE:  1. Pain Status: Improved-controlled with oral pain medications.    2. Return to near baseline physical activity: Yes    3. Cleared for discharge by consultants (if involved): Yes     Discharge Planner Nurse   Safe discharge environment identified: Yes  Barriers to discharge: Yes, pending MD approval, pain being managed.       Entered by: Radha Ching RN 05/22/2023 10:36 AM     Please review provider order for any additional goals.   Nurse to notify provider when observation goals have been met and patient is ready for discharge.Goal Outcome Evaluation:

## 2023-05-22 NOTE — PLAN OF CARE
PRIMARY DIAGNOSIS: ACUTE PAIN  OUTPATIENT/OBSERVATION GOALS TO BE MET BEFORE DISCHARGE:  1. Pain Status: Improved-controlled with oral pain medications.    2. Return to near baseline physical activity: Yes    3. Cleared for discharge by consultants (if involved): Yes    Discharge Planner Nurse   Safe discharge environment identified: Yes  Barriers to discharge: No, patient discharging home with no services. Left with all personal belongings.        Entered by: Radha Ching RN 05/22/2023 2:34 PM     Please review provider order for any additional goals.   Nurse to notify provider when observation goals have been met and patient is ready for discharge.Goal Outcome Evaluation:

## 2023-05-22 NOTE — PLAN OF CARE
Goal Outcome Evaluation:                      PRIMARY DIAGNOSIS: ACUTE PAIN  OUTPATIENT/OBSERVATION GOALS TO BE MET BEFORE DISCHARGE:  1. Pain Status: Improved-controlled with oral pain medications.    2. Return to near baseline physical activity: Yes    3. Cleared for discharge by consultants (if involved): Yes    Discharge Planner Nurse   Safe discharge environment identified: Yes  Barriers to discharge: No, pt is due to discharge home today. Follow up with [facility].        Entered by: Yana Valles RN 05/22/2023 5:10 AM     Please review provider order for any additional goals.   Nurse to notify provider when observation goals have been met and patient is ready for discharge.

## 2023-05-22 NOTE — PLAN OF CARE
Problem: Pain Acute  Goal: Optimal Pain Control and Function  Outcome: Progressing  Intervention: Develop Pain Management Plan  Recent Flowsheet Documentation  Taken 5/21/2023 1930 by Carmita Potter, RN  Pain Management Interventions: medication (see MAR)  Taken 5/21/2023 1623 by Carmita Potter, RN  Pain Management Interventions: medication (see MAR)   Goal Outcome Evaluation:         Pt ambulated to waiting area to sit next to a tamar window at start of shift. Pt requested Norco x1 and Robaxin x1 for pain control with relief. Pt independent with minimal assist to tend to Meal and Adl assist/toileting and purewick placement at HS. Gave pt teaching regarding getting up after Norco and robaxin. Enc pt to use call light as needed. Pt states that Voltaren and lidocaine massaged into left shoulder muscle helps with pain. Cont to monitor and assist.

## 2023-05-22 NOTE — PROGRESS NOTES
Will not see today:  PAIN MANAGEMENT SERVICE CHART CHECK  This patient's chart has been reviewed by the Pain Service. It has been determined that no change is necessary to the pain management regimen at this time. The chart will be reviewed regularly and the patient will be seen if necessary. If you would like the patient to be seen, please contact the service at [phone] and ask to have the patient seen. Pain team signing off.     Thank you!      Blanquita Zimmerman APRN, CNS-BC, CNP, ACHPN  Acute Care Pain Management Program   Hours of pain coverage 7a-1700- after 1700 please call the house officer   [facility] (Patricio ROMAN, [place], [place], [place])   Page via [employer]- Click HERE to page Blanquita or Sj text web console

## 2023-05-30 ENCOUNTER — TELEPHONE (OUTPATIENT)
Dept: NEUROSURGERY | Facility: CLINIC | Age: 88
End: 2023-05-30

## 2023-05-30 NOTE — TELEPHONE ENCOUNTER
5/30/2023 attempted to call patient and Replaced by [facility] hospital follow up for neck pain with imaging evidence of severe stenosis C3-4 per Delilah H RN. Left detailed message to call and Replaced by [facility] appt with MERCEDES on Handy day.

## 2023-06-20 ENCOUNTER — OFFICE VISIT (OUTPATIENT)
Dept: NEUROSURGERY | Facility: CLINIC | Age: 88
End: 2023-06-20
Payer: COMMERCIAL

## 2023-06-20 VITALS — HEART RATE: 62 BPM | OXYGEN SATURATION: 97 % | SYSTOLIC BLOOD PRESSURE: 168 MMHG | DIASTOLIC BLOOD PRESSURE: 74 MMHG

## 2023-06-20 DIAGNOSIS — M48.02 SPINAL STENOSIS IN CERVICAL REGION: Primary | ICD-10-CM

## 2023-06-20 PROCEDURE — 99213 OFFICE O/P EST LOW 20 MIN: CPT | Performed by: NURSE PRACTITIONER

## 2023-06-20 ASSESSMENT — PAIN SCALES - GENERAL: PAINLEVEL: SEVERE PAIN (7)

## 2023-06-20 NOTE — PATIENT INSTRUCTIONS
Call [place] and ask if they can offer injections for your neck pain.   If they cannot, Let us know and we can see if [facility] can offer you any injections. [phone]     Severe canal compromise at C3-4 with no additional severe spinal stenosis. Probable moderate to severe foraminal stenosis C3-4. No significant foraminal stenosis.

## 2023-06-20 NOTE — PROGRESS NOTES
"Neurosurgery Follow UP  June 20, 2023    A/P: Sherice is here today for hospital follow up from May for 4 days of left sided neck pain. Images reviewed. MRI without signal change. Severe canal compromise is present at C3-C4 with Probable moderate-severe foraminal stenosis. Followed by Dr Handy. Sherice is a little uncertain as to why she is here today. She typically goes to [place] for her spine issues. Discussed we saw her while hospitalized recently and gave her the option to continue at [facility] or see us in follow up. Signals must have gotten crossed. She will continue to follow with [facility] and let us know if there is anything we can do to assist her in the future.     She has been told in the past she is too high risk for any surgery and if it were offered she would likely decline. She states her neck pain is improved since her hospital stay. Taking muscle relaxer which is helpful. She denies arm pain. Does get some tingling bilateral forearm near the wrist and attributes it to her \"3rd stage heart failure\". Denies dexterity issues. Does endorse gait instability. We discussed PT can be helpful for balance. We discussed she is at high risk for spinal cord injury if she were to have a fall due to her severe stenosis.     Hx Laminoplasty/laminecotmy many years ago     Physical Exam  BP (!) 168/74   Pulse 62   SpO2 97%     General: alert, oriented. MENDOZA. Speech is clear.   Motor: normal for age  Coordination: steady gait     Imaging: reviewed and compared to old films from 2016 - stenosis worse     Martha Aguayo, LATRICE-CNP  [facility] Neurosurgery  O: [phone]        "

## 2023-06-20 NOTE — LETTER
"    6/20/2023         RE: Sherice Alvarez  [address]  [address]  [place] [address]        Dear Colleague,    Thank you for referring your patient, Sherice Alvarez, to the [facility]ERY. Please see a copy of my visit note below.    Neurosurgery Follow UP  June 20, 2023    A/P: Sherice is here today for hospital follow up from May for 4 days of left sided neck pain. Images reviewed. MRI without signal change. Severe canal compromise is present at C3-C4 with Probable moderate-severe foraminal stenosis. Followed by Dr Handy. Sherice is a little uncertain as to why she is here today. She typically goes to [facility] [facility] for her spine issues. Discussed we saw her while hospitalized recently and gave her the option to continue at [place] [facility] or see us in follow up. Signals must have gotten crossed. She will continue to follow with [facility] [facility] and let us know if there is anything we can do to assist her in the future.     She has been told in the past she is too high risk for any surgery and if it were offered she would likely decline. She states her neck pain is improved since her hospital stay. Taking muscle relaxer which is helpful. She denies arm pain. Does get some tingling bilateral forearm near the wrist and attributes it to her \"3rd stage heart failure\". Denies dexterity issues. Does endorse gait instability. We discussed PT can be helpful for balance. We discussed she is at high risk for spinal cord injury if she were to have a fall due to her severe stenosis.     Hx Laminoplasty/laminecotmy many years ago     Physical Exam  BP (!) 168/74   Pulse 62   SpO2 97%     General: alert, oriented. MENDOZA. Speech is clear.   Motor: normal for age  Coordination: steady gait     Imaging: reviewed and compared to old films from 2016 - stenosis worse     Martha Aguayo, AG-CNP  [facility] Neurosurgery  O: [phone]            Again, thank you for allowing me to participate in the care of your " patient.        Sincerely,        TAVO Webber CNP

## 2023-06-20 NOTE — NURSING NOTE
"Sherice Alvarez is a 88 year old female who presents for:  Chief Complaint   Patient presents with     RECHECK        Initial Vitals:  BP (!) 168/74   Pulse 62   SpO2 97%  Estimated body mass index is 24.14 kg/m  as calculated from the following:    Height as of 2/14/23: 5' 2\" (1.575 m).    Weight as of 4/17/23: 132 lb (59.9 kg).. There is no height or weight on file to calculate BSA. BP completed using cuff size: regular  Severe Pain (7)    Jose M France    "

## 2023-06-23 ENCOUNTER — OFFICE VISIT (OUTPATIENT)
Dept: CARDIOLOGY | Facility: CLINIC | Age: 88
End: 2023-06-23
Payer: COMMERCIAL

## 2023-06-23 VITALS
RESPIRATION RATE: 16 BRPM | HEIGHT: 62 IN | HEART RATE: 77 BPM | SYSTOLIC BLOOD PRESSURE: 130 MMHG | WEIGHT: 130 LBS | BODY MASS INDEX: 23.92 KG/M2 | DIASTOLIC BLOOD PRESSURE: 62 MMHG

## 2023-06-23 DIAGNOSIS — I25.83 CORONARY ARTERIOSCLEROSIS DUE TO LIPID RICH PLAQUE: Primary | ICD-10-CM

## 2023-06-23 DIAGNOSIS — I50.31 ACUTE HEART FAILURE WITH PRESERVED EJECTION FRACTION (H): ICD-10-CM

## 2023-06-23 DIAGNOSIS — I10 ACCELERATED HYPERTENSION: ICD-10-CM

## 2023-06-23 DIAGNOSIS — N18.31 STAGE 3A CHRONIC KIDNEY DISEASE (H): ICD-10-CM

## 2023-06-23 DIAGNOSIS — E78.5 DYSLIPIDEMIA, GOAL LDL BELOW 100: ICD-10-CM

## 2023-06-23 DIAGNOSIS — M54.2 NECK PAIN: ICD-10-CM

## 2023-06-23 DIAGNOSIS — Z95.2 S/P TAVR (TRANSCATHETER AORTIC VALVE REPLACEMENT): ICD-10-CM

## 2023-06-23 PROCEDURE — 99214 OFFICE O/P EST MOD 30 MIN: CPT | Performed by: INTERNAL MEDICINE

## 2023-06-23 RX ORDER — SENNOSIDES 8.6 MG
1 TABLET ORAL PRN
COMMUNITY
End: 2024-02-09

## 2023-06-23 RX ORDER — NITROGLYCERIN 0.4 MG/1
0.4 TABLET SUBLINGUAL EVERY 5 MIN PRN
Qty: 90 TABLET | Refills: 0 | Status: SHIPPED | OUTPATIENT
Start: 2023-06-23

## 2023-06-23 NOTE — LETTER
6/23/2023    [facility]  [address]  [facility] [address]    RE: Sherice Alvarez       Dear Colleague,     I had the pleasure of seeing Sherice Alvarez in the [facility].      [facility]  Heart Care Clinic Follow-up Note    Assessment & Plan        (I25.10,  I25.83) Coronary arteriosclerosis due to lipid rich plaque  (primary encounter diagnosis)  Comment:  Angiography April 2020 showed normal left main, proximal LAD 20% stenosis with patent stent, mid sequential 30% lesion with a distal 70% lesion, circumflex with a patent proximal stent with a third obtuse marginal artery 99% stenosis.  Right coronary artery patent stents with a mid 40% stenosis. Attempted intervention on distal LAD as well as obtuse marginal artery which were unsuccessful and underwent nuclear stress test May 2021 which was normal.  Presented again to the hospital and was admitted for chest pain with rule out and unremarkable nuclear stress test 2022.  Currently work on preventive therapy as she is not having any significant symptoms.     (I35.0) Severe calcific aortic valve stenosis  Comment: Given this she had a 23 mm DANIEL 3 valve placed June 2020 with echo showing mean gradient of 7 mmHg and peak velocity 1.9 m/s.  Recheck echo December 2023.       (I50.33) Acute on chronic heart failure with preserved ejection fraction (H)  Comment: No significant signs or symptoms currently, on furosemide only as needed due to dry mouth, this is continued and I suggest he speak with her primary about possible Sjogren's syndrome.    (E78.5) Dyslipidemia, goal LDL below 100  Comment: Total cholesterol 222 with LDL of 148, as above does not appear to be taking statin anymore. We will need to address.     (I10) Accelerated hypertension  Comment: Under good control currently only on metoprolol and amlodipine.     (N18.31) Stage 3a chronic kidney disease (H)  Comment: Creatinine increased to 1.44 with a GFR 35 and  defer to primary.    (M54.2) Neck pain  Comment: Diagnosed with recent severe cervical stenosis at C3-C4, defer to neurosurgery.  No cardiac contraindication undergoing surgery if need be.    Plan  1.  Speak with primary concerning for Sjogren's syndrome.  2.  Will have nurse connect with her concerning medicine she is taking at home, confusion whether she is on statin or amlodipine.  If not consider restarting statin.  3.  Follow-up me in 6 months or sooner if need be.    Subjective  CC: 88-year-old white female here for follow-up visit.  Since have seen her her son who was in hospice has .  She is dealing with grief from that.  She is still living independently, tells me she still has white hands, describes a Raynaud's phenomenon.  She tells me she has some mild bipedal edema at the end of the day as well as some peripheral leg cramps.  Tells me she has itching all over.  There is no rash.  She also complains of just generalized fatigue.  No syncope, presyncope, chest pains, palpitations, significant shortness of breath, PND or orthopnea.    Medications  Current Outpatient Medications   Medication Sig Dispense Refill    acetaminophen (TYLENOL) 500 MG tablet Take 500-1,000 mg by mouth every 8 hours as needed for mild pain      albuterol (PROVENTIL HFA;VENTOLIN HFA) 90 mcg/actuation inhaler [ALBUTEROL (PROVENTIL HFA;VENTOLIN HFA) 90 MCG/ACTUATION INHALER] Inhale 1-2 puffs every 6 (six) hours as needed for wheezing.      amLODIPine (NORVASC) 5 MG tablet Take 5 mg by mouth daily      aspirin (ASA) 81 MG EC tablet Take 81 mg by mouth daily      diclofenac (VOLTAREN) 1 % topical gel Apply 2-4 g topically 4 times daily as needed APPLY TO LEFT SHOULDER AND LEFT KNEE.      donepezil (ARICEPT) 5 MG tablet Take 5 mg by mouth daily      ferrous sulfate 325 (65 FE) MG tablet [FERROUS SULFATE 325 (65 FE) MG TABLET] Take 1 tablet (325 mg total) by mouth daily with breakfast.  0    furosemide (LASIX) 20 MG tablet Take 20  "mg by mouth daily as needed      HYDROcodone-acetaminophen (NORCO) 5-325 MG tablet Take 1 tablet by mouth every 8 hours as needed for severe pain 7 tablet 0    lidocaine (XYLOCAINE) 5 % external ointment Apply topically 3 times daily 50 g 0    methocarbamol (ROBAXIN) 500 MG tablet Take 500 mg by mouth 4 times daily as needed for muscle spasms      metoprolol succinate ER (TOPROL XL) 25 MG 24 hr tablet TAKE 1 TABLET BY MOUTH AT BEDTIME 90 tablet 3    Multiple Vitamin (THERA-TABS) TABS Take 1 tablet by mouth daily      nitroGLYcerin (NITROSTAT) 0.4 MG sublingual tablet Place 1 tablet (0.4 mg) under the tongue every 5 minutes as needed 90 tablet 0    OLANZapine (ZYPREXA) 2.5 MG tablet Take 1 tablet (2.5 mg) by mouth 2 times daily as needed (anxiety) 14 tablet 0    omeprazole 20 MG tablet Take 20 mg by mouth daily as needed      QUEtiapine (SEROQUEL) 25 MG tablet Take 25 mg by mouth At Bedtime      rOPINIRole (REQUIP) 2 MG tablet Take 2 mg by mouth nightly as needed      sennosides (SENOKOT) 8.6 MG tablet Take 1 tablet by mouth as needed for constipation      Vitamin D3 (CHOLECALCIFEROL) 125 MCG (5000 UT) tablet Take 1 tablet by mouth daily         Objective  /62 (BP Location: Left arm, Patient Position: Sitting, Cuff Size: Adult Regular)   Pulse 77   Resp 16   Ht 1.575 m (5' 2\")   Wt 59 kg (130 lb)   BMI 23.78 kg/m      General Appearance:    Alert, cooperative, no distress, appears stated age   Head:    Normocephalic, without obvious abnormality, atraumatic   Throat:   Lips, mucosa, and tongue normal; teeth and gums normal   Neck:   Supple, symmetrical, trachea midline, no adenopathy;        thyroid:  No enlargement/tenderness/nodules; no carotid    bruit or JVD   Back:     Symmetric, no curvature, ROM normal, no CVA tenderness   Lungs:     Clear to auscultation bilaterally, respirations unlabored   Chest wall:    No tenderness or deformity   Heart:    Regular rate and rhythm, S1 and S2 normal, no murmur, " rub   or gallop   Abdomen:     Soft, non-tender, bowel sounds active all four quadrants,     no masses, no organomegaly   Extremities:   Normal, atraumatic, no cyanosis or edema   Pulses:   2+ and symmetric all extremities   Skin:   Skin color, texture, turgor normal, no rashes or lesions     Results    Lab Results personally reviewed   Lab Results   Component Value Date    CHOL 222 (H) 07/12/2022    CHOL 226 (H) 03/30/2021     Lab Results   Component Value Date    HDL 59 07/12/2022    HDL 76 03/30/2021     No components found for: LDLCALC  Lab Results   Component Value Date    TRIG 75 07/12/2022    TRIG 89 03/30/2021     Lab Results   Component Value Date    WBC 8.2 05/20/2023    HGB 10.2 (L) 05/20/2023    HCT 33.1 (L) 05/20/2023     05/20/2023     Lab Results   Component Value Date    BUN 25.5 (H) 05/20/2023     (L) 05/20/2023    CO2 19 (L) 05/20/2023             Thank you for allowing me to participate in the care of your patient.      Sincerely,     NANCY FELDMAN MD     [facility]  cc:   Nancy Feldman MD  [address], [address]  [place] [id number]

## 2023-06-23 NOTE — PATIENT INSTRUCTIONS
Ms Sherice Alvarez,  I enjoyed visiting with you again today.  I am not too concerned with your heart status, I do not think you have heart failure, your valve is working well and heart function is normal.   Per our conversation given the dry mouth despite no lasix it could be SJORGREN'S SYNDROME and would suggest discussing with your primary.  I will plan on seeing you 6 months and once again sorry about your son.  Ulisses Feldman

## 2023-06-23 NOTE — PROGRESS NOTES
[facility]  Heart Care Clinic Follow-up Note    Assessment & Plan        (I25.10,  I25.83) Coronary arteriosclerosis due to lipid rich plaque  (primary encounter diagnosis)  Comment:  Angiography April 2020 showed normal left main, proximal LAD 20% stenosis with patent stent, mid sequential 30% lesion with a distal 70% lesion, circumflex with a patent proximal stent with a third obtuse marginal artery 99% stenosis.  Right coronary artery patent stents with a mid 40% stenosis. Attempted intervention on distal LAD as well as obtuse marginal artery which were unsuccessful and underwent nuclear stress test May 2021 which was normal.  Presented again to the hospital and was admitted for chest pain with rule out and unremarkable nuclear stress test 2022.  Currently work on preventive therapy as she is not having any significant symptoms.     (I35.0) Severe calcific aortic valve stenosis  Comment: Given this she had a 23 mm DANIEL 3 valve placed June 2020 with echo showing mean gradient of 7 mmHg and peak velocity 1.9 m/s.  Recheck echo December 2023.       (I50.33) Acute on chronic heart failure with preserved ejection fraction (H)  Comment: No significant signs or symptoms currently, on furosemide only as needed due to dry mouth, this is continued and I suggest he speak with her primary about possible Sjogren's syndrome.    (E78.5) Dyslipidemia, goal LDL below 100  Comment: Total cholesterol 222 with LDL of 148, as above does not appear to be taking statin anymore. We will need to address.     (I10) Accelerated hypertension  Comment: Under good control currently only on metoprolol and amlodipine.     (N18.31) Stage 3a chronic kidney disease (H)  Comment: Creatinine increased to 1.44 with a GFR 35 and defer to primary.    (M54.2) Neck pain  Comment: Diagnosed with recent severe cervical stenosis at C3-C4, defer to neurosurgery.  No cardiac contraindication undergoing surgery if need be.    Plan  1.  Speak with  primary concerning for Sjogren's syndrome.  2.  Will have nurse connect with her concerning medicine she is taking at home, confusion whether she is on statin or amlodipine.  If not consider restarting statin.  3.  Follow-up me in 6 months or sooner if need be.    Subjective  CC: 88-year-old white female here for follow-up visit.  Since have seen her her son who was in hospice has .  She is dealing with grief from that.  She is still living independently, tells me she still has white hands, describes a Raynaud's phenomenon.  She tells me she has some mild bipedal edema at the end of the day as well as some peripheral leg cramps.  Tells me she has itching all over.  There is no rash.  She also complains of just generalized fatigue.  No syncope, presyncope, chest pains, palpitations, significant shortness of breath, PND or orthopnea.    Medications  Current Outpatient Medications   Medication Sig Dispense Refill     acetaminophen (TYLENOL) 500 MG tablet Take 500-1,000 mg by mouth every 8 hours as needed for mild pain       albuterol (PROVENTIL HFA;VENTOLIN HFA) 90 mcg/actuation inhaler [ALBUTEROL (PROVENTIL HFA;VENTOLIN HFA) 90 MCG/ACTUATION INHALER] Inhale 1-2 puffs every 6 (six) hours as needed for wheezing.       amLODIPine (NORVASC) 5 MG tablet Take 5 mg by mouth daily       aspirin (ASA) 81 MG EC tablet Take 81 mg by mouth daily       diclofenac (VOLTAREN) 1 % topical gel Apply 2-4 g topically 4 times daily as needed APPLY TO LEFT SHOULDER AND LEFT KNEE.       donepezil (ARICEPT) 5 MG tablet Take 5 mg by mouth daily       ferrous sulfate 325 (65 FE) MG tablet [FERROUS SULFATE 325 (65 FE) MG TABLET] Take 1 tablet (325 mg total) by mouth daily with breakfast.  0     furosemide (LASIX) 20 MG tablet Take 20 mg by mouth daily as needed       HYDROcodone-acetaminophen (NORCO) 5-325 MG tablet Take 1 tablet by mouth every 8 hours as needed for severe pain 7 tablet 0     lidocaine (XYLOCAINE) 5 % external ointment  "Apply topically 3 times daily 50 g 0     methocarbamol (ROBAXIN) 500 MG tablet Take 500 mg by mouth 4 times daily as needed for muscle spasms       metoprolol succinate ER (TOPROL XL) 25 MG 24 hr tablet TAKE 1 TABLET BY MOUTH AT BEDTIME 90 tablet 3     Multiple Vitamin (THERA-TABS) TABS Take 1 tablet by mouth daily       nitroGLYcerin (NITROSTAT) 0.4 MG sublingual tablet Place 1 tablet (0.4 mg) under the tongue every 5 minutes as needed 90 tablet 0     OLANZapine (ZYPREXA) 2.5 MG tablet Take 1 tablet (2.5 mg) by mouth 2 times daily as needed (anxiety) 14 tablet 0     omeprazole 20 MG tablet Take 20 mg by mouth daily as needed       QUEtiapine (SEROQUEL) 25 MG tablet Take 25 mg by mouth At Bedtime       rOPINIRole (REQUIP) 2 MG tablet Take 2 mg by mouth nightly as needed       sennosides (SENOKOT) 8.6 MG tablet Take 1 tablet by mouth as needed for constipation       Vitamin D3 (CHOLECALCIFEROL) 125 MCG (5000 UT) tablet Take 1 tablet by mouth daily         Objective  /62 (BP Location: Left arm, Patient Position: Sitting, Cuff Size: Adult Regular)   Pulse 77   Resp 16   Ht 1.575 m (5' 2\")   Wt 59 kg (130 lb)   BMI 23.78 kg/m      General Appearance:    Alert, cooperative, no distress, appears stated age   Head:    Normocephalic, without obvious abnormality, atraumatic   Throat:   Lips, mucosa, and tongue normal; teeth and gums normal   Neck:   Supple, symmetrical, trachea midline, no adenopathy;        thyroid:  No enlargement/tenderness/nodules; no carotid    bruit or JVD   Back:     Symmetric, no curvature, ROM normal, no CVA tenderness   Lungs:     Clear to auscultation bilaterally, respirations unlabored   Chest wall:    No tenderness or deformity   Heart:    Regular rate and rhythm, S1 and S2 normal, no murmur, rub   or gallop   Abdomen:     Soft, non-tender, bowel sounds active all four quadrants,     no masses, no organomegaly   Extremities:   Normal, atraumatic, no cyanosis or edema   Pulses:   2+ " and symmetric all extremities   Skin:   Skin color, texture, turgor normal, no rashes or lesions     Results    Lab Results personally reviewed   Lab Results   Component Value Date    CHOL 222 (H) 07/12/2022    CHOL 226 (H) 03/30/2021     Lab Results   Component Value Date    HDL 59 07/12/2022    HDL 76 03/30/2021     No components found for: LDLCALC  Lab Results   Component Value Date    TRIG 75 07/12/2022    TRIG 89 03/30/2021     Lab Results   Component Value Date    WBC 8.2 05/20/2023    HGB 10.2 (L) 05/20/2023    HCT 33.1 (L) 05/20/2023     05/20/2023     Lab Results   Component Value Date    BUN 25.5 (H) 05/20/2023     (L) 05/20/2023    CO2 19 (L) 05/20/2023

## 2023-06-26 ENCOUNTER — TELEPHONE (OUTPATIENT)
Dept: CARDIOLOGY | Facility: CLINIC | Age: 88
End: 2023-06-26

## 2023-06-26 NOTE — TELEPHONE ENCOUNTER
Left detailed message with the clinic direct number to call and review her medication list.    Per OV 6/23/2023:  Plan  1.  Speak with primary concerning for Sjogren's syndrome.  2.  Will have nurse connect with her concerning medicine she is taking at home, confusion whether she is on statin or amlodipine.  If not consider restarting statin.  3.  Follow-up me in 6 months or sooner if need be.

## 2023-06-26 NOTE — TELEPHONE ENCOUNTER
----- Message from Ulisses Feldman MD sent at 6/23/2023 11:02 AM CDT -----  Can we connect with her and figure out what medication she is taking at home, question of whether she is still on amlodipine and metoprolol?  Is she on a statin?  If not on a statin why was it stopped and we need to restart.  LF

## 2023-07-07 ENCOUNTER — TRANSFERRED RECORDS (OUTPATIENT)
Dept: HEALTH INFORMATION MANAGEMENT | Facility: CLINIC | Age: 88
End: 2023-07-07

## 2023-08-31 ENCOUNTER — APPOINTMENT (OUTPATIENT)
Dept: RADIOLOGY | Facility: HOSPITAL | Age: 88
End: 2023-08-31
Attending: EMERGENCY MEDICINE
Payer: COMMERCIAL

## 2023-08-31 ENCOUNTER — APPOINTMENT (OUTPATIENT)
Dept: ULTRASOUND IMAGING | Facility: HOSPITAL | Age: 88
End: 2023-08-31
Attending: EMERGENCY MEDICINE
Payer: COMMERCIAL

## 2023-08-31 ENCOUNTER — HOSPITAL ENCOUNTER (EMERGENCY)
Facility: HOSPITAL | Age: 88
Discharge: HOME | End: 2023-08-31
Attending: EMERGENCY MEDICINE | Admitting: EMERGENCY MEDICINE
Payer: COMMERCIAL

## 2023-08-31 VITALS
OXYGEN SATURATION: 96 % | HEIGHT: 61 IN | SYSTOLIC BLOOD PRESSURE: 143 MMHG | RESPIRATION RATE: 26 BRPM | BODY MASS INDEX: 24.92 KG/M2 | HEART RATE: 62 BPM | DIASTOLIC BLOOD PRESSURE: 63 MMHG | TEMPERATURE: 98.3 F | WEIGHT: 132 LBS

## 2023-08-31 DIAGNOSIS — R60.0 PERIPHERAL EDEMA: ICD-10-CM

## 2023-08-31 LAB
ANION GAP SERPL CALCULATED.3IONS-SCNC: 7 MMOL/L (ref 7–15)
BASOPHILS # BLD AUTO: 0.1 10E3/UL (ref 0–0.2)
BASOPHILS NFR BLD AUTO: 1 %
BUN SERPL-MCNC: 25.9 MG/DL (ref 8–23)
CALCIUM SERPL-MCNC: 9.1 MG/DL (ref 8.8–10.2)
CHLORIDE SERPL-SCNC: 108 MMOL/L (ref 98–107)
CREAT SERPL-MCNC: 1.32 MG/DL (ref 0.51–0.95)
DEPRECATED HCO3 PLAS-SCNC: 27 MMOL/L (ref 22–29)
EOSINOPHIL # BLD AUTO: 0.3 10E3/UL (ref 0–0.7)
EOSINOPHIL NFR BLD AUTO: 3 %
ERYTHROCYTE [DISTWIDTH] IN BLOOD BY AUTOMATED COUNT: 19.9 % (ref 10–15)
GFR SERPL CREATININE-BSD FRML MDRD: 38 ML/MIN/1.73M2
GLUCOSE SERPL-MCNC: 95 MG/DL (ref 70–99)
HCT VFR BLD AUTO: 34 % (ref 35–47)
HGB BLD-MCNC: 10.9 G/DL (ref 11.7–15.7)
HOLD SPECIMEN: NORMAL
IMM GRANULOCYTES # BLD: 0.1 10E3/UL
IMM GRANULOCYTES NFR BLD: 1 %
LYMPHOCYTES # BLD AUTO: 2.1 10E3/UL (ref 0.8–5.3)
LYMPHOCYTES NFR BLD AUTO: 21 %
MCH RBC QN AUTO: 30.1 PG (ref 26.5–33)
MCHC RBC AUTO-ENTMCNC: 32.1 G/DL (ref 31.5–36.5)
MCV RBC AUTO: 94 FL (ref 78–100)
MONOCYTES # BLD AUTO: 0.8 10E3/UL (ref 0–1.3)
MONOCYTES NFR BLD AUTO: 8 %
NEUTROPHILS # BLD AUTO: 6.6 10E3/UL (ref 1.6–8.3)
NEUTROPHILS NFR BLD AUTO: 66 %
NRBC # BLD AUTO: 0 10E3/UL
NRBC BLD AUTO-RTO: 0 /100
NT-PROBNP SERPL-MCNC: 534 PG/ML (ref 0–1800)
PLATELET # BLD AUTO: 237 10E3/UL (ref 150–450)
POTASSIUM SERPL-SCNC: 4.1 MMOL/L (ref 3.4–5.3)
RBC # BLD AUTO: 3.62 10E6/UL (ref 3.8–5.2)
SODIUM SERPL-SCNC: 142 MMOL/L (ref 136–145)
TROPONIN T SERPL HS-MCNC: 29 NG/L
TROPONIN T SERPL HS-MCNC: 29 NG/L
WBC # BLD AUTO: 10.1 10E3/UL (ref 4–11)

## 2023-08-31 PROCEDURE — 93005 ELECTROCARDIOGRAM TRACING: CPT | Performed by: STUDENT IN AN ORGANIZED HEALTH CARE EDUCATION/TRAINING PROGRAM

## 2023-08-31 PROCEDURE — 85025 COMPLETE CBC W/AUTO DIFF WBC: CPT | Performed by: EMERGENCY MEDICINE

## 2023-08-31 PROCEDURE — 80048 BASIC METABOLIC PNL TOTAL CA: CPT | Performed by: EMERGENCY MEDICINE

## 2023-08-31 PROCEDURE — 99285 EMERGENCY DEPT VISIT HI MDM: CPT | Mod: 25

## 2023-08-31 PROCEDURE — 96374 THER/PROPH/DIAG INJ IV PUSH: CPT

## 2023-08-31 PROCEDURE — 84484 ASSAY OF TROPONIN QUANT: CPT | Performed by: STUDENT IN AN ORGANIZED HEALTH CARE EDUCATION/TRAINING PROGRAM

## 2023-08-31 PROCEDURE — 36415 COLL VENOUS BLD VENIPUNCTURE: CPT | Performed by: STUDENT IN AN ORGANIZED HEALTH CARE EDUCATION/TRAINING PROGRAM

## 2023-08-31 PROCEDURE — 80048 BASIC METABOLIC PNL TOTAL CA: CPT | Performed by: STUDENT IN AN ORGANIZED HEALTH CARE EDUCATION/TRAINING PROGRAM

## 2023-08-31 PROCEDURE — 93970 EXTREMITY STUDY: CPT

## 2023-08-31 PROCEDURE — 250N000011 HC RX IP 250 OP 636: Performed by: EMERGENCY MEDICINE

## 2023-08-31 PROCEDURE — 83880 ASSAY OF NATRIURETIC PEPTIDE: CPT | Performed by: EMERGENCY MEDICINE

## 2023-08-31 PROCEDURE — 84484 ASSAY OF TROPONIN QUANT: CPT | Performed by: EMERGENCY MEDICINE

## 2023-08-31 PROCEDURE — 93005 ELECTROCARDIOGRAM TRACING: CPT | Performed by: EMERGENCY MEDICINE

## 2023-08-31 PROCEDURE — 71045 X-RAY EXAM CHEST 1 VIEW: CPT

## 2023-08-31 PROCEDURE — 85025 COMPLETE CBC W/AUTO DIFF WBC: CPT | Performed by: STUDENT IN AN ORGANIZED HEALTH CARE EDUCATION/TRAINING PROGRAM

## 2023-08-31 PROCEDURE — 36415 COLL VENOUS BLD VENIPUNCTURE: CPT | Performed by: EMERGENCY MEDICINE

## 2023-08-31 PROCEDURE — 250N000013 HC RX MED GY IP 250 OP 250 PS 637: Performed by: EMERGENCY MEDICINE

## 2023-08-31 RX ORDER — ROPINIROLE 1 MG/1
2 TABLET, FILM COATED ORAL ONCE
Status: COMPLETED | OUTPATIENT
Start: 2023-08-31 | End: 2023-08-31

## 2023-08-31 RX ORDER — KETOROLAC TROMETHAMINE 15 MG/ML
15 INJECTION, SOLUTION INTRAMUSCULAR; INTRAVENOUS ONCE
Status: COMPLETED | OUTPATIENT
Start: 2023-08-31 | End: 2023-08-31

## 2023-08-31 RX ADMIN — ROPINIROLE HYDROCHLORIDE 2 MG: 1 TABLET, FILM COATED ORAL at 16:15

## 2023-08-31 RX ADMIN — KETOROLAC TROMETHAMINE 15 MG: 15 INJECTION INTRAMUSCULAR; INTRAVENOUS at 14:48

## 2023-08-31 ASSESSMENT — ENCOUNTER SYMPTOMS
BACK PAIN: 1
NUMBNESS: 0

## 2023-08-31 ASSESSMENT — ACTIVITIES OF DAILY LIVING (ADL)
ADLS_ACUITY_SCORE: 35
ADLS_ACUITY_SCORE: 35

## 2023-08-31 NOTE — ED TRIAGE NOTES
"Patient with CHF hx increasing edema in lower extremities, worse on left. Did see MD ten days ago, lab tests, \"everything was fine\" here today for edema and pain in lower extremtites and left back back (chronic) radiating down left leg. \"I was having chest pain a few days ago, not now\"        "

## 2023-08-31 NOTE — ED NOTES
Per MD pritchard, pt took home dose of metoprolol 25mg at this time. Discomfort and spasm significantly improved after Requip in addition. MD shankar

## 2023-08-31 NOTE — ED NOTES
"Pt reports that she did take her BP medication today but it \"is so off\" took it at approx 0300 this AM and does take it twice a day. BP hypertensive. MD notified  "

## 2023-08-31 NOTE — ED NOTES
Pt endorsing increased discomfort in her left leg of which she has experienced for some time but today significantly worse. Describes as a muscle and nerve pain that jolts and worse with bending her leg. Helps to sit at side of bed. MD  noted and ordered Requip of which pt reports she takes for relief with this issue

## 2023-08-31 NOTE — ED PROVIDER NOTES
EMERGENCY DEPARTMENT ENCOUNTER      NAME: Sherice Alvarez  AGE: 89 year old female  YOB: 1934  MRN: [id number]  EVALUATION DATE & TIME: 2023  2:25 PM    PCP: Rosemary Mauricio    ED PROVIDER: Ranjeet Ventura M.D.      Chief Complaint   Patient presents with    Leg Swelling    Back Pain         FINAL IMPRESSION:  1.  Peripheral edema, left greater than right.  2.  Chronic low back pain.      ED COURSE & MEDICAL DECISION MAKIN:13 PM I met with the patient to gather history and to perform my initial exam. We discussed plans for the ED course, including diagnostic testing and treatment. PPE worn: cloth mask.  Patient has chronic low back pain.  She also notes bilateral leg swelling, left greater than right, for over a month.  She has not seen her doctor for this problem.  She did see her doctor 10 days ago reportedly evaluation was negative at that time.  She notes increasing pain and swelling in her legs.  Denies any current chest pain, shortness of breath, lightheadedness, etc.  Denies any back pain or hemoptysis.  5:41 PM Rechecked and updated patient on lab and imaging results. Discussed further plan of care.      Pertinent Labs & Imaging studies reviewed. (See chart for details)  89 year old female presents to the Emergency Department for evaluation of increasing peripheral edema.    At the conclusion of the encounter I discussed the results of all of the tests and the disposition. The questions were answered. The patient or family acknowledged understanding and was agreeable with the care plan.              Medical Decision Making    History:  Supplemental history from: Documented in chart, if applicable  External Record(s) reviewed: Both inpatient and outpatient [facility] were reviewed.    Work Up:  Chart documentation includes differential considered and any EKGs or imaging independently interpreted by provider, where specified.  Differential diagnosis includes  peripheral edema, CHF, etc.  In additional to work up documented, I considered the following work up: Documented in chart, if applicable.    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   Care affected by social determinants of health: N/A    Disposition considerations: Dissipate discharge home if evaluation negative.          MEDICATIONS GIVEN IN THE EMERGENCY:  Medications   ketorolac (TORADOL) injection 15 mg (15 mg Intravenous $Given 8/31/23 1448)   rOPINIRole (REQUIP) tablet 2 mg (2 mg Oral $Given 8/31/23 1615)       NEW PRESCRIPTIONS STARTED AT TODAY'S ER VISIT  New Prescriptions    No medications on file          =================================================================    HPI    Patient information was obtained from: Patient    Use of : N/A         Sherice Alvarez is a 89 year old female with a pertinent history of Hypothyroidism, dyslipidemia, CAD in native artery, asthma, and dyspnea who presents to this ED walk in for evaluation of leg swelling and back pain.     The patient reports that her legs have been hurting for awhile and it worsened and swelled up. She states that her left leg hurts more than her right. She did not take any pain medications for it. She does take 5 mg of oxycodone. She mentions that she has chronic back pain and is given injections for it.     She does not identify any waxing or waning symptoms otherwise, exacerbating or alleviating features, associated symptoms except as mentioned. She denies any pain related complaints.    REVIEW OF SYSTEMS   Review of Systems   Cardiovascular:  Positive for leg swelling.   Musculoskeletal:  Positive for back pain.   Neurological:  Negative for numbness.        PAST MEDICAL HISTORY:  Past Medical History:   Diagnosis Date     Arthritis     Asthma     CAD (coronary artery disease)     Cancer (H)     basal cell    Chronic kidney disease     ckd 3    Chronic pain syndrome     Congestive heart failure (H)     Crohn's disease (H)     GERD (gastroesophageal reflux disease)     Hx of heart artery stent 2016    1 stent R Proximal CA and 1 Stent L Proximal circumflex  2016 Stent proximal LAD    Hyperlipidemia     Hypertension     NSTEMI (non-ST elevated myocardial infarction) (H) 2016    PONV (postoperative nausea and vomiting)     severe povn with last knee surgery    Restless legs syndrome     Stroke (H) 2010    numbness rt jaw       PAST SURGICAL HISTORY:  Past Surgical History:   Procedure Laterality Date    APPENDECTOMY      CARDIAC CATHETERIZATION  2016    multiple vessel disease.  no intervention    CARDIAC CATHETERIZATION  2016    CARDIAC CATHETERIZATION N/A 2016    Procedure: Coronary Angiogram;  Surgeon: Sunil Moran MD;  Location: [facility] Cath Lab;  Service:     CAROTID ENDARTERECTOMY      CAROTID ENDARTERECTOMY Right 2010    CERVICAL LAMINECTOMY  1994     SECTION      CV CORONARY ANGIOGRAM N/A 2020    Procedure: Coronary Angiogram;  Surgeon: Vinita Ramos MD;  Location: [facility] Cath Lab;  Service: Cardiology    CV LEFT HEART CATHETERIZATION WITHOUT LEFT VENTRICULOGRAM Left 2020    Procedure: Left Heart Catheterization Without Left Ventriculogram;  Surgeon: Jerad Lerner MD;  Location: [facility] Cath Lab;  Service: Cardiology    CV TRANSCATHETER AORTIC VALVE REPLACEMENT - OTHER APPROACH N/A 2020    Procedure: CV TRANSCATHETER AORTIC VALVE REPLACEMENT - RIGHT SUBCLAVIAN APPROACH;  Surgeon: John Caceres MD;  Location: [facility] Cath Lab;  Service: Cardiology    HEART CATH, ANGIOPLASTY      HEMORRHOIDECTOMY EXTERNAL      IR LUMBAR EPIDURAL STEROID INJECTION  2014    IR LUMBAR NERVE ROOT INJECTION  10/01/2013    JOINT REPLACEMENT       GA ESOPHAGOGASTRODUODENOSCOPY TRANSORAL DIAGNOSTIC N/A 07/10/2019    Procedure: ESOPHAGOGASTRODUODENOSCOPY (EGD) with gastric biopsy;  Surgeon: Sunil Stuart MD;  Location: [facility] GI;  Service: Gastroenterology    GA REPLACE AORTIC VALVE OPEN AXILLRY ARTRY APPROACH N/A 06/02/2020    Procedure: OR TRANSCATHETER AORTIC VALVE REPLACEMENT, SUBCLAVIAN APPROACH;  Surgeon: Bhavin Cuadra MD;  Location: [facility] Cath Lab;  Service: Cardiology    TONSILLECTOMY & ADENOIDECTOMY      TOTAL KNEE ARTHROPLASTY Right     TRANSCATHETER AORTIC-VALVE REPLACEMENT      ZZC TOTAL KNEE ARTHROPLASTY Left 05/11/2021    Procedure: LEFT TOTAL KNEE ARTHROPLASTY;  Surgeon: Doug Rhodes MD;  Location: [facility];  Service: Orthopedics           CURRENT MEDICATIONS:    acetaminophen (TYLENOL) 500 MG tablet  albuterol (PROVENTIL HFA;VENTOLIN HFA) 90 mcg/actuation inhaler  amLODIPine (NORVASC) 5 MG tablet  aspirin (ASA) 81 MG EC tablet  diclofenac (VOLTAREN) 1 % topical gel  donepezil (ARICEPT) 5 MG tablet  ferrous sulfate 325 (65 FE) MG tablet  furosemide (LASIX) 20 MG tablet  HYDROcodone-acetaminophen (NORCO) 5-325 MG tablet  lidocaine (XYLOCAINE) 5 % external ointment  methocarbamol (ROBAXIN) 500 MG tablet  metoprolol succinate ER (TOPROL XL) 25 MG 24 hr tablet  Multiple Vitamin (THERA-TABS) TABS  nitroGLYcerin (NITROSTAT) 0.4 MG sublingual tablet  OLANZapine (ZYPREXA) 2.5 MG tablet  omeprazole 20 MG tablet  QUEtiapine (SEROQUEL) 25 MG tablet  rOPINIRole (REQUIP) 2 MG tablet  sennosides (SENOKOT) 8.6 MG tablet  Vitamin D3 (CHOLECALCIFEROL) 125 MCG (5000 UT) tablet        ALLERGIES:  Allergies   Allergen Reactions    Amitriptyline Hcl [Amitriptyline] Other (See Comments)    Erythromycin Diarrhea and Other (See Comments)     Childhood reaction    Gabapentin Other (See Comments)     Jerking movements, swelling    Naproxen Unknown and Other (See Comments)    Other Environmental Allergy Unknown     Molds, dander  "   Pregabalin Other (See Comments)       FAMILY HISTORY:  Family History   Problem Relation Age of Onset    Atrial fibrillation Mother     Parkinsonism Father        SOCIAL HISTORY:   Social History     Socioeconomic History    Marital status:      Spouse name: None    Number of children: None    Years of education: None    Highest education level: None   Tobacco Use    Smoking status: Former     Types: Cigarettes     Quit date: 1980     Years since quittin.6    Smokeless tobacco: Never   Substance and Sexual Activity    Alcohol use: No    Drug use: No   Former smoker.  No current drugs, alcohol, or tobacco.    VITALS:  BP (!) 175/74   Pulse 75   Temp 98.3  F (36.8  C)   Resp 26   Ht 1.549 m (5' 1\")   Wt 59.9 kg (132 lb)   SpO2 100%   BMI 24.94 kg/m      PHYSICAL EXAM    Vital Signs:  BP (!) 175/74   Pulse 75   Temp 98.3  F (36.8  C)   Resp 26   Ht 1.549 m (5' 1\")   Wt 59.9 kg (132 lb)   SpO2 100%   BMI 24.94 kg/m    General:  On entering the room she is in no apparent distress.    Neck:  Neck supple with full range of motion and nontender.    Back:  Back and spine are nontender.  No costovertebral angle tenderness.    HEENT:  Oropharynx clear with moist mucous membranes.  HEENT unremarkable.  No JVD.  Pulmonary:  Chest clear to auscultation without rhonchi rales or wheezing.    Cardiovascular:  Cardiac regular rate and rhythm without murmurs rubs or gallops.    Abdomen:  Abdomen soft nontender.  There is no rebound or guarding.    Muskuloskeletal:  She moves all 4 without any difficulty and has normal neurovascular exams.  Extremities without clubbing, cyanosis.  Bilateral lower leg and ankle edema, left greater than right.  4+ on the left and 2-3+ on the right.  Tender left leg to palpation.  Strength and sensation intact to soft touch.  Good pulse capillary refill.  Legs and calves are nontender.    Neuro:  She is alert and oriented ×3 and moves all extremities symmetrically.  "   Psych:  Normal affect.    Skin:  Unremarkable and warm and dry.       LAB:  All pertinent labs reviewed and interpreted.  Labs Ordered and Resulted from Time of ED Arrival to Time of ED Departure   BASIC METABOLIC PANEL - Abnormal       Result Value    Sodium 142      Potassium 4.1      Chloride 108 (*)     Carbon Dioxide (CO2) 27      Anion Gap 7      Urea Nitrogen 25.9 (*)     Creatinine 1.32 (*)     Calcium 9.1      Glucose 95      GFR Estimate 38 (*)    TROPONIN T, HIGH SENSITIVITY - Abnormal    Troponin T, High Sensitivity 29 (*)    CBC WITH PLATELETS AND DIFFERENTIAL - Abnormal    WBC Count 10.1      RBC Count 3.62 (*)     Hemoglobin 10.9 (*)     Hematocrit 34.0 (*)     MCV 94      MCH 30.1      MCHC 32.1      RDW 19.9 (*)     Platelet Count 237      % Neutrophils 66      % Lymphocytes 21      % Monocytes 8      % Eosinophils 3      % Basophils 1      % Immature Granulocytes 1      NRBCs per 100 WBC 0      Absolute Neutrophils 6.6      Absolute Lymphocytes 2.1      Absolute Monocytes 0.8      Absolute Eosinophils 0.3      Absolute Basophils 0.1      Absolute Immature Granulocytes 0.1      Absolute NRBCs 0.0     TROPONIN T, HIGH SENSITIVITY - Abnormal    Troponin T, High Sensitivity 29 (*)    NT PROBNP INPATIENT - Normal    N terminal Pro BNP Inpatient 534         RADIOLOGY:  Reviewed all pertinent imaging. Please see official radiology report.  US Lower Extremity Venous Duplex Bilateral   Final Result   IMPRESSION:      No deep venous thrombosis in the visualized bilateral lower extremities.      XR Chest Port 1 View   Final Result   IMPRESSION: No acute cardiopulmonary abnormality. Normal cardiomediastinal silhouette with TAVR.                 EKG:    Normal sinus rhythm 76, left atrial lodgment, borderline LVH, otherwise negative EKG.  Same as previous EKG of February 2023.    I have independently reviewed and interpreted the EKG(s) documented above.    PROCEDURES:         Yenni BRITO, am serving as a  scribe to document services personally performed by Dr. Ventura based on my observation and the provider's statements to me. I, Ranjeet Ventura MD attest that Yenni Han is acting in a scribe capacity, has observed my performance of the services and has documented them in accordance with my direction.    Ranjeet Ventura M.D.  Emergency Medicine  [facility] EMERGENCY DEPARTMENT  [address] [place] [id number]  [phone]  Dept: [phone]       Ranjeet Ventura MD  08/31/23 2599

## 2023-08-31 NOTE — DISCHARGE INSTRUCTIONS
Elevate left leg whenever possible.  Avoid salt or salty foods in the diet.  See your clinic for follow-up within the next week.

## 2023-09-01 LAB
ATRIAL RATE - MUSE: 76 BPM
DIASTOLIC BLOOD PRESSURE - MUSE: NORMAL MMHG
INTERPRETATION ECG - MUSE: NORMAL
P AXIS - MUSE: 68 DEGREES
PR INTERVAL - MUSE: 208 MS
QRS DURATION - MUSE: 88 MS
QT - MUSE: 404 MS
QTC - MUSE: 454 MS
R AXIS - MUSE: 46 DEGREES
SYSTOLIC BLOOD PRESSURE - MUSE: NORMAL MMHG
T AXIS - MUSE: 71 DEGREES
VENTRICULAR RATE- MUSE: 76 BPM

## 2023-09-09 ENCOUNTER — APPOINTMENT (OUTPATIENT)
Dept: ULTRASOUND IMAGING | Facility: HOSPITAL | Age: 88
End: 2023-09-09
Attending: EMERGENCY MEDICINE
Payer: COMMERCIAL

## 2023-09-09 ENCOUNTER — APPOINTMENT (OUTPATIENT)
Dept: RADIOLOGY | Facility: HOSPITAL | Age: 88
End: 2023-09-09
Attending: EMERGENCY MEDICINE
Payer: COMMERCIAL

## 2023-09-09 ENCOUNTER — APPOINTMENT (OUTPATIENT)
Dept: CT IMAGING | Facility: HOSPITAL | Age: 88
End: 2023-09-09
Attending: EMERGENCY MEDICINE
Payer: COMMERCIAL

## 2023-09-09 ENCOUNTER — HOSPITAL ENCOUNTER (OUTPATIENT)
Facility: HOSPITAL | Age: 88
Setting detail: OBSERVATION
Discharge: HOME | End: 2023-09-09
Attending: EMERGENCY MEDICINE | Admitting: EMERGENCY MEDICINE
Payer: COMMERCIAL

## 2023-09-09 VITALS
OXYGEN SATURATION: 91 % | RESPIRATION RATE: 25 BRPM | WEIGHT: 131 LBS | TEMPERATURE: 98.7 F | SYSTOLIC BLOOD PRESSURE: 134 MMHG | BODY MASS INDEX: 24.73 KG/M2 | HEART RATE: 69 BPM | HEIGHT: 61 IN | DIASTOLIC BLOOD PRESSURE: 65 MMHG

## 2023-09-09 DIAGNOSIS — D64.9 ACUTE ON CHRONIC ANEMIA: ICD-10-CM

## 2023-09-09 DIAGNOSIS — R60.0 EDEMA OF LEFT LOWER EXTREMITY: ICD-10-CM

## 2023-09-09 DIAGNOSIS — M79.662 PAIN OF LEFT LOWER LEG: ICD-10-CM

## 2023-09-09 DIAGNOSIS — L03.116 CELLULITIS OF LEFT LOWER EXTREMITY: ICD-10-CM

## 2023-09-09 DIAGNOSIS — S81.802A AVULSION OF SKIN OF LEFT LOWER LEG, INITIAL ENCOUNTER: ICD-10-CM

## 2023-09-09 DIAGNOSIS — R07.9 CHEST PAIN, UNSPECIFIED TYPE: ICD-10-CM

## 2023-09-09 DIAGNOSIS — I10 HYPERTENSION, UNSPECIFIED TYPE: ICD-10-CM

## 2023-09-09 LAB
ALBUMIN UR-MCNC: NEGATIVE MG/DL
ANION GAP SERPL CALCULATED.3IONS-SCNC: 9 MMOL/L (ref 7–15)
APPEARANCE UR: CLEAR
BACTERIA #/AREA URNS HPF: ABNORMAL /HPF
BASOPHILS # BLD AUTO: 0.1 10E3/UL (ref 0–0.2)
BASOPHILS NFR BLD AUTO: 1 %
BILIRUB UR QL STRIP: NEGATIVE
BUN SERPL-MCNC: 23.8 MG/DL (ref 8–23)
CALCIUM SERPL-MCNC: 8.5 MG/DL (ref 8.8–10.2)
CHLORIDE SERPL-SCNC: 105 MMOL/L (ref 98–107)
CK SERPL-CCNC: 220 U/L (ref 26–192)
COLOR UR AUTO: COLORLESS
CREAT SERPL-MCNC: 1.22 MG/DL (ref 0.51–0.95)
DEPRECATED HCO3 PLAS-SCNC: 25 MMOL/L (ref 22–29)
EGFRCR SERPLBLD CKD-EPI 2021: 42 ML/MIN/1.73M2
EOSINOPHIL # BLD AUTO: 0.4 10E3/UL (ref 0–0.7)
EOSINOPHIL NFR BLD AUTO: 4 %
ERYTHROCYTE [DISTWIDTH] IN BLOOD BY AUTOMATED COUNT: 19.7 % (ref 10–15)
GLUCOSE SERPL-MCNC: 91 MG/DL (ref 70–99)
GLUCOSE UR STRIP-MCNC: NEGATIVE MG/DL
HCT VFR BLD AUTO: 30.2 % (ref 35–47)
HGB BLD-MCNC: 9.7 G/DL (ref 11.7–15.7)
HGB UR QL STRIP: NEGATIVE
IMM GRANULOCYTES # BLD: 0 10E3/UL
IMM GRANULOCYTES NFR BLD: 0 %
KETONES UR STRIP-MCNC: NEGATIVE MG/DL
LEUKOCYTE ESTERASE UR QL STRIP: NEGATIVE
LYMPHOCYTES # BLD AUTO: 1.5 10E3/UL (ref 0.8–5.3)
LYMPHOCYTES NFR BLD AUTO: 17 %
MAGNESIUM SERPL-MCNC: 1.9 MG/DL (ref 1.7–2.3)
MCH RBC QN AUTO: 29.8 PG (ref 26.5–33)
MCHC RBC AUTO-ENTMCNC: 32.1 G/DL (ref 31.5–36.5)
MCV RBC AUTO: 93 FL (ref 78–100)
MONOCYTES # BLD AUTO: 1 10E3/UL (ref 0–1.3)
MONOCYTES NFR BLD AUTO: 10 %
NEUTROPHILS # BLD AUTO: 6.4 10E3/UL (ref 1.6–8.3)
NEUTROPHILS NFR BLD AUTO: 68 %
NITRATE UR QL: NEGATIVE
NRBC # BLD AUTO: 0 10E3/UL
NRBC BLD AUTO-RTO: 0 /100
NT-PROBNP SERPL-MCNC: 754 PG/ML (ref 0–1800)
PH UR STRIP: 6.5 [PH] (ref 5–7)
PLATELET # BLD AUTO: 204 10E3/UL (ref 150–450)
POTASSIUM SERPL-SCNC: 3.9 MMOL/L (ref 3.4–5.3)
RBC # BLD AUTO: 3.26 10E6/UL (ref 3.8–5.2)
RBC URINE: <1 /HPF
SODIUM SERPL-SCNC: 139 MMOL/L (ref 136–145)
SP GR UR STRIP: 1.01 (ref 1–1.03)
TRANSITIONAL EPI: <1 /HPF
TROPONIN T SERPL HS-MCNC: 34 NG/L
TROPONIN T SERPL HS-MCNC: 39 NG/L
UROBILINOGEN UR STRIP-MCNC: <2 MG/DL
WBC # BLD AUTO: 9.4 10E3/UL (ref 4–11)
WBC URINE: 2 /HPF

## 2023-09-09 PROCEDURE — 73590 X-RAY EXAM OF LOWER LEG: CPT | Mod: LT

## 2023-09-09 PROCEDURE — 81001 URINALYSIS AUTO W/SCOPE: CPT | Performed by: EMERGENCY MEDICINE

## 2023-09-09 PROCEDURE — 99291 CRITICAL CARE FIRST HOUR: CPT | Mod: 25

## 2023-09-09 PROCEDURE — 93005 ELECTROCARDIOGRAM TRACING: CPT | Performed by: EMERGENCY MEDICINE

## 2023-09-09 PROCEDURE — G0378 HOSPITAL OBSERVATION PER HR: HCPCS

## 2023-09-09 PROCEDURE — 250N000009 HC RX 250: Performed by: EMERGENCY MEDICINE

## 2023-09-09 PROCEDURE — 85025 COMPLETE CBC W/AUTO DIFF WBC: CPT | Performed by: EMERGENCY MEDICINE

## 2023-09-09 PROCEDURE — 83735 ASSAY OF MAGNESIUM: CPT | Performed by: EMERGENCY MEDICINE

## 2023-09-09 PROCEDURE — 36415 COLL VENOUS BLD VENIPUNCTURE: CPT | Performed by: EMERGENCY MEDICINE

## 2023-09-09 PROCEDURE — 74174 CTA ABD&PLVS W/CONTRAST: CPT

## 2023-09-09 PROCEDURE — 250N000013 HC RX MED GY IP 250 OP 250 PS 637: Performed by: EMERGENCY MEDICINE

## 2023-09-09 PROCEDURE — 250N000011 HC RX IP 250 OP 636: Mod: JZ | Performed by: EMERGENCY MEDICINE

## 2023-09-09 PROCEDURE — 83880 ASSAY OF NATRIURETIC PEPTIDE: CPT | Performed by: EMERGENCY MEDICINE

## 2023-09-09 PROCEDURE — 71046 X-RAY EXAM CHEST 2 VIEWS: CPT

## 2023-09-09 PROCEDURE — 82550 ASSAY OF CK (CPK): CPT | Performed by: EMERGENCY MEDICINE

## 2023-09-09 PROCEDURE — 93971 EXTREMITY STUDY: CPT | Mod: LT

## 2023-09-09 PROCEDURE — 84484 ASSAY OF TROPONIN QUANT: CPT | Performed by: EMERGENCY MEDICINE

## 2023-09-09 PROCEDURE — 80048 BASIC METABOLIC PNL TOTAL CA: CPT | Performed by: EMERGENCY MEDICINE

## 2023-09-09 RX ORDER — OXYCODONE HYDROCHLORIDE 5 MG/1
2.5 TABLET ORAL EVERY 6 HOURS PRN
Qty: 6 TABLET | Refills: 0 | Status: SHIPPED | OUTPATIENT
Start: 2023-09-09 | End: 2023-09-12

## 2023-09-09 RX ORDER — IOPAMIDOL 755 MG/ML
75 INJECTION, SOLUTION INTRAVASCULAR ONCE
Status: COMPLETED | OUTPATIENT
Start: 2023-09-09 | End: 2023-09-09

## 2023-09-09 RX ORDER — ACETAMINOPHEN 325 MG/1
650 TABLET ORAL ONCE
Status: COMPLETED | OUTPATIENT
Start: 2023-09-09 | End: 2023-09-09

## 2023-09-09 RX ORDER — CEPHALEXIN 500 MG/1
500 CAPSULE ORAL ONCE
Status: COMPLETED | OUTPATIENT
Start: 2023-09-09 | End: 2023-09-09

## 2023-09-09 RX ORDER — NITROGLYCERIN 0.4 MG/1
0.4 TABLET SUBLINGUAL EVERY 5 MIN PRN
Status: DISCONTINUED | OUTPATIENT
Start: 2023-09-09 | End: 2023-09-09 | Stop reason: HOSPADM

## 2023-09-09 RX ORDER — GINSENG 100 MG
CAPSULE ORAL ONCE
Status: COMPLETED | OUTPATIENT
Start: 2023-09-09 | End: 2023-09-09

## 2023-09-09 RX ORDER — MORPHINE SULFATE 2 MG/ML
2 INJECTION, SOLUTION INTRAMUSCULAR; INTRAVENOUS ONCE
Status: COMPLETED | OUTPATIENT
Start: 2023-09-09 | End: 2023-09-09

## 2023-09-09 RX ORDER — CEPHALEXIN 500 MG/1
500 CAPSULE ORAL 2 TIMES DAILY
Qty: 14 CAPSULE | Refills: 0 | Status: SHIPPED | OUTPATIENT
Start: 2023-09-09 | End: 2023-09-16

## 2023-09-09 RX ADMIN — BACITRACIN: 500 OINTMENT TOPICAL at 09:16

## 2023-09-09 RX ADMIN — IOPAMIDOL 75 ML: 755 INJECTION, SOLUTION INTRAVENOUS at 13:38

## 2023-09-09 RX ADMIN — ACETAMINOPHEN 650 MG: 325 TABLET ORAL at 08:13

## 2023-09-09 RX ADMIN — CEPHALEXIN 500 MG: 500 CAPSULE ORAL at 08:13

## 2023-09-09 ASSESSMENT — ENCOUNTER SYMPTOMS
GASTROINTESTINAL NEGATIVE: 1
RESPIRATORY NEGATIVE: 1
FEVER: 0
BACK PAIN: 1
NEUROLOGICAL NEGATIVE: 1
PSYCHIATRIC NEGATIVE: 1
CARDIOVASCULAR NEGATIVE: 1

## 2023-09-09 ASSESSMENT — ACTIVITIES OF DAILY LIVING (ADL)
ADLS_ACUITY_SCORE: 35

## 2023-09-09 NOTE — ED TRIAGE NOTES
Patient cut her leg on some outside furniture a couple week ago and now the leg is red, swollen and painful. No fevers      Triage Assessment       Row Name 09/09/23 5936       Triage Assessment (Adult)    Airway WDL WDL       Respiratory WDL    Respiratory WDL WDL       Skin Circulation/Temperature WDL    Skin Circulation/Temperature WDL X  wound on the left side of the leg       Cardiac WDL    Cardiac WDL WDL       Peripheral/Neurovascular WDL    Peripheral Neurovascular WDL WDL       Cognitive/Neuro/Behavioral WDL    Cognitive/Neuro/Behavioral WDL WDL

## 2023-09-09 NOTE — DISCHARGE INSTRUCTIONS
Read and follow the discharge instructions.    You are declining admission for your chest pain.    Your Red blood cell was slightly low at 9.6 slightly lower than previous.  Continue your iron.  Call your clinic on Monday to make a follow-up appointment for reevaluation of this.    Elevate your leg above your heart.  The Lasix will help with your swelling.  Body will make you urinate more.    You may take oxycodone as needed for leg pain but you need to call your orthopedic doctor on Monday to discuss further evaluation as previously scheduled.  Do not drive while taking the oxycodone.    Your Ultrasound reveals no evidence of blood clot, you x-ray reveals no broken bones.    Take the antibiotics as instructed you were given your first dose here.  Also given a prescription for oxycodone as needed for pain do not drive while taking this medication.  Is a narcotic medication he can make you constipated.    Call your primary care doctor on Monday to make a follow-up appointment for wound recheck.    Return for increased redness swelling fever or any other concerns.

## 2023-09-09 NOTE — ED PROVIDER NOTES
EMERGENCY DEPARTMENT ENCOUNTER      NAME: Sherice Alvarez  AGE: 89 year old female  YOB: 1934  MRN: [id number]  EVALUATION DATE & TIME: No admission date for patient encounter.    PCP: Rosemary Mauricio    ED PROVIDER: Marika Camacho M.D.      CHIEF COMPLAINT     Chief Complaint   Patient presents with    Leg Swelling         FINAL IMPRESSION:     1. Avulsion of skin of left lower leg, initial encounter    2. Edema of left lower extremity    3. Cellulitis of left lower extremity    4. Hypertension, unspecified type    5. Acute on chronic anemia    6. Chest pain, unspecified type          MEDICAL DECISION MAKING:       Pertinent Labs & Imaging studies reviewed. (See chart for details)    89 year old female presents to the Emergency Department for evaluation of left leg wound and redness.    History  Supplemental history from patient.  External Record(s) review as documented below    Exam  Appearing cooperative and pleasant.  Bilateral surgical scars of the knee.  Mild erythema on the left lower leg.  Edema compared to the contralateral leg.  Skin avulsion to the left.  No evidence of cyanosis or vascular injury.  Pain is not out of proportion to the exam.  No palpable cords.    Differential Diagnosis include but not limited to cellulitis foreign body necrotizing fasciitis DVT compartment syndrome dissection acute coronary syndrome PE among others.      Vital Signs: hypertension  EKG: Sinus rhythm first-degree AV block poor anterior R wave progression normal axis  Imaging: I independently interpreted left leg xray foreign body no fractures no air.  Previous hardware is intact..Formal read by radiologist.  Home meds: Reviewed  ED meds: tylenol Keflex  Fluids: Oral  Labs: none      Clinical Impression and Decision Making  89-year-old female presents here with a wound on the left lower leg.  Has history of chronic lower extremity edema left greater than right.  Injury occurred on Monday.  Stated  initially the bleeding will not stop.  There is minimal redness but there is no warmth.  No evidence of necrotizing fasciitis.  Given the asymmetry of the legs ultrasound was done negative for DVT.  X-ray reveals no free air hardware to be intact no fracture.  She is otherwise afebrile well-appearing.  Started on Keflex.  I reviewed external records.  Tetanus is up-to-date.  We will discharge her with wound care and follow-up primary care doctor and strict return precautions given.    upon discharge she stated she is having chest tightness she said has been urinating on her.  She is very concerned about this leg swelling.  She is not tachycardic not tachypneic not hypoxic.  We will do an EKG troponin we will continue to reevaluate.    Reevaluated.  Notify of EKG and troponin.  States she feels comfortable going home.  Had another episode of anterior chest pressure.  Repeat EKG without acute abnormality concerning given her age and risk factors.  She agreed with admission.  Admitting physician came discussed with patient at bedside.  Now patient says she does not want to be admitted.  That she is getting chest pressure because of the leg pain.  It is clear the patient states the leg pain that goes up to the lower back is chronic and that steroids have helped.  She has oxycodone at home.  States she only has 1 left we will review .  But given Hypertension on the chest and leg pain and back pain we will make sure she is not dissecting.    CTA no dissection and some edema.  Discussed with patient at length.  She feels like some of her chest pain is related to her leg pain.  She has seen Ortho and was scheduled to go have an MRI but she went to the wrong location.  Will prescribe Roxicodone which she had taken before I reviewed .  We will do Keflex for her wounds.  Encouraged her to follow-up with her primary care doctor and her AAA doctor discharge in stable condition ambulatory.    In addition to the work out  documented, I considered the following MRI for her low back pain radiating into the left leg this is a chronic condition.  Patient denies fecal or any incontinence no numbness no tingling she has follow-up with Ortho in the past she had injections.  Nothing this is an emergent condition at this moment.    Medical Decision Making    History:  Supplemental history from: Documented in chart, if applicable  External Record(s) reviewed: Documented in chart, if applicable.    Work Up:  Chart documentation includes differential considered and any EKGs or imaging independently interpreted by provider, where specified.  In additional to work up documented, I considered the following work up: Documented in chart, if applicable.    External consultation:  Discussion of management with another provider: Documented in chart, if applicable    Complicating factors:  Care impacted by chronic illness: Hypertension  Care affected by social determinants of health: N/A    Disposition considerations: Discharge.  Prescription for Keflex and oxycodone sent to her pharmacy.  Recommended she continue her leg size amlodipine.      Review of External Records  Hospital/Clinic: [facility]  Date: 8/17/2023  Primary care doctor  Has son requests refill on amlodipine.    [facility] tdap 2016    External Consultation  Hospitalist       ED COURSE     7:55 AM met with patient and obtain a history.  9:03AM re evaluated and updated.  9:30 AM daily no results.  Feels comfortable with discharge.    10:09 AM just before discharge patient stated she started having chest pressure for short of breath she states she is urinating a lot.  She is wondering why her legs are swollen when she is peeing so much.  The leg swelling and the increased urination have been going on but the chest pressure she says started slowly over 1 hour since she has been here.    10:36 AM evaluated.  Sleeping.  Heart rate 72.  Satting 100% on room air.    10:44 AM reevaluated.  Denies  current chest pain denies current chest pressure denies shortness of breath she is concerned about her toes cramping.    12:28 PM spoke with Dr. Lopez who accepts the patient for admission     12:44 PM updated.  Patient with these episodes of leg pain that radiates to the left lower back.  She said the pain is chronic she has been prescribed oxycodone she only has 1 pill left and for that she injections.  She said the chest pressure she had not had before she says she does not know if it is because of the leg pain but she has chest pressure.  EKG x2 without ST segment elevation.  Troponin x2 elevated but not increasing.  Given the location of pain and her hypertensive we will do morphine for pain and will CTA make sure she is not dissecting.    1:02 PM Dr. Lopez at bedside. Patient she feels like the leg pain which is chronic for her hair is giving her to chest pressure.  She declines admission.  We will discharge.     2:27 PM with radiologist read of CT.  No acute or chronic abnormality.  Patient updated.  Discharged in stable condition.      At the conclusion of the encounter I discussed the results of all of the tests and the disposition. The questions were answered. The patient acknowledged understanding and was agreeable with the care plan.         Critical Care     Performed by: Dr Marika Camacho  Authorized by: Dr Marika Camacho  Total critical care time: 30 minutes  Critical care was necessary to treat or prevent imminent or life-threatening deterioration of the following conditions: Chest pain back pain hypertension discussion with patient and admitting physician reevaluations.  Critical care was time spent personally by me on the following activities: development of treatment plan with patient or surrogate, discussions with consultants, examination of patient, evaluation of patient's response to treatment, obtaining history from patient or surrogate, ordering and performing treatments and  interventions, ordering and review of laboratory studies, ordering and review of radiographic studies, re-evaluation of patient's condition and monitoring for potential decompensation.  Critical care time was exclusive of separately billable procedures and treating other patients.   MEDICATIONS GIVEN IN THE EMERGENCY:     Medications   nitroGLYcerin (NITROSTAT) sublingual tablet 0.4 mg (has no administration in time range)   acetaminophen (TYLENOL) tablet 650 mg (650 mg Oral $Given 9/9/23 0813)   cephALEXin (KEFLEX) capsule 500 mg (500 mg Oral $Given 9/9/23 0813)   bacitracin ointment ( Topical $Given 9/9/23 0916)   morphine (PF) injection 2 mg (2 mg Intravenous Not Given 9/9/23 1302)       NEW PRESCRIPTIONS STARTED AT TODAY'S ER VISIT     New Prescriptions    CEPHALEXIN (KEFLEX) 500 MG CAPSULE    Take 1 capsule (500 mg) by mouth 2 times daily for 7 days          =================================================================    HPI     Patient information was obtained from: patient     Use of : N/A          Sherice Alvarez is a 89 year old female who presents by.  She drove from home.    Patient has been doing relatively well and on Monday she was at her deck there was a week or furniture and she accidentally caught the left leg.  She said there was a lot of bleeding.  The bleeding eventually stopped.  Had noticed some redness and she is here for evaluation.    The left leg is swollen and she said this happened even prior to her injury on Monday.  He is usually more swollen than the right.  This has been going on for months.    Reports left leg and hip pain.  She is [facility].  She gets steroid injections.  Scheduled to have an MRI.    She denies any numbness or tingling or weakness of the leg.  No fevers or chills no current chest pain or shortness of breath no current abdominal pain no dysuria hematuria.    Takes a daily aspirin.    No other complaints are voiced.      REVIEW OF  SYSTEMS   Review of Systems   Constitutional:  Negative for fever.   HENT: Negative.     Respiratory: Negative.     Cardiovascular: Negative.    Gastrointestinal: Negative.    Genitourinary: Negative.    Musculoskeletal:  Positive for back pain.        Left leg pain prior to injury  Gets steroid injections back   Neurological: Negative.    Psychiatric/Behavioral: Negative.     All other systems reviewed and are negative.      PAST MEDICAL HISTORY:     Past Medical History:   Diagnosis Date    Arthritis     Asthma     CAD (coronary artery disease)     Cancer (H)     basal cell    Chronic kidney disease     ckd 3    Chronic pain syndrome     Congestive heart failure (H)     Crohn's disease (H)     GERD (gastroesophageal reflux disease)     Hx of heart artery stent 2016    1 stent R Proximal CA and 1 Stent L Proximal circumflex  2016 Stent proximal LAD    Hyperlipidemia     Hypertension     NSTEMI (non-ST elevated myocardial infarction) (H) 2016    PONV (postoperative nausea and vomiting)     severe povn with last knee surgery    Restless legs syndrome     Stroke (H) 2010    numbness rt jaw       PAST SURGICAL HISTORY:     Past Surgical History:   Procedure Laterality Date    APPENDECTOMY      CARDIAC CATHETERIZATION  2016    multiple vessel disease.  no intervention    CARDIAC CATHETERIZATION  2016    CARDIAC CATHETERIZATION N/A 2016    Procedure: Coronary Angiogram;  Surgeon: Sunil Moran MD;  Location: [facility] Cath Lab;  Service:     CAROTID ENDARTERECTOMY      CAROTID ENDARTERECTOMY Right 2010    CERVICAL LAMINECTOMY  1994     SECTION      CV CORONARY ANGIOGRAM N/A 2020    Procedure: Coronary Angiogram;  Surgeon: Vinita Ramos MD;  Location: [facility] Cath Lab;  Service: Cardiology    CV LEFT HEART CATHETERIZATION WITHOUT LEFT VENTRICULOGRAM Left 2020    Procedure: Left Heart Catheterization Without Left Ventriculogram;  Surgeon:  Jerad Lerner MD;  Location: [facility] Cath Lab;  Service: Cardiology    CV TRANSCATHETER AORTIC VALVE REPLACEMENT - OTHER APPROACH N/A 06/02/2020    Procedure: CV TRANSCATHETER AORTIC VALVE REPLACEMENT - RIGHT SUBCLAVIAN APPROACH;  Surgeon: John Caceres MD;  Location: [facility] Cath Lab;  Service: Cardiology    HEART CATH, ANGIOPLASTY      HEMORRHOIDECTOMY EXTERNAL      IR LUMBAR EPIDURAL STEROID INJECTION  12/30/2014    IR LUMBAR NERVE ROOT INJECTION  10/01/2013    JOINT REPLACEMENT      IN ESOPHAGOGASTRODUODENOSCOPY TRANSORAL DIAGNOSTIC N/A 07/10/2019    Procedure: ESOPHAGOGASTRODUODENOSCOPY (EGD) with gastric biopsy;  Surgeon: Sunil Stuart MD;  Location: [facility] GI;  Service: Gastroenterology    IN REPLACE AORTIC VALVE OPEN AXILLRY ARTRY APPROACH N/A 06/02/2020    Procedure: OR TRANSCATHETER AORTIC VALVE REPLACEMENT, SUBCLAVIAN APPROACH;  Surgeon: Bhavin Cuadra MD;  Location: [facility] Cath Lab;  Service: Cardiology    TONSILLECTOMY & ADENOIDECTOMY      TOTAL KNEE ARTHROPLASTY Right     TRANSCATHETER AORTIC-VALVE REPLACEMENT      ZZC TOTAL KNEE ARTHROPLASTY Left 05/11/2021    Procedure: LEFT TOTAL KNEE ARTHROPLASTY;  Surgeon: Doug Rhodes MD;  Location: [facility];  Service: Orthopedics         CURRENT MEDICATIONS:   cephALEXin (KEFLEX) 500 MG capsule  acetaminophen (TYLENOL) 500 MG tablet  albuterol (PROVENTIL HFA;VENTOLIN HFA) 90 mcg/actuation inhaler  amLODIPine (NORVASC) 5 MG tablet  aspirin (ASA) 81 MG EC tablet  diclofenac (VOLTAREN) 1 % topical gel  donepezil (ARICEPT) 5 MG tablet  ferrous sulfate 325 (65 FE) MG tablet  furosemide (LASIX) 20 MG tablet  HYDROcodone-acetaminophen (NORCO) 5-325 MG tablet  lidocaine (XYLOCAINE) 5 % external ointment  methocarbamol (ROBAXIN) 500 MG tablet  metoprolol succinate ER (TOPROL XL) 25 MG 24 hr tablet  Multiple Vitamin (THERA-TABS) TABS  nitroGLYcerin (NITROSTAT) 0.4 MG sublingual tablet  OLANZapine (ZYPREXA) 2.5 MG  "tablet  omeprazole 20 MG tablet  QUEtiapine (SEROQUEL) 25 MG tablet  rOPINIRole (REQUIP) 2 MG tablet  sennosides (SENOKOT) 8.6 MG tablet  Vitamin D3 (CHOLECALCIFEROL) 125 MCG (5000 UT) tablet         ALLERGIES:     Allergies   Allergen Reactions    Amitriptyline Hcl [Amitriptyline] Other (See Comments)    Erythromycin Diarrhea and Other (See Comments)     Childhood reaction    Gabapentin Other (See Comments)     Jerking movements, swelling    Naproxen Unknown and Other (See Comments)    Other Environmental Allergy Unknown     Molds, dander    Pregabalin Other (See Comments)       FAMILY HISTORY:     Family History   Problem Relation Age of Onset    Atrial fibrillation Mother     Parkinsonism Father        SOCIAL HISTORY:     Social History     Socioeconomic History    Marital status:    Tobacco Use    Smoking status: Former     Types: Cigarettes     Quit date: 1980     Years since quittin.7    Smokeless tobacco: Never   Substance and Sexual Activity    Alcohol use: No    Drug use: No       VITALS:   BP (!) 186/84   Pulse 77   Temp 98.7  F (37.1  C)   Resp 22   Ht 1.549 m (5' 1\")   Wt 59.4 kg (131 lb)   SpO2 97%   BMI 24.75 kg/m      PHYSICAL EXAM     Physical Exam  Vitals and nursing note reviewed. Exam conducted with a chaperone present.   Constitutional:       Appearance: Normal appearance. She is normal weight.   HENT:      Head: Normocephalic.      Nose: Nose normal.   Eyes:      Extraocular Movements: Extraocular movements intact.   Cardiovascular:      Rate and Rhythm: Normal rate.   Pulmonary:      Effort: Pulmonary effort is normal.   Abdominal:      Palpations: Abdomen is soft.   Musculoskeletal:         General: Swelling and signs of injury present.        Legs:       Comments: Bilateral knee surgical scars  Edema B R>L  Skin avulsion lateral left leg  Mild non confluent erythema  No warmth  No cyanosis   Skin:     Capillary Refill: Capillary refill takes less than 2 seconds. "   Neurological:      General: No focal deficit present.      Mental Status: She is alert and oriented to person, place, and time.               LAB:     All pertinent labs reviewed and interpreted.  Labs Ordered and Resulted from Time of ED Arrival to Time of ED Departure   BASIC METABOLIC PANEL - Abnormal       Result Value    Sodium 139      Potassium 3.9      Chloride 105      Carbon Dioxide (CO2) 25      Anion Gap 9      Urea Nitrogen 23.8 (*)     Creatinine 1.22 (*)     Calcium 8.5 (*)     Glucose 91      GFR Estimate 42 (*)    CBC WITH PLATELETS AND DIFFERENTIAL - Abnormal    WBC Count 9.4      RBC Count 3.26 (*)     Hemoglobin 9.7 (*)     Hematocrit 30.2 (*)     MCV 93      MCH 29.8      MCHC 32.1      RDW 19.7 (*)     Platelet Count 204      % Neutrophils 68      % Lymphocytes 17      % Monocytes 10      % Eosinophils 4      % Basophils 1      % Immature Granulocytes 0      NRBCs per 100 WBC 0      Absolute Neutrophils 6.4      Absolute Lymphocytes 1.5      Absolute Monocytes 1.0      Absolute Eosinophils 0.4      Absolute Basophils 0.1      Absolute Immature Granulocytes 0.0      Absolute NRBCs 0.0     TROPONIN T, HIGH SENSITIVITY - Abnormal    Troponin T, High Sensitivity 39 (*)    TROPONIN T, HIGH SENSITIVITY - Abnormal    Troponin T, High Sensitivity 34 (*)    ROUTINE UA WITH MICROSCOPIC REFLEX TO CULTURE - Abnormal    Color Urine Colorless      Appearance Urine Clear      Glucose Urine Negative      Bilirubin Urine Negative      Ketones Urine Negative      Specific Gravity Urine 1.006      Blood Urine Negative      pH Urine 6.5      Protein Albumin Urine Negative      Urobilinogen Urine <2.0      Nitrite Urine Negative      Leukocyte Esterase Urine Negative      Bacteria Urine Few (*)     RBC Urine <1      WBC Urine 2      Transitional Epithelials Urine <1     CK TOTAL - Abnormal     (*)    NT PROBNP INPATIENT - Normal    N terminal Pro BNP Inpatient 754     MAGNESIUM - Normal    Magnesium 1.9           RADIOLOGY:     Reviewed all pertinent imaging. Please see official radiology report.  Chest XR,  PA & LAT   Final Result   IMPRESSION: Patchy right infrahilar opacity is likely represent atelectasis or developing infection. Minimal left basilar atelectasis. No effusions or pneumothorax. Cardiomegaly without overt pulmonary edema. Aortic valve replacement changes.      XR Tibia and Fibula Left 2 Views   Final Result   IMPRESSION: Soft tissue swelling. There is no radiopaque foreign body or soft tissue gas identified by radiograph. Bones are demineralized. There is a knee joint replacement with some mild periprosthetic lucency along the posterior femoral component,    findings can be seen with osteolysis or hardware loosening.      No evidence of an acute tibia or fibular fracture. Atherosclerotic vascular calcifications.      US Lower Extremity Venous Duplex Left   Final Result   IMPRESSION:   1.  No deep venous thrombosis in the left lower extremity.            CTA Chest Abdomen Pelvis w Contrast    (Results Pending)        EKG:     EKG #1  Sinus rhythm first-degree AV block poor anterior progression normal axis.  Time:880833    Ventricular rate 72 bmp  Axis normal  KS interval 228 ms  QRS duration 82 ms  QT//477 ms    Compared to previous EKG on 8/31/2023 inverted T wave in aVL was seen before anterior progression was seen before first-degree AV block seen before possible left atrial enlargement seen before.  I have independently reviewed and interpreted the EKG(s) documented above.    EKG #2  With pain.  Sinus rhythm first-degree AV block poor anterior progression normal axis QTc now 481.    Time: 000091    Ventricular rate 79 bmp  Axis normal  KS interval 218 ms  QRS duration 84 ms  QT//481 ms    Compared to previous EKG on compared to earlier EKG QTc now normalized otherwise no significant changes.  PROCEDURES:     Procedures      I, Blessing Braswell, am serving as a scribe to document services  personally performed by Dr. Camacho based on my observation and the provider's statements to me. I, Marika Camacho MD attest that Blessing Linosl is acting in a scribe capacity, has observed my performance of the services and has documented them in accordance with my direction.    Marika Camacho M.D.  Emergency Medicine  [facility] EMERGENCY DEPARTMENT  [address] [place] [id number] [phone]  Dept: [phone]       Marika Camacho MD  09/09/23 1151

## 2023-09-10 LAB
ATRIAL RATE - MUSE: 72 BPM
ATRIAL RATE - MUSE: 79 BPM
DIASTOLIC BLOOD PRESSURE - MUSE: 82 MMHG
DIASTOLIC BLOOD PRESSURE - MUSE: NORMAL MMHG
INTERPRETATION ECG - MUSE: NORMAL
INTERPRETATION ECG - MUSE: NORMAL
P AXIS - MUSE: 69 DEGREES
P AXIS - MUSE: 71 DEGREES
PR INTERVAL - MUSE: 218 MS
PR INTERVAL - MUSE: 228 MS
QRS DURATION - MUSE: 82 MS
QRS DURATION - MUSE: 84 MS
QT - MUSE: 420 MS
QT - MUSE: 436 MS
QTC - MUSE: 477 MS
QTC - MUSE: 481 MS
R AXIS - MUSE: 37 DEGREES
R AXIS - MUSE: 68 DEGREES
SYSTOLIC BLOOD PRESSURE - MUSE: 194 MMHG
SYSTOLIC BLOOD PRESSURE - MUSE: NORMAL MMHG
T AXIS - MUSE: 71 DEGREES
T AXIS - MUSE: 75 DEGREES
VENTRICULAR RATE- MUSE: 72 BPM
VENTRICULAR RATE- MUSE: 79 BPM

## 2023-10-13 ENCOUNTER — OFFICE VISIT (OUTPATIENT)
Dept: CARDIOLOGY | Facility: CLINIC | Age: 88
End: 2023-10-13
Attending: INTERNAL MEDICINE
Payer: COMMERCIAL

## 2023-10-13 VITALS
WEIGHT: 130 LBS | RESPIRATION RATE: 16 BRPM | HEART RATE: 65 BPM | BODY MASS INDEX: 24.55 KG/M2 | SYSTOLIC BLOOD PRESSURE: 110 MMHG | HEIGHT: 61 IN | DIASTOLIC BLOOD PRESSURE: 64 MMHG

## 2023-10-13 DIAGNOSIS — I10 ACCELERATED HYPERTENSION: ICD-10-CM

## 2023-10-13 DIAGNOSIS — Z95.2 S/P TAVR (TRANSCATHETER AORTIC VALVE REPLACEMENT): ICD-10-CM

## 2023-10-13 DIAGNOSIS — N18.31 STAGE 3A CHRONIC KIDNEY DISEASE (H): ICD-10-CM

## 2023-10-13 DIAGNOSIS — I50.32 CHRONIC HEART FAILURE WITH PRESERVED EJECTION FRACTION (H): Primary | ICD-10-CM

## 2023-10-13 LAB
ANION GAP SERPL CALCULATED.3IONS-SCNC: 12 MMOL/L (ref 7–15)
BUN SERPL-MCNC: 22.5 MG/DL (ref 8–23)
CALCIUM SERPL-MCNC: 9.1 MG/DL (ref 8.8–10.2)
CHLORIDE SERPL-SCNC: 107 MMOL/L (ref 98–107)
CREAT SERPL-MCNC: 1.04 MG/DL (ref 0.51–0.95)
DEPRECATED HCO3 PLAS-SCNC: 21 MMOL/L (ref 22–29)
EGFRCR SERPLBLD CKD-EPI 2021: 51 ML/MIN/1.73M2
GLUCOSE SERPL-MCNC: 80 MG/DL (ref 70–99)
POTASSIUM SERPL-SCNC: 4.3 MMOL/L (ref 3.4–5.3)
SODIUM SERPL-SCNC: 140 MMOL/L (ref 135–145)

## 2023-10-13 PROCEDURE — 80048 BASIC METABOLIC PNL TOTAL CA: CPT | Performed by: NURSE PRACTITIONER

## 2023-10-13 PROCEDURE — 99214 OFFICE O/P EST MOD 30 MIN: CPT | Performed by: NURSE PRACTITIONER

## 2023-10-13 PROCEDURE — 36415 COLL VENOUS BLD VENIPUNCTURE: CPT | Performed by: NURSE PRACTITIONER

## 2023-10-13 NOTE — PROGRESS NOTES
"      Assessment/Recommendations   Assessment:    1. Heart failure with preserved ejection fraction, NYHA class II: Compensated.  She states she is feeling well.  She denies any acute heart failure symptoms.  She has trace lower extremity edema.  Her weight has been stable.  She takes Lasix as needed and has not taken it recently.  2. Hypertension: Controlled.  Blood pressure 110/64  3. Valvular heart disease: Status post TAVR in June 2020  4. Chronic kidney disease stage IIIa: She had labs a month ago which were stable    Plan:  1. Continue current medications  2. Schedule echocardiogram in December per Dr Feldman's note from June  3. BMP pending per patient's request    Sherice Alvarez will follow up with Dr Feldman January 17.     History of Present Illness/Subjective    Ms. Sherice Alvarez is a 89 year old female seen at [facility] heart failure clinic today for continued follow-up.  She follows up for heart failure with preserved ejection fraction.  Her last echocardiogram was done December 2022 which showed ejection fraction of 60 to 65%.  She has a past medical history significant for hypertension, CAD with stents, chronic kidney disease stage IIIa, hyperlipidemia, TAVR, DVT.    Today, she has trace edema.  She denies lightheadedness, shortness of breath, dyspnea on exertion, orthopnea, PND, palpitations, chest pain, and abdominal fullness/bloating.      She is monitoring home weights which are stable.  She is following a low sodium diet.        Physical Examination Review of Systems   /64 (BP Location: Right arm, Patient Position: Sitting, Cuff Size: Adult Regular)   Pulse 65   Resp 16   Ht 1.549 m (5' 1\")   Wt 59 kg (130 lb)   BMI 24.56 kg/m    Body mass index is 24.56 kg/m .  Wt Readings from Last 3 Encounters:   10/13/23 59 kg (130 lb)   09/09/23 59.4 kg (131 lb)   08/31/23 59.9 kg (132 lb)       General Appearance:   no acute distress   ENT/Mouth: No abnormalities   EYES:  no " scleral icterus, normal conjunctivae   Neck: no thyromegaly   Chest/Lungs:   lungs are clear to auscultation, no rales or wheezing, equal chest wall expansion    Cardiovascular:   Regular. Normal first and second heart sounds with no murmurs, rubs, or gallops, trace edema bilaterally    Abdomen:  bowel sounds are present   Extremities: no cyanosis or clubbing   Skin: warm   Neurologic: no tremors     Psychiatric: alert and oriented x3                                              Medical History  Surgical History Family History Social History   Past Medical History:   Diagnosis Date    Arthritis     Asthma     CAD (coronary artery disease)     Cancer (H)     basal cell    Chronic kidney disease     ckd 3    Chronic pain syndrome     Congestive heart failure (H)     Crohn's disease (H)     GERD (gastroesophageal reflux disease)     Hx of heart artery stent 2016    1 stent R Proximal CA and 1 Stent L Proximal circumflex  2016 Stent proximal LAD    Hyperlipidemia     Hypertension     NSTEMI (non-ST elevated myocardial infarction) (H) 2016    PONV (postoperative nausea and vomiting)     severe povn with last knee surgery    Restless legs syndrome     Stroke (H) 2010    numbness rt jaw    Past Surgical History:   Procedure Laterality Date    APPENDECTOMY      CARDIAC CATHETERIZATION  2016    multiple vessel disease.  no intervention    CARDIAC CATHETERIZATION  2016    CARDIAC CATHETERIZATION N/A 2016    Procedure: Coronary Angiogram;  Surgeon: Sunil Moran MD;  Location: [facility] Cath Lab;  Service:     CAROTID ENDARTERECTOMY      CAROTID ENDARTERECTOMY Right 2010    CERVICAL LAMINECTOMY  1994     SECTION      CV CORONARY ANGIOGRAM N/A 2020    Procedure: Coronary Angiogram;  Surgeon: Vinita Ramos MD;  Location: [facility] Cath Lab;  Service: Cardiology    CV LEFT HEART CATHETERIZATION WITHOUT LEFT VENTRICULOGRAM Left 2020    Procedure:  Left Heart Catheterization Without Left Ventriculogram;  Surgeon: Jerad Lerner MD;  Location: [facility] Cath Lab;  Service: Cardiology    CV TRANSCATHETER AORTIC VALVE REPLACEMENT - OTHER APPROACH N/A 2020    Procedure: CV TRANSCATHETER AORTIC VALVE REPLACEMENT - RIGHT SUBCLAVIAN APPROACH;  Surgeon: John Caceres MD;  Location: [facility] Cath Lab;  Service: Cardiology    HEART CATH, ANGIOPLASTY      HEMORRHOIDECTOMY EXTERNAL      IR LUMBAR EPIDURAL STEROID INJECTION  2014    IR LUMBAR NERVE ROOT INJECTION  10/01/2013    JOINT REPLACEMENT      MS ESOPHAGOGASTRODUODENOSCOPY TRANSORAL DIAGNOSTIC N/A 07/10/2019    Procedure: ESOPHAGOGASTRODUODENOSCOPY (EGD) with gastric biopsy;  Surgeon: Sunil Stuart MD;  Location: [facility];  Service: Gastroenterology    MS REPLACE AORTIC VALVE OPEN AXILLRY ARTRY APPROACH N/A 2020    Procedure: OR TRANSCATHETER AORTIC VALVE REPLACEMENT, SUBCLAVIAN APPROACH;  Surgeon: Bhavin Cuadra MD;  Location: [facility] Cath Lab;  Service: Cardiology    TONSILLECTOMY & ADENOIDECTOMY      TOTAL KNEE ARTHROPLASTY Right     TRANSCATHETER AORTIC-VALVE REPLACEMENT      ZZC TOTAL KNEE ARTHROPLASTY Left 2021    Procedure: LEFT TOTAL KNEE ARTHROPLASTY;  Surgeon: Doug Rhodes MD;  Location: [facility];  Service: Orthopedics    Family History   Problem Relation Age of Onset    Atrial fibrillation Mother     Parkinsonism Father     Social History     Socioeconomic History    Marital status:      Spouse name: Not on file    Number of children: Not on file    Years of education: Not on file    Highest education level: Not on file   Occupational History    Not on file   Tobacco Use    Smoking status: Former     Types: Cigarettes     Quit date: 1980     Years since quittin.8    Smokeless tobacco: Never   Substance and Sexual Activity    Alcohol use: No    Drug use: No    Sexual activity: Not on file   Other Topics Concern    Not  on file   Social History Narrative    Not on file     Social Determinants of Health     Financial Resource Strain: Not on file   Food Insecurity: Not on file   Transportation Needs: Not on file   Physical Activity: Not on file   Stress: Not on file   Social Connections: Not on file   Interpersonal Safety: Not on file   Housing Stability: Not on file          Medications  Allergies   Current Outpatient Medications   Medication Sig Dispense Refill    albuterol (PROVENTIL HFA;VENTOLIN HFA) 90 mcg/actuation inhaler [ALBUTEROL (PROVENTIL HFA;VENTOLIN HFA) 90 MCG/ACTUATION INHALER] Inhale 1-2 puffs every 6 (six) hours as needed for wheezing.      amLODIPine (NORVASC) 5 MG tablet Take 5 mg by mouth daily      aspirin (ASA) 81 MG EC tablet Take 81 mg by mouth daily      ferrous sulfate 325 (65 FE) MG tablet [FERROUS SULFATE 325 (65 FE) MG TABLET] Take 1 tablet (325 mg total) by mouth daily with breakfast.  0    furosemide (LASIX) 20 MG tablet Take 20 mg by mouth daily as needed      HYDROcodone-acetaminophen (NORCO) 5-325 MG tablet Take 1 tablet by mouth every 8 hours as needed for severe pain 7 tablet 0    methocarbamol (ROBAXIN) 500 MG tablet Take 500 mg by mouth 4 times daily as needed for muscle spasms      metoprolol succinate ER (TOPROL XL) 25 MG 24 hr tablet TAKE 1 TABLET BY MOUTH AT BEDTIME 90 tablet 3    Multiple Vitamin (THERA-TABS) TABS Take 1 tablet by mouth daily      nitroGLYcerin (NITROSTAT) 0.4 MG sublingual tablet Place 1 tablet (0.4 mg) under the tongue every 5 minutes as needed 90 tablet 0    OLANZapine (ZYPREXA) 2.5 MG tablet Take 1 tablet (2.5 mg) by mouth 2 times daily as needed (anxiety) 14 tablet 0    omeprazole 20 MG tablet Take 20 mg by mouth daily as needed      rOPINIRole (REQUIP) 2 MG tablet Take 2 mg by mouth nightly as needed      sennosides (SENOKOT) 8.6 MG tablet Take 1 tablet by mouth as needed for constipation      Vitamin D3 (CHOLECALCIFEROL) 125 MCG (5000 UT) tablet Take 1 tablet by  mouth daily      diclofenac (VOLTAREN) 1 % topical gel Apply 2-4 g topically 4 times daily as needed APPLY TO LEFT SHOULDER AND LEFT KNEE. (Patient not taking: Reported on 10/13/2023)      donepezil (ARICEPT) 5 MG tablet Take 5 mg by mouth daily (Patient not taking: Reported on 10/13/2023)      lidocaine (XYLOCAINE) 5 % external ointment Apply topically 3 times daily (Patient not taking: Reported on 10/13/2023) 50 g 0    QUEtiapine (SEROQUEL) 25 MG tablet Take 25 mg by mouth At Bedtime (Patient not taking: Reported on 10/13/2023)      Allergies   Allergen Reactions    Amitriptyline Hcl [Amitriptyline] Other (See Comments)    Erythromycin Diarrhea and Other (See Comments)     Childhood reaction    Gabapentin Other (See Comments)     Jerking movements, swelling    Naproxen Unknown and Other (See Comments)    Other Environmental Allergy Unknown     Molds, dander    Pregabalin Other (See Comments)         Lab Results    Chemistry/lipid CBC Cardiac Enzymes/BNP/TSH/INR   Lab Results   Component Value Date    CHOL 222 (H) 07/12/2022    HDL 59 07/12/2022    TRIG 75 07/12/2022    BUN 23.8 (H) 09/09/2023     09/09/2023    CO2 25 09/09/2023    Lab Results   Component Value Date    WBC 9.4 09/09/2023    HGB 9.7 (L) 09/09/2023    HCT 30.2 (L) 09/09/2023    MCV 93 09/09/2023     09/09/2023    Lab Results   Component Value Date    TROPONINI 0.03 08/02/2022    BNP 62 08/02/2022    TSH 5.17 (H) 12/09/2022    INR 1.13 11/09/2022             This note has been dictated using voice recognition software. Any grammatical, typographical, or context distortions are unintentional and inherent to the software    30 minutes spent on the date of encounter doing chart review, review of outside records, review of test results, interpretation with above tests, patient visit, and documentation.

## 2023-10-13 NOTE — LETTER
"10/13/2023    [facility]  [address]  [facility] [address]    RE: Sherice Alvarez       Dear Colleague,     I had the pleasure of seeing Sherice Alvarez in the [facility].        Assessment/Recommendations   Assessment:    1. Heart failure with preserved ejection fraction, NYHA class II: Compensated.  She states she is feeling well.  She denies any acute heart failure symptoms.  She has trace lower extremity edema.  Her weight has been stable.  She takes Lasix as needed and has not taken it recently.  2. Hypertension: Controlled.  Blood pressure 110/64  3. Valvular heart disease: Status post TAVR in June 2020  4. Chronic kidney disease stage IIIa: She had labs a month ago which were stable    Plan:  1. Continue current medications  2. Schedule echocardiogram in December per Dr Feldman's note from June  3. BMP pending per patient's request    Sherice Alvarez will follow up with Dr Feldman January 17.     History of Present Illness/Subjective    Ms. Sherice Alvarez is a 89 year old female seen at [facility] heart failure clinic today for continued follow-up.  She follows up for heart failure with preserved ejection fraction.  Her last echocardiogram was done December 2022 which showed ejection fraction of 60 to 65%.  She has a past medical history significant for hypertension, CAD with stents, chronic kidney disease stage IIIa, hyperlipidemia, TAVR, DVT.    Today, she has trace edema.  She denies lightheadedness, shortness of breath, dyspnea on exertion, orthopnea, PND, palpitations, chest pain, and abdominal fullness/bloating.      She is monitoring home weights which are stable.  She is following a low sodium diet.        Physical Examination Review of Systems   /64 (BP Location: Right arm, Patient Position: Sitting, Cuff Size: Adult Regular)   Pulse 65   Resp 16   Ht 1.549 m (5' 1\")   Wt 59 kg (130 lb)   BMI 24.56 kg/m    Body mass index is 24.56 " kg/m .  Wt Readings from Last 3 Encounters:   10/13/23 59 kg (130 lb)   23 59.4 kg (131 lb)   23 59.9 kg (132 lb)       General Appearance:   no acute distress   ENT/Mouth: No abnormalities   EYES:  no scleral icterus, normal conjunctivae   Neck: no thyromegaly   Chest/Lungs:   lungs are clear to auscultation, no rales or wheezing, equal chest wall expansion    Cardiovascular:   Regular. Normal first and second heart sounds with no murmurs, rubs, or gallops, trace edema bilaterally    Abdomen:  bowel sounds are present   Extremities: no cyanosis or clubbing   Skin: warm   Neurologic: no tremors     Psychiatric: alert and oriented x3                                              Medical History  Surgical History Family History Social History   Past Medical History:   Diagnosis Date    Arthritis     Asthma     CAD (coronary artery disease)     Cancer (H)     basal cell    Chronic kidney disease     ckd 3    Chronic pain syndrome     Congestive heart failure (H)     Crohn's disease (H)     GERD (gastroesophageal reflux disease)     Hx of heart artery stent 2016    1 stent R Proximal CA and 1 Stent L Proximal circumflex  2016 Stent proximal LAD    Hyperlipidemia     Hypertension     NSTEMI (non-ST elevated myocardial infarction) (H) 2016    PONV (postoperative nausea and vomiting)     severe povn with last knee surgery    Restless legs syndrome     Stroke (H) 2010    numbness rt jaw    Past Surgical History:   Procedure Laterality Date    APPENDECTOMY      CARDIAC CATHETERIZATION  2016    multiple vessel disease.  no intervention    CARDIAC CATHETERIZATION  2016    CARDIAC CATHETERIZATION N/A 2016    Procedure: Coronary Angiogram;  Surgeon: Sunil Moran MD;  Location: [facility] Lab;  Service:     CAROTID ENDARTERECTOMY      CAROTID ENDARTERECTOMY Right 2010    CERVICAL LAMINECTOMY  1994     SECTION      CV CORONARY ANGIOGRAM N/A 2020     Procedure: Coronary Angiogram;  Surgeon: Vinita Ramos MD;  Location: [facility] Cath Lab;  Service: Cardiology    CV LEFT HEART CATHETERIZATION WITHOUT LEFT VENTRICULOGRAM Left 04/17/2020    Procedure: Left Heart Catheterization Without Left Ventriculogram;  Surgeon: Jerad Lerner MD;  Location: [facility] Cath Lab;  Service: Cardiology    CV TRANSCATHETER AORTIC VALVE REPLACEMENT - OTHER APPROACH N/A 06/02/2020    Procedure: CV TRANSCATHETER AORTIC VALVE REPLACEMENT - RIGHT SUBCLAVIAN APPROACH;  Surgeon: John Caceres MD;  Location: [facility] Cath Lab;  Service: Cardiology    HEART CATH, ANGIOPLASTY      HEMORRHOIDECTOMY EXTERNAL      IR LUMBAR EPIDURAL STEROID INJECTION  12/30/2014    IR LUMBAR NERVE ROOT INJECTION  10/01/2013    JOINT REPLACEMENT      AK ESOPHAGOGASTRODUODENOSCOPY TRANSORAL DIAGNOSTIC N/A 07/10/2019    Procedure: ESOPHAGOGASTRODUODENOSCOPY (EGD) with gastric biopsy;  Surgeon: Sunil Stuart MD;  Location: [facility];  Service: Gastroenterology    AK REPLACE AORTIC VALVE OPEN AXILLRY ARTRY APPROACH N/A 06/02/2020    Procedure: OR TRANSCATHETER AORTIC VALVE REPLACEMENT, SUBCLAVIAN APPROACH;  Surgeon: Bhavin Cuadra MD;  Location: [facility] Lab;  Service: Cardiology    TONSILLECTOMY & ADENOIDECTOMY      TOTAL KNEE ARTHROPLASTY Right     TRANSCATHETER AORTIC-VALVE REPLACEMENT      ZZC TOTAL KNEE ARTHROPLASTY Left 05/11/2021    Procedure: LEFT TOTAL KNEE ARTHROPLASTY;  Surgeon: Doug Rhodes MD;  Location: [facility];  Service: Orthopedics    Family History   Problem Relation Age of Onset    Atrial fibrillation Mother     Parkinsonism Father     Social History     Socioeconomic History    Marital status:      Spouse name: Not on file    Number of children: Not on file    Years of education: Not on file    Highest education level: Not on file   Occupational History    Not on file   Tobacco Use    Smoking status: Former     Types: Cigarettes      Quit date: 1980     Years since quittin.8    Smokeless tobacco: Never   Substance and Sexual Activity    Alcohol use: No    Drug use: No    Sexual activity: Not on file   Other Topics Concern    Not on file   Social History Narrative    Not on file     Social Determinants of Health     Financial Resource Strain: Not on file   Food Insecurity: Not on file   Transportation Needs: Not on file   Physical Activity: Not on file   Stress: Not on file   Social Connections: Not on file   Interpersonal Safety: Not on file   Housing Stability: Not on file          Medications  Allergies   Current Outpatient Medications   Medication Sig Dispense Refill    albuterol (PROVENTIL HFA;VENTOLIN HFA) 90 mcg/actuation inhaler [ALBUTEROL (PROVENTIL HFA;VENTOLIN HFA) 90 MCG/ACTUATION INHALER] Inhale 1-2 puffs every 6 (six) hours as needed for wheezing.      amLODIPine (NORVASC) 5 MG tablet Take 5 mg by mouth daily      aspirin (ASA) 81 MG EC tablet Take 81 mg by mouth daily      ferrous sulfate 325 (65 FE) MG tablet [FERROUS SULFATE 325 (65 FE) MG TABLET] Take 1 tablet (325 mg total) by mouth daily with breakfast.  0    furosemide (LASIX) 20 MG tablet Take 20 mg by mouth daily as needed      HYDROcodone-acetaminophen (NORCO) 5-325 MG tablet Take 1 tablet by mouth every 8 hours as needed for severe pain 7 tablet 0    methocarbamol (ROBAXIN) 500 MG tablet Take 500 mg by mouth 4 times daily as needed for muscle spasms      metoprolol succinate ER (TOPROL XL) 25 MG 24 hr tablet TAKE 1 TABLET BY MOUTH AT BEDTIME 90 tablet 3    Multiple Vitamin (THERA-TABS) TABS Take 1 tablet by mouth daily      nitroGLYcerin (NITROSTAT) 0.4 MG sublingual tablet Place 1 tablet (0.4 mg) under the tongue every 5 minutes as needed 90 tablet 0    OLANZapine (ZYPREXA) 2.5 MG tablet Take 1 tablet (2.5 mg) by mouth 2 times daily as needed (anxiety) 14 tablet 0    omeprazole 20 MG tablet Take 20 mg by mouth daily as needed      rOPINIRole (REQUIP) 2 MG  tablet Take 2 mg by mouth nightly as needed      sennosides (SENOKOT) 8.6 MG tablet Take 1 tablet by mouth as needed for constipation      Vitamin D3 (CHOLECALCIFEROL) 125 MCG (5000 UT) tablet Take 1 tablet by mouth daily      diclofenac (VOLTAREN) 1 % topical gel Apply 2-4 g topically 4 times daily as needed APPLY TO LEFT SHOULDER AND LEFT KNEE. (Patient not taking: Reported on 10/13/2023)      donepezil (ARICEPT) 5 MG tablet Take 5 mg by mouth daily (Patient not taking: Reported on 10/13/2023)      lidocaine (XYLOCAINE) 5 % external ointment Apply topically 3 times daily (Patient not taking: Reported on 10/13/2023) 50 g 0    QUEtiapine (SEROQUEL) 25 MG tablet Take 25 mg by mouth At Bedtime (Patient not taking: Reported on 10/13/2023)      Allergies   Allergen Reactions    Amitriptyline Hcl [Amitriptyline] Other (See Comments)    Erythromycin Diarrhea and Other (See Comments)     Childhood reaction    Gabapentin Other (See Comments)     Jerking movements, swelling    Naproxen Unknown and Other (See Comments)    Other Environmental Allergy Unknown     Molds, dander    Pregabalin Other (See Comments)         Lab Results    Chemistry/lipid CBC Cardiac Enzymes/BNP/TSH/INR   Lab Results   Component Value Date    CHOL 222 (H) 07/12/2022    HDL 59 07/12/2022    TRIG 75 07/12/2022    BUN 23.8 (H) 09/09/2023     09/09/2023    CO2 25 09/09/2023    Lab Results   Component Value Date    WBC 9.4 09/09/2023    HGB 9.7 (L) 09/09/2023    HCT 30.2 (L) 09/09/2023    MCV 93 09/09/2023     09/09/2023    Lab Results   Component Value Date    TROPONINI 0.03 08/02/2022    BNP 62 08/02/2022    TSH 5.17 (H) 12/09/2022    INR 1.13 11/09/2022             This note has been dictated using voice recognition software. Any grammatical, typographical, or context distortions are unintentional and inherent to the software    30 minutes spent on the date of encounter doing chart review, review of outside records, review of test results,  interpretation with above tests, patient visit, and documentation.                Thank you for allowing me to participate in the care of your patient.      Sincerely,     TAVO Fernandez [facility]  cc:   Ulisses Feldman MD  [address], [address]  [place] [address]

## 2023-10-13 NOTE — PATIENT INSTRUCTIONS
Sherice Alvarez,    It was a pleasure to see you today at [facility].     My recommendations after this visit include:  - Please follow up with Dr Feldman January 17   - Schedule echocardiogram in December   - Continue current medications  - I have checked labs    Kim Arreola, CNP

## 2023-10-19 ENCOUNTER — TELEPHONE (OUTPATIENT)
Dept: CARDIOLOGY | Facility: CLINIC | Age: 88
End: 2023-10-19

## 2023-10-19 NOTE — TELEPHONE ENCOUNTER
----- Message from Tammy Amin RN sent at 10/18/2023  4:30 PM CDT -----  Regarding: FW: telephone call    ----- Message -----  From: Ligia Garcia  Sent: 10/18/2023   3:17 PM CDT  To: TAVO Fernandez CNP; #  Subject: telephone call                                   Sherice, came into the office and she said that someone has been calling her with (Lab results?) but they have talked so fast that she can't get the phone number to call back.      Please call her back.  She wants to know what the lab results are   Please speak slowly and repeat the phone number    Thanks.

## 2023-12-11 ENCOUNTER — HOSPITAL ENCOUNTER (OUTPATIENT)
Dept: CARDIOLOGY | Facility: HOSPITAL | Age: 88
Discharge: HOME | End: 2023-12-11
Attending: NURSE PRACTITIONER | Admitting: NURSE PRACTITIONER
Payer: COMMERCIAL

## 2023-12-11 DIAGNOSIS — I50.32 CHRONIC HEART FAILURE WITH PRESERVED EJECTION FRACTION (H): ICD-10-CM

## 2023-12-11 LAB — LVEF ECHO: NORMAL

## 2023-12-11 PROCEDURE — 93306 TTE W/DOPPLER COMPLETE: CPT

## 2023-12-11 PROCEDURE — 93306 TTE W/DOPPLER COMPLETE: CPT | Mod: 26 | Performed by: INTERNAL MEDICINE

## 2023-12-19 ENCOUNTER — HOSPITAL ENCOUNTER (EMERGENCY)
Facility: HOSPITAL | Age: 88
Discharge: HOME | End: 2023-12-19
Attending: STUDENT IN AN ORGANIZED HEALTH CARE EDUCATION/TRAINING PROGRAM | Admitting: STUDENT IN AN ORGANIZED HEALTH CARE EDUCATION/TRAINING PROGRAM
Payer: COMMERCIAL

## 2023-12-19 ENCOUNTER — APPOINTMENT (OUTPATIENT)
Dept: CT IMAGING | Facility: HOSPITAL | Age: 88
End: 2023-12-19
Attending: STUDENT IN AN ORGANIZED HEALTH CARE EDUCATION/TRAINING PROGRAM
Payer: COMMERCIAL

## 2023-12-19 VITALS
SYSTOLIC BLOOD PRESSURE: 187 MMHG | RESPIRATION RATE: 24 BRPM | DIASTOLIC BLOOD PRESSURE: 83 MMHG | TEMPERATURE: 98.5 F | OXYGEN SATURATION: 97 % | HEART RATE: 74 BPM

## 2023-12-19 DIAGNOSIS — M54.50 CHRONIC LEFT-SIDED LOW BACK PAIN WITHOUT SCIATICA: ICD-10-CM

## 2023-12-19 DIAGNOSIS — R53.83 MALAISE AND FATIGUE: ICD-10-CM

## 2023-12-19 DIAGNOSIS — G89.29 CHRONIC LEFT-SIDED LOW BACK PAIN WITHOUT SCIATICA: ICD-10-CM

## 2023-12-19 DIAGNOSIS — R53.81 MALAISE AND FATIGUE: ICD-10-CM

## 2023-12-19 LAB
ALBUMIN UR-MCNC: 10 MG/DL
ANION GAP SERPL CALCULATED.3IONS-SCNC: 9 MMOL/L (ref 7–15)
APPEARANCE UR: CLEAR
BASOPHILS # BLD AUTO: 0.1 10E3/UL (ref 0–0.2)
BASOPHILS NFR BLD AUTO: 1 %
BILIRUB UR QL STRIP: NEGATIVE
BUN SERPL-MCNC: 28.5 MG/DL (ref 8–23)
CALCIUM SERPL-MCNC: 8.8 MG/DL (ref 8.8–10.2)
CHLORIDE SERPL-SCNC: 107 MMOL/L (ref 98–107)
COLOR UR AUTO: ABNORMAL
CREAT SERPL-MCNC: 1.24 MG/DL (ref 0.51–0.95)
DEPRECATED HCO3 PLAS-SCNC: 25 MMOL/L (ref 22–29)
EGFRCR SERPLBLD CKD-EPI 2021: 41 ML/MIN/1.73M2
EOSINOPHIL # BLD AUTO: 0.1 10E3/UL (ref 0–0.7)
EOSINOPHIL NFR BLD AUTO: 1 %
ERYTHROCYTE [DISTWIDTH] IN BLOOD BY AUTOMATED COUNT: 20.3 % (ref 10–15)
GLUCOSE SERPL-MCNC: 89 MG/DL (ref 70–99)
GLUCOSE UR STRIP-MCNC: NEGATIVE MG/DL
HCT VFR BLD AUTO: 31.6 % (ref 35–47)
HGB BLD-MCNC: 10.3 G/DL (ref 11.7–15.7)
HGB UR QL STRIP: NEGATIVE
HOLD SPECIMEN: NORMAL
IMM GRANULOCYTES # BLD: 0.1 10E3/UL
IMM GRANULOCYTES NFR BLD: 1 %
KETONES UR STRIP-MCNC: NEGATIVE MG/DL
LEUKOCYTE ESTERASE UR QL STRIP: ABNORMAL
LYMPHOCYTES # BLD AUTO: 1.4 10E3/UL (ref 0.8–5.3)
LYMPHOCYTES NFR BLD AUTO: 14 %
MCH RBC QN AUTO: 29.9 PG (ref 26.5–33)
MCHC RBC AUTO-ENTMCNC: 32.6 G/DL (ref 31.5–36.5)
MCV RBC AUTO: 92 FL (ref 78–100)
MONOCYTES # BLD AUTO: 0.7 10E3/UL (ref 0–1.3)
MONOCYTES NFR BLD AUTO: 7 %
NEUTROPHILS # BLD AUTO: 7.8 10E3/UL (ref 1.6–8.3)
NEUTROPHILS NFR BLD AUTO: 76 %
NITRATE UR QL: NEGATIVE
NRBC # BLD AUTO: 0 10E3/UL
NRBC BLD AUTO-RTO: 0 /100
PH UR STRIP: 6 [PH] (ref 5–7)
PLATELET # BLD AUTO: 217 10E3/UL (ref 150–450)
POTASSIUM SERPL-SCNC: 3.9 MMOL/L (ref 3.4–5.3)
RBC # BLD AUTO: 3.44 10E6/UL (ref 3.8–5.2)
RBC URINE: 1 /HPF
SODIUM SERPL-SCNC: 141 MMOL/L (ref 135–145)
SP GR UR STRIP: 1.01 (ref 1–1.03)
SQUAMOUS EPITHELIAL: 1 /HPF
TROPONIN T SERPL HS-MCNC: 28 NG/L
TROPONIN T SERPL HS-MCNC: 31 NG/L
UROBILINOGEN UR STRIP-MCNC: <2 MG/DL
WBC # BLD AUTO: 10.1 10E3/UL (ref 4–11)
WBC URINE: 10 /HPF

## 2023-12-19 PROCEDURE — 82374 ASSAY BLOOD CARBON DIOXIDE: CPT | Performed by: STUDENT IN AN ORGANIZED HEALTH CARE EDUCATION/TRAINING PROGRAM

## 2023-12-19 PROCEDURE — 82310 ASSAY OF CALCIUM: CPT | Performed by: STUDENT IN AN ORGANIZED HEALTH CARE EDUCATION/TRAINING PROGRAM

## 2023-12-19 PROCEDURE — 99285 EMERGENCY DEPT VISIT HI MDM: CPT | Mod: 25

## 2023-12-19 PROCEDURE — 85025 COMPLETE CBC W/AUTO DIFF WBC: CPT | Performed by: STUDENT IN AN ORGANIZED HEALTH CARE EDUCATION/TRAINING PROGRAM

## 2023-12-19 PROCEDURE — 81001 URINALYSIS AUTO W/SCOPE: CPT | Performed by: STUDENT IN AN ORGANIZED HEALTH CARE EDUCATION/TRAINING PROGRAM

## 2023-12-19 PROCEDURE — 250N000011 HC RX IP 250 OP 636: Mod: JZ | Performed by: STUDENT IN AN ORGANIZED HEALTH CARE EDUCATION/TRAINING PROGRAM

## 2023-12-19 PROCEDURE — 84484 ASSAY OF TROPONIN QUANT: CPT | Performed by: STUDENT IN AN ORGANIZED HEALTH CARE EDUCATION/TRAINING PROGRAM

## 2023-12-19 PROCEDURE — 93005 ELECTROCARDIOGRAM TRACING: CPT | Performed by: STUDENT IN AN ORGANIZED HEALTH CARE EDUCATION/TRAINING PROGRAM

## 2023-12-19 PROCEDURE — 71275 CT ANGIOGRAPHY CHEST: CPT

## 2023-12-19 PROCEDURE — 36415 COLL VENOUS BLD VENIPUNCTURE: CPT | Performed by: STUDENT IN AN ORGANIZED HEALTH CARE EDUCATION/TRAINING PROGRAM

## 2023-12-19 RX ORDER — IOPAMIDOL 755 MG/ML
90 INJECTION, SOLUTION INTRAVASCULAR ONCE
Status: COMPLETED | OUTPATIENT
Start: 2023-12-19 | End: 2023-12-19

## 2023-12-19 RX ADMIN — IOPAMIDOL 90 ML: 755 INJECTION, SOLUTION INTRAVENOUS at 13:52

## 2023-12-19 NOTE — ED PROVIDER NOTES
"EMERGENCY DEPARTMENT ENCOUNTER      NAME: Sherice Alvarez  AGE: 89 year old female  YOB: 1934  MRN: [id number]  EVALUATION DATE & TIME: No admission date for patient encounter.    PCP: Rosemary Mauricio    ED PROVIDER: Phong Murray MD      Chief Complaint   Patient presents with    Back Pain    Generalized Weakness    Dehydration         FINAL IMPRESSION:  1. Chronic left-sided low back pain without sciatica    2. Malaise and fatigue          ED COURSE & MEDICAL DECISION MAKING:    Pertinent Labs & Imaging studies reviewed. (See chart for details)  89 year old female presents to the Emergency Department for evaluation of fatigue    ED Course as of 12/19/23 1450   Tue Dec 19, 2023   1218 EKG is normal sinus rhythm at a rate of 77, normal intervals, no ST segment changes that would indicate emergent ischemia.   1347 Patient is an 89-year-old female with chronic left low back pain who presents to the emergency department with multiple complaints, including generalized weakness/fatigue, exertional dyspnea and \"feeling dry\".  On exam she has no focal deficits, no lower extremity edema, mood is reassuring vital signs.  She also mentioned that she is still grieving the loss of her son 6 months ago, and may possibly be suffering from depression related to loss, but this is more of a diagnosis of exclusion.  Differential includes pulmonary embolism, ACS, anemia, UTI.   1449 Baseline CKD.  No electrolyte abnormalities.  No leukocytosis.  Baseline anemia.  Initial troponin is 31, will trend at the 2-hour.  CT scan does not show evidence of pulmonary embolism.  Pending urinalysis and second troponin.   1449 Patient signed out with the following to do:  -Follow-up urinalysis, second troponin.  If reassuring, then to home with outpatient follow-up.       Medical Decision Making    History:  Supplemental history from: Documented in chart, if applicable  External Record(s) reviewed: Documented in " chart, if applicable.    Work Up:  Chart documentation includes differential considered and any EKGs or imaging independently interpreted by provider, where specified.  In additional to work up documented, I considered the following work up: Documented in chart, if applicable.    External consultation:  Discussion of management with another provider: Documented in chart, if applicable    Complicating factors:  Care impacted by chronic illness: Chronic Kidney Disease, Heart Disease, Hyperlipidemia, and Hypertension  Care affected by social determinants of health: N/A    Disposition considerations: Admission considered. Patient was signed out to the oncoming physician, disposition pending.      12:28 PM I met with the patient, obtained history, performed an initial exam, and discussed options and plan for diagnostics and treatment here in the ED.    At the conclusion of the encounter I discussed the results of all of the tests and the disposition. The questions were answered. The patient or family acknowledged understanding and was agreeable with the care plan.     0 minutes of critical care time     MEDICATIONS GIVEN IN THE EMERGENCY:  Medications   iopamidol (ISOVUE-370) solution 90 mL (90 mLs Intravenous $Given 12/19/23 4599)       NEW PRESCRIPTIONS STARTED AT TODAY'S ER VISIT  New Prescriptions    No medications on file          =================================================================    HPI    Patient information was obtained from: Patient.    Use of : N/A         Sherice Alvarez is a 89 year old female with a pertinent history of CAD, chronic kidney disease, CHF, hyperlipidemia, hypertension who presents to this ED via walk-in for evaluation of back pain and generalized weakness.    The patient reports that she has chronic back pain, for which she used to receive injections, but she has recently started seeing a pain clinic for options for placing a stimulator. She endorses new  "generalized weakness that started 2-3 days ago and that she thinks may be partly related to her back pain. She endorses nausea that occurs when her pain is especially bad. She also says that she believes she is very dehydrated, with her urine output being significantly less than her fluid intake. She notes that with her new generalized weakness, she \"feels so sick she doesn't know where to put herself.\"     The patient also mentions that tomorrow is her son's birthday, and shares that he passed away 6 months ago. She notes that she finds it difficult to verbalize her psychological symptoms, which have become worse as a result of the grief associated with tomorrow's date. She notes nausea and dry heaving related to this grief.      PAST MEDICAL HISTORY:  Past Medical History:   Diagnosis Date    Arthritis     Asthma     CAD (coronary artery disease)     Cancer (H)     basal cell    Chronic kidney disease     ckd 3    Chronic pain syndrome     Congestive heart failure (H)     Crohn's disease (H)     GERD (gastroesophageal reflux disease)     Hx of heart artery stent 2016    1 stent R Proximal CA and 1 Stent L Proximal circumflex  2016 Stent proximal LAD    Hyperlipidemia     Hypertension     NSTEMI (non-ST elevated myocardial infarction) (H) 2016    PONV (postoperative nausea and vomiting)     severe povn with last knee surgery    Restless legs syndrome     Stroke (H) 2010    numbness rt jaw       PAST SURGICAL HISTORY:  Past Surgical History:   Procedure Laterality Date    APPENDECTOMY      CARDIAC CATHETERIZATION  2016    multiple vessel disease.  no intervention    CARDIAC CATHETERIZATION  2016    CARDIAC CATHETERIZATION N/A 2016    Procedure: Coronary Angiogram;  Surgeon: Sunil Moran MD;  Location: [facility] Cath Lab;  Service:     CAROTID ENDARTERECTOMY      CAROTID ENDARTERECTOMY Right 2010    CERVICAL LAMINECTOMY  1994     SECTION      CV " CORONARY ANGIOGRAM N/A 04/16/2020    Procedure: Coronary Angiogram;  Surgeon: Vinita Ramos MD;  Location: [facility] Cath Lab;  Service: Cardiology    CV LEFT HEART CATHETERIZATION WITHOUT LEFT VENTRICULOGRAM Left 04/17/2020    Procedure: Left Heart Catheterization Without Left Ventriculogram;  Surgeon: Jerad Lerner MD;  Location: [facility] Cath Lab;  Service: Cardiology    CV TRANSCATHETER AORTIC VALVE REPLACEMENT - OTHER APPROACH N/A 06/02/2020    Procedure: CV TRANSCATHETER AORTIC VALVE REPLACEMENT - RIGHT SUBCLAVIAN APPROACH;  Surgeon: John Caceres MD;  Location: [facility] Cath Lab;  Service: Cardiology    HEART CATH, ANGIOPLASTY      HEMORRHOIDECTOMY EXTERNAL      IR LUMBAR EPIDURAL STEROID INJECTION  12/30/2014    IR LUMBAR NERVE ROOT INJECTION  10/01/2013    JOINT REPLACEMENT      TN ESOPHAGOGASTRODUODENOSCOPY TRANSORAL DIAGNOSTIC N/A 07/10/2019    Procedure: ESOPHAGOGASTRODUODENOSCOPY (EGD) with gastric biopsy;  Surgeon: Sunil Stuart MD;  Location: [facility];  Service: Gastroenterology    TN REPLACE AORTIC VALVE OPEN AXILLRY ARTRY APPROACH N/A 06/02/2020    Procedure: OR TRANSCATHETER AORTIC VALVE REPLACEMENT, SUBCLAVIAN APPROACH;  Surgeon: Bhavin Cuadra MD;  Location: [facility] Cath Lab;  Service: Cardiology    TONSILLECTOMY & ADENOIDECTOMY      TOTAL KNEE ARTHROPLASTY Right     TRANSCATHETER AORTIC-VALVE REPLACEMENT      ZZC TOTAL KNEE ARTHROPLASTY Left 05/11/2021    Procedure: LEFT TOTAL KNEE ARTHROPLASTY;  Surgeon: Doug Rhodes MD;  Location: [facility];  Service: Orthopedics           CURRENT MEDICATIONS:    albuterol (PROVENTIL HFA;VENTOLIN HFA) 90 mcg/actuation inhaler  amLODIPine (NORVASC) 5 MG tablet  aspirin (ASA) 81 MG EC tablet  diclofenac (VOLTAREN) 1 % topical gel  donepezil (ARICEPT) 5 MG tablet  ferrous sulfate 325 (65 FE) MG tablet  furosemide (LASIX) 20 MG tablet  HYDROcodone-acetaminophen (NORCO) 5-325 MG tablet  lidocaine (XYLOCAINE) 5  % external ointment  methocarbamol (ROBAXIN) 500 MG tablet  metoprolol succinate ER (TOPROL XL) 25 MG 24 hr tablet  Multiple Vitamin (THERA-TABS) TABS  nitroGLYcerin (NITROSTAT) 0.4 MG sublingual tablet  OLANZapine (ZYPREXA) 2.5 MG tablet  omeprazole 20 MG tablet  QUEtiapine (SEROQUEL) 25 MG tablet  rOPINIRole (REQUIP) 2 MG tablet  sennosides (SENOKOT) 8.6 MG tablet  Vitamin D3 (CHOLECALCIFEROL) 125 MCG (5000 UT) tablet        ALLERGIES:  Allergies   Allergen Reactions    Amitriptyline Hcl [Amitriptyline] Other (See Comments)    Erythromycin Diarrhea and Other (See Comments)     Childhood reaction    Gabapentin Other (See Comments)     Jerking movements, swelling    Naproxen Unknown and Other (See Comments)    Other Environmental Allergy Unknown     Molds, dander    Pregabalin Other (See Comments)       FAMILY HISTORY:  Family History   Problem Relation Age of Onset    Atrial fibrillation Mother     Parkinsonism Father        SOCIAL HISTORY:   Social History     Socioeconomic History    Marital status:    Tobacco Use    Smoking status: Former     Types: Cigarettes     Quit date: 1980     Years since quittin.9    Smokeless tobacco: Never   Substance and Sexual Activity    Alcohol use: No    Drug use: No       VITALS:  BP (!) 161/75   Pulse 89   Temp 98.5  F (36.9  C) (Oral)   Resp 24   SpO2 97%     PHYSICAL EXAM    Physical Exam  Vitals and nursing note reviewed.   Constitutional:       General: She is not in acute distress.     Appearance: Normal appearance. She is normal weight. She is not ill-appearing.   HENT:      Head: Normocephalic and atraumatic.      Nose: Nose normal.      Mouth/Throat:      Mouth: Mucous membranes are moist.      Pharynx: Oropharynx is clear.   Eyes:      Extraocular Movements: Extraocular movements intact.      Conjunctiva/sclera: Conjunctivae normal.      Pupils: Pupils are equal, round, and reactive to light.   Cardiovascular:      Rate and Rhythm: Normal rate and  regular rhythm.      Pulses: Normal pulses.      Heart sounds: Normal heart sounds. No murmur heard.  Pulmonary:      Effort: Pulmonary effort is normal. No respiratory distress.      Breath sounds: Normal breath sounds.   Abdominal:      General: Abdomen is flat. There is no distension.      Palpations: Abdomen is soft.      Tenderness: There is no abdominal tenderness.   Musculoskeletal:         General: Normal range of motion.      Cervical back: Normal range of motion.      Right lower leg: No edema.      Left lower leg: No edema.   Skin:     General: Skin is warm and dry.      Capillary Refill: Capillary refill takes less than 2 seconds.   Neurological:      General: No focal deficit present.      Mental Status: She is alert and oriented to person, place, and time. Mental status is at baseline.   Psychiatric:         Mood and Affect: Mood normal.         Behavior: Behavior normal.         Thought Content: Thought content normal.         Judgment: Judgment normal.            LAB:  All pertinent labs reviewed and interpreted.  Results for orders placed or performed during the hospital encounter of 12/19/23   CT Chest Pulmonary Embolism w Contrast    Impression    IMPRESSION:  No pulmonary emboli or other acute finding in the chest.   Basic metabolic panel   Result Value Ref Range    Sodium 141 135 - 145 mmol/L    Potassium 3.9 3.4 - 5.3 mmol/L    Chloride 107 98 - 107 mmol/L    Carbon Dioxide (CO2) 25 22 - 29 mmol/L    Anion Gap 9 7 - 15 mmol/L    Urea Nitrogen 28.5 (H) 8.0 - 23.0 mg/dL    Creatinine 1.24 (H) 0.51 - 0.95 mg/dL    GFR Estimate 41 (L) >60 mL/min/1.73m2    Calcium 8.8 8.8 - 10.2 mg/dL    Glucose 89 70 - 99 mg/dL   Result Value Ref Range    Troponin T, High Sensitivity 31 (H) <=14 ng/L   CBC with platelets and differential   Result Value Ref Range    WBC Count 10.1 4.0 - 11.0 10e3/uL    RBC Count 3.44 (L) 3.80 - 5.20 10e6/uL    Hemoglobin 10.3 (L) 11.7 - 15.7 g/dL    Hematocrit 31.6 (L) 35.0 - 47.0 %     MCV 92 78 - 100 fL    MCH 29.9 26.5 - 33.0 pg    MCHC 32.6 31.5 - 36.5 g/dL    RDW 20.3 (H) 10.0 - 15.0 %    Platelet Count 217 150 - 450 10e3/uL    % Neutrophils 76 %    % Lymphocytes 14 %    % Monocytes 7 %    % Eosinophils 1 %    % Basophils 1 %    % Immature Granulocytes 1 %    NRBCs per 100 WBC 0 <1 /100    Absolute Neutrophils 7.8 1.6 - 8.3 10e3/uL    Absolute Lymphocytes 1.4 0.8 - 5.3 10e3/uL    Absolute Monocytes 0.7 0.0 - 1.3 10e3/uL    Absolute Eosinophils 0.1 0.0 - 0.7 10e3/uL    Absolute Basophils 0.1 0.0 - 0.2 10e3/uL    Absolute Immature Granulocytes 0.1 <=0.4 10e3/uL    Absolute NRBCs 0.0 10e3/uL   Extra Red Top Tube   Result Value Ref Range    Hold Specimen JIC        RADIOLOGY:  Reviewed all pertinent imaging. Please see official radiology report.  CT Chest Pulmonary Embolism w Contrast   Final Result   IMPRESSION:   No pulmonary emboli or other acute finding in the chest.          PROCEDURES:   None      [facility] Documentation:   CMS Diagnoses:               I, Iva Langley, am serving as a scribe to document services personally performed by Phong Murray MD based on my observation and the provider's statements to me. I, Phong Murray MD, attest that Iva Langley is acting in a scribe capacity, has observed my performance of the services and has documented them in accordance with my direction.    Phong Murray MD  [facility] EMERGENCY DEPARTMENT  [address] [place] [id number] [phone]       Phong Murray MD  12/19/23 0713

## 2023-12-19 NOTE — DISCHARGE INSTRUCTIONS
Please follow-up with your outpatient provider for these ongoing complaints.    If your symptoms worsen, return to the emergency department immediately.

## 2023-12-19 NOTE — ED TRIAGE NOTES
"Patient comes in with back pain and is taking oxycodone which she says isn't helping. States she \"doesn't feel good\". Reports generalized weakness that started last week, confusion today, and chest heaviness today.  Feels as if she is dehydrated. Reports she has been having more frequent urination.        "

## 2023-12-19 NOTE — PROGRESS NOTES
Patient seen in clinic for HF education s/p recent ER visit 8/19/2021  Reviewed HF Binder that includes the  HF Sx Awareness & Action plan  handout and  A [employer]er Pump  booklet and Weight log booklet highlighting :  __X_patient s type of heart failure _X__Na management in diet  __X_importance of daily wts  _X__Fluid Guidelines, if applicable  __X_medication review and importance of compliance     Instructed patient in signs and sx of heart failure, reiterated when to call clinic - reviewed HF hotline # [phone] and after hours call # [phone].  Majority of time was spent reviewing: low salt diet and who to call and when.   Patient verbalized understanding of HF discussion.  Plan for f/u with continued HF education reviewed.  Writer met with patient after her office visit with [facility]. She reports 10 lb weight gain over a short period of time and chest tightness with associated shortness of breath. She also appeared to have some significant LE edema. She takes her Furosemide but was unaware of her other medications and what they are for. Attempted to start HF education and go over her medications and low salt diet. She abruptly stood up during visit and started to put on her sweater and thanked writer for the information. Informed her that we will call her with the blood test results and additional recommendations. She verbalized understanding. [place] [place]      Prior Authorization Not Needed per Insurance    Medication: temazepam (RESTORIL) 15 MG capsule  Insurance Company: [employer] Non-Specialty PA's - Phone [phone] Fax [phone]  Expected CoPay:      Pharmacy Filling the Rx: [id number] [place], [place] - [address] [place] [address]  Pharmacy Notified:  yes  Patient Notified:  no

## 2023-12-19 NOTE — ED NOTES
EMERGENCY DEPARTMENT PROGRESS NOTE         ED COURSE AND MEDICAL DECISION MAKING  Patient was signed out to me by dr resendiz at 1500      Sherice Alvarez is a 89 year old female who presents with lower back discomfort and so had cta of the chest to r/o pe or dissection which was unremarkable for cause though has severe coronary artery calcifications.  EKG without ischemic changes.  Second troponin is just drawn and pending. If second trop is not significantly increased, patient is okay with discharge home, may be somatic related to grief of death of son recently.     Patient's repeat troponin was 28 and not significantly changed from the first.  Was discharged home as previously arranged    Medications   iopamidol (ISOVUE-370) solution 90 mL (90 mLs Intravenous $Given 12/19/23 1352)     New Prescriptions    No medications on file       LAB  Pertinent labs results reviewed   Results for orders placed or performed during the hospital encounter of 12/19/23   CT Chest Pulmonary Embolism w Contrast     Status: None    Narrative    EXAM: CT CHEST PULMONARY EMBOLISM W CONTRAST  LOCATION: [facility]  DATE: 12/19/2023    INDICATION: SOB  COMPARISON: 9/9/2023  TECHNIQUE: CT chest pulmonary angiogram during arterial phase injection of IV contrast. Multiplanar reformats and MIP reconstructions were performed. Dose reduction techniques were used.   CONTRAST: IsoVue 370 90ml    FINDINGS:  ANGIOGRAM CHEST: Pulmonary arteries are normal caliber and negative for pulmonary emboli. Thoracic aorta is negative for dissection. No CT evidence of right heart strain.    LUNGS AND PLEURA: Unchanged linear scarring in both lower lobes.    MEDIASTINUM/AXILLAE: No lymphadenopathy. Normal esophagus. No significant pericardial effusion. TAVR    CORONARY ARTERY CALCIFICATION: Severe.    UPPER ABDOMEN: Normal.    MUSCULOSKELETAL: Multilevel degenerative changes in the spine.      Impression    IMPRESSION:  No pulmonary  emboli or other acute finding in the chest.   [place] Draw *Canceled*     Status: None ()    Narrative    The following orders were created for panel order [place] Draw.  Procedure                               Abnormality         Status                     ---------                               -----------         ------                       Please view results for these tests on the individual orders.   [place] Draw *Canceled*     Status: None ()    Narrative    The following orders were created for panel order [place] Draw.  Procedure                               Abnormality         Status                     ---------                               -----------         ------                       Please view results for these tests on the individual orders.   Basic metabolic panel     Status: Abnormal   Result Value Ref Range    Sodium 141 135 - 145 mmol/L    Potassium 3.9 3.4 - 5.3 mmol/L    Chloride 107 98 - 107 mmol/L    Carbon Dioxide (CO2) 25 22 - 29 mmol/L    Anion Gap 9 7 - 15 mmol/L    Urea Nitrogen 28.5 (H) 8.0 - 23.0 mg/dL    Creatinine 1.24 (H) 0.51 - 0.95 mg/dL    GFR Estimate 41 (L) >60 mL/min/1.73m2    Calcium 8.8 8.8 - 10.2 mg/dL    Glucose 89 70 - 99 mg/dL   Troponin T, High Sensitivity     Status: Abnormal   Result Value Ref Range    Troponin T, High Sensitivity 31 (H) <=14 ng/L   [place] Draw     Status: None    Narrative    The following orders were created for panel order [place] Draw.  Procedure                               Abnormality         Status                     ---------                               -----------         ------                     Extra Red Top Tube[[id number]]                               Final result                 Please view results for these tests on the individual orders.   CBC with platelets and differential     Status: Abnormal   Result Value Ref Range    WBC Count 10.1 4.0 - 11.0 10e3/uL    RBC Count 3.44 (L) 3.80 - 5.20 10e6/uL    Hemoglobin 10.3 (L)  11.7 - 15.7 g/dL    Hematocrit 31.6 (L) 35.0 - 47.0 %    MCV 92 78 - 100 fL    MCH 29.9 26.5 - 33.0 pg    MCHC 32.6 31.5 - 36.5 g/dL    RDW 20.3 (H) 10.0 - 15.0 %    Platelet Count 217 150 - 450 10e3/uL    % Neutrophils 76 %    % Lymphocytes 14 %    % Monocytes 7 %    % Eosinophils 1 %    % Basophils 1 %    % Immature Granulocytes 1 %    NRBCs per 100 WBC 0 <1 /100    Absolute Neutrophils 7.8 1.6 - 8.3 10e3/uL    Absolute Lymphocytes 1.4 0.8 - 5.3 10e3/uL    Absolute Monocytes 0.7 0.0 - 1.3 10e3/uL    Absolute Eosinophils 0.1 0.0 - 0.7 10e3/uL    Absolute Basophils 0.1 0.0 - 0.2 10e3/uL    Absolute Immature Granulocytes 0.1 <=0.4 10e3/uL    Absolute NRBCs 0.0 10e3/uL   Extra Red Top Tube     Status: None   Result Value Ref Range    Hold Specimen JI    Troponin T, High Sensitivity (now)     Status: Abnormal   Result Value Ref Range    Troponin T, High Sensitivity 28 (H) <=14 ng/L   CBC with Platelets & Differential     Status: Abnormal    Narrative    The following orders were created for panel order CBC with Platelets & Differential.  Procedure                               Abnormality         Status                     ---------                               -----------         ------                     CBC with platelets and d...[[id number]]  Abnormal            Final result                 Please view results for these tests on the individual orders.         RADIOLOGY    Pertinent imaging reviewed   Please see official radiology report.  CT Chest Pulmonary Embolism w Contrast   Final Result   IMPRESSION:   No pulmonary emboli or other acute finding in the chest.          FINAL IMPRESSION    1. Chronic left-sided low back pain without sciatica    2. Malaise and fatigue              Lynette Pedersen MD  12/19/23 4473

## 2023-12-22 LAB
ATRIAL RATE - MUSE: 77 BPM
DIASTOLIC BLOOD PRESSURE - MUSE: NORMAL MMHG
INTERPRETATION ECG - MUSE: NORMAL
P AXIS - MUSE: 78 DEGREES
PR INTERVAL - MUSE: 206 MS
QRS DURATION - MUSE: 82 MS
QT - MUSE: 416 MS
QTC - MUSE: 470 MS
R AXIS - MUSE: 73 DEGREES
SYSTOLIC BLOOD PRESSURE - MUSE: NORMAL MMHG
T AXIS - MUSE: 79 DEGREES
VENTRICULAR RATE- MUSE: 77 BPM

## 2024-01-17 ENCOUNTER — OFFICE VISIT (OUTPATIENT)
Dept: CARDIOLOGY | Facility: CLINIC | Age: 89
End: 2024-01-17
Payer: COMMERCIAL

## 2024-01-17 VITALS
WEIGHT: 121.8 LBS | OXYGEN SATURATION: 97 % | BODY MASS INDEX: 23.01 KG/M2 | RESPIRATION RATE: 16 BRPM | HEART RATE: 80 BPM | SYSTOLIC BLOOD PRESSURE: 110 MMHG | DIASTOLIC BLOOD PRESSURE: 62 MMHG

## 2024-01-17 DIAGNOSIS — I50.33 ACUTE ON CHRONIC HEART FAILURE WITH PRESERVED EJECTION FRACTION (H): ICD-10-CM

## 2024-01-17 DIAGNOSIS — E78.5 DYSLIPIDEMIA, GOAL LDL BELOW 100: ICD-10-CM

## 2024-01-17 DIAGNOSIS — Z95.2 S/P TAVR (TRANSCATHETER AORTIC VALVE REPLACEMENT): ICD-10-CM

## 2024-01-17 DIAGNOSIS — N18.31 STAGE 3A CHRONIC KIDNEY DISEASE (H): ICD-10-CM

## 2024-01-17 DIAGNOSIS — I25.83 CORONARY ARTERIOSCLEROSIS DUE TO LIPID RICH PLAQUE: Primary | ICD-10-CM

## 2024-01-17 DIAGNOSIS — I10 HYPERTENSION, UNSPECIFIED TYPE: ICD-10-CM

## 2024-01-17 LAB
ANION GAP SERPL CALCULATED.3IONS-SCNC: 9 MMOL/L (ref 7–15)
BUN SERPL-MCNC: 27.2 MG/DL (ref 8–23)
CALCIUM SERPL-MCNC: 8.8 MG/DL (ref 8.8–10.2)
CHLORIDE SERPL-SCNC: 105 MMOL/L (ref 98–107)
CREAT SERPL-MCNC: 1.57 MG/DL (ref 0.51–0.95)
DEPRECATED HCO3 PLAS-SCNC: 28 MMOL/L (ref 22–29)
EGFRCR SERPLBLD CKD-EPI 2021: 31 ML/MIN/1.73M2
GLUCOSE SERPL-MCNC: 97 MG/DL (ref 70–99)
NT-PROBNP SERPL-MCNC: 475 PG/ML (ref 0–1800)
POTASSIUM SERPL-SCNC: 3.9 MMOL/L (ref 3.4–5.3)
SODIUM SERPL-SCNC: 142 MMOL/L (ref 135–145)

## 2024-01-17 PROCEDURE — 99214 OFFICE O/P EST MOD 30 MIN: CPT | Performed by: INTERNAL MEDICINE

## 2024-01-17 PROCEDURE — 36415 COLL VENOUS BLD VENIPUNCTURE: CPT | Performed by: INTERNAL MEDICINE

## 2024-01-17 PROCEDURE — 83880 ASSAY OF NATRIURETIC PEPTIDE: CPT | Performed by: INTERNAL MEDICINE

## 2024-01-17 PROCEDURE — 80048 BASIC METABOLIC PNL TOTAL CA: CPT | Performed by: INTERNAL MEDICINE

## 2024-01-17 RX ORDER — BACLOFEN 10 MG/1
1 TABLET ORAL
COMMUNITY
Start: 2023-10-04 | End: 2024-02-09

## 2024-01-17 RX ORDER — ATENOLOL 25 MG/1
25 TABLET ORAL DAILY
COMMUNITY
End: 2024-02-09

## 2024-01-17 RX ORDER — ACETAMINOPHEN 325 MG/1
650 TABLET ORAL EVERY 6 HOURS PRN
Status: ON HOLD | COMMUNITY
End: 2024-02-12

## 2024-01-17 RX ORDER — OXYCODONE HYDROCHLORIDE 5 MG/1
5 TABLET ORAL 3 TIMES DAILY PRN
Status: ON HOLD | COMMUNITY
End: 2024-02-12

## 2024-01-17 NOTE — LETTER
January 18, 2024      Sherice Alvarez  [address]   [place] [address]        Dear ,    We are writing to inform you of your test results. I left a voicemail with this information as well. Please stay well-hydrated and follow up in 6 months.          From: Ulisses Feldman MD  Sent: 1/17/2024   2:26 PM CST       This lady came in to see me today and was more concerned about urinating a lot as well as dry skin and hair loss.  Let her know she does not have extra fluid, not in heart failure.  There might be an element of dehydration in which case I would recommend she drink a little bit more speak with her primary.  Refrain from salt.  Follow-up with me in 6 to 7 months as I discussed.  LF    Resulted Orders   Basic metabolic panel   Result Value Ref Range    Sodium 142 135 - 145 mmol/L      Comment:      Reference intervals for this test were updated on 09/26/2023 to more accurately reflect our healthy population. There may be differences in the flagging of prior results with similar values performed with this method. Interpretation of those prior results can be made in the context of the updated reference intervals.     Potassium 3.9 3.4 - 5.3 mmol/L    Chloride 105 98 - 107 mmol/L    Carbon Dioxide (CO2) 28 22 - 29 mmol/L    Anion Gap 9 7 - 15 mmol/L    Urea Nitrogen 27.2 (H) 8.0 - 23.0 mg/dL    Creatinine 1.57 (H) 0.51 - 0.95 mg/dL    GFR Estimate 31 (L) >60 mL/min/1.73m2    Calcium 8.8 8.8 - 10.2 mg/dL    Glucose 97 70 - 99 mg/dL   N terminal pro BNP outpatient   Result Value Ref Range    N Terminal Pro BNP Outpatient 475 0 - 1,800 pg/mL      Comment:      Reference range shown and results flagged as abnormal are for the outpatient, non acute settings. Establishing a baseline value for each individual patient is useful for follow-up.    Suggested inpatient cut points for confirming diagnosis of CHF in an acute setting are:  >450 pg/mL (age 18 to less than 50)  >900 pg/mL (age 50 to  less than 75)  >1800 pg/mL (75 yrs and older)    An inpatient or emergency department NT-proPBNP <300 pg/mL effectively rules out acute CHF, with 99% negative predictive value.           If you have any questions or concerns, please call the clinic at the number listed above.       Sincerely,      Ulisses Feldman MD

## 2024-01-17 NOTE — PROGRESS NOTES
[facility]  Heart Care Clinic Follow-up Note    Assessment & Plan        (I25.10,  I25.83) Coronary arteriosclerosis due to lipid rich plaque  (primary encounter diagnosis)  Comment:  Angiography April 2020 showed normal left main, proximal LAD 20% stenosis with patent stent, mid sequential 30% lesion with a distal 70% lesion, circumflex with a patent proximal stent with a third obtuse marginal artery 99% stenosis.  Right coronary artery patent stents with a mid 40% stenosis. Attempted intervention on distal LAD as well as obtuse marginal artery which were unsuccessful and underwent nuclear stress test May 2021 which was normal.  Presented again to the hospital and was admitted for chest pain with rule out and unremarkable nuclear stress test 2022.  Currently work on preventive therapy as she is not having any significant symptoms.     (I35.0) Severe calcific aortic valve stenosis  Comment: Given this she had a 23 mm DANIEL 3 valve placed June 2020 with echo showing mean gradient of 7 mmHg and peak velocity 1.9 m/s. Recheck echo December 2023 shows valve is doing well with an 18 mm mean gradient and peak velocity 2.0 m/s with no paravalvular leak.       (I50.33) Acute on chronic heart failure with preserved ejection fraction (H)  Comment: No significant signs or symptoms currently, on furosemide only as needed due to dry mouth, although she is concerned about dehydration and tells me she has not used this for years.  We discussed fluid and salt restriction, heart is good at 60 to 65%.  Given her concern of dehydration today we will check renal profile as well as BNP.     (E78.5) Dyslipidemia, goal LDL below 100  Comment: Total cholesterol 222 with LDL of 148, as above does not appear to be taking statin anymore. We will need to address.     (I10) Accelerated hypertension  Comment: Under good control currently only on metoprolol and amlodipine.  Did discuss that the amlodipine could cause peripheral  edema.     (N18.31) Stage 3a chronic kidney disease (H)  Comment: Creatinine increased to 1.24 with a GFR 41 and BUN of 28.5.  Will recheck renal profile today with a BMP.  Instructed her not to take furosemide.     Plan  1.  Renal profile and BMP today.  2.  Suggest she speak with her primary or orthopedics concerning her tremendous back and leg discomfort.  3.  Follow-up me in about 6 to 7 months or sooner if needed.    Subjective  CC: 89-year-old white female here for 6-month follow-up.  She comes in with numerous noncardiac concerns.  She states she feels like she is dehydrated, she is urinating several pounds a day.  She states that she has some hair growth issues as well as skin turgor issues.  She admits to some shortness of breath and heavy activity and also the bipedal edema left worse than right.  She tells me she has significant back discomfort and will be going to urgent care following me since her oxycodones are not working.  There is no chest pain, palpitations, PND, orthopnea or worsened edema.    Medications  Current Outpatient Medications   Medication Sig Dispense Refill    acetaminophen (TYLENOL) 325 MG tablet Take 650 mg by mouth every 6 hours as needed      albuterol (PROVENTIL HFA;VENTOLIN HFA) 90 mcg/actuation inhaler [ALBUTEROL (PROVENTIL HFA;VENTOLIN HFA) 90 MCG/ACTUATION INHALER] Inhale 1-2 puffs every 6 (six) hours as needed for wheezing.      amLODIPine (NORVASC) 5 MG tablet Take 5 mg by mouth daily      aspirin (ASA) 81 MG EC tablet Take 81 mg by mouth daily      atenolol (TENORMIN) 25 MG tablet Take 25 mg by mouth daily      baclofen (LIORESAL) 10 MG tablet Take 1 tablet by mouth 3 times daily      ferrous sulfate 325 (65 FE) MG tablet [FERROUS SULFATE 325 (65 FE) MG TABLET] Take 1 tablet (325 mg total) by mouth daily with breakfast.  0    lidocaine (XYLOCAINE) 5 % external ointment Apply topically 3 times daily 50 g 0    methocarbamol (ROBAXIN) 500 MG tablet Take 500 mg by mouth 4 times  daily as needed for muscle spasms      metoprolol succinate ER (TOPROL XL) 25 MG 24 hr tablet TAKE 1 TABLET BY MOUTH AT BEDTIME (Patient taking differently: Take 25 mg by mouth 2 times daily) 90 tablet 3    Multiple Vitamin (THERA-TABS) TABS Take 1 tablet by mouth daily      OLANZapine (ZYPREXA) 2.5 MG tablet Take 1 tablet (2.5 mg) by mouth 2 times daily as needed (anxiety) 14 tablet 0    omeprazole 20 MG tablet Take 20 mg by mouth daily as needed      oxyCODONE (ROXICODONE) 5 MG tablet Take 5 mg by mouth 2 times daily as needed      rOPINIRole (REQUIP) 2 MG tablet Take 2 mg by mouth nightly as needed      sennosides (SENOKOT) 8.6 MG tablet Take 1 tablet by mouth as needed for constipation      Vitamin D3 (CHOLECALCIFEROL) 125 MCG (5000 UT) tablet Take 1 tablet by mouth daily      diclofenac (VOLTAREN) 1 % topical gel Apply 2-4 g topically 4 times daily as needed APPLY TO LEFT SHOULDER AND LEFT KNEE. (Patient not taking: Reported on 1/17/2024)      HYDROcodone-acetaminophen (NORCO) 5-325 MG tablet Take 1 tablet by mouth every 8 hours as needed for severe pain (Patient not taking: Reported on 1/17/2024) 7 tablet 0    nitroGLYcerin (NITROSTAT) 0.4 MG sublingual tablet Place 1 tablet (0.4 mg) under the tongue every 5 minutes as needed 90 tablet 0    QUEtiapine (SEROQUEL) 25 MG tablet Take 25 mg by mouth At Bedtime (Patient not taking: Reported on 10/13/2023)         Objective  /62 (BP Location: Left arm, Patient Position: Sitting, Cuff Size: Adult Regular)   Pulse 80   Resp 16   Wt 55.2 kg (121 lb 12.8 oz)   SpO2 97%   BMI 23.01 kg/m      General Appearance:    Alert, cooperative, no distress, appears stated age   Head:    Normocephalic, without obvious abnormality, atraumatic   Throat:   Lips, mucosa, and tongue normal; teeth and gums normal   Neck:   Supple, symmetrical, trachea midline, no adenopathy;        thyroid:  No enlargement/tenderness/nodules; no carotid    bruit or JVD   Back:     Symmetric, no  "curvature, ROM normal, no CVA tenderness   Lungs:     Clear to auscultation bilaterally, respirations unlabored   Chest wall:    No tenderness or deformity   Heart:    Regular rate and rhythm, S1 and S2 normal, no murmur, rub   or gallop   Abdomen:     Soft, non-tender, bowel sounds active all four quadrants,     no masses, no organomegaly   Extremities:   Normal, atraumatic, no cyanosis, 1/4 bipedal edema   Pulses:   2+ and symmetric all extremities   Skin:   Skin color, texture, turgor normal, no rashes or lesions     Results    Lab Results personally reviewed   Lab Results   Component Value Date    CHOL 222 (H) 07/12/2022    CHOL 226 (H) 03/30/2021     Lab Results   Component Value Date    HDL 59 07/12/2022    HDL 76 03/30/2021     No components found for: \"LDLCALC\"  Lab Results   Component Value Date    TRIG 75 07/12/2022    TRIG 89 03/30/2021     Lab Results   Component Value Date    WBC 10.1 12/19/2023    HGB 10.3 (L) 12/19/2023    HCT 31.6 (L) 12/19/2023     12/19/2023     Lab Results   Component Value Date    BUN 28.5 (H) 12/19/2023     12/19/2023    CO2 25 12/19/2023           "

## 2024-01-17 NOTE — LETTER
1/17/2024    [facility]  [address]  [facility] [address]    RE: Sherice Alvarez       Dear Colleague,     I had the pleasure of seeing Sherice Alvarez in the [facility].      [facility]  Heart Care Clinic Follow-up Note    Assessment & Plan        (I25.10,  I25.83) Coronary arteriosclerosis due to lipid rich plaque  (primary encounter diagnosis)  Comment:  Angiography April 2020 showed normal left main, proximal LAD 20% stenosis with patent stent, mid sequential 30% lesion with a distal 70% lesion, circumflex with a patent proximal stent with a third obtuse marginal artery 99% stenosis.  Right coronary artery patent stents with a mid 40% stenosis. Attempted intervention on distal LAD as well as obtuse marginal artery which were unsuccessful and underwent nuclear stress test May 2021 which was normal.  Presented again to the hospital and was admitted for chest pain with rule out and unremarkable nuclear stress test 2022.  Currently work on preventive therapy as she is not having any significant symptoms.     (I35.0) Severe calcific aortic valve stenosis  Comment: Given this she had a 23 mm DANIEL 3 valve placed June 2020 with echo showing mean gradient of 7 mmHg and peak velocity 1.9 m/s. Recheck echo December 2023 shows valve is doing well with an 18 mm mean gradient and peak velocity 2.0 m/s with no paravalvular leak.       (I50.33) Acute on chronic heart failure with preserved ejection fraction (H)  Comment: No significant signs or symptoms currently, on furosemide only as needed due to dry mouth, although she is concerned about dehydration and tells me she has not used this for years.  We discussed fluid and salt restriction, heart is good at 60 to 65%.  Given her concern of dehydration today we will check renal profile as well as BNP.     (E78.5) Dyslipidemia, goal LDL below 100  Comment: Total cholesterol 222 with LDL of 148, as above does not appear to be  taking statin anymore. We will need to address.     (I10) Accelerated hypertension  Comment: Under good control currently only on metoprolol and amlodipine.  Did discuss that the amlodipine could cause peripheral edema.     (N18.31) Stage 3a chronic kidney disease (H)  Comment: Creatinine increased to 1.24 with a GFR 41 and BUN of 28.5.  Will recheck renal profile today with a BMP.  Instructed her not to take furosemide.     Plan  1.  Renal profile and BMP today.  2.  Suggest she speak with her primary or orthopedics concerning her tremendous back and leg discomfort.  3.  Follow-up me in about 6 to 7 months or sooner if needed.    Subjective  CC: 89-year-old white female here for 6-month follow-up.  She comes in with numerous noncardiac concerns.  She states she feels like she is dehydrated, she is urinating several pounds a day.  She states that she has some hair growth issues as well as skin turgor issues.  She admits to some shortness of breath and heavy activity and also the bipedal edema left worse than right.  She tells me she has significant back discomfort and will be going to urgent care following me since her oxycodones are not working.  There is no chest pain, palpitations, PND, orthopnea or worsened edema.    Medications  Current Outpatient Medications   Medication Sig Dispense Refill    acetaminophen (TYLENOL) 325 MG tablet Take 650 mg by mouth every 6 hours as needed      albuterol (PROVENTIL HFA;VENTOLIN HFA) 90 mcg/actuation inhaler [ALBUTEROL (PROVENTIL HFA;VENTOLIN HFA) 90 MCG/ACTUATION INHALER] Inhale 1-2 puffs every 6 (six) hours as needed for wheezing.      amLODIPine (NORVASC) 5 MG tablet Take 5 mg by mouth daily      aspirin (ASA) 81 MG EC tablet Take 81 mg by mouth daily      atenolol (TENORMIN) 25 MG tablet Take 25 mg by mouth daily      baclofen (LIORESAL) 10 MG tablet Take 1 tablet by mouth 3 times daily      ferrous sulfate 325 (65 FE) MG tablet [FERROUS SULFATE 325 (65 FE) MG TABLET]  Take 1 tablet (325 mg total) by mouth daily with breakfast.  0    lidocaine (XYLOCAINE) 5 % external ointment Apply topically 3 times daily 50 g 0    methocarbamol (ROBAXIN) 500 MG tablet Take 500 mg by mouth 4 times daily as needed for muscle spasms      metoprolol succinate ER (TOPROL XL) 25 MG 24 hr tablet TAKE 1 TABLET BY MOUTH AT BEDTIME (Patient taking differently: Take 25 mg by mouth 2 times daily) 90 tablet 3    Multiple Vitamin (THERA-TABS) TABS Take 1 tablet by mouth daily      OLANZapine (ZYPREXA) 2.5 MG tablet Take 1 tablet (2.5 mg) by mouth 2 times daily as needed (anxiety) 14 tablet 0    omeprazole 20 MG tablet Take 20 mg by mouth daily as needed      oxyCODONE (ROXICODONE) 5 MG tablet Take 5 mg by mouth 2 times daily as needed      rOPINIRole (REQUIP) 2 MG tablet Take 2 mg by mouth nightly as needed      sennosides (SENOKOT) 8.6 MG tablet Take 1 tablet by mouth as needed for constipation      Vitamin D3 (CHOLECALCIFEROL) 125 MCG (5000 UT) tablet Take 1 tablet by mouth daily      diclofenac (VOLTAREN) 1 % topical gel Apply 2-4 g topically 4 times daily as needed APPLY TO LEFT SHOULDER AND LEFT KNEE. (Patient not taking: Reported on 1/17/2024)      HYDROcodone-acetaminophen (NORCO) 5-325 MG tablet Take 1 tablet by mouth every 8 hours as needed for severe pain (Patient not taking: Reported on 1/17/2024) 7 tablet 0    nitroGLYcerin (NITROSTAT) 0.4 MG sublingual tablet Place 1 tablet (0.4 mg) under the tongue every 5 minutes as needed 90 tablet 0    QUEtiapine (SEROQUEL) 25 MG tablet Take 25 mg by mouth At Bedtime (Patient not taking: Reported on 10/13/2023)         Objective  /62 (BP Location: Left arm, Patient Position: Sitting, Cuff Size: Adult Regular)   Pulse 80   Resp 16   Wt 55.2 kg (121 lb 12.8 oz)   SpO2 97%   BMI 23.01 kg/m      General Appearance:    Alert, cooperative, no distress, appears stated age   Head:    Normocephalic, without obvious abnormality, atraumatic   Throat:   Lips,  "mucosa, and tongue normal; teeth and gums normal   Neck:   Supple, symmetrical, trachea midline, no adenopathy;        thyroid:  No enlargement/tenderness/nodules; no carotid    bruit or JVD   Back:     Symmetric, no curvature, ROM normal, no CVA tenderness   Lungs:     Clear to auscultation bilaterally, respirations unlabored   Chest wall:    No tenderness or deformity   Heart:    Regular rate and rhythm, S1 and S2 normal, no murmur, rub   or gallop   Abdomen:     Soft, non-tender, bowel sounds active all four quadrants,     no masses, no organomegaly   Extremities:   Normal, atraumatic, no cyanosis, 1/4 bipedal edema   Pulses:   2+ and symmetric all extremities   Skin:   Skin color, texture, turgor normal, no rashes or lesions     Results    Lab Results personally reviewed   Lab Results   Component Value Date    CHOL 222 (H) 07/12/2022    CHOL 226 (H) 03/30/2021     Lab Results   Component Value Date    HDL 59 07/12/2022    HDL 76 03/30/2021     No components found for: \"LDLCALC\"  Lab Results   Component Value Date    TRIG 75 07/12/2022    TRIG 89 03/30/2021     Lab Results   Component Value Date    WBC 10.1 12/19/2023    HGB 10.3 (L) 12/19/2023    HCT 31.6 (L) 12/19/2023     12/19/2023     Lab Results   Component Value Date    BUN 28.5 (H) 12/19/2023     12/19/2023    CO2 25 12/19/2023               Thank you for allowing me to participate in the care of your patient.      Sincerely,     NANCY FELDMAN MD     [facility]  cc:   Nancy Feldman MD  [address], [address]  [place] [address]      "

## 2024-01-17 NOTE — PATIENT INSTRUCTIONS
Ms Sherice Alvarez,  I enjoyed visiting with you again today.  I am sorry to hear you are having so much issues with the back pain and the leg pain, dehydration, the skin and hair issues.  The good news, is the heart is doing well.  Per our conversation I will check the blood work today looking at the kidney and fluid retention, I would stay off the furosemide and stay away from salt.  I will plan on seeing you 6 months or so.  Ulisses Feldman

## 2024-02-09 ENCOUNTER — APPOINTMENT (OUTPATIENT)
Dept: RADIOLOGY | Facility: HOSPITAL | Age: 89
DRG: 481 | End: 2024-02-09
Attending: EMERGENCY MEDICINE
Payer: COMMERCIAL

## 2024-02-09 ENCOUNTER — HOSPITAL ENCOUNTER (INPATIENT)
Facility: HOSPITAL | Age: 89
LOS: 5 days | Discharge: NURSING, REHAB OR LONG-TERM CARE | DRG: 481 | End: 2024-02-14
Attending: EMERGENCY MEDICINE | Admitting: HOSPITALIST
Payer: COMMERCIAL

## 2024-02-09 DIAGNOSIS — S72.141A CLOSED DISPLACED INTERTROCHANTERIC FRACTURE OF RIGHT FEMUR, INITIAL ENCOUNTER (H): Primary | ICD-10-CM

## 2024-02-09 DIAGNOSIS — D64.9 ACUTE ON CHRONIC ANEMIA: ICD-10-CM
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
## 2024-02-09 DIAGNOSIS — S72.21XA CLOSED DISPLACED SUBTROCHANTERIC FRACTURE OF RIGHT FEMUR, INITIAL ENCOUNTER (H): ICD-10-CM

## 2024-02-09 PROBLEM — I10 HYPERTENSION, UNSPECIFIED TYPE: Status: ACTIVE | Noted: 2023-09-09

## 2024-02-09 PROBLEM — F43.23 ADJUSTMENT DISORDER WITH MIXED ANXIETY AND DEPRESSED MOOD: Status: ACTIVE | Noted: 2022-08-02

## 2024-02-09 PROBLEM — Z95.2 S/P TAVR (TRANSCATHETER AORTIC VALVE REPLACEMENT): Status: ACTIVE | Noted: 2020-06-02

## 2024-02-09 PROBLEM — I50.30 (HFPEF) HEART FAILURE WITH PRESERVED EJECTION FRACTION (H): Status: ACTIVE | Noted: 2021-10-28

## 2024-02-09 LAB
ABO/RH(D): NORMAL
ANION GAP SERPL CALCULATED.3IONS-SCNC: 6 MMOL/L (ref 7–15)
ANTIBODY SCREEN: NEGATIVE
APTT PPP: 27 SECONDS (ref 22–38)
BASOPHILS # BLD AUTO: 0.1 10E3/UL (ref 0–0.2)
BASOPHILS NFR BLD AUTO: 1 %
BUN SERPL-MCNC: 25.4 MG/DL (ref 8–23)
CALCIUM SERPL-MCNC: 8.5 MG/DL (ref 8.8–10.2)
CHLORIDE SERPL-SCNC: 109 MMOL/L (ref 98–107)
CREAT SERPL-MCNC: 1.04 MG/DL (ref 0.51–0.95)
DEPRECATED HCO3 PLAS-SCNC: 25 MMOL/L (ref 22–29)
EGFRCR SERPLBLD CKD-EPI 2021: 51 ML/MIN/1.73M2
EOSINOPHIL # BLD AUTO: 0.3 10E3/UL (ref 0–0.7)
EOSINOPHIL NFR BLD AUTO: 2 %
ERYTHROCYTE [DISTWIDTH] IN BLOOD BY AUTOMATED COUNT: 20.1 % (ref 10–15)
GLUCOSE SERPL-MCNC: 99 MG/DL (ref 70–99)
HCT VFR BLD AUTO: 30.8 % (ref 35–47)
HGB BLD-MCNC: 10.2 G/DL (ref 11.7–15.7)
IMM GRANULOCYTES # BLD: 0.3 10E3/UL
IMM GRANULOCYTES NFR BLD: 2 %
INR PPP: 1.23 (ref 0.85–1.15)
LYMPHOCYTES # BLD AUTO: 2.7 10E3/UL (ref 0.8–5.3)
LYMPHOCYTES NFR BLD AUTO: 18 %
MCH RBC QN AUTO: 30.5 PG (ref 26.5–33)
MCHC RBC AUTO-ENTMCNC: 33.1 G/DL (ref 31.5–36.5)
MCV RBC AUTO: 92 FL (ref 78–100)
MONOCYTES # BLD AUTO: 1.2 10E3/UL (ref 0–1.3)
MONOCYTES NFR BLD AUTO: 8 %
NEUTROPHILS # BLD AUTO: 10.2 10E3/UL (ref 1.6–8.3)
NEUTROPHILS NFR BLD AUTO: 69 %
NRBC # BLD AUTO: 0 10E3/UL
NRBC BLD AUTO-RTO: 0 /100
PLATELET # BLD AUTO: 201 10E3/UL (ref 150–450)
POTASSIUM SERPL-SCNC: 3.6 MMOL/L (ref 3.4–5.3)
RBC # BLD AUTO: 3.34 10E6/UL (ref 3.8–5.2)
SODIUM SERPL-SCNC: 140 MMOL/L (ref 135–145)
SPECIMEN EXPIRATION DATE: NORMAL
TROPONIN T SERPL HS-MCNC: 37 NG/L
TROPONIN T SERPL HS-MCNC: 46 NG/L
TROPONIN T SERPL HS-MCNC: 46 NG/L
WBC # BLD AUTO: 14.7 10E3/UL (ref 4–11)

## 2024-02-09 PROCEDURE — 73552 X-RAY EXAM OF FEMUR 2/>: CPT | Mod: RT

## 2024-02-09 PROCEDURE — 72100 X-RAY EXAM L-S SPINE 2/3 VWS: CPT

## 2024-02-09 PROCEDURE — 84484 ASSAY OF TROPONIN QUANT: CPT | Performed by: EMERGENCY MEDICINE

## 2024-02-09 PROCEDURE — 85730 THROMBOPLASTIN TIME PARTIAL: CPT | Performed by: EMERGENCY MEDICINE

## 2024-02-09 PROCEDURE — 96376 TX/PRO/DX INJ SAME DRUG ADON: CPT

## 2024-02-09 PROCEDURE — 36415 COLL VENOUS BLD VENIPUNCTURE: CPT | Performed by: HOSPITALIST

## 2024-02-09 PROCEDURE — 85610 PROTHROMBIN TIME: CPT | Performed by: EMERGENCY MEDICINE

## 2024-02-09 PROCEDURE — 96374 THER/PROPH/DIAG INJ IV PUSH: CPT

## 2024-02-09 PROCEDURE — 250N000013 HC RX MED GY IP 250 OP 250 PS 637: Performed by: HOSPITALIST

## 2024-02-09 PROCEDURE — 250N000011 HC RX IP 250 OP 636: Performed by: EMERGENCY MEDICINE

## 2024-02-09 PROCEDURE — 99223 1ST HOSP IP/OBS HIGH 75: CPT | Mod: AI | Performed by: HOSPITALIST

## 2024-02-09 PROCEDURE — 86900 BLOOD TYPING SEROLOGIC ABO: CPT | Performed by: HOSPITALIST

## 2024-02-09 PROCEDURE — 96375 TX/PRO/DX INJ NEW DRUG ADDON: CPT

## 2024-02-09 PROCEDURE — 250N000011 HC RX IP 250 OP 636: Performed by: HOSPITALIST

## 2024-02-09 PROCEDURE — 73560 X-RAY EXAM OF KNEE 1 OR 2: CPT | Mod: LT

## 2024-02-09 PROCEDURE — 36415 COLL VENOUS BLD VENIPUNCTURE: CPT | Performed by: EMERGENCY MEDICINE

## 2024-02-09 PROCEDURE — 93005 ELECTROCARDIOGRAM TRACING: CPT | Performed by: EMERGENCY MEDICINE

## 2024-02-09 PROCEDURE — 84484 ASSAY OF TROPONIN QUANT: CPT | Performed by: HOSPITALIST

## 2024-02-09 PROCEDURE — 120N000001 HC R&B MED SURG/OB

## 2024-02-09 PROCEDURE — 72170 X-RAY EXAM OF PELVIS: CPT

## 2024-02-09 PROCEDURE — 80048 BASIC METABOLIC PNL TOTAL CA: CPT | Performed by: EMERGENCY MEDICINE

## 2024-02-09 PROCEDURE — 71045 X-RAY EXAM CHEST 1 VIEW: CPT

## 2024-02-09 PROCEDURE — 86923 COMPATIBILITY TEST ELECTRIC: CPT | Performed by: INTERNAL MEDICINE

## 2024-02-09 PROCEDURE — 99285 EMERGENCY DEPT VISIT HI MDM: CPT | Mod: 25

## 2024-02-09 PROCEDURE — 85025 COMPLETE CBC W/AUTO DIFF WBC: CPT | Performed by: EMERGENCY MEDICINE

## 2024-02-09 RX ORDER — PROCHLORPERAZINE MALEATE 5 MG
5 TABLET ORAL EVERY 6 HOURS PRN
Status: DISCONTINUED | OUTPATIENT
Start: 2024-02-09 | End: 2024-02-14 | Stop reason: HOSPADM

## 2024-02-09 RX ORDER — CEFAZOLIN SODIUM/WATER 2 G/20 ML
2 SYRINGE (ML) INTRAVENOUS
Status: COMPLETED | OUTPATIENT
Start: 2024-02-10 | End: 2024-02-10

## 2024-02-09 RX ORDER — OXYCODONE HYDROCHLORIDE 5 MG/1
10 TABLET ORAL EVERY 4 HOURS PRN
Status: DISCONTINUED | OUTPATIENT
Start: 2024-02-09 | End: 2024-02-12

## 2024-02-09 RX ORDER — CEFAZOLIN SODIUM/WATER 2 G/20 ML
2 SYRINGE (ML) INTRAVENOUS SEE ADMIN INSTRUCTIONS
Status: DISCONTINUED | OUTPATIENT
Start: 2024-02-10 | End: 2024-02-10 | Stop reason: HOSPADM

## 2024-02-09 RX ORDER — HYDRALAZINE HYDROCHLORIDE 10 MG/1
10 TABLET, FILM COATED ORAL EVERY 4 HOURS PRN
Status: DISCONTINUED | OUTPATIENT
Start: 2024-02-09 | End: 2024-02-14 | Stop reason: HOSPADM

## 2024-02-09 RX ORDER — ACETAMINOPHEN 325 MG/1
650 TABLET ORAL EVERY 4 HOURS PRN
Status: DISCONTINUED | OUTPATIENT
Start: 2024-02-09 | End: 2024-02-13

## 2024-02-09 RX ORDER — AMOXICILLIN 250 MG
2 CAPSULE ORAL 2 TIMES DAILY PRN
Status: DISCONTINUED | OUTPATIENT
Start: 2024-02-09 | End: 2024-02-14 | Stop reason: HOSPADM

## 2024-02-09 RX ORDER — ONDANSETRON 2 MG/ML
4 INJECTION INTRAMUSCULAR; INTRAVENOUS ONCE
Status: COMPLETED | OUTPATIENT
Start: 2024-02-09 | End: 2024-02-09

## 2024-02-09 RX ORDER — NALOXONE HYDROCHLORIDE 0.4 MG/ML
0.2 INJECTION, SOLUTION INTRAMUSCULAR; INTRAVENOUS; SUBCUTANEOUS
Status: DISCONTINUED | OUTPATIENT
Start: 2024-02-09 | End: 2024-02-14 | Stop reason: HOSPADM

## 2024-02-09 RX ORDER — HYDRALAZINE HYDROCHLORIDE 20 MG/ML
10 INJECTION INTRAMUSCULAR; INTRAVENOUS EVERY 4 HOURS PRN
Status: DISCONTINUED | OUTPATIENT
Start: 2024-02-09 | End: 2024-02-14 | Stop reason: HOSPADM

## 2024-02-09 RX ORDER — MORPHINE SULFATE 4 MG/ML
4 INJECTION, SOLUTION INTRAMUSCULAR; INTRAVENOUS EVERY 30 MIN PRN
Status: COMPLETED | OUTPATIENT
Start: 2024-02-09 | End: 2024-02-09

## 2024-02-09 RX ORDER — HYDROMORPHONE HYDROCHLORIDE 1 MG/ML
0.3 INJECTION, SOLUTION INTRAMUSCULAR; INTRAVENOUS; SUBCUTANEOUS
Status: DISCONTINUED | OUTPATIENT
Start: 2024-02-09 | End: 2024-02-09

## 2024-02-09 RX ORDER — LIDOCAINE 40 MG/G
CREAM TOPICAL
Status: DISCONTINUED | OUTPATIENT
Start: 2024-02-09 | End: 2024-02-10

## 2024-02-09 RX ORDER — HYDROMORPHONE HCL IN WATER/PF 6 MG/30 ML
0.1 PATIENT CONTROLLED ANALGESIA SYRINGE INTRAVENOUS
Status: DISCONTINUED | OUTPATIENT
Start: 2024-02-09 | End: 2024-02-12

## 2024-02-09 RX ORDER — NALOXONE HYDROCHLORIDE 0.4 MG/ML
0.4 INJECTION, SOLUTION INTRAMUSCULAR; INTRAVENOUS; SUBCUTANEOUS
Status: DISCONTINUED | OUTPATIENT
Start: 2024-02-09 | End: 2024-02-14 | Stop reason: HOSPADM

## 2024-02-09 RX ORDER — HYDROMORPHONE HCL IN WATER/PF 6 MG/30 ML
0.2 PATIENT CONTROLLED ANALGESIA SYRINGE INTRAVENOUS EVERY 4 HOURS PRN
Status: DISCONTINUED | OUTPATIENT
Start: 2024-02-09 | End: 2024-02-10

## 2024-02-09 RX ORDER — OXYCODONE HYDROCHLORIDE 5 MG/1
5 TABLET ORAL EVERY 4 HOURS PRN
Status: DISCONTINUED | OUTPATIENT
Start: 2024-02-09 | End: 2024-02-12

## 2024-02-09 RX ORDER — ACETAMINOPHEN 650 MG/1
650 SUPPOSITORY RECTAL EVERY 4 HOURS PRN
Status: DISCONTINUED | OUTPATIENT
Start: 2024-02-09 | End: 2024-02-14 | Stop reason: HOSPADM

## 2024-02-09 RX ORDER — AMOXICILLIN 250 MG
1 CAPSULE ORAL 2 TIMES DAILY PRN
Status: DISCONTINUED | OUTPATIENT
Start: 2024-02-09 | End: 2024-02-14 | Stop reason: HOSPADM

## 2024-02-09 RX ORDER — CALCIUM CARBONATE 500 MG/1
1000 TABLET, CHEWABLE ORAL 4 TIMES DAILY PRN
Status: DISCONTINUED | OUTPATIENT
Start: 2024-02-09 | End: 2024-02-14 | Stop reason: HOSPADM

## 2024-02-09 RX ORDER — LIDOCAINE 50 MG/G
OINTMENT TOPICAL 3 TIMES DAILY PRN
COMMUNITY
End: 2024-04-04

## 2024-02-09 RX ORDER — PROCHLORPERAZINE 25 MG
12.5 SUPPOSITORY, RECTAL RECTAL EVERY 12 HOURS PRN
Status: DISCONTINUED | OUTPATIENT
Start: 2024-02-09 | End: 2024-02-14 | Stop reason: HOSPADM

## 2024-02-09 RX ORDER — LORAZEPAM 2 MG/ML
0.5 INJECTION INTRAMUSCULAR ONCE
Status: COMPLETED | OUTPATIENT
Start: 2024-02-09 | End: 2024-02-09

## 2024-02-09 RX ADMIN — ONDANSETRON 4 MG: 2 INJECTION INTRAMUSCULAR; INTRAVENOUS at 12:51

## 2024-02-09 RX ADMIN — MORPHINE SULFATE 4 MG: 4 INJECTION, SOLUTION INTRAMUSCULAR; INTRAVENOUS at 13:25

## 2024-02-09 RX ADMIN — MORPHINE SULFATE 4 MG: 4 INJECTION, SOLUTION INTRAMUSCULAR; INTRAVENOUS at 12:54

## 2024-02-09 RX ADMIN — HYDROMORPHONE HYDROCHLORIDE 0.3 MG: 1 INJECTION, SOLUTION INTRAMUSCULAR; INTRAVENOUS; SUBCUTANEOUS at 16:40

## 2024-02-09 RX ADMIN — LORAZEPAM 0.5 MG: 2 INJECTION INTRAMUSCULAR; INTRAVENOUS at 12:48

## 2024-02-09 RX ADMIN — MORPHINE SULFATE 4 MG: 4 INJECTION, SOLUTION INTRAMUSCULAR; INTRAVENOUS at 18:33

## 2024-02-09 RX ADMIN — OXYCODONE HYDROCHLORIDE 10 MG: 5 TABLET ORAL at 19:24

## 2024-02-09 ASSESSMENT — ENCOUNTER SYMPTOMS
BACK PAIN: 1
NECK PAIN: 0

## 2024-02-09 ASSESSMENT — ACTIVITIES OF DAILY LIVING (ADL)
ADLS_ACUITY_SCORE: 38
ADLS_ACUITY_SCORE: 38
ADLS_ACUITY_SCORE: 27
ADLS_ACUITY_SCORE: 38

## 2024-02-09 NOTE — ED NOTES
"Patient now taken to xray.  Pain is more manageable after second dose of morphine. Patient placed on 2 liters NC due to oxygen levels dropping after narcotics. Continues alert.  Patient still reports \"spasms\" with increases in pain.  Ice applied to the right hip.  Right leg appears shorter than the left. Called patients daughter Gulshan, listed as emergency.  Detail message left for her to call back.   "

## 2024-02-09 NOTE — ED NOTES
[facility]  Expected date: 2/9/24  Expected time:   Means of arrival: Ambulance  Comments:  Braulio, 89F, Fall, 50mcg Fent

## 2024-02-09 NOTE — ED TRIAGE NOTES
Patient had a fall at the [employer] parking lot.  Was shopping by herself, drives.  Has chronic back pain issues, takes oxycodone and is scheduled to get a nerve stimulator.  Is not on thinners.  Complaining of back pain worse, also right leg appears shorter than the left.  Was given 50 of fentanyl by medics.  Arrives in much pain.      Triage Assessment (Adult)       Row Name 02/09/24 1232          Triage Assessment    Airway WDL WDL        Respiratory WDL    Respiratory WDL WDL        Skin Circulation/Temperature WDL    Skin Circulation/Temperature WDL WDL        Cardiac WDL    Cardiac WDL WDL        Peripheral/Neurovascular WDL    Peripheral Neurovascular WDL WDL        Cognitive/Neuro/Behavioral WDL    Cognitive/Neuro/Behavioral WDL WDL

## 2024-02-09 NOTE — ED NOTES
Patient has returned from xray--Tolerated it well.  Appears more comfortable and reports less pain. Attempted to call her daughter Gulshan again, left a message.  Also called her daughter Jacqueline and left a message for her as well.

## 2024-02-09 NOTE — CONSULTS
ORTHOPEDIC CONSULTATION    Consultation  Sherice Alvarez,  1934, MRN [id number]    [unfilled]  Closed displaced subtrochanteric fracture of right femur, initial encounter (H) [S72.21XA]    PCP: Rosemary Mauricio, [phone]   Code status:  Prior       Extended Emergency Contact Information  Primary Emergency Contact: Gulshan Renteria   [place]  Mobile Phone: [phone]  Relation: Daughter  Secondary Emergency Contact: Carly Velázquez   [place]  Mobile Phone: [phone]  Relation: Daughter         CHIEF COMPLAINT: Closed displaced subtrochanteric fracture of right femur, initial encounter (H)      HISTORY OF PRESENT ILLNESS:  The patient is seen in orthopedic consultation at the request of Dr. Mayur Ventura MD.  The patient is a 89 year old female with an acute fall today causing a right intertrochanteric/subtrochanteric femur fracture. She reports earlier today she was putting groceries in her care when she fell onto her back and had immediate pain. She was unable to get up and EMS was called. She was brought to  ER and XR confirmed fracture. No previous pain in right hip prior to fall. Denies N/T of right lower extremity, but endorses severe muscle spasms and pain. She is being admitted to the medical floor, hospitalist has yet to see her. PMH included HTN, hyperlipidemia, stroke, CHF, chronic kidney disease and chronic pain syndrome.           ALLERGIES:   Review of patient's allergies indicates   Allergies   Allergen Reactions    Amitriptyline Hcl [Amitriptyline] Other (See Comments)    Erythromycin Diarrhea and Other (See Comments)     Childhood reaction    Gabapentin Other (See Comments)     Jerking movements, swelling    Naproxen Unknown and Other (See Comments)    Other Environmental Allergy Unknown     Molds, dander    Pregabalin Other (See Comments)         MEDICATIONS UPON ADMISSION:  Medications were reviewed.  They include:   acetaminophen (TYLENOL) 325 MG  tablet  albuterol (PROVENTIL HFA;VENTOLIN HFA) 90 mcg/actuation inhaler  amLODIPine (NORVASC) 5 MG tablet  aspirin (ASA) 81 MG EC tablet  atenolol (TENORMIN) 25 MG tablet  baclofen (LIORESAL) 10 MG tablet  diclofenac (VOLTAREN) 1 % topical gel  ferrous sulfate 325 (65 FE) MG tablet  HYDROcodone-acetaminophen (NORCO) 5-325 MG tablet  lidocaine (XYLOCAINE) 5 % external ointment  methocarbamol (ROBAXIN) 500 MG tablet  metoprolol succinate ER (TOPROL XL) 25 MG 24 hr tablet  Multiple Vitamin (THERA-TABS) TABS  nitroGLYcerin (NITROSTAT) 0.4 MG sublingual tablet  OLANZapine (ZYPREXA) 2.5 MG tablet  omeprazole 20 MG tablet  oxyCODONE (ROXICODONE) 5 MG tablet  QUEtiapine (SEROQUEL) 25 MG tablet  rOPINIRole (REQUIP) 2 MG tablet  sennosides (SENOKOT) 8.6 MG tablet  Vitamin D3 (CHOLECALCIFEROL) 125 MCG (5000 UT) tablet        SOCIAL HISTORY:   she  reports that she quit smoking about 44 years ago. Her smoking use included cigarettes. She has never used smokeless tobacco. She reports that she does not drink alcohol and does not use drugs.      FAMILY HISTORY:  family history includes Atrial fibrillation in her mother; Parkinsonism in her father.      REVIEW OF SYSTEMS:   See HPI, otherwise negative      PHYSICAL EXAMINATION:  Vitals: Temp:  [98.6  F (37  C)] 98.6  F (37  C)  Pulse:  [76-93] 76  Resp:  [12-18] 12  BP: (165-222)/() 165/70  SpO2:  [95 %-99 %] 96 %  General: On examination, the patient is A & O x 3  SKIN: There is no evidence of erythema, ecchymosis or swelling  Pulses:  dorsalis pedis pulse palpable bilateral   Sensation: intact and equal bilaterally  Tenderness: TTP GT and hip girdle  ROM: dorsiflexion and plantarflexion intact, further testing not done due to known fracture. Leg is shortened and externally rotated   Motor: dorsiflexion/plantarflexion intact, wiggles all 5 toes   Contralateral side= Full range of motion, Negative joint instability findings, 5/5 motor groups about the joint, Non-tender.        RADIOGRAPHIC EVALUATION:    XR Femur Right 2 Views   Final Result   IMPRESSION: Diffuse osseous demineralization. Comminuted impacted and externally rotated fracture of the right intertrochanteric and subtrochanteric femoral neck with medial displacement of the lesser trochanter.       Moderate degenerative arthrosis of the right hip and advanced degenerative arthrosis of the left hip with bone-on-bone articulation. Bilateral total knee arthroplasties.       Degenerative arthrosis of both SI joints. Lower lumbar facet arthropathy and degenerative interspace narrowing.            IMPRESSION:  Right intertrochanteric/subtrochanteric femur fracture, acute.      PLAN:  This patient was discussed with Dr. Sin Bolden, on-call surgeon for [facility] and they are in agreement with the following plan.   Plan for right hip pinning with short nail.   Needs to be medically optimized/hospitalist clearance  NPO  Pain control  NWB    Will await clearance from hospitalist team. If cleared, can do tonight vs tomorrow AM pending time frame.     Thank you for including [facility] in the care of Sherice Alvarez. It has been a pleasure participating in their care.    Nelly Decker PA-C      CC1:   Ranjeet Ventura MD    CC2:   Rosemary Mauricio

## 2024-02-09 NOTE — MEDICATION SCRIBE - ADMISSION MEDICATION HISTORY
Medication Scribe Admission Medication History    Admission medication history is complete. The information provided in this note is only as accurate as the sources available at the time of the update.    Information Source(s): Patient and Family member via in-person and phone    Pertinent Information: Patient reports that her daughter, Jacqueline, assist in setting up her medications. Called Jacqueline, [phone], and discussed medications. Jacqueline reports that patient is very non compliant regarding medications. Jacqueline would set up patient's medications in a pill box, and patient would refuse to take them. It  was reported that this situations has lasted for the past two years. Patient reported to have dumped medications into a cup and refused to take them. Sure scripts fill data supports these reports.     Jacqueline reports that patient takes only medications that patient believes will assist her in her pain control. A few months ago, jacqueline threw out many pill bottles that contained medications patient refuses to take. This includes medications such as Amlodipine, Sertraline, etc. Medications not confirmed by patient's daughter's were deleted from the profile.     Changes made to PTA medication list:  Added: None  Deleted: Albuterol, Amlodipine, Aspirin, Atenolol, Baclofen, Iron, Norco, Metoprolol, Olanzapine, Omeprazole, Seroquel, Senokot, D3  Changed: Lidocaine to PRn    Allergies reviewed with patient and updates made in EHR: yes    Medication History Completed By: Bogdan Cuba 2/9/2024 2:49 PM    PTA Med List   Medication Sig Last Dose    acetaminophen (TYLENOL) 325 MG tablet Take 650 mg by mouth every 6 hours as needed Past Month at prn    diclofenac (VOLTAREN) 1 % topical gel Apply 2-4 g topically 4 times daily as needed APPLY TO LEFT SHOULDER AND LEFT KNEE. Past Month at prn    lidocaine (XYLOCAINE) 5 % external ointment Apply topically 3 times daily as needed for moderate pain Past Month at prn    lidocaine  (XYLOCAINE) 5 % external ointment Apply topically 3 times daily Past Month    methocarbamol (ROBAXIN) 500 MG tablet Take 500 mg by mouth 4 times daily as needed for muscle spasms Past Month at ptn    Multiple Vitamin (THERA-TABS) TABS Take 1 tablet by mouth daily Unknown at unknown    oxyCODONE (ROXICODONE) 5 MG tablet Take 5 mg by mouth 3 times daily as needed 2/9/2024 at am    rOPINIRole (REQUIP) 2 MG tablet Take 2 mg by mouth nightly as needed Past Month at prn

## 2024-02-09 NOTE — ED PROVIDER NOTES
EMERGENCY DEPARTMENT ENCOUNTER      NAME: Sherice Alvarez  AGE: 89 year old female  YOB: 1934  MRN: [id number]  EVALUATION DATE & TIME: 2024 12:25 PM    PCP: Rosemary Mauricio    ED PROVIDER: Ranjeet Ventura M.D.      Chief Complaint   Patient presents with    Fall         FINAL IMPRESSION:  1.  Acute fall.  2.  Acute lumbar pain exacerbation on chronic lumbar pain.  3.  Acute comminuted proximal right femur fracture at the right hip.    ED COURSE & MEDICAL DECISION MAKIN:42 PM I met with the patient to gather history and to perform my initial exam. We discussed plans for the ED course, including diagnostic testing and treatment. PPE worn: cloth mask.  Patient fell at a grocery store and complaining of increasing low back pain.  Also complaining of bilateral hip pain.  Unable to walk since that time.  50 mcg of fentanyl per medics.  2:50 PM.  Comminuted proximal right femur fracture at the hip.  Plan admit patient to [facility] inpatient.  Results communicated to the patient.  We will page orthopedics and the admitting service.  Patient in agreement.  3:02 PM.  Orthopedics called and they are in agreement with the plan.  Patient NPO.  Possible surgery later on today.  3:19 PM.  Hospitalist called back and is in agreement.  Patient admitted to [facility] inpatient.    Pertinent Labs & Imaging studies reviewed. (See chart for details)  89 year old female presents to the Emergency Department for evaluation of low back and bilateral hip pain after a fall.    At the conclusion of the encounter I discussed the results of all of the tests and the disposition. The questions were answered. The patient or family acknowledged understanding and was agreeable with the care plan.              Medical Decision Making  Obtained supplemental history: EMS.  Reviewed external records: Both inpatient and outpatient computer records were reviewed.  Care impacted by chronic illness:Chronic Kidney Disease,  "Chronic Pain, Hyperlipidemia, and Hypertension  Care significantly affected by social determinants of health:Access to Medical Care  Did you consider but not order tests?: Work up considered but not performed and documented in chart, if applicable  Did you interpret images independently?: Independent interpretation of ECG and images noted in documentation, when applicable.  Consultation discussion with other provider:Did you involve another provider (consultant, MH, pharmacy, etc.)?: No  I suspect patient will require admission after evaluation complete.      MEDICATIONS GIVEN IN THE EMERGENCY:  Medications - No data to display    NEW PRESCRIPTIONS STARTED AT TODAY'S ER VISIT  New Prescriptions    No medications on file          =================================================================    HPI    Patient information was obtained from: Patient    Use of : N/A         Sherice Alvarez is a 89 year old female with a pertinent history of hypertension, hyperlipidemia, stroke, congestive heart failure, chronic kidney disease and chronic pain syndrome who presents to this ED by EMS for evaluation of fall.    The patient reports that she was at the grocery store, when she fell on her back as she was putting her groceries away into her car. She rates her pain as \"25/10.\" Patient complains of back back, and right and left hip pain. Her left hip pain radiates from her hip down to her knee. Patient has a history of chronic back pain. Her baseline for back pain is 7/10.     Patient denies any other injuries. No neck or arm pain. No collarbone pain.     She does not identify any waxing or waning symptoms otherwise, exacerbating or alleviating features, associated symptoms except as mentioned. She denies any pain related complaints.    REVIEW OF SYSTEMS   Review of Systems   Musculoskeletal:  Positive for back pain. Negative for neck pain.        Negative for arm pain.  Negative for collarbone pain.     "     PAST MEDICAL HISTORY:  Past Medical History:   Diagnosis Date    Arthritis     Asthma     CAD (coronary artery disease)     Cancer (H)     basal cell    Chronic kidney disease     ckd 3    Chronic pain syndrome     Congestive heart failure (H)     Crohn's disease (H)     GERD (gastroesophageal reflux disease)     Hx of heart artery stent 2016    1 stent R Proximal CA and 1 Stent L Proximal circumflex  2016 Stent proximal LAD    Hyperlipidemia     Hypertension     NSTEMI (non-ST elevated myocardial infarction) (H) 2016    PONV (postoperative nausea and vomiting)     severe povn with last knee surgery    Restless legs syndrome     Stroke (H) 2010    numbness rt jaw       PAST SURGICAL HISTORY:  Past Surgical History:   Procedure Laterality Date    APPENDECTOMY      CARDIAC CATHETERIZATION  2016    multiple vessel disease.  no intervention    CARDIAC CATHETERIZATION  2016    CARDIAC CATHETERIZATION N/A 2016    Procedure: Coronary Angiogram;  Surgeon: Sunil Moran MD;  Location: [facility] Cath Lab;  Service:     CAROTID ENDARTERECTOMY      CAROTID ENDARTERECTOMY Right 2010    CERVICAL LAMINECTOMY  1994     SECTION      CV CORONARY ANGIOGRAM N/A 2020    Procedure: Coronary Angiogram;  Surgeon: Vinita Ramos MD;  Location: [facility] Cath Lab;  Service: Cardiology    CV LEFT HEART CATHETERIZATION WITHOUT LEFT VENTRICULOGRAM Left 2020    Procedure: Left Heart Catheterization Without Left Ventriculogram;  Surgeon: Jerad Lerner MD;  Location: [facility] Cath Lab;  Service: Cardiology    CV TRANSCATHETER AORTIC VALVE REPLACEMENT - OTHER APPROACH N/A 2020    Procedure: CV TRANSCATHETER AORTIC VALVE REPLACEMENT - RIGHT SUBCLAVIAN APPROACH;  Surgeon: John Caceres MD;  Location: [facility] Cath Lab;  Service: Cardiology    HEART CATH, ANGIOPLASTY      HEMORRHOIDECTOMY EXTERNAL      IR LUMBAR EPIDURAL STEROID INJECTION   12/30/2014    IR LUMBAR NERVE ROOT INJECTION  10/01/2013    JOINT REPLACEMENT      MO ESOPHAGOGASTRODUODENOSCOPY TRANSORAL DIAGNOSTIC N/A 07/10/2019    Procedure: ESOPHAGOGASTRODUODENOSCOPY (EGD) with gastric biopsy;  Surgeon: Sunil Stuart MD;  Location: [facility];  Service: Gastroenterology    MO REPLACE AORTIC VALVE OPEN AXILLRY ARTRY APPROACH N/A 06/02/2020    Procedure: OR TRANSCATHETER AORTIC VALVE REPLACEMENT, SUBCLAVIAN APPROACH;  Surgeon: Bhavin Cuadra MD;  Location: [facility] Cath Lab;  Service: Cardiology    TONSILLECTOMY & ADENOIDECTOMY      TOTAL KNEE ARTHROPLASTY Right     TRANSCATHETER AORTIC-VALVE REPLACEMENT      ZZC TOTAL KNEE ARTHROPLASTY Left 05/11/2021    Procedure: LEFT TOTAL KNEE ARTHROPLASTY;  Surgeon: Doug Rhodes MD;  Location: [facility];  Service: Orthopedics           CURRENT MEDICATIONS:    acetaminophen (TYLENOL) 325 MG tablet  albuterol (PROVENTIL HFA;VENTOLIN HFA) 90 mcg/actuation inhaler  amLODIPine (NORVASC) 5 MG tablet  aspirin (ASA) 81 MG EC tablet  atenolol (TENORMIN) 25 MG tablet  baclofen (LIORESAL) 10 MG tablet  diclofenac (VOLTAREN) 1 % topical gel  ferrous sulfate 325 (65 FE) MG tablet  HYDROcodone-acetaminophen (NORCO) 5-325 MG tablet  lidocaine (XYLOCAINE) 5 % external ointment  methocarbamol (ROBAXIN) 500 MG tablet  metoprolol succinate ER (TOPROL XL) 25 MG 24 hr tablet  Multiple Vitamin (THERA-TABS) TABS  nitroGLYcerin (NITROSTAT) 0.4 MG sublingual tablet  OLANZapine (ZYPREXA) 2.5 MG tablet  omeprazole 20 MG tablet  oxyCODONE (ROXICODONE) 5 MG tablet  QUEtiapine (SEROQUEL) 25 MG tablet  rOPINIRole (REQUIP) 2 MG tablet  sennosides (SENOKOT) 8.6 MG tablet  Vitamin D3 (CHOLECALCIFEROL) 125 MCG (5000 UT) tablet        ALLERGIES:  Allergies   Allergen Reactions    Amitriptyline Hcl [Amitriptyline] Other (See Comments)    Erythromycin Diarrhea and Other (See Comments)     Childhood reaction    Gabapentin Other (See Comments)     Jerking  "movements, swelling    Naproxen Unknown and Other (See Comments)    Other Environmental Allergy Unknown     Molds, dander    Pregabalin Other (See Comments)       FAMILY HISTORY:  Family History   Problem Relation Age of Onset    Atrial fibrillation Mother     Parkinsonism Father        SOCIAL HISTORY:   Social History     Socioeconomic History    Marital status:    Tobacco Use    Smoking status: Former     Types: Cigarettes     Quit date: 1980     Years since quittin.1    Smokeless tobacco: Never   Substance and Sexual Activity    Alcohol use: No    Drug use: No   Denies current drugs, alcohol, or tobacco.  VITALS:  BP (!) 222/100   Pulse 93   Temp 98.6  F (37  C) (Oral)   Resp 18   Ht 1.473 m (4' 10\")   Wt 54.4 kg (120 lb)   SpO2 99%   BMI 25.08 kg/m      PHYSICAL EXAM    Vital Signs:  BP (!) 222/100   Pulse 93   Temp 98.6  F (37  C) (Oral)   Resp 18   Ht 1.473 m (4' 10\")   Wt 54.4 kg (120 lb)   SpO2 99%   BMI 25.08 kg/m    General:  On entering the room she appears to be in significant pain and discomfort.  Neck:  Neck supple with full range of motion and nontender.    Back:  Back and spine are nontender in the cervical and thoracic spine.  Tender in the lumbar spine and paralumbar muscles bilaterally.  No costovertebral angle tenderness.    HEENT:  Oropharynx clear with moist mucous membranes.  HEENT unremarkable.    Pulmonary:  Chest clear to auscultation without rhonchi rales or wheezing.    Cardiovascular:  Cardiac regular rate and rhythm without murmurs rubs or gallops.    Abdomen:  Abdomen soft nontender.  There is no rebound or guarding.    Muskuloskeletal:  She moves all 4 without any difficulty and has normal neurovascular exams.  Extremities without clubbing, cyanosis, or edema.  Legs and calves are nontender.  The right leg appears shortened and externally rotated.  Tender in the femur and right hip.  Tender in the left hip left femur, and left knee.  Neuro:  She is alert " and oriented ×3 and moves all extremities symmetrically.  Strength 5/5 in both legs.  Sensation intact both legs.  Psych:  Normal affect.    Skin:  Unremarkable and warm and dry.       LAB:  All pertinent labs reviewed and interpreted.  Labs Ordered and Resulted from Time of ED Arrival to Time of ED Departure   INR - Abnormal       Result Value    INR 1.23 (*)    CBC WITH PLATELETS AND DIFFERENTIAL - Abnormal    WBC Count 14.7 (*)     RBC Count 3.34 (*)     Hemoglobin 10.2 (*)     Hematocrit 30.8 (*)     MCV 92      MCH 30.5      MCHC 33.1      RDW 20.1 (*)     Platelet Count 201      % Neutrophils 69      % Lymphocytes 18      % Monocytes 8      % Eosinophils 2      % Basophils 1      % Immature Granulocytes 2      NRBCs per 100 WBC 0      Absolute Neutrophils 10.2 (*)     Absolute Lymphocytes 2.7      Absolute Monocytes 1.2      Absolute Eosinophils 0.3      Absolute Basophils 0.1      Absolute Immature Granulocytes 0.3      Absolute NRBCs 0.0     BASIC METABOLIC PANEL - Abnormal    Sodium 140      Potassium 3.6      Chloride 109 (*)     Carbon Dioxide (CO2) 25      Anion Gap 6 (*)     Urea Nitrogen 25.4 (*)     Creatinine 1.04 (*)     GFR Estimate 51 (*)     Calcium 8.5 (*)     Glucose 99     PARTIAL THROMBOPLASTIN TIME - Normal    aPTT 27         RADIOLOGY:  Reviewed all pertinent imaging. Please see official radiology report.  XR Knee Left 1/2 Views   Final Result   IMPRESSION: Postoperative changes of total knee arthroplasty and patellar resurfacing. Components appear well-seated. No effusion.      Lumbar spine XR, 2-3 views   Final Result   IMPRESSION: 5 nonrib-bearing lumbar type vertebral bodies. Lumbar spine lordosis is maintained. Lumbar vertebral body heights are maintained. Chronic compression fracture deformity of the L4 vertebral body. Grade 1 anterolisthesis of L4 on L5. Advanced    loss of disc space height at L4-L5 with apposing endplate sclerosis and marginal endplate osteophytes. Moderate loss of  disc space height at L1-L2 and L5-S1. Mild to moderate multilevel degenerative facet changes. Atherosclerotic vascular calcifications    in the abdomen. Partially evaluated comminuted fracture of the proximal right femur.      XR Chest Port 1 View   Final Result   IMPRESSION: Heart size magnified in AP projection with stable TAVR prosthesis. Widening of superior mediastinum likely secondary to supine position and shallow inspiration. No focal consolidation, pneumothorax nor gross pleural effusion. No definite    fracture.      XR Femur Left 2 Views   Final Result   IMPRESSION: Diffuse osseous demineralization. Comminuted impacted and externally rotated fracture of the right intertrochanteric and subtrochanteric femoral neck with medial displacement of the lesser trochanter.      Moderate degenerative arthrosis of the right hip and advanced degenerative arthrosis of the left hip with bone-on-bone articulation. Bilateral total knee arthroplasties.      Degenerative arthrosis of both SI joints. Lower lumbar facet arthropathy and degenerative interspace narrowing.       NOTE: ABNORMAL REPORT      THE DICTATION ABOVE DESCRIBES AN ABNORMALITY FOR WHICH FOLLOW-UP IS NEEDED.       XR Femur Right 2 Views   Final Result   IMPRESSION: Diffuse osseous demineralization. Comminuted impacted and externally rotated fracture of the right intertrochanteric and subtrochanteric femoral neck with medial displacement of the lesser trochanter.      Moderate degenerative arthrosis of the right hip and advanced degenerative arthrosis of the left hip with bone-on-bone articulation. Bilateral total knee arthroplasties.      Degenerative arthrosis of both SI joints. Lower lumbar facet arthropathy and degenerative interspace narrowing.       NOTE: ABNORMAL REPORT      THE DICTATION ABOVE DESCRIBES AN ABNORMALITY FOR WHICH FOLLOW-UP IS NEEDED.       XR Pelvis 1/2 Views   Final Result   IMPRESSION: Diffuse osseous demineralization. Comminuted  impacted and externally rotated fracture of the right intertrochanteric and subtrochanteric femoral neck with medial displacement of the lesser trochanter.      Moderate degenerative arthrosis of the right hip and advanced degenerative arthrosis of the left hip with bone-on-bone articulation. Bilateral total knee arthroplasties.      Degenerative arthrosis of both SI joints. Lower lumbar facet arthropathy and degenerative interspace narrowing.       NOTE: ABNORMAL REPORT      THE DICTATION ABOVE DESCRIBES AN ABNORMALITY FOR WHICH FOLLOW-UP IS NEEDED.                  EKG:    Normal sinus rhythm 85, ST segment flattening or depressions in V5 and V6.  Otherwise very similar to previous EKG of December 19, 2023.    I have independently reviewed and interpreted the EKG(s) documented above.    PROCEDURES:         I, Darshana Rod, am serving as a scribe to document services personally performed by Dr. Ventura based on my observation and the provider's statements to me. I, Ranjeet Ventura MD attest that Darshana Rod is acting in a scribe capacity, has observed my performance of the services and has documented them in accordance with my direction.    Ranjeet Ventura M.D.  Emergency Medicine  [facility] EMERGENCY DEPARTMENT  [facility] [facility] [address]  [phone]  Dept: [phone]       Ranjeet Ventura MD  02/09/24 0501       Ranjeet Ventura MD  02/09/24 [id number]       Ranjeet Ventura MD  02/09/24 [id number]

## 2024-02-09 NOTE — ED NOTES
"[facility] ED Handoff Report    ED Chief Complaint: fall    ED Diagnosis:  (W19.XXXA) Fall, initial encounter  Comment:  Fell in the [facility] parking lot after shopping today.  Plan:     (S72.21XA) Closed displaced subtrochanteric fracture of right femur, initial encounter (H)  Comment:   Patient will have surgery either today or tomorrow--needs to be cleared by hospitalist--ortho has seen her.  Plan:        PMH:    Past Medical History:   Diagnosis Date    Arthritis     Asthma     CAD (coronary artery disease)     Cancer (H)     basal cell    Chronic kidney disease     ckd 3    Chronic pain syndrome     Congestive heart failure (H)     Crohn's disease (H)     GERD (gastroesophageal reflux disease)     Hx of heart artery stent 11/01/2016    1 stent R Proximal CA and 1 Stent L Proximal circumflex  12/2016 Stent proximal LAD    Hyperlipidemia     Hypertension     NSTEMI (non-ST elevated myocardial infarction) (H) 11/01/2016    PONV (postoperative nausea and vomiting)     severe povn with last knee surgery    Restless legs syndrome     Stroke (H) 01/01/2010    numbness rt jaw        Code Status:  Prior     Falls Risk: Yes Band: Applied    Current Living Situation/Residence: lives alone     Elimination Status: Continent: Yes     Activity Level: Independent    Patients Preferred Language:  English     Needed: No    Vital Signs:  BP (!) 165/70   Pulse 76   Temp 98.6  F (37  C) (Oral)   Resp 12   Ht 1.473 m (4' 10\")   Wt 54.4 kg (120 lb)   SpO2 96%   BMI 25.08 kg/m       Cardiac Rhythm: NSR    Pain Score: 6/10    Is the Patient Confused:  No    Last Food or Drink: 02/09/24 at unsure--probably around noon    Focused Assessment:        Patient has chronic back pain issues--is oxycodone for that--is scheduled to get a nerve stimulator later this month.  Today while shopping at [facility], she fell putting groceries in her car.  Came by medics--has broken right hip--has received ativan and morphine X 2 " doses--placed on 2 liters NC after narcs given--is alert and oriented X 3--slightly forgetful--lives alone    Tests Performed: Done: Labs and Imaging    Treatments Provided:  pain medication    Family Dynamics/Concerns: No    Family Updated On Visitor Policy: No    Plan of Care Communicated to Family: No    Who Was Updated about Plan of Care: I have called her daughter Gulshan and left 2 messages to call--Called her daughter Jacqueline and also left a message to call--have not heard back from them.    Belongings Checklist Done and Signed by Patient: Yes    Belongings Sent with Patient: purse and cloths    Medications sent with patient: none    Covid: asymptomatic , deffered    Additional Information:     RN: Jill Spivey RN   2/9/2024 3:46 PM

## 2024-02-09 NOTE — H&P
[facility]    History and Physical - Hospitalist Service       Date of Admission:  2/9/2024    Assessment & Plan      Sherice Alvarez is a 89 year old female with PMH of HTN, chronic pain, lumbar radiculopathy, CAD, Aortic Stenosis (s/p TAVR), Asthma, HFpEF, CKDIII, admitted on 2/9/2024 after fall at grocery story. Patient is a poor historian due to sedation from pain medication. At baseline patient states independent with ADLs. She is able to ambulate normally without any dyspnea but states does have intermittent edema in legs.     #Fall  #Right intertrochanteric and subtrochanteric femoral neck fracture with medial displacement  #Chronic pain secondary to lumbar radiculopathy  #Chronic compression fracture deformity of L4  -Ortho consulted possible surgery today  -NPO for surgery   -RCRI of 2.4% given cardiac history. Troponin mildly elevated and now stable. Patient moderate cardiac risk for a intermediate risk procedure   -Opioid tolerant patient and might require pain management if current pain regimen insufficient  -Narcan prn   -PT/OT evaluation post surgery       #CAD  #HFpEF  #Aortic Stenosis s/p TAVR  #Elevated troponin  -Appears Euvolemic except for bipedal edema (appears chronic)  -EKG with ST changes in lateral leads but has lots of artifacts.   -Asymptomatic troponin leak. Trop mild elevation but now flat.       #HTN  -Per daughter patient non compliant with medications: Amlodipine, Metoprolol  -Hydralazine prn  -Will start ACEI as indicated    #CKDIII  -Monitor kidney function     #Leukocytosis  -No indication of infection. Could be stress driven margination. Will monitor.     #Asthma  -Bronchodilators prn        Diet: NPO per Anesthesia Guidelines for Procedure/Surgery Except for: Meds    DVT Prophylaxis: Pneumatic Compression Devices  Tyson Catheter: Not present  Lines: None     Cardiac Monitoring: ACTIVE order. Indication: marcus operative monitoring in a patient with  "cardiac history  Code Status: Full Code      Clinically Significant Risk Factors Present on Admission               # Coagulation Defect: INR = 1.23 (Ref range: 0.85 - 1.15) and/or PTT = 27 Seconds (Ref range: 22 - 38 Seconds), will monitor for bleeding    # Hypertension: Noted on problem list      # Overweight: Estimated body mass index is 25.08 kg/m  as calculated from the following:    Height as of this encounter: 1.473 m (4' 10\").    Weight as of this encounter: 54.4 kg (120 lb).       # Asthma: noted on problem list        Disposition Plan      Expected Discharge Date: 02/11/2024                  Mckenna Pettit MD  Hospitalist Service  [facility]  Securely message with [employer] (more info)  Text page via txtr Paging/Directory     ______________________________________________________________________    Chief Complaint   Right leg pain.     History is obtained from the patient    History of Present Illness   Sherice Alvarez is a 89 year old female with PMH of HTN, chronic pain, lumbar radiculopathy, CAD, Aortic Stenosis (s/p TAVR), Asthma, HFpEF, CKDIII, admitted on 2/9/2024 after fall at [employer]. Patient is a poor historian due to sedation from pain medication. She reports severe back pain and right hip pain, intermittent and sharp. Baseline patient follows with pain clinic for back pain. She denies any chest pain, nausea, vomiting, palpitations. At baseline patient states independent with ADLs. She is able to ambulate normally without any dyspnea but states does have intermittent edema in legs.       Past Medical History    Past Medical History:   Diagnosis Date    Arthritis     Asthma     CAD (coronary artery disease)     Cancer (H)     basal cell    Chronic kidney disease     ckd 3    Chronic pain syndrome     Congestive heart failure (H)     Crohn's disease (H)     GERD (gastroesophageal reflux disease)     Hx of heart artery stent 11/01/2016    1 stent R Proximal CA and 1 " Stent L Proximal circumflex  2016 Stent proximal LAD    Hyperlipidemia     Hypertension     NSTEMI (non-ST elevated myocardial infarction) (H) 2016    PONV (postoperative nausea and vomiting)     severe povn with last knee surgery    Restless legs syndrome     Stroke (H) 2010    numbness rt jaw       Past Surgical History   Past Surgical History:   Procedure Laterality Date    APPENDECTOMY      CARDIAC CATHETERIZATION  2016    multiple vessel disease.  no intervention    CARDIAC CATHETERIZATION  2016    CARDIAC CATHETERIZATION N/A 2016    Procedure: Coronary Angiogram;  Surgeon: Sunil Moran MD;  Location: [facility] Lab;  Service:     CAROTID ENDARTERECTOMY      CAROTID ENDARTERECTOMY Right 2010    CERVICAL LAMINECTOMY  1994     SECTION      CV CORONARY ANGIOGRAM N/A 2020    Procedure: Coronary Angiogram;  Surgeon: Vinita Ramos MD;  Location: [facility];  Service: Cardiology    CV LEFT HEART CATHETERIZATION WITHOUT LEFT VENTRICULOGRAM Left 2020    Procedure: Left Heart Catheterization Without Left Ventriculogram;  Surgeon: Jerad Lerner MD;  Location: [facility];  Service: Cardiology    CV TRANSCATHETER AORTIC VALVE REPLACEMENT - OTHER APPROACH N/A 2020    Procedure: CV TRANSCATHETER AORTIC VALVE REPLACEMENT - RIGHT SUBCLAVIAN APPROACH;  Surgeon: John Caceres MD;  Location: [facility] Lab;  Service: Cardiology    HEART CATH, ANGIOPLASTY      HEMORRHOIDECTOMY EXTERNAL      IR LUMBAR EPIDURAL STEROID INJECTION  2014    IR LUMBAR NERVE ROOT INJECTION  10/01/2013    JOINT REPLACEMENT      WV ESOPHAGOGASTRODUODENOSCOPY TRANSORAL DIAGNOSTIC N/A 07/10/2019    Procedure: ESOPHAGOGASTRODUODENOSCOPY (EGD) with gastric biopsy;  Surgeon: Sunil Stuart MD;  Location: [facility];  Service: Gastroenterology    WV REPLACE AORTIC VALVE OPEN AXILLRY ARTRY APPROACH N/A 2020    Procedure: OR  TRANSCATHETER AORTIC VALVE REPLACEMENT, SUBCLAVIAN APPROACH;  Surgeon: Bhavin Cuadra MD;  Location: [facility] Cath Lab;  Service: Cardiology    TONSILLECTOMY & ADENOIDECTOMY      TOTAL KNEE ARTHROPLASTY Right     TRANSCATHETER AORTIC-VALVE REPLACEMENT      ZZC TOTAL KNEE ARTHROPLASTY Left 05/11/2021    Procedure: LEFT TOTAL KNEE ARTHROPLASTY;  Surgeon: Doug Rhodes MD;  Location: [facility];  Service: Orthopedics       Prior to Admission Medications   Prior to Admission Medications   Prescriptions Last Dose Informant Patient Reported? Taking?   Multiple Vitamin (THERA-TABS) TABS Unknown at unknown  Yes Yes   Sig: Take 1 tablet by mouth daily   acetaminophen (TYLENOL) 325 MG tablet Past Month at prn  Yes Yes   Sig: Take 650 mg by mouth every 6 hours as needed   diclofenac (VOLTAREN) 1 % topical gel Past Month at prn  Yes Yes   Sig: Apply 2-4 g topically 4 times daily as needed APPLY TO LEFT SHOULDER AND LEFT KNEE.   lidocaine (XYLOCAINE) 5 % external ointment Past Month  No Yes   Sig: Apply topically 3 times daily   lidocaine (XYLOCAINE) 5 % external ointment Past Month at prn  Yes Yes   Sig: Apply topically 3 times daily as needed for moderate pain   methocarbamol (ROBAXIN) 500 MG tablet Past Month at ptn  Yes Yes   Sig: Take 500 mg by mouth 4 times daily as needed for muscle spasms   nitroGLYcerin (NITROSTAT) 0.4 MG sublingual tablet n/a at n/a  No No   Sig: Place 1 tablet (0.4 mg) under the tongue every 5 minutes as needed   oxyCODONE (ROXICODONE) 5 MG tablet 2/9/2024 at am  Yes Yes   Sig: Take 5 mg by mouth 3 times daily as needed   rOPINIRole (REQUIP) 2 MG tablet Past Month at prn  Yes Yes   Sig: Take 2 mg by mouth nightly as needed      Facility-Administered Medications: None           Physical Exam   Vital Signs: Temp: 98.3  F (36.8  C) Temp src: Oral BP: (!) 179/81 Pulse: 78   Resp: 16 SpO2: 92 % O2 Device: None (Room air) Oxygen Delivery: 2 LPM  Weight: 120 lbs 0 oz    Constitutional:  somnolent, cooperative, and mild distress  Eyes: extra-ocular muscles intact and sclera clear  ENT: atramatic, oral pharynx with moist mucus membranes  Respiratory: nasal cannula, no increased work of breathing, good air exchange, and clear to auscultation  Cardiovascular: regular rate and rhythm, normal S1 and S2, + 1 edema bilaterally  GI: normal bowel sounds, soft, non-distended, and non-tender  Musculoskeletal: Right leg laterally rotated, unable to assess ROM due to pain.   Neurologic: somnolent due to pain meds and unable to follow directions.     Medical Decision Making       55 MINUTES SPENT BY ME on the date of service doing chart review, history, exam, documentation & further activities per the note.      Data     I have personally reviewed the following data over the past 24 hrs:    14.7 (H)  \   10.2 (L)   / 201     140 109 (H) 25.4 (H) /  99   3.6 25 1.04 (H) \     Trop: 37 (H) BNP: N/A     INR:  1.23 (H) PTT:  27   D-dimer:  N/A Fibrinogen:  N/A       Imaging results reviewed over the past 24 hrs:   Recent Results (from the past 24 hour(s))   XR Pelvis 1/2 Views    Narrative    EXAM: XR PELVIS 1/2 VIEWS, XR FEMUR RIGHT 2 VIEWS, XR FEMUR LEFT 2 VIEWS  LOCATION: [facility]  DATE: 2/9/2024    INDICATION: fall  COMPARISON: CT abdomen pelvis 09/09/2023      Impression    IMPRESSION: Diffuse osseous demineralization. Comminuted impacted and externally rotated fracture of the right intertrochanteric and subtrochanteric femoral neck with medial displacement of the lesser trochanter.    Moderate degenerative arthrosis of the right hip and advanced degenerative arthrosis of the left hip with bone-on-bone articulation. Bilateral total knee arthroplasties.    Degenerative arthrosis of both SI joints. Lower lumbar facet arthropathy and degenerative interspace narrowing.     NOTE: ABNORMAL REPORT    THE DICTATION ABOVE DESCRIBES AN ABNORMALITY FOR WHICH FOLLOW-UP IS NEEDED.    XR Femur Right  2 Views    Narrative    EXAM: XR PELVIS 1/2 VIEWS, XR FEMUR RIGHT 2 VIEWS, XR FEMUR LEFT 2 VIEWS  LOCATION: [facility]  DATE: 2/9/2024    INDICATION: fall  COMPARISON: CT abdomen pelvis 09/09/2023      Impression    IMPRESSION: Diffuse osseous demineralization. Comminuted impacted and externally rotated fracture of the right intertrochanteric and subtrochanteric femoral neck with medial displacement of the lesser trochanter.    Moderate degenerative arthrosis of the right hip and advanced degenerative arthrosis of the left hip with bone-on-bone articulation. Bilateral total knee arthroplasties.    Degenerative arthrosis of both SI joints. Lower lumbar facet arthropathy and degenerative interspace narrowing.     NOTE: ABNORMAL REPORT    THE DICTATION ABOVE DESCRIBES AN ABNORMALITY FOR WHICH FOLLOW-UP IS NEEDED.    XR Femur Left 2 Views    Narrative    EXAM: XR PELVIS 1/2 VIEWS, XR FEMUR RIGHT 2 VIEWS, XR FEMUR LEFT 2 VIEWS  LOCATION: [facility]  DATE: 2/9/2024    INDICATION: fall  COMPARISON: CT abdomen pelvis 09/09/2023      Impression    IMPRESSION: Diffuse osseous demineralization. Comminuted impacted and externally rotated fracture of the right intertrochanteric and subtrochanteric femoral neck with medial displacement of the lesser trochanter.    Moderate degenerative arthrosis of the right hip and advanced degenerative arthrosis of the left hip with bone-on-bone articulation. Bilateral total knee arthroplasties.    Degenerative arthrosis of both SI joints. Lower lumbar facet arthropathy and degenerative interspace narrowing.     NOTE: ABNORMAL REPORT    THE DICTATION ABOVE DESCRIBES AN ABNORMALITY FOR WHICH FOLLOW-UP IS NEEDED.    XR Chest Port 1 View    Narrative    EXAM: XR CHEST PORT 1 VIEW  LOCATION: [facility]  DATE: 2/9/2024    INDICATION: Status post fall with back pain.  COMPARISON: 01/25/2024      Impression    IMPRESSION: Heart  size magnified in AP projection with stable TAVR prosthesis. Widening of superior mediastinum likely secondary to supine position and shallow inspiration. No focal consolidation, pneumothorax nor gross pleural effusion. No definite   fracture.   Lumbar spine XR, 2-3 views    Narrative    EXAM: XR LUMBAR SPINE 2/3 VIEWS  LOCATION: [facility]  DATE: 2/9/2024    INDICATION: Low back pain.  COMPARISON: Right femur radiographs 02/09/2024.      Impression    IMPRESSION: 5 nonrib-bearing lumbar type vertebral bodies. Lumbar spine lordosis is maintained. Lumbar vertebral body heights are maintained. Chronic compression fracture deformity of the L4 vertebral body. Grade 1 anterolisthesis of L4 on L5. Advanced   loss of disc space height at L4-L5 with apposing endplate sclerosis and marginal endplate osteophytes. Moderate loss of disc space height at L1-L2 and L5-S1. Mild to moderate multilevel degenerative facet changes. Atherosclerotic vascular calcifications   in the abdomen. Partially evaluated comminuted fracture of the proximal right femur.   XR Knee Left 1/2 Views    Narrative    EXAM: XR KNEE LEFT 1/2 VIEWS  LOCATION: [facility]  DATE: 2/9/2024    INDICATION: Pain after fall  COMPARISON: None.      Impression    IMPRESSION: Postoperative changes of total knee arthroplasty and patellar resurfacing. Components appear well-seated. No effusion.

## 2024-02-09 NOTE — ED NOTES
Bed: JNED-36  Expected date:   Expected time:   Means of arrival:   Comments:  ARTUR I when boarder

## 2024-02-10 ENCOUNTER — ANESTHESIA EVENT (OUTPATIENT)
Dept: SURGERY | Facility: HOSPITAL | Age: 89
DRG: 481 | End: 2024-02-10
Payer: COMMERCIAL

## 2024-02-10 ENCOUNTER — APPOINTMENT (OUTPATIENT)
Dept: RADIOLOGY | Facility: HOSPITAL | Age: 89
DRG: 481 | End: 2024-02-10
Attending: STUDENT IN AN ORGANIZED HEALTH CARE EDUCATION/TRAINING PROGRAM
Payer: COMMERCIAL

## 2024-02-10 ENCOUNTER — ANESTHESIA (OUTPATIENT)
Dept: SURGERY | Facility: HOSPITAL | Age: 89
DRG: 481 | End: 2024-02-10
Payer: COMMERCIAL

## 2024-02-10 LAB
ALBUMIN SERPL BCG-MCNC: 3.5 G/DL (ref 3.5–5.2)
ALP SERPL-CCNC: 55 U/L (ref 40–150)
ALT SERPL W P-5'-P-CCNC: 23 U/L (ref 0–50)
ANION GAP SERPL CALCULATED.3IONS-SCNC: 4 MMOL/L (ref 7–15)
AST SERPL W P-5'-P-CCNC: 36 U/L (ref 0–45)
BILIRUB SERPL-MCNC: 0.8 MG/DL
BUN SERPL-MCNC: 19.5 MG/DL (ref 8–23)
CALCIUM SERPL-MCNC: 8.5 MG/DL (ref 8.8–10.2)
CHLORIDE SERPL-SCNC: 105 MMOL/L (ref 98–107)
CREAT SERPL-MCNC: 0.96 MG/DL (ref 0.51–0.95)
DEPRECATED HCO3 PLAS-SCNC: 28 MMOL/L (ref 22–29)
EGFRCR SERPLBLD CKD-EPI 2021: 56 ML/MIN/1.73M2
ERYTHROCYTE [DISTWIDTH] IN BLOOD BY AUTOMATED COUNT: 20 % (ref 10–15)
GLUCOSE BLDC GLUCOMTR-MCNC: 90 MG/DL (ref 70–99)
GLUCOSE BLDC GLUCOMTR-MCNC: 95 MG/DL (ref 70–99)
GLUCOSE SERPL-MCNC: 94 MG/DL (ref 70–99)
HCT VFR BLD AUTO: 28.8 % (ref 35–47)
HGB BLD-MCNC: 9.5 G/DL (ref 11.7–15.7)
MCH RBC QN AUTO: 30.3 PG (ref 26.5–33)
MCHC RBC AUTO-ENTMCNC: 33 G/DL (ref 31.5–36.5)
MCV RBC AUTO: 92 FL (ref 78–100)
PLATELET # BLD AUTO: 183 10E3/UL (ref 150–450)
POTASSIUM SERPL-SCNC: 4.3 MMOL/L (ref 3.4–5.3)
PROT SERPL-MCNC: 5.9 G/DL (ref 6.4–8.3)
RBC # BLD AUTO: 3.14 10E6/UL (ref 3.8–5.2)
SODIUM SERPL-SCNC: 137 MMOL/L (ref 135–145)
WBC # BLD AUTO: 17.5 10E3/UL (ref 4–11)

## 2024-02-10 PROCEDURE — C1713 ANCHOR/SCREW BN/BN,TIS/BN: HCPCS | Performed by: STUDENT IN AN ORGANIZED HEALTH CARE EDUCATION/TRAINING PROGRAM

## 2024-02-10 PROCEDURE — 93005 ELECTROCARDIOGRAM TRACING: CPT | Performed by: HOSPITALIST

## 2024-02-10 PROCEDURE — 0QS606Z REPOSITION RIGHT UPPER FEMUR WITH INTRAMEDULLARY INTERNAL FIXATION DEVICE, OPEN APPROACH: ICD-10-PCS | Performed by: STUDENT IN AN ORGANIZED HEALTH CARE EDUCATION/TRAINING PROGRAM

## 2024-02-10 PROCEDURE — 250N000013 HC RX MED GY IP 250 OP 250 PS 637: Performed by: HOSPITALIST

## 2024-02-10 PROCEDURE — 999N000141 HC STATISTIC PRE-PROCEDURE NURSING ASSESSMENT: Performed by: STUDENT IN AN ORGANIZED HEALTH CARE EDUCATION/TRAINING PROGRAM

## 2024-02-10 PROCEDURE — 370N000017 HC ANESTHESIA TECHNICAL FEE, PER MIN: Performed by: STUDENT IN AN ORGANIZED HEALTH CARE EDUCATION/TRAINING PROGRAM

## 2024-02-10 PROCEDURE — 82040 ASSAY OF SERUM ALBUMIN: CPT | Performed by: HOSPITALIST

## 2024-02-10 PROCEDURE — 99223 1ST HOSP IP/OBS HIGH 75: CPT | Performed by: GENERAL ACUTE CARE HOSPITAL

## 2024-02-10 PROCEDURE — 710N000009 HC RECOVERY PHASE 1, LEVEL 1, PER MIN: Performed by: STUDENT IN AN ORGANIZED HEALTH CARE EDUCATION/TRAINING PROGRAM

## 2024-02-10 PROCEDURE — 36415 COLL VENOUS BLD VENIPUNCTURE: CPT | Performed by: HOSPITALIST

## 2024-02-10 PROCEDURE — 250N000011 HC RX IP 250 OP 636: Performed by: HOSPITALIST

## 2024-02-10 PROCEDURE — 258N000003 HC RX IP 258 OP 636: Performed by: STUDENT IN AN ORGANIZED HEALTH CARE EDUCATION/TRAINING PROGRAM

## 2024-02-10 PROCEDURE — 999N000065 XR FEMUR PORT RIGHT 2 VIEWS: Mod: RT

## 2024-02-10 PROCEDURE — 250N000009 HC RX 250: Performed by: STUDENT IN AN ORGANIZED HEALTH CARE EDUCATION/TRAINING PROGRAM

## 2024-02-10 PROCEDURE — 99233 SBSQ HOSP IP/OBS HIGH 50: CPT | Performed by: HOSPITALIST

## 2024-02-10 PROCEDURE — 272N000001 HC OR GENERAL SUPPLY STERILE: Performed by: STUDENT IN AN ORGANIZED HEALTH CARE EDUCATION/TRAINING PROGRAM

## 2024-02-10 PROCEDURE — P9045 ALBUMIN (HUMAN), 5%, 250 ML: HCPCS | Mod: JZ | Performed by: NURSE ANESTHETIST, CERTIFIED REGISTERED

## 2024-02-10 PROCEDURE — 93005 ELECTROCARDIOGRAM TRACING: CPT

## 2024-02-10 PROCEDURE — 250N000013 HC RX MED GY IP 250 OP 250 PS 637: Performed by: GENERAL ACUTE CARE HOSPITAL

## 2024-02-10 PROCEDURE — 250N000011 HC RX IP 250 OP 636: Performed by: ANESTHESIOLOGY

## 2024-02-10 PROCEDURE — 85027 COMPLETE CBC AUTOMATED: CPT | Performed by: HOSPITALIST

## 2024-02-10 PROCEDURE — 999N000180 XR SURGERY CARM FLUORO LESS THAN 5 MIN

## 2024-02-10 PROCEDURE — 250N000013 HC RX MED GY IP 250 OP 250 PS 637: Performed by: STUDENT IN AN ORGANIZED HEALTH CARE EDUCATION/TRAINING PROGRAM

## 2024-02-10 PROCEDURE — 360N000084 HC SURGERY LEVEL 4 W/ FLUORO, PER MIN: Performed by: STUDENT IN AN ORGANIZED HEALTH CARE EDUCATION/TRAINING PROGRAM

## 2024-02-10 PROCEDURE — 93010 ELECTROCARDIOGRAM REPORT: CPT | Performed by: INTERNAL MEDICINE

## 2024-02-10 PROCEDURE — 120N000001 HC R&B MED SURG/OB

## 2024-02-10 PROCEDURE — 250N000009 HC RX 250: Performed by: NURSE ANESTHETIST, CERTIFIED REGISTERED

## 2024-02-10 PROCEDURE — C1769 GUIDE WIRE: HCPCS | Performed by: STUDENT IN AN ORGANIZED HEALTH CARE EDUCATION/TRAINING PROGRAM

## 2024-02-10 PROCEDURE — 250N000011 HC RX IP 250 OP 636: Performed by: NURSE ANESTHETIST, CERTIFIED REGISTERED

## 2024-02-10 PROCEDURE — 250N000011 HC RX IP 250 OP 636: Performed by: PHYSICIAN ASSISTANT

## 2024-02-10 PROCEDURE — 250N000013 HC RX MED GY IP 250 OP 250 PS 637: Performed by: ANESTHESIOLOGY

## 2024-02-10 PROCEDURE — 258N000003 HC RX IP 258 OP 636: Performed by: ANESTHESIOLOGY

## 2024-02-10 PROCEDURE — 258N000003 HC RX IP 258 OP 636: Performed by: NURSE ANESTHETIST, CERTIFIED REGISTERED

## 2024-02-10 DEVICE — IMP SCR SYN TFNA FENESTRATED LAG 95MM 04.038.195S: Type: IMPLANTABLE DEVICE | Site: HIP | Status: FUNCTIONAL

## 2024-02-10 DEVICE — IMPLANTABLE DEVICE: Type: IMPLANTABLE DEVICE | Site: HIP | Status: FUNCTIONAL

## 2024-02-10 DEVICE — SCREW BN 36MM 5MM LCK X25 IM NL 04.045.036S: Type: IMPLANTABLE DEVICE | Site: HIP | Status: FUNCTIONAL

## 2024-02-10 DEVICE — SCREW BN 34MM 5MM LCK X25 STRL IM NL 04.045.034S: Type: IMPLANTABLE DEVICE | Site: HIP | Status: FUNCTIONAL

## 2024-02-10 RX ORDER — ACETAMINOPHEN 325 MG/1
975 TABLET ORAL ONCE
Status: DISCONTINUED | OUTPATIENT
Start: 2024-02-10 | End: 2024-02-10 | Stop reason: HOSPADM

## 2024-02-10 RX ORDER — LIDOCAINE 40 MG/G
CREAM TOPICAL
Status: DISCONTINUED | OUTPATIENT
Start: 2024-02-10 | End: 2024-02-10 | Stop reason: HOSPADM

## 2024-02-10 RX ORDER — FENTANYL CITRATE 50 UG/ML
50 INJECTION, SOLUTION INTRAMUSCULAR; INTRAVENOUS EVERY 5 MIN PRN
Status: DISCONTINUED | OUTPATIENT
Start: 2024-02-10 | End: 2024-02-10

## 2024-02-10 RX ORDER — TRANEXAMIC ACID 100 MG/ML
INJECTION, SOLUTION INTRAVENOUS PRN
Status: DISCONTINUED | OUTPATIENT
Start: 2024-02-10 | End: 2024-02-10

## 2024-02-10 RX ORDER — PROPOFOL 10 MG/ML
INJECTION, EMULSION INTRAVENOUS CONTINUOUS PRN
Status: DISCONTINUED | OUTPATIENT
Start: 2024-02-10 | End: 2024-02-10

## 2024-02-10 RX ORDER — HYDROMORPHONE HCL IN WATER/PF 6 MG/30 ML
0.2 PATIENT CONTROLLED ANALGESIA SYRINGE INTRAVENOUS EVERY 5 MIN PRN
Status: DISCONTINUED | OUTPATIENT
Start: 2024-02-10 | End: 2024-02-10

## 2024-02-10 RX ORDER — ASPIRIN 81 MG/1
81 TABLET ORAL DAILY
Status: DISCONTINUED | OUTPATIENT
Start: 2024-02-11 | End: 2024-02-10

## 2024-02-10 RX ORDER — OXYCODONE HYDROCHLORIDE 5 MG/1
5 TABLET ORAL EVERY 4 HOURS PRN
Status: DISCONTINUED | OUTPATIENT
Start: 2024-02-10 | End: 2024-02-12

## 2024-02-10 RX ORDER — ONDANSETRON 4 MG/1
4 TABLET, ORALLY DISINTEGRATING ORAL EVERY 6 HOURS PRN
Status: DISCONTINUED | OUTPATIENT
Start: 2024-02-10 | End: 2024-02-14 | Stop reason: HOSPADM

## 2024-02-10 RX ORDER — AMOXICILLIN 250 MG
1 CAPSULE ORAL 2 TIMES DAILY
Status: DISCONTINUED | OUTPATIENT
Start: 2024-02-10 | End: 2024-02-12

## 2024-02-10 RX ORDER — SODIUM CHLORIDE, SODIUM LACTATE, POTASSIUM CHLORIDE, CALCIUM CHLORIDE 600; 310; 30; 20 MG/100ML; MG/100ML; MG/100ML; MG/100ML
INJECTION, SOLUTION INTRAVENOUS CONTINUOUS
Status: DISCONTINUED | OUTPATIENT
Start: 2024-02-10 | End: 2024-02-11

## 2024-02-10 RX ORDER — HYDROMORPHONE HYDROCHLORIDE 1 MG/ML
0.5 INJECTION, SOLUTION INTRAMUSCULAR; INTRAVENOUS; SUBCUTANEOUS EVERY 4 HOURS PRN
Status: DISCONTINUED | OUTPATIENT
Start: 2024-02-10 | End: 2024-02-12

## 2024-02-10 RX ORDER — FENTANYL CITRATE 50 UG/ML
25-100 INJECTION, SOLUTION INTRAMUSCULAR; INTRAVENOUS
Status: DISCONTINUED | OUTPATIENT
Start: 2024-02-10 | End: 2024-02-10 | Stop reason: HOSPADM

## 2024-02-10 RX ORDER — POLYETHYLENE GLYCOL 3350 17 G/17G
17 POWDER, FOR SOLUTION ORAL DAILY
Status: DISCONTINUED | OUTPATIENT
Start: 2024-02-11 | End: 2024-02-14 | Stop reason: HOSPADM

## 2024-02-10 RX ORDER — FENTANYL CITRATE 50 UG/ML
25 INJECTION, SOLUTION INTRAMUSCULAR; INTRAVENOUS EVERY 5 MIN PRN
Status: DISCONTINUED | OUTPATIENT
Start: 2024-02-10 | End: 2024-02-10

## 2024-02-10 RX ORDER — BUPIVACAINE HYDROCHLORIDE 2.5 MG/ML
INJECTION, SOLUTION EPIDURAL; INFILTRATION; INTRACAUDAL
Status: COMPLETED | OUTPATIENT
Start: 2024-02-10 | End: 2024-02-10

## 2024-02-10 RX ORDER — BISACODYL 10 MG
10 SUPPOSITORY, RECTAL RECTAL DAILY PRN
Status: DISCONTINUED | OUTPATIENT
Start: 2024-02-10 | End: 2024-02-14 | Stop reason: HOSPADM

## 2024-02-10 RX ORDER — PROCHLORPERAZINE MALEATE 5 MG
5 TABLET ORAL EVERY 6 HOURS PRN
Status: DISCONTINUED | OUTPATIENT
Start: 2024-02-10 | End: 2024-02-10

## 2024-02-10 RX ORDER — LIDOCAINE HYDROCHLORIDE 10 MG/ML
INJECTION, SOLUTION INFILTRATION; PERINEURAL PRN
Status: DISCONTINUED | OUTPATIENT
Start: 2024-02-10 | End: 2024-02-10

## 2024-02-10 RX ORDER — ONDANSETRON 2 MG/ML
4 INJECTION INTRAMUSCULAR; INTRAVENOUS EVERY 30 MIN PRN
Status: DISCONTINUED | OUTPATIENT
Start: 2024-02-10 | End: 2024-02-10

## 2024-02-10 RX ORDER — ACETAMINOPHEN 325 MG/1
975 TABLET ORAL EVERY 8 HOURS
Qty: 27 TABLET | Refills: 0 | Status: DISCONTINUED | OUTPATIENT
Start: 2024-02-10 | End: 2024-02-12

## 2024-02-10 RX ORDER — ONDANSETRON 2 MG/ML
4 INJECTION INTRAMUSCULAR; INTRAVENOUS EVERY 6 HOURS PRN
Status: DISCONTINUED | OUTPATIENT
Start: 2024-02-10 | End: 2024-02-14 | Stop reason: HOSPADM

## 2024-02-10 RX ORDER — VASOPRESSIN IN 0.9 % NACL 2 UNIT/2ML
SYRINGE (ML) INTRAVENOUS PRN
Status: DISCONTINUED | OUTPATIENT
Start: 2024-02-10 | End: 2024-02-10

## 2024-02-10 RX ORDER — ASPIRIN 81 MG/1
81 TABLET ORAL 2 TIMES DAILY
Status: DISCONTINUED | OUTPATIENT
Start: 2024-02-10 | End: 2024-02-14 | Stop reason: HOSPADM

## 2024-02-10 RX ORDER — MAGNESIUM HYDROXIDE 1200 MG/15ML
LIQUID ORAL PRN
Status: DISCONTINUED | OUTPATIENT
Start: 2024-02-10 | End: 2024-02-10 | Stop reason: HOSPADM

## 2024-02-10 RX ORDER — HYDROMORPHONE HCL IN WATER/PF 6 MG/30 ML
0.2 PATIENT CONTROLLED ANALGESIA SYRINGE INTRAVENOUS
Status: DISCONTINUED | OUTPATIENT
Start: 2024-02-10 | End: 2024-02-12

## 2024-02-10 RX ORDER — HYDROMORPHONE HCL IN WATER/PF 6 MG/30 ML
0.1 PATIENT CONTROLLED ANALGESIA SYRINGE INTRAVENOUS
Status: DISCONTINUED | OUTPATIENT
Start: 2024-02-10 | End: 2024-02-12

## 2024-02-10 RX ORDER — SODIUM CHLORIDE, SODIUM LACTATE, POTASSIUM CHLORIDE, CALCIUM CHLORIDE 600; 310; 30; 20 MG/100ML; MG/100ML; MG/100ML; MG/100ML
INJECTION, SOLUTION INTRAVENOUS CONTINUOUS
Status: DISCONTINUED | OUTPATIENT
Start: 2024-02-10 | End: 2024-02-10 | Stop reason: HOSPADM

## 2024-02-10 RX ORDER — PROPOFOL 10 MG/ML
INJECTION, EMULSION INTRAVENOUS PRN
Status: DISCONTINUED | OUTPATIENT
Start: 2024-02-10 | End: 2024-02-10

## 2024-02-10 RX ORDER — LIDOCAINE 40 MG/G
CREAM TOPICAL
Status: DISCONTINUED | OUTPATIENT
Start: 2024-02-10 | End: 2024-02-14 | Stop reason: HOSPADM

## 2024-02-10 RX ORDER — BUPIVACAINE HYDROCHLORIDE 7.5 MG/ML
INJECTION, SOLUTION INTRASPINAL
Status: COMPLETED | OUTPATIENT
Start: 2024-02-10 | End: 2024-02-10

## 2024-02-10 RX ORDER — ACETAMINOPHEN 325 MG/1
650 TABLET ORAL EVERY 4 HOURS PRN
Status: DISCONTINUED | OUTPATIENT
Start: 2024-02-13 | End: 2024-02-14 | Stop reason: HOSPADM

## 2024-02-10 RX ORDER — HYDROMORPHONE HCL IN WATER/PF 6 MG/30 ML
0.4 PATIENT CONTROLLED ANALGESIA SYRINGE INTRAVENOUS EVERY 5 MIN PRN
Status: DISCONTINUED | OUTPATIENT
Start: 2024-02-10 | End: 2024-02-10

## 2024-02-10 RX ORDER — ATORVASTATIN CALCIUM 40 MG/1
40 TABLET, FILM COATED ORAL EVERY EVENING
Status: DISCONTINUED | OUTPATIENT
Start: 2024-02-10 | End: 2024-02-14 | Stop reason: HOSPADM

## 2024-02-10 RX ORDER — HYDROMORPHONE HYDROCHLORIDE 1 MG/ML
0.5 INJECTION, SOLUTION INTRAMUSCULAR; INTRAVENOUS; SUBCUTANEOUS ONCE
Status: COMPLETED | OUTPATIENT
Start: 2024-02-10 | End: 2024-02-10

## 2024-02-10 RX ORDER — METHOCARBAMOL 500 MG/1
500 TABLET, FILM COATED ORAL ONCE
Status: COMPLETED | OUTPATIENT
Start: 2024-02-10 | End: 2024-02-10

## 2024-02-10 RX ORDER — CEFAZOLIN SODIUM 1 G/3ML
1 INJECTION, POWDER, FOR SOLUTION INTRAMUSCULAR; INTRAVENOUS EVERY 12 HOURS
Qty: 10 ML | Refills: 0 | Status: COMPLETED | OUTPATIENT
Start: 2024-02-11 | End: 2024-02-11

## 2024-02-10 RX ORDER — CEPHALEXIN 500 MG/1
500 CAPSULE ORAL EVERY 8 HOURS SCHEDULED
Status: DISCONTINUED | OUTPATIENT
Start: 2024-02-11 | End: 2024-02-14 | Stop reason: HOSPADM

## 2024-02-10 RX ORDER — ONDANSETRON 4 MG/1
4 TABLET, ORALLY DISINTEGRATING ORAL EVERY 30 MIN PRN
Status: DISCONTINUED | OUTPATIENT
Start: 2024-02-10 | End: 2024-02-10

## 2024-02-10 RX ADMIN — HYDROMORPHONE HYDROCHLORIDE 0.2 MG: 0.2 INJECTION, SOLUTION INTRAMUSCULAR; INTRAVENOUS; SUBCUTANEOUS at 16:39

## 2024-02-10 RX ADMIN — HYDROMORPHONE HYDROCHLORIDE 0.5 MG: 1 INJECTION, SOLUTION INTRAMUSCULAR; INTRAVENOUS; SUBCUTANEOUS at 10:34

## 2024-02-10 RX ADMIN — PHENYLEPHRINE HYDROCHLORIDE 100 MCG: 10 INJECTION INTRAVENOUS at 13:49

## 2024-02-10 RX ADMIN — ALBUMIN HUMAN: 0.05 INJECTION, SOLUTION INTRAVENOUS at 15:24

## 2024-02-10 RX ADMIN — HYDRALAZINE HYDROCHLORIDE 10 MG: 20 INJECTION INTRAMUSCULAR; INTRAVENOUS at 05:36

## 2024-02-10 RX ADMIN — METHOCARBAMOL 500 MG: 500 TABLET ORAL at 16:58

## 2024-02-10 RX ADMIN — PHENYLEPHRINE HYDROCHLORIDE 100 MCG: 10 INJECTION INTRAVENOUS at 14:04

## 2024-02-10 RX ADMIN — PHENYLEPHRINE HYDROCHLORIDE 0.3 MCG/KG/MIN: 10 INJECTION INTRAVENOUS at 13:27

## 2024-02-10 RX ADMIN — Medication 2 G: at 13:23

## 2024-02-10 RX ADMIN — TRANEXAMIC ACID 1 G: 1 INJECTION, SOLUTION INTRAVENOUS at 13:41

## 2024-02-10 RX ADMIN — PROPOFOL 30 MG: 10 INJECTION, EMULSION INTRAVENOUS at 13:27

## 2024-02-10 RX ADMIN — TRANEXAMIC ACID 1 G: 1 INJECTION, SOLUTION INTRAVENOUS at 15:02

## 2024-02-10 RX ADMIN — HYDROMORPHONE HYDROCHLORIDE 0.2 MG: 0.2 INJECTION, SOLUTION INTRAMUSCULAR; INTRAVENOUS; SUBCUTANEOUS at 02:38

## 2024-02-10 RX ADMIN — ACETAMINOPHEN 975 MG: 325 TABLET ORAL at 22:06

## 2024-02-10 RX ADMIN — ATORVASTATIN CALCIUM 40 MG: 40 TABLET, FILM COATED ORAL at 21:03

## 2024-02-10 RX ADMIN — Medication 0.5 UNITS: at 15:07

## 2024-02-10 RX ADMIN — PHENYLEPHRINE HYDROCHLORIDE 100 MCG: 10 INJECTION INTRAVENOUS at 14:28

## 2024-02-10 RX ADMIN — SODIUM CHLORIDE, POTASSIUM CHLORIDE, SODIUM LACTATE AND CALCIUM CHLORIDE: 600; 310; 30; 20 INJECTION, SOLUTION INTRAVENOUS at 14:41

## 2024-02-10 RX ADMIN — SODIUM CHLORIDE, POTASSIUM CHLORIDE, SODIUM LACTATE AND CALCIUM CHLORIDE: 600; 310; 30; 20 INJECTION, SOLUTION INTRAVENOUS at 18:24

## 2024-02-10 RX ADMIN — PHENYLEPHRINE HYDROCHLORIDE 100 MCG: 10 INJECTION INTRAVENOUS at 13:27

## 2024-02-10 RX ADMIN — HYDROMORPHONE HYDROCHLORIDE 0.2 MG: 0.2 INJECTION, SOLUTION INTRAMUSCULAR; INTRAVENOUS; SUBCUTANEOUS at 06:11

## 2024-02-10 RX ADMIN — PHENYLEPHRINE HYDROCHLORIDE 100 MCG: 10 INJECTION INTRAVENOUS at 13:59

## 2024-02-10 RX ADMIN — PROPOFOL 75 MCG/KG/MIN: 10 INJECTION, EMULSION INTRAVENOUS at 13:31

## 2024-02-10 RX ADMIN — OXYCODONE HYDROCHLORIDE 10 MG: 5 TABLET ORAL at 19:22

## 2024-02-10 RX ADMIN — BUPIVACAINE HYDROCHLORIDE IN DEXTROSE 1.6 ML: 7.5 INJECTION, SOLUTION SUBARACHNOID at 13:28

## 2024-02-10 RX ADMIN — Medication 81 MG: at 21:03

## 2024-02-10 RX ADMIN — SODIUM CHLORIDE, POTASSIUM CHLORIDE, SODIUM LACTATE AND CALCIUM CHLORIDE: 600; 310; 30; 20 INJECTION, SOLUTION INTRAVENOUS at 13:22

## 2024-02-10 RX ADMIN — PHENYLEPHRINE HYDROCHLORIDE 200 MCG: 10 INJECTION INTRAVENOUS at 15:05

## 2024-02-10 RX ADMIN — HYDROMORPHONE HYDROCHLORIDE 0.2 MG: 1 INJECTION, SOLUTION INTRAMUSCULAR; INTRAVENOUS; SUBCUTANEOUS at 16:18

## 2024-02-10 RX ADMIN — OXYCODONE HYDROCHLORIDE 5 MG: 5 TABLET ORAL at 16:34

## 2024-02-10 RX ADMIN — HYDROMORPHONE HYDROCHLORIDE 0.5 MG: 1 INJECTION, SOLUTION INTRAMUSCULAR; INTRAVENOUS; SUBCUTANEOUS at 07:15

## 2024-02-10 RX ADMIN — BUPIVACAINE HYDROCHLORIDE 30 ML: 2.5 INJECTION, SOLUTION EPIDURAL; INFILTRATION; INTRACAUDAL; PERINEURAL at 13:04

## 2024-02-10 RX ADMIN — PHENYLEPHRINE HYDROCHLORIDE 100 MCG: 10 INJECTION INTRAVENOUS at 14:43

## 2024-02-10 RX ADMIN — ACETAMINOPHEN 650 MG: 325 TABLET ORAL at 16:58

## 2024-02-10 RX ADMIN — LIDOCAINE HYDROCHLORIDE 3 ML: 10 INJECTION, SOLUTION INFILTRATION; PERINEURAL at 13:27

## 2024-02-10 RX ADMIN — FENTANYL CITRATE 50 MCG: 50 INJECTION, SOLUTION INTRAMUSCULAR; INTRAVENOUS at 12:57

## 2024-02-10 RX ADMIN — SENNOSIDES AND DOCUSATE SODIUM 1 TABLET: 8.6; 5 TABLET ORAL at 21:03

## 2024-02-10 RX ADMIN — HYDROMORPHONE HYDROCHLORIDE 0.5 MG: 1 INJECTION, SOLUTION INTRAMUSCULAR; INTRAVENOUS; SUBCUTANEOUS at 21:03

## 2024-02-10 RX ADMIN — DEXMEDETOMIDINE HYDROCHLORIDE 4 MCG: 100 INJECTION, SOLUTION INTRAVENOUS at 13:26

## 2024-02-10 ASSESSMENT — ACTIVITIES OF DAILY LIVING (ADL)
ADLS_ACUITY_SCORE: 27
ADLS_ACUITY_SCORE: 27
ADLS_ACUITY_SCORE: 28
ADLS_ACUITY_SCORE: 28
ADLS_ACUITY_SCORE: 27
ADLS_ACUITY_SCORE: 27
ADLS_ACUITY_SCORE: 28
ADLS_ACUITY_SCORE: 27
ADLS_ACUITY_SCORE: 28
ADLS_ACUITY_SCORE: 27

## 2024-02-10 NOTE — PLAN OF CARE
Problem: Adult Inpatient Plan of Care  Goal: Optimal Comfort and Wellbeing  Intervention: Monitor Pain and Promote Comfort  Recent Flowsheet Documentation  Taken 2/10/2024 1034 by Katia العراقي RN  Pain Management Interventions:   medication (see MAR)   cold applied   emotional support   pillow support provided     Problem: Adult Inpatient Plan of Care  Goal: Optimal Comfort and Wellbeing  Intervention: Monitor Pain and Promote Comfort  Recent Flowsheet Documentation  Taken 2/10/2024 1034 by Katia العراقي RN  Pain Management Interventions:   medication (see MAR)   cold applied   emotional support   pillow support provided   Goal Outcome Evaluation:         Patient is alert and orientated times 4. Rated pain 8 in right hip,leg and lower back. Medicated with 0.5mg of dilaudid as ordered. Patient refused turning at this time. She shifts her weight independently. Family in room and supportive. Plan is for surgery today. Patient and daughter notified of time for surgery. Will continue to assess for pain and medicate as needed.

## 2024-02-10 NOTE — CONSULTS
We have been asked to see Sherice Alvarez at [facility] by Dr. Ruma Lopez for preoperative evaluation.    Impression and Plan     Assessment:  Preoperative cardiovascular examination prior to surgical repair of a traumatic right intertrochanteric and subtrochanteric femoral neck fracture with general anesthesia scheduled for today. She seems fairly well-compensated from a cardiac standpoint and seems only at minimally elevated risk of perioperative major adverse cardiac events and her surgery also seems urgent.  Abnormal resting 12-lead electrocardiograms 2/9/2024 and 2/10/2024 showing ST segment depression which may be demand-mediated from pain and hypertension. Cardiac biomarker pattern and symptoms are not consistent with acute myocardial injury.  Mildly elevated and stable high-sensitivity troponin which seems chronically elevated. This is not consistent with an acute coronary syndrome.  Coronary artery disease status post drug-eluting stenting x2 to the proximal right coronary and proximal left circumflex arteries 11/7/2016, drug-eluting stenting x1 to the mid left anterior descending artery 12/27/2016, then drug-eluting stenting x1 to the mid left anterior descending artery 4/17/2020. She has a known occlusion of the distal left circumflex felt not amenable to intervention. Pharmacologic nuclear stress testing 5/3/2022 showed no ischemia. She denies angina.  Severe aortic stenosis status post transcatheter aortic valve replacement with a 23 mm Pan Chilango 3 bioprosthetic valve 6/2/2020. Transthoracic echocardiogram 12/11/2023 showed normal prosthetic valve function.  Chronic congestive heart failure with preserved left ventricular ejection fraction. She seems euvolemic and recent brain natriuretic peptides have been upper normal limits to only mildly elevated.  Infrarenal abdominal aortic aneurysm without rupture measuring 3.4 cm on CT 9/9/2023.  Essential hypertension.  "Elevated.  Hyperlipidemia. Last  mg/dL.  Chronic pain.    Plan:  No further cardiac testing or treatment recommended prior to surgery today  We will resume aspirin 81 mg and atorvastatin at a dose of 40 mg daily postoperatively  She does not appear loop diuretics at this time  She may need her anti-hypertensive medication adjusted postoperatively  We will sign off at this time. Please do not hesitate to contact us with additional questions or concerns.    Primary Cardiologist: Dr. Ulisses Feldman    Clinically Significant Risk Factors Present on Admission     # Coagulation Defect: INR = 1.23 (Ref range: 0.85 - 1.15) and/or PTT = 27 Seconds (Ref range: 22 - 38 Seconds), will monitor for bleeding    # Hypertension: Noted on problem list      # Overweight: Estimated body mass index is 25.08 kg/m  as calculated from the following:    Height as of this encounter: 1.473 m (4' 10\").    Weight as of this encounter: 54.4 kg (120 lb).       # Asthma: noted on problem list     History of Present Illness      Ms. Sherice Alvarez is a 89 year old female with a history of CAD s/p multiple PCIs, severe aortic stenosis s/p TAVR in 2020, HFpEF, HTN and HLD admitted yesterday with pain after a fall, found to have an acute right femur fracture. She does not recall what happen but was putting groceries in her car and fell onto back and had immediate pain. She thinks she could have been lightheaded at the time but is not sure if she had any loss of consciousness. X-ra confirmed right femoral fracture and surgery is being planned for today. ECGs showed mild ST depressions.    She admits to having memory issues and chronic pain, but still walks regularly without exertional chest pain or dyspnea. She has been experiencing chest burning which seems brought on by eating. She admits to forgetting to take her medications. She used to take atorvastatin but it is not clear why this was stopped and she somehow was on a PCSK9 inhibitor for " a brief period of time. She takes furosemide on an as needed basis and has not taken it in a while.     No palpitations, paroxysmal nocturnal dyspnea (PND), or orthopnea.    Review of Systems:  Further review of systems is otherwise negative/noncontributory (based on review of medical record (admission H&P) and 13 point review of systems reviewed. Pertinent positives noted).    Cardiac Diagnostics     ECG 2/9/2024 (personally reviewed and interpreted): sinus rhythm, nonspecific ST segment abnormality  ECG 2/10/2024 (personally reviewed and interpreted): sinus rhythm, borderline LVH, inferolateral ST depressions    Telemetry (personally reviewed): SR, no arrhythmias    Echocardiogram 12/11/2023 (results reviewed):   1. The left ventricle is normal in size. Left ventricular function is normal.The ejection fraction is 60-65%. There is moderate concentric left ventricular hypertrophy. No regional wall motion abnormalities noted.  2. Well seated 23 mm bioprosthetic Chilango S3 TAVR valve in aortic position with normal gradient (mean 18 mmHg, peak Fahad: 2.0 m/sec) and no paravalvular regurgitation.  3. There is mild mitral stenosis,mean gradient 4.6 mmHg.    Cardiac cath 4/17/2020 (report reviewed):  LM:no obstruction  LAD:80% dLAD lesion  Lcx:small AV Lcx is either subtotally occluded w/ a small channel or a  w/ bridging collaterals, YONIS 2-3 flow before intervention  RCA:not injected     LVEDP:17  AV mean gradient: simultaneous 39     Access:  R Femoral artery     PCI:  LMT was engaged w/ a 6F EBU 3.0 Guide catheter and the lesion in Lcx was attempted to be wired w/ the help of Turnpike LP, TwinPass, and Supercross microcatheters and Nicolas Cat FC,  200, and Fighter wires, unsuccessfully. Due to the branch small size, likely balloon only result for this lesion, we chose to switch our attention to the LAD, which was wired w/ a Forte wire, ballooned w/ 2.0x8mm Emerge balloon, and stented w/ a 2.5x20mm Synergy EES  at 20 xavier. Of note, she had severe 6/10 CP w/ balloon and stent inflations w/ LAD, which resolved after PCI and IC NTG administration. Final angiography showed no dissection or perforation and a YONIS 3 flow.    Cardiac Cath 4/16/2020 (results reviewed):     Persistent chest pain in patient with known coronary disease and mild-moderate aortic valve stenosis.    Patent proximal LAD, circumflex and RCA stents.    Progressive disease in the distal LAD, which is now severe.    Progressive disease in the ostium of OM-3 which is now critical. Attempts to pass wires thru the lesion failed due to severity of lesion and severe tortuosity in the mid circumflex.    Moderate disease in the mid RCA, stable relative to last angiogram.    Cardiac Stress Testing 5/3/2022 (results reviewed):     The regadenoson nuclear stress test is negative for inducible myocardial ischemia or infarction.    The left ventricular ejection fraction at stress is greater than 70%.    The patient is at a low risk of future cardiac ischemic events.    A prior study was conducted on 5/3/2021.  This study has no change when compared with the prior study.      Medical History  Surgical History Family History Social History   Past Medical History:   Diagnosis Date    Arthritis     Asthma     CAD (coronary artery disease)     Cancer (H)     basal cell    Chronic kidney disease     ckd 3    Chronic pain syndrome     Congestive heart failure (H)     Crohn's disease (H)     GERD (gastroesophageal reflux disease)     Hx of heart artery stent 11/01/2016    1 stent R Proximal CA and 1 Stent L Proximal circumflex  12/2016 Stent proximal LAD    Hyperlipidemia     Hypertension     NSTEMI (non-ST elevated myocardial infarction) (H) 11/01/2016    PONV (postoperative nausea and vomiting)     severe povn with last knee surgery    Restless legs syndrome     Stroke (H) 01/01/2010    numbness rt jaw     Past Surgical History:   Procedure Laterality Date    APPENDECTOMY       CARDIAC CATHETERIZATION  2016    multiple vessel disease.  no intervention    CARDIAC CATHETERIZATION  2016    CARDIAC CATHETERIZATION N/A 2016    Procedure: Coronary Angiogram;  Surgeon: Sunil Moran MD;  Location: [facility];  Service:     CAROTID ENDARTERECTOMY      CAROTID ENDARTERECTOMY Right 2010    CERVICAL LAMINECTOMY  1994     SECTION      CV CORONARY ANGIOGRAM N/A 2020    Procedure: Coronary Angiogram;  Surgeon: Vinita Ramos MD;  Location: [facility];  Service: Cardiology    CV LEFT HEART CATHETERIZATION WITHOUT LEFT VENTRICULOGRAM Left 2020    Procedure: Left Heart Catheterization Without Left Ventriculogram;  Surgeon: Jerad Lerner MD;  Location: [facility];  Service: Cardiology    CV TRANSCATHETER AORTIC VALVE REPLACEMENT - OTHER APPROACH N/A 2020    Procedure: CV TRANSCATHETER AORTIC VALVE REPLACEMENT - RIGHT SUBCLAVIAN APPROACH;  Surgeon: John Caceres MD;  Location: [facility];  Service: Cardiology    HEART CATH, ANGIOPLASTY      HEMORRHOIDECTOMY EXTERNAL      IR LUMBAR EPIDURAL STEROID INJECTION  2014    IR LUMBAR NERVE ROOT INJECTION  10/01/2013    JOINT REPLACEMENT      RI ESOPHAGOGASTRODUODENOSCOPY TRANSORAL DIAGNOSTIC N/A 07/10/2019    Procedure: ESOPHAGOGASTRODUODENOSCOPY (EGD) with gastric biopsy;  Surgeon: Sunil Stuart MD;  Location: [facility];  Service: Gastroenterology    RI REPLACE AORTIC VALVE OPEN AXILLRY ARTRY APPROACH N/A 2020    Procedure: OR TRANSCATHETER AORTIC VALVE REPLACEMENT, SUBCLAVIAN APPROACH;  Surgeon: Bhavin Cuadra MD;  Location: [facility];  Service: Cardiology    TONSILLECTOMY & ADENOIDECTOMY      TOTAL KNEE ARTHROPLASTY Right     TRANSCATHETER AORTIC-VALVE REPLACEMENT      ZZC TOTAL KNEE ARTHROPLASTY Left 2021    Procedure: LEFT TOTAL KNEE ARTHROPLASTY;  Surgeon: Doug Rhodes MD;  Location: [facility]  "OR;  Service: Orthopedics     Family History   Problem Relation Age of Onset    Atrial fibrillation Mother     Parkinsonism Father            Social History     Socioeconomic History    Marital status:      Spouse name: Not on file    Number of children: Not on file    Years of education: Not on file    Highest education level: Not on file   Occupational History    Not on file   Tobacco Use    Smoking status: Former     Types: Cigarettes     Quit date: 1980     Years since quittin.1    Smokeless tobacco: Never   Substance and Sexual Activity    Alcohol use: No    Drug use: No    Sexual activity: Not on file   Other Topics Concern    Not on file   Social History Narrative    Not on file     Social Determinants of Health     Financial Resource Strain: Not on file   Food Insecurity: Not on file   Transportation Needs: Not on file   Physical Activity: Not on file   Stress: Not on file   Social Connections: Not on file   Interpersonal Safety: Not on file   Housing Stability: Not on file             Physical Examination   VITALS: /52 (BP Location: Right arm)   Pulse 108   Temp 98.8  F (37.1  C) (Oral)   Resp 18   Ht 1.473 m (4' 10\")   Wt 54.4 kg (120 lb)   SpO2 98%   BMI 25.08 kg/m    BMI: Body mass index is 25.08 kg/m .  Wt Readings from Last 3 Encounters:   24 54.4 kg (120 lb)   24 55.2 kg (121 lb 12.8 oz)   10/13/23 59 kg (130 lb)         Intake/Output Summary (Last 24 hours) at 2/10/2024 0852  Last data filed at 2/10/2024 0443  Gross per 24 hour   Intake 0 ml   Output 1000 ml   Net -1000 ml       General Appearance:  Alert, cooperative, a bit confused and uncomfortable, appears stated age   Head:  Normocephalic, without obvious abnormality, atraumatic   Eyes:  PERRL, conjunctiva/corneas clear, EOM's intact   Ears:  Normal external ear canals bilaterally   Nose: Nares normal, septum midline, no drainage   Throat: Lips, mucosa, and tongue normal; teeth and gums normal   Neck: " Supple, symmetrical, trachea midline, no adenopathy, no carotid bruit or JVD   Back:   Symmetric, no abnormal curvature, ROM normal   Lungs:   Clear to auscultation bilaterally, respirations unlabored   Chest Wall:  No tenderness or deformity   Heart:  Regular rate and rhythm, S1, S2 normal. Soft systolic murmur. No rub or gallop   Abdomen:   Soft, non-tender, bowel sounds active all four quadrants,  no masses, no organomegaly   Extremities: Extremities normal, atraumatic, no cyanosis or edema   Skin: Skin color, texture, turgor normal, no rashes or lesions   Psychiatric: Normal affect, pleasant and cooperative   Neurologic: Alert and oriented X 3, Moves all 4 extremities            Imaging      CXR 2/9/2023 (report reviewed):  Heart size magnified in AP projection with stable TAVR prosthesis.   Widening of superior mediastinum likely secondary to supine position and shallow inspiration.   No focal consolidation, pneumothorax nor gross pleural effusion.   No definite fracture.    CTA chest/abdomen/pelvis 9/9/2023 (report reviewed):  1.  No evidence of acute aortic syndrome.  2.  Mild early interstitial pulmonary edema.  3.  Mild wall thickening of the esophagus suggestive of esophagitis.  4.  Infrarenal abdominal aortic aneurysm measures 3.4 cm.  5.  Cardiomegaly.  6.  Degenerative changes of the spine.  7.  Stable 7 mm hypodense lesion in the body of the pancreas likely a side branch intraductal papillary mucinous neoplasm. Although the guidelines for follow-up are listed below the clinical utility of follow-up of this entity in patients at 75 years of age is limited and can be determined on a clinical basis.    X-ray femur 2/9/2024 (report reviewed):  IMPRESSION: Diffuse osseous demineralization. Comminuted impacted and externally rotated fracture of the right intertrochanteric and subtrochanteric femoral neck with medial displacement of the lesser trochanter.     Moderate degenerative arthrosis of the right hip  and advanced degenerative arthrosis of the left hip with bone-on-bone articulation. Bilateral total knee arthroplasties.     Degenerative arthrosis of both SI joints. Lower lumbar facet arthropathy and degenerative interspace narrowing.      Lab Results    Chemistry/lipid CBC Cardiac Enzymes/BNP/TSH/INR   Recent Labs   Lab Test 07/12/22  0915   CHOL 222*   HDL 59   *   TRIG 75     Recent Labs   Lab Test 07/12/22  0915 03/30/21  1507 06/04/20  0814   * 132* 63     Recent Labs   Lab Test 02/10/24  0512      POTASSIUM 4.3   CHLORIDE 105   CO2 28   GLC 94   BUN 19.5   CR 0.96*   GFRESTIMATED 56*   BESS 8.5*     Recent Labs   Lab Test 02/10/24  0512 02/09/24  1347 01/17/24  1137   CR 0.96* 1.04* 1.57*     Recent Labs   Lab Test 05/19/23  0801 11/06/16  0555   A1C 4.8 5.6          Recent Labs   Lab Test 02/10/24  0512   WBC 17.5*   HGB 9.5*   HCT 28.8*   MCV 92        Recent Labs   Lab Test 02/10/24  0512 02/09/24  1315 12/19/23  1255   HGB 9.5* 10.2* 10.3*    Recent Labs   Lab Test 08/02/22  1258 08/02/22  0727 08/02/22  0009   TROPONINI 0.03 0.03 0.02     Recent Labs   Lab Test 01/17/24  1137 09/09/23  0947 08/31/23  1327 12/28/22  0707 11/09/22  1854 09/21/22  1517 08/02/22  0009 05/03/22  1509 01/15/22  0248 10/11/21  1154 09/20/21  1328   BNP  --   --   --   --   --   --  62 127 80   < >  --    NTBNPI  --  754 534 634   < >  --   --   --   --   --   --    NTBNP 475  --   --   --   --  575*  --   --   --   --  539*    < > = values in this interval not displayed.     Recent Labs   Lab Test 12/09/22  1539   TSH 5.17*     Recent Labs   Lab Test 02/09/24  1315 11/09/22  1532 01/15/22  0248   INR 1.23* 1.13 1.13           Current Inpatient Scheduled Medications   Scheduled Meds:   ceFAZolin  2 g Intravenous Pre-Op/Pre-procedure x 1 dose    ceFAZolin  2 g Intravenous See Admin Instructions    sodium chloride (PF)  3 mL Intracatheter Q8H            Medications Prior to Admission   Prior to Admission  medications    Medication Sig Start Date End Date Taking? Authorizing Provider   acetaminophen (TYLENOL) 325 MG tablet Take 650 mg by mouth every 6 hours as needed   Yes Reported, Patient   diclofenac (VOLTAREN) 1 % topical gel Apply 2-4 g topically 4 times daily as needed APPLY TO LEFT SHOULDER AND LEFT KNEE. 8/5/21  Yes Reported, Patient   lidocaine (XYLOCAINE) 5 % external ointment Apply topically 3 times daily as needed for moderate pain   Yes Reported, Patient   lidocaine (XYLOCAINE) 5 % external ointment Apply topically 3 times daily 5/22/23  Yes Miki Magaña MD   methocarbamol (ROBAXIN) 500 MG tablet Take 500 mg by mouth 4 times daily as needed for muscle spasms   Yes Unknown, Entered By History   Multiple Vitamin (THERA-TABS) TABS Take 1 tablet by mouth daily   Yes Reported, Patient   oxyCODONE (ROXICODONE) 5 MG tablet Take 5 mg by mouth 3 times daily as needed   Yes Reported, Patient   rOPINIRole (REQUIP) 2 MG tablet Take 2 mg by mouth nightly as needed   Yes Unknown, Entered By History   nitroGLYcerin (NITROSTAT) 0.4 MG sublingual tablet Place 1 tablet (0.4 mg) under the tongue every 5 minutes as needed 6/23/23   Ulisses Feldman MD Brent E. White, MD [facility]  Non-Invasive Cardiologist  [facility]  Pager [phone]

## 2024-02-10 NOTE — CARE PLAN
Report given to pre-op nurse Lucila Tyson emptied and patient down to OR by bed and transport. Daughter at bedside. Vital signs stable

## 2024-02-10 NOTE — ANESTHESIA POSTPROCEDURE EVALUATION
Patient: Sherice Alvarez    Procedure: Procedure(s):  INTERNAL FIXATION, FRACTURE, TROCHANTERIC, HIP, USING PINS OR RODS       Anesthesia Type:  Spinal    Note:  Disposition: Inpatient   Postop Pain Control: Uneventful            Sign Out: Well controlled pain   PONV: No   Neuro/Psych: Uneventful            Sign Out: Acceptable/Baseline neuro status   Airway/Respiratory: Uneventful            Sign Out: Acceptable/Baseline resp. status   CV/Hemodynamics: Uneventful            Sign Out: Acceptable CV status; No obvious hypovolemia; No obvious fluid overload   Other NRE: NONE   DID A NON-ROUTINE EVENT OCCUR?            Last vitals:  Vitals Value Taken Time   BP 93/50 02/10/24 1600   Temp 36.3  C (97.4  F) 02/10/24 1537   Pulse 84 02/10/24 1604   Resp 15 02/10/24 1604   SpO2 100 % 02/10/24 1604   Vitals shown include unfiled device data.    Electronically Signed By: Karen Zavaleta MD  February 10, 2024  4:06 PM

## 2024-02-10 NOTE — CONSULTS
Care Management Initial Consult    General Information  Assessment completed with: Children, daughter Jacqueline  Type of CM/SW Visit: Initial Assessment    Primary Care Provider verified and updated as needed: Yes   Readmission within the last 30 days: no previous admission in last 30 days         Advance Care Planning: Advance Care Planning Reviewed: other (see comments) (NO ACP documents)          Communication Assessment  Patient's communication style: spoken language (English or Bilingual)    Hearing Difficulty or Deaf: no   Wear Glasses or Blind: no    Cognitive  Cognitive/Neuro/Behavioral: WDL                      Living Environment:   People in home: alone     Current living Arrangements: independent living facility      Able to return to prior arrangements: other (see comments) (depending on therapy recs)       Family/Social Support:  Care provided by: self  Provides care for: child(neetu)  Marital Status:   Children          Description of Support System: Supportive         Current Resources:   Patient receiving home care services: No     Community Resources: None  Equipment currently used at home: none  Supplies currently used at home: None    Employment/Financial:  Employment Status: retired        Financial Concerns:             Does the patient's insurance plan have a 3 day qualifying hospital stay waiver?  No    Lifestyle & Psychosocial Needs:  Social Determinants of Health     Food Insecurity: Not on file   Depression: Not at risk (6/20/2023)    PHQ-2     PHQ-2 Score: 1   Housing Stability: Not on file   Tobacco Use: Medium Risk (2/9/2024)    Patient History     Smoking Tobacco Use: Former     Smokeless Tobacco Use: Never     Passive Exposure: Not on file   Financial Resource Strain: Not on file   Alcohol Use: Not on file   Transportation Needs: Not on file   Physical Activity: Not on file   Interpersonal Safety: Not on file   Stress: Not on file   Social Connections: Not on file       Functional  Status:  Prior to admission patient needed assistance:              Mental Health Status:          Chemical Dependency Status:                Values/Beliefs:  Spiritual, Cultural Beliefs, Catholic Practices, Values that affect care:                 Additional Information:  Assessed. RNCM introduced self and CM role to pts daughter Jacqueline, pt sleeping soundly. Pt lives at [facility]. Pt Ind with ADLS, does not use walker or w/c but has them if needed. Pt Ind with [facility] prior to fx hip. Pt drives short distances and pt fell at [facility] and broke hip per daughter. Jacqueline will be going over to [facility] today to find pts car. Pts daughters are supportive. Pt has established PCP. Does follow at [facility] pain clinic for chronic back pain, and works with [facility]. No home care svcs prior. Family anticipates TCU at discharge, pt has been to [facility] and liked it. Family to transport if safe to do so.       Per Jacqueline provider at pain clinic suggested in the future that pt be followed OP by a palliative team, daughter would like this referral placed at discharge. Not interested in hospice but looking into the palliative support for pain mgmt.       Cm will continue to follow plan of care, review recommendations, and assist with any discharge needs anticipated.     Mira Campuzano RN

## 2024-02-10 NOTE — ANESTHESIA PREPROCEDURE EVALUATION
Anesthesia Pre-Procedure Evaluation    Patient: Sherice Alvarez   MRN: [id number] : 1934        Procedure : Procedure(s):  INTERNAL FIXATION, FRACTURE, TROCHANTERIC, HIP, USING PINS OR RODS          Past Medical History:   Diagnosis Date    Arthritis     Asthma     CAD (coronary artery disease)     Cancer (H)     basal cell    Chronic kidney disease     ckd 3    Chronic pain syndrome     Congestive heart failure (H)     Crohn's disease (H)     GERD (gastroesophageal reflux disease)     Hx of heart artery stent 2016    1 stent R Proximal CA and 1 Stent L Proximal circumflex  2016 Stent proximal LAD    Hyperlipidemia     Hypertension     NSTEMI (non-ST elevated myocardial infarction) (H) 2016    PONV (postoperative nausea and vomiting)     severe povn with last knee surgery    Restless legs syndrome     Stroke (H) 2010    numbness rt jaw      Past Surgical History:   Procedure Laterality Date    APPENDECTOMY      CARDIAC CATHETERIZATION  2016    multiple vessel disease.  no intervention    CARDIAC CATHETERIZATION  2016    CARDIAC CATHETERIZATION N/A 2016    Procedure: Coronary Angiogram;  Surgeon: Sunil Moran MD;  Location: [facility];  Service:     CAROTID ENDARTERECTOMY      CAROTID ENDARTERECTOMY Right 2010    CERVICAL LAMINECTOMY  1994     SECTION      CV CORONARY ANGIOGRAM N/A 2020    Procedure: Coronary Angiogram;  Surgeon: Vinita Ramos MD;  Location: [facility];  Service: Cardiology    CV LEFT HEART CATHETERIZATION WITHOUT LEFT VENTRICULOGRAM Left 2020    Procedure: Left Heart Catheterization Without Left Ventriculogram;  Surgeon: Jerad Lerner MD;  Location: [facility];  Service: Cardiology    CV TRANSCATHETER AORTIC VALVE REPLACEMENT - OTHER APPROACH N/A 2020    Procedure: CV TRANSCATHETER AORTIC VALVE REPLACEMENT - RIGHT SUBCLAVIAN APPROACH;  Surgeon: John Caceers MD;   Location: [facility];  Service: Cardiology    HEART CATH, ANGIOPLASTY      HEMORRHOIDECTOMY EXTERNAL      IR LUMBAR EPIDURAL STEROID INJECTION  2014    IR LUMBAR NERVE ROOT INJECTION  10/01/2013    JOINT REPLACEMENT      LA ESOPHAGOGASTRODUODENOSCOPY TRANSORAL DIAGNOSTIC N/A 07/10/2019    Procedure: ESOPHAGOGASTRODUODENOSCOPY (EGD) with gastric biopsy;  Surgeon: Sunil Stuart MD;  Location: [facility];  Service: Gastroenterology    LA REPLACE AORTIC VALVE OPEN AXILLRY ARTRY APPROACH N/A 2020    Procedure: OR TRANSCATHETER AORTIC VALVE REPLACEMENT, SUBCLAVIAN APPROACH;  Surgeon: Bhavin Cuadra MD;  Location: [facility];  Service: Cardiology    TONSILLECTOMY & ADENOIDECTOMY      TOTAL KNEE ARTHROPLASTY Right     TRANSCATHETER AORTIC-VALVE REPLACEMENT      ZZC TOTAL KNEE ARTHROPLASTY Left 2021    Procedure: LEFT TOTAL KNEE ARTHROPLASTY;  Surgeon: Doug Rhodes MD;  Location: [facility];  Service: Orthopedics      Allergies   Allergen Reactions    Amitriptyline Hcl [Amitriptyline] Other (See Comments)    Erythromycin Diarrhea and Other (See Comments)     Childhood reaction    Gabapentin Other (See Comments)     Jerking movements, swelling    Naproxen Unknown and Other (See Comments)    Other Environmental Allergy Unknown     Molds, dander    Pregabalin Other (See Comments)      Social History     Tobacco Use    Smoking status: Former     Types: Cigarettes     Quit date: 1980     Years since quittin.1    Smokeless tobacco: Never   Substance Use Topics    Alcohol use: No      Wt Readings from Last 1 Encounters:   24 54.4 kg (120 lb)        Anesthesia Evaluation            ROS/MED HX  ENT/Pulmonary:     (+)                      asthma                  Neurologic:       Cardiovascular: Comment: 1. The left ventricle is normal in size. Left ventricular function is  normal.The ejection fraction is 60-65%. There is moderate concentric  "left  ventricular hypertrophy. No regional wall motion abnormalities noted.  2. Well seated 23 mm bioprosthetic [place] S3 TAVR valve in aortic position  with normal gradient (mean 18 mmHg, peak Fahad: 2.0 m/sec) and no paravalvular  regurgitation.  3. There is mild mitral stenosis,mean gradient 4.6 mmHg.      (+)  hypertension- -  CAD -  - stent-      CHF                     valvular problems/murmurs  s/p TAVR.         METS/Exercise Tolerance:     Hematologic:  - neg hematologic  ROS     Musculoskeletal:   (+)  arthritis,             GI/Hepatic:       Renal/Genitourinary:     (+) renal disease,             Endo:       Psychiatric/Substance Use:       Infectious Disease:       Malignancy:       Other:            Physical Exam    Airway  airway exam normal      Mallampati: II   TM distance: > 3 FB   Neck ROM: full   Mouth opening: > 3 cm    Respiratory Devices and Support         Dental       (+) Modest Abnormalities - crowns, retainers, 1 or 2 missing teeth      Cardiovascular   cardiovascular exam normal          Pulmonary           (+) decreased breath sounds           OUTSIDE LABS:  CBC:   Lab Results   Component Value Date    WBC 17.5 (H) 02/10/2024    WBC 14.7 (H) 02/09/2024    HGB 9.5 (L) 02/10/2024    HGB 10.2 (L) 02/09/2024    HCT 28.8 (L) 02/10/2024    HCT 30.8 (L) 02/09/2024     02/10/2024     02/09/2024     BMP:   Lab Results   Component Value Date     02/10/2024     02/09/2024    POTASSIUM 4.3 02/10/2024    POTASSIUM 3.6 02/09/2024    CHLORIDE 105 02/10/2024    CHLORIDE 109 (H) 02/09/2024    CO2 28 02/10/2024    CO2 25 02/09/2024    BUN 19.5 02/10/2024    BUN 25.4 (H) 02/09/2024    CR 0.96 (H) 02/10/2024    CR 1.04 (H) 02/09/2024    GLC 94 02/10/2024    GLC 95 02/10/2024     COAGS:   Lab Results   Component Value Date    PTT 27 02/09/2024    INR 1.23 (H) 02/09/2024     POC: No results found for: \"BGM\", \"HCG\", \"HCGS\"  HEPATIC:   Lab Results   Component Value Date    ALBUMIN 3.5 " "02/10/2024    PROTTOTAL 5.9 (L) 02/10/2024    ALT 23 02/10/2024    AST 36 02/10/2024    ALKPHOS 55 02/10/2024    BILITOTAL 0.8 02/10/2024     OTHER:   Lab Results   Component Value Date    A1C 4.8 05/19/2023    BESS 8.5 (L) 02/10/2024    PHOS 4.0 06/14/2022    MAG 1.9 09/09/2023    LIPASE 54 12/28/2022    TSH 5.17 (H) 12/09/2022    T4 1.09 12/09/2022    CRP 0.3 01/15/2022    SED 24 (H) 06/07/2021       Anesthesia Plan    ASA Status:  3    NPO Status:  NPO Appropriate    Anesthesia Type: Spinal.              Consents    Anesthesia Plan(s) and associated risks, benefits, and realistic alternatives discussed. Questions answered and patient/representative(s) expressed understanding.     - Discussed:     - Discussed with:  Patient (Daughter present)      - Extended Intubation/Ventilatory Support Discussed: No.      - Patient is DNR/DNI Status: No          Postoperative Care    Pain management: Multi-modal analgesia, Peripheral nerve block (Single Shot).   PONV prophylaxis: Ondansetron (or other 5HT-3), Dexamethasone or Solumedrol     Comments:    Other Comments: Preop FI block for post op pain control  Spinal anesthesia  Will convert to GETA if needed           Karen Zavaleta MD    I have reviewed the pertinent notes and labs in the chart from the past 30 days and (re)examined the patient.  Any updates or changes from those notes are reflected in this note.            # Coagulation Defect: INR = 1.23 (Ref range: 0.85 - 1.15) and/or PTT = 27 Seconds (Ref range: 22 - 38 Seconds), will monitor for bleeding   # Overweight: Estimated body mass index is 25.08 kg/m  as calculated from the following:    Height as of this encounter: 1.473 m (4' 10\").    Weight as of this encounter: 54.4 kg (120 lb).      "

## 2024-02-10 NOTE — OP NOTE
PATIENT: Sherice Alvarez  MR# :   [id number]  DATE OF OPERATION: 02/10/24    SURGEON:  Gilberto Joy MD     ASSISTANT:  Bruce Hammonds PA-C     A skilled assistant was required due to the nature of the case for patient position, retraction, exposure, implant placement, closure and dressing application.      Preoperative diagnosis: Right proximal femur peritrochanteric fracture, multipart, displaced  Osteopenia/osteoporosis  Chronic lumbar pain with radiculopathy  CAD  Aortic stenosis status post TAVR complicated by heart failure  CKD 3    Postoperative diagnosis: Right proximal femur peritrochanteric fracture, multipart, displaced  Osteopenia/osteoporosis  Chronic lumbar pain with radiculopathy  CAD  Aortic stenosis status post TAVR complicated by heart failure  CKD 3    Surgical procedure: Open reduction internal fixation right proximal femur peritrochanteric fracture      Anesthesia:  Fascia [place] regional block + spinal    Drains: None    Estimated blood loss: 200 cc    IV fluids: Please see Anesthesia Report    Complications: None identified intraoperatively     Blood products: none given     Specimens: * No specimens in log *    Findings: Multipart right proximal femur peritrochanteric fracture, with displacement    COMPONENTS IMPLANTED:  Implant Name Type Inv. Item Serial No.  Lot No. LRB No. Used Action   NAIL FEMORAL TFNA [id number] 125 DEG - [id number] Metallic Hardware/[place] NAIL FEMORAL TFNA [id number] 125 DEG  [employer] [id number] Right 1 Implanted   IMP SCR SYN TFNA FENESTRATED LAG 95MM 04.038.195S - [id number] Metallic Hardware/[place] IMP SCR SYN TFNA FENESTRATED LAG 95MM 04.038.195S  [employer] [id number] Right 1 Implanted   SCREW BN 36MM 5MM LCK X25 IM NL 04.045.036S - [id number] Metallic Hardware/[place] SCREW BN 36MM 5MM LCK X25 IM NL 04.045.036S  [employer] [id number] Right 1 Implanted   SCREW BN 34MM 5MM LCK X25 STRL IM NL 04.045.034S - [id number] Metallic  Hardware/[place] SCREW BN 34MM 5MM LCK X25 STRL IM NL 04.045.034S  [facility] [id number] Right 1 Implanted         INDICATIONS:    Sherice Alvarez is a 89 year old female who was admitted for a right proximal femur fracture after ground-level fall.    Preoperatively, the nature of the procedure, risks and benefits, as well as alternatives including nonsurgical management were discussed in detail with the patient. I reviewed and discussed the patient's condition and relevant images with the patient. We discussed options for further evaluation and treatment, including conservative non-operative management versus surgical intervention.       From a conservative standpoint, the patient can pursue non-operative management, which would include protected weight bearing to the lower extremity, which carries a high risk of morbidity and mortality due to prolonged and diminished mobilization/ambulation and it's associated complications, which include but are not limited to blood clots (DVT/PE), skin ulcerations and pressure sores, and pneumonia. In the long term, there is also the risk of chronic pain, fracture nonunion, fracture malunion, and avascular necrosis of the femoral head.     From a surgical standpoint, we discussed closed reduction vs open reduction and internal fixation. Given, the patient's fracture morphology and degree of displacement, internal fixation would also carry the risks of chronic pain, fracture nonunion, fracture malunion, and avascular necrosis of the femoral head.      We also discussed at length the risks and benefits of surgery. Our discussion included but was not limited to the risk of pain, bleeding, infection, blood clots (DVT, PE), wound issues, continued chronic pain in the hip, post-operative joint stiffness, painful arc of motion, difficulty with ambulation, iatrogenic fracture, damage to nearby neurovascular structures, implant loosening and/or failure requiring revision, and possible  post-operative leg length discrepancy (apparent or actual). The possibility of intra-operative and/or post-operative medical complications such as anesthesia complications or reactions, respiratory and cardiovascular events, stroke, heart attack and/or death were also discussed. In the case of infection of the joint, the patient understands that this will require prolonged IV antibiotic therapy and possible multiple operative procedures in the future.      The patient demonstrated an understanding of these risks as well as the potential benefits of surgery which would include possible improvement in pain, range of motion, and early ambulation while potentially limiting the long-term consequences of nonoperative management. The patient demonstrated an understanding of the indications of surgery and all possible complications, and together we have decided to proceed with surgery. Specific details of the surgical procedure, hospitalization, recovery, rehabilitation, and long-term precautions were also presented. The final choices will be made at the time of procedure to match the anatomy and condition of the bone, ligaments, tendons, and muscles. All of patients questions were answered preoperatively at which time informed consent was taken.      PROCEDURE:    After proper identification of the patient including verification and marking of the surgical site, the patient was brought to the operating room.  Spinal anesthesia was given without complications and prophylactic IV Ancef was confirmed to have been administered within the appropriate timeframe(s). The patient was placed in the supine position on the [place] fracture table.  The perineal post was secured.  The patient's ipsilateral arm was well-padded and placed across her chest on a pillow, and secured.  A chest strap was placed.  IV TXA was provided.    Prior to prepping the operative limb, we utilized fluoroscopy to perform closed reduction of the multipart  proximal femur peritrochanteric fracture.  We utilized orthogonal views.    The surgical site which was properly marked, was then prepped and draped in the usual sterile fashion. A timeout was done utilizing protocol to again identify the correct patient and operative site, which was marked appropriately.    We began the procedure by incising sharply through the skin and subcutaneous tissue with a #10 blade in line just proximal to the palpable greater trochanter tip.  We incised to the abductor fascia as well.  We then used fluoroscopy to place the threaded guidepin at the tip of the greater trochanter, which was then passed into the proximal intramedullary canal with orthogonal views.  We used a trephine reamer to open the proximal canal, and replaced the threaded guidepin with a ball-tipped guidewire  Down to the level of the knee.  We confirmed this was intraosseous with orthogonal views.  This was measured to be 320 mm in length.  We then began sequentially reaming, increasing up to a 11.5 mm diameter reamer.  This allowed for adequate endosteal chatter within the diaphysis.  We selected a 10 mm diameter long cephalomedullary nail as a result.  Based on preoperative measurements, we selected a 10 x 320 mm length right cephalomedullary femoral nail with 125 degree femoral neck angle.  This was placed onto the lateral aiming arm on the back table, and then over the guidepin into the proximal femur.  This was passed distal in the femur with use of a mallet.  We utilized fluoroscopy to place his at the appropriate depth.  We then used the lateral aiming arm to plan placement of the threaded guidepin for the cannulated screw within the femoral head.  A 10 blade was again used to sharply incise the lateral thigh skin, subcutaneous tissue and IT band for placement of the triple sleeve guide down to the lateral cortex.  We made a separate incision to utilize a John periosteal elevator to provide posterior directed  pressure along the anterior femoral neck to reduce procurvatum deformity  That we are unable to address with closed reduction.  While holding this, we then placed the threaded guidepin through the femoral neck to the center center position of the femoral head.  This was confirmed with orthogonal fluoroscopic views.  This was measured to be 95 mm in length.  The lateral cortical reamer was utilized, followed by the femoral head and neck reamer for the planned 95 mm lag screw.  We then placed the 95 mm cannulated lag screw by hand to the appropriate depth, securing this fully with the setscrew proximally.  We then turned our attention to placement of distal bicortical interlocking screws via perfect [place] technique.  We incised the skin, subcutaneous tissue, IT band.  The drill bit was utilized for bicortical preparation.  2 separate 5 mm bicortical locking screws were placed with excellent bite.  We removed the lateral aiming arm and took final fluoroscopic images confirming maintained reduction of the displaced peritrochanteric fracture and appropriate placement of the intramedullary device.    We then thoroughly irrigated all incisions with 500 cc of sterile saline via bulb syringe.  The deep fascia and IT band was closed with interrupted #1 Vicryl suture, followed by superficial subcutaneous closure with interrupted 2-0 Vicryl, and skin closure with running 3-0 Monocryl and skin glue.  Once the glue had dried sterile dressings were placed over the incisions.  There were no known complications at the end of the procedure.  The patient was allowed to awaken from anesthetic and transferred to her hospital bed in stable condition.  She was then transferred to the PACU for ongoing postoperative hemodynamic monitoring.    Sponge, needle, and instrument counts were correct at the conclusion of the procedure. The patient has palpable distal pulses in both lower extremity.     Postoperative Plan:  Pain Control: Continue  per pain protocol.  Weight Bearing: Weight bearing as tolerated on affected lower extremity.    Posterior hip precautions   Hip abduction pillow  - OK to remove POD1 and replace with pillow between knees while in bed  DVT Prophylaxis: ASA 81mg BID x4 weeks postop  Antibiotics: 24 hours of perioperative antibiotics + Keflex 500mg QID PO while inpatient, and discharge with Duricef 500mg BID PO (total of 7 days postop)  GI: Plan for aggressive bowel regimen to prevent constipation from narcotic medications  Lines: HLIV once tolerating PO  PT/OT: Eval and treatment. Will follow up on recommendations.  Follow up:  in 2 weeks in clinic for wound check  Discharge plan: to home versus TCU pending PT and social work evaluations    We recommend that the patient follow up with primary care physician upon discharge to discuss bone health and potential medical management in the setting of low energy hip fracture.  We will place orders for supplemental calcium and vitamin D in the meantime.     Dispo: stable to pacu    Gilberto Joy MD  Orthopedic Surgery  [facility]

## 2024-02-10 NOTE — PLAN OF CARE
Orthopedic note    Pt. Surgical clearance pending, elevated troponin being monitored.  Will plan NPO at MN and hip pinning in AM with [facility] surgeon on call.      Sin Bolden MD, DO on 2/9/2024 at 7:20 PM

## 2024-02-10 NOTE — ANESTHESIA PROCEDURE NOTES
"Intrathecal injection Procedure Note    Pre-Procedure   Staff -        Anesthesiologist:  Karen Zavaleta MD       Performed By: anesthesiologist       Procedure Start/Stop Times: 2/10/2024 1:28 PM and 2/10/2024 1:29 PM       Pre-Anesthestic Checklist: patient identified, IV checked, risks and benefits discussed, informed consent, monitors and equipment checked, pre-op evaluation, at physician/surgeon's request and post-op pain management  Timeout:       Correct Patient: Yes        Correct Procedure: Yes        Correct Site: Yes        Correct Position: Yes   Procedure Documentation  Procedure: intrathecal injection       Patient Position: LLD       Skin prep: Chloraprep       Insertion Site: L3-4. (midline approach).       Needle Gauge: 24.        Needle Length (Inches): 4        Spinal Needle Type: Pencan       Introducer used       # of attempts: 1 and  # of redirects:     Assessment/Narrative         Paresthesias: No.       CSF fluid: clear.    Medication(s) Administered   0.75% Hyperbaric Bupivacaine (Intrathecal) - Intrathecal   1.6 mL - 2/10/2024 1:28:00 PM  Medication Administration Time: 2/10/2024 1:28 PM      FOR [facility] ([facility]) ONLY:   Pain Team Contact information: please page the Pain Team Via [employer]. Search \"Pain\". During daytime hours, please page the attending first. At night please page the resident first.      "

## 2024-02-10 NOTE — PLAN OF CARE
Problem: Adult Inpatient Plan of Care  Goal: Plan of Care Review  Description: The Plan of Care Review/Shift note should be completed every shift.  The Outcome Evaluation is a brief statement about your assessment that the patient is improving, declining, or no change.  This information will be displayed automatically on your shift  note.  Outcome: Progressing     Problem: Adult Inpatient Plan of Care  Goal: Optimal Comfort and Wellbeing  Outcome: Progressing     Problem: Pain Acute  Goal: Optimal Pain Control and Function  Outcome: Progressing   Goal Outcome Evaluation:       Received call from [employer] about ST elevation changes. Tele strips printed and in chart. Updated Dr. Michaud, EKG ordered.  Abnormal EKG with changes compared to ECG on 2/9. Alerted Dr. Hardwick and she will review results. Increased pain even after administering Dilaudid 0.2. Administered one time dose 0.5. Patient is NPO for surgery later this morning.

## 2024-02-10 NOTE — ANESTHESIA PROCEDURE NOTES
Fascia iliaca Procedure Note    Pre-Procedure   Staff -        Anesthesiologist:  Karen Zavaleta MD       Performed By: anesthesiologist       Location: pre-op       Procedure Start/Stop Times: 2/10/2024 1:04 PM and 2/10/2024 1:05 PM       Pre-Anesthestic Checklist: patient identified, IV checked, site marked, risks and benefits discussed, informed consent, monitors and equipment checked, pre-op evaluation, at physician/surgeon's request and post-op pain management  Timeout:       Correct Patient: Yes        Correct Procedure: Yes        Correct Site: Yes        Correct Position: Yes        Correct Laterality: Yes        Site Marked: Yes  Procedure Documentation  Procedure: Fascia iliaca       Laterality: right       Patient Position: supine       Patient Prep/Sterile Barriers: sterile gloves, mask       Skin prep: Chloraprep       Needle Gauge: 20.        Needle Length (Inches): 4        Ultrasound guided       1. Ultrasound was used to identify targeted nerve, plexus, vascular marker, or fascial plane and place a needle adjacent to it in real-time.       2. Ultrasound was used to visualize the spread of anesthetic in close proximity to the above referenced structure.       3. A permanent image is entered into the patient's record.       4. The visualized anatomic structures appeared normal.       5. There were no apparent abnormal pathologic findings.    Assessment/Narrative         The placement was negative for: blood aspirated, painful injection and site bleeding       Paresthesias: No.       Bolus given via needle..        Secured via.        Insertion/Infusion Method: Single Shot       Complications: none       Injection made incrementally with aspirations every 5 mL.    Medication(s) Administered   Bupivacaine 0.25% PF (Infiltration) - Infiltration   30 mL - 2/10/2024 1:04:00 PM  Medication Administration Time: 2/10/2024 1:04 PM      FOR [facility] ([facility]) ONLY:   Pain Team Contact information: please  "page the Pain Team Via [facility]. Search \"Pain\". During daytime hours, please page the attending first. At night please page the resident first.      "

## 2024-02-10 NOTE — PLAN OF CARE
Problem: Pain Acute  Goal: Optimal Pain Control and Function  Outcome: Progressing     Problem: Mobility Impairment  Goal: Optimal Mobility  Outcome: Progressing   Goal Outcome Evaluation:                      Pt. Arrived on P4 at around 6.30 pm. Tyson in place. Pt is currently immobile due to her right hip fracture. Pt. Is on 2L O2. Pt. Is on tele running normal sinus. NPO at midnight. Pain managed  with IV morphine and PO oxy. Pt. Resting comfortably. Possible surgery tomorrow.

## 2024-02-10 NOTE — CONSULTS
ORTHOPEDIC CONSULTATION    Consultation  Sherice Alvarez,  1934, MRN [id number]    Fall, initial encounter [W19.XXXA]  Closed displaced subtrochanteric fracture of right femur, initial encounter (H) [S72.21XA]  Closed displaced intertrochanteric fracture of right femur, initial encounter (H) [S72.141A]    PCP: Rosemary Mauricio, [phone]   Code status:  Full Code       Extended Emergency Contact Information  Primary Emergency Contact: Gulshan Renteria   [place]  Mobile Phone: [phone]  Relation: Daughter  Secondary Emergency Contact: Carly Velázquez   [place]  Mobile Phone: [phone]  Relation: Daughter         IMPRESSION:  Right peritrochanteric femoral neck fracture with displacement    Preoperatively, the nature of the procedure, risks and benefits, as well as alternatives including nonsurgical management were discussed in detail with the patient. I reviewed and discussed the patient's condition and relevant images with the patient. We discussed options for further evaluation and treatment, including conservative non-operative management versus surgical intervention.      From a conservative standpoint, the patient can pursue non-operative management, which would include protected weight bearing to the lower extremity, which carries a high risk of morbidity and mortality due to prolonged and diminished mobilization/ambulation and it's associated complications, which include but are not limited to blood clots (DVT/PE), skin ulcerations and pressure sores, and pneumonia. In the long term, there is also the risk of chronic pain, fracture nonunion, fracture malunion, and avascular necrosis of the femoral head.    From a surgical standpoint, we discussed closed reduction vs open reduction and internal fixation. Given, the patient's fracture morphology and degree of displacement, internal fixation would also carry the risks of chronic pain, fracture nonunion, fracture malunion, and  avascular necrosis of the femoral head.     We also discussed at length the risks and benefits of surgery. Our discussion included but was not limited to the risk of pain, bleeding, infection, blood clots (DVT, PE), wound issues, continued chronic pain in the hip, post-operative joint stiffness, painful arc of motion, difficulty with ambulation, iatrogenic fracture, damage to nearby neurovascular structures, implant loosening and/or failure requiring revision, and possible post-operative leg length discrepancy (apparent or actual). The possibility of intra-operative and/or post-operative medical complications such as anesthesia complications or reactions, respiratory and cardiovascular events, stroke, heart attack and/or death were also discussed. In the case of infection of the joint, the patient understands that this will require prolonged IV antibiotic therapy and possible multiple operative procedures in the future.     The patient demonstrated an understanding of these risks as well as the potential benefits of surgery which would include possible improvement in pain, range of motion, and early ambulation while potentially limiting the long-term consequences of nonoperative management. The patient demonstrated an understanding of the indications of surgery and all possible complications, and together we have decided to proceed with surgery. Specific details of the surgical procedure, hospitalization, recovery, rehabilitation, and long-term precautions were also presented. The final choices will be made at the time of procedure to match the anatomy and condition of the bone, ligaments, tendons, and muscles. All of patients questions were answered preoperatively at which time informed consent was taken.       PLAN:  -N.p.o.  -Plan for surgical intervention today pending clearance  -Primary and cardiology clearance required preoperatively          CHIEF COMPLAINT: Closed displaced subtrochanteric fracture of right  femur, initial encounter (H)    HISTORY OF PRESENT ILLNESS:  The patient is a pleasant 89-year-old female who sustained a right proximal femur fracture after ground-level fall.  She has significant medical comorbidities as listed.  Orthopedics was consulted to manage her proximal femur fracture.      ALLERGIES:   Review of patient's allergies indicates   Allergies   Allergen Reactions    Amitriptyline Hcl [Amitriptyline] Other (See Comments)    Erythromycin Diarrhea and Other (See Comments)     Childhood reaction    Gabapentin Other (See Comments)     Jerking movements, swelling    Naproxen Unknown and Other (See Comments)    Other Environmental Allergy Unknown     Molds, dander    Pregabalin Other (See Comments)         MEDICATIONS UPON ADMISSION:  Medications were reviewed.  They include:   Medications Prior to Admission   Medication Sig Dispense Refill Last Dose    acetaminophen (TYLENOL) 325 MG tablet Take 650 mg by mouth every 6 hours as needed   Past Month at prn    diclofenac (VOLTAREN) 1 % topical gel Apply 2-4 g topically 4 times daily as needed APPLY TO LEFT SHOULDER AND LEFT KNEE.   Past Month at prn    lidocaine (XYLOCAINE) 5 % external ointment Apply topically 3 times daily as needed for moderate pain   Past Month at prn    lidocaine (XYLOCAINE) 5 % external ointment Apply topically 3 times daily 50 g 0 Past Month    methocarbamol (ROBAXIN) 500 MG tablet Take 500 mg by mouth 4 times daily as needed for muscle spasms   Past Month at ptn    Multiple Vitamin (THERA-TABS) TABS Take 1 tablet by mouth daily   Unknown at unknown    oxyCODONE (ROXICODONE) 5 MG tablet Take 5 mg by mouth 3 times daily as needed   2/9/2024 at am    rOPINIRole (REQUIP) 2 MG tablet Take 2 mg by mouth nightly as needed   Past Month at prn    nitroGLYcerin (NITROSTAT) 0.4 MG sublingual tablet Place 1 tablet (0.4 mg) under the tongue every 5 minutes as needed 90 tablet 0 n/a at n/a         SOCIAL HISTORY:   she  reports that she quit  smoking about 44 years ago. Her smoking use included cigarettes. She has never used smokeless tobacco. She reports that she does not drink alcohol and does not use drugs.      FAMILY HISTORY:  family history includes Atrial fibrillation in her mother; Parkinsonism in her father.      REVIEW OF SYSTEMS:   Reviewed with patient. See HPI, otherwise negative       PHYSICAL EXAMINATION:  Vitals: Temp:  [98  F (36.7  C)-98.8  F (37.1  C)] 98.8  F (37.1  C)  Pulse:  [] 108  Resp:  [12-18] 18  BP: (101-222)/() 101/52  SpO2:  [92 %-100 %] 98 %  General: Nonacute distress, alert and oriented  Pulmonary: Nonlabored breathing on room air at rest  Cardiac: Regular heart peripheral pulse  Focused examination of the right lower extremity:  -Intact skin over the lateral hip  -Pain with even passive limited motion secondary to fracture  -Compartment soft and compressible  -Neurovascular intact distally      RADIOGRAPHIC EVALUATION:  X-ray AP pelvis, multiview and right femur (performed yesterday): Personally reviewed today.  Reveals displaced multifragmented peritrochanteric right proximal femur fracture.    PERTINENT LABS:  Reviewed      Gilberto Joy MD, MD  [facility]  Date: 2/10/2024  Time: 10:17 AM    CC1:   Ruma Loepz MD    CC2:   Rosemary Mauricio

## 2024-02-10 NOTE — ANESTHESIA CARE TRANSFER NOTE
Patient: Sherice Alvarez    Procedure: Procedure(s):  INTERNAL FIXATION, FRACTURE, TROCHANTERIC, HIP, USING PINS OR RODS       Diagnosis: Fall, initial encounter [W19.XXXA]  Closed displaced intertrochanteric fracture of right femur, initial encounter (H) [S72.141A]  Diagnosis Additional Information: No value filed.    Anesthesia Type:   Spinal     Note:    Oropharynx: oropharynx clear of all foreign objects  Level of Consciousness: drowsy  Oxygen Supplementation: nasal cannula  Level of Supplemental Oxygen (L/min / FiO2): 4  Independent Airway: airway patency satisfactory and stable  Dentition: dentition unchanged  Vital Signs Stable: post-procedure vital signs reviewed and stable  Report to RN Given: handoff report given  Patient transferred to: PACU    Handoff Report: Identifed the Patient, Identified the Reponsible Provider, Reviewed the pertinent medical history, Discussed the surgical course, Reviewed Intra-OP anesthesia mangement and issues during anesthesia, Set expectations for post-procedure period and Allowed opportunity for questions and acknowledgement of understanding      Vitals:  Vitals Value Taken Time   BP 92/47 02/10/24 1533   Temp     Pulse 80 02/10/24 1534   Resp 17 02/10/24 1534   SpO2 100 % 02/10/24 1534   Vitals shown include unfiled device data.    Electronically Signed By: TAVO Damian CRNA  February 10, 2024  3:36 PM

## 2024-02-11 LAB
ANION GAP SERPL CALCULATED.3IONS-SCNC: 8 MMOL/L (ref 7–15)
BLD PROD TYP BPU: NORMAL
BLOOD COMPONENT TYPE: NORMAL
BUN SERPL-MCNC: 21 MG/DL (ref 8–23)
CALCIUM SERPL-MCNC: 7.7 MG/DL (ref 8.8–10.2)
CHLORIDE SERPL-SCNC: 106 MMOL/L (ref 98–107)
CODING SYSTEM: NORMAL
CREAT SERPL-MCNC: 1.31 MG/DL (ref 0.51–0.95)
CROSSMATCH: NORMAL
DEPRECATED HCO3 PLAS-SCNC: 26 MMOL/L (ref 22–29)
EGFRCR SERPLBLD CKD-EPI 2021: 39 ML/MIN/1.73M2
ERYTHROCYTE [DISTWIDTH] IN BLOOD BY AUTOMATED COUNT: 20.3 % (ref 10–15)
GLUCOSE BLDC GLUCOMTR-MCNC: 101 MG/DL (ref 70–99)
GLUCOSE SERPL-MCNC: 116 MG/DL (ref 70–99)
HCT VFR BLD AUTO: 20 % (ref 35–47)
HGB BLD-MCNC: 6.1 G/DL (ref 11.7–15.7)
HGB BLD-MCNC: 6.4 G/DL (ref 11.7–15.7)
HGB BLD-MCNC: 8 G/DL (ref 11.7–15.7)
HOLD SPECIMEN: NORMAL
ISSUE DATE AND TIME: NORMAL
MCH RBC QN AUTO: 30 PG (ref 26.5–33)
MCHC RBC AUTO-ENTMCNC: 32 G/DL (ref 31.5–36.5)
MCV RBC AUTO: 94 FL (ref 78–100)
PLATELET # BLD AUTO: 122 10E3/UL (ref 150–450)
POTASSIUM SERPL-SCNC: 4.3 MMOL/L (ref 3.4–5.3)
RBC # BLD AUTO: 2.13 10E6/UL (ref 3.8–5.2)
SODIUM SERPL-SCNC: 140 MMOL/L (ref 135–145)
UNIT ABO/RH: NORMAL
UNIT NUMBER: NORMAL
UNIT STATUS: NORMAL
UNIT TYPE ISBT: 6200
WBC # BLD AUTO: 15.4 10E3/UL (ref 4–11)

## 2024-02-11 PROCEDURE — 250N000013 HC RX MED GY IP 250 OP 250 PS 637: Performed by: HOSPITALIST

## 2024-02-11 PROCEDURE — 80048 BASIC METABOLIC PNL TOTAL CA: CPT | Performed by: INTERNAL MEDICINE

## 2024-02-11 PROCEDURE — 250N000013 HC RX MED GY IP 250 OP 250 PS 637: Performed by: STUDENT IN AN ORGANIZED HEALTH CARE EDUCATION/TRAINING PROGRAM

## 2024-02-11 PROCEDURE — 85027 COMPLETE CBC AUTOMATED: CPT | Performed by: STUDENT IN AN ORGANIZED HEALTH CARE EDUCATION/TRAINING PROGRAM

## 2024-02-11 PROCEDURE — 99233 SBSQ HOSP IP/OBS HIGH 50: CPT | Performed by: INTERNAL MEDICINE

## 2024-02-11 PROCEDURE — 36415 COLL VENOUS BLD VENIPUNCTURE: CPT | Performed by: STUDENT IN AN ORGANIZED HEALTH CARE EDUCATION/TRAINING PROGRAM

## 2024-02-11 PROCEDURE — 250N000011 HC RX IP 250 OP 636: Performed by: STUDENT IN AN ORGANIZED HEALTH CARE EDUCATION/TRAINING PROGRAM

## 2024-02-11 PROCEDURE — 36415 COLL VENOUS BLD VENIPUNCTURE: CPT | Performed by: INTERNAL MEDICINE

## 2024-02-11 PROCEDURE — 120N000001 HC R&B MED SURG/OB

## 2024-02-11 PROCEDURE — 250N000011 HC RX IP 250 OP 636: Performed by: HOSPITALIST

## 2024-02-11 PROCEDURE — 85018 HEMOGLOBIN: CPT | Performed by: STUDENT IN AN ORGANIZED HEALTH CARE EDUCATION/TRAINING PROGRAM

## 2024-02-11 PROCEDURE — 250N000013 HC RX MED GY IP 250 OP 250 PS 637: Performed by: GENERAL ACUTE CARE HOSPITAL

## 2024-02-11 PROCEDURE — P9016 RBC LEUKOCYTES REDUCED: HCPCS | Performed by: INTERNAL MEDICINE

## 2024-02-11 PROCEDURE — 85018 HEMOGLOBIN: CPT | Performed by: INTERNAL MEDICINE

## 2024-02-11 PROCEDURE — 250N000013 HC RX MED GY IP 250 OP 250 PS 637: Performed by: INTERNAL MEDICINE

## 2024-02-11 RX ORDER — LIDOCAINE 4 G/G
1 PATCH TOPICAL
Status: DISCONTINUED | OUTPATIENT
Start: 2024-02-11 | End: 2024-02-12

## 2024-02-11 RX ADMIN — ACETAMINOPHEN 975 MG: 325 TABLET ORAL at 22:41

## 2024-02-11 RX ADMIN — HYDROMORPHONE HYDROCHLORIDE 0.5 MG: 1 INJECTION, SOLUTION INTRAMUSCULAR; INTRAVENOUS; SUBCUTANEOUS at 15:45

## 2024-02-11 RX ADMIN — CEPHALEXIN 500 MG: 500 CAPSULE ORAL at 21:50

## 2024-02-11 RX ADMIN — Medication 81 MG: at 21:30

## 2024-02-11 RX ADMIN — ACETAMINOPHEN 975 MG: 325 TABLET ORAL at 14:13

## 2024-02-11 RX ADMIN — HYDROMORPHONE HYDROCHLORIDE 0.5 MG: 1 INJECTION, SOLUTION INTRAMUSCULAR; INTRAVENOUS; SUBCUTANEOUS at 22:48

## 2024-02-11 RX ADMIN — Medication 81 MG: at 08:20

## 2024-02-11 RX ADMIN — OXYCODONE HYDROCHLORIDE 5 MG: 5 TABLET ORAL at 08:20

## 2024-02-11 RX ADMIN — CEFAZOLIN 1 G: 1 INJECTION, POWDER, FOR SOLUTION INTRAMUSCULAR; INTRAVENOUS at 01:02

## 2024-02-11 RX ADMIN — POLYETHYLENE GLYCOL 3350 17 G: 17 POWDER, FOR SOLUTION ORAL at 08:20

## 2024-02-11 RX ADMIN — HYDROMORPHONE HYDROCHLORIDE 0.2 MG: 0.2 INJECTION, SOLUTION INTRAMUSCULAR; INTRAVENOUS; SUBCUTANEOUS at 09:15

## 2024-02-11 RX ADMIN — LIDOCAINE 1 PATCH: 4 PATCH TOPICAL at 15:56

## 2024-02-11 RX ADMIN — OXYCODONE HYDROCHLORIDE 5 MG: 5 TABLET ORAL at 21:32

## 2024-02-11 RX ADMIN — SENNOSIDES AND DOCUSATE SODIUM 1 TABLET: 8.6; 5 TABLET ORAL at 21:30

## 2024-02-11 RX ADMIN — OXYCODONE HYDROCHLORIDE 5 MG: 5 TABLET ORAL at 10:45

## 2024-02-11 RX ADMIN — ACETAMINOPHEN 975 MG: 325 TABLET ORAL at 06:36

## 2024-02-11 RX ADMIN — ATORVASTATIN CALCIUM 40 MG: 40 TABLET, FILM COATED ORAL at 21:31

## 2024-02-11 RX ADMIN — SENNOSIDES AND DOCUSATE SODIUM 1 TABLET: 8.6; 5 TABLET ORAL at 08:20

## 2024-02-11 RX ADMIN — CEFAZOLIN 1 G: 1 INJECTION, POWDER, FOR SOLUTION INTRAMUSCULAR; INTRAVENOUS at 14:25

## 2024-02-11 ASSESSMENT — ACTIVITIES OF DAILY LIVING (ADL)
ADLS_ACUITY_SCORE: 27
ADLS_ACUITY_SCORE: 29
ADLS_ACUITY_SCORE: 29
ADLS_ACUITY_SCORE: 33
ADLS_ACUITY_SCORE: 27
ADLS_ACUITY_SCORE: 33
ADLS_ACUITY_SCORE: 27
ADLS_ACUITY_SCORE: 33
ADLS_ACUITY_SCORE: 27
ADLS_ACUITY_SCORE: 27
ADLS_ACUITY_SCORE: 33
ADLS_ACUITY_SCORE: 33

## 2024-02-11 NOTE — PLAN OF CARE
"  Problem: Adult Inpatient Plan of Care  Goal: Plan of Care Review  Description: The Plan of Care Review/Shift note should be completed every shift.  The Outcome Evaluation is a brief statement about your assessment that the patient is improving, declining, or no change.  This information will be displayed automatically on your shift  note.  Outcome: Progressing  Goal: Patient-Specific Goal (Individualized)  Description: You can add care plan individualizations to a care plan. Examples of Individualization might be:  \"Parent requests to be called daily at 9am for status\", \"I have a hard time hearing out of my right ear\", or \"Do not touch me to wake me up as it startles  me\".  Outcome: Progressing  Goal: Absence of Hospital-Acquired Illness or Injury  Outcome: Progressing  Intervention: Identify and Manage Fall Risk  Recent Flowsheet Documentation  Taken 2/11/2024 0406 by Yana Valles RN  Safety Promotion/Fall Prevention:   activity supervised   increased rounding and observation   patient and family education   room door open   safety round/check completed  Taken 2/11/2024 0000 by Yana Valles RN  Safety Promotion/Fall Prevention:   activity supervised   increased rounding and observation   patient and family education   room door open   safety round/check completed  Intervention: Prevent Skin Injury  Recent Flowsheet Documentation  Taken 2/11/2024 0406 by Yana Valles RN  Body Position: supine, head elevated  Taken 2/11/2024 0000 by Yana Valles RN  Body Position: supine, head elevated  Intervention: Prevent and Manage VTE (Venous Thromboembolism) Risk  Recent Flowsheet Documentation  Taken 2/11/2024 0406 by Yana Valles RN  VTE Prevention/Management: SCDs (sequential compression devices) on  Taken 2/11/2024 0000 by Yana Valles RN  VTE Prevention/Management: SCDs (sequential compression devices) on  Intervention: Prevent Infection  Recent Flowsheet Documentation  Taken 2/11/2024 0406 by Yana Valles" RN  Infection Prevention: hand hygiene promoted  Taken 2/11/2024 0000 by Yana Valles RN  Infection Prevention: hand hygiene promoted  Goal: Optimal Comfort and Wellbeing  Outcome: Progressing  Goal: Readiness for Transition of Care  Outcome: Progressing     Problem: Risk for Delirium  Goal: Optimal Coping  Outcome: Progressing  Intervention: Optimize Psychosocial Adjustment to Delirium  Recent Flowsheet Documentation  Taken 2/11/2024 0406 by Yana Valles RN  Supportive Measures: active listening utilized  Taken 2/11/2024 0000 by Yana Valles RN  Supportive Measures: active listening utilized  Goal: Improved Behavioral Control  Outcome: Progressing  Intervention: Prevent and Manage Agitation  Recent Flowsheet Documentation  Taken 2/11/2024 0406 by Yana Valles RN  Environment Familiarity/Consistency: daily routine followed  Taken 2/11/2024 0000 by Yana Valles RN  Environment Familiarity/Consistency: daily routine followed  Intervention: Minimize Safety Risk  Recent Flowsheet Documentation  Taken 2/11/2024 0406 by Yana Valles RN  Communication Enhancement Strategies: call light answered in person  Taken 2/11/2024 0000 by Yana Valles RN  Communication Enhancement Strategies: call light answered in person  Goal: Improved Attention and Thought Clarity  Outcome: Progressing  Intervention: Maximize Cognitive Function  Recent Flowsheet Documentation  Taken 2/11/2024 0406 by Yana Valles RN  Sensory Stimulation Regulation:   care clustered   lighting decreased   quiet environment promoted  Reorientation Measures:   calendar in view   clock in view  Taken 2/11/2024 0000 by Yana Valles RN  Sensory Stimulation Regulation:   care clustered   lighting decreased   quiet environment promoted  Reorientation Measures:   calendar in view   clock in view  Goal: Improved Sleep  Outcome: Progressing     Problem: Pain Acute  Goal: Optimal Pain Control and Function  Outcome: Progressing  Intervention: Prevent or  Manage Pain  Recent Flowsheet Documentation  Taken 2/11/2024 0406 by Yana Valles RN  Sensory Stimulation Regulation:   care clustered   lighting decreased   quiet environment promoted  Medication Review/Management: medications reviewed  Taken 2/11/2024 0000 by Yana Valles RN  Sensory Stimulation Regulation:   care clustered   lighting decreased   quiet environment promoted  Medication Review/Management: medications reviewed  Intervention: Optimize Psychosocial Wellbeing  Recent Flowsheet Documentation  Taken 2/11/2024 0406 by Yana Valles RN  Supportive Measures: active listening utilized  Taken 2/11/2024 0000 by Yana Valles RN  Supportive Measures: active listening utilized     Problem: Mobility Impairment  Goal: Optimal Mobility  Outcome: Progressing  Intervention: Optimize Mobility  Recent Flowsheet Documentation  Taken 2/11/2024 0406 by Yana Valles RN  Activity Management: bedrest  Positioning/Transfer Devices:   pillows   applied  Taken 2/11/2024 0000 by Yana Valles RN  Activity Management: bedrest  Positioning/Transfer Devices:   pillows   applied   Goal Outcome Evaluation:  Pt slept well overnight. Pt is still bedrest overnight, summers is in place at this time due to lack of mobility. No complaints of pain and iv antibiotics ran as ordered. All due cares done and explained to the patient, will cont to monitor and treat as needed.

## 2024-02-11 NOTE — PLAN OF CARE
Problem: Pain Acute  Goal: Optimal Pain Control and Function  Intervention: Optimize Psychosocial Wellbeing  Recent Flowsheet Documentation  Taken 2/10/2024 1852 by German Jon RN  Supportive Measures: active listening utilized     Problem: Mobility Impairment  Goal: Optimal Mobility  Outcome: Progressing   Goal Outcome Evaluation:                      Patient returned from PACU at 1730. CSM good in lower extremities. Dressing on L hip CDI. Running 100 ml/hr LR. Patient ordered dinner and ate 50% of dinner. Drinking well. Capnography taken off as patient eating and drinking.  On pulse ox and on 2 liters O2. Patient dangled legs over edge of bed for 10 seconds until pain became to much. Tyson intact, draining straw colored urine. Pain managed with PRN IV dilaudid and Oxy. VSS.

## 2024-02-11 NOTE — PLAN OF CARE
Problem: Adult Inpatient Plan of Care  Goal: Absence of Hospital-Acquired Illness or Injury  Intervention: Prevent Skin Injury  Recent Flowsheet Documentation  Taken 2/11/2024 0820 by Katia العراقي RN  Body Position: (pt refused turning at this time. pt shifts herself in bed)   supine, head elevated   weight shifting   log-rolled  Device Skin Pressure Protection: adhesive use limited     Problem: Adult Inpatient Plan of Care  Goal: Optimal Comfort and Wellbeing  Outcome: Progressing  Intervention: Monitor Pain and Promote Comfort  Recent Flowsheet Documentation  Taken 2/11/2024 1045 by Katia العراقي RN  Pain Management Interventions:   medication (see MAR)   distraction   diversional activity provided   emotional support  Taken 2/11/2024 0915 by Katia العراقي RN  Pain Management Interventions:   medication (see MAR)   MD notified (comment)   diversional activity provided   cold applied  Taken 2/11/2024 0820 by Katia العراقي RN  Pain Management Interventions:   medication (see MAR)   distraction   emotional support   pillow support provided   repositioned     Problem: Adult Inpatient Plan of Care  Goal: Optimal Comfort and Wellbeing  Intervention: Monitor Pain and Promote Comfort  Recent Flowsheet Documentation  Taken 2/11/2024 1045 by Katia العراقي RN  Pain Management Interventions:   medication (see MAR)   distraction   diversional activity provided   emotional support  Taken 2/11/2024 0915 by Katia العراقي RN  Pain Management Interventions:   medication (see MAR)   MD notified (comment)   diversional activity provided   cold applied  Taken 2/11/2024 0820 by Ktaia العراقي RN  Pain Management Interventions:   medication (see MAR)   distraction   emotional support   pillow support provided   repositioned     Problem: Pain Acute  Goal: Optimal Pain Control and Function  Outcome: Progressing  Intervention: Develop Pain Management Plan  Recent Flowsheet  Documentation  Taken 2/11/2024 1045 by Katia العراقي RN  Pain Management Interventions:   medication (see MAR)   distraction   diversional activity provided   emotional support  Taken 2/11/2024 0915 by Katia العراقي RN  Pain Management Interventions:   medication (see MAR)   MD notified (comment)   diversional activity provided   cold applied  Taken 2/11/2024 0820 by Katia العراقي RN  Pain Management Interventions:   medication (see MAR)   distraction   emotional support   pillow support provided   repositioned  Intervention: Prevent or Manage Pain  Recent Flowsheet Documentation  Taken 2/11/2024 0820 by Katia العراقي RN  Sensory Stimulation Regulation:   care clustered   lighting decreased   quiet environment promoted  Medication Review/Management: medications reviewed  Intervention: Optimize Psychosocial Wellbeing  Recent Flowsheet Documentation  Taken 2/11/2024 0820 by Katia العراقي RN  Supportive Measures:   active listening utilized   verbalization of feelings encouraged   problem-solving facilitated   relaxation techniques promoted   Goal Outcome Evaluation:         Patient painful this am in hip area and left leg pain. Pain 9/10. Medicated with po dilaudid. Patient continued to be restless and complaining of severe pain. Medicated with dilaudid 0.2 mg IV. Ice pack applied. Dr Whitaker notified of patients pain. Continued to monitor pain and patient medicated with dilaudid 5 mg with good relief. Hgb 6.4 this am one unit of packed red blood cells given when consent was signed at 11:30. Patient tolerated well and received one unit of blood. Dressing to hip right is dry and intact and clean. Patient refused to be turned this am due to pain. Patient was washed up and turned after pain was decreased. Continue to monitor pain and medicate as needed. Patient sleeping after pain medication this am.vital signs stable.

## 2024-02-11 NOTE — PROGRESS NOTES
[facility]    Medicine Progress Note - Hospitalist Service    Date of Admission:  2/9/2024    Assessment & Plan   Sherice Alvarez is a 89 year old female with PMH of HTN, chronic pain, lumbar radiculopathy, CAD, Aortic Stenosis (s/p TAVR), Asthma, HFpEF, CKDIII, admitted on 2/9/2024 after fall at grocery store.  Workup here revealed right intertrochanteric and subtrochanteric femoral fracture, plan to go to the OR later today.      2/10 : s/p Open reduction internal fixation right proximal femur peritrochanteric fracture       2/11 :     Post op day 1  Continue post op care, pain control  On a liter of oxygen, will try to wean off  Hb < 7 today, likely blood loss from surgery  Will transfuse 1 unit prbc, monitor hb, transfuse prn to keep hb . 7-8  Pt/ot to evaluate tomorrow      Not medically ready for discharge at this time.      A/p :      #Fall  #Right intertrochanteric and subtrochanteric femoral neck fracture with medial displacement  #Chronic pain secondary to lumbar radiculopathy  #Chronic compression fracture deformity of L4  -Reviewed hip x-rays  -Patient evaluated by Ortho, plan for surgery later today.  -Continue n.p.o., IVF, pain control  -Mildly elevated tropes, EKG showed some ST depressions.  Patient seen by cardiology , did not recommend any further cardiac testing and can proceed with the surgery today.  -May need pain management postoperatively for adequate pain control given patient has history of chronic pain  -PT/OT postsurgery.        #CAD  #HFpEF  #Aortic Stenosis s/p TAVR  #Elevated troponin  -Appears Euvolemic except for bipedal edema (appears chronic)  -Reviewed EKG.  -Evaluated by cardiology preop, signed off now  -Continue aspirin and statin        #HTN  -Per daughter patient non compliant with medications: Amlodipine, Metoprolol  -Hydralazine prn, adequate pain control  -Can add ACE       #CKDIII  -Monitor kidney function      #Leukocytosis  -Likely stress  "related, recheck tomorrow.  Noted WBC slightly up today.     #Asthma  -Bronchodilators prn             Diet: Advance Diet as Tolerated: Regular Diet Adult    DVT Prophylaxis: Pneumatic Compression Devices  Tyson Catheter: PRESENT, indication: Other (Comment) (surgical)  Lines: None     Cardiac Monitoring: ACTIVE order. Indication: marcus operative monitoring in a patient with cardiac history  Code Status: Full Code      Clinically Significant Risk Factors               # Coagulation Defect: INR = 1.23 (Ref range: 0.85 - 1.15) and/or PTT = 27 Seconds (Ref range: 22 - 38 Seconds), will monitor for bleeding  # Thrombocytopenia: Lowest platelets = 122 in last 2 days, will monitor for bleeding   # Hypertension: Noted on problem list        # Overweight: Estimated body mass index is 25.08 kg/m  as calculated from the following:    Height as of this encounter: 1.473 m (4' 10\").    Weight as of this encounter: 54.4 kg (120 lb)., PRESENT ON ADMISSION     # Asthma: noted on problem list        Disposition Plan      Expected Discharge Date: 02/12/2024      Destination: inpatient rehabilitation facility              Scott Whitaker MD  Hospitalist Service  [facility]  Securely message with [employer] (more info)  Text page via [employer] Paging/Directory   ______________________________________________________________________        Physical Exam   Vital Signs: Temp: 99.6  F (37.6  C) Temp src: Oral BP: 110/52 Pulse: 87   Resp: 18 SpO2: 98 % O2 Device: Nasal cannula Oxygen Delivery: 1 LPM  Weight: 120 lbs 0 oz    General: Pleasant, elderly female in NAD  HEENT:EOMI, AT,NC  CVS:RRR, no edema  RS:CTAB  Neurology:Grossly normal  Psy:Approrpiate affect      Medical Decision Making       >45 MINUTES SPENT BY ME on the date of service doing chart review, history, exam, documentation & further activities per the note.  MANAGEMENT DISCUSSED with the following over the past 24 hours: Patient, cardiology, bedside RN   "     Data     I have personally reviewed the following data over the past 24 hrs:    15.4 (H)  \   6.4 (LL)   / 122 (L)     N/A N/A N/A /  101 (H)   N/A N/A N/A \

## 2024-02-11 NOTE — PROGRESS NOTES
"Ortho Progress Note      Post-operative Day: 1      Subjective:  Pain: controlled, moderate  Chest pain, SOB:  No  Nausea, vomiting:  no  Lightheadedness, dizziness:  No  Neuro:  Patient denies new onset numbness or paresthesias    No overnight events.  Pain is moderate.  A.m. hemoglobin 6.1.    Objective:  /42   Pulse 82   Temp 98.9  F (37.2  C) (Oral)   Resp 18   Ht 1.473 m (4' 10\")   Wt 54.4 kg (120 lb)   SpO2 98%   BMI 25.08 kg/m    Gen: A&O x 3. NAD. Appears well  Wound status: clean/dry/intact  Circulation, motion and sensation: Dorsiflexion/plantarflexion intact and equal bilaterally; distal lower extremity sensation is intact and equal bilaterally   Swelling: moderate   Calf tenderness: calves are soft and non-tender bilaterally     Pertinent Labs   Lab Results: personally reviewed.   Lab Results   Component Value Date    INR 1.23 (H) 02/09/2024    INR 1.13 11/09/2022    INR 1.13 01/15/2022     Lab Results   Component Value Date    WBC 15.4 (H) 02/11/2024    HGB 6.4 (LL) 02/11/2024    HCT 20.0 (L) 02/11/2024    MCV 94 02/11/2024     (L) 02/11/2024     Lab Results   Component Value Date     02/10/2024    CO2 28 02/10/2024       Assessment: POD1 s/ptRight hip trochanteric nailing, doing well. Acute blood loss anemia    Plan:     Recommend transfusion of 2 units of PRBCs today due to hemoglobin of 6.1      Pain Control: Continue per pain protocol.  Weight Bearing: Weight bearing as tolerated on affected lower extremity.   DVT Prophylaxis: ASA 81mg BID x4 weeks postop  Antibiotics: 24 hours of perioperative antibiotics + Keflex 500mg QID PO while inpatient, and discharge with Duricef 500mg BID PO (total of 7 days postop)  GI: Plan for aggressive bowel regimen to prevent constipation from narcotic medications  PT/OT: Eval and treatment. Will follow up on recommendations.  Follow up:  in 2 weeks in clinic for wound check  Discharge plan: to home versus TCU pending PT and social work " evaluations    Report completed by:  LESLIE DENTON MD  Date: 2/11/2024  Time: 12:51 PM

## 2024-02-11 NOTE — PLAN OF CARE
Nurse informed patient hemoglobin was 6.1, patient is postoperative day 1 right ORIF, reviewed vitals which appear to be currently stable, also reviewed labs and hemoglobin appears to have trended down from 9.5 to 6.1 which suspecting is most likely a lab error, advised repeating stat CBC and if CBC showing similarly low hemoglobin or downtrending hemoglobin may need to give blood transfusion and look for source of blood loss, otherwise if hemoglobin stable or slightly low will continue to closely monitor.

## 2024-02-12 ENCOUNTER — APPOINTMENT (OUTPATIENT)
Dept: PHYSICAL THERAPY | Facility: HOSPITAL | Age: 89
DRG: 481 | End: 2024-02-12
Attending: HOSPITALIST
Payer: COMMERCIAL

## 2024-02-12 LAB
ATRIAL RATE - MUSE: 107 BPM
DIASTOLIC BLOOD PRESSURE - MUSE: NORMAL MMHG
GLUCOSE BLDC GLUCOMTR-MCNC: 103 MG/DL (ref 70–99)
GLUCOSE BLDC GLUCOMTR-MCNC: 103 MG/DL (ref 70–99)
GLUCOSE BLDC GLUCOMTR-MCNC: 86 MG/DL (ref 70–99)
HGB BLD-MCNC: 7.6 G/DL (ref 11.7–15.7)
INTERPRETATION ECG - MUSE: NORMAL
P AXIS - MUSE: 68 DEGREES
PR INTERVAL - MUSE: 178 MS
QRS DURATION - MUSE: 88 MS
QT - MUSE: 348 MS
QTC - MUSE: 464 MS
R AXIS - MUSE: 48 DEGREES
SYSTOLIC BLOOD PRESSURE - MUSE: NORMAL MMHG
T AXIS - MUSE: 93 DEGREES
VENTRICULAR RATE- MUSE: 107 BPM

## 2024-02-12 PROCEDURE — 250N000013 HC RX MED GY IP 250 OP 250 PS 637: Performed by: GENERAL ACUTE CARE HOSPITAL

## 2024-02-12 PROCEDURE — 99222 1ST HOSP IP/OBS MODERATE 55: CPT | Performed by: NURSE PRACTITIONER

## 2024-02-12 PROCEDURE — 97161 PT EVAL LOW COMPLEX 20 MIN: CPT | Mod: GP

## 2024-02-12 PROCEDURE — 250N000011 HC RX IP 250 OP 636: Performed by: NURSE PRACTITIONER

## 2024-02-12 PROCEDURE — 36415 COLL VENOUS BLD VENIPUNCTURE: CPT | Performed by: STUDENT IN AN ORGANIZED HEALTH CARE EDUCATION/TRAINING PROGRAM

## 2024-02-12 PROCEDURE — 250N000013 HC RX MED GY IP 250 OP 250 PS 637: Performed by: PHYSICIAN ASSISTANT

## 2024-02-12 PROCEDURE — 250N000013 HC RX MED GY IP 250 OP 250 PS 637: Performed by: STUDENT IN AN ORGANIZED HEALTH CARE EDUCATION/TRAINING PROGRAM

## 2024-02-12 PROCEDURE — 250N000013 HC RX MED GY IP 250 OP 250 PS 637: Performed by: HOSPITALIST

## 2024-02-12 PROCEDURE — 250N000013 HC RX MED GY IP 250 OP 250 PS 637: Performed by: NURSE PRACTITIONER

## 2024-02-12 PROCEDURE — 120N000001 HC R&B MED SURG/OB

## 2024-02-12 PROCEDURE — 97110 THERAPEUTIC EXERCISES: CPT | Mod: GP

## 2024-02-12 PROCEDURE — 250N000011 HC RX IP 250 OP 636: Performed by: STUDENT IN AN ORGANIZED HEALTH CARE EDUCATION/TRAINING PROGRAM

## 2024-02-12 PROCEDURE — 85018 HEMOGLOBIN: CPT | Performed by: STUDENT IN AN ORGANIZED HEALTH CARE EDUCATION/TRAINING PROGRAM

## 2024-02-12 PROCEDURE — 99232 SBSQ HOSP IP/OBS MODERATE 35: CPT | Performed by: INTERNAL MEDICINE

## 2024-02-12 PROCEDURE — 97116 GAIT TRAINING THERAPY: CPT | Mod: GP

## 2024-02-12 RX ORDER — ASPIRIN 81 MG/1
81 TABLET ORAL 2 TIMES DAILY
Qty: 60 TABLET | Refills: 0 | Status: SHIPPED | OUTPATIENT
Start: 2024-02-12 | End: 2024-02-14

## 2024-02-12 RX ORDER — AMOXICILLIN 250 MG
2 CAPSULE ORAL 2 TIMES DAILY
Status: DISCONTINUED | OUTPATIENT
Start: 2024-02-12 | End: 2024-02-14 | Stop reason: HOSPADM

## 2024-02-12 RX ORDER — LIDOCAINE 4 G/G
2 PATCH TOPICAL
Status: DISCONTINUED | OUTPATIENT
Start: 2024-02-12 | End: 2024-02-14 | Stop reason: HOSPADM

## 2024-02-12 RX ORDER — AMOXICILLIN 250 MG
2 CAPSULE ORAL 2 TIMES DAILY PRN
Qty: 60 TABLET | Refills: 0 | Status: SHIPPED | OUTPATIENT
Start: 2024-02-12

## 2024-02-12 RX ORDER — OXYCODONE HYDROCHLORIDE 10 MG/1
10 TABLET ORAL EVERY 4 HOURS PRN
Qty: 25 TABLET | Refills: 0 | Status: SHIPPED | OUTPATIENT
Start: 2024-02-12 | End: 2024-02-13

## 2024-02-12 RX ORDER — METHOCARBAMOL 750 MG/1
750 TABLET, FILM COATED ORAL 4 TIMES DAILY
Status: DISCONTINUED | OUTPATIENT
Start: 2024-02-12 | End: 2024-02-13

## 2024-02-12 RX ORDER — HYDROMORPHONE HCL IN WATER/PF 6 MG/30 ML
0.1 PATIENT CONTROLLED ANALGESIA SYRINGE INTRAVENOUS EVERY 6 HOURS PRN
Status: DISCONTINUED | OUTPATIENT
Start: 2024-02-12 | End: 2024-02-13

## 2024-02-12 RX ORDER — HYDROMORPHONE HCL IN WATER/PF 6 MG/30 ML
0.2 PATIENT CONTROLLED ANALGESIA SYRINGE INTRAVENOUS EVERY 6 HOURS PRN
Status: DISCONTINUED | OUTPATIENT
Start: 2024-02-12 | End: 2024-02-13

## 2024-02-12 RX ORDER — ACETAMINOPHEN 325 MG/1
975 TABLET ORAL EVERY 6 HOURS
Status: DISCONTINUED | OUTPATIENT
Start: 2024-02-12 | End: 2024-02-14 | Stop reason: HOSPADM

## 2024-02-12 RX ORDER — HYDROMORPHONE HYDROCHLORIDE 2 MG/1
2-4 TABLET ORAL
Status: DISCONTINUED | OUTPATIENT
Start: 2024-02-12 | End: 2024-02-13

## 2024-02-12 RX ORDER — ACETAMINOPHEN 325 MG/1
975 TABLET ORAL EVERY 8 HOURS
Qty: 100 TABLET | Refills: 0 | Status: ON HOLD | OUTPATIENT
Start: 2024-02-12 | End: 2024-04-04

## 2024-02-12 RX ORDER — CEFADROXIL 500 MG/1
500 CAPSULE ORAL 2 TIMES DAILY
Qty: 14 CAPSULE | Refills: 0 | Status: SHIPPED | OUTPATIENT
Start: 2024-02-12 | End: 2024-02-22

## 2024-02-12 RX ADMIN — OXYCODONE HYDROCHLORIDE 5 MG: 5 TABLET ORAL at 10:00

## 2024-02-12 RX ADMIN — METHOCARBAMOL 750 MG: 750 TABLET, FILM COATED ORAL at 16:54

## 2024-02-12 RX ADMIN — DICLOFENAC 2 G: 10 GEL TOPICAL at 12:52

## 2024-02-12 RX ADMIN — Medication 81 MG: at 09:18

## 2024-02-12 RX ADMIN — HYDROMORPHONE HYDROCHLORIDE 4 MG: 2 TABLET ORAL at 16:59

## 2024-02-12 RX ADMIN — HYDROMORPHONE HYDROCHLORIDE 0.2 MG: 0.2 INJECTION, SOLUTION INTRAMUSCULAR; INTRAVENOUS; SUBCUTANEOUS at 12:51

## 2024-02-12 RX ADMIN — METHOCARBAMOL 750 MG: 750 TABLET, FILM COATED ORAL at 12:51

## 2024-02-12 RX ADMIN — METHOCARBAMOL 750 MG: 750 TABLET, FILM COATED ORAL at 20:35

## 2024-02-12 RX ADMIN — HYDROMORPHONE HYDROCHLORIDE 0.2 MG: 0.2 INJECTION, SOLUTION INTRAMUSCULAR; INTRAVENOUS; SUBCUTANEOUS at 01:06

## 2024-02-12 RX ADMIN — ACETAMINOPHEN 975 MG: 325 TABLET ORAL at 17:47

## 2024-02-12 RX ADMIN — ATORVASTATIN CALCIUM 40 MG: 40 TABLET, FILM COATED ORAL at 20:35

## 2024-02-12 RX ADMIN — DICLOFENAC 2 G: 10 GEL TOPICAL at 17:03

## 2024-02-12 RX ADMIN — CEPHALEXIN 500 MG: 500 CAPSULE ORAL at 13:34

## 2024-02-12 RX ADMIN — ACETAMINOPHEN 975 MG: 325 TABLET ORAL at 06:28

## 2024-02-12 RX ADMIN — ACETAMINOPHEN 975 MG: 325 TABLET ORAL at 11:40

## 2024-02-12 RX ADMIN — SENNOSIDES AND DOCUSATE SODIUM 1 TABLET: 8.6; 5 TABLET ORAL at 09:18

## 2024-02-12 RX ADMIN — OXYCODONE HYDROCHLORIDE 10 MG: 5 TABLET ORAL at 03:20

## 2024-02-12 RX ADMIN — HYDROMORPHONE HYDROCHLORIDE 2 MG: 2 TABLET ORAL at 21:48

## 2024-02-12 RX ADMIN — CEPHALEXIN 500 MG: 500 CAPSULE ORAL at 21:41

## 2024-02-12 RX ADMIN — Medication 81 MG: at 20:34

## 2024-02-12 RX ADMIN — POLYETHYLENE GLYCOL 3350 17 G: 17 POWDER, FOR SOLUTION ORAL at 09:18

## 2024-02-12 RX ADMIN — SENNOSIDES AND DOCUSATE SODIUM 1 TABLET: 8.6; 5 TABLET ORAL at 09:21

## 2024-02-12 RX ADMIN — LIDOCAINE 2 PATCH: 4 PATCH TOPICAL at 20:40

## 2024-02-12 RX ADMIN — ACETAMINOPHEN 650 MG: 325 TABLET ORAL at 03:24

## 2024-02-12 RX ADMIN — SENNOSIDES AND DOCUSATE SODIUM 2 TABLET: 8.6; 5 TABLET ORAL at 21:33

## 2024-02-12 RX ADMIN — CEPHALEXIN 500 MG: 500 CAPSULE ORAL at 06:29

## 2024-02-12 RX ADMIN — DICLOFENAC 2 G: 10 GEL TOPICAL at 20:48

## 2024-02-12 ASSESSMENT — ACTIVITIES OF DAILY LIVING (ADL)
ADLS_ACUITY_SCORE: 37
ADLS_ACUITY_SCORE: 37
ADLS_ACUITY_SCORE: 33
ADLS_ACUITY_SCORE: 37
ADLS_ACUITY_SCORE: 33
ADLS_ACUITY_SCORE: 37

## 2024-02-12 NOTE — PLAN OF CARE
Problem: Adult Inpatient Plan of Care  Goal: Absence of Hospital-Acquired Illness or Injury  Intervention: Identify and Manage Fall Risk  Recent Flowsheet Documentation  Taken 2/12/2024 0942 by Consuelo Kat RN  Safety Promotion/Fall Prevention:   room near nurse's station   room door open     Problem: Adult Inpatient Plan of Care  Goal: Absence of Hospital-Acquired Illness or Injury  Intervention: Prevent and Manage VTE (Venous Thromboembolism) Risk  Recent Flowsheet Documentation  Taken 2/12/2024 0942 by Consuelo Kat RN  VTE Prevention/Management: SCDs (sequential compression devices) on     Problem: Risk for Delirium  Goal: Optimal Coping  Outcome: Progressing   Goal Outcome Evaluation: pt alert and oriented, reported pain, oxycodone given with a  bit of relieve, pain team reviewed her pain medication, pt sleeping off and on during cares, pain can be intolerable  sometimes,   Gave iv Dilaudid at 1300 with relieve, incision site dressing dry clean and intact, pt can wriggle Right toes. Ate 75% of breakfast, daughter here during break to see pt. Pt can make her needs known.

## 2024-02-12 NOTE — PLAN OF CARE
Problem: Adult Inpatient Plan of Care  Goal: Optimal Comfort and Wellbeing  Intervention: Monitor Pain and Promote Comfort  Recent Flowsheet Documentation  Taken 2/11/2024 1545 by Lexie Mcdonald RN  Pain Management Interventions: medication (see MAR)     Problem: Pain Acute  Goal: Optimal Pain Control and Function  Outcome: Progressing  Intervention: Develop Pain Management Plan  Recent Flowsheet Documentation  Taken 2/11/2024 1545 by Lexie Mcdonald RN  Pain Management Interventions: medication (see MAR)  Intervention: Prevent or Manage Pain  Recent Flowsheet Documentation  Taken 2/11/2024 1600 by Lexie Mcdonald RN  Bowel Elimination Promotion: adequate fluid intake promoted  Medication Review/Management: medications reviewed    Problem: Mobility Impairment  Goal: Optimal Mobility  Outcome: Progressing  Intervention: Optimize Mobility  Recent Flowsheet Documentation  Taken 2/11/2024 1600 by Lexie Mcdonald RN  Positioning/Transfer Devices:   pillows   in use    Goal Outcome Evaluation:Patient complained of pain 9/10 at the beginning of the shift. Prn I.V dilaudid 0.5 mg given with good effect. Patient napped 2 hours. At bedtime, patient rated pain at 6/10. Prn Oxycodone 5 mg given. Patient stated medication was not effect and rated pain at 10/10. Scheduled Tylenol and prn Dilaudid 0.5 mg given. We will continue to monitor. PureWick in use. Bladder scan for 47 ml.

## 2024-02-12 NOTE — PROGRESS NOTES
02/12/24 1330   Appointment Info   Signing Clinician's Name / Credentials (PT) Sunita Brown PT   Rehab Comments (PT) Patient i bed .Pain better then in am,reports mm spasms   Living Environment   People in Home alone   Current Living Arrangements apartment;independent living facility   Home Accessibility no concerns   Transportation Anticipated health plan transportation   Self-Care   Usual Activity Tolerance good   Current Activity Tolerance fair   Equipment Currently Used at Home none  (has SEC and FWW at home)   Fall history within last six months yes   Number of times patient has fallen within last six months 1   Activity/Exercise/Self-Care Comment Patient independent with mobility and ADL .Drives short distances .Gets assist with cleaning.Retired .   General Information   Onset of Illness/Injury or Date of Surgery 02/09/24   Referring Physician Scott Whitaker   Patient/Family Therapy Goals Statement (PT) aware will need TCU   Pertinent History of Current Problem (include personal factors and/or comorbidities that impact the POC) Admitted after falling at the parking lot and sustaining R hip fx.S/pORIF on 02/10.Patient has a hx of HTN,chronic pain ,anny TKA,TAVR,CAD.   Existing Precautions/Restrictions fall   Weight-Bearing Status - LLE full weight-bearing   Weight-Bearing Status - RLE weight-bearing as tolerated   Cognition   Affect/Mental Status (Cognition) WFL   Orientation Status (Cognition) oriented x 4   Pain Assessment   Patient Currently in Pain Yes, see Vital Sign flowsheet   Range of Motion (ROM)   Range of Motion ROM deficits secondary to surgical procedure;ROM deficits secondary to pain;ROM deficits secondary to weakness   ROM Comment R hip   Strength (Manual Muscle Testing)   Strength (Manual Muscle Testing) Deficits observed during functional mobility   Strength Comments R leg   Bed Mobility   Supine-Sit [place] (Bed Mobility) moderate assist (50% patient effort);maximum  assist (25% patient effort)  (for upper body and R leg)   Sit-Supine [place] (Bed Mobility) maximum assist (25% patient effort)  (to get anny L/E onto bed)   Bed Mobility Limitations decreased ability to use legs for bridging/pushing   Impairments Contributing to Impaired Bed Mobility pain   Comment, (Bed Mobility) pt needed assist to scoot to EOB while in supine and in sitting -Extra time needed,   Transfers   Maintains Weight-bearing Status (Transfers) able to maintain   Sit-Stand Transfer   Sit-Stand Lynn (Transfers) moderate assist (50% patient effort)   Assistive Device (Sit-Stand Transfers) walker, front-wheeled   Comment, (Sit-Stand Transfer) bed slightly elevated -cues for safety/hands placement   Gait/Stairs (Locomotion)   [place]ynn Level (Gait) moderate assist (50% patient effort);maximum assist (25% patient effort)   Assistive Device (Gait) walker, front-wheeled   Distance in Feet (Gait) stood   Comment, (Gait/Stairs) stod briefly x 1 - flexed forward posture,did not get to full stand   Clinical Impression   Criteria for Skilled Therapeutic Intervention Yes, treatment indicated   PT Diagnosis (PT) impaired functional mobility   Influenced by the following impairments surgery,pain   Functional limitations due to impairments bed mobility ,ROM/Strength,transfers,gait   Clinical Presentation (PT Evaluation Complexity) stable   Clinical Presentation Rationale pt preesnts as med diagnosed   Clinical Decision Making (Complexity) moderate complexity   Planned Therapy Interventions (PT) bed mobility training;gait training;strengthening;transfer training;ROM (range of motion)   Risk & Benefits of therapy have been explained evaluation/treatment results reviewed   Clinical Impression Comments patient is a 89 yo female admitted with hip fx and underwent ORIF .Preesnts with mobility defiits due to surgery and a nlot of pain .Appropriate for skilled PT to mobilize and improve functional mobility .   PT  Total Evaluation Time   PT Eval, Low Complexity Minutes (27907) 15   Physical Therapy Goals   PT Frequency Daily   PT Predicted Duration/Target Date for Goal Attainment 02/19/24   PT Goals Bed Mobility;Transfers;Gait   PT: Bed Mobility Minimal assist   PT: Transfers Minimal assist;Bed to/from chair;Assistive device   PT: Gait Minimal assist;Rolling walker;25 feet   Interventions   Interventions Quick Adds Gait Training;Therapeutic Procedure   Therapeutic Procedure/Exercise   Ther. Procedure: strength, endurance, ROM, flexibillity Minutes (10577) 12   Symptoms Noted During/After Treatment fatigue;increased pain   Treatment Detail/Skilled Intervention Patient performmed routine post hip surgery ex x 10 reps .Independent on L .mad assist on R with slow speed in limited ROM due to pain .Needed breaks due to pain and mm spasms.Max assist of 2 to scoot in bed   Gait Training   Gait Training Minutes (18237) 8   Symptoms Noted During/After Treatment (Gait Training) fatigue;increased pain;dizziness   Treatment Detail/Skilled Intervention amb 4 feet forward /back and sideways toward HOB -very small steps ,slow pace,   Distance in Feet 4 feet   [place] Level (Gait Training) moderate assist (50% patient effort)   Physical Assistance Level (Gait Training) verbal cues;1 person assist  (cues for the sequance)   Weight Bearing (Gait Training) weight-bearing as tolerated   Assistive Device (Gait Training) rolling walker   Pattern Analysis (Gait Training) 3-point gait   Gait Analysis Deviations decreased carol;decreased step length;decreased stride length;decreased toe-to-floor clearance;decreased weight-shifting ability   Impairments (Gait Analysis/Training) pain;ROM decreased;strength decreased   PT Discharge Planning   PT Plan mobilize ,OOB to chair.hip hx ex   PT Discharge Recommendation (DC Rec) Transitional Care Facility   PT Rationale for DC Rec Needs assist with mobility ,lives alone   PT Brief overview of current  status mod assist for bed mobility .sit -stand with FWW and amb 4 feet   PT Equipment Needed at Discharge walker, rolling   Total Session Time   Timed Code Treatment Minutes 20   Total Session Time (sum of timed and untimed services) 35

## 2024-02-12 NOTE — PROGRESS NOTES
"Orthopedic Progress Note      Assessment: 2 Days Post-Op  S/P Procedure(s):  INTERNAL FIXATION, FRACTURE, TROCHANTERIC, HIP, USING PINS OR RODS     Plan:   Pain Control: Continue per pain protocol.  Weight Bearing: Weight bearing as tolerated on affected lower extremity.               Posterior hip precautions              Hip abduction pillow  - OK to remove POD1 and replace with pillow between knees while in bed  DVT Prophylaxis: ASA 81mg BID x4 weeks postop  Antibiotics: 24 hours of perioperative antibiotics + Keflex 500mg QID PO while inpatient, and discharge with Duricef 500mg BID PO (total of 7 days postop)  GI: Plan for aggressive bowel regimen to prevent constipation from narcotic medications  Lines: HLIV once tolerating PO  Labs: Hgb 7.6  PT/OT: Eval and treatment. Will follow up on recommendations.  Follow up:  in 2 weeks in clinic for wound check  Discharge plan: to home versus TCU pending PT and social work evaluatio      Subjective:  Pain: moderate  Nausea, Vomiting:  No  Lightheadedness, Dizziness:  No  Neuro:  Patient denies new onset numbness or paresthesias  Fever, chills: No  Chest pain: No  SOB: No    Patient reports feeling well today. Patient reports pain is tolerable with current pain regimen. Patient eating and drinking well. Patient voiding and passing gas however no BM. All questions/concerns answered.      Objective:  /53 (BP Location: Right arm)   Pulse 75   Temp 97.6  F (36.4  C) (Oral)   Resp 16   Ht 1.473 m (4' 10\")   Wt 54.4 kg (120 lb)   SpO2 99%   BMI 25.08 kg/m      The patient is A&Ox3. Appears comfortable, sitting up at bedside.  Calves without tenderness, neg Mary Alice's  Brisk capillary refill in the toes.   Palpable Right dorsalis pedis pulse. Right foot warm & well-perfused.  Sensation is intact to light touch & equal bilaterally in the femoral, DP, SP & tibial nerve distributions.  ROM: Appropriately flexes & extends all toes bilaterally.   Motor: +5/5 dorsiflexion, " plantar flexion & EHL bilaterally.   Leg lengths equal.  Dressing C/D/I without strikethrough, no surrounding erythema.      No drain     Pertinent Labs   Lab Results: personally reviewed.   Lab Results   Component Value Date    INR 1.23 (H) 02/09/2024    INR 1.13 11/09/2022    INR 1.13 01/15/2022     Lab Results   Component Value Date    WBC 15.4 (H) 02/11/2024    HGB 7.6 (L) 02/12/2024    HCT 20.0 (L) 02/11/2024    MCV 94 02/11/2024     (L) 02/11/2024     Lab Results   Component Value Date     02/11/2024    CO2 26 02/11/2024         Report completed by:  HARVINDER TAYLOR PA-C  Date: 02/12/2024  [facility]

## 2024-02-12 NOTE — CONSULTS
[facility] ACUTE PAIN SERVICE CONSULTATION     Date of Admission:  2/9/2024  Date of Consult (When I saw the patient): 02/12/24     Assessment/Plan:     Sherice Alvarez is a 89 year old female who was admitted on 2/9/2024.  Pain team was asked to see the patient for postop pain, fracture pain, chronic pain on chronic opioids. Admitted for fracture pain after a fall. History of HTN, chronic pain, lumbar radiculopathy, CAD, Aortic Stenosis (s/p TAVR), Asthma, HFpEF, CKDIII, admitted on 2/9/2024 after fall at grocery store.  Describes pain as 8-10/10 and aching, sharp, pressure. The patient denies nausea, vomiting, chest pain, shortness of breath. The patient reports no bowel movement yet. The patient does not smoke and denies chemical dependency history.     Post op day: 2 Days Post-Op.   INTERNAL FIXATION, FRACTURE, TROCHANTERIC, HIP, USING PINS OR RODS     PLAN:   1) Pain is consistent with fracture pain, postop pain, in the setting of chronic pain on chronic opioid therapy. Labs and imaging indicated: I have personally reviewed pertinent notes, labs, tests, and radiologic imaging in patient's chart. Treatment plan includes multimodal pain approach, Hospital Medicine Service for medical management, orthopedics, PT, OT. Patient educated regarding multimodal pain approach, medications as listed below,. Patient is understanding of the plan. All questions and concerns addressed to patient's satisfaction.   2)Multimodal Medication Therapy  Topical: Voltaren gel 4 times daily, lidocaine patch  NSAID'S: CrCl 25  Muscle Relaxants: Robaxin-750 milligrams 4 times daily  Adjuvants: Acetaminophen 1 g every 6 hours  Antidepressants/anxiolytics: Requip 2 mg nightly as needed  Opioids: Discontinue oxycodone, trial Dilaudid 2-4 mg every 3 hours as needed  IV Pain medication: Dilaudid 0.1-0.2 mg every 6 hours as needed  3)Non-medication interventions: Ice, rest, PT, OT  4)Constipation Prophylaxis: Scheduled and  "prn    -Opioid prescriber has been [facility]  -[place]  pulled from system on 2/12/2024. This indicates ongoing opioid prescriptions for oxycodone, Norco, tramadol within the past year.  She has had 14 prescriptions filled since March 2023.  Most prescriptions were for 8 days or less.  However it appears she started following with [facility] pain clinic November 2023.   1/30/2024 oxycodone 5 mg #90 for 30 days  1/4/2024 oxycodone 5 mg #60 for 15 days  11/30/2023 oxycodone 5 mg #60 for 15 days  11/3/2023 oxycodone 5 mg #30 for 8 days  Discharge Recommendations - We recommend prescribing the following at the time of discharge: Per orthopedics     History of Present Illness (HPI):       Sherice Alvarez is a 89 year old female who presented for Right intertrochanteric and subtrochanteric femoral neck fracture with medial displacement after a fall.  Past medical history as above. The pain is reported to be acute fracture and postop pain, chronic low back pain secondary to lumbar radiculopathy and chronic compression fracture of L4. Acute pain is located in the right hip, and it does not radiate.  Current pain is rated at 8/10 and goal is 2-4/10.      Patient is tearful during visit due to pain.  She reports severe pain in the right hip and describes this as pressure, sharp, throbbing.  She states \"I do not think I am getting enough or the right pain medication.  I take more medication at home then I am getting here.  Why is it that people like me who need pain medications have to beg for it while other people get it off the street.\"  Patient also mentions during visit that she had a family member who passed away due to an overdose and she became upset while this provider attempted to provide opioid medication safety education stating \" I know more than anyone about this and unless you had to bury a family member you know nothing about it\".    Per [place]  review, the patient does have an opioid tolerance. " Opioid induced side effects noted and include: none    Reviewed medical record, labs, imaging, ED note, and care everywhere.      Home pain medications/psych medications/anticoagulation medications include: APAP q6h prn, voltaren gel, lidocaine ointment qid, robaxin 500 mg qid prn, Oxycodone 5 mg tid prn       Medical History   PAST MEDICAL HISTORY:   Past Medical History:   Diagnosis Date    Arthritis     Asthma     CAD (coronary artery disease)     Cancer (H)     basal cell    Chronic kidney disease     ckd 3    Chronic pain syndrome     Congestive heart failure (H)     Crohn's disease (H)     GERD (gastroesophageal reflux disease)     Hx of heart artery stent 2016    1 stent R Proximal CA and 1 Stent L Proximal circumflex  2016 Stent proximal LAD    Hyperlipidemia     Hypertension     NSTEMI (non-ST elevated myocardial infarction) (H) 2016    PONV (postoperative nausea and vomiting)     severe povn with last knee surgery    Restless legs syndrome     Stroke (H) 2010    numbness rt jaw       PAST SURGICAL HISTORY:   Past Surgical History:   Procedure Laterality Date    APPENDECTOMY      CARDIAC CATHETERIZATION  2016    multiple vessel disease.  no intervention    CARDIAC CATHETERIZATION  2016    CARDIAC CATHETERIZATION N/A 2016    Procedure: Coronary Angiogram;  Surgeon: Sunil Moran MD;  Location: [facility] Cath Lab;  Service:     CAROTID ENDARTERECTOMY      CAROTID ENDARTERECTOMY Right 2010    CERVICAL LAMINECTOMY  1994     SECTION      CV CORONARY ANGIOGRAM N/A 2020    Procedure: Coronary Angiogram;  Surgeon: Vinita Ramos MD;  Location: [facility] Cath Lab;  Service: Cardiology    CV LEFT HEART CATHETERIZATION WITHOUT LEFT VENTRICULOGRAM Left 2020    Procedure: Left Heart Catheterization Without Left Ventriculogram;  Surgeon: Jerad Lerner MD;  Location: [facility] Cath Lab;  Service: Cardiology    CV TRANSCATHETER AORTIC  VALVE REPLACEMENT - OTHER APPROACH N/A 2020    Procedure: CV TRANSCATHETER AORTIC VALVE REPLACEMENT - RIGHT SUBCLAVIAN APPROACH;  Surgeon: John Caceres MD;  Location: [facility] Cath Lab;  Service: Cardiology    HEART CATH, ANGIOPLASTY      HEMORRHOIDECTOMY EXTERNAL      IR LUMBAR EPIDURAL STEROID INJECTION  2014    IR LUMBAR NERVE ROOT INJECTION  10/01/2013    JOINT REPLACEMENT      OPEN REDUCTION INTERNAL FIXATION HIP NAILING Right 2/10/2024    Procedure: INTERNAL FIXATION, FRACTURE, TROCHANTERIC, HIP, USING PINS OR RODS RIGHT;  Surgeon: Gilberto Joy MD;  [place] [facility]    [place] ESOPHAGOGASTRODUODENOSCOPY TRANSORAL DIAGNOSTIC N/A 07/10/2019    Procedure: ESOPHAGOGASTRODUODENOSCOPY (EGD) with gastric biopsy;  Surgeon: Sunil Stuart MD;  Location: [facility];  Service: Gastroenterology    MD REPLACE AORTIC VALVE OPEN AXILLRY ARTRY APPROACH N/A 2020    Procedure: OR TRANSCATHETER AORTIC VALVE REPLACEMENT, SUBCLAVIAN APPROACH;  Surgeon: Bhavin Cuadra MD;  Location: [facility] Cath Lab;  Service: Cardiology    TONSILLECTOMY & ADENOIDECTOMY      TOTAL KNEE ARTHROPLASTY Right     TRANSCATHETER AORTIC-VALVE REPLACEMENT      ZZC TOTAL KNEE ARTHROPLASTY Left 2021    Procedure: LEFT TOTAL KNEE ARTHROPLASTY;  Surgeon: Doug Rhodes MD;  Location: [facility];  Service: Orthopedics       FAMILY HISTORY:   Family History   Problem Relation Age of Onset    Atrial fibrillation Mother     Parkinsonism Father        SOCIAL HISTORY:   Social History     Tobacco Use    Smoking status: Former     Types: Cigarettes     Quit date: 1980     Years since quittin.1    Smokeless tobacco: Never   Substance Use Topics    Alcohol use: No        HEALTH & LIFESTYLE PRACTICES  Tobacco:  reports that she quit smoking about 44 years ago. Her smoking use included cigarettes. She has never used smokeless tobacco.  Alcohol:  reports no history of alcohol use.  Illicit  drugs:  reports no history of drug use.    Allergies  Allergies   Allergen Reactions    Amitriptyline Hcl [Amitriptyline] Other (See Comments)    Erythromycin Diarrhea and Other (See Comments)     Childhood reaction    Gabapentin Other (See Comments)     Jerking movements, swelling    Naproxen Unknown and Other (See Comments)    Other Environmental Allergy Unknown     Molds, dander    Pregabalin Other (See Comments)       Problem List  Patient Active Problem List    Diagnosis Date Noted    Fall, initial encounter 02/09/2024     Priority: Medium    Closed displaced subtrochanteric fracture of right femur, initial encounter (H) 02/09/2024     Priority: Medium    Cellulitis of left lower extremity 09/09/2023     Priority: Medium    Edema of left lower extremity 09/09/2023     Priority: Medium    Avulsion of skin of left lower leg, initial encounter 09/09/2023     Priority: Medium    Hypertension, unspecified type 09/09/2023     Priority: Medium    Acute on chronic anemia 09/09/2023     Priority: Medium    Polyuria 05/19/2023     Priority: Medium    Mild cognitive impairment 05/19/2023     Priority: Medium    Neck pain 05/18/2023     Priority: Medium    Cervical spine pain 05/18/2023     Priority: Medium    Restless leg syndrome 08/03/2022     Priority: Medium    Chest pain, unspecified type 08/02/2022     Priority: Medium    Other insomnia 08/02/2022     Priority: Medium    Adjustment disorder with mixed anxiety and depressed mood 08/02/2022     Priority: Medium    (HFpEF) heart failure with preserved ejection fraction (H) 10/28/2021     Priority: Medium    Acute deep vein thrombosis (DVT) of lower extremity (H) 09/08/2021     Priority: Medium    S/P total knee arthroplasty, left 05/11/2021     Priority: Medium    Leukocytosis, unspecified type      Priority: Medium    S/P TAVR (transcatheter aortic valve replacement) 06/02/2020     Priority: Medium    Dyspnea 05/07/2020     Priority: Medium    Coronary  arteriosclerosis due to lipid rich plaque      Priority: Medium    Severe calcific aortic valve stenosis      Priority: Medium    Asthma 05/17/2019     Priority: Medium    Stage 3 chronic kidney disease (H) 05/15/2019     Priority: Medium    Anterior epistaxis 11/22/2017     Priority: Medium    Abnormal chest CT      Priority: Medium    Gastroesophageal reflux disease with esophagitis 11/04/2016     Priority: Medium    Dyslipidemia, goal LDL below 100 11/04/2016     Priority: Medium    Cancer (H)      Priority: Medium    Constipation 06/03/2014     Priority: Medium       Prior to Admission Medications   Medications Prior to Admission   Medication Sig Dispense Refill Last Dose    diclofenac (VOLTAREN) 1 % topical gel Apply 2-4 g topically 4 times daily as needed APPLY TO LEFT SHOULDER AND LEFT KNEE.   Past Month at prn    lidocaine (XYLOCAINE) 5 % external ointment Apply topically 3 times daily as needed for moderate pain   Past Month at prn    lidocaine (XYLOCAINE) 5 % external ointment Apply topically 3 times daily 50 g 0 Past Month    methocarbamol (ROBAXIN) 500 MG tablet Take 500 mg by mouth 4 times daily as needed for muscle spasms   Past Month at ptn    Multiple Vitamin (THERA-TABS) TABS Take 1 tablet by mouth daily   Unknown at unknown    rOPINIRole (REQUIP) 2 MG tablet Take 2 mg by mouth nightly as needed   Past Month at prn    nitroGLYcerin (NITROSTAT) 0.4 MG sublingual tablet Place 1 tablet (0.4 mg) under the tongue every 5 minutes as needed 90 tablet 0 n/a at n/a    [DISCONTINUED] acetaminophen (TYLENOL) 325 MG tablet Take 650 mg by mouth every 6 hours as needed   Past Month at prn    [DISCONTINUED] oxyCODONE (ROXICODONE) 5 MG tablet Take 5 mg by mouth 3 times daily as needed   2/9/2024 at am       Review of Systems  Complete ROS reviewed, unless noted in HPI, all other systems reviewed (with patient) and all others found to be negative.      Objective:     Physical Exam:  BP (!) 155/67 (BP Location: Left  "arm)   Pulse 86   Temp 97.7  F (36.5  C) (Oral)   Resp 16   Ht 1.473 m (4' 10\")   Wt 54.4 kg (120 lb)   SpO2 99%   BMI 25.08 kg/m    Weight:   Vitals:    02/09/24 1230   Weight: 54.4 kg (120 lb)      Body mass index is 25.08 kg/m .    General Appearance:  Alert, cooperative, no distress   Head:  Normocephalic, without obvious abnormality   Eyes:  PERRL, conjunctiva/corneas clear, EOM's intact   ENT/Throat: Lips, mucosa, and tongue normal; teeth and gums normal   Lymph/Neck: Supple, symmetrical, trachea midline   Lungs:   Clear to auscultation bilaterally, respirations unlabored   Cardiovascular/Heart:  Regular rate and rhythm, S1, S2 normal   Abdomen:   Soft, non-tender, bowel sounds active all four quadrants,  no masses, no organomegaly   Musculoskeletal: Incision is covered    Skin: Skin warm, dry    Neurologic: Alert and oriented X 3, Moves all 4 extremities     Psych: Affect is tearful anxious     Imaging: Reviewed I have personally reviewed pertinent labs, tests, and radiologic imaging in patient's chart.    Labs: Reviewed I have personally reviewed pertinent labs, tests, and radiologic imaging in patient's chart.        Total time spent 65 minutes with greater than 50% in consultation, education and coordination of care.   Also discussed with RN  Elements of Medical Decision Making as described above. High level of decision making required due to 1 or more chronic illness with severe exacerbation, progression, and side effects of treatment.  Acute or chronic illness or injury or surgery. High risk therapy including opioids, high risk drug therapy including oral and/or parenteral controlled substances.     Thank you for this consultation.    Radha VO, FNP-C  Acute Care Pain Management Program [facility]   Monday-Friday 8a-4p   Page via Epic or Vocera Messaging     "

## 2024-02-12 NOTE — PLAN OF CARE
Problem: Pain Acute  Goal: Optimal Pain Control and Function  2/12/2024 0533 by Lexie Mcdonald RN  Outcome: Progressing  Intervention: Develop Pain Management Plan  Recent Flowsheet Documentation  Taken 2/12/2024 0106 by Lexie Mcdonald RN  Pain Management Interventions: medication (see MAR)  Intervention: Prevent or Manage Pain  Recent Flowsheet Documentation  Taken 2/12/2024 0030 by Lexie Mcdonald RN  Sensory Stimulation Regulation: television on  Bowel Elimination Promotion: adequate fluid intake promoted  Medication Review/Management: medications reviewed   Goal Outcome Evaluation:Patient complaint of pain 8/10 at the beginning of the shift. Patient states that the pain medications given were not enough for her pain, stating that she took more pain medication at home than what she is getting post surgery. Pain and discomfort managed with prn tylenol, Dilaudid and Oxycodone.Patient is voiding. Pvr checked x 1 for 14 ml.

## 2024-02-12 NOTE — PROGRESS NOTES
SPIRITUAL HEALTH SERVICES Progress Note    Saw pt Sherice Alvarez and offered [employer] sacrament of anointing for the healing of the sick.     Fr. Hayden Lisa

## 2024-02-13 ENCOUNTER — APPOINTMENT (OUTPATIENT)
Dept: OCCUPATIONAL THERAPY | Facility: HOSPITAL | Age: 89
DRG: 481 | End: 2024-02-13
Attending: HOSPITALIST
Payer: COMMERCIAL

## 2024-02-13 ENCOUNTER — APPOINTMENT (OUTPATIENT)
Dept: PHYSICAL THERAPY | Facility: HOSPITAL | Age: 89
DRG: 481 | End: 2024-02-13
Payer: COMMERCIAL

## 2024-02-13 LAB
HGB BLD-MCNC: 7.6 G/DL (ref 11.7–15.7)
HOLD SPECIMEN: NORMAL

## 2024-02-13 PROCEDURE — 97530 THERAPEUTIC ACTIVITIES: CPT | Mod: GP

## 2024-02-13 PROCEDURE — 97535 SELF CARE MNGMENT TRAINING: CPT | Mod: GO

## 2024-02-13 PROCEDURE — 250N000011 HC RX IP 250 OP 636: Performed by: STUDENT IN AN ORGANIZED HEALTH CARE EDUCATION/TRAINING PROGRAM

## 2024-02-13 PROCEDURE — 250N000013 HC RX MED GY IP 250 OP 250 PS 637: Performed by: GENERAL ACUTE CARE HOSPITAL

## 2024-02-13 PROCEDURE — 97166 OT EVAL MOD COMPLEX 45 MIN: CPT | Mod: GO

## 2024-02-13 PROCEDURE — 36415 COLL VENOUS BLD VENIPUNCTURE: CPT | Performed by: PHYSICIAN ASSISTANT

## 2024-02-13 PROCEDURE — 99232 SBSQ HOSP IP/OBS MODERATE 35: CPT | Performed by: INTERNAL MEDICINE

## 2024-02-13 PROCEDURE — 250N000013 HC RX MED GY IP 250 OP 250 PS 637: Performed by: NURSE PRACTITIONER

## 2024-02-13 PROCEDURE — 250N000011 HC RX IP 250 OP 636: Performed by: NURSE PRACTITIONER

## 2024-02-13 PROCEDURE — 120N000001 HC R&B MED SURG/OB

## 2024-02-13 PROCEDURE — 250N000013 HC RX MED GY IP 250 OP 250 PS 637: Performed by: STUDENT IN AN ORGANIZED HEALTH CARE EDUCATION/TRAINING PROGRAM

## 2024-02-13 PROCEDURE — 99232 SBSQ HOSP IP/OBS MODERATE 35: CPT | Performed by: NURSE PRACTITIONER

## 2024-02-13 PROCEDURE — 250N000013 HC RX MED GY IP 250 OP 250 PS 637: Performed by: PHYSICIAN ASSISTANT

## 2024-02-13 PROCEDURE — 97110 THERAPEUTIC EXERCISES: CPT | Mod: GP

## 2024-02-13 PROCEDURE — 85018 HEMOGLOBIN: CPT | Performed by: PHYSICIAN ASSISTANT

## 2024-02-13 RX ORDER — METHOCARBAMOL 500 MG/1
250 TABLET ORAL EVERY 6 HOURS PRN
Status: DISCONTINUED | OUTPATIENT
Start: 2024-02-13 | End: 2024-02-14 | Stop reason: HOSPADM

## 2024-02-13 RX ORDER — HYDROMORPHONE HYDROCHLORIDE 2 MG/1
2 TABLET ORAL EVERY 4 HOURS PRN
Status: DISCONTINUED | OUTPATIENT
Start: 2024-02-13 | End: 2024-02-14 | Stop reason: HOSPADM

## 2024-02-13 RX ORDER — HYDROMORPHONE HYDROCHLORIDE 2 MG/1
2 TABLET ORAL
Status: DISCONTINUED | OUTPATIENT
Start: 2024-02-13 | End: 2024-02-13

## 2024-02-13 RX ADMIN — POLYETHYLENE GLYCOL 3350 17 G: 17 POWDER, FOR SOLUTION ORAL at 08:00

## 2024-02-13 RX ADMIN — HYDROMORPHONE HYDROCHLORIDE 2 MG: 2 TABLET ORAL at 20:51

## 2024-02-13 RX ADMIN — HYDROMORPHONE HYDROCHLORIDE 4 MG: 2 TABLET ORAL at 07:55

## 2024-02-13 RX ADMIN — METHOCARBAMOL 750 MG: 750 TABLET, FILM COATED ORAL at 01:11

## 2024-02-13 RX ADMIN — HYDROMORPHONE HYDROCHLORIDE 0.2 MG: 0.2 INJECTION, SOLUTION INTRAMUSCULAR; INTRAVENOUS; SUBCUTANEOUS at 00:10

## 2024-02-13 RX ADMIN — DICLOFENAC 2 G: 10 GEL TOPICAL at 12:12

## 2024-02-13 RX ADMIN — ACETAMINOPHEN 975 MG: 325 TABLET ORAL at 16:51

## 2024-02-13 RX ADMIN — METHOCARBAMOL 750 MG: 750 TABLET, FILM COATED ORAL at 08:00

## 2024-02-13 RX ADMIN — HYDROMORPHONE HYDROCHLORIDE 0.2 MG: 0.2 INJECTION, SOLUTION INTRAMUSCULAR; INTRAVENOUS; SUBCUTANEOUS at 09:02

## 2024-02-13 RX ADMIN — CEPHALEXIN 500 MG: 500 CAPSULE ORAL at 14:47

## 2024-02-13 RX ADMIN — Medication 81 MG: at 20:51

## 2024-02-13 RX ADMIN — ACETAMINOPHEN 975 MG: 325 TABLET ORAL at 12:11

## 2024-02-13 RX ADMIN — CEPHALEXIN 500 MG: 500 CAPSULE ORAL at 05:34

## 2024-02-13 RX ADMIN — SENNOSIDES AND DOCUSATE SODIUM 2 TABLET: 8.6; 5 TABLET ORAL at 20:52

## 2024-02-13 RX ADMIN — HYDROMORPHONE HYDROCHLORIDE 2 MG: 2 TABLET ORAL at 16:50

## 2024-02-13 RX ADMIN — ATORVASTATIN CALCIUM 40 MG: 40 TABLET, FILM COATED ORAL at 20:52

## 2024-02-13 RX ADMIN — DICLOFENAC 2 G: 10 GEL TOPICAL at 21:12

## 2024-02-13 RX ADMIN — LIDOCAINE 2 PATCH: 4 PATCH TOPICAL at 20:52

## 2024-02-13 RX ADMIN — DICLOFENAC 2 G: 10 GEL TOPICAL at 08:00

## 2024-02-13 RX ADMIN — ONDANSETRON 4 MG: 4 TABLET, ORALLY DISINTEGRATING ORAL at 09:19

## 2024-02-13 RX ADMIN — DICLOFENAC 2 G: 10 GEL TOPICAL at 16:50

## 2024-02-13 RX ADMIN — HYDROMORPHONE HYDROCHLORIDE 2 MG: 2 TABLET ORAL at 03:45

## 2024-02-13 RX ADMIN — Medication 81 MG: at 08:00

## 2024-02-13 RX ADMIN — ACETAMINOPHEN 975 MG: 325 TABLET ORAL at 05:35

## 2024-02-13 RX ADMIN — SENNOSIDES AND DOCUSATE SODIUM 2 TABLET: 8.6; 5 TABLET ORAL at 08:00

## 2024-02-13 RX ADMIN — CEPHALEXIN 500 MG: 500 CAPSULE ORAL at 21:11

## 2024-02-13 ASSESSMENT — ACTIVITIES OF DAILY LIVING (ADL)
ADLS_ACUITY_SCORE: 37
PREVIOUS_RESPONSIBILITIES: MEAL PREP;LAUNDRY;SHOPPING;MEDICATION MANAGEMENT;DRIVING
ADLS_ACUITY_SCORE: 36
ADLS_ACUITY_SCORE: 37
ADLS_ACUITY_SCORE: 37
ADLS_ACUITY_SCORE: 36
ADLS_ACUITY_SCORE: 36
ADLS_ACUITY_SCORE: 37

## 2024-02-13 NOTE — PLAN OF CARE
Patient continuously getting out of bed and coming to doorway. Patient redirected, returned to bed. Patient will lay in bed for a few minutes and then try to get up again. Patient currently laying in stretcher covered in blankets. ETA for Kika 1752.   Patient is A&Ox4. She c/o pain in surgical site. Dressing is CDI. Given prn and scheduled oral pain meds.  Voiding dark alma urine. Patient eating and drinking well. Denies nausea.   Problem: Adult Inpatient Plan of Care  Goal: Optimal Comfort and Wellbeing  Outcome: Progressing     Problem: Risk for Delirium  Goal: Optimal Coping  Outcome: Progressing  Goal: Improved Behavioral Control  Outcome: Progressing  Intervention: Minimize Safety Risk  Recent Flowsheet Documentation  Taken 2/12/2024 1800 by Manjula Ellison RN  Communication Enhancement Strategies: call light answered in person  Enhanced Safety Measures: room near unit station     Problem: Pain Acute  Goal: Optimal Pain Control and Function  Outcome: Progressing  Intervention: Prevent or Manage Pain  Recent Flowsheet Documentation  Taken 2/12/2024 1800 by Manjula Ellison RN  Sensory Stimulation Regulation: television on  Bowel Elimination Promotion: adequate fluid intake promoted  Medication Review/Management: medications reviewed     Problem: Adult Inpatient Plan of Care  Goal: Absence of Hospital-Acquired Illness or Injury  Intervention: Identify and Manage Fall Risk  Recent Flowsheet Documentation  Taken 2/12/2024 1800 by Manjula Ellison RN  Safety Promotion/Fall Prevention:   room door open   clutter free environment maintained   activity supervised   lighting adjusted  Intervention: Prevent Skin Injury  Recent Flowsheet Documentation  Taken 2/12/2024 1800 by Manjula Ellison RN  Body Position: turned  Intervention: Prevent and Manage VTE (Venous Thromboembolism) Risk  Recent Flowsheet Documentation  Taken 2/12/2024 1800 by Manjula Ellison RN  VTE Prevention/Management: SCDs (sequential compression devices) on  Intervention: Prevent Infection  Recent Flowsheet Documentation  Taken 2/12/2024 1800 by Manjula Ellison RN  Infection Prevention: hand hygiene promoted     Problem: Risk for Delirium  Goal: Improved Attention and Thought Clarity  Intervention:  Maximize Cognitive Function  Recent Flowsheet Documentation  Taken 2/12/2024 1800 by Manjula Ellison, RN  Sensory Stimulation Regulation: television on  Reorientation Measures: clock in view     Problem: Mobility Impairment  Goal: Optimal Mobility  Intervention: Optimize Mobility  Recent Flowsheet Documentation  Taken 2/12/2024 1800 by Manjula Ellison RN  Assistive Device Utilized: gait belt  Activity Management:   activity adjusted per tolerance   activity encouraged  Positioning/Transfer Devices:   pillows   in use   Goal Outcome Evaluation:

## 2024-02-13 NOTE — PLAN OF CARE
Problem: Adult Inpatient Plan of Care  Goal: Optimal Comfort and Wellbeing  Intervention: Monitor Pain and Promote Comfort  Recent Flowsheet Documentation  Taken 2/12/2024 2035 by Jimbo Cabrera RN  Pain Management Interventions: medication (see MAR)     Problem: Pain Acute  Goal: Optimal Pain Control and Function  Outcome: Progressing  Intervention: Develop Pain Management Plan  Recent Flowsheet Documentation  Taken 2/12/2024 2035 by Jimbo Cabrera RN  Pain Management Interventions: medication (see MAR)  Intervention: Prevent or Manage Pain  Recent Flowsheet Documentation  Taken 2/12/2024 2035 by Jimbo Cabrera RN  Sensory Stimulation Regulation:   care clustered   television on  Bowel Elimination Promotion: adequate fluid intake promoted  Medication Review/Management: medications reviewed  Intervention: Optimize Psychosocial Wellbeing  Recent Flowsheet Documentation  Taken 2/12/2024 2035 by Jimbo Cabrera RN  Supportive Measures: active listening utilized   Goal Outcome Evaluation:    Patient AOx4. Expressed increased B LUE/back pain and spasms overnight; administered PO/IV Dilaudid, tylenol, and Robaxin with patient reports of improved relief following. Utilizing purewick and voiding without issue.

## 2024-02-13 NOTE — PROGRESS NOTES
[facility] ACUTE PAIN SERVICE    Daily PAIN Progress Note    Assessment/Plan:  Sherice Alvarez is a 89 year old female who was admitted on 2/9/2024.  Pain team was asked to see the patient for postop pain, fracture pain, chronic pain on chronic opioids. Admitted for fracture pain after a fall. History of HTN, chronic pain, lumbar radiculopathy, CAD, Aortic Stenosis (s/p TAVR), Asthma, HFpEF, CKDIII, admitted on 2/9/2024 after fall at grocery store.  The patient does not smoke and denies chemical dependency history.      Post op day: 3 Days Post-Op.   INTERNAL FIXATION, FRACTURE, TROCHANTERIC, HIP, USING PINS OR RODS     Opioid Induced Respiratory Depression Risk Assessment:?high  (Low 0-1; Moderate 2-4; or High >4 or >/= 3 if two of the risk factors are age > 60 and opioid naive) due to the following risk factors: ARMIN, COPD/Asthma/pulmonary disease, CHF, renal dysfunction, hepatic dysfunction, Obesity, Smoker, Age>60, >2 opioid therapies, concomitant CNS depressants, opioid naive status, or post surgical.      PLAN:   1) Pain is consistent with fracture pain, postop pain, in the setting of chronic pain on chronic opioid therapy. Patient appears sedated today and still requires NC O2, recommend to decrease opioids and robaxin given sedation and high risk for opioid induced respiratory depression. Labs and imaging indicated: I have personally reviewed pertinent notes, labs, tests, and radiologic imaging in patient's chart. Treatment plan includes multimodal pain approach, Hospital Medicine Service for medical management, orthopedics, PT, OT. Patient educated regarding multimodal pain approach, medications as listed below. Patient is understanding of the plan. All questions and concerns addressed to patient's satisfaction.   2)Multimodal Medication Therapy  Topical: Voltaren gel 4 times daily, lidocaine patches  NSAID'S: CrCl 25  Muscle Relaxants: Robaxin 750 mg 4 times daily - change to 250 mg q6h prn given  "sedation and lethargy   Adjuvants: Acetaminophen 1 g every 6 hours  Antidepressants/anxiolytics: Requip 2 mg nightly as needed  Opioids: Oxycodone was stopped due to severe pain and patient reported not effective. Home med is Oxycodone 5 mg tid. Dilaudid 2-4 mg every 3 hours as needed was ordered 2/12/24 - will change to 2mg q4h prn given sedation, lethargy, nausea.   IV Pain medication: discontinue   3)Non-medication interventions: Ice, rest, PT, OT  4)Constipation Prophylaxis: Scheduled and prn     -Opioid prescriber has been [facility] pain [facility]  -[place]  pulled from system on 2/12/2024. This indicates ongoing opioid prescriptions for oxycodone, Norco, tramadol within the past year.  She has had 14 prescriptions filled since March 2023.  Most prescriptions were for 8 days or less.  However it appears she started following with [facility] pain clinic November 2023.   1/30/2024 oxycodone 5 mg #90 for 30 days  1/4/2024 oxycodone 5 mg #60 for 15 days  11/30/2023 oxycodone 5 mg #60 for 15 days  11/3/2023 oxycodone 5 mg #30 for 8 days  Discharge Recommendations - We recommend prescribing the following at the time of discharge: Per orthopedics    Subjective:  Patient is seen sitting up in bed. She is falling asleep when eating a pancake. She reports pain is \"bad\" but unable to give it a number. She reports \"I feel whoozy\". She falls asleep several times during conversation. She denies vomiting, chest pain/ she denies shortness of breath but remains on NC O2. Reports nausea and constipation.     Principal Problem:    Closed displaced subtrochanteric fracture of right femur, initial encounter (H)  Active Problems:    Coronary arteriosclerosis due to lipid rich plaque    S/P TAVR (transcatheter aortic valve replacement)    (HFpEF) heart failure with preserved ejection fraction (H)    Adjustment disorder with mixed anxiety and depressed mood    Hypertension, unspecified type    Acute on chronic anemia    Fall, initial " "encounter      Objective:  Vital signs in last 24 hours:  BP (!) 148/68 (BP Location: Right arm)   Pulse 80   Temp 98.1  F (36.7  C) (Oral)   Resp 20   Ht 1.473 m (4' 10\")   Wt 54.4 kg (120 lb)   SpO2 92%   BMI 25.08 kg/m    Weight:     Vitals:    02/09/24 1230   Weight: 54.4 kg (120 lb)      Weight change:   Body mass index is 25.08 kg/m .    Intake/Output last 3 shifts:  I/O last 3 completed shifts:  In: 830 [P.O.:830]  Out: 350 [Urine:350]  Intake/Output this shift:  No intake/output data recorded.    Review of Systems:   As per subjective, all others negative.    Physical Exam:  General Appearance:  Appears sedated, resting in bed, no distress   Head:  Normocephalic, without obvious abnormality, atraumatic   Eyes:  PERRL, conjunctiva/corneas clear, EOM's intact   Nose: Nares normal, septum midline   Throat: Lips, mucosa, and tongue normal; teeth and gums normal   Neck: Supple, symmetrical, trachea midline   Back:   Symmetric, no curvature, ROM normal   Lungs:   Clear to auscultation bilaterally, respirations unlabored, NC O2   Heart:  Regular rate and rhythm, S1, S2 normal    Abdomen:   Soft, non-tender, bowel sounds active all four quadrants   Extremities: Incision is covered, dressing is CDI   Skin: Skin color, texture, turgor normal, no rashes or lesions   Neurologic: RASS -1 and oriented X 3, Moves all 4 extremities     Imaging: Reviewed I have personally reviewed pertinent labs, tests, and radiologic imaging in patient's chart.      Labs: Reviewed I have personally reviewed pertinent labs, tests, and radiologic imaging in patient's chart.      Total time spent 35 minutes with greater than 50% in consultation, education and coordination of care.   Also discussed with RN  Elements of Medical Decision Making as described above. High level of decision making required due to 1 or more chronic illness with severe exacerbation, progression, and side effects of treatment.  Acute or chronic illness or injury " or surgery. High risk therapy including opioids, high risk drug therapy including oral and/or parenteral controlled substances.       GABY GarcesP-C  Acute Care Pain Management Program [facility]   Monday-Friday 8a-4p   Page via Epic or [employer]

## 2024-02-13 NOTE — PROGRESS NOTES
"Care Management Follow Up    Length of Stay (days): 4    Expected Discharge Date: 02/13/2024     Concerns to be Addressed:     Care progression  Patient plan of care discussed at interdisciplinary rounds: Yes    Anticipated Discharge Disposition:  Transitional care     Anticipated Discharge Services:  Transitional care  Anticipated Discharge DME:  NA    Patient/family educated on Medicare website which has current facility and service quality ratings:  NA  Education Provided on the Discharge Plan:  Yes per team  Patient/Family in Agreement with the Plan:  Yes    Referrals Placed by CM/SW:  Yes  Private pay costs discussed: Not applicable    Additional Information:  Met with patient at bedside to discuss PT discharge rec for Transitional care and patient agreed.  Patient selected TCU in the [facility] area for now  Writer provided a list of local skilled nursing facilities [facility] (which includes the medicare.gov website) for patient and family to review.   Referrals sent    Also called daughter, Gulshan to inform her of the TCU choices. Gulshan said to call Jacqueline with updates.    Social Hx: \"Lives at [facility]. Ind with ADLS and IADLS prior to hip fx. Drives short distances and fell [place] [employer], broke hip. Follow at [facility] pain clinic for chronic back pain, and works with [facility]. Family to transport. Daughter requesting OP palliative referral at discharge.\"    RNCM to follow for medical progression, recommendations, and final discharge plan.     Aleida Butt, RN     [address] with [facility] accepted patient.  Met patient at bedside and she accept. Sent response.  Discussed transportation and patient said to call her daughter    Called patient's daughter, Jacqueline and updated.   Jacqueline said the family will transport.   "

## 2024-02-13 NOTE — PLAN OF CARE
Goal Outcome Evaluation:  Pt c/o of lots pain right hip. Medicated with po dilaudid with minimal relief. Medicated with IV dilaudid. Pt declined getting up early am. Instructed patient we would re-address activity after IV dilaudid takes affect. Pt c/o nausea. Medicated with zofran. Instructed pt on use of I/S. Pt able to get it to 750 x 5.

## 2024-02-13 NOTE — PROGRESS NOTES
"   02/13/24 0830   Appointment Info   Signing Clinician's Name / Credentials (OT) Alexia Graves OTR/L   Living Environment   People in Home alone   Current Living Arrangements independent living facility   Home Accessibility no concerns   Self-Care   Usual Activity Tolerance good   Current Activity Tolerance fair   Equipment Currently Used at Home shower chair   Fall history within last six months yes   Number of times patient has fallen within last six months 1   Activity/Exercise/Self-Care Comment Patient independent with mobility and ADL .Drives short distances .Gets assist with cleaning.Retired .   Instrumental Activities of Daily Living (IADL)   Previous Responsibilities meal prep;laundry;shopping;medication management;driving   IADL Comments has housekeeping assist   General Information   Onset of Illness/Injury or Date of Surgery 02/09/24   Referring Physician Bruce Hammonds PA-C   Patient/Family Therapy Goal Statement (OT) \"i want to have less pain\"   Additional Occupational Profile Info/Pertinent History of Current Problem Patient independent with mobility and ADL .Drives short distances .Gets assist with cleaning.Retired .   Existing Precautions/Restrictions fall   Limitations/Impairments safety/cognitive   Heart Disease Risk Factors Medical history   General Observations and Info Pt's pain not manages this date, unable to fully participate in OT session.   Cognitive Status Examination   Orientation Status orientation to person, place and time   Pain Assessment   Patient Currently in Pain Yes, see Vital Sign flowsheet   Range of Motion Comprehensive   General Range of Motion no range of motion deficits identified   Strength Comprehensive (MMT)   General Manual Muscle Testing (MMT) Assessment no strength deficits identified   Bed Mobility   Bed Mobility supine-sit   Supine-Sit [place] (Bed Mobility) maximum assist (25% patient effort)   Clinical Impression "   Criteria for Skilled Therapeutic Interventions Met (OT) Yes, treatment indicated   OT Diagnosis impaired ADLs   OT Problem List-Impairments impacting ADL problems related to;activity tolerance impaired;pain;balance   Assessment of Occupational Performance 3-5 Performance Deficits   Planned Therapy Interventions (OT) ADL retraining;bed mobility training;transfer training   Clinical Decision Making Complexity (OT) detailed assessment/moderate complexity   Risk & Benefits of therapy have been explained evaluation/treatment results reviewed;participants included;patient   Clinical Impression Comments Pt presents with inability to participate in ADLs/ or to sit EOB greater than 30 secs due to uncontrolled pain   OT Total Evaluation Time   OT Eval, Moderate Complexity Minutes (89906) 10   OT Goals   Therapy Frequency (OT) Daily   OT Predicted Duration/Target Date for Goal Attainment 02/20/24   OT Goals Hygiene/Grooming;Upper Body Dressing;Lower Body Dressing;Toilet Transfer/Toileting   OT: Hygiene/Grooming modified independent   OT: Upper Body Dressing Modified independent   OT: Lower Body Dressing Minimal assist   OT: Toilet Transfer/Toileting Minimal assist   Self-Care/Home Management   Self-Care/Home Mgmt/ADL, Compensatory, Meal Prep Minutes (42781) 10   Symptoms Noted During/After Treatment (Meal Preparation/Planning Training) increased pain   Treatment Detail/Skilled Intervention Pt received education for bed mobility.  Grooming/ self feeding with HOB elevated with set up A   OT Discharge Planning   OT Plan progress transfers as able, LB dressing, toileting   OT Discharge Recommendation (DC Rec) Transitional Care Facility   OT Rationale for DC Rec pt is well below baseline, needing extensive assist for BADLs   OT Brief overview of current status uncontrolled pain, needing max A for bed mobility   Total Session Time   Timed Code Treatment Minutes 10   Total Session Time (sum of timed and untimed services) 20

## 2024-02-13 NOTE — PROGRESS NOTES
[facility]    Medicine Progress Note - Hospitalist Service    Date of Admission:  2/9/2024    Assessment & Plan   Sherice Alvarez is a 89 year old female with PMH of HTN, chronic pain, lumbar radiculopathy, CAD, Aortic Stenosis (s/p TAVR), Asthma, HFpEF, CKDIII, admitted on 2/9/2024 after fall at grocery store.  Workup here revealed right intertrochanteric and subtrochanteric femoral fracture, plan to go to the OR later today.      2/10 : s/p Open reduction internal fixation right proximal femur peritrochanteric fracture       2/11 :     Post op day 1  Continue post op care, pain control  On a liter of oxygen, will try to wean off  Hb < 7 today, likely blood loss from surgery  Will transfuse 1 unit prbc, monitor hb, transfuse prn to keep hb . 7-8  Pt/ot to evaluate tomorrow      Not medically ready for discharge at this time.      2/12 :     Post op day 2  Continue post op care, pain control  On a liter of oxygen, will try to wean off  Hb stable, likely blood loss from surgery    Pt/ot to evaluate tomorrow      Not medically ready for discharge at this time.      A/p :      #Fall  #Right intertrochanteric and subtrochanteric femoral neck fracture with medial displacement  #Chronic pain secondary to lumbar radiculopathy  #Chronic compression fracture deformity of L4  -Reviewed hip x-rays  -Patient evaluated by Ortho, plan for surgery later today.  -Continue n.p.o., IVF, pain control  -Mildly elevated tropes, EKG showed some ST depressions.  Patient seen by cardiology , did not recommend any further cardiac testing and can proceed with the surgery today.  -May need pain management postoperatively for adequate pain control given patient has history of chronic pain  -PT/OT postsurgery.        #CAD  #HFpEF  #Aortic Stenosis s/p TAVR  #Elevated troponin  -Appears Euvolemic except for bipedal edema (appears chronic)  -Reviewed EKG.  -Evaluated by cardiology preop, signed off now  -Continue  "aspirin and statin        #HTN  -Per daughter patient non compliant with medications: Amlodipine, Metoprolol  -Hydralazine prn, adequate pain control  -Can add ACE       #CKDIII  -Monitor kidney function      #Leukocytosis  -Likely stress related, recheck tomorrow.  Noted WBC slightly up today.     #Asthma  -Bronchodilators prn             Diet: Advance Diet as Tolerated: Regular Diet Adult  Discharge Instruction - Regular Diet Adult    DVT Prophylaxis: Pneumatic Compression Devices  Tyson Catheter: Not present  Lines: None     Cardiac Monitoring: ACTIVE order. Indication: marcus operative monitoring in a patient with cardiac history  Code Status: Full Code      Clinically Significant Risk Factors                # Thrombocytopenia: Lowest platelets = 122 in last 2 days, will monitor for bleeding   # Hypertension: Noted on problem list        # Overweight: Estimated body mass index is 25.08 kg/m  as calculated from the following:    Height as of this encounter: 1.473 m (4' 10\").    Weight as of this encounter: 54.4 kg (120 lb)., PRESENT ON ADMISSION     # Asthma: noted on problem list        Disposition Plan      Expected Discharge Date: 02/13/2024    Discharge Delays: Placement - TCU  Destination: inpatient rehabilitation facility              Scott Whitaker MD  Hospitalist Service  [facility]  Securely message with [employer] (more info)  Text page via [employer] Paging/Directory   ______________________________________________________________________        Physical Exam   Vital Signs: Temp: 98.1  F (36.7  C) Temp src: Oral BP: 100/51 Pulse: 72   Resp: 20 SpO2: 100 % O2 Device: Nasal cannula Oxygen Delivery: 1 LPM  Weight: 120 lbs 0 oz    General: Pleasant, elderly female in NAD  HEENT:EOMI, AT,NC  CVS:RRR, no edema  RS:CTAB  Neurology:Grossly normal  Psy:Approrpiate affect      Medical Decision Making       >45 MINUTES SPENT BY ME on the date of service doing chart review, history, exam, " documentation & further activities per the note.  MANAGEMENT DISCUSSED with the following over the past 24 hours: Patient, cardiology, bedside RN       Data     I have personally reviewed the following data over the past 24 hrs:    N/A  \   7.6 (L)   / N/A     N/A N/A N/A /  103 (H)   N/A N/A N/A \

## 2024-02-13 NOTE — PROGRESS NOTES
"Orthopedic Progress Note      Assessment: 3 Days Post-Op  S/P Procedure(s):  INTERNAL FIXATION, FRACTURE, TROCHANTERIC, HIP, USING PINS OR RODS     Plan:   Pain Control: She notes severe pain with oral and IV dilaudid - possibly nausea related to dilaudid  Weight Bearing: Weight bearing as tolerated on affected lower extremity.               Posterior hip precautions              Hip abduction pillow  - OK to remove POD1 and replace with pillow between knees while in bed  DVT Prophylaxis: ASA 81mg BID x4 weeks postop  Antibiotics:  Keflex 500mg QID PO while inpatient, and discharge with Duricef 500mg BID PO (total of 7 days postop)  GI: Plan for aggressive bowel regimen to prevent constipation from narcotic medications. Nauseous today - continue zofran as needed  Lines: HLIV once tolerating PO  Labs: Hgb 7.6 yesterday, pending today  PT/OT: Eval and treatment. Will follow up on recommendations.  Follow up:  in 2 weeks in clinic for wound check  Discharge plan: TCU pending placement      Subjective:  Pain: moderate  Nausea, Vomiting:  Yes  Lightheadedness, Dizziness:  No  Neuro:  Patient denies new onset numbness or paresthesias  Fever, chills: No  Chest pain: No  SOB: No    Patient reports severe pain today.  OT present at bedside, was only able to minimally dangle legs off bed before returning to lying position.  She had used IV dilaudid dose prior to working with therapy.  Patient states pain is worse today than yesterday.  Also has new nausea this morning. Patient eating and drinking well. Patient voiding and passing gas however no BM. All questions/concerns answered.      Objective:  BP (!) 148/68 (BP Location: Right arm)   Pulse 80   Temp 98.1  F (36.7  C) (Oral)   Resp 20   Ht 1.473 m (4' 10\")   Wt 54.4 kg (120 lb)   SpO2 92%   BMI 25.08 kg/m      The patient is A&Ox3. Appears comfortable, sitting up at bedside.  Calves without tenderness, neg Mary Alice's  Brisk capillary refill in the toes.   Palpable " Right dorsalis pedis pulse. Right foot warm & well-perfused.  Sensation is intact to light touch & equal bilaterally in the femoral, DP, SP & tibial nerve distributions.  ROM: Appropriately flexes & extends all toes bilaterally.   Motor: +5/5 dorsiflexion, plantar flexion & EHL bilaterally.   Leg lengths equal.  Dressing C/D/I without strikethrough, no surrounding erythema.      No drain     Pertinent Labs   Lab Results: personally reviewed.   Lab Results   Component Value Date    INR 1.23 (H) 02/09/2024    INR 1.13 11/09/2022    INR 1.13 01/15/2022     Lab Results   Component Value Date    WBC 15.4 (H) 02/11/2024    HGB 7.6 (L) 02/12/2024    HCT 20.0 (L) 02/11/2024    MCV 94 02/11/2024     (L) 02/11/2024     Lab Results   Component Value Date     02/11/2024    CO2 26 02/11/2024         Report completed by:  HARVINDER TAYLOR PA-C  Date: 02/13/2024  [facility]

## 2024-02-14 ENCOUNTER — APPOINTMENT (OUTPATIENT)
Dept: PHYSICAL THERAPY | Facility: HOSPITAL | Age: 89
DRG: 481 | End: 2024-02-14
Payer: COMMERCIAL

## 2024-02-14 VITALS
RESPIRATION RATE: 15 BRPM | OXYGEN SATURATION: 90 % | HEIGHT: 58 IN | BODY MASS INDEX: 25.19 KG/M2 | WEIGHT: 120 LBS | SYSTOLIC BLOOD PRESSURE: 108 MMHG | HEART RATE: 77 BPM | TEMPERATURE: 98.8 F | DIASTOLIC BLOOD PRESSURE: 52 MMHG

## 2024-02-14 LAB
CREAT SERPL-MCNC: 1.09 MG/DL (ref 0.51–0.95)
EGFRCR SERPLBLD CKD-EPI 2021: 48 ML/MIN/1.73M2
HGB BLD-MCNC: 8.8 G/DL (ref 11.7–15.7)

## 2024-02-14 PROCEDURE — 250N000013 HC RX MED GY IP 250 OP 250 PS 637

## 2024-02-14 PROCEDURE — 99232 SBSQ HOSP IP/OBS MODERATE 35: CPT | Performed by: NURSE PRACTITIONER

## 2024-02-14 PROCEDURE — 250N000013 HC RX MED GY IP 250 OP 250 PS 637: Performed by: STUDENT IN AN ORGANIZED HEALTH CARE EDUCATION/TRAINING PROGRAM

## 2024-02-14 PROCEDURE — 85018 HEMOGLOBIN: CPT | Performed by: PHYSICIAN ASSISTANT

## 2024-02-14 PROCEDURE — 36415 COLL VENOUS BLD VENIPUNCTURE: CPT | Performed by: PHYSICIAN ASSISTANT

## 2024-02-14 PROCEDURE — 99239 HOSP IP/OBS DSCHRG MGMT >30: CPT | Performed by: INTERNAL MEDICINE

## 2024-02-14 PROCEDURE — 97530 THERAPEUTIC ACTIVITIES: CPT | Mod: GP

## 2024-02-14 PROCEDURE — 97110 THERAPEUTIC EXERCISES: CPT | Mod: GP

## 2024-02-14 PROCEDURE — 82565 ASSAY OF CREATININE: CPT | Performed by: INTERNAL MEDICINE

## 2024-02-14 PROCEDURE — 250N000013 HC RX MED GY IP 250 OP 250 PS 637: Performed by: PHYSICIAN ASSISTANT

## 2024-02-14 PROCEDURE — 250N000013 HC RX MED GY IP 250 OP 250 PS 637: Performed by: NURSE PRACTITIONER

## 2024-02-14 PROCEDURE — 36415 COLL VENOUS BLD VENIPUNCTURE: CPT | Performed by: INTERNAL MEDICINE

## 2024-02-14 RX ORDER — METHOCARBAMOL 500 MG/1
250 TABLET, FILM COATED ORAL 4 TIMES DAILY PRN
Qty: 5 TABLET | Refills: 0 | Status: SHIPPED | OUTPATIENT
Start: 2024-02-14 | End: 2024-02-22

## 2024-02-14 RX ORDER — ASPIRIN 81 MG/1
TABLET ORAL
Status: ON HOLD | DISCHARGE
Start: 2024-02-14 | End: 2024-04-04

## 2024-02-14 RX ORDER — ATORVASTATIN CALCIUM 40 MG/1
40 TABLET, FILM COATED ORAL EVERY EVENING
DISCHARGE
Start: 2024-02-14

## 2024-02-14 RX ORDER — LIDOCAINE 4 G/G
2 PATCH TOPICAL EVERY 24 HOURS
Qty: 5 PATCH | Refills: 0 | Status: SHIPPED | OUTPATIENT
Start: 2024-02-14

## 2024-02-14 RX ORDER — METHOCARBAMOL 500 MG/1
500 TABLET, FILM COATED ORAL 4 TIMES DAILY PRN
Qty: 5 TABLET | Refills: 0 | Status: SHIPPED | OUTPATIENT
Start: 2024-02-14 | End: 2024-02-14

## 2024-02-14 RX ADMIN — ACETAMINOPHEN 975 MG: 325 TABLET ORAL at 05:27

## 2024-02-14 RX ADMIN — HYDROMORPHONE HYDROCHLORIDE 1 MG: 2 TABLET ORAL at 04:03

## 2024-02-14 RX ADMIN — Medication 81 MG: at 08:51

## 2024-02-14 RX ADMIN — DICLOFENAC 2 G: 10 GEL TOPICAL at 14:33

## 2024-02-14 RX ADMIN — CEPHALEXIN 500 MG: 500 CAPSULE ORAL at 14:33

## 2024-02-14 RX ADMIN — METHOCARBAMOL 250 MG: 500 TABLET, FILM COATED ORAL at 08:50

## 2024-02-14 RX ADMIN — ACETAMINOPHEN 975 MG: 325 TABLET ORAL at 11:49

## 2024-02-14 RX ADMIN — HYDROMORPHONE HYDROCHLORIDE 2 MG: 2 TABLET ORAL at 11:49

## 2024-02-14 RX ADMIN — HYDROMORPHONE HYDROCHLORIDE 2 MG: 2 TABLET ORAL at 00:54

## 2024-02-14 RX ADMIN — SENNOSIDES AND DOCUSATE SODIUM 2 TABLET: 8.6; 5 TABLET ORAL at 08:52

## 2024-02-14 RX ADMIN — HYDROMORPHONE HYDROCHLORIDE 2 MG: 2 TABLET ORAL at 05:27

## 2024-02-14 RX ADMIN — POLYETHYLENE GLYCOL 3350 17 G: 17 POWDER, FOR SOLUTION ORAL at 08:52

## 2024-02-14 RX ADMIN — DICLOFENAC 2 G: 10 GEL TOPICAL at 08:50

## 2024-02-14 RX ADMIN — ACETAMINOPHEN 975 MG: 325 TABLET ORAL at 00:53

## 2024-02-14 RX ADMIN — METHOCARBAMOL 250 MG: 500 TABLET, FILM COATED ORAL at 01:57

## 2024-02-14 RX ADMIN — CEPHALEXIN 500 MG: 500 CAPSULE ORAL at 05:27

## 2024-02-14 ASSESSMENT — ACTIVITIES OF DAILY LIVING (ADL)
ADLS_ACUITY_SCORE: 39
ADLS_ACUITY_SCORE: 39
ADLS_ACUITY_SCORE: 41
ADLS_ACUITY_SCORE: 41
ADLS_ACUITY_SCORE: 37
ADLS_ACUITY_SCORE: 39

## 2024-02-14 NOTE — PROGRESS NOTES
Care Management Discharge Note    Discharge Date: 02/14/2024       Discharge Disposition: Transitional Care - [facility]    Discharge Services: Transportation Services, Transitional [facility]    Discharge DME: Oxygen    Discharge Transportation: [employer] for  transport  2:50 - 3:40 PM today     Private pay costs discussed: transportation costs    Does the patient's insurance plan have a 3 day qualifying hospital stay waiver?  Yes     Which insurance plan 3 day waiver is available? Alternative insurance waiver    Will the waiver be used for post-acute placement? Yes    PAS Confirmation Code: [id number]  Patient/family educated on Medicare website which has current facility and service quality ratings: yes    Education Provided on the Discharge Plan: Yes per team  Persons Notified of Discharge Plans: Patient per team  Patient/Family in Agreement with the Plan: yes    Handoff Referral Completed: Yes    Additional Information:  Patient discharge to [facility] [employer] for WC  2:50 - 3:40 PM today.     Orders sent    Aleida Butt RN

## 2024-02-14 NOTE — PROGRESS NOTES
"Orthopedic Progress Note      Assessment: 4 Days Post-Op  S/P Procedure(s):  INTERNAL FIXATION, FRACTURE, TROCHANTERIC, HIP, USING PINS OR RODS     Plan:   Pain Control: Appreciate recs per pain team  Weight Bearing: Weight bearing as tolerated on affected lower extremity.               Posterior hip precautions              Hip abduction pillow  - OK to remove POD1 and replace with pillow between knees while in bed  DVT Prophylaxis: ASA 81mg BID x4 weeks postop  Antibiotics:  Keflex 500mg QID PO while inpatient, and discharge with Duricef 500mg BID PO (total of 7 days postop)  GI: Plan for aggressive bowel regimen to prevent constipation from narcotic medications.  Lines: HLIV once tolerating PO  Labs: Hgb 8.8  PT/OT: Eval and treatment. Will follow up on recommendations.  Follow up:  in 2 weeks in clinic for wound check  Discharge plan: TCU pending placement.  Okay to discharge from an orthopedic standpoint.        Subjective:  Pain: moderate  Nausea, Vomiting:  Yes  Lightheadedness, Dizziness:  No  Neuro:  Patient denies new onset numbness or paresthesias  Fever, chills: No  Chest pain: No  SOB: No    Patient reports moderate pain today.  Doing slightly better today, but had severe overnight pain.  No new concerns. All questions/concerns answered.      Objective:  /59 (BP Location: Left arm)   Pulse 77   Temp 98  F (36.7  C) (Oral)   Resp 20   Ht 1.473 m (4' 10\")   Wt 54.4 kg (120 lb)   SpO2 100%   BMI 25.08 kg/m      The patient is A&Ox3. Appears comfortable, sitting up at bedside.  Calves without tenderness, neg Mary Alice's  Brisk capillary refill in the toes.   Palpable Right dorsalis pedis pulse. Right foot warm & well-perfused.  Sensation is intact to light touch & equal bilaterally in the femoral, DP, SP & tibial nerve distributions.  ROM: Appropriately flexes & extends all toes bilaterally.   Motor: +5/5 dorsiflexion, plantar flexion & EHL bilaterally.   Leg lengths equal.  Dressing C/D/I without " strikethrough, no surrounding erythema.      No drain     Pertinent Labs   Lab Results: personally reviewed.   Lab Results   Component Value Date    INR 1.23 (H) 02/09/2024    INR 1.13 11/09/2022    INR 1.13 01/15/2022     Lab Results   Component Value Date    WBC 15.4 (H) 02/11/2024    HGB 8.8 (L) 02/14/2024    HCT 20.0 (L) 02/11/2024    MCV 94 02/11/2024     (L) 02/11/2024     Lab Results   Component Value Date     02/11/2024    CO2 26 02/11/2024         Report completed by:  HARVINDER TAYLOR PA-C  Date: 02/14/2024  [facility]

## 2024-02-14 NOTE — SIGNIFICANT EVENT
Significant Event Note    Time of event: 0350    Description of event:  House paged about patient requesting to speak to a doctor about pain meds and why we can't give her what she wants. Discussed pain team involvement and reason why meds were decreased. She became very upset and started hollering about not treating people's pain. Discussed giving one small dose to get her through. Stated this is all I can do additionally tonight as there was concerns about sleepiness with more. As well as many other side effects that come as we age. Encouraged her to talk to day team about issue.    Discussed with: bedside nurse    Blessing Saul MD

## 2024-02-14 NOTE — DISCHARGE SUMMARY
[facility]  Hospitalist Discharge Summary      Date of Admission:  2/9/2024  Date of Discharge:  No discharge date for patient encounter.  Discharging Provider: Scott Whitaker MD  Discharge Service: Hospitalist Service    Discharge Diagnoses       Sherice Alvarez is a 89 year old female with PMH of HTN, chronic pain, lumbar radiculopathy, CAD, Aortic Stenosis (s/p TAVR), Asthma, HFpEF, CKDIII, admitted on 2/9/2024 after fall at grocery store.  Workup here revealed right intertrochanteric and subtrochanteric femoral fracture.                A/p :      #Fall  #Right intertrochanteric and subtrochanteric femoral neck fracture with medial displacement  #Chronic pain secondary to lumbar radiculopathy  #Chronic compression fracture deformity of L4  -Reviewed hip x-rays       2/10/24 : s/p Open reduction internal fixation right proximal femur peritrochanteric fracture   Post op stable course  Plan to discharge to tcu today with outpatient ortho follow up  Aspirin 81 mg bid for 4 weeks for dvt prevention and then once daily  Cefadroxil per ortho for 7 days for post op infection prevention  Outpatient ortho follow up       # acute blood loss anemia    Likely blood loss perio from hip surgery  Hb stable post 1 unit prbc     #CAD  #HFpEF  #Aortic Stenosis s/p TAVR  #Elevated troponin  -Appears Euvolemic except for bipedal edema (appears chronic)  -Reviewed EKG.  -Evaluated by cardiology preop, signed off now  -Continue aspirin and statin   stable     #HTN  -Per daughter patient non compliant with medications: Amlodipine, Metoprolol  -Hydralazine prn, adequate pain control  -Can add ACE        #CKDIII  -Monitor kidney function      #Leukocytosis  -Likely stress related, recheck tomorrow.  Noted WBC slightly up today.     #Asthma  -Bronchodilators prn               Clinically Significant Risk Factors     # Overweight: Estimated body mass index is 25.08 kg/m  as calculated from the following:     "Height as of this encounter: 1.473 m (4' 10\").    Weight as of this encounter: 54.4 kg (120 lb).       Follow-ups Needed After Discharge   Follow-up Appointments     Follow Up Care      Please follow-up with Dr. Joy's team in 2 weeks at [facility].   Call our scheduling line at [phone] to make an appointment, if you do   not already have one scheduled.        Follow Up and recommended labs and tests      Follow up with [facility] physician.  The following labs/tests are   recommended: cbc, bmp.    Follow up with  Ortho      As advised        {Additional follow-up instructions/to-do's for PCP    :  cbc, bmp    Unresulted Labs Ordered in the Past 30 Days of this Admission       No orders found from 1/10/2024 to 2/10/2024.        These results will be followed up by pcp    Discharge Disposition   Discharged to nursing home  Condition at discharge: Stable        Consultations This Hospital Stay   PHYSICAL THERAPY ADULT IP CONSULT  OCCUPATIONAL THERAPY ADULT IP CONSULT  CARDIOLOGY IP CONSULT  CARE MANAGEMENT / SOCIAL WORK IP CONSULT  PAIN MANAGEMENT ADULT IP CONSULT  PHYSICAL THERAPY ADULT IP CONSULT  OCCUPATIONAL THERAPY ADULT IP CONSULT  PAIN MANAGEMENT ADULT IP CONSULT  PHYSICAL THERAPY ADULT IP CONSULT  OCCUPATIONAL THERAPY ADULT IP CONSULT    Code Status   Full Code    Time Spent on this Encounter   I, Scott Whitaker MD, personally saw the patient today and spent greater than 30 minutes discharging this patient.       Scott Whitaker MD  [address] [address]  Phone: [phone]  Fax: [phone]  ______________________________________________________________________    Physical Exam   Vital Signs: Temp: 98  F (36.7  C) Temp src: Oral BP: 133/59 Pulse: 77   Resp: 20 SpO2: 100 % O2 Device: Nasal cannula Oxygen Delivery: 2 LPM  Weight: 120 lbs 0 oz         GENERAL: The patient is not in any acute distressed. Awake and alert.  HEENT: " "Nonicteric sclerae, PERRLA, EOMI. Oropharynx clear. Moist mucous membranes. Conjunctivae appear well perfused.  HEART: Regular rate and rhythm without murmurs.  LUNGS: Clear to auscultation bilaterally. No wheezing or crackles.  ABDOMEN: Soft, positive bowel sounds, nontender.  SKIN: No rash, no excessive bruising, petechiae, or purpura.  EXTREMITIES : no rashes, no swelling in legs.  NEUROLOGIC: conscious and oriented, follows commands, no obvious focal deficits.  ROS: All other systems negative          Primary Care Physician   [facility]    Discharge Orders      Basic metabolic panel     Reason for your hospital stay    S/p hip repair     When to call - Contact Surgeon Team    You may experience symptoms that require follow-up before your scheduled appointment. Refer to the \"Stoplight Tool\" for instructions on when to contact your Surgeon Team if you are concerned about pain control, blood clots, constipation, or if you are unable to urinate.     When to call - Reach out to Urgent Care    If you are not able to reach your Surgeon Team and you need immediate care, go to the Orthopedic Walk-in Clinic or Urgent Care at your Surgeon's office.  Do NOT go to the Emergency Room unless you have shortness of breath, chest pain, or other signs of a medical emergency.     When to call - Reasons to Call 911    Call 911 immediately if you experience sudden-onset chest pain, arm weakness/numbness, slurred speech, or shortness of breath     Discharge Instruction - Breathing exercises    Perform breathing exercises using your Incentive Spirometer 10 times per hour while awake for 2 weeks.     Symptoms - Fever Management    A low grade fever can be expected after surgery.  Use acetaminophen (TYLENOL) as needed for fever management.  Contact your Surgeon Team if you have a fever greater than 101.5 F, chills, and/or night sweats.     Symptoms - Constipation management    Constipation (hard, dry bowel movements) is " expected after surgery due to the combination of being less active, the anesthetic, and the opioid pain medication.  You can do the following to help reduce constipation:  ~  FLUIDS:  Drink clear liquids (water or Gatorade), or juice (apple/prune).  ~  DIET:  Eat a fiber rich diet.    ~  ACTIVITY:  Get up and move around several times a day.  Increase your activity as you are able.  MEDICATIONS:  Reduce the risk of constipation by starting medications before you are constipated.  You can take Miralax   (1 packet as directed) and/or a stool softener (Senokot 1-2 tablets 1-2 times a day).  If you already have constipation and these medications are not working, you can get magnesium citrate and use as directed.  If you continue to have constipation you can try an over the counter suppository or enema.  Call your Surgeon Team if it has been greater than 3 days since your last bowel movement.     Symptoms - Reduced Urine Output    Changes in the amount of fluids you drank before and after surgery may result in problems urinating.  It is important to stay well-hydrated after surgery and drink plenty of water. If it has been greater than 8 hours since you have urinated despite drinking plenty of water, call your Surgeon Team.     Activity - Exercises to prevent blood clots    Unless otherwise directed by your Surgeon team, perform the following exercises at least three times per day for the first four weeks after surgery to prevent blood clots in your legs: 1) Point and flex your feet (Ankle Pumps), 2) Move your ankle around in big circles, 3) Wiggle your toes, 4) Walk, even for short distances, several times a day, will help decrease the risk of blood clots.     Comfort and Pain Management - Pain after Surgery    Pain after surgery is normal and expected.  You will have some amount of pain for several weeks after surgery.  Your pain will improve with time.  There are several things you can do to help reduce your pain  "including: rest, ice, elevation, and using pain medications as needed. Contact your Surgeon Team if you have pain that persists or worsens after surgery despite rest, ice, elevation, and taking your medication(s) as prescribed. Contact your Surgeon Team if you have new numbness, tingling, or weakness in your operative extremity.     Comfort and Pain Management - Swelling after Surgery    Swelling and/or bruising of the surgical extremity is common and may persist for several months after surgery. In addition to frequent icing and elevation, gentle compressive support with an ACE wrap or tubigrip may help with swelling. Apply compression regularly, removing at least twice daily to perform skin checks. Contact your Surgeon Team if your swelling increases and is NOT associated with an increase in your activity level, or if your swelling increases and is associated with redness and pain.     Comfort and Pain Management - LOWER Extremity Elevation    Swelling is expected for several months after surgery. This type of swelling is usually associated with gravity and activity, and can be improved with elevation.   The best way to do this is to get your \"toes above your nose\" by laying down and placing several pillows lengthwise under your calf and heel. When elevating your leg keep your knee completely straight. Perform this elevation as often as possible especially for the first two weeks after surgery.     Comfort and Pain Management - Cold therapy    Ice can be used to control swelling and discomfort after surgery. Place a thin towel over your operative site and apply the ice pack overtop. Leave ice pack in place for 20 minutes, then remove for 20 minutes. Repeat this 20 minutes on/20 minutes off routine as often as tolerated.     Medication Instructions - Acetaminophen (TYLENOL) Instructions    You were discharged with acetaminophen (TYLENOL) for pain management after surgery. Acetaminophen most effectively manages pain " symptoms when it is taken on a schedule without missing doses (every four, six, or eight hours). Your Provider will prescribe a safe daily dose between 3000 - 4000 mg.  Do NOT exceed this daily dose. Most patients use acetaminophen for pain control for the first four weeks after surgery.  You can wean from this medication as your pain decreases.     Medication Instructions - Opioid Instructions (1 - 2 tablets Q 4-6 hours, MAX 6 tablets)    You were discharged with an opioid medication (hydromorphone, oxycodone, hydrocodone, or tramadol). This medication should only be taken for breakthrough pain that is not controlled with acetaminophen (TYLENOL). If you rate your pain less than 3 you do not need this medication.  Pain rating 0-3:  You do not need this medication.  Pain rating 4-6:  Take 1 tablet every 4-6 hours as needed  Pain rating 7-10:  Take 2 tablets every 4-6 hours as needed.  Do not exceed 6 tablets per day     Medication Instructions - Opioids - Tapering Instructions    In the first three days following surgery, your symptoms may warrant use of the narcotic pain medication every four to six hours as prescribed. This is normal. As your pain symptoms improve, focus your efforts on decreasing (tapering) use of narcotic medications. The most successful tapering strategy is to first, decrease the number of tablets you take every 4-6 hours to the minimum prescribed. Then, increase the amount of time between doses.  For example:  First, taper to   or 1 tablet every 4-6 hours.  Then, taper to   or 1 tablet every 6-8 hours.  Then, taper to   or 1 tablet every 8-10 hours.  Then, taper to   or 1 tablet every 10-12 hours.  Then, taper to   or 1 tablet at bedtime.  The bedtime dose can help with comfort during sleep and is typically the last dose to be discontinued after surgery.     Follow Up Care    Please follow-up with Dr. Joy's team in 2 weeks at [facility]. Call our scheduling line at [phone] to make  an appointment, if you do not already have one scheduled.     Activity    Activity as tolerated     Return to Driving    Return to driving - Driving is NOT permitted until directed by your provider. Under no circumstance are you permitted to drive while using narcotic pain medications.     Weight bearing as tolerated    Weight bearing as tolerated on your operative extremity.     Dressing / Wound Care - Wound    You have a clean dressing on your surgical wound. Dressing change instructions as follows: dressing will be removed at your follow-up appointment. Contact your Surgeon Team if you have increased redness, warmth around the surgical wound, and/or drainage from the surgical wound.     Dressing / Wound care - Shower with wound/dressing covered    You must COVER your dressing or incision with saran wrap (or any other non-permeable covering) to allow the incision to remain dry while showering.  You may shower 2 days after surgery as long as the surgical wound stays dry. Continue to cover your dressing or incision for showering until your first office visit.  You are strictly prohibited from soaking or submerging the surgical wound underwater.     Dressing / Wound Care - NO Tub Bathing    Tub bathing, swimming, or any other activities that will cause your incision to be submerged in water should be avoided until allowed by your Surgeon.     Medication instructions -  Anticoagulation - aspirin    Take the aspirin as prescribed for a total of four weeks after surgery.  This is given to help minimize your risk of blood clot.     No flexion past 90 degrees    No bending at waist past 90 degrees.     No internal rotation    No pivoting on your operative leg.     No adduction past midline    No crossing your operative leg.     General info for SNF    Length of Stay Estimate: Short Term Care: Estimated # of Days <30  Condition at Discharge: Stable  Level of care:skilled   Rehabilitation Potential: Fair  Admission H&P  remains valid and up-to-date: Yes  Recent Chemotherapy: N/A  Use Nursing Home Standing Orders: Yes     Mantoux instructions    Give two-step Mantoux (PPD) Per Facility Policy Yes     Follow Up and recommended labs and tests    [facility]  The following labs/tests are recommended: cbc, bmp.    Follow up with  Ortho      As advised     Reason for your hospital stay    Post op hip #     Activity - Up with nursing assistance     Full Code     Physical Therapy Adult Consult    Evaluate and treat as clinically indicated.    Reason:  deconditioning     Occupational Therapy Adult Consult    Evaluate and treat as clinically indicated.    Reason:  deconditioning     Crutches DME    DME Documentation: Describe the reason for need to support medical necessity: Impaired gait status post hip surgery. I, the undersigned, certify that the above prescribed supplies are medically necessary for this patient and is both reasonable and necessary in reference to accepted standards of medical practice in the treatment of this patient's condition and is not prescribed as a convenience.     Cane DME    DME Documentation: Describe the reason for need to support medical necessity: Impaired gait status post hip surgery. I, the undersigned, certify that the above prescribed supplies are medically necessary for this patient and is both reasonable and necessary in reference to accepted standards of medical practice in the treatment of this patient's condition and is not prescribed as a convenience.     Walker DME    DME Documentation: Describe the reason for need to support medical necessity: Impaired gait status post hip surgery. I, the undersigned, certify that the above prescribed supplies are medically necessary for this patient and is both reasonable and necessary in reference to accepted standards of medical practice in the treatment of this patient's condition and is not prescribed as a convenience.      Discharge Instruction - Regular Diet Adult    Return to your pre-surgery diet unless instructed otherwise     Diet    Follow this diet upon discharge: Orders Placed This Encounter      Advance Diet as Tolerated: Regular Diet Adult      Discharge Instruction - Regular Diet Adult     CBC with Platelets & Differential       Significant Results and Procedures   Most Recent 3 CBC's:  Recent Labs   Lab Test 02/14/24  0523 02/13/24  0945 02/12/24  0519 02/11/24  1615 02/11/24  0646 02/11/24  0523 02/10/24  0512 02/09/24  1315   WBC  --   --   --   --  15.4*  --  17.5* 14.7*   HGB 8.8* 7.6* 7.6*   < > 6.4*   < > 9.5* 10.2*   MCV  --   --   --   --  94  --  92 92   PLT  --   --   --   --  122*  --  183 201    < > = values in this interval not displayed.     Most Recent 3 BMP's:  Recent Labs   Lab Test 02/14/24  1102 02/12/24  1306 02/12/24  0819 02/12/24  0700 02/11/24  1508 02/11/24  0716 02/10/24  0512 02/10/24  0009 02/09/24  1347   NA  --   --   --   --  140  --  137  --  140   POTASSIUM  --   --   --   --  4.3  --  4.3  --  3.6   CHLORIDE  --   --   --   --  106  --  105  --  109*   CO2  --   --   --   --  26  --  28  --  25   BUN  --   --   --   --  21.0  --  19.5  --  25.4*   CR 1.09*  --   --   --  1.31*  --  0.96*  --  1.04*   ANIONGAP  --   --   --   --  8  --  4*  --  6*   BESS  --   --   --   --  7.7*  --  8.5*  --  8.5*   GLC  --  103* 103* 86 116*   < > 94   < > 99    < > = values in this interval not displayed.     Most Recent 2 LFT's:  Recent Labs   Lab Test 02/10/24  0512 12/28/22  0707   AST 36 24   ALT 23 17   ALKPHOS 55 70   BILITOTAL 0.8 0.5     Most Recent 3 INR's:  Recent Labs   Lab Test 02/09/24  1315 11/09/22  1532 01/15/22  0248   INR 1.23* 1.13 1.13       Discharge Medications   Current Discharge Medication List        START taking these medications    Details   aspirin 81 MG EC tablet Aspirin 81 mg bid for 4 weeks and then aspirin 81 mg daily    Associated Diagnoses: Closed displaced  intertrochanteric fracture of right femur, initial encounter (H)      atorvastatin (LIPITOR) 40 MG tablet Take 1 tablet (40 mg) by mouth every evening    Associated Diagnoses: Acute on chronic anemia      cefadroxil (DURICEF) 500 MG capsule Take 1 capsule (500 mg) by mouth 2 times daily for 7 days  Qty: 14 capsule, Refills: 0    Associated Diagnoses: Closed displaced intertrochanteric fracture of right femur, initial encounter (H)      Lidocaine (LIDOCARE) 4 % Patch Place 2 patches onto the skin every 24 hours To prevent lidocaine toxicity, patient should be patch free for 12 hrs daily.  Qty: 5 patch, Refills: 0    Associated Diagnoses: Acute on chronic anemia      senna-docusate (SENOKOT-S/PERICOLACE) 8.6-50 MG tablet Take 2 tablets by mouth 2 times daily as needed for constipation  Qty: 60 tablet, Refills: 0    Associated Diagnoses: Closed displaced intertrochanteric fracture of right femur, initial encounter (H)           CONTINUE these medications which have CHANGED    Details   acetaminophen (TYLENOL) 325 MG tablet Take 3 tablets (975 mg) by mouth every 8 hours  Qty: 100 tablet, Refills: 0    Associated Diagnoses: Closed displaced intertrochanteric fracture of right femur, initial encounter (H)      methocarbamol (ROBAXIN) 500 MG tablet Take 0.5 tablets (250 mg) by mouth 4 times daily as needed for muscle spasms  Qty: 5 tablet, Refills: 0    Associated Diagnoses: Acute on chronic anemia           CONTINUE these medications which have NOT CHANGED    Details   diclofenac (VOLTAREN) 1 % topical gel Apply 2-4 g topically 4 times daily as needed APPLY TO LEFT SHOULDER AND LEFT KNEE.      !! lidocaine (XYLOCAINE) 5 % external ointment Apply topically 3 times daily as needed for moderate pain      !! lidocaine (XYLOCAINE) 5 % external ointment Apply topically 3 times daily  Qty: 50 g, Refills: 0    Associated Diagnoses: Cervical spine pain      Multiple Vitamin (THERA-TABS) TABS Take 1 tablet by mouth daily       rOPINIRole (REQUIP) 2 MG tablet Take 2 mg by mouth nightly as needed      nitroGLYcerin (NITROSTAT) 0.4 MG sublingual tablet Place 1 tablet (0.4 mg) under the tongue every 5 minutes as needed  Qty: 90 tablet, Refills: 0       !! - Potential duplicate medications found. Please discuss with provider.        STOP taking these medications       oxyCODONE (ROXICODONE) 5 MG tablet Comments:   Reason for Stopping:             Allergies   Allergies   Allergen Reactions    Amitriptyline Hcl [Amitriptyline] Other (See Comments)    Erythromycin Diarrhea and Other (See Comments)     Childhood reaction    Gabapentin Other (See Comments)     Jerking movements, swelling    Naproxen Unknown and Other (See Comments)    Other Environmental Allergy Unknown     Molds, dander    Pregabalin Other (See Comments)

## 2024-02-14 NOTE — PROGRESS NOTES
[facility] ACUTE PAIN SERVICE    Daily PAIN Progress Note    Assessment/Plan:  Sherice Alvarez is a 89 year old female who was admitted on 2/9/2024.  Pain team was asked to see the patient for postop pain, fracture pain, chronic pain on chronic opioids. Admitted for fracture pain after a fall. History of HTN, chronic pain, lumbar radiculopathy, CAD, Aortic Stenosis (s/p TAVR), Asthma, HFpEF, CKDIII, admitted on 2/9/2024 after fall at grocery store.  The patient does not smoke and denies chemical dependency history.      Post op day: 4 Days Post-Op.   INTERNAL FIXATION, FRACTURE, TROCHANTERIC, HIP, USING PINS OR RODS     Opioid Induced Respiratory Depression Risk Assessment:?high  (Low 0-1; Moderate 2-4; or High >4 or >/= 3 if two of the risk factors are age > 60 and opioid naive) due to the following risk factors: ARMIN, COPD/Asthma/pulmonary disease, CHF, renal dysfunction, hepatic dysfunction, Obesity, Smoker, Age>60, >2 opioid therapies, concomitant CNS depressants, opioid naive status, or post surgical.     Plans for discharge. Pain team will sign off.     PLAN:   1) Pain is consistent with acute traumatic pain after a fall, fracture pain, postop pain, in the setting of chronic pain on chronic opioid therapy. Patient appears more alert today compared to yesterday. She is now off oxygen. Plans for discharge today to TCU. Continue to monitor for risk of opioid induced respiratory depression. Recommend to continue to limit opioids and sedating medications as able due to high risk of opioid induced respiratory depression and improvement of nausea and oxygen use now on lowered dosing of dilaudid and robaxin. Labs and imaging indicated: I have personally reviewed pertinent notes, labs, tests, and radiologic imaging in patient's chart. Treatment plan includes multimodal pain approach, Hospital Medicine Service for medical management, orthopedics, PT, OT. Patient educated regarding multimodal pain approach,  medications as listed below. Patient is understanding of the plan. All questions and concerns addressed to patient's satisfaction.   2)Multimodal Medication Therapy  Topical: Voltaren gel 4 times daily, lidocaine patches  NSAID'S: CrCl 25  Muscle Relaxants: Robaxin 250 mg q6h prn    Adjuvants: Acetaminophen 1 g every 6 hours  Antidepressants/anxiolytics: Requip 2 mg nightly as needed  Opioids: Oxycodone was stopped due to severe pain and patient reported not effective. Home med is Oxycodone 5 mg tid. Dilaudid 2-4 mg every 3 hours as needed was ordered 2/12/24 and was decreased to 2mg q4h prn given sedation, lethargy, nausea.  Doing well today on 2 mg q4h prn - will continue   IV Pain medication: discontinue   3)Non-medication interventions: Ice, rest, PT, OT  4)Constipation Prophylaxis: Scheduled and prn     -Opioid prescriber has been [facility]  [place]  pulled from [place] on 2/12/2024. This indicates ongoing opioid prescriptions for oxycodone, Norco, tramadol within the past year.  She has had 14 prescriptions filled since March 2023.  Most prescriptions were for 8 days or less.  However it appears she started following with [facility] pain clinic November 2023.   1/30/2024 oxycodone 5 mg #90 for 30 days  1/4/2024 oxycodone 5 mg #60 for 15 days  11/30/2023 oxycodone 5 mg #60 for 15 days  11/3/2023 oxycodone 5 mg #30 for 8 days  Discharge Recommendations - We recommend prescribing the following at the time of discharge: Per orthopedics - dilaudid, APAP, robaxin, topicals. Discontinue home Oxycodone when taking dilaudid. Follow up Orthopedics.     Subjective:  Patient is seen sitting up in bed. She is alert, more awake than yesterday. She is on room air and now off oxygen. She denies nausea, vomiting, chest pain, shortness of breath. Reports 5-6/10 aching, spasm pain of the right hip.      Principal Problem:    Closed displaced subtrochanteric fracture of right femur, initial encounter (H)  Active  "Problems:    Coronary arteriosclerosis due to lipid rich plaque    S/P TAVR (transcatheter aortic valve replacement)    (HFpEF) heart failure with preserved ejection fraction (H)    Adjustment disorder with mixed anxiety and depressed mood    Hypertension, unspecified type    Acute on chronic anemia    Fall, initial encounter      Objective:  Vital signs in last 24 hours:  /59 (BP Location: Left arm)   Pulse 77   Temp 98  F (36.7  C) (Oral)   Resp 20   Ht 1.473 m (4' 10\")   Wt 54.4 kg (120 lb)   SpO2 100%   BMI 25.08 kg/m    Weight:     Vitals:    02/09/24 1230   Weight: 54.4 kg (120 lb)      Weight change:   Body mass index is 25.08 kg/m .    Intake/Output last 3 shifts:  I/O last 3 completed shifts:  In: 240 [P.O.:240]  Out: -   Intake/Output this shift:  No intake/output data recorded.    Review of Systems:   As per subjective, all others negative.    Physical Exam:  General Appearance:  Alert, resting in bed, no distress   Head:  Normocephalic, without obvious abnormality, atraumatic   Eyes:  PERRL, conjunctiva/corneas clear, EOM's intact   Nose: Nares normal, septum midline   Throat: Lips, mucosa, and tongue normal; teeth and gums normal   Neck: Supple, symmetrical, trachea midline   Back:   Symmetric, no curvature, ROM normal   Lungs:   Clear to auscultation bilaterally, respirations unlabored, room air   Heart:  Regular rate and rhythm, S1, S2 normal    Abdomen:   Soft, non-tender, bowel sounds active all four quadrants   Extremities: Incision is covered, dressing is CDI   Skin: Skin color, texture, turgor normal, no rashes or lesions   Neurologic: Alert and oriented X 3, Moves all 4 extremities     Imaging: Reviewed I have personally reviewed pertinent labs, tests, and radiologic imaging in patient's chart.      Labs: Reviewed I have personally reviewed pertinent labs, tests, and radiologic imaging in patient's chart.      Total time spent 35 minutes with greater than 50% in consultation, " education and coordination of care.   Also discussed with RN  Elements of Medical Decision Making as described above. High level of decision making required due to 1 or more chronic illness with severe exacerbation, progression, and side effects of treatment.  Acute or chronic illness or injury or surgery. High risk therapy including opioids, high risk drug therapy including oral and/or parenteral controlled substances.       GABY GarcesP-C  Acute Care Pain Management Program [facility]   Monday-Friday 8a-4p   Page via Epic or [employer]

## 2024-02-14 NOTE — PROGRESS NOTES
[facility]    Medicine Progress Note - Hospitalist Service    Date of Admission:  2/9/2024    Assessment & Plan   Sherice Alvarez is a 89 year old female with PMH of HTN, chronic pain, lumbar radiculopathy, CAD, Aortic Stenosis (s/p TAVR), Asthma, HFpEF, CKDIII, admitted on 2/9/2024 after fall at grocery store.  Workup here revealed right intertrochanteric and subtrochanteric femoral fracture, plan to go to the OR later today.      2/10 : s/p Open reduction internal fixation right proximal femur peritrochanteric fracture       2/11 :     Post op day 1  Continue post op care, pain control  On a liter of oxygen, will try to wean off  Hb < 7 today, likely blood loss from surgery  Will transfuse 1 unit prbc, monitor hb, transfuse prn to keep hb . 7-8  Pt/ot to evaluate tomorrow      Not medically ready for discharge at this time.      2/12 :     Post op day 2  Continue post op care, pain control  On a liter of oxygen, will try to wean off  Hb stable, likely blood loss from surgery    Pt/ot to evaluate tomorrow      Not medically ready for discharge at this time.        2/13 :     Post op day 3  Continue post op care, pain control  On a liter of oxygen, will try to wean off  Hb stable, likely blood loss from surgery    Awaiting placement        A/p :      #Fall  #Right intertrochanteric and subtrochanteric femoral neck fracture with medial displacement  #Chronic pain secondary to lumbar radiculopathy  #Chronic compression fracture deformity of L4  -Reviewed hip x-rays  -Patient evaluated by Ortho, plan for surgery later today.  -Continue n.p.o., IVF, pain control  -Mildly elevated tropes, EKG showed some ST depressions.  Patient seen by cardiology , did not recommend any further cardiac testing and can proceed with the surgery today.  -May need pain management postoperatively for adequate pain control given patient has history of chronic pain  -PT/OT postsurgery.        #CAD  #HFpEF  #Aortic  "Stenosis s/p TAVR  #Elevated troponin  -Appears Euvolemic except for bipedal edema (appears chronic)  -Reviewed EKG.  -Evaluated by cardiology preop, signed off now  -Continue aspirin and statin        #HTN  -Per daughter patient non compliant with medications: Amlodipine, Metoprolol  -Hydralazine prn, adequate pain control  -Can add ACE       #CKDIII  -Monitor kidney function      #Leukocytosis  -Likely stress related, recheck tomorrow.  Noted WBC slightly up today.     #Asthma  -Bronchodilators prn             Diet: Advance Diet as Tolerated: Regular Diet Adult  Discharge Instruction - Regular Diet Adult    DVT Prophylaxis: Pneumatic Compression Devices  Tyson Catheter: Not present  Lines: None     Cardiac Monitoring: None  Code Status: Full Code      Clinically Significant Risk Factors                  # Hypertension: Noted on problem list        # Overweight: Estimated body mass index is 25.08 kg/m  as calculated from the following:    Height as of this encounter: 1.473 m (4' 10\").    Weight as of this encounter: 54.4 kg (120 lb).      # Asthma: noted on problem list        Disposition Plan      Expected Discharge Date: 02/14/2024    Discharge Delays: Placement - TCU  Destination: inpatient rehabilitation facility  Discharge Comments: TCU placement            Scott Whitaker MD  Hospitalist Service  [facility]  Securely message with [employer] (more info)  Text page via [employer] Paging/Directory   ______________________________________________________________________        Physical Exam   Vital Signs: Temp: 98  F (36.7  C) Temp src: Oral BP: 131/60 Pulse: 76   Resp: 18 SpO2: 100 % O2 Device: Nasal cannula Oxygen Delivery: 1 LPM  Weight: 120 lbs 0 oz    General: Pleasant, elderly female in NAD  HEENT:EOMI, AT,NC  CVS:RRR, no edema  RS:CTAB  Neurology:Grossly normal  Psy:Approrpiate affect      Medical Decision Making       >45 MINUTES SPENT BY ME on the date of service doing chart review, " history, exam, documentation & further activities per the note.  MANAGEMENT DISCUSSED with the following over the past 24 hours: Patient, cardiology, bedside RN       Data     I have personally reviewed the following data over the past 24 hrs:    N/A  \   7.6 (L)   / N/A     N/A N/A N/A /  N/A   N/A N/A N/A \

## 2024-02-14 NOTE — PROGRESS NOTES
Occupational Therapy Discharge Summary    Reason for therapy discharge:    Discharged to transitional care facility.    Progress towards therapy goal(s). See goals on Care Plan in [facility] electronic health record for goal details.  Goals not met.  Barriers to achieving goals:   discharge from facility.    Therapy recommendation(s):    Continued therapy is recommended.  Rationale/Recommendations:  At TCU to progress ind with ADLs and safety.

## 2024-02-14 NOTE — PLAN OF CARE
Physical Therapy Discharge Summary    Reason for therapy discharge:    Discharged to transitional care facility.    Progress towards therapy goal(s). See goals on Care Plan in [facility] electronic health record for goal details.  Goals not met.  Barriers to achieving goals:   limited tolerance for therapy and discharge from facility.    Therapy recommendation(s):    Continued therapy is recommended.  Rationale/Recommendations:  PT goals not met .

## 2024-02-14 NOTE — PLAN OF CARE
Problem: Adult Inpatient Plan of Care  Goal: Plan of Care Review  Description: The Plan of Care Review/Shift note should be completed every shift.  The Outcome Evaluation is a brief statement about your assessment that the patient is improving, declining, or no change.  This information will be displayed automatically on your shift  note.  Outcome: Progressing     Problem: Pain Acute  Goal: Optimal Pain Control and Function  Outcome: Not Progressing     Problem: Mobility Impairment  Goal: Optimal Mobility  Outcome: Progressing   Goal Outcome Evaluation:       Pt alert & oriented. Able to make needs known. Prn dilaudid, prn robaxin and scheduled tylenol given for pain without relief. Hospitalist updated with no orders. Pt was crying and yelling, wanting to have more pain medication. Pt stated more pain at the back. Pt requested to call her daughter. Pt talked to daughter. House officer ordered one time dilaudid. Incontinence cares done. Oxygen in use at 2LPM, satting WNL. BP slightly elevated. Pulse ox continuous monitoring. Oral abx given as per schedule. Will continue to monitor.

## 2024-02-14 NOTE — PROGRESS NOTES
"Care Management Follow Up    Length of Stay (days): 5    Expected Discharge Date: 02/14/2024     Concerns to be Addressed:     Care progression  Patient plan of care discussed at interdisciplinary rounds: Yes    Anticipated Discharge Disposition:  Transitional care     Anticipated Discharge Services:  Transitional care  Anticipated Discharge DME:  NA    Patient/family educated on Medicare website which has current facility and service quality ratings:  NA  Education Provided on the Discharge Plan:  Yes per team  Patient/Family in Agreement with the Plan:  Yes    Referrals Placed by CM/SW:  Yes  Private pay costs discussed: Transportation costs    Additional Information:  Called and spoke with Meggan at [facility] If patient ready, she can arrive between 1:00 - 5:00 PM today.    Sent message to provider, Dr. Julianne Whitaker responded patient is ready    Called patient's daughter, Jacqueline, and she wants to talk with provider prior to discharge regarding patient's back pain and pain management.  Jacqueline said family will transport    Message sent to Dr. Whitaker to call the daughter.    Social Hx: \"Lives at [facility]. Ind with ADLS and IADLS prior to hip fx. Drives [place] and fell [place], broke hip. Follow at [facility] pain clinic for chronic back pain, and works with [employer]. Family to transport. Daughter requesting OP palliative referral at discharge.\"    RNCM to follow for medical progression, recommendations, and final discharge plan.     Aleida Butt RN     Called Jacqueline, she has not heard from provider  She plan to transport between 3:00 - 4:00 PM    Rec'd call from patient's Carly. She preferred a WC transport.  Discussed out of pocket cost of [facility] medical transportation by wheelchair with patient and or family member, Carly. Patient/family member agreed with the plan to have transportation arranged by [facility] transport.      Called [facility] for WC transport  2:50 - 3:40 PM today.  Updated bedside nurse and HUC

## 2024-02-15 ENCOUNTER — TELEPHONE (OUTPATIENT)
Dept: GERIATRICS | Facility: CLINIC | Age: 89
End: 2024-02-15
Payer: COMMERCIAL

## 2024-02-15 ENCOUNTER — PATIENT OUTREACH (OUTPATIENT)
Dept: CARE COORDINATION | Facility: CLINIC | Age: 89
End: 2024-02-15
Payer: COMMERCIAL

## 2024-02-15 ENCOUNTER — LAB REQUISITION (OUTPATIENT)
Dept: LAB | Facility: CLINIC | Age: 89
End: 2024-02-15
Payer: COMMERCIAL

## 2024-02-15 DIAGNOSIS — S72.21XD DISPLACED SUBTROCHANTERIC FRACTURE OF RIGHT FEMUR, SUBSEQUENT ENCOUNTER FOR CLOSED FRACTURE WITH ROUTINE HEALING: ICD-10-CM

## 2024-02-15 DIAGNOSIS — S72.91XD CLOSED FRACTURE OF RIGHT FEMUR WITH ROUTINE HEALING, UNSPECIFIED FRACTURE MORPHOLOGY, UNSPECIFIED PORTION OF FEMUR, SUBSEQUENT ENCOUNTER: Primary | ICD-10-CM

## 2024-02-15 RX ORDER — OXYCODONE HYDROCHLORIDE 5 MG/1
5 TABLET ORAL EVERY 6 HOURS PRN
Qty: 20 TABLET | Refills: 0 | Status: SHIPPED | OUTPATIENT
Start: 2024-02-15 | End: 2024-02-16

## 2024-02-15 RX ORDER — ONDANSETRON 4 MG/1
4 TABLET, FILM COATED ORAL EVERY 6 HOURS PRN
COMMUNITY

## 2024-02-15 NOTE — PROGRESS NOTES
Connected Care Resource Center: Connected Care Resource Center    Background: Transitional Care Management program identified per system criteria and reviewed by Connected Care Resource Center team for possible outreach.    Assessment: Upon chart review, CCRC Team member will not proceed with patient outreach related to this episode of Transitional Care Management program due to reason below:    Non-[facility] TCU: CCRC team member noted patient discharged to TCU/ARU/LTACH. Patient is not established with a [facility] currently supported by Primary Care-Care Coordination therefore handoff to Primary Care-Care Coordination is not appropriate at this time.    Plan: Transitional Care Management episode addressed appropriately per reason noted above.      RHONA Ortiz  [facility] Care Resource [place], [facility]    *Connected Care Resource Team does NOT follow patient ongoing. Referrals are identified based on internal discharge reports and the outreach is to ensure patient has an understanding of their discharge instructions.

## 2024-02-15 NOTE — TELEPHONE ENCOUNTER
[facility] NON-EMERGENT ENCOUNTER    Sherice Alvarez is a 89 year old  (8/21/1934)    Disposition  Pt had emesis after eating breakfast. Reporting nausea. Vitals stable.     ABHI provided to [facility] EASON/TCU:  Zofran 4 mg PO Q6H PRN      Electronically signed by:   Katt Mancini RN

## 2024-02-16 ENCOUNTER — TRANSITIONAL CARE UNIT VISIT (OUTPATIENT)
Dept: GERIATRICS | Facility: CLINIC | Age: 89
End: 2024-02-16
Payer: COMMERCIAL

## 2024-02-16 ENCOUNTER — LAB REQUISITION (OUTPATIENT)
Dept: LAB | Facility: CLINIC | Age: 89
End: 2024-02-16
Payer: COMMERCIAL

## 2024-02-16 DIAGNOSIS — I10 HYPERTENSION, UNSPECIFIED TYPE: ICD-10-CM

## 2024-02-16 DIAGNOSIS — S72.21XD DISPLACED SUBTROCHANTERIC FRACTURE OF RIGHT FEMUR, SUBSEQUENT ENCOUNTER FOR CLOSED FRACTURE WITH ROUTINE HEALING: ICD-10-CM

## 2024-02-16 DIAGNOSIS — G89.29 OTHER CHRONIC PAIN: ICD-10-CM

## 2024-02-16 DIAGNOSIS — N18.30 STAGE 3 CHRONIC KIDNEY DISEASE, UNSPECIFIED WHETHER STAGE 3A OR 3B CKD (H): ICD-10-CM

## 2024-02-16 DIAGNOSIS — D62 ANEMIA DUE TO BLOOD LOSS, ACUTE: ICD-10-CM

## 2024-02-16 DIAGNOSIS — I25.10 CORONARY ARTERY DISEASE INVOLVING NATIVE HEART WITHOUT ANGINA PECTORIS, UNSPECIFIED VESSEL OR LESION TYPE: ICD-10-CM

## 2024-02-16 DIAGNOSIS — D72.829 LEUKOCYTOSIS, UNSPECIFIED TYPE: ICD-10-CM

## 2024-02-16 DIAGNOSIS — S72.91XD CLOSED FRACTURE OF RIGHT FEMUR WITH ROUTINE HEALING, UNSPECIFIED FRACTURE MORPHOLOGY, UNSPECIFIED PORTION OF FEMUR, SUBSEQUENT ENCOUNTER: Primary | ICD-10-CM

## 2024-02-16 DIAGNOSIS — I50.32 CHRONIC HEART FAILURE WITH PRESERVED EJECTION FRACTION (H): ICD-10-CM

## 2024-02-16 DIAGNOSIS — R53.81 PHYSICAL DECONDITIONING: ICD-10-CM

## 2024-02-16 DIAGNOSIS — M79.604 ACUTE PAIN OF LOWER EXTREMITY, RIGHT: ICD-10-CM

## 2024-02-16 LAB
ANION GAP SERPL CALCULATED.3IONS-SCNC: 9 MMOL/L (ref 7–15)
BUN SERPL-MCNC: 20.4 MG/DL (ref 8–23)
CALCIUM SERPL-MCNC: 8.3 MG/DL (ref 8.8–10.2)
CHLORIDE SERPL-SCNC: 104 MMOL/L (ref 98–107)
CREAT SERPL-MCNC: 0.99 MG/DL (ref 0.51–0.95)
DEPRECATED HCO3 PLAS-SCNC: 24 MMOL/L (ref 22–29)
EGFRCR SERPLBLD CKD-EPI 2021: 54 ML/MIN/1.73M2
ERYTHROCYTE [DISTWIDTH] IN BLOOD BY AUTOMATED COUNT: 18.1 % (ref 10–15)
GLUCOSE SERPL-MCNC: 83 MG/DL (ref 70–99)
HCT VFR BLD AUTO: 25 % (ref 35–47)
HGB BLD-MCNC: 8.1 G/DL (ref 11.7–15.7)
MCH RBC QN AUTO: 30.7 PG (ref 26.5–33)
MCHC RBC AUTO-ENTMCNC: 32.4 G/DL (ref 31.5–36.5)
MCV RBC AUTO: 95 FL (ref 78–100)
PLATELET # BLD AUTO: 270 10E3/UL (ref 150–450)
POTASSIUM SERPL-SCNC: 4.2 MMOL/L (ref 3.4–5.3)
RBC # BLD AUTO: 2.64 10E6/UL (ref 3.8–5.2)
SODIUM SERPL-SCNC: 137 MMOL/L (ref 135–145)
WBC # BLD AUTO: 10.5 10E3/UL (ref 4–11)

## 2024-02-16 PROCEDURE — P9604 ONE-WAY ALLOW PRORATED TRIP: HCPCS

## 2024-02-16 PROCEDURE — 99309 SBSQ NF CARE MODERATE MDM 30: CPT | Performed by: NURSE PRACTITIONER

## 2024-02-16 PROCEDURE — 36415 COLL VENOUS BLD VENIPUNCTURE: CPT

## 2024-02-16 PROCEDURE — 80048 BASIC METABOLIC PNL TOTAL CA: CPT

## 2024-02-16 PROCEDURE — 85027 COMPLETE CBC AUTOMATED: CPT

## 2024-02-16 RX ORDER — OXYCODONE HYDROCHLORIDE 5 MG/1
5 TABLET ORAL EVERY 4 HOURS PRN
Qty: 20 TABLET | Refills: 0 | Status: SHIPPED | OUTPATIENT
Start: 2024-02-16 | End: 2024-02-22

## 2024-02-16 NOTE — LETTER
2/16/2024        RE: Sherice Alvarez  [address]   [address]  [facility] [address]        [facility] GERIATRICS    PRIMARY CARE PROVIDER AND CLINIC:  [facility], [address] / [facility] [address]  Chief Complaint   Patient presents with     Hospital F/U      [facility] Medical Record Number:  [id number]  Place of Service where encounter took place:  [facility]    Sherice Alvarez is a 89 year old  (8/21/1934) female, admitted to the above facility from  [facility]. Hospital stay 2/9/24 through 2/14/24. PMH includes HTN, chronic pain, lumbar radiculopathy, CAD, aortic stenosis (s/p TAVR), asthma, HFpEF, and CKD3. Patient admitted to hospital after fall at the grocery store. Work-up revealed right intertrochanteric and subtrochanteric femoral fracture s/p open reduction internal fixation right proximal femur peritrochanteric fracture.     HPI:    Initial visit with patient at TCU today. Patient's main concern is pain, acute pain in R leg acute and chronic lumbar radiculopathy felt along L aspect of low back, hip, and leg. She is followed by the pain clinic and has been working to get chronic pain under control. She is frustrated and does not feel that her pain is well managed. She has attempted to work with therapies but explains that she cannot fully participate due to pain. She has pain at rest while siting up in her wheelchair. She denies chest pain, shortness of breath, and bowel/bladder concerns.    CODE STATUS/ADVANCE DIRECTIVES DISCUSSION:  Full Code  CPR/Full code   ALLERGIES:   Allergies   Allergen Reactions     Amitriptyline Hcl [Amitriptyline] Other (See Comments)     Erythromycin Diarrhea and Other (See Comments)     Childhood reaction     Gabapentin Other (See Comments)     Jerking movements, swelling     Naproxen Unknown and Other (See Comments)     Other Environmental Allergy Unknown     Molds, dander     Pregabalin Other (See  Comments)      PAST MEDICAL HISTORY:   Past Medical History:   Diagnosis Date     Arthritis      Asthma      CAD (coronary artery disease)      Cancer (H)     basal cell     Chronic kidney disease     ckd 3     Chronic pain syndrome      Congestive heart failure (H)      Crohn's disease (H)      GERD (gastroesophageal reflux disease)      Hx of heart artery stent 2016    1 stent R Proximal CA and 1 Stent L Proximal circumflex  2016 Stent proximal LAD     Hyperlipidemia      Hypertension      NSTEMI (non-ST elevated myocardial infarction) (H) 2016     PONV (postoperative nausea and vomiting)     severe povn with last knee surgery     Restless legs syndrome      Stroke (H) 2010    numbness rt jaw      PAST SURGICAL HISTORY:   has a past surgical history that includes IR Lumbar Nerve Root Injection (10/01/2013); IR Lumbar Epidural Steroid Injection (2014); Total Knee Arthroplasty (Right);  Section; carotid endarterectomy; Hemorrhoidectomy external; carotid endarterectomy (Right, 2010); appendectomy; tonsillectomy & adenoidectomy; Cardiac catheterization (2016); Cardiac catheterization (2016); Cardiac catheterization (N/A, 2016); Pr Esophagogastroduodenoscopy Transoral Diagnostic (N/A, 07/10/2019); Cv Coronary Angiogram (N/A, 2020); Cv Left Heart Catheterization Without Left Ventriculogram (Left, 2020); Pr Replace Aortic Valve Open Axillry Artry Approach (N/A, 2020); Cv Transcatheter Aortic Valve Replacement - Other Approach (N/A, 2020); joint replacement; Cervical Laminectomy (1994); TOTAL KNEE ARTHROPLASTY (Left, 2021); Transcatheter Aortic-Valve Replacement; Heart Cath, Angioplasty; and Open reduction internal fixation hip nailing (Right, 2/10/2024).  FAMILY HISTORY: family history includes Atrial fibrillation in her mother; Parkinsonism in her father.  SOCIAL HISTORY:   reports that she quit smoking about 44 years ago. Her  smoking use included cigarettes. She has never used smokeless tobacco. She reports that she does not drink alcohol and does not use drugs.  Patient's living condition: lives alone in independent apartment     Post Discharge Medication Reconciliation Status:   MED REC REQUIRED  Post Medication Reconciliation Status:  Discharge medications reconciled and changed, see notes/orders       Current Outpatient Medications   Medication Sig     oxyCODONE (ROXICODONE) 5 MG tablet Take 2 tablets (10 mg) by mouth every 6 hours as needed for pain     pregabalin (LYRICA) 25 MG capsule Take 1 capsule (25 mg) by mouth 2 times daily     tiZANidine (ZANAFLEX) 2 MG tablet Take 1 tablet (2 mg) by mouth 3 times daily     acetaminophen (TYLENOL) 325 MG tablet Take 3 tablets (975 mg) by mouth every 8 hours     aspirin 81 MG EC tablet Aspirin 81 mg bid for 4 weeks and then aspirin 81 mg daily     atorvastatin (LIPITOR) 40 MG tablet Take 1 tablet (40 mg) by mouth every evening     diclofenac (VOLTAREN) 1 % topical gel Apply 2-4 g topically 4 times daily as needed APPLY TO LEFT SHOULDER AND LEFT KNEE.     Lidocaine (LIDOCARE) 4 % Patch Place 2 patches onto the skin every 24 hours To prevent lidocaine toxicity, patient should be patch free for 12 hrs daily.     lidocaine (XYLOCAINE) 5 % external ointment Apply topically 3 times daily as needed for moderate pain     lidocaine (XYLOCAINE) 5 % external ointment Apply topically 3 times daily     Multiple Vitamin (THERA-TABS) TABS Take 1 tablet by mouth daily     nitroGLYcerin (NITROSTAT) 0.4 MG sublingual tablet Place 1 tablet (0.4 mg) under the tongue every 5 minutes as needed     ondansetron (ZOFRAN) 4 MG tablet Take 4 mg by mouth every 6 hours as needed for nausea     rOPINIRole (REQUIP) 2 MG tablet Take 2 mg by mouth nightly as needed     senna-docusate (SENOKOT-S/PERICOLACE) 8.6-50 MG tablet Take 2 tablets by mouth 2 times daily as needed for constipation     No current facility-administered  "medications for this visit.       ROS:  4 point ROS including Respiratory, CV, GI and , other than that noted in the HPI,  is negative    Vitals:  BP (!) 164/90   Pulse 79   Temp 99.4  F (37.4  C)   Resp 20   Ht 1.473 m (4' 10\")   Wt 60.7 kg (133 lb 12.8 oz)   SpO2 100%   BMI 27.96 kg/m    Exam:  GENERAL APPEARANCE:  Alert, oriented  RESP:  respiratory effort and palpation of chest normal, lungs clear to auscultation   CV:  Palpation and auscultation of heart done , regular rate and rhythm, no murmur, rub, or gallop, no edema  ABDOMEN:  normal bowel sounds, soft, nontender, no hepatosplenomegaly or other masses  SKIN:  Inspection of skin and subcutaneous tissue baseline  PSYCH:  oriented X 3, normal insight, judgement and memory, crying, anxious    Lab/Diagnostic data:  Labs done in SNF are in [place] EPIC. Please refer to them using [employer]Care Everywhere.    ASSESSMENT/PLAN:    (S72.91XD) Closed fracture of right femur with routine healing, unspecified fracture morphology, unspecified portion of femur, subsequent encounter  (primary encounter diagnosis)  (R53.81) Physical deconditioning  (M79.604) Acute pain of lower extremity, right  (G89.29) Other chronic pain  Comment: Acute on chronic pain. Fall s/p open reduction internal fixation right proximal femur peritrochanteric fracture. Chronic pain 2/2 lumbar radiculopathy and chronic compression fracture deformity L4. Deconditioning 2/2 recent hospitalization.   Plan: PT/OT eval and treat, discharge per there recs. ASA 81 mg bid for 4-weeks for DVT prevention and then once daily. Cefadroxil per ortho for 7 days for post op infection prevention and outpatient Ortho follow-up. Increase oxycodone freq to 5 mg q4h prn and schedule robaxin 250 mg q6h for better pain control. Monitor for hypotension and sedation.    (D62) Anemia due to blood loss, acute  (D72.829) Leukocytosis, unspecified type  Comment: Had likely blood loss perio from hip surgery. She received 1 " unit pRBC while inpatient. Hgb 8.8 stable at discharge. Hgb on 2/16 8.1, trending down. No signs of acute blood loss. Elevated WBC while inpatient, normalizing to 10.5 upon recheck on 2/16.   Plan: Recheck CBC Monday 2/19. Monitor for changes.     (I50.32) Chronic heart failure with preserved ejection fraction (H)  (I25.10) Coronary artery disease involving native heart without angina pectoris, unspecified vessel or lesion type  Comment: No s/s of exacerbation. Appears euvolemic.  Plan: Daily weights. Monitor for shortness of breath, edema, bloating, weight gain, and activity intolerance. Continue aspirin and statin.     (I10) Hypertension, unspecified type  (N18.30) CKD III  Comment: Chronic/stable. BPs 120-140/60-70, outlier 188/97. Kidney function stable with last labs.   Plan: No active BP meds. Continue to monitor.       Orders:  Repeat cbc on Monday  Increase oxycodone freq to 5 mg q4h prn  Schedule robaxin 250 mg q6h    Marty Hernandez CNP, DNP Student  [facility]      Electronically signed by:  Venessa Hernandez    I was present with the student who particpated in the serivce and documentation of the note. I have verified the history and personally peformed the physical exam and medical decision making. I agree with the assessment and plan of care as documented in the note.                   Sincerely,        Marty Hernandez CNP

## 2024-02-16 NOTE — TELEPHONE ENCOUNTER
TCU nurse called reporting patient admitted yesterday and is having severe pain. Using tylenol and methocarbamol without relief. She underwent ORIF for right femur fracture 2/10/2024 and was on oxycodone inpatient.   Order given for oxycodone 5 mg po every 6 hrs prn. Take 1 dose from ekit now.   Sent to pharmacy      TAVO Miller CNP on 2/15/2024 at 7:58 PM

## 2024-02-16 NOTE — PROGRESS NOTES
[facility] GERIATRICS    PRIMARY CARE PROVIDER AND CLINIC:  [facility], [address] / [facility] [address]  Chief Complaint   Patient presents with    Hospital F/U      [facility] Medical Record Number:  [id number]  Place of Service where encounter took place:  [facility]    Sherice Alvarez is a 89 year old  (8/21/1934) female, admitted to the above facility from  [facility]. Hospital stay 2/9/24 through 2/14/24. PMH includes HTN, chronic pain, lumbar radiculopathy, CAD, aortic stenosis (s/p TAVR), asthma, HFpEF, and CKD3. Patient admitted to hospital after fall at the grocery store. Work-up revealed right intertrochanteric and subtrochanteric femoral fracture s/p open reduction internal fixation right proximal femur peritrochanteric fracture.     HPI:    Initial visit with patient at TCU today. Patient's main concern is pain, acute pain in R leg acute and chronic lumbar radiculopathy felt along L aspect of low back, hip, and leg. She is followed by the pain clinic and has been working to get chronic pain under control. She is frustrated and does not feel that her pain is well managed. She has attempted to work with therapies but explains that she cannot fully participate due to pain. She has pain at rest while siting up in her wheelchair. She denies chest pain, shortness of breath, and bowel/bladder concerns.    CODE STATUS/ADVANCE DIRECTIVES DISCUSSION:  Full Code  CPR/Full code   ALLERGIES:   Allergies   Allergen Reactions    Amitriptyline Hcl [Amitriptyline] Other (See Comments)    Erythromycin Diarrhea and Other (See Comments)     Childhood reaction    Gabapentin Other (See Comments)     Jerking movements, swelling    Naproxen Unknown and Other (See Comments)    Other Environmental Allergy Unknown     Molds, dander    Pregabalin Other (See Comments)      PAST MEDICAL HISTORY:   Past Medical History:   Diagnosis Date    Arthritis     Asthma     CAD  (coronary artery disease)     Cancer (H)     basal cell    Chronic kidney disease     ckd 3    Chronic pain syndrome     Congestive heart failure (H)     Crohn's disease (H)     GERD (gastroesophageal reflux disease)     Hx of heart artery stent 2016    1 stent R Proximal CA and 1 Stent L Proximal circumflex  2016 Stent proximal LAD    Hyperlipidemia     Hypertension     NSTEMI (non-ST elevated myocardial infarction) (H) 2016    PONV (postoperative nausea and vomiting)     severe povn with last knee surgery    Restless legs syndrome     Stroke (H) 2010    numbness rt jaw      PAST SURGICAL HISTORY:   has a past surgical history that includes IR Lumbar Nerve Root Injection (10/01/2013); IR Lumbar Epidural Steroid Injection (2014); Total Knee Arthroplasty (Right);  Section; carotid endarterectomy; Hemorrhoidectomy external; carotid endarterectomy (Right, 2010); appendectomy; tonsillectomy & adenoidectomy; Cardiac catheterization (2016); Cardiac catheterization (2016); Cardiac catheterization (N/A, 2016); Pr Esophagogastroduodenoscopy Transoral Diagnostic (N/A, 07/10/2019); Cv Coronary Angiogram (N/A, 2020); Cv Left Heart Catheterization Without Left Ventriculogram (Left, 2020); Pr Replace Aortic Valve Open Axillry Artry Approach (N/A, 2020); Cv Transcatheter Aortic Valve Replacement - Other Approach (N/A, 2020); joint replacement; Cervical Laminectomy (1994); TOTAL KNEE ARTHROPLASTY (Left, 2021); Transcatheter Aortic-Valve Replacement; Heart Cath, Angioplasty; and Open reduction internal fixation hip nailing (Right, 2/10/2024).  FAMILY HISTORY: family history includes Atrial fibrillation in her mother; Parkinsonism in her father.  SOCIAL HISTORY:   reports that she quit smoking about 44 years ago. Her smoking use included cigarettes. She has never used smokeless tobacco. She reports that she does not drink alcohol and does  not use drugs.  Patient's living condition: lives alone in independent apartment     Post Discharge Medication Reconciliation Status:   MED REC REQUIRED  Post Medication Reconciliation Status:  Discharge medications reconciled and changed, see notes/orders       Current Outpatient Medications   Medication Sig    oxyCODONE (ROXICODONE) 5 MG tablet Take 2 tablets (10 mg) by mouth every 6 hours as needed for pain    pregabalin (LYRICA) 25 MG capsule Take 1 capsule (25 mg) by mouth 2 times daily    tiZANidine (ZANAFLEX) 2 MG tablet Take 1 tablet (2 mg) by mouth 3 times daily    acetaminophen (TYLENOL) 325 MG tablet Take 3 tablets (975 mg) by mouth every 8 hours    aspirin 81 MG EC tablet Aspirin 81 mg bid for 4 weeks and then aspirin 81 mg daily    atorvastatin (LIPITOR) 40 MG tablet Take 1 tablet (40 mg) by mouth every evening    diclofenac (VOLTAREN) 1 % topical gel Apply 2-4 g topically 4 times daily as needed APPLY TO LEFT SHOULDER AND LEFT KNEE.    Lidocaine (LIDOCARE) 4 % Patch Place 2 patches onto the skin every 24 hours To prevent lidocaine toxicity, patient should be patch free for 12 hrs daily.    lidocaine (XYLOCAINE) 5 % external ointment Apply topically 3 times daily as needed for moderate pain    lidocaine (XYLOCAINE) 5 % external ointment Apply topically 3 times daily    Multiple Vitamin (THERA-TABS) TABS Take 1 tablet by mouth daily    nitroGLYcerin (NITROSTAT) 0.4 MG sublingual tablet Place 1 tablet (0.4 mg) under the tongue every 5 minutes as needed    ondansetron (ZOFRAN) 4 MG tablet Take 4 mg by mouth every 6 hours as needed for nausea    rOPINIRole (REQUIP) 2 MG tablet Take 2 mg by mouth nightly as needed    senna-docusate (SENOKOT-S/PERICOLACE) 8.6-50 MG tablet Take 2 tablets by mouth 2 times daily as needed for constipation     No current facility-administered medications for this visit.       ROS:  4 point ROS including Respiratory, CV, GI and , other than that noted in the HPI,  is  "negative    Vitals:  BP (!) 164/90   Pulse 79   Temp 99.4  F (37.4  C)   Resp 20   Ht 1.473 m (4' 10\")   Wt 60.7 kg (133 lb 12.8 oz)   SpO2 100%   BMI 27.96 kg/m    Exam:  GENERAL APPEARANCE:  Alert, oriented  RESP:  respiratory effort and palpation of chest normal, lungs clear to auscultation   CV:  Palpation and auscultation of heart done , regular rate and rhythm, no murmur, rub, or gallop, no edema  ABDOMEN:  normal bowel sounds, soft, nontender, no hepatosplenomegaly or other masses  SKIN:  Inspection of skin and subcutaneous tissue baseline  PSYCH:  oriented X 3, normal insight, judgement and memory, crying, anxious    Lab/Diagnostic data:  Labs done in SNF are in [place] EPIC. Please refer to them using [employer] Everywhere.    ASSESSMENT/PLAN:    (S72.91XD) Closed fracture of right femur with routine healing, unspecified fracture morphology, unspecified portion of femur, subsequent encounter  (primary encounter diagnosis)  (R53.81) Physical deconditioning  (M79.604) Acute pain of lower extremity, right  (G89.29) Other chronic pain  Comment: Acute on chronic pain. Fall s/p open reduction internal fixation right proximal femur peritrochanteric fracture. Chronic pain 2/2 lumbar radiculopathy and chronic compression fracture deformity L4. Deconditioning 2/2 recent hospitalization.   Plan: PT/OT eval and treat, discharge per there recs. ASA 81 mg bid for 4-weeks for DVT prevention and then once daily. Cefadroxil per ortho for 7 days for post op infection prevention and outpatient Ortho follow-up. Increase oxycodone freq to 5 mg q4h prn and schedule robaxin 250 mg q6h for better pain control. Monitor for hypotension and sedation.    (D62) Anemia due to blood loss, acute  (D72.829) Leukocytosis, unspecified type  Comment: Had likely blood loss perio from hip surgery. She received 1 unit pRBC while inpatient. Hgb 8.8 stable at discharge. Hgb on 2/16 8.1, trending down. No signs of acute blood loss. Elevated " WBC while inpatient, normalizing to 10.5 upon recheck on 2/16.   Plan: Recheck CBC Monday 2/19. Monitor for changes.     (I50.32) Chronic heart failure with preserved ejection fraction (H)  (I25.10) Coronary artery disease involving native heart without angina pectoris, unspecified vessel or lesion type  Comment: No s/s of exacerbation. Appears euvolemic.  Plan: Daily weights. Monitor for shortness of breath, edema, bloating, weight gain, and activity intolerance. Continue aspirin and statin.     (I10) Hypertension, unspecified type  (N18.30) CKD III  Comment: Chronic/stable. BPs 120-140/60-70, outlier 188/97. Kidney function stable with last labs.   Plan: No active BP meds. Continue to monitor.       Orders:  Repeat cbc on Monday  Increase oxycodone freq to 5 mg q4h prn  Schedule robaxin 250 mg q6h    Marty Hernandez, CNP, DNP Student  [facility]      Electronically signed by:  Venessa Hernandez    I was present with the student who particpated in the serivce and documentation of the note. I have verified the history and personally peformed the physical exam and medical decision making. I agree with the assessment and plan of care as documented in the note.

## 2024-02-17 ENCOUNTER — TELEPHONE (OUTPATIENT)
Dept: GERIATRICS | Facility: CLINIC | Age: 89
End: 2024-02-17
Payer: COMMERCIAL

## 2024-02-17 NOTE — TELEPHONE ENCOUNTER
[facility] TELEPHONE ENCOUNTER    Chief Complaint   Patient presents with    Pain       Sherice Alvarez is a 89 year old  (8/21/1934),Nurse called today to report: Increased pain complaints despite Tylenol, robaxin, and oxycodone use. Daughter at bedside and requests to send to hospital for further evaluation    ASSESSMENT/PLAN    OK to send back to hospital per family request.     Electronically signed by:   TAVO Barlow CNP

## 2024-02-19 ENCOUNTER — DOCUMENTATION ONLY (OUTPATIENT)
Dept: GERIATRICS | Facility: CLINIC | Age: 89
End: 2024-02-19
Payer: COMMERCIAL

## 2024-02-19 VITALS
RESPIRATION RATE: 20 BRPM | SYSTOLIC BLOOD PRESSURE: 164 MMHG | BODY MASS INDEX: 28.09 KG/M2 | HEIGHT: 58 IN | WEIGHT: 133.8 LBS | HEART RATE: 79 BPM | DIASTOLIC BLOOD PRESSURE: 90 MMHG | OXYGEN SATURATION: 100 % | TEMPERATURE: 99.4 F

## 2024-02-19 LAB
ATRIAL RATE - MUSE: 85 BPM
DIASTOLIC BLOOD PRESSURE - MUSE: 89 MMHG
INTERPRETATION ECG - MUSE: NORMAL
P AXIS - MUSE: 66 DEGREES
PR INTERVAL - MUSE: 184 MS
QRS DURATION - MUSE: 86 MS
QT - MUSE: 404 MS
QTC - MUSE: 480 MS
R AXIS - MUSE: 60 DEGREES
SYSTOLIC BLOOD PRESSURE - MUSE: 210 MMHG
T AXIS - MUSE: 109 DEGREES
VENTRICULAR RATE- MUSE: 85 BPM

## 2024-02-22 RX ORDER — OXYCODONE HYDROCHLORIDE 5 MG/1
10 TABLET ORAL 2 TIMES DAILY
COMMUNITY
Start: 2024-02-22 | End: 2024-03-06

## 2024-02-22 RX ORDER — PREGABALIN 25 MG/1
25 CAPSULE ORAL 2 TIMES DAILY
COMMUNITY
Start: 2024-02-22 | End: 2024-04-09

## 2024-02-22 RX ORDER — TIZANIDINE 2 MG/1
2 TABLET ORAL 3 TIMES DAILY
Status: ON HOLD | COMMUNITY
Start: 2024-02-22 | End: 2024-04-08

## 2024-02-23 ENCOUNTER — TRANSITIONAL CARE UNIT VISIT (OUTPATIENT)
Dept: GERIATRICS | Facility: CLINIC | Age: 89
End: 2024-02-23
Payer: COMMERCIAL

## 2024-02-23 VITALS
BODY MASS INDEX: 27.37 KG/M2 | WEIGHT: 130.4 LBS | HEART RATE: 68 BPM | SYSTOLIC BLOOD PRESSURE: 96 MMHG | HEIGHT: 58 IN | DIASTOLIC BLOOD PRESSURE: 48 MMHG | TEMPERATURE: 99.1 F | OXYGEN SATURATION: 96 % | RESPIRATION RATE: 19 BRPM

## 2024-02-23 DIAGNOSIS — I10 HYPERTENSION, UNSPECIFIED TYPE: ICD-10-CM

## 2024-02-23 DIAGNOSIS — R53.81 PHYSICAL DECONDITIONING: Primary | ICD-10-CM

## 2024-02-23 DIAGNOSIS — S72.91XD CLOSED FRACTURE OF RIGHT FEMUR WITH ROUTINE HEALING, UNSPECIFIED FRACTURE MORPHOLOGY, UNSPECIFIED PORTION OF FEMUR, SUBSEQUENT ENCOUNTER: ICD-10-CM

## 2024-02-23 DIAGNOSIS — M48.061 SPINAL STENOSIS OF LUMBAR REGION WITHOUT NEUROGENIC CLAUDICATION: ICD-10-CM

## 2024-02-23 DIAGNOSIS — I50.32 CHRONIC HEART FAILURE WITH PRESERVED EJECTION FRACTION (H): ICD-10-CM

## 2024-02-23 DIAGNOSIS — D62 ANEMIA DUE TO BLOOD LOSS, ACUTE: ICD-10-CM

## 2024-02-23 DIAGNOSIS — M79.604 ACUTE PAIN OF LOWER EXTREMITY, RIGHT: ICD-10-CM

## 2024-02-23 DIAGNOSIS — G89.29 OTHER CHRONIC PAIN: ICD-10-CM

## 2024-02-23 PROCEDURE — 99309 SBSQ NF CARE MODERATE MDM 30: CPT | Performed by: NURSE PRACTITIONER

## 2024-02-23 NOTE — LETTER
"    2/23/2024        RE: Sherice Alvarez  [address]   [address]  [place] [address]        [facility] GERIATRICS    Chief Complaint   Patient presents with     Nursing Home Acute     HPI:  Sherice Alvarez is a 89 year old  (8/21/1934), who is being seen today for an episodic care visit at: [facility] ([facility]) [[id number]] PMH includes HTN, chronic pain, lumbar radiculopathy, CAD, aortic stenosis (s/p TAVR), asthma, HFpEF, and CKD3. She was admitted to [facility] 2/9/24 through 2/14/24 after fall at the grocery store. Work-up revealed right intertrochanteric and subtrochanteric femoral fracture s/p open reduction internal fixation right proximal femur peritrochanteric fracture. She was discharged to TCU on 2/14. On 2/17 patient was admitted to [facility] for uncontrolled pain from left lumbar region that radiated down her left leg. Imaging was without acute changes of comminuted right hip fracture but disclosed severe lumbar stenosis. She was evaluated by NeuroSurgery w/o plans for surgical intervention. The patient improved after she had HILLARY of bilateral SI joints (2/20). It was recommended that she continue steroids and physical therapy as well as outpatient follow up. Patient discharged back to TCU on 2/21.     Today's concern is: \"pain\". Patient continues to complain of unmanaged radicular pain in her left lumbar region/left hip and R hip. Patient was discharged from hospital on increased pain regimen including oxycodone 10 mg q6h, lyrica, and tizanidine. She denies chest pain, shortness of breath, n/v, and bowel/bladder concerns.     Allergies, and PMH/PSH reviewed in EPIC today.  REVIEW OF SYSTEMS:  4 point ROS including Respiratory, CV, GI and , other than that noted in the HPI,  is negative    Objective:   BP 96/48   Pulse 68   Temp 99.1  F (37.3  C)   Resp 19   Ht 1.473 m (4' 10\")   Wt 59.1 kg (130 lb 6.4 oz)   SpO2 96%   BMI 27.25 kg/m    GENERAL " APPEARANCE:  Alert, anxious  RESP:  respiratory effort and palpation of chest normal, lungs clear to auscultation , no respiratory distress  CV:  Palpation and auscultation of heart done , regular rate and rhythm, no murmur, rub, or gallop, no edema  ABDOMEN:  normal bowel sounds, soft, nontender, no hepatosplenomegaly or other masses  PSYCH:  oriented X 3, insight and judgement impaired    Labs done in SNF are in [place] EPIC. Please refer to them using [facility]/Care Everywhere.    Assessment/Plan:    (R53.81) Physical deconditioning  (primary encounter diagnosis)  (S72.91XD) Closed fracture of right femur with routine healing, unspecified fracture morphology, unspecified portion of femur, subsequent encounter  (M79.604) Acute pain of lower extremity, right  (G89.29) Other chronic pain  (M48.061) Spinal stenosis of lumbar region without neurogenic claudication  Comment:  Acute on chronic pain. Fall s/p open reduction internal fixation right proximal femur peritrochanteric fracture. Chronic pain 2/2 lumbar radiculopathy and chronic compression fracture and stenosis. Deconditioning 2/2 recent hospitalization.   Plan: PT/OT eval and treat, discharge per there recs. ASA 81 mg bid for 4-weeks for DVT prevention and then once daily. Ortho follow-up. Continue multimodal pain regimen- lido patch/cream, tylenol, methlypred, lyrica, zanaflex. Schedule oxycodone 10 mg BID and allow BID prn, not within 6 hours of previous dose. Monitor for hypotension and sedation. Refer patient back to pain clinic for follow-up as she continues to inquire about implantable stimulator for chronic pain management.    (I50.32) Chronic heart failure with preserved ejection fraction (H)  Comment: No s/s of exacerbation. Appears euvolemic.  Plan: Daily weights. Monitor for shortness of breath, edema, bloating, weight gain, and activity intolerance. Continue aspirin and statin.     (I10) Hypertension, unspecified type  Comment: BPs 120-160/60-90s.  Metoprolol restarted at discharge from hospital.  Plan: Continue to monitor.     (D62) Anemia due to blood loss, acute  Comment: Had likely blood loss perio from hip surgery. She received 1 unit pRBC during initial hospitalization. Hgb since then ranging 7.6-9s. Discharge from second hospitalization hgb 7.6. No signs of acute blood loss.   Plan: Recheck CBC Wednesday 2/28. Monitor for changes.       MED REC REQUIRED  Post Medication Reconciliation Status:  Discharge medications reconciled and changed, see notes/orders      Orders:  -Schedule oxycodone 10 mg BID and BID PRN.   -Recheck CBC Monday 2/26    Electronically signed by: Marty Hernandez CNP, DNP Student, [facility]    Venessa Hernandez CNP    I was present with the student who particpated in the serivce and documentation of the note. I have verified the history and personally peformed the physical exam and medical decision making. I agree with the assessment and plan of care as documented in the note.           Sincerely,        Marty Hernandez CNP

## 2024-02-23 NOTE — PROGRESS NOTES
"[facility] GERIATRICS    Chief Complaint   Patient presents with    Nursing Home Acute     HPI:  Sherice Alvarez is a 89 year old  (8/21/1934), who is being seen today for an episodic care visit at: [facility] ([facility]) [[id number]] PMH includes HTN, chronic pain, lumbar radiculopathy, CAD, aortic stenosis (s/p TAVR), asthma, HFpEF, and CKD3. She was admitted to [facility] 2/9/24 through 2/14/24 after fall at the grocery store. Work-up revealed right intertrochanteric and subtrochanteric femoral fracture s/p open reduction internal fixation right proximal femur peritrochanteric fracture. She was discharged to TCU on 2/14. On 2/17 patient was admitted to [facility] for uncontrolled pain from left lumbar region that radiated down her left leg. Imaging was without acute changes of comminuted right hip fracture but disclosed severe lumbar stenosis. She was evaluated by NeuroSurgery w/o plans for surgical intervention. The patient improved after she had HILLARY of bilateral SI joints (2/20). It was recommended that she continue steroids and physical therapy as well as outpatient follow up. Patient discharged back to TCU on 2/21.     Today's concern is: \"pain\". Patient continues to complain of unmanaged radicular pain in her left lumbar region/left hip and R hip. Patient was discharged from hospital on increased pain regimen including oxycodone 10 mg q6h, lyrica, and tizanidine. She denies chest pain, shortness of breath, n/v, and bowel/bladder concerns.     Allergies, and PMH/PSH reviewed in [facility] today.  REVIEW OF SYSTEMS:  4 point ROS including Respiratory, CV, GI and , other than that noted in the HPI,  is negative    Objective:   BP 96/48   Pulse 68   Temp 99.1  F (37.3  C)   Resp 19   Ht 1.473 m (4' 10\")   Wt 59.1 kg (130 lb 6.4 oz)   SpO2 96%   BMI 27.25 kg/m    GENERAL APPEARANCE:  Alert, anxious  RESP:  respiratory effort and palpation of chest normal, lungs clear to " auscultation , no respiratory distress  CV:  Palpation and auscultation of heart done , regular rate and rhythm, no murmur, rub, or gallop, no edema  ABDOMEN:  normal bowel sounds, soft, nontender, no hepatosplenomegaly or other masses  PSYCH:  oriented X 3, insight and judgement impaired    Labs done in SNF are in [place] EPIC. Please refer to them using EPIC/Care Everywhere.    Assessment/Plan:    (R53.81) Physical deconditioning  (primary encounter diagnosis)  (S72.91XD) Closed fracture of right femur with routine healing, unspecified fracture morphology, unspecified portion of femur, subsequent encounter  (M79.604) Acute pain of lower extremity, right  (G89.29) Other chronic pain  (M48.061) Spinal stenosis of lumbar region without neurogenic claudication  Comment:  Acute on chronic pain. Fall s/p open reduction internal fixation right proximal femur peritrochanteric fracture. Chronic pain 2/2 lumbar radiculopathy and chronic compression fracture and stenosis. Deconditioning 2/2 recent hospitalization.   Plan: PT/OT eval and treat, discharge per there recs. ASA 81 mg bid for 4-weeks for DVT prevention and then once daily. Ortho follow-up. Continue multimodal pain regimen- lido patch/cream, tylenol, methlypred, lyrica, zanaflex. Schedule oxycodone 10 mg BID and allow BID prn, not within 6 hours of previous dose. Monitor for hypotension and sedation. Refer patient back to pain clinic for follow-up as she continues to inquire about implantable stimulator for chronic pain management.    (I50.32) Chronic heart failure with preserved ejection fraction (H)  Comment: No s/s of exacerbation. Appears euvolemic.  Plan: Daily weights. Monitor for shortness of breath, edema, bloating, weight gain, and activity intolerance. Continue aspirin and statin.     (I10) Hypertension, unspecified type  Comment: BPs 120-160/60-90s. Metoprolol restarted at discharge from hospital.  Plan: Continue to monitor.     (D62) Anemia due to  blood loss, acute  Comment: Had likely blood loss perio from hip surgery. She received 1 unit pRBC during initial hospitalization. Hgb since then ranging 7.6-9s. Discharge from second hospitalization hgb 7.6. No signs of acute blood loss.   Plan: Recheck CBC Wednesday 2/28. Monitor for changes.       MED REC REQUIRED  Post Medication Reconciliation Status:  Discharge medications reconciled and changed, see notes/orders      Orders:  -Schedule oxycodone 10 mg BID and BID PRN.   -Recheck CBC Monday 2/26    Electronically signed by: Marty Hernandez CNP, DNP Student, [facility]    Venessa Hernandez CNP    I was present with the student who particpated in the serivce and documentation of the note. I have verified the history and personally peformed the physical exam and medical decision making. I agree with the assessment and plan of care as documented in the note.

## 2024-03-04 ENCOUNTER — TRANSITIONAL CARE UNIT VISIT (OUTPATIENT)
Dept: GERIATRICS | Facility: CLINIC | Age: 89
End: 2024-03-04
Payer: COMMERCIAL

## 2024-03-04 DIAGNOSIS — F39 EPISODIC MOOD DISORDER (H): ICD-10-CM

## 2024-03-04 DIAGNOSIS — I50.32 CHRONIC HEART FAILURE WITH PRESERVED EJECTION FRACTION (H): ICD-10-CM

## 2024-03-04 DIAGNOSIS — R60.0 BILATERAL LOWER EXTREMITY EDEMA: ICD-10-CM

## 2024-03-04 DIAGNOSIS — I10 HYPERTENSION, UNSPECIFIED TYPE: ICD-10-CM

## 2024-03-04 DIAGNOSIS — R53.81 PHYSICAL DECONDITIONING: ICD-10-CM

## 2024-03-04 DIAGNOSIS — D62 ANEMIA DUE TO BLOOD LOSS, ACUTE: ICD-10-CM

## 2024-03-04 DIAGNOSIS — M48.061 SPINAL STENOSIS OF LUMBAR REGION WITHOUT NEUROGENIC CLAUDICATION: ICD-10-CM

## 2024-03-04 DIAGNOSIS — S72.91XD CLOSED FRACTURE OF RIGHT FEMUR WITH ROUTINE HEALING, UNSPECIFIED FRACTURE MORPHOLOGY, UNSPECIFIED PORTION OF FEMUR, SUBSEQUENT ENCOUNTER: Primary | ICD-10-CM

## 2024-03-04 DIAGNOSIS — G89.29 OTHER CHRONIC PAIN: ICD-10-CM

## 2024-03-04 DIAGNOSIS — M79.604 ACUTE PAIN OF LOWER EXTREMITY, RIGHT: ICD-10-CM

## 2024-03-04 PROCEDURE — 99309 SBSQ NF CARE MODERATE MDM 30: CPT

## 2024-03-04 NOTE — LETTER
"    3/4/2024        RE: Sherice Avlarez  [address]   [address]  [place] [address]        [facility] GERIATRICS    Chief Complaint   Patient presents with     RECHECK     initial     HPI:  Sherice Alvarez is a 89 year old  (8/21/1934), who is being seen today for an episodic care visit at: [facility] ([facility]) [[id number]]. HPI information obtained from: facility chart records, facility staff, patient report and [facility] chart review. PMH: HTN, CAD, aortic stenosis s/p TAVR, HFpEF, CKD stage III, chronic pain, lumbar radiculopathy, RLS, asthma, MCI, and recent R femur fracture s/p ORIF. Admitted to [facility] on 2/9/24 after falling at a grocery store. Workup found right intertrochanteric and subtrochanteric femoral fracture. Underwent ORIF on 2/10. Received 1 unit of PRBC post-op for anemia due to blood loss. [facility]charged to [facility] with 7-day cefadroxil for infection prevention and PTA tylenol and robaxin. Presented to the ED via EMS from [facility] on 2/17/24 with c/o worsening back pain, radiculopathy symptoms, and right hip pain without traumatic insight. Patient had been given oxycodone, tylenol, and robaxin at [facility] but reportedly have not been effective. Admitted to [facility] to facilitate better pain control. Imaging negative for acute changes; severe lumbar stenosis noted. NSG consulted; no surgical intervention recommended. Received HILLARY of bilateral SI joints on 2/20 with improvement. Discharged back to [facility] on 2/21 with Medrol dosepak and recommendation to continue steroids, physical therapy, and outpatient follow-up.     Ms. Quintero is seen today in her room for a visit. Patient notes improvement; \" I am functioning at a medium level\". When asked about therapies, she is \"enjoying it\" and finds it \"helpful\". Endorses SOB that is anxiety driven, notes \"chest tightness sometimes\" when her anxiety level gets high which is \"not all the time\". Her anxiety goes away with time; \"I just grin and bear " "it\". She reports her anxiety is driven by pain; \"I feel pain all the time\". She also lost her adult son half a year ago and continues to grieve. She is receptive to seeing in-house ACP for support. Writer plans to schedule the tylenol TID for better pain control; patient requests for stronger opioids but writer advises to utilize non-opioids first. Patient reports, she saw [facility] for steroid injections in her back in the past.There was a plan for \"pain pump\" but she is \"stuck here\". After a long discussion, patient to agree to the plan. Appetite is too good; reports trying to lose weight. No sleep concerns. Denies CP, palpitations, lightheadedness, dizziness, fatigue, SOB, fever, chills, nausea/vomiting, bladder or bowel concerns today.      Allergies, and PMH/PSH reviewed in EPIC today.  REVIEW OF SYSTEMS:  4 point ROS including Respiratory, CV, GI and , other than that noted in the HPI,  is negative    Objective:   BP 96/48   Pulse 68   Temp 99.1  F (37.3  C)   Resp 19   Ht 1.473 m (4' 10\")   Wt 59 kg (130 lb)   SpO2 96%   BMI 27.17 kg/m    GENERAL APPEARANCE:  Alert, elderly, in no distress, sitting in WC  HEENT:  atraumatic, [place], EOM intact, moist mucus membranes  RESP:  non-labored breathing, lungs clear on auscultation, no respiratory distress, no cough  CV:  Rate regular, S1 S2 noted, BLE edema  ABDOMEN:  soft, non-distended, non-tender, bowel sounds active  M/S:   wheelchair bound, moves all extremities, strength and tone equal bilaterally, no calf pain, arthritic changes noted in hands  SKIN:  warm, dry, thin, fragile, no obvious rash, lesions, ulcerations or petechiae   NEURO:   Face is symmetric, examination of sensation by touch normal, follows and tracks, slow speech  PSYCH: anxious, mood fluctuates      Labs reviewed as per [facility] and/or Care Everywhere.    Assessment/Plan:     Closed fracture of right femur with routine healing, unspecified fracture morphology, unspecified portion " of femur, subsequent encounter  Acute pain of lower extremity, right  Other chronic pain  Spinal stenosis of lumbar region without neurogenic claudication  S/p ORIF on 2/10/24. Per inpatient pain consult during hospital stay, advised Lyrica 25 mg BID should be maximum dose due to renal function. Today, patient is able to participate with therapies while in TCU with no concerns.   -start APAP for analgesia 1000 mg TID  -Discontinue APAP 1000 mg q6h PRN  -continue Lyrica 25 mg BID  -continue oxycodone 5 mg q12h and 5 mg q12h PRN  -continue tizanidine 2 mg TID  -continue lidocaine 4% patch daily   -continue lidocaine 5% ointment TID  -continue voltaren 1% topical gel QID PRN  -follow-up with ortho as recommended      Chronic heart failure with preserved ejection fraction (H)  Bilateral lower extremity edema  Echo on 12/11/23 finds LVEF 60-65%; mild mitral valve stenosis noted. Peripheral edema noted on exam today.  -start compression stockings daily, off at bedtime  -follow weights  -monitor volume status    Hypertension, unspecified type  Restarted on metoprolol during hospital stay. BPs have been adequate.   -continue as ordered  -follow BP's and adjust medications as needed    Anemia due to blood loss, acute  Last hemoglobin 7.6 (2/21/24). No signs of active bleeding.  -monitor bleeding risks  -plan to recheck Hgb next visit    Episodic mood disorder (H24)  Per history. Fluctuating temperament noted during visit today.   -recommend in-house ACP to follow    Physical deconditioning  Secondary to diagnoses above. In TCU for rehabilitation.   -PT/OT following- adv per recommendations   -SW available for safe discharge planning ongoing      Electronically signed by: Dr. Christina Little, DNP, APRN, AGNP-BC         Sincerely,        Christina Little, APRN CNP

## 2024-03-05 VITALS
HEART RATE: 68 BPM | BODY MASS INDEX: 27.29 KG/M2 | OXYGEN SATURATION: 96 % | SYSTOLIC BLOOD PRESSURE: 96 MMHG | DIASTOLIC BLOOD PRESSURE: 48 MMHG | TEMPERATURE: 99.1 F | WEIGHT: 130 LBS | HEIGHT: 58 IN | RESPIRATION RATE: 19 BRPM

## 2024-03-05 NOTE — PROGRESS NOTES
"[facility] GERIATRICS    Chief Complaint   Patient presents with    RECHECK     initial     HPI:  Sherice Alvarez is a 89 year old  (8/21/1934), who is being seen today for an episodic care visit at: [facility] ([facility]) [[id number]]. HPI information obtained from: facility chart records, facility staff, patient report and [facility] chart review. PMH: HTN, CAD, aortic stenosis s/p TAVR, HFpEF, CKD stage III, chronic pain, lumbar radiculopathy, RLS, asthma, MCI, and recent R femur fracture s/p ORIF. Admitted to [facility] on 2/9/24 after falling at a grocery store. Workup found right intertrochanteric and subtrochanteric femoral fracture. Underwent ORIF on 2/10. Received 1 unit of PRBC post-op for anemia due to blood loss. Discharged to [facility] with 7-day cefadroxil for infection prevention and PTA tylenol and robaxin. Presented to the ED via EMS from [facility] on 2/17/24 with c/o worsening back pain, radiculopathy symptoms, and right hip pain without traumatic insight. Patient had been given oxycodone, tylenol, and robaxin at [facility] but reportedly have not been effective. Admitted to [facility] to facilitate better pain control. Imaging negative for acute changes; severe lumbar stenosis noted. NSG consulted; no surgical intervention recommended. Received HILLARY of bilateral SI joints on 2/20 with improvement. Discharged back to [facility] on 2/21 with Medrol dosepak and recommendation to continue steroids, physical therapy, and outpatient follow-up.     Ms. Quintero is seen today in her room for a visit. Patient notes improvement; \" I am functioning at a medium level\". When asked about therapies, she is \"enjoying it\" and finds it \"helpful\". Endorses SOB that is anxiety driven, notes \"chest tightness sometimes\" when her anxiety level gets high which is \"not all the time\". Her anxiety goes away with time; \"I just grin and bear it\". She reports her anxiety is driven by pain; \"I feel pain all the time\". She also lost her adult " "son half a year ago and continues to grieve. She is receptive to seeing in-house ACP for support. Writer plans to schedule the tylenol TID for better pain control; patient requests for stronger opioids but writer advises to utilize non-opioids first. Patient reports, she saw [facility] for steroid injections in her back in the past.There was a plan for \"pain pump\" but she is \"stuck here\". After a long discussion, patient to agree to the plan. Appetite is too good; reports trying to lose weight. No sleep concerns. Denies CP, palpitations, lightheadedness, dizziness, fatigue, SOB, fever, chills, nausea/vomiting, bladder or bowel concerns today.      Allergies, and PMH/PSH reviewed in [facility] today.  REVIEW OF SYSTEMS:  4 point ROS including Respiratory, CV, GI and , other than that noted in the HPI,  is negative    Objective:   BP 96/48   Pulse 68   Temp 99.1  F (37.3  C)   Resp 19   Ht 1.473 m (4' 10\")   Wt 59 kg (130 lb)   SpO2 96%   BMI 27.17 kg/m    GENERAL APPEARANCE:  Alert, elderly, in no distress, sitting in WC  HEENT:  atraumatic, [place], EOM intact, moist mucus membranes  RESP:  non-labored breathing, lungs clear on auscultation, no respiratory distress, no cough  CV:  Rate regular, S1 S2 noted, BLE edema  ABDOMEN:  soft, non-distended, non-tender, bowel sounds active  M/S:   wheelchair bound, moves all extremities, strength and tone equal bilaterally, no calf pain, arthritic changes noted in hands  SKIN:  warm, dry, thin, fragile, no obvious rash, lesions, ulcerations or petechiae   NEURO:   Face is symmetric, examination of sensation by touch normal, follows and tracks, slow speech  PSYCH: anxious, mood fluctuates      Labs reviewed as per [facility] and/or Care Everywhere.    Assessment/Plan:     Closed fracture of right femur with routine healing, unspecified fracture morphology, unspecified portion of femur, subsequent encounter  Acute pain of lower extremity, right  Other chronic pain  Spinal " stenosis of lumbar region without neurogenic claudication  S/p ORIF on 2/10/24. Per inpatient pain consult during hospital stay, advised Lyrica 25 mg BID should be maximum dose due to renal function. Today, patient is able to participate with therapies while in TCU with no concerns.   -start APAP for analgesia 1000 mg TID  -Discontinue APAP 1000 mg q6h PRN  -continue Lyrica 25 mg BID  -continue oxycodone 5 mg q12h and 5 mg q12h PRN  -continue tizanidine 2 mg TID  -continue lidocaine 4% patch daily   -continue lidocaine 5% ointment TID  -continue voltaren 1% topical gel QID PRN  -follow-up with ortho as recommended      Chronic heart failure with preserved ejection fraction (H)  Bilateral lower extremity edema  Echo on 12/11/23 finds LVEF 60-65%; mild mitral valve stenosis noted. Peripheral edema noted on exam today.  -start compression stockings daily, off at bedtime  -follow weights  -monitor volume status    Hypertension, unspecified type  Restarted on metoprolol during hospital stay. BPs have been adequate.   -continue as ordered  -follow BP's and adjust medications as needed    Anemia due to blood loss, acute  Last hemoglobin 7.6 (2/21/24). No signs of active bleeding.  -monitor bleeding risks  -plan to recheck Hgb next visit    Episodic mood disorder (H24)  Per history. Fluctuating temperament noted during visit today.   -recommend in-house ACP to follow    Physical deconditioning  Secondary to diagnoses above. In TCU for rehabilitation.   -PT/OT following- adv per recommendations   -SW available for safe discharge planning ongoing      Electronically signed by: Dr. Christina Little, DNP, APRN, AGNP-BC

## 2024-03-06 DIAGNOSIS — S72.91XD CLOSED FRACTURE OF RIGHT FEMUR WITH ROUTINE HEALING, UNSPECIFIED FRACTURE MORPHOLOGY, UNSPECIFIED PORTION OF FEMUR, SUBSEQUENT ENCOUNTER: ICD-10-CM

## 2024-03-06 RX ORDER — OXYCODONE HYDROCHLORIDE 5 MG/1
TABLET ORAL
Qty: 45 TABLET | Refills: 0 | Status: SHIPPED | OUTPATIENT
Start: 2024-03-06 | End: 2024-04-09

## 2024-03-09 PROBLEM — F39 EPISODIC MOOD DISORDER (H): Status: ACTIVE | Noted: 2024-03-09

## 2024-03-12 PROBLEM — E03.9 HYPOTHYROIDISM (ACQUIRED): Status: ACTIVE | Noted: 2023-01-11

## 2024-03-12 PROBLEM — I50.22 CHRONIC SYSTOLIC CHF (CONGESTIVE HEART FAILURE) (H): Status: ACTIVE | Noted: 2023-01-11

## 2024-03-12 PROBLEM — S72.90XD AFTERCARE FOR HEALING TRAUMATIC FRACTURE OF FEMUR: Status: ACTIVE | Noted: 2024-02-17

## 2024-03-12 PROBLEM — I70.0 AORTIC CALCIFICATION (H): Status: ACTIVE | Noted: 2023-04-26

## 2024-03-12 PROBLEM — L98.429: Status: ACTIVE | Noted: 2024-02-18

## 2024-03-12 PROBLEM — M48.062 SPINAL STENOSIS OF LUMBAR REGION WITH NEUROGENIC CLAUDICATION: Status: ACTIVE | Noted: 2024-02-17

## 2024-03-12 PROBLEM — E78.5 DYSLIPIDEMIA: Status: ACTIVE | Noted: 2023-04-26

## 2024-03-12 PROBLEM — S72.002A CLOSED FRACTURE OF NECK OF LEFT FEMUR (H): Status: ACTIVE | Noted: 2024-02-18

## 2024-03-20 ENCOUNTER — DISCHARGE SUMMARY NURSING HOME (OUTPATIENT)
Dept: GERIATRICS | Facility: CLINIC | Age: 89
End: 2024-03-20
Payer: COMMERCIAL

## 2024-03-20 DIAGNOSIS — M48.062 SPINAL STENOSIS OF LUMBAR REGION WITH NEUROGENIC CLAUDICATION: Primary | ICD-10-CM

## 2024-03-20 DIAGNOSIS — S72.21XA CLOSED DISPLACED SUBTROCHANTERIC FRACTURE OF RIGHT FEMUR, INITIAL ENCOUNTER (H): ICD-10-CM

## 2024-03-20 DIAGNOSIS — G25.81 RESTLESS LEG SYNDROME: ICD-10-CM

## 2024-03-20 DIAGNOSIS — I10 HYPERTENSION, UNSPECIFIED TYPE: ICD-10-CM

## 2024-03-20 PROCEDURE — 99316 NF DSCHRG MGMT 30 MIN+: CPT | Performed by: NURSE PRACTITIONER

## 2024-03-20 NOTE — PROGRESS NOTES
"[facility] GERIATRICS DISCHARGE SUMMARY  PATIENT'S NAME: Sherice Alvarez  YOB: 1934  MEDICAL RECORD NUMBER:  [id number]  Place of Service where encounter took place:  [facility] ([facility]) [[id number]]    PRIMARY CARE PROVIDER AND CLINIC RESPONSIBLE AFTER TRANSFER:   [facility], [address] / [facility] [address]     Transferring providers: Marty Hernandez CNP, Rosa Elias MD  Recent Hospitalization/ED:  [facility] stay 2/9/24 to 2/14/24. [facility] stay 2/17/24 to 2/21/24.   Date of SNF Admission:  2/21/24  Date of SNF (anticipated) Discharge: March 22, 2024  Discharged to: previous assisted living  Cognitive Scores: CPT: 4.4/5.6  Physical Function: Wheelchair dependent  DME: Wheelchair    Due to diagnosis of R femur fx my patient will require and benefit from a manual 16 inch x 17inch Dino height wheelchair with a pair of standard foot rests, a pair of full-length armrests, and an 16x17 inch comfort curve cushion for lifetime use.  Patient requires dino height option due to short stature: 4'10\"    Patient has mobility limitation that significantly impairs her ability to participate in mobility related and ADLs such as food prep, dressing, transfers and ambulation.  This limitation cannot be sufficiently and safely resolved by the use of a cane, walker or crutches due to weakness related to R femur fx.  Patient and caregiver are able to safely use a wheelchair and mobility deficit can be resolved with its use.  Patient's home provides adequate access and maneuvering space.  Patient will use a wheelchair daily and has not expressed an unwillingness to use it within her home.    Discharge date: 3/22/24         CODE STATUS/ADVANCE DIRECTIVES DISCUSSION:  Full Code   ALLERGIES: Amitriptyline hcl [amitriptyline], Erythromycin, Gabapentin, Gramineae pollens, Naproxen, Other environmental allergy, and " Pregabalin    NURSING FACILITY COURSE   Medication Changes/Rationale:   Resumed metoprolol 25   Melatonin 3 mg nightly for insomnia..  Change oxycodone to 5 mg p.o. twice daily and 5 mg p.o. twice daily as needed.    Summary of nursing facility stay:   1. Spinal stenosis of lumbar region with neurogenic claudication    2. Hypertension, unspecified type    3. Restless leg syndrome    4. Closed displaced subtrochanteric fracture of right femur, initial encounter (H)      She was admitted to [facility] 2/9/24 through 2/14/24 after fall at the grocery store. Work-up revealed right intertrochanteric and subtrochanteric femoral fracture s/p open reduction internal fixation right proximal femur peritrochanteric fracture. She was discharged to TCU on 2/14. On 2/17 patient was admitted to [facility] for uncontrolled pain from left lumbar region that radiated down her left leg. Imaging was without acute changes of comminuted right hip fracture but disclosed severe lumbar stenosis. She was evaluated by NeuroSurgery w/o plans for surgical intervention. The patient improved after she had HILLARY of bilateral SI joints (2/20). It was recommended that she continue steroids and physical therapy as well as outpatient follow up. Patient discharged back to TCU on 2/21.     Initially her pain was difficult to control however after her stay at [facility], we were able to reduce her oxycodone from 10 mg to 5 mg twice daily and twice daily as needed.  She slowly improved in strength today she is feeling somewhat weak/she is ready to go home.  I did ask nursing to test for COVID-19 and influenza given that weakness and malaise.    Discharge Medications:  MED REC REQUIRED  Post Medication Reconciliation Status:  Discharge medications reconciled and changed, see notes/orders       Current Outpatient Medications   Medication Sig Dispense Refill    acetaminophen (TYLENOL) 325 MG tablet Take 3 tablets (975 mg) by mouth every 8 hours  (Patient taking differently: Take 1,000 mg by mouth 3 times daily) 100 tablet 0    aspirin 81 MG EC tablet Aspirin 81 mg bid for 4 weeks and then aspirin 81 mg daily      atorvastatin (LIPITOR) 40 MG tablet Take 1 tablet (40 mg) by mouth every evening      diclofenac (VOLTAREN) 1 % topical gel Apply 2-4 g topically 4 times daily as needed APPLY TO LEFT SHOULDER AND LEFT KNEE.      Lidocaine (LIDOCARE) 4 % Patch Place 2 patches onto the skin every 24 hours To prevent lidocaine toxicity, patient should be patch free for 12 hrs daily. 5 patch 0    lidocaine (XYLOCAINE) 5 % external ointment Apply topically 3 times daily as needed for moderate pain      lidocaine (XYLOCAINE) 5 % external ointment Apply topically 3 times daily 50 g 0    Multiple Vitamin (THERA-TABS) TABS Take 1 tablet by mouth daily      nitroGLYcerin (NITROSTAT) 0.4 MG sublingual tablet Place 1 tablet (0.4 mg) under the tongue every 5 minutes as needed 90 tablet 0    ondansetron (ZOFRAN) 4 MG tablet Take 4 mg by mouth every 6 hours as needed for nausea      oxyCODONE (ROXICODONE) 5 MG tablet Take 1 tablet (5 mg) by mouth 2 times daily. May also take 1 tablet (5 mg) every 12 hours as needed for severe pain. not within 6 hours of previous dose. 45 tablet 0    pregabalin (LYRICA) 25 MG capsule Take 1 capsule (25 mg) by mouth 2 times daily      rOPINIRole (REQUIP) 2 MG tablet Take 2 mg by mouth nightly as needed      senna-docusate (SENOKOT-S/PERICOLACE) 8.6-50 MG tablet Take 2 tablets by mouth 2 times daily as needed for constipation 60 tablet 0    tiZANidine (ZANAFLEX) 2 MG tablet Take 1 tablet (2 mg) by mouth 3 times daily            Controlled medications:   Okay to discharge with the remaining oxycodone.     Past Medical History:   Past Medical History:   Diagnosis Date    Arthritis     Asthma     CAD (coronary artery disease)     Cancer (H)     basal cell    Chronic kidney disease     ckd 3    Chronic pain syndrome     Congestive heart failure (H)      Crohn's disease (H)     GERD (gastroesophageal reflux disease)     Hx of heart artery stent 11/01/2016    1 stent R Proximal CA and 1 Stent L Proximal circumflex  12/2016 Stent proximal LAD    Hyperlipidemia     Hypertension     NSTEMI (non-ST elevated myocardial infarction) (H) 11/01/2016    PONV (postoperative nausea and vomiting)     severe povn with last knee surgery    Restless legs syndrome     Stroke (H) 01/01/2010    numbness rt jaw     Physical Exam:   Vitals: There were no vitals taken for this visit.  BMI: There is no height or weight on file to calculate BMI.  General appearance: alert, cooperative.   Lungs: respirations unlabored,no wheezing or rales.   Cardiovascular: Regular rate and rhythm.   ABDOMEN: Globular and soft, non tender.    Extremities: extremities normal, atraumatic, no cyanosis or edema  Skin: No rashes or lesions  Neurologic: oriented. No focal deficits.   Psych: interacts well with caregivers,  pleasant     SNF labs: Recent labs in [facility] reviewed by me today.     DISCHARGE PLAN:  Follow up labs: Per PCP  Medical Follow Up:      Follow up with primary care provider in 1-2 weeks  [facility] scheduled appointments: n/A  Discharge Services: Home Care:  Occupational Therapy, Physical Therapy, and Registered Nurse  Discharge Instructions Verbalized to Patient at Discharge:   Weigh yourself daily in the morning and keep a record. Call your primary clinic: a) if you are more short of breath, or b) if your weight changes more than 3 pounds in one day or more than 5 pounds in one week.   DO NOT DRIVE while taking narcotic pain medications.     TOTAL DISCHARGE TIME:   Greater than 30 minutes  Electronically signed by:  Marty Hernandez CNP   Documentation of Face to Face and Certification for Home Health Services    I certify that patient: Sherice is under my care and that I, or a nurse practitioner or physician's assistant working with me, had a face-to-face encounter that meets the  physician face-to-face encounter requirements with this patient on: 3/20/24.    This encounter with the patient was in whole, or in part, for the following medical condition, which is the primary reason for home health care: R femur fx    I certify that, based on my findings, the following services are medically necessary home health services: Nursing, Occupational Therapy, and Physical Therapy.    My clinical findings support the need for the above services because: RN required for medication management, PT and OT required for continued functional improvment in home setting .     Further, I certify that my clinical findings support that this patient is homebound (i.e. absences from home require considerable and taxing effort and are for medical reasons or [employer] services or infrequently or of short duration when for other reasons) because: Requires assistance of another person or specialized equipment to access medical services because patient: Requires supervision of another for safe transfer...    Based on the above findings. I certify that this patient is confined to the home and needs intermittent skilled nursing care, physical therapy and/or speech therapy.  The patient is under my care, and I have initiated the establishment of the plan of care.  This patient will be followed by a physician who will periodically review the plan of care.

## 2024-03-20 NOTE — LETTER
"    3/20/2024        RE: Sherice Alvarez  [address]   [address]  [place] [address]        [facility] GERIATRICS DISCHARGE SUMMARY  PATIENT'S NAME: Sherice Alvarez  YOB: 1934  MEDICAL RECORD NUMBER:  [id number]  Place of Service where encounter took place:  [facility] ([facility]) [[id number]]    PRIMARY CARE PROVIDER AND CLINIC RESPONSIBLE AFTER TRANSFER:   [facility], [address] / [place] [address]     Transferring providers: Marty Hernandez CNP, Rosa Elias MD  Recent Hospitalization/ED:  [facility] stay 2/9/24 to 2/14/24. [facility] stay 2/17/24 to 2/21/24.   Date of SNF Admission:  2/21/24  Date of SNF (anticipated) Discharge: March 22, 2024  Discharged to: previous assisted living  Cognitive Scores: CPT: 4.4/5.6  Physical Function: Wheelchair dependent  DME: Wheelchair    Due to diagnosis of R femur fx my patient will require and benefit from a manual 16 inch x 17inch Dino height wheelchair with a pair of standard foot rests, a pair of full-length armrests, and an 16x17 inch comfort curve cushion for lifetime use.  Patient requires dino height option due to short stature: 4'10\"    Patient has mobility limitation that significantly impairs her ability to participate in mobility related and ADLs such as food prep, dressing, transfers and ambulation.  This limitation cannot be sufficiently and safely resolved by the use of a cane, walker or crutches due to weakness related to R femur fx.  Patient and caregiver are able to safely use a wheelchair and mobility deficit can be resolved with its use.  Patient's home provides adequate access and maneuvering space.  Patient will use a wheelchair daily and has not expressed an unwillingness to use it within her home.    Discharge date: 3/22/24         CODE STATUS/ADVANCE DIRECTIVES DISCUSSION:  Full Code   ALLERGIES: Amitriptyline hcl [amitriptyline], " Erythromycin, Gabapentin, Gramineae pollens, Naproxen, Other environmental allergy, and Pregabalin    NURSING FACILITY COURSE   Medication Changes/Rationale:   Resumed metoprolol 25   Melatonin 3 mg nightly for insomnia..  Change oxycodone to 5 mg p.o. twice daily and 5 mg p.o. twice daily as needed.    Summary of nursing facility stay:   1. Spinal stenosis of lumbar region with neurogenic claudication    2. Hypertension, unspecified type    3. Restless leg syndrome    4. Closed displaced subtrochanteric fracture of right femur, initial encounter (H)      She was admitted to [facility] 2/9/24 through 2/14/24 after fall at the grocery store. Work-up revealed right intertrochanteric and subtrochanteric femoral fracture s/p open reduction internal fixation right proximal femur peritrochanteric fracture. She was discharged to TCU on 2/14. On 2/17 patient was admitted to [facility] for uncontrolled pain from left lumbar region that radiated down her left leg. Imaging was without acute changes of comminuted right hip fracture but disclosed severe lumbar stenosis. She was evaluated by NeuroSurgery w/o plans for surgical intervention. The patient improved after she had HILLARY of bilateral SI joints (2/20). It was recommended that she continue steroids and physical therapy as well as outpatient follow up. Patient discharged back to TCU on 2/21.     Initially her pain was difficult to control however after her stay at [facility], we were able to reduce her oxycodone from 10 mg to 5 mg twice daily and twice daily as needed.  She slowly improved in strength today she is feeling somewhat weak/she is ready to go home.  I did ask nursing to test for COVID-19 and influenza given that weakness and malaise.    Discharge Medications:  MED REC REQUIRED  Post Medication Reconciliation Status:  Discharge medications reconciled and changed, see notes/orders       Current Outpatient Medications   Medication Sig Dispense Refill      acetaminophen (TYLENOL) 325 MG tablet Take 3 tablets (975 mg) by mouth every 8 hours (Patient taking differently: Take 1,000 mg by mouth 3 times daily) 100 tablet 0     aspirin 81 MG EC tablet Aspirin 81 mg bid for 4 weeks and then aspirin 81 mg daily       atorvastatin (LIPITOR) 40 MG tablet Take 1 tablet (40 mg) by mouth every evening       diclofenac (VOLTAREN) 1 % topical gel Apply 2-4 g topically 4 times daily as needed APPLY TO LEFT SHOULDER AND LEFT KNEE.       Lidocaine (LIDOCARE) 4 % Patch Place 2 patches onto the skin every 24 hours To prevent lidocaine toxicity, patient should be patch free for 12 hrs daily. 5 patch 0     lidocaine (XYLOCAINE) 5 % external ointment Apply topically 3 times daily as needed for moderate pain       lidocaine (XYLOCAINE) 5 % external ointment Apply topically 3 times daily 50 g 0     Multiple Vitamin (THERA-TABS) TABS Take 1 tablet by mouth daily       nitroGLYcerin (NITROSTAT) 0.4 MG sublingual tablet Place 1 tablet (0.4 mg) under the tongue every 5 minutes as needed 90 tablet 0     ondansetron (ZOFRAN) 4 MG tablet Take 4 mg by mouth every 6 hours as needed for nausea       oxyCODONE (ROXICODONE) 5 MG tablet Take 1 tablet (5 mg) by mouth 2 times daily. May also take 1 tablet (5 mg) every 12 hours as needed for severe pain. not within 6 hours of previous dose. 45 tablet 0     pregabalin (LYRICA) 25 MG capsule Take 1 capsule (25 mg) by mouth 2 times daily       rOPINIRole (REQUIP) 2 MG tablet Take 2 mg by mouth nightly as needed       senna-docusate (SENOKOT-S/PERICOLACE) 8.6-50 MG tablet Take 2 tablets by mouth 2 times daily as needed for constipation 60 tablet 0     tiZANidine (ZANAFLEX) 2 MG tablet Take 1 tablet (2 mg) by mouth 3 times daily            Controlled medications:   Okay to discharge with the remaining oxycodone.     Past Medical History:   Past Medical History:   Diagnosis Date     Arthritis      Asthma      CAD (coronary artery disease)      Cancer (H)      basal cell     Chronic kidney disease     ckd 3     Chronic pain syndrome      Congestive heart failure (H)      Crohn's disease (H)      GERD (gastroesophageal reflux disease)      Hx of heart artery stent 11/01/2016    1 stent R Proximal CA and 1 Stent L Proximal circumflex  12/2016 Stent proximal LAD     Hyperlipidemia      Hypertension      NSTEMI (non-ST elevated myocardial infarction) (H) 11/01/2016     PONV (postoperative nausea and vomiting)     severe povn with last knee surgery     Restless legs syndrome      Stroke (H) 01/01/2010    numbness rt jaw     Physical Exam:   Vitals: There were no vitals taken for this visit.  BMI: There is no height or weight on file to calculate BMI.  General appearance: alert, cooperative.   Lungs: respirations unlabored,no wheezing or rales.   Cardiovascular: Regular rate and rhythm.   ABDOMEN: Globular and soft, non tender.    Extremities: extremities normal, atraumatic, no cyanosis or edema  Skin: No rashes or lesions  Neurologic: oriented. No focal deficits.   Psych: interacts well with caregivers,  pleasant     SNF labs: Recent labs in [facility] reviewed by me today.     DISCHARGE PLAN:  Follow up labs: Per PCP  Medical Follow Up:      Follow up with primary care provider in 1-2 weeks  [facility] scheduled appointments: n/A  Discharge Services: Home Care:  Occupational Therapy, Physical Therapy, and Registered Nurse  Discharge Instructions Verbalized to Patient at Discharge:   Weigh yourself daily in the morning and keep a record. Call your primary clinic: a) if you are more short of breath, or b) if your weight changes more than 3 pounds in one day or more than 5 pounds in one week.   DO NOT DRIVE while taking narcotic pain medications.     TOTAL DISCHARGE TIME:   Greater than 30 minutes  Electronically signed by:  Marty Hernandez CNP   Documentation of Face to Face and Certification for Home Health Services    I certify that patient: Sherice is under my care and  that I, or a nurse practitioner or physician's assistant working with me, had a face-to-face encounter that meets the physician face-to-face encounter requirements with this patient on: 3/20/24.    This encounter with the patient was in whole, or in part, for the following medical condition, which is the primary reason for home health care: R femur fx    I certify that, based on my findings, the following services are medically necessary home health services: Nursing, Occupational Therapy, and Physical Therapy.    My clinical findings support the need for the above services because: RN required for medication management, PT and OT required for continued functional improvment in home setting .     Further, I certify that my clinical findings support that this patient is homebound (i.e. absences from home require considerable and taxing effort and are for medical reasons or [employer] services or infrequently or of short duration when for other reasons) because: Requires assistance of another person or specialized equipment to access medical services because patient: Requires supervision of another for safe transfer...    Based on the above findings. I certify that this patient is confined to the home and needs intermittent skilled nursing care, physical therapy and/or speech therapy.  The patient is under my care, and I have initiated the establishment of the plan of care.  This patient will be followed by a physician who will periodically review the plan of care.                      Sincerely,        Marty Hernandez, CNP

## 2024-03-30 RX ORDER — LANOLIN ALCOHOL/MO/W.PET/CERES
3 CREAM (GRAM) TOPICAL
COMMUNITY
Start: 2024-03-30

## 2024-03-30 RX ORDER — METOPROLOL TARTRATE 25 MG/1
25 TABLET, FILM COATED ORAL 2 TIMES DAILY
COMMUNITY
Start: 2024-03-30 | End: 2024-04-04

## 2024-04-04 ENCOUNTER — APPOINTMENT (OUTPATIENT)
Dept: CT IMAGING | Facility: HOSPITAL | Age: 89
DRG: 083 | End: 2024-04-04
Attending: EMERGENCY MEDICINE
Payer: COMMERCIAL

## 2024-04-04 ENCOUNTER — APPOINTMENT (OUTPATIENT)
Dept: RADIOLOGY | Facility: HOSPITAL | Age: 89
DRG: 083 | End: 2024-04-04
Attending: EMERGENCY MEDICINE
Payer: COMMERCIAL

## 2024-04-04 ENCOUNTER — HOSPITAL ENCOUNTER (INPATIENT)
Facility: HOSPITAL | Age: 89
LOS: 4 days | Discharge: NURSING, REHAB OR LONG-TERM CARE | DRG: 083 | End: 2024-04-08
Attending: EMERGENCY MEDICINE | Admitting: INTERNAL MEDICINE
Payer: COMMERCIAL

## 2024-04-04 DIAGNOSIS — G89.29 OTHER CHRONIC PAIN: Primary | ICD-10-CM

## 2024-04-04 DIAGNOSIS — R55 SYNCOPE, UNSPECIFIED SYNCOPE TYPE: ICD-10-CM

## 2024-04-04 DIAGNOSIS — I60.9 SUBARACHNOID HEMORRHAGE (H): ICD-10-CM

## 2024-04-04 LAB
ALBUMIN SERPL BCG-MCNC: 3.7 G/DL (ref 3.5–5.2)
ALBUMIN UR-MCNC: NEGATIVE MG/DL
ALP SERPL-CCNC: 66 U/L (ref 40–150)
ALT SERPL W P-5'-P-CCNC: 19 U/L (ref 0–50)
ANION GAP SERPL CALCULATED.3IONS-SCNC: 10 MMOL/L (ref 7–15)
APPEARANCE UR: CLEAR
AST SERPL W P-5'-P-CCNC: 30 U/L (ref 0–45)
BACTERIA #/AREA URNS HPF: ABNORMAL /HPF
BILIRUB SERPL-MCNC: 0.5 MG/DL
BILIRUB UR QL STRIP: NEGATIVE
BUN SERPL-MCNC: 27.7 MG/DL (ref 8–23)
CALCIUM SERPL-MCNC: 9.2 MG/DL (ref 8.8–10.2)
CHLORIDE SERPL-SCNC: 108 MMOL/L (ref 98–107)
COLOR UR AUTO: COLORLESS
CREAT SERPL-MCNC: 0.99 MG/DL (ref 0.51–0.95)
DEPRECATED HCO3 PLAS-SCNC: 23 MMOL/L (ref 22–29)
EGFRCR SERPLBLD CKD-EPI 2021: 54 ML/MIN/1.73M2
ERYTHROCYTE [DISTWIDTH] IN BLOOD BY AUTOMATED COUNT: 19.3 % (ref 10–15)
GLUCOSE SERPL-MCNC: 91 MG/DL (ref 70–99)
GLUCOSE UR STRIP-MCNC: NEGATIVE MG/DL
HCT VFR BLD AUTO: 28.8 % (ref 35–47)
HGB BLD-MCNC: 9.4 G/DL (ref 11.7–15.7)
HGB UR QL STRIP: NEGATIVE
HYALINE CASTS: 1 /LPF
KETONES UR STRIP-MCNC: ABNORMAL MG/DL
LEUKOCYTE ESTERASE UR QL STRIP: NEGATIVE
MAGNESIUM SERPL-MCNC: 1.9 MG/DL (ref 1.7–2.3)
MCH RBC QN AUTO: 30.7 PG (ref 26.5–33)
MCHC RBC AUTO-ENTMCNC: 32.6 G/DL (ref 31.5–36.5)
MCV RBC AUTO: 94 FL (ref 78–100)
NITRATE UR QL: NEGATIVE
PH UR STRIP: 6.5 [PH] (ref 5–7)
PLATELET # BLD AUTO: 177 10E3/UL (ref 150–450)
POTASSIUM SERPL-SCNC: 3.9 MMOL/L (ref 3.4–5.3)
PROT SERPL-MCNC: 5.9 G/DL (ref 6.4–8.3)
RADIOLOGIST FLAGS: ABNORMAL
RBC # BLD AUTO: 3.06 10E6/UL (ref 3.8–5.2)
RBC URINE: <1 /HPF
SODIUM SERPL-SCNC: 141 MMOL/L (ref 135–145)
SP GR UR STRIP: 1.01 (ref 1–1.03)
SQUAMOUS EPITHELIAL: 1 /HPF
TROPONIN T SERPL HS-MCNC: 45 NG/L
UROBILINOGEN UR STRIP-MCNC: <2 MG/DL
WBC # BLD AUTO: 10.6 10E3/UL (ref 4–11)
WBC URINE: 4 /HPF

## 2024-04-04 PROCEDURE — 72125 CT NECK SPINE W/O DYE: CPT

## 2024-04-04 PROCEDURE — 81001 URINALYSIS AUTO W/SCOPE: CPT | Performed by: EMERGENCY MEDICINE

## 2024-04-04 PROCEDURE — 70450 CT HEAD/BRAIN W/O DYE: CPT | Mod: 76

## 2024-04-04 PROCEDURE — 73502 X-RAY EXAM HIP UNI 2-3 VIEWS: CPT

## 2024-04-04 PROCEDURE — 70450 CT HEAD/BRAIN W/O DYE: CPT

## 2024-04-04 PROCEDURE — 93005 ELECTROCARDIOGRAM TRACING: CPT | Performed by: EMERGENCY MEDICINE

## 2024-04-04 PROCEDURE — 99291 CRITICAL CARE FIRST HOUR: CPT | Mod: 25

## 2024-04-04 PROCEDURE — 84484 ASSAY OF TROPONIN QUANT: CPT | Performed by: EMERGENCY MEDICINE

## 2024-04-04 PROCEDURE — 258N000003 HC RX IP 258 OP 636: Performed by: EMERGENCY MEDICINE

## 2024-04-04 PROCEDURE — 70496 CT ANGIOGRAPHY HEAD: CPT

## 2024-04-04 PROCEDURE — 85027 COMPLETE CBC AUTOMATED: CPT | Performed by: EMERGENCY MEDICINE

## 2024-04-04 PROCEDURE — 99222 1ST HOSP IP/OBS MODERATE 55: CPT | Performed by: PHYSICIAN ASSISTANT

## 2024-04-04 PROCEDURE — 250N000013 HC RX MED GY IP 250 OP 250 PS 637: Performed by: INTERNAL MEDICINE

## 2024-04-04 PROCEDURE — 83735 ASSAY OF MAGNESIUM: CPT | Performed by: EMERGENCY MEDICINE

## 2024-04-04 PROCEDURE — 99222 1ST HOSP IP/OBS MODERATE 55: CPT | Performed by: INTERNAL MEDICINE

## 2024-04-04 PROCEDURE — 120N000001 HC R&B MED SURG/OB

## 2024-04-04 PROCEDURE — 250N000011 HC RX IP 250 OP 636: Mod: JZ | Performed by: EMERGENCY MEDICINE

## 2024-04-04 PROCEDURE — 36415 COLL VENOUS BLD VENIPUNCTURE: CPT | Performed by: EMERGENCY MEDICINE

## 2024-04-04 PROCEDURE — 250N000011 HC RX IP 250 OP 636: Performed by: EMERGENCY MEDICINE

## 2024-04-04 PROCEDURE — 80053 COMPREHEN METABOLIC PANEL: CPT | Performed by: EMERGENCY MEDICINE

## 2024-04-04 RX ORDER — METOPROLOL SUCCINATE 25 MG/1
25 TABLET, EXTENDED RELEASE ORAL EVERY EVENING
COMMUNITY

## 2024-04-04 RX ORDER — LEVETIRACETAM 250 MG/1
250 TABLET ORAL 2 TIMES DAILY
Status: DISCONTINUED | OUTPATIENT
Start: 2024-04-04 | End: 2024-04-04

## 2024-04-04 RX ORDER — IOPAMIDOL 755 MG/ML
67 INJECTION, SOLUTION INTRAVASCULAR ONCE
Status: COMPLETED | OUTPATIENT
Start: 2024-04-04 | End: 2024-04-04

## 2024-04-04 RX ORDER — AMOXICILLIN 250 MG
1 CAPSULE ORAL 2 TIMES DAILY PRN
Status: DISCONTINUED | OUTPATIENT
Start: 2024-04-04 | End: 2024-04-08 | Stop reason: HOSPADM

## 2024-04-04 RX ORDER — NALOXONE HYDROCHLORIDE 0.4 MG/ML
0.4 INJECTION, SOLUTION INTRAMUSCULAR; INTRAVENOUS; SUBCUTANEOUS
Status: DISCONTINUED | OUTPATIENT
Start: 2024-04-04 | End: 2024-04-08 | Stop reason: HOSPADM

## 2024-04-04 RX ORDER — METOPROLOL SUCCINATE 25 MG/1
25 TABLET, EXTENDED RELEASE ORAL EVERY EVENING
Status: DISCONTINUED | OUTPATIENT
Start: 2024-04-04 | End: 2024-04-08 | Stop reason: HOSPADM

## 2024-04-04 RX ORDER — OXYCODONE HYDROCHLORIDE 5 MG/1
5 TABLET ORAL EVERY 6 HOURS PRN
Status: DISCONTINUED | OUTPATIENT
Start: 2024-04-04 | End: 2024-04-08 | Stop reason: HOSPADM

## 2024-04-04 RX ORDER — NITROGLYCERIN 0.4 MG/1
0.4 TABLET SUBLINGUAL EVERY 5 MIN PRN
Status: DISCONTINUED | OUTPATIENT
Start: 2024-04-04 | End: 2024-04-08 | Stop reason: HOSPADM

## 2024-04-04 RX ORDER — ACETAMINOPHEN 500 MG
500 TABLET ORAL 3 TIMES DAILY
COMMUNITY

## 2024-04-04 RX ORDER — NALOXONE HYDROCHLORIDE 0.4 MG/ML
0.2 INJECTION, SOLUTION INTRAMUSCULAR; INTRAVENOUS; SUBCUTANEOUS
Status: DISCONTINUED | OUTPATIENT
Start: 2024-04-04 | End: 2024-04-08 | Stop reason: HOSPADM

## 2024-04-04 RX ORDER — TIZANIDINE 2 MG/1
2 TABLET ORAL 3 TIMES DAILY
Status: DISCONTINUED | OUTPATIENT
Start: 2024-04-04 | End: 2024-04-05

## 2024-04-04 RX ORDER — HYDRALAZINE HYDROCHLORIDE 10 MG/1
10 TABLET, FILM COATED ORAL EVERY 4 HOURS PRN
Status: DISCONTINUED | OUTPATIENT
Start: 2024-04-04 | End: 2024-04-05

## 2024-04-04 RX ORDER — LIDOCAINE 50 MG/G
OINTMENT TOPICAL 3 TIMES DAILY PRN
COMMUNITY

## 2024-04-04 RX ORDER — ASPIRIN 81 MG/1
81 TABLET ORAL DAILY
Status: ON HOLD | COMMUNITY
End: 2024-04-06

## 2024-04-04 RX ORDER — HYDRALAZINE HYDROCHLORIDE 10 MG/1
10 TABLET, FILM COATED ORAL EVERY 4 HOURS PRN
Status: DISCONTINUED | OUTPATIENT
Start: 2024-04-04 | End: 2024-04-04

## 2024-04-04 RX ORDER — LEVETIRACETAM 500 MG/1
500 TABLET ORAL 2 TIMES DAILY
Status: DISCONTINUED | OUTPATIENT
Start: 2024-04-04 | End: 2024-04-08 | Stop reason: HOSPADM

## 2024-04-04 RX ORDER — POLYETHYLENE GLYCOL 3350 17 G/17G
17 POWDER, FOR SOLUTION ORAL DAILY PRN
Status: DISCONTINUED | OUTPATIENT
Start: 2024-04-04 | End: 2024-04-08 | Stop reason: HOSPADM

## 2024-04-04 RX ORDER — ACETAMINOPHEN 325 MG/1
650 TABLET ORAL EVERY 4 HOURS PRN
Status: DISCONTINUED | OUTPATIENT
Start: 2024-04-04 | End: 2024-04-08 | Stop reason: HOSPADM

## 2024-04-04 RX ORDER — HYDRALAZINE HYDROCHLORIDE 20 MG/ML
10 INJECTION INTRAMUSCULAR; INTRAVENOUS EVERY 4 HOURS PRN
Status: DISCONTINUED | OUTPATIENT
Start: 2024-04-04 | End: 2024-04-04

## 2024-04-04 RX ORDER — HYDRALAZINE HYDROCHLORIDE 20 MG/ML
10 INJECTION INTRAMUSCULAR; INTRAVENOUS EVERY 4 HOURS PRN
Status: DISCONTINUED | OUTPATIENT
Start: 2024-04-04 | End: 2024-04-05

## 2024-04-04 RX ORDER — AMOXICILLIN 250 MG
2 CAPSULE ORAL 2 TIMES DAILY PRN
Status: DISCONTINUED | OUTPATIENT
Start: 2024-04-04 | End: 2024-04-08 | Stop reason: HOSPADM

## 2024-04-04 RX ORDER — PREGABALIN 25 MG/1
25 CAPSULE ORAL 2 TIMES DAILY
Status: DISCONTINUED | OUTPATIENT
Start: 2024-04-04 | End: 2024-04-08 | Stop reason: HOSPADM

## 2024-04-04 RX ORDER — IPRATROPIUM BROMIDE AND ALBUTEROL SULFATE 2.5; .5 MG/3ML; MG/3ML
3 SOLUTION RESPIRATORY (INHALATION) EVERY 4 HOURS PRN
Status: DISCONTINUED | OUTPATIENT
Start: 2024-04-04 | End: 2024-04-08 | Stop reason: HOSPADM

## 2024-04-04 RX ORDER — CALCIUM CARBONATE 500 MG/1
1000 TABLET, CHEWABLE ORAL 4 TIMES DAILY PRN
Status: DISCONTINUED | OUTPATIENT
Start: 2024-04-04 | End: 2024-04-08 | Stop reason: HOSPADM

## 2024-04-04 RX ORDER — LIDOCAINE 40 MG/G
CREAM TOPICAL
Status: DISCONTINUED | OUTPATIENT
Start: 2024-04-04 | End: 2024-04-08 | Stop reason: HOSPADM

## 2024-04-04 RX ORDER — PROCHLORPERAZINE 25 MG
12.5 SUPPOSITORY, RECTAL RECTAL EVERY 12 HOURS PRN
Status: DISCONTINUED | OUTPATIENT
Start: 2024-04-04 | End: 2024-04-08 | Stop reason: HOSPADM

## 2024-04-04 RX ORDER — ROPINIROLE 1 MG/1
2 TABLET, FILM COATED ORAL AT BEDTIME
Status: DISCONTINUED | OUTPATIENT
Start: 2024-04-04 | End: 2024-04-08 | Stop reason: HOSPADM

## 2024-04-04 RX ORDER — ATORVASTATIN CALCIUM 40 MG/1
40 TABLET, FILM COATED ORAL EVERY EVENING
Status: DISCONTINUED | OUTPATIENT
Start: 2024-04-04 | End: 2024-04-08 | Stop reason: HOSPADM

## 2024-04-04 RX ORDER — PROCHLORPERAZINE MALEATE 5 MG
5 TABLET ORAL EVERY 6 HOURS PRN
Status: DISCONTINUED | OUTPATIENT
Start: 2024-04-04 | End: 2024-04-08 | Stop reason: HOSPADM

## 2024-04-04 RX ORDER — ACETAMINOPHEN 650 MG/1
650 SUPPOSITORY RECTAL EVERY 4 HOURS PRN
Status: DISCONTINUED | OUTPATIENT
Start: 2024-04-04 | End: 2024-04-08 | Stop reason: HOSPADM

## 2024-04-04 RX ADMIN — LEVETIRACETAM 500 MG: 100 INJECTION, SOLUTION INTRAVENOUS at 14:28

## 2024-04-04 RX ADMIN — OXYCODONE HYDROCHLORIDE 5 MG: 5 TABLET ORAL at 17:46

## 2024-04-04 RX ADMIN — HYDRALAZINE HYDROCHLORIDE 10 MG: 10 TABLET, FILM COATED ORAL at 17:47

## 2024-04-04 RX ADMIN — METOPROLOL SUCCINATE 25 MG: 25 TABLET, EXTENDED RELEASE ORAL at 21:36

## 2024-04-04 RX ADMIN — PREGABALIN 25 MG: 25 CAPSULE ORAL at 21:35

## 2024-04-04 RX ADMIN — TIZANIDINE 2 MG: 2 TABLET ORAL at 21:35

## 2024-04-04 RX ADMIN — ATORVASTATIN CALCIUM 40 MG: 40 TABLET, FILM COATED ORAL at 21:35

## 2024-04-04 RX ADMIN — IOPAMIDOL 67 ML: 755 INJECTION, SOLUTION INTRAVENOUS at 14:08

## 2024-04-04 RX ADMIN — LEVETIRACETAM 500 MG: 500 TABLET, FILM COATED ORAL at 21:36

## 2024-04-04 RX ADMIN — ROPINIROLE HYDROCHLORIDE 2 MG: 1 TABLET, FILM COATED ORAL at 21:36

## 2024-04-04 ASSESSMENT — ACTIVITIES OF DAILY LIVING (ADL)
ADLS_ACUITY_SCORE: 28
ADLS_ACUITY_SCORE: 26
ADLS_ACUITY_SCORE: 26
ADLS_ACUITY_SCORE: 39
ADLS_ACUITY_SCORE: 39
ADLS_ACUITY_SCORE: 26
ADLS_ACUITY_SCORE: 28
ADLS_ACUITY_SCORE: 39
ADLS_ACUITY_SCORE: 39
ADLS_ACUITY_SCORE: 26
ADLS_ACUITY_SCORE: 39
ADLS_ACUITY_SCORE: 28
ADLS_ACUITY_SCORE: 28

## 2024-04-04 ASSESSMENT — COLUMBIA-SUICIDE SEVERITY RATING SCALE - C-SSRS
2. HAVE YOU ACTUALLY HAD ANY THOUGHTS OF KILLING YOURSELF IN THE PAST MONTH?: NO
6. HAVE YOU EVER DONE ANYTHING, STARTED TO DO ANYTHING, OR PREPARED TO DO ANYTHING TO END YOUR LIFE?: NO
1. IN THE PAST MONTH, HAVE YOU WISHED YOU WERE DEAD OR WISHED YOU COULD GO TO SLEEP AND NOT WAKE UP?: NO

## 2024-04-04 NOTE — CONSULTS
Neurosurgery Consult    HPI    Ms. Alvarez is a 89-year-old female who presented to the emergency department after syncope and being found on the floor.  Head CT scan was performed in the ER, demonstrated right sylvian fissure subarachnoid hemorrhage.  Follow-up CT negative for aneurysm.    Patient not reporting headache currently.    Medical history   CAD, asthma, stage III CKD, CHF, Crohn's disease, GERD, hypertension, and prior CVA         B/P: 155/70, T: 97.8, P: 65, R: 17       Exam    Alert and in no acute distress  Moving all extremities, right lower extremity guarded due to pain in the hip.  Finger-nose slow and accurate  Negative pronator drift  Extraocular movements intact  Pupils equal and reactive        Imaging    IMPRESSION:  1.  Small volume subarachnoid blood products layering along the right sylvian fissure.  2.  Additional chronic findings as described.    Assessment    Status post fall and syncope  Subarachnoid hemorrhage in the right sylvian fissure  CTA negative for aneurysm    Plan:    Recommend a head CTA, or MRA studies defer to radiologist preference) due to location of the bleed in the sylvian fissure, which could represent a potential MCA aneurysm.      Recommend repeat head CT scan in 6 hours for stability.    Blood pressure less than 150.  Head of bed greater than 30 degrees.  Hold baby aspirin.  Begin Keppra 500 mg twice daily for 7 days.    If head CT is stable, and negative for aneurysm, okay to stay at [facility].     Would also recommend outpatient follow-up with vascular neurosurgery in several months, to rule out potential aneurysm as current CTA imaging is negative, and may represent thrombus in the aneurysm which when resolved may be able to be detected at a later date.    Discussed with Dr. Brannon

## 2024-04-04 NOTE — ED NOTES
[facility] ED Handoff Report    ED Chief Complaint: syncope/fall    ED Diagnosis:  (I60.9) Subarachnoid hemorrhage (H)  Comment:   Plan:     (R55) Syncope, unspecified syncope type  Comment:   Plan:        PMH:    Past Medical History:   Diagnosis Date    Arthritis     Asthma     CAD (coronary artery disease)     Cancer (H)     basal cell    Chronic kidney disease     ckd 3    Chronic pain syndrome     Congestive heart failure (H)     Crohn's disease (H)     GERD (gastroesophageal reflux disease)     Hx of heart artery stent 11/01/2016    1 stent R Proximal CA and 1 Stent L Proximal circumflex  12/2016 Stent proximal LAD    Hyperlipidemia     Hypertension     NSTEMI (non-ST elevated myocardial infarction) (H) 11/01/2016    PONV (postoperative nausea and vomiting)     severe povn with last knee surgery    Restless legs syndrome     Stroke (H) 01/01/2010    numbness rt jaw        Code Status:  Prior     Falls Risk: Yes Band: Applied    Current Living Situation/Residence: lives alone     Elimination Status: Continent: Yes     Activity Level: SBA w/ walker    Patients Preferred Language:  English     Needed: No    Vital Signs:  /60   Pulse 64   Temp 97.8  F (36.6  C) (Oral)   Resp 16   Ht 1.524 m (5')   Wt 52.6 kg (116 lb)   SpO2 92%   BMI 22.65 kg/m       Cardiac Rhythm: nsr    Pain Score: 6/10    Is the Patient Confused:  No    Last Food or Drink: 04/04/24 at breakfast.    Focused Assessment:  pt was at home and home care came to assess pt.  S/p R hip surgery.  Was in TCU for a time before going home.  Pt was found to be very drowsy and when walking while home care was there had syncopal episode and fell.  Pt c/o R hip pain.  Ems transported and pt given fentanyl intranasal 25mcg enroute.  Pt cont to be drowsy on arrival but pleasant and interactive when awake--falls right back to sleep after finishing sentence.  Easily arousable to voice.  Reports pain 6/10 in low back and R hip.   Keppra infusing now.     Tests Performed: Done: Labs and Imaging    Treatments Provided:  see mar    Family Dynamics/Concerns: No      Belongings Checklist Done and Signed by Patient: Yes    Belongings Sent with Patient: 1 bag    Medications sent with patient: na    Covid: asymptomatic , none    Additional Information: daughter was here very briefly but gone now. Pt arousable to voice.      RN: Betty Conrad RN   4/4/2024 2:16 PM

## 2024-04-04 NOTE — H&P
[facility]    History and Physical - Hospitalist Service       Date of Admission:  4/4/2024    Assessment & Plan   Sherice Alvarez is 89 year old female with history of CAD, chronic pain, lumbar radiculopathy, CAD, aortic Stenosis (s/p TAVR), asthma, stage III CKD, GERD, hypertension, and prior CVA who presents to the ER with episode of syncope, head trauma and subarachnoid hemorrhage.    Subarachnoid hemorrhage in the right sylvian fissure  Episode of fall  Head CT showed small volume subarachnoid blood products layering along the right sylvian fissure.  Per neurosurgery service, hemorrhage in the sylvian fissure could be secondary to MCA aneurysm for which CT angiogram or MRA is recommended    Keppra twice daily for 7 days per neurosurgery service  Follow-up repeat head CT scan  CT angiogram or MRA per neurosurgery service for evaluation for possible MCA aneurysm.  PT/OT  Frequent neurochecks  Neurosurgery [id number] assistance    Syncope  Unclear whether this is cardiogenic given cardiac history including aortic stenosis status post TAVR, HFpEF and CAD vs vasovagal event.  Echocardiogram performed 10/13/2023 revealed normal LV systolic function with LVEF of 60 to 65%, well-seated 23 mm bioprosthetic Chilango S3 TAVR valve in aortic position.  Brain CT was negative for acute intracranial process.    Follow-up echocardiogram  Telemetry for now  Fall precaution  Monitor electrolytes and replace as needed    Right hip pain  Chronic pain  She underwent open reduction internal fixation right proximal femur peritrochanteric fracture on 2/10/2024 and was subsequently discharged to TCU.  Patient recently returned home from TCU. Pelvic x-ray showed postoperative changes from instrumented fixation of the right femur transfixing a comminuted proximal femur fracture which demonstrates some interval incomplete healing when compared to the prior study and no evidence of displaced pelvic  fracture.      Analgesics as needed    Hypertension  Resume PTA metoprolol succinate  IV hydralazine as needed    History of CAD  Resume PTA metoprolol succinate  Hold PTA aspirin due to subarachnoid hemorrhage    History of asthma  Not in acute exacerbation  DuoNebs as needed  Supplemental oxygen as needed    Stage III CKD  Creatinine is at baseline  Monitor BMP  Avoid nephrotoxin    History of HFpEF  Aortic stenosis status post TAVR  Not decompensated at this time  Not on diuretic therapy PTA  Continue PTA metoprolol succinate  Avoid nephrotoxin  Monitor daily weight    Initial blood pressure was 178/76, pulse 63, respiratory rate 16, temperature 97.8  F and oxygen saturation of 96% on room air.  Notable laboratory findings include chloride 108, creatinine 0.99, total protein 5.9, initial high-sensitivity troponin 45, normal WBC and hemoglobin 9.4.  EKG showed sinus rhythm with first-degree AV block.  Echocardiogram done in December 2023 revealed moderate LVH with normal LV systolic function and LVEF of 60 to 65% and well seated 23 mm bioprosthetic Chilango S3 TAVR valve in aortic position.       She received Keppra 500 mg in the ER. She wants to be full code.    Diet: Combination Diet Low Saturated Fat Na <2400mg Diet, No Caffeine Diet  DVT Prophylaxis: Pneumatic Compression Devices  Tyson Catheter: Not present  Lines: None     Cardiac Monitoring: None  Code Status: Full Code    Clinically Significant Risk Factors Present on Admission                # Drug Induced Platelet Defect: home medication list includes an antiplatelet medication   # Hypertension: Noted on problem list  # Chronic heart failure with preserved ejection fraction: heart failure noted on problem list and last echo with EF >50%         # Asthma: noted on problem list        Disposition Plan      Expected Discharge Date: 04/06/2024                  Lindy Mcduffie MD  Hospitalist Service  [facility]  Securely message  with Sj (more info)  Text page via [facility] Paging/Directory     ______________________________________________________________________    Chief Complaint   Syncope and trauma    History is obtained from the patient    History of Present Illness   Sherice Alvarez is 89 year old female with history of CAD, asthma, stage III CKD, CHF, Crohn's disease, GERD, hypertension, and prior CVA who presents to the ER with episode of syncope and head trauma    She was in her usual state of health until today during visit by the visiting nurse when patient suddenly passed out and sustained injury to her head.  She states he felt lightheaded before she passed out. She denies leg weakness. She endorsed right hip pain attributed to recent surgery for right hip.  She was intermittently drowsy but arousable and able to engage meaningful conversation in the ER.  She underwent open reduction internal fixation right proximal femur peritrochanteric fracture on 2/10/2024 and was subsequently discharged to TCU.  Patient recently returned home from TCU.    Head CT showed small volume subarachnoid blood products layering along the right sylvian fissure.  Per ER attending, neurosurgery team recommends hospital admission, Keppra with plan to repeat head CT at 5 PM.    Initial blood pressure was 178/76, pulse 63, respiratory rate 16, temperature 97.8  F and oxygen saturation of 96% on room air.  Notable laboratory findings include chloride 108, creatinine 0.99, total protein 5.9, initial high-sensitivity troponin 45, normal WBC and hemoglobin 9.4.  EKG showed sinus rhythm with first-degree AV block.  Echocardiogram done in December 2023 revealed moderate LVH with normal LV systolic function and LVEF of 60 to 65% and well seated 23 mm bioprosthetic Chilango S3 TAVR valve in aortic position.     Pelvic x-ray showed postoperative changes from instrumented fixation of the right femur transfixing a comminuted proximal femur fracture which  demonstrates some interval incomplete healing when compared to the prior study and no evidence of displaced pelvic fracture.  Head CT showed small volume subarachnoid blood products layering along the right sylvian fissure.     She received Keppra 500 mg in the ER. She wants to be full code.    Past Medical History    Past Medical History:   Diagnosis Date    Arthritis     Asthma     CAD (coronary artery disease)     Cancer (H)     basal cell    Chronic kidney disease     ckd 3    Chronic pain syndrome     Congestive heart failure (H)     Crohn's disease (H)     GERD (gastroesophageal reflux disease)     Hx of heart artery stent 2016    1 stent R Proximal CA and 1 Stent L Proximal circumflex  2016 Stent proximal LAD    Hyperlipidemia     Hypertension     NSTEMI (non-ST elevated myocardial infarction) (H) 2016    PONV (postoperative nausea and vomiting)     severe povn with last knee surgery    Restless legs syndrome     Stroke (H) 2010    numbness rt jaw       Past Surgical History   Past Surgical History:   Procedure Laterality Date    APPENDECTOMY      CARDIAC CATHETERIZATION  2016    multiple vessel disease.  no intervention    CARDIAC CATHETERIZATION  2016    CARDIAC CATHETERIZATION N/A 2016    Procedure: Coronary Angiogram;  Surgeon: Sunil Moran MD;  Location: [facility] Cath [facility];  Service:     CAROTID ENDARTERECTOMY      CAROTID ENDARTERECTOMY Right 2010    CERVICAL LAMINECTOMY  1994     SECTION      CV CORONARY ANGIOGRAM N/A 2020    Procedure: Coronary Angiogram;  Surgeon: Vinita Ramos MD;  Location: [facility] Cath [facility];  Service: Cardiology    CV LEFT HEART CATHETERIZATION WITHOUT LEFT VENTRICULOGRAM Left 2020    Procedure: Left Heart Catheterization Without Left Ventriculogram;  Surgeon: Jerad Lerner MD;  Location: [facility];  Service: Cardiology    CV TRANSCATHETER AORTIC VALVE REPLACEMENT - OTHER APPROACH  N/A 06/02/2020    Procedure: CV TRANSCATHETER AORTIC VALVE REPLACEMENT - RIGHT SUBCLAVIAN APPROACH;  Surgeon: John Caceres MD;  Location: [facility] Cath Lab;  Service: Cardiology    HEART CATH, ANGIOPLASTY      HEMORRHOIDECTOMY EXTERNAL      IR LUMBAR EPIDURAL STEROID INJECTION  12/30/2014    IR LUMBAR NERVE ROOT INJECTION  10/01/2013    JOINT REPLACEMENT      OPEN REDUCTION INTERNAL FIXATION HIP NAILING Right 2/10/2024    Procedure: INTERNAL FIXATION, FRACTURE, TROCHANTERIC, HIP, USING PINS OR RODS RIGHT;  Surgeon: Gilberto Joy MD;  Location: [facility]    DC ESOPHAGOGASTRODUODENOSCOPY TRANSORAL DIAGNOSTIC N/A 07/10/2019    Procedure: ESOPHAGOGASTRODUODENOSCOPY (EGD) with gastric biopsy;  Surgeon: Sunil Stuart MD;  Location: [facility] GI;  Service: Gastroenterology    DC REPLACE AORTIC VALVE OPEN AXILLRY ARTRY APPROACH N/A 06/02/2020    Procedure: OR TRANSCATHETER AORTIC VALVE REPLACEMENT, SUBCLAVIAN APPROACH;  Surgeon: Bhavin Cuadra MD;  Location: [facility];  Service: Cardiology    TONSILLECTOMY & ADENOIDECTOMY      TOTAL KNEE ARTHROPLASTY Right     TRANSCATHETER AORTIC-VALVE REPLACEMENT      ZZC TOTAL KNEE ARTHROPLASTY Left 05/11/2021    Procedure: LEFT TOTAL KNEE ARTHROPLASTY;  Surgeon: Doug Rhodes MD;  Location: [facility];  Service: Orthopedics       Prior to Admission Medications   Prior to Admission Medications   Prescriptions Last Dose Informant Patient Reported? Taking?   Lidocaine (LIDOCARE) 4 % Patch 4/4/2024 at am  No Yes   Sig: Place 2 patches onto the skin every 24 hours To prevent lidocaine toxicity, patient should be patch free for 12 hrs daily.   Multiple Vitamin (THERA-TABS) TABS Unknown at prn  Yes Yes   Sig: Take 1 tablet by mouth daily as needed (when desired)   acetaminophen (TYLENOL) 500 MG tablet 4/4/2024 at x1  Yes Yes   Sig: Take 500 mg by mouth 3 times daily   aspirin 81 MG EC tablet 4/4/2024 at am  Yes Yes   Sig: Take 81 mg by  mouth daily   atorvastatin (LIPITOR) 40 MG tablet 4/3/2024 at pm  No Yes   Sig: Take 1 tablet (40 mg) by mouth every evening   diclofenac (VOLTAREN) 1 % topical gel Unknown at prn  Yes Yes   Sig: Apply 2-4 g topically 4 times daily as needed APPLY TO LEFT SHOULDER AND LEFT KNEE.   lidocaine (XYLOCAINE) 5 % external ointment Unknown at prn  Yes Yes   Sig: Apply topically 3 times daily as needed for moderate pain   melatonin 3 MG tablet Unknown at prn  Yes Yes   Sig: Take 3 mg by mouth nightly as needed for sleep   metoprolol succinate ER (TOPROL XL) 25 MG 24 hr tablet 4/3/2024 at pm  Yes Yes   Sig: Take 25 mg by mouth every evening   nitroGLYcerin (NITROSTAT) 0.4 MG sublingual tablet Unknown at prn  No Yes   Sig: Place 1 tablet (0.4 mg) under the tongue every 5 minutes as needed   ondansetron (ZOFRAN) 4 MG tablet Unknown at prn  Yes Yes   Sig: Take 4 mg by mouth every 6 hours as needed for nausea   oxyCODONE (ROXICODONE) 5 MG tablet Unknown at see med hx note  No Yes   Sig: Take 1 tablet (5 mg) by mouth 2 times daily. May also take 1 tablet (5 mg) every 12 hours as needed for severe pain. not within 6 hours of previous dose.   pregabalin (LYRICA) 25 MG capsule 4/4/2024 at am  Yes Yes   Sig: Take 1 capsule (25 mg) by mouth 2 times daily   rOPINIRole (REQUIP) 2 MG tablet 4/3/2024 at pm  Yes Yes   Sig: Take 2 mg by mouth at bedtime   senna-docusate (SENOKOT-S/PERICOLACE) 8.6-50 MG tablet Unknown at prn  No Yes   Sig: Take 2 tablets by mouth 2 times daily as needed for constipation   tiZANidine (ZANAFLEX) 2 MG tablet 4/4/2024 at x1  Yes Yes   Sig: Take 1 tablet (2 mg) by mouth 3 times daily      Facility-Administered Medications: None        Review of Systems    The 10 point Review of Systems is negative other than noted in the HPI or here.     Physical Exam   Vital Signs: Temp: 97.9  F (36.6  C) Temp src: Oral BP: (!) 170/77 Pulse: 72   Resp: 18 SpO2: 95 % O2 Device: None (Room air)    Weight: 126 lbs 1.65 oz    General  appearance: Drowsy but arousable, awake, Alert, Cooperative, not in any obvious distress and appears stated age   HEENT: Normocephalic, atraumatic, conjunctiva clear without icterus and ears without discharge  Lungs: Clear to auscultation bilaterally, no wheezing, good air exchange, normal work of breathing  Cardiovascular: Regular Rate and Rythm, normal apical impulse, normal S1 and S2, close lower extremity edema bilaterally  Abdomen: Soft, non-tender and Non-distended, active bowel sounds  Skin: Skin color, texture normal and bruising or bleeding. No rashes or lesions over face, neck, arms and legs, turgor normal.  Musculoskeletal: No bony deformities or joint tenderness. Normal ROM upon flexion & extension.   Neurologic: Drowsy but arousable, facial symmetry preserved.  Power 3/5 globally  Psychiatric: Unable to assess      Medical Decision Making       60 MINUTES SPENT BY ME on the date of service doing chart review, history, exam, documentation & further activities per the note.      Data     I have personally reviewed the following data over the past 24 hrs:    10.6  \   9.4 (L)   / 177     141 108 (H) 27.7 (H) /  91   3.9 23 0.99 (H) \     ALT: 19 AST: 30 AP: 66 TBILI: 0.5   ALB: 3.7 TOT PROTEIN: 5.9 (L) LIPASE: N/A     Trop: 45 (H) BNP: N/A       Imaging results reviewed over the past 24 hrs:   Recent Results (from the past 24 hour(s))   Head CT w/o contrast   Result Value    Radiologist flags Acute intracranial hemorrhage (AA)    Narrative    EXAM: CT HEAD W/O CONTRAST  LOCATION: [facility]  DATE: 4/4/2024    INDICATION: Syncope, confusion  COMPARISON: MRI head 10/08/2019.  TECHNIQUE: Routine CT Head without IV contrast. Multiplanar reformats. Dose reduction techniques were used.    FINDINGS:  INTRACRANIAL CONTENTS: Small volume subarachnoid blood products layering along the right sylvian fissure; no associated mass effect, no additional foci of hemorrhage. No findings to suggest  intracranial mass. No CT evidence of acute infarct. Moderate   presumed chronic small vessel ischemic changes. Moderate generalized volume loss. No hydrocephalus.     VISUALIZED ORBITS/SINUSES/MASTOIDS: No intraorbital abnormality. No paranasal sinus mucosal disease. No middle ear or mastoid effusion.    BONES/SOFT TISSUES: No acute abnormality.      Impression    IMPRESSION:  1.  Small volume subarachnoid blood products layering along the right sylvian fissure.  2.  Additional chronic findings as described.      [Critical Result: Acute intracranial hemorrhage]    Finding was identified on 4/4/2024 12:51 PM CDT.     Dr. Zuniga was contacted by me on 4/4/2024 1:02 PM CDT and verbalized understanding of the critical result.   CT Cervical Spine w/o Contrast    Narrative    EXAM: CT CERVICAL SPINE W/O CONTRAST  LOCATION: [facility]  DATE: 4/4/2024    INDICATION: Syncope  COMPARISON: None.  TECHNIQUE: Routine CT Cervical Spine without IV contrast. Multiplanar reformats. Dose reduction techniques were used.    FINDINGS:  VERTEBRA: Marked degenerative changes throughout the cervical spine with bilateral facet arthropathy and multilevel ankylosis. Associated grade 1 anterolisthesis at C3-C4, C7-T1, and T1-T2. Assessment somewhat degraded due to osseous demineralization and   artifact through the mid cervical spine. Vertebral body heights within normal limits. No discernible acute fracture.     CANAL/FORAMINA: No high-grade spinal canal stenosis.    PARASPINAL: Postsurgical changes throughout the right neck soft tissue.      Impression    IMPRESSION:  1.  No discernible acute traumatic abnormality within the cervical spine on this somewhat degraded exam.     XR Pelvis and Hip Right 2 Views    Narrative    EXAM: XR PELVIS AND HIP RIGHT 2 VIEWS  LOCATION: [facility]  DATE: 4/4/2024    INDICATION: Fall, right hip pain, recent ORIF  COMPARISON: 02/17/2024.      Impression     IMPRESSION:     Again seen are postoperative changes from instrumented fixation of the right femur transfixing a comminuted proximal femur fracture which demonstrates some interval incomplete healing when compared to the prior study. There is a persistently displaced   lesser trochanteric fragment, in similar alignment. No definite new right hip fracture is identified.     No displaced pelvic fracture. There is diffuse osseous demineralization, which reduces radiographic sensitivity for the detection of nondisplaced fractures. There is advanced left hip osteoarthritis. There are degenerative changes in the lower lumbar   spine and at the sacroiliac joints.   CTA Head Neck with Contrast    Narrative    EXAM: CTA HEAD NECK W CONTRAST  LOCATION: [facility]  DATE: 4/4/2024    INDICATION: Subarachnoid hemorrhage, right sylvian fissure  COMPARISON: CT head 04/04/2024 12:37 PM  CONTRAST: isovue 370 67ml  TECHNIQUE: Head and neck CT angiogram with IV contrast. Noncontrast head CT followed by axial helical CT images of the head and neck vessels obtained during the arterial phase of intravenous contrast administration. Axial 2D reconstructed images and   multiplanar 3D MIP reconstructed images of the head and neck vessels were performed by the technologist. Dose reduction techniques were used. All stenosis measurements made according to NASCET criteria unless otherwise specified.    FINDINGS:   NONCONTRAST HEAD CT:   INTRACRANIAL CONTENTS: Similar small volume subarachnoid hemorrhage at the right sylvian fissure. No new acute intracranial hemorrhage. No CT evidence of acute infarct. Moderate presumed chronic small vessel ischemic changes. Moderate generalized volume   loss. No hydrocephalus.     VISUALIZED ORBITS/SINUSES/MASTOIDS: Prior bilateral cataract surgery. Visualized portions of the orbits are otherwise unremarkable. No paranasal sinus mucosal disease. No middle ear or mastoid  effusion.    BONES/SOFT TISSUES: No acute abnormality.    HEAD CTA:  ANTERIOR CIRCULATION: No occlusion, aneurysm, or high flow vascular malformation. There are atherosclerotic calcifications of bilateral carotid siphons without hemodynamically significant stenosis. There is focal mild stenosis of the right middle   cerebral artery origin. Developmentally hypoplastic left A1 anterior cerebral artery segment.    POSTERIOR CIRCULATION: No occlusion, aneurysm, or high flow vascular malformation. Normal basilar artery. There are scattered mild-to-moderate stenoses of the bilateral posterior cerebral arteries, greatest at the left P1 segment.     DURAL VENOUS SINUSES: Not well evaluated on a technical basis.    NECK CTA:  RIGHT CAROTID: Atherosclerotic plaque results in less than 50% stenosis in the right ICA. No dissection.    LEFT CAROTID: Atherosclerotic plaque results in 70-90% stenosis in the left ICA. No dissection.    VERTEBRAL ARTERIES: There is severely diseased right vertebral artery including complete occlusion of the V1 segment and proximal V2 segment. The mid and distal right V2 segment demonstrates numerous multifocal moderate and severe stenoses. There is   occlusion at the junction of the right V3 and V4 segments vertebral artery. There is moderate stenosis of the left vertebral artery origin. The remaining left vertebral artery is widely patent.     AORTIC ARCH: Classic aortic arch anatomy with approximately 80% stenosis of the left subclavian artery origin.    NONVASCULAR STRUCTURES: Unremarkable.      Impression    IMPRESSION:   HEAD CT:  1.  Stable small volume subarachnoid hemorrhage at the right Sylvian fissure.  2.  No new acute intracranial hemorrhage.    HEAD CTA:  1.  No aneurysm or high flow vascular malformation identified.  2.  Scattered mild to moderate stenoses of the intracranial arterial vasculature.    NECK CTA:  1.  Atherosclerotic plaque results in 70-90% stenosis of the left internal  carotid artery.  2.  Atherosclerotic plaque results in less than 50% stenosis of the right internal carotid artery.  3.  Severely diseased right vertebral artery with multifocal areas of occlusion and multifocal moderate and severe stenoses in the mid and distal V2 segment.  4.  Moderate stenosis of the left vertebral artery origin.  5.  Approximately 80% stenosis of the left subclavian artery origin.     Head CT w/o contrast    Narrative    EXAM: CT HEAD W/O CONTRAST  LOCATION: [facility]  DATE: 4/4/2024    INDICATION: Follow up on subarachnoid hemorrhage  COMPARISON: 04/04/2024 1:52 PM  TECHNIQUE: Routine CT Head without IV contrast. Multiplanar reformats. Dose reduction techniques were used.    FINDINGS:  INTRACRANIAL CONTENTS: Unchanged small volume subarachnoid hemorrhage at the right sylvian fissure. No new acute intracranial hemorrhage. No CT evidence of acute infarct. Moderate presumed chronic small vessel ischemic changes. Moderate generalized   volume loss. No hydrocephalus.     VISUALIZED ORBITS/SINUSES/MASTOIDS: Prior bilateral cataract surgery. Visualized portions of the orbits are otherwise unremarkable. No paranasal sinus mucosal disease. No middle ear or mastoid effusion.    BONES/SOFT TISSUES: No acute abnormality.      Impression    IMPRESSION:  1.  Unchanged small volume subarachnoid hemorrhage at the right Sylvian fissure.  2.  No new acute intracranial abnormality.

## 2024-04-04 NOTE — ED NOTES
Bed: JNED-07  Expected date: 4/4/24  Expected time: 10:21 AM  Means of arrival:   Comments:  Braulio/syncope

## 2024-04-04 NOTE — PHARMACY-ADMISSION MEDICATION HISTORY
Pharmacist Admission Medication History    Admission medication history is complete. The information provided in this note is only as accurate as the sources available at the time of the update.    Information Source(s): Patient, Family member, and CareEverywhere/SureScripts via in-person and phone    Pertinent Information:   Regarding oxycodone and pain management:  -Pt had ~12 tabs after discharge from TCU 3/23. Pt memory per daughter is not always reliable, unsure when last dose of oxycodone was.   -Per daughter, pt goes to [facility] pain clinic and did have an appointment for tomorrow, 4/5. -Pain clinic states pt should be taking oxycodone scheduled (as appears on PTA list). Not for recent surgery alone but due to osteoarthritis in various areas.   -Would consider pain team consult to find optimal pain management for pt while here.    Changes made to PTA medication list:  Added: None  Deleted: None  Changed: requip to scheduled, , mvi/lido ointment to prn, apap dose change, asa to daily    Allergies reviewed with patient and updates made in EHR: unable to assess    Medication History Completed By: Tammy Turner [facility] 4/4/2024 4:18 PM    PTA Med List   Medication Sig Note Last Dose    acetaminophen (TYLENOL) 500 MG tablet Take 500 mg by mouth 3 times daily  4/4/2024 at x1    aspirin 81 MG EC tablet Take 81 mg by mouth daily  4/4/2024 at am    atorvastatin (LIPITOR) 40 MG tablet Take 1 tablet (40 mg) by mouth every evening  4/3/2024 at pm    diclofenac (VOLTAREN) 1 % topical gel Apply 2-4 g topically 4 times daily as needed APPLY TO LEFT SHOULDER AND LEFT KNEE. 4/4/2024: Uses at least once daily Unknown at prn    Lidocaine (LIDOCARE) 4 % Patch Place 2 patches onto the skin every 24 hours To prevent lidocaine toxicity, patient should be patch free for 12 hrs daily. 4/4/2024: To right thigh around incision site 4/4/2024 at am    lidocaine (XYLOCAINE) 5 % external ointment Apply topically 3 times daily as needed  for moderate pain  Unknown at prn    melatonin 3 MG tablet Take 3 mg by mouth nightly as needed for sleep  Unknown at prn    metoprolol succinate ER (TOPROL XL) 25 MG 24 hr tablet Take 25 mg by mouth every evening  4/3/2024 at pm    Multiple Vitamin (THERA-TABS) TABS Take 1 tablet by mouth daily as needed (when desired)  Unknown at prn    nitroGLYcerin (NITROSTAT) 0.4 MG sublingual tablet Place 1 tablet (0.4 mg) under the tongue every 5 minutes as needed  Unknown at prn    ondansetron (ZOFRAN) 4 MG tablet Take 4 mg by mouth every 6 hours as needed for nausea  Unknown at prn    oxyCODONE (ROXICODONE) 5 MG tablet Take 1 tablet (5 mg) by mouth 2 times daily. May also take 1 tablet (5 mg) every 12 hours as needed for severe pain. not within 6 hours of previous dose.  Unknown at see med hx note    pregabalin (LYRICA) 25 MG capsule Take 1 capsule (25 mg) by mouth 2 times daily  4/4/2024 at am    rOPINIRole (REQUIP) 2 MG tablet Take 2 mg by mouth at bedtime  4/3/2024 at pm    senna-docusate (SENOKOT-S/PERICOLACE) 8.6-50 MG tablet Take 2 tablets by mouth 2 times daily as needed for constipation  Unknown at prn    tiZANidine (ZANAFLEX) 2 MG tablet Take 1 tablet (2 mg) by mouth 3 times daily  4/4/2024 at x1   \

## 2024-04-04 NOTE — ED PROVIDER NOTES
EMERGENCY DEPARTMENT ENCOUNTER      NAME: Sherice Alvarez  AGE: 89 year old female  YOB: 1934  MRN: [id number]  EVALUATION DATE & TIME: No admission date for patient encounter.    PCP: Rosemary Mauricio    ED PROVIDER: Marko Zuniga M.D.      Chief Complaint   Patient presents with    Syncope         FINAL IMPRESSION:  Right-sided subarachnoid hemorrhage  Syncope  Right hip pain  Confusion    ED COURSE & MEDICAL DECISION MAKING:    Pertinent Labs & Imaging studies reviewed. (See chart for details)  89 year old female presents to the Emergency Department for evaluation of syncopal event.  Patient is not certain as to why she is actually here.  She states she recently left the TCU after having surgery on a fractured right hip.  Review of records also indicate that on 2/24/2024 patient had bilateral SI joint injections due to chronic low back pain.  Patient with history of spinal stenosis with neurogenic claudication.  Daughter had contacted primary care physician a few days ago regarding continuation of Lyrica that had been started in the TCU.  Per EMS home health nurse was checking on the patient.  Patient was apparently up and ambulatory without difficulties and suddenly syncopized.  She does not recall fainting at all.  Seems to be slightly confused regarding her presentation.  Does indicate increased right hip pain after her fall.  Exam reveals a elderly female in mild distress.  Vital signs symptoms slight hypertension.  Normocephalic atraumatic and neck without significant tenderness.  Patient with marked reduced range of motion of right hip secondary discomfort.  Will obtain CT imaging the head and neck given her mechanism, age and distracting injuries.  Will also obtain repeat films of the hip to assess disruption of her recent ORIF.  Baseline blood work being obtained assess for contributory electrolyte imbalance, anemia, urinary tract infection.  EKG also been obtained given her  syncope.  Patient likely require hospitalization.. Patient appears non toxic with stable vitals signs.     10:37 AM I met with the patient for the initial interview and physical examination. Discussed plan for treatment and workup in the ED.    12:30 PM.  Troponin mildly elevated 45.  Magnesium normal.  Mild anemia and hemoglobin 9.4.  This is actually improved compared to 2/16/2024 minimal renal insufficiency with BUN of 27.7 creatinine of 0.99.  CT imaging pending.    1:03 PM.  CT of the head reviewed and discussed with radiology.  Patient with small subarachnoid hemorrhage along the right sylvian fissure.  No mass effect.  Patient neurologically intact.  Will discuss findings with neurosurgery.  X-rays of the hip reviewed.  No evidence of acute fracture or disruption of her surgical repair.  At the conclusion of the encounter I discussed the results of all of the tests and the disposition. The questions were answered and return precautions provided. The patient or family acknowledged understanding and was agreeable with the care plan.   1:19 PM I spoke to neurosurgery discussing patient's symptoms and further plan of care.  He recommends initiation of Keppra prophylactically.  Also request CTA of the head and neck given area of location and concerns of aneurysmal bleed.  Repeat CT scan in 6 hours.  Okay to admit patient here presently  1:53 PM Spoke with , Hospitalist, regarding plan for admission.       Patient represents critical care situation.  Approximately 55 minutes spent directly involved in patient's care independent of any procedures   PPE: Provider wore paper mask.     MEDICATIONS GIVEN IN THE EMERGENCY:  Medications   levETIRAcetam (KEPPRA) 500 mg in sodium chloride 0.9 % 100 mL intermittent infusion (has no administration in time range)       NEW PRESCRIPTIONS STARTED AT TODAY'S ER VISIT  New Prescriptions    No medications on file           =================================================================    HPI    Patient information was obtained from: Patient and nurse     Use of Intrepreter: N/A      Sherice Alvarez is a 89 year old female with a pertient medical history of hypertension, asthma, restless legs syndrome, chronic pain syndrome, hyperlipidemia, and hypothyroidism who presents to the ED for evaluation of syncope.     The patient reports she has been unable to sleep for the past few days and has felt weak after being discharged from the hospital for surgery. She lives at a senior living, but does not have an assistant and has been getting around with a walker. Today while she was being checked up on by homehealth, she had passed out on the ground, but does not remember anything.     Per nurse reports when homehealth came by to check up on the patient, during the patient's assessment she was falling in and out sleep. When the patient decided to move around to do something, homehealth found her passed out on the ground. To add on, the patient feels dehydrated and her right hip is in pain.      Per chart review patient presented to [facility] on 2/9/24-2/14/24 for evaluation of closed displaced subtrochanteric fracture of right femur. Patient was admitted on 2/9/24 after a fall at the grocery store. Work-up had shown right intertrochanteric and subtrochanteric femoral fracture.        REVIEW OF SYSTEMS   Constitutional:  Denies fever, chills, elderly woman and mildly distress  Respiratory:  Denies productive cough or increased work of breathing  Cardiovascular:  Denies chest pain, palpitations  GI:  Denies abdominal pain, nausea, vomiting, or change in bowel or bladder habits   Musculoskeletal: Reports right hip muscle/joint pain, limited motion of right-hip. Mild lower extremity edema  Skin:  Denies rash  Neurologic:  Denies focal weakness  All systems negative except as marked.     PAST MEDICAL HISTORY:  Past Medical  History:   Diagnosis Date    Arthritis     Asthma     CAD (coronary artery disease)     Cancer (H)     basal cell    Chronic kidney disease     ckd 3    Chronic pain syndrome     Congestive heart failure (H)     Crohn's disease (H)     GERD (gastroesophageal reflux disease)     Hx of heart artery stent 2016    1 stent R Proximal CA and 1 Stent L Proximal circumflex  2016 Stent proximal LAD    Hyperlipidemia     Hypertension     NSTEMI (non-ST elevated myocardial infarction) (H) 2016    PONV (postoperative nausea and vomiting)     severe povn with last knee surgery    Restless legs syndrome     Stroke (H) 2010    numbness rt jaw       PAST SURGICAL HISTORY:  Past Surgical History:   Procedure Laterality Date    APPENDECTOMY      CARDIAC CATHETERIZATION  2016    multiple vessel disease.  no intervention    CARDIAC CATHETERIZATION  2016    CARDIAC CATHETERIZATION N/A 2016    Procedure: Coronary Angiogram;  Surgeon: Sunil Moran MD;  Location: [facility];  Service:     CAROTID ENDARTERECTOMY      CAROTID ENDARTERECTOMY Right 2010    CERVICAL LAMINECTOMY  1994     SECTION      CV CORONARY ANGIOGRAM N/A 2020    Procedure: Coronary Angiogram;  Surgeon: Vinita Ramos MD;  Location: [facility] Lab;  Service: Cardiology    CV LEFT HEART CATHETERIZATION WITHOUT LEFT VENTRICULOGRAM Left 2020    Procedure: Left Heart Catheterization Without Left Ventriculogram;  Surgeon: Jerad Lerner MD;  Location: [facility] Lab;  Service: Cardiology    CV TRANSCATHETER AORTIC VALVE REPLACEMENT - OTHER APPROACH N/A 2020    Procedure: CV TRANSCATHETER AORTIC VALVE REPLACEMENT - RIGHT SUBCLAVIAN APPROACH;  Surgeon: John Caceres MD;  Location: [facility] Cath Lab;  Service: Cardiology    HEART CATH, ANGIOPLASTY      HEMORRHOIDECTOMY EXTERNAL      IR LUMBAR EPIDURAL STEROID INJECTION  2014    IR LUMBAR NERVE ROOT INJECTION   10/01/2013    JOINT REPLACEMENT      OPEN REDUCTION INTERNAL FIXATION HIP NAILING Right 2/10/2024    Procedure: INTERNAL FIXATION, FRACTURE, TROCHANTERIC, HIP, USING PINS OR RODS RIGHT;  Surgeon: Gilberto Joy MD;  [place] [facility]    WI ESOPHAGOGASTRODUODENOSCOPY TRANSORAL DIAGNOSTIC N/A 07/10/2019    Procedure: ESOPHAGOGASTRODUODENOSCOPY (EGD) with gastric biopsy;  Surgeon: Sunil Stuart MD;  Location: [facility];  Service: Gastroenterology    WI REPLACE AORTIC VALVE OPEN AXILLRY ARTRY APPROACH N/A 06/02/2020    Procedure: OR TRANSCATHETER AORTIC VALVE REPLACEMENT, SUBCLAVIAN APPROACH;  Surgeon: Bhavin Cuadra MD;  Location: [facility] Cath Lab;  Service: Cardiology    TONSILLECTOMY & ADENOIDECTOMY      TOTAL KNEE ARTHROPLASTY Right     TRANSCATHETER AORTIC-VALVE REPLACEMENT      ZZC TOTAL KNEE ARTHROPLASTY Left 05/11/2021    Procedure: LEFT TOTAL KNEE ARTHROPLASTY;  Surgeon: Doug Rhodes MD;  Location: [facility];  Service: Orthopedics         CURRENT MEDICATIONS:      Current Facility-Administered Medications:     levETIRAcetam (KEPPRA) 500 mg in sodium chloride 0.9 % 100 mL intermittent infusion, 500 mg, Intravenous, Once, Marko Zuniga MD    Current Outpatient Medications:     acetaminophen (TYLENOL) 325 MG tablet, Take 3 tablets (975 mg) by mouth every 8 hours (Patient taking differently: Take 1,000 mg by mouth 3 times daily), Disp: 100 tablet, Rfl: 0    aspirin 81 MG EC tablet, Aspirin 81 mg bid for 4 weeks and then aspirin 81 mg daily, Disp: , Rfl:     atorvastatin (LIPITOR) 40 MG tablet, Take 1 tablet (40 mg) by mouth every evening, Disp: , Rfl:     diclofenac (VOLTAREN) 1 % topical gel, Apply 2-4 g topically 4 times daily as needed APPLY TO LEFT SHOULDER AND LEFT KNEE., Disp: , Rfl:     Lidocaine (LIDOCARE) 4 % Patch, Place 2 patches onto the skin every 24 hours To prevent lidocaine toxicity, patient should be patch free for 12 hrs daily., Disp: 5 patch,  Rfl: 0    lidocaine (XYLOCAINE) 5 % external ointment, Apply topically 3 times daily as needed for moderate pain, Disp: , Rfl:     lidocaine (XYLOCAINE) 5 % external ointment, Apply topically 3 times daily, Disp: 50 g, Rfl: 0    melatonin 3 MG tablet, Take 1 tablet (3 mg) by mouth nightly as needed for sleep, Disp: , Rfl:     metoprolol tartrate (LOPRESSOR) 25 MG tablet, Take 1 tablet (25 mg) by mouth 2 times daily, Disp: , Rfl:     Multiple Vitamin (THERA-TABS) TABS, Take 1 tablet by mouth daily, Disp: , Rfl:     nitroGLYcerin (NITROSTAT) 0.4 MG sublingual tablet, Place 1 tablet (0.4 mg) under the tongue every 5 minutes as needed, Disp: 90 tablet, Rfl: 0    ondansetron (ZOFRAN) 4 MG tablet, Take 4 mg by mouth every 6 hours as needed for nausea, Disp: , Rfl:     oxyCODONE (ROXICODONE) 5 MG tablet, Take 1 tablet (5 mg) by mouth 2 times daily. May also take 1 tablet (5 mg) every 12 hours as needed for severe pain. not within 6 hours of previous dose., Disp: 45 tablet, Rfl: 0    pregabalin (LYRICA) 25 MG capsule, Take 1 capsule (25 mg) by mouth 2 times daily, Disp: , Rfl:     rOPINIRole (REQUIP) 2 MG tablet, Take 2 mg by mouth nightly as needed, Disp: , Rfl:     senna-docusate (SENOKOT-S/PERICOLACE) 8.6-50 MG tablet, Take 2 tablets by mouth 2 times daily as needed for constipation, Disp: 60 tablet, Rfl: 0    tiZANidine (ZANAFLEX) 2 MG tablet, Take 1 tablet (2 mg) by mouth 3 times daily, Disp: , Rfl:     ALLERGIES:  Allergies   Allergen Reactions    Amitriptyline Hcl [Amitriptyline] Other (See Comments)    Erythromycin Diarrhea and Other (See Comments)     Childhood reaction    Gabapentin Other (See Comments)     Jerking movements, swelling    Gramineae Pollens Unknown    Naproxen Unknown and Other (See Comments)    Other Environmental Allergy Unknown     Molds, dander    Pregabalin Other (See Comments)       FAMILY HISTORY:  Family History   Problem Relation Age of Onset    Atrial fibrillation Mother     Parkinsonism  Father        SOCIAL HISTORY:   Social History     Socioeconomic History    Marital status:    Tobacco Use    Smoking status: Former     Types: Cigarettes     Quit date: 1980     Years since quittin.2    Smokeless tobacco: Never   Substance and Sexual Activity    Alcohol use: No    Drug use: No       VITALS:  Patient Vitals for the past 24 hrs:   BP Temp Temp src Pulse Resp SpO2 Height Weight   24 1345 124/60 -- -- 64 16 92 % -- --   24 1307 (!) 155/70 -- -- 65 17 92 % -- --   24 1207 (!) 182/77 -- -- 72 (!) 34 96 % -- --   24 1043 (!) 178/76 97.8  F (36.6  C) Oral 63 16 96 % 1.524 m (5') 52.6 kg (116 lb)        PHYSICAL EXAM    Constitutional:  Awake, alert, mildly distress, elderly woman. Moderate discomfort.   HENT:  Normocephalic, Atraumatic. Bilateral external ears normal. Oropharynx moist. Nose normal. Neck- Normal range of motion with no guarding, No midline cervical tenderness, Supple, No stridor. Fissuring of tongue.   Eyes:  PERRL, EOMI with no signs of entrapment, Conjunctiva normal, No discharge.   Respiratory:  Normal breath sounds, No respiratory distress, No wheezing.    Cardiovascular:  Normal heart rate, Normal rhythm, No appreciable rubs or gallops.   GI:  Soft, No tenderness, No distension, No palpable masses  Musculoskeletal:  Intact distal pulses. No tenderness to palpation or major deformities noted. Limited motion of right-hip. Mild lower extremity edema.   Integument:  Warm, Dry, No erythema, No rash.  Neurologic:  Alert & oriented, Normal motor function, Normal sensory function, No focal deficits noted.   Psychiatric:  Affect normal, Judgment normal, Mood normal. Poor historian.     LAB:  All pertinent labs reviewed and interpreted.  Results for orders placed or performed during the hospital encounter of 24   Head CT w/o contrast   Result Value Ref Range    Radiologist flags Acute intracranial hemorrhage (AA)     Impression    IMPRESSION:  1.   Small volume subarachnoid blood products layering along the right sylvian fissure.  2.  Additional chronic findings as described.      [Critical Result: Acute intracranial hemorrhage]    Finding was identified on 4/4/2024 12:51 PM CDT.     Dr. Zuniga was contacted by me on 4/4/2024 1:02 PM CDT and verbalized understanding of the critical result.   CT Cervical Spine w/o Contrast    Impression    IMPRESSION:  1.  No discernible acute traumatic abnormality within the cervical spine on this somewhat degraded exam.     XR Pelvis and Hip Right 2 Views    Impression    IMPRESSION:     Again seen are postoperative changes from instrumented fixation of the right femur transfixing a comminuted proximal femur fracture which demonstrates some interval incomplete healing when compared to the prior study. There is a persistently displaced   lesser trochanteric fragment, in similar alignment. No definite new right hip fracture is identified.     No displaced pelvic fracture. There is diffuse osseous demineralization, which reduces radiographic sensitivity for the detection of nondisplaced fractures. There is advanced left hip osteoarthritis. There are degenerative changes in the lower lumbar   spine and at the sacroiliac joints.   Comprehensive metabolic panel   Result Value Ref Range    Sodium 141 135 - 145 mmol/L    Potassium 3.9 3.4 - 5.3 mmol/L    Carbon Dioxide (CO2) 23 22 - 29 mmol/L    Anion Gap 10 7 - 15 mmol/L    Urea Nitrogen 27.7 (H) 8.0 - 23.0 mg/dL    Creatinine 0.99 (H) 0.51 - 0.95 mg/dL    GFR Estimate 54 (L) >60 mL/min/1.73m2    Calcium 9.2 8.8 - 10.2 mg/dL    Chloride 108 (H) 98 - 107 mmol/L    Glucose 91 70 - 99 mg/dL    Alkaline Phosphatase 66 40 - 150 U/L    AST 30 0 - 45 U/L    ALT 19 0 - 50 U/L    Protein Total 5.9 (L) 6.4 - 8.3 g/dL    Albumin 3.7 3.5 - 5.2 g/dL    Bilirubin Total 0.5 <=1.2 mg/dL   Result Value Ref Range    Magnesium 1.9 1.7 - 2.3 mg/dL   Result Value Ref Range    Troponin T, High  Sensitivity 45 (H) <=14 ng/L   CBC (+ platelets, no diff)   Result Value Ref Range    WBC Count 10.6 4.0 - 11.0 10e3/uL    RBC Count 3.06 (L) 3.80 - 5.20 10e6/uL    Hemoglobin 9.4 (L) 11.7 - 15.7 g/dL    Hematocrit 28.8 (L) 35.0 - 47.0 %    MCV 94 78 - 100 fL    MCH 30.7 26.5 - 33.0 pg    MCHC 32.6 31.5 - 36.5 g/dL    RDW 19.3 (H) 10.0 - 15.0 %    Platelet Count 177 150 - 450 10e3/uL       RADIOLOGY:  Reviewed all pertinent imaging. Please see official radiology report.  XR Pelvis and Hip Right 2 Views   Final Result   IMPRESSION:       Again seen are postoperative changes from instrumented fixation of the right femur transfixing a comminuted proximal femur fracture which demonstrates some interval incomplete healing when compared to the prior study. There is a persistently displaced    lesser trochanteric fragment, in similar alignment. No definite new right hip fracture is identified.       No displaced pelvic fracture. There is diffuse osseous demineralization, which reduces radiographic sensitivity for the detection of nondisplaced fractures. There is advanced left hip osteoarthritis. There are degenerative changes in the lower lumbar    spine and at the sacroiliac joints.      CT Cervical Spine w/o Contrast   Final Result   IMPRESSION:   1.  No discernible acute traumatic abnormality within the cervical spine on this somewhat degraded exam.         Head CT w/o contrast   Final Result   Abnormal   IMPRESSION:   1.  Small volume subarachnoid blood products layering along the right sylvian fissure.   2.  Additional chronic findings as described.         [Critical Result: Acute intracranial hemorrhage]      Finding was identified on 4/4/2024 12:51 PM CDT.       Dr. Zuniga was contacted by me on 4/4/2024 1:02 PM CDT and verbalized understanding of the critical result.      CTA Head Neck with Contrast    (Results Pending)   Head CT w/o contrast    (Results Pending)       EKG:    Normal sinus rhythm with first-degree  AV block.  Normal QRS.  Normal ST segments.  Compared to February 10, 2024 ST segment depressions noted in the lateral and inferior leads has resolved  I have independently reviewed and interpreted the EKG(s) documented above.           I, Yenni Han, am serving as a scribe to document services personally performed by Marko Zuniga MD, based on my observation and the provider's statements to me. I, Marko Zuniga MD attest that Yenni Han is acting in a scribe capacity, has observed my performance of the services and has documented them in accordance with my direction.    Marko Zuniga M.D.  Emergency Medicine  [facility] EMERGENCY DEPARTMENT       Marko Zuniga MD  04/04/24 1752

## 2024-04-04 NOTE — ED TRIAGE NOTES
Braulio to rm7 from home.  Pt home from TCU after recent R hip surgery.  Has been getting around well with chu.  Home health was there this morning and noted pt to be very drowsy during exam, closing eyes and dozing off in iddle of assessment.  Pt was up walking and home nurse reports she turned around and pt found on floor.  Pt had passed out.  C/o R hip pain.  Ems gave fentanyl 25mcg enroute for hip pain.  Pt awake on arrival and interacting with staff but falls asleep before report complete.  Pt states has not been able to sleep lately due to pain.       Triage Assessment (Adult)       Row Name 04/04/24 1045          Triage Assessment    Airway WDL WDL        Respiratory WDL    Respiratory WDL WDL        Skin Circulation/Temperature WDL    Skin Circulation/Temperature WDL WDL        Cardiac WDL    Cardiac WDL WDL        Peripheral/Neurovascular WDL    Peripheral Neurovascular WDL WDL        Cognitive/Neuro/Behavioral WDL    Cognitive/Neuro/Behavioral WDL WDL

## 2024-04-05 ENCOUNTER — APPOINTMENT (OUTPATIENT)
Dept: CARDIOLOGY | Facility: HOSPITAL | Age: 89
DRG: 083 | End: 2024-04-05
Attending: INTERNAL MEDICINE
Payer: COMMERCIAL

## 2024-04-05 ENCOUNTER — APPOINTMENT (OUTPATIENT)
Dept: CT IMAGING | Facility: HOSPITAL | Age: 89
DRG: 083 | End: 2024-04-05
Attending: INTERNAL MEDICINE
Payer: COMMERCIAL

## 2024-04-05 ENCOUNTER — APPOINTMENT (OUTPATIENT)
Dept: OCCUPATIONAL THERAPY | Facility: HOSPITAL | Age: 89
DRG: 083 | End: 2024-04-05
Attending: INTERNAL MEDICINE
Payer: COMMERCIAL

## 2024-04-05 PROBLEM — N18.30 STAGE 3 CHRONIC KIDNEY DISEASE (H): Status: ACTIVE | Noted: 2019-05-15

## 2024-04-05 PROBLEM — J45.909 ASTHMA: Status: ACTIVE | Noted: 2019-05-17

## 2024-04-05 PROBLEM — Z96.652 S/P TOTAL KNEE ARTHROPLASTY, LEFT: Status: ACTIVE | Noted: 2021-05-11

## 2024-04-05 LAB
GLUCOSE BLDC GLUCOMTR-MCNC: 163 MG/DL (ref 70–99)
GLUCOSE BLDC GLUCOMTR-MCNC: 98 MG/DL (ref 70–99)

## 2024-04-05 PROCEDURE — 99231 SBSQ HOSP IP/OBS SF/LOW 25: CPT | Performed by: PHYSICIAN ASSISTANT

## 2024-04-05 PROCEDURE — 258N000003 HC RX IP 258 OP 636: Performed by: INTERNAL MEDICINE

## 2024-04-05 PROCEDURE — 250N000013 HC RX MED GY IP 250 OP 250 PS 637: Performed by: INTERNAL MEDICINE

## 2024-04-05 PROCEDURE — 120N000001 HC R&B MED SURG/OB

## 2024-04-05 PROCEDURE — 97165 OT EVAL LOW COMPLEX 30 MIN: CPT | Mod: GO

## 2024-04-05 PROCEDURE — 93306 TTE W/DOPPLER COMPLETE: CPT

## 2024-04-05 PROCEDURE — 99233 SBSQ HOSP IP/OBS HIGH 50: CPT | Performed by: INTERNAL MEDICINE

## 2024-04-05 PROCEDURE — 97535 SELF CARE MNGMENT TRAINING: CPT | Mod: GO

## 2024-04-05 PROCEDURE — 250N000009 HC RX 250: Performed by: INTERNAL MEDICINE

## 2024-04-05 PROCEDURE — 70450 CT HEAD/BRAIN W/O DYE: CPT

## 2024-04-05 PROCEDURE — 93306 TTE W/DOPPLER COMPLETE: CPT | Mod: 26 | Performed by: INTERNAL MEDICINE

## 2024-04-05 RX ORDER — LIDOCAINE 50 MG/G
OINTMENT TOPICAL 2 TIMES DAILY
Status: DISCONTINUED | OUTPATIENT
Start: 2024-04-05 | End: 2024-04-08 | Stop reason: HOSPADM

## 2024-04-05 RX ORDER — HYDRALAZINE HYDROCHLORIDE 20 MG/ML
5 INJECTION INTRAMUSCULAR; INTRAVENOUS EVERY 6 HOURS PRN
Status: DISCONTINUED | OUTPATIENT
Start: 2024-04-05 | End: 2024-04-06

## 2024-04-05 RX ORDER — LIDOCAINE 40 MG/G
CREAM TOPICAL 2 TIMES DAILY
Status: DISCONTINUED | OUTPATIENT
Start: 2024-04-05 | End: 2024-04-05

## 2024-04-05 RX ADMIN — TIZANIDINE 2 MG: 2 TABLET ORAL at 14:31

## 2024-04-05 RX ADMIN — PREGABALIN 25 MG: 25 CAPSULE ORAL at 21:54

## 2024-04-05 RX ADMIN — ROPINIROLE HYDROCHLORIDE 2 MG: 1 TABLET, FILM COATED ORAL at 21:54

## 2024-04-05 RX ADMIN — LEVETIRACETAM 500 MG: 500 TABLET, FILM COATED ORAL at 08:27

## 2024-04-05 RX ADMIN — HYDRALAZINE HYDROCHLORIDE 10 MG: 10 TABLET, FILM COATED ORAL at 05:41

## 2024-04-05 RX ADMIN — LEVETIRACETAM 500 MG: 500 TABLET, FILM COATED ORAL at 21:54

## 2024-04-05 RX ADMIN — METOPROLOL SUCCINATE 25 MG: 25 TABLET, EXTENDED RELEASE ORAL at 21:54

## 2024-04-05 RX ADMIN — SODIUM CHLORIDE 500 ML: 9 INJECTION, SOLUTION INTRAVENOUS at 15:22

## 2024-04-05 RX ADMIN — PREGABALIN 25 MG: 25 CAPSULE ORAL at 08:27

## 2024-04-05 RX ADMIN — TIZANIDINE 2 MG: 2 TABLET ORAL at 08:27

## 2024-04-05 RX ADMIN — ACETAMINOPHEN 650 MG: 325 TABLET ORAL at 08:27

## 2024-04-05 RX ADMIN — ATORVASTATIN CALCIUM 40 MG: 40 TABLET, FILM COATED ORAL at 21:54

## 2024-04-05 RX ADMIN — OXYCODONE HYDROCHLORIDE 5 MG: 5 TABLET ORAL at 06:41

## 2024-04-05 RX ADMIN — LIDOCAINE: 50 OINTMENT TOPICAL at 20:04

## 2024-04-05 RX ADMIN — HYDRALAZINE HYDROCHLORIDE 10 MG: 10 TABLET, FILM COATED ORAL at 14:31

## 2024-04-05 ASSESSMENT — ACTIVITIES OF DAILY LIVING (ADL)
ADLS_ACUITY_SCORE: 28
ADLS_ACUITY_SCORE: 29
ADLS_ACUITY_SCORE: 29
ADLS_ACUITY_SCORE: 28
ADLS_ACUITY_SCORE: 29
ADLS_ACUITY_SCORE: 29
ADLS_ACUITY_SCORE: 28
ADLS_ACUITY_SCORE: 28
ADLS_ACUITY_SCORE: 29
ADLS_ACUITY_SCORE: 28

## 2024-04-05 NOTE — PLAN OF CARE
"  Problem: Adult Inpatient Plan of Care  Goal: Plan of Care Review  Description: The Plan of Care Review/Shift note should be completed every shift.  The Outcome Evaluation is a brief statement about your assessment that the patient is improving, declining, or no change.  This information will be displayed automatically on your shift  note.  Outcome: Progressing   Goal Outcome Evaluation:       Plan of care discussed. Repeat CT scan of head. Cardiac diet. Falls program, physical and occupational therapies tomorrow. Neurosurgery will be monitoring your brain for bleeding.  Problem: Adult Inpatient Plan of Care  Goal: Patient-Specific Goal (Individualized)  Description: You can add care plan individualizations to a care plan. Examples of Individualization might be:  \"Parent requests to be called daily at 9am for status\", \"I have a hard time hearing out of my right ear\", or \"Do not touch me to wake me up as it startles  me\".  Outcome: Progressing   \"I just really want to get strong so I can go back home.\"  Problem: Adult Inpatient Plan of Care  Goal: Absence of Hospital-Acquired Illness or Injury  Intervention: Identify and Manage Fall Risk  Recent Flowsheet Documentation  Taken 4/4/2024 1730 by Tessa Nuñez RN  Safety Promotion/Fall Prevention:   supervised activity   room door open   patient and family education   nonskid shoes/slippers when out of bed   assistive device/personal items within reach   Verbally understands the falls program. Call light within reach.  Problem: Adult Inpatient Plan of Care  Goal: Optimal Comfort and Wellbeing  Outcome: Progressing  Intervention: Monitor Pain and Promote Comfort  Recent Flowsheet Documentation  Taken 4/4/2024 1730 by Tessa Nuñez RN  Pain Management Interventions: medication (see MAR)   Oxycodone given fo severe low back/right hip pain. 6/10.                 "

## 2024-04-05 NOTE — PROGRESS NOTES
Neurosurgery Progress       CT HEAD W/O CONTRAST - 4/5/2024    1. Unchanged subarachnoid blood products. No interval intracranial pathology.     CTA HEAD NECK W CONTRAST - 4/4/2024    HEAD CT:  1.  Stable small volume subarachnoid hemorrhage at the right Sylvian fissure.  2.  No new acute intracranial hemorrhage.     HEAD CTA:  1.  No aneurysm or high flow vascular malformation identified.  2.  Scattered mild to moderate stenoses of the intracranial arterial vasculature.     NECK CTA:  1.  Atherosclerotic plaque results in 70-90% stenosis of the left internal carotid artery.  2.  Atherosclerotic plaque results in less than 50% stenosis of the right internal carotid artery.  3.  Severely diseased right vertebral artery with multifocal areas of occlusion and multifocal moderate and severe stenoses in the mid and distal V2 segment.  4.  Moderate stenosis of the left vertebral artery origin.  5.  Approximately 80% stenosis of the left subclavian artery origin.    Neuro stable.     TODAY'S PLAN:     Would recommend outpatient follow-up with Vascular Neurosurgery in several months, to rule out potential aneurysm as current CTA imaging is negative, and may represent thrombus in the aneurysm which when resolved may be able to be detected at a later date.      In the event that patient's symptoms worsen or change we would appreciate being contacted. We did discuss signs of a worsening problem that she should seek being evaluated.     We did review the above information with the patient whom agrees with the plan and did verbalize understanding.   ________________________________________________________________     /55 (BP Location: Right arm)   Pulse 72   Temp 98.9  F (37.2  C) (Oral)   Resp 18   Ht 1.524 m (5')   Wt 57.2 kg (126 lb 1.7 oz)   SpO2 96%   BMI 24.63 kg/m       Pt in bed. She appears comfortable and in no apparent distress, moving all extremities.   CN II-XII intact, alert and appropriate with  conversation and following commands.   Able to name 3/3 objects.   Finger to nose slow and accurate.   Rapid hand movements intact.   Absent for upper and lower extremity drift.   Bilateral upper and lower extremities 5/5. DTR's wnl. Sensation intact throughout. Normal ROM.   Calves soft and non-tender.     All pertinent labs and updated imaging reviewed in EPIC.         Oscar Aviles PA-C   Neurosurgery

## 2024-04-05 NOTE — PROGRESS NOTES
SPIRITUAL HEALTH SERVICES Progress Note  [facility], P1     responded to rapid response code. Medical staff attending to patient; no visitors present at this time.      Plan of Care - A  will continue to visit as able or per request by patient/family/staff.      Jose M Kurtz MDiv, [facility]  /Manager [facility]  [phone]       Spiritual Health Services is available 24/7 for emergent requests and consults, either by paging the on-call  or by entering an ASAP/STAT consult in [employer], which will also page the on-call .

## 2024-04-05 NOTE — PLAN OF CARE
Problem: Risk for Delirium  Goal: Improved Sleep  Outcome: Progressing     Problem: Adult Inpatient Plan of Care  Goal: Optimal Comfort and Wellbeing  Outcome: Progressing   Goal Outcome Evaluation:    Patient alert and oriented, HTN overnight prn PO hydralazine given. Pain reported to R hip prn oxy helpful. Was drowsy at start of shift and slept well all night. Purewick in place with adequate output. No new concerns overnight.

## 2024-04-05 NOTE — CONSULTS
WO RN Consult Note    Today's Visit: Patient unabl to be seen today due to RR.  [facility] will follow up next week.  ANGEL LUIS Murray, RN, PHN, HNB-BC, CWOCN  Pager no longer is use, please contact through videoNEXT group: [facility] [employer]

## 2024-04-05 NOTE — PROGRESS NOTES
04/05/24 0857   Appointment Info   Signing Clinician's Name / Credentials (OT) Rosa Abbott ADRIENNETR/L   Living Environment   People in Home alone   Current Living Arrangements independent living facility   Home Accessibility no concerns   Self-Care   Current Activity Tolerance good   Equipment Currently Used at Home walker, rolling   Fall history within last six months yes   Activity/Exercise/Self-Care Comment Pt recently discharged from  TCU.  Pt is independent with ADLs.  Currently using AE for LB dressing   General Information   Onset of Illness/Injury or Date of Surgery 04/04/24   Referring Physician Reta   Patient/Family Therapy Goal Statement (OT) Pt admitted s/p fall vs syncope, SAH, recnet hip surgery   General Observations and Info per pt, she has hip precautions   Cognitive Status Examination   Cognitive Status Comments Grossly intact for ADLs   Visual Perception   Visual Impairment/Limitations WFL   Sensory   Sensory Quick Adds sensation intact   Range of Motion Comprehensive   General Range of Motion no range of motion deficits identified   Strength Comprehensive (MMT)   General Manual Muscle Testing (MMT) Assessment no strength deficits identified   Bed Mobility   Comment (Bed Mobility) NT   Transfers   Transfer Comments CGA   Balance   Balance Comments CGA   Activities of Daily Living   BADL Assessment/Intervention   (SBA G/H)   Clinical Impression   Criteria for Skilled Therapeutic Interventions Met (OT) Yes, treatment indicated   OT Diagnosis Impaired ADL independence   OT Problem List-Impairments impacting ADL balance;mobility;pain   Assessment of Occupational Performance 1-3 Performance Deficits   Planned Therapy Interventions (OT) ADL retraining;transfer training   Risk & Benefits of therapy have been explained evaluation/treatment results reviewed;participants included;patient   Clinical Impression Comments Pt seen bedside for OT eval and treatment.  Pt demonstrates impaired  balance and mobility for ADLs.  OT to continue to address.  Recommend home at discharge   OT Total Evaluation Time   OT Eval, Low Complexity Minutes (81291) 10   OT Goals   OT Predicted Duration/Target Date for Goal Attainment 04/12/24   OT Goals Lower Body Dressing;Transfers;Toilet Transfer/Toileting;Hygiene/Grooming   OT: Hygiene/Grooming supervision/stand-by assist;while standing   OT: Lower Body Dressing Modified independent   OT: Transfer Modified independent   OT: Toilet Transfer/Toileting Modified independent   OT Discharge Planning   OT Plan LB dressing with AE, pt has AE at home, toileting, transfers   OT Discharge Recommendation (DC Rec) home with assist   OT Rationale for DC Rec REcommend home iwth assist as needed.  Pt is doing well with basic ADLs   OT Brief overview of current status CGA

## 2024-04-05 NOTE — SIGNIFICANT EVENT
Significant Event Note    Time of event: 10:37 AM April 5, 2024    Description of event:  Rapid response paged for AMS.     Briefly, patient is admitted for a subarachnoid hemorrhage.     This morning, patient was awake and sitting up in the chair. Recently, the RN went to evaluate and she was very somnolent and difficult to awaken.     On my arrival to room, primary hospitalist Dr. Mckenzie was present at bedside and evaluating patient. Patient briefly awakened to voice, initially not answering questions or following commands. PERRL. Decision was made to repeat CT head STAT to assess for worsening SAH. Later, as patient was being transferred, she was a bit more awake and answering some questions.     Plan:  Concern for expanding SAH. Also possible that this is medication-induced, as patient got oxycodone, Keppra, Lyrica, and tizanidine this morning around 0830.   - STAT head CT     Discussed with: bedside nurse and supervising physician, Dr. Jonah Maciel MD, PGY2   PACU

## 2024-04-05 NOTE — PROGRESS NOTES
[facility]    Medicine Progress Note - Hospitalist Service    Date of Admission:  4/4/2024    Assessment & Plan     Sherice Alvarez is 89 year old female with history of CAD, chronic pain, lumbar radiculopathy, aortic stenosis (s/p TAVR), asthma, stage III CKD, hypertension, and prior CVA who presents to the ER with episode of syncope and head trauma. She was found to have subarachnoid hemorrhage.    Recurrent syncope and fall:  Patient had a syncope prior to admission and had two episodes after admission. Associated with lightheadedness and hypotension. During the two episodes after admission, no cardiac arrhythmia identified on telemetry. But both episodes occurred after the use of tizanidine.   Echocardiogram shows normal LVEF. No significant change compared to prior echo.   - Continue telemetry  - Discontinue tizanidine  - Orthostatic blood pressure    Subarachnoid hemorrhage in the right sylvian fissure  Head CT showed small volume subarachnoid blood products layering along the right sylvian fissure.    Per neurosurgery service, hemorrhage in the sylvian fissure could be secondary to MCA aneurysm for which CT angiogram or MRA is recommended.  CTA head shows no aneurysm or high flow vascular malformation identified. Scattered mild to moderate stenoses of the intracranial arterial vasculature.  CT head repeated today and it shows no change of the brain bleed.   - Neurosurgery input appreciated.   - Continue Keppra  - Continue to monitor.   - PT/OT    Right hip pain  Chronic pain  She underwent open reduction internal fixation right proximal femur peritrochanteric fracture on 2/10/2024 and was subsequently discharged to TCU.  Patient recently returned home from TCU. Pelvic x-ray showed postoperative changes from instrumented fixation of the right femur transfixing a comminuted proximal femur fracture which demonstrates some interval incomplete healing when compared to the prior study  and no evidence of displaced pelvic fracture.    Per daughter, patient was scheduled to have a pain stimulator placement today.   - Topical lidocaine and tylenol prn  - Outpatient pain clinic follow up    Essential hypertension  - Resume PTA metoprolol succinate  - Low dose IV hydralazine as needed    History of CAD  Resume PTA metoprolol succinate  Hold PTA aspirin due to subarachnoid hemorrhage    History of asthma  Not in acute exacerbation  DuoNebs as needed  Supplemental oxygen as needed    Stage III CKD  Creatinine is at baseline  Monitor BMP  Avoid nephrotoxin    History of HFpEF  Aortic stenosis status post TAVR  Not decompensated at this time  Not on diuretic therapy PTA  Continue PTA metoprolol succinate  Avoid nephrotoxin  Monitor daily weight      Her daughter Carly was updated by the bedside and plan of care was discussed.         Diet: Combination Diet Low Saturated Fat Na <2400mg Diet, No Caffeine Diet    DVT Prophylaxis: Pneumatic Compression Devices  Tyson Catheter: Not present  Lines: None     Cardiac Monitoring: ACTIVE order. Indication: Syncope- low cardiac risk (24 hours)  Code Status: Full Code      Clinically Significant Risk Factors                  # Hypertension: Noted on problem list  # Chronic heart failure with preserved ejection fraction: heart failure noted on problem list and last echo with EF >50%           # Asthma: noted on problem list        Disposition Plan      Expected Discharge Date: 04/06/2024        Discharge Comments: WOC consult            Yokasta Mckenzie MD  Hospitalist Service  [facility]  Securely message with [employer] (more info)  Text page via [employer] Paging/Directory   ______________________________________________________________________    Interval History   Patient had two episodes of syncope today and RT was called. In the morning, while she was sitting up in the chair, she turned pale and became less responsive. Her BP was low at the time.  STAT CT head showed no change of her brain bleed. Prior to this event, she took oxycodone, Lyrica, tizanidine and Keppra.   In the afternoon, her BP was high, she was  given hydralazine. She complained about lower back pain. She was given tizanidine. She had another episode of syncope while sitting up in the chair. She had hypotension and required fluid bolus. Hr BP improved and she woke up later.     Physical Exam   Vital Signs: Temp: 97.9  F (36.6  C) Temp src: Oral BP: 109/54 Pulse: 74   Resp: 16 SpO2: 96 % O2 Device: Nasal cannula Oxygen Delivery: 1 LPM  Weight: 126 lbs 1.65 oz    General appearance: not in acute distress  HEENT: PERRL, EOMI  Lungs: Clear breath sounds in bilateral lung fields  Cardiovascular: Regular rate and rhythm, normal S1-S2  Abdomen: Soft, non tender, no distension  Musculoskeletal: No joint swelling  Skin: No rash and no edema  Neurology: AAO ×3.  Cranial nerves II - XII normal.  Normal muscle strength in all four extremities.     Medical Decision Making       60 MINUTES SPENT BY ME on the date of service doing chart review, history, exam, documentation & further activities per the note.      Data         Imaging results reviewed over the past 24 hrs:   Recent Results (from the past 24 hour(s))   Head CT w/o contrast    Narrative    EXAM: CT HEAD W/O CONTRAST  LOCATION: [facility]  DATE: 4/4/2024    INDICATION: Follow up on subarachnoid hemorrhage  COMPARISON: 04/04/2024 1:52 PM  TECHNIQUE: Routine CT Head without IV contrast. Multiplanar reformats. Dose reduction techniques were used.    FINDINGS:  INTRACRANIAL CONTENTS: Unchanged small volume subarachnoid hemorrhage at the right sylvian fissure. No new acute intracranial hemorrhage. No CT evidence of acute infarct. Moderate presumed chronic small vessel ischemic changes. Moderate generalized   volume loss. No hydrocephalus.     VISUALIZED ORBITS/SINUSES/MASTOIDS: Prior bilateral cataract surgery. Visualized  portions of the orbits are otherwise unremarkable. No paranasal sinus mucosal disease. No middle ear or mastoid effusion.    BONES/SOFT TISSUES: No acute abnormality.      Impression    IMPRESSION:  1.  Unchanged small volume subarachnoid hemorrhage at the right Sylvian fissure.  2.  No new acute intracranial abnormality.     CT Head w/o Contrast    Narrative    EXAM: CT HEAD W/O CONTRAST  LOCATION: [facility]  DATE: 2024    INDICATION: change of mental status this morning. CT showed brain bleed yesterday. Change of brain bleed? New stroke?  COMPARISON: 2024  TECHNIQUE: Routine CT Head without IV contrast. Multiplanar reformats. Dose reduction techniques were used.    FINDINGS:  INTRACRANIAL CONTENTS: Unchanged small volume subarachnoid blood products layering along the right sylvian fissure. No new intracranial blood products. No CT evidence of acute infarct. Moderate presumed chronic small vessel ischemic changes. Moderate   generalized volume loss. No hydrocephalus.     VISUALIZED ORBITS/SINUSES/MASTOIDS: No intraorbital abnormality. No paranasal sinus mucosal disease. No middle ear or mastoid effusion.    BONES/SOFT TISSUES: No acute abnormality.      Impression    IMPRESSION:  1.  Unchanged subarachnoid blood products. No interval intracranial pathology.   Echocardiogram Complete    Narrative    [id number]  [id number]  [id number]  [id number]^ANNE^ISAIAH^[place] [address]     Name: ROBINSON SHORT  MRN: [id number]  : 1934  Study Date: 2024 10:56 AM  Age: 89 yrs  Gender: Female  Patient Location: [facility]  Reason For Study: Syncope  Ordering Physician: ISAIAH RODRÍGUEZ  Performed By: ACE     BSA: 1.5 m2  Height: 60 in  Weight: 126 lb  HR: 70  BP: 111/55 mmHg  ______________________________________________________________________________  Procedure  Complete Portable Echo  Adult.  ______________________________________________________________________________  Interpretation Summary     1. Normal left ventricular size and systolic performance with a visually  estimated ejection fraction of 65-70%.  2. There is mild concentric increase in left ventricular wall thickness.  3. 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  Â  High normal aortic valve prosthesis metrics with a mean systolic gradient of  18 mmHg and a peak anterograde velocity of 2.8 m/sec.  Â  No aortic insufficiency is detected.  4. There is mild calcific mitral stenosis.  5. Normal right ventricular size and systolic performance.     When compared to the prior real-time echocardiogram dated 11 December 2023,  the findings are felt to be fairly similar on both examinations.  ______________________________________________________________________________  Left ventricle:  Normal left ventricular size and systolic performance with a visually  estimated ejection fraction of 65-70%. There is normal regional wall motion.  There is mild concentric increase in left ventricular wall thickness.     Assessment of LV Diastolic Function: The cumulative findings are indeterminate  in the evaluation of diastolic function.     Right ventricle:  Normal right ventricular size and systolic performance.     Left atrium:  There is borderline left atrial enlargement.     Right atrium:  The right atrium is of normal size.     IVC:  The IVC is borderline dilated.     Aortic valve:  There is a bio-prosthetic aortic valve (documented 23 mm [place] 3  tissue valve). High normal aortic valve prosthesis metrics with a mean  systolic gradient of 18 mmHg and a peak anterograde velocity of 2.8 m/sec. The  Ao Acceleration Time is 0.10 sec. No aortic insufficiency is detected.     Mitral valve:  There is moderate mitral annular calcification. There is mild calcific mitral  stenosis. There is trace mitral  insufficiency.     Tricuspid valve:  The tricuspid valve is grossly morphologically normal. There is mild, to  perhaps mild-moderate, tricuspid insufficiency.     Pulmonic valve:  The pulmonic valve is grossly morphologically normal. There is trace pulmonic  insufficiency.     Thoracic aorta:  The aortic root and proximal ascending aorta are of normal dimension.     Pericardium:  There is no significant pericardial effusion.  ______________________________________________________________________________  ______________________________________________________________________________  MMode/2D Measurements & Calculations  IVSd: 0.77 cm  LVIDd: 3.5 cm  LVIDs: 2.3 cm  LVPWd: 1.3 cm  FS: 35.6 %  LV mass(C)d: 108.2 grams  LV mass(C)dI: 70.6 grams/m2  LA dimension: 2.8 cm  asc Aorta Diam: 3.4 cm  LVOT diam: 2.0 cm  LVOT area: 3.1 cm2  Asc Ao diam index BSA (cm/m2): 2.2  Asc Ao diam index Ht(cm/m): 2.2  EF Biplane: 67.1 %     LA Volume Indexed (AL/bp): 28.0 ml/m2  RV Base: 4.3 cm  RWT: 0.72  TAPSE: 2.2 cm     Time Measurements  MM HR: 67.0 BPM     Doppler Measurements & Calculations  MV E max fahad: 109.0 cm/sec  MV A max fahad: 152.0 cm/sec  MV E/A: 0.72  MV max PG: 10.6 mmHg  MV mean P.0 mmHg  MV V2 VTI: 51.7 cm  MVA(VTI): 1.6 cm2  MV dec slope: 429.0 cm/sec2  MV dec time: 0.25 sec  Ao V2 max: 283.0 cm/sec  Ao max P.0 mmHg  Ao V2 mean: 201.0 cm/sec  Ao mean P.0 mmHg  Ao V2 VTI: 74.1 cm  BIRD(I,D): 1.1 cm2  BIRD(V,D): 1.2 cm2  Ao acc time: 0.10 sec     LV V1 max P.6 mmHg  LV V1 max: 107.0 cm/sec  LV V1 VTI: 27.0 cm  SV(LVOT): 84.8 ml  SI(LVOT): 55.3 ml/m2  PA acc time: 0.11 sec  TR max fahad: 258.0 cm/sec  TR max P.6 mmHg  AV Fahad Ratio (DI): 0.38  BIRD Index (cm2/m2): 0.75  E/E': 19.6  E/E' av.3  Lateral E/e': 19.6  Medial E/e': 20.9  Peak E' Fahad: 5.5 cm/sec  RV S Fahad: 12.3 cm/sec     ______________________________________________________________________________  Report approved by: Rocky Calles,  Becky 04/05/2024 01:20 PM

## 2024-04-05 NOTE — PLAN OF CARE
Goal Outcome Evaluation:    Alert and oriented very pleasant lady.  More alert after the RAPID RESPONSE earlier today. Tearful and sad. Talking about her late son and . Was to visit her husbands grave today because it is his birthday.  Does still have pain in right hip and her lower back. Changing position helpful.  Blood pressure elevated this afternoon 182/83 hydralazine po prn administered. Alert and sitting up in the chair this afternoon but acknowledges feeling weak.

## 2024-04-05 NOTE — SIGNIFICANT EVENT
Pt was alert and oriented x 4. Assist of one to the bathroom and to the chair this am. Sitting in chair since 0830. Ate breakfast.     Went to get blood pressure re check since am BP was 177/77.     Pt was found to be very lethargic. Sternal rub to arouse pt and then she would close her eyes right away.  Pt was placed back in the bed.  BP 86/52 with decreased level of consciousness.     Rapid response called. BG 98. 88% on RA so placed on 2L/NC.  LOWELL. No drift with her arms. Drift with her bilateral legs.  Recent hip surgery.      Returned from the CT. Echo in room now. Pt a little more awake. /55. Will place telemetry. Bed alarm for safety. Will update MD.

## 2024-04-05 NOTE — SIGNIFICANT EVENT
Significant Event Note    Time of event: 3:27 PM April 5, 2024    Description of event:  Rapid response called for hypotension and AMS -- same as earlier in the day. Patient here with SAH and syncope. Patient with decreased responsiveness. Primary hospitalist, Dr. Mckenzie presented to room and ran rapid. Fluids initiated for hypotension. Concern for hypotension and AMS with hydralazine, possibly zanaflex.     BP (!) 68/33   Pulse 65   Temp 98.6  F (37  C) (Oral)   Resp 16   Ht 1.524 m (5')   Wt 57.2 kg (126 lb 1.7 oz)   SpO2 98%   BMI 24.63 kg/m        Plan:  - management per primary [facility]       Blessing Saul MD

## 2024-04-05 NOTE — CODE/RAPID RESPONSE
During bedside handoff with AM nurse, witnessed patient unresponsive in the chair with a gray hue to skin. Patient did not arouse to rigorous stimulation and pain. Called a rapid response and carried patient to bed. BG was 163. BP was 60s/30s. Put patient on 2 L nasal cannula and placed patient in trendelenburg. Satting in mid 90s. Patient grunting and pushing people away during sternal rubs. Obtained order for a 500 mL bolus. Color improved and blood pressure up to 100/54 while laying flat in bed. Patient satting 99% on 2 L, weaned patient down to 1 L. Final pressure after bolus was 110/60 and patient now alert and oriented. Provider adjusted patient's medications.

## 2024-04-06 ENCOUNTER — APPOINTMENT (OUTPATIENT)
Dept: PHYSICAL THERAPY | Facility: HOSPITAL | Age: 89
DRG: 083 | End: 2024-04-06
Attending: INTERNAL MEDICINE
Payer: COMMERCIAL

## 2024-04-06 PROBLEM — G89.29 OTHER CHRONIC PAIN: Status: ACTIVE | Noted: 2024-04-06

## 2024-04-06 LAB
CREAT SERPL-MCNC: 0.84 MG/DL (ref 0.51–0.95)
EGFRCR SERPLBLD CKD-EPI 2021: 66 ML/MIN/1.73M2
HOLD SPECIMEN: NORMAL

## 2024-04-06 PROCEDURE — 250N000011 HC RX IP 250 OP 636: Performed by: INTERNAL MEDICINE

## 2024-04-06 PROCEDURE — 99232 SBSQ HOSP IP/OBS MODERATE 35: CPT | Performed by: INTERNAL MEDICINE

## 2024-04-06 PROCEDURE — 250N000013 HC RX MED GY IP 250 OP 250 PS 637: Performed by: INTERNAL MEDICINE

## 2024-04-06 PROCEDURE — 97116 GAIT TRAINING THERAPY: CPT | Mod: GP | Performed by: PHYSICAL THERAPIST

## 2024-04-06 PROCEDURE — 82565 ASSAY OF CREATININE: CPT | Performed by: INTERNAL MEDICINE

## 2024-04-06 PROCEDURE — 36415 COLL VENOUS BLD VENIPUNCTURE: CPT | Performed by: INTERNAL MEDICINE

## 2024-04-06 PROCEDURE — 97162 PT EVAL MOD COMPLEX 30 MIN: CPT | Mod: GP | Performed by: PHYSICAL THERAPIST

## 2024-04-06 PROCEDURE — 120N000001 HC R&B MED SURG/OB

## 2024-04-06 RX ORDER — HYDRALAZINE HYDROCHLORIDE 20 MG/ML
5 INJECTION INTRAMUSCULAR; INTRAVENOUS EVERY 4 HOURS PRN
Status: DISCONTINUED | OUTPATIENT
Start: 2024-04-06 | End: 2024-04-07

## 2024-04-06 RX ORDER — HYDRALAZINE HYDROCHLORIDE 20 MG/ML
5 INJECTION INTRAMUSCULAR; INTRAVENOUS EVERY 6 HOURS PRN
Status: DISCONTINUED | OUTPATIENT
Start: 2024-04-06 | End: 2024-04-06

## 2024-04-06 RX ORDER — AMLODIPINE BESYLATE 5 MG/1
5 TABLET ORAL 2 TIMES DAILY
Status: DISCONTINUED | OUTPATIENT
Start: 2024-04-06 | End: 2024-04-08 | Stop reason: HOSPADM

## 2024-04-06 RX ORDER — AMLODIPINE BESYLATE 5 MG/1
5 TABLET ORAL DAILY
Status: DISCONTINUED | OUTPATIENT
Start: 2024-04-06 | End: 2024-04-06

## 2024-04-06 RX ADMIN — PREGABALIN 25 MG: 25 CAPSULE ORAL at 08:53

## 2024-04-06 RX ADMIN — ANORECTAL OINTMENT: 15.7; .44; 24; 20.6 OINTMENT TOPICAL at 18:16

## 2024-04-06 RX ADMIN — ACETAMINOPHEN 650 MG: 325 TABLET ORAL at 02:22

## 2024-04-06 RX ADMIN — AMLODIPINE BESYLATE 5 MG: 5 TABLET ORAL at 09:52

## 2024-04-06 RX ADMIN — HYDRALAZINE HYDROCHLORIDE 5 MG: 20 INJECTION INTRAMUSCULAR; INTRAVENOUS at 13:06

## 2024-04-06 RX ADMIN — AMLODIPINE BESYLATE 5 MG: 5 TABLET ORAL at 20:49

## 2024-04-06 RX ADMIN — LEVETIRACETAM 500 MG: 500 TABLET, FILM COATED ORAL at 08:53

## 2024-04-06 RX ADMIN — LEVETIRACETAM 500 MG: 500 TABLET, FILM COATED ORAL at 20:49

## 2024-04-06 RX ADMIN — PREGABALIN 25 MG: 25 CAPSULE ORAL at 20:48

## 2024-04-06 RX ADMIN — HYDRALAZINE HYDROCHLORIDE 5 MG: 20 INJECTION INTRAMUSCULAR; INTRAVENOUS at 03:17

## 2024-04-06 RX ADMIN — ROPINIROLE HYDROCHLORIDE 2 MG: 1 TABLET, FILM COATED ORAL at 20:48

## 2024-04-06 RX ADMIN — ANORECTAL OINTMENT: 15.7; .44; 24; 20.6 OINTMENT TOPICAL at 20:48

## 2024-04-06 RX ADMIN — LIDOCAINE: 50 OINTMENT TOPICAL at 08:53

## 2024-04-06 RX ADMIN — LIDOCAINE: 50 OINTMENT TOPICAL at 13:03

## 2024-04-06 RX ADMIN — OXYCODONE HYDROCHLORIDE 5 MG: 5 TABLET ORAL at 02:22

## 2024-04-06 RX ADMIN — OXYCODONE HYDROCHLORIDE 5 MG: 5 TABLET ORAL at 21:21

## 2024-04-06 RX ADMIN — METOPROLOL SUCCINATE 25 MG: 25 TABLET, EXTENDED RELEASE ORAL at 20:49

## 2024-04-06 RX ADMIN — ATORVASTATIN CALCIUM 40 MG: 40 TABLET, FILM COATED ORAL at 20:48

## 2024-04-06 ASSESSMENT — ACTIVITIES OF DAILY LIVING (ADL)
ADLS_ACUITY_SCORE: 28
ADLS_ACUITY_SCORE: 39
ADLS_ACUITY_SCORE: 39
ADLS_ACUITY_SCORE: 28
ADLS_ACUITY_SCORE: 29
ADLS_ACUITY_SCORE: 39
ADLS_ACUITY_SCORE: 39
ADLS_ACUITY_SCORE: 29
ADLS_ACUITY_SCORE: 39
DEPENDENT_IADLS:: INDEPENDENT
ADLS_ACUITY_SCORE: 28
ADLS_ACUITY_SCORE: 39
ADLS_ACUITY_SCORE: 29
ADLS_ACUITY_SCORE: 28
ADLS_ACUITY_SCORE: 39
ADLS_ACUITY_SCORE: 28
ADLS_ACUITY_SCORE: 29

## 2024-04-06 NOTE — CONSULTS
Care Management Initial Consult    General Information  Assessment completed with: Patient,    Type of CM/SW Visit: Initial Assessment    Primary Care Provider verified and updated as needed: Yes   Readmission within the last 30 days: no previous admission in last 30 days      Reason for Consult: discharge planning  Advance Care Planning:            Communication Assessment  Patient's communication style: spoken language (English or Bilingual)    Hearing Difficulty or Deaf: no   Wear Glasses or Blind: no    Cognitive  Cognitive/Neuro/Behavioral: WDL  Level of Consciousness: alert  Arousal Level: opens eyes spontaneously  Orientation: oriented x 4  Mood/Behavior: calm, cooperative  Best Language: 0 - No aphasia  Speech: clear    Living Environment:   People in home: alone     Current living Arrangements: apartment      Able to return to prior arrangements: yes       Family/Social Support:  Care provided by: self  Provides care for: no one  Marital Status:   Children          Description of Support System: Supportive, Involved         Current Resources:   Patient receiving home care services: Yes  Skilled Home Care Services: Physical Therapy, Occupational Therapy  Community Resources: None  Equipment currently used at home: walker, rolling  Supplies currently used at home: None    Employment/Financial:  Employment Status: retired        Financial Concerns: none   Referral to Financial Worker: No       Does the patient's insurance plan have a 3 day qualifying hospital stay waiver?  Yes     Which insurance plan 3 day waiver is available? Alternative insurance waiver    Will the waiver be used for post-acute placement? Undetermined at this time    Lifestyle & Psychosocial Needs:  Social Determinants of Health     Food Insecurity: Not on file   Depression: Not at risk (6/20/2023)    PHQ-2     PHQ-2 Score: 1   Housing Stability: Not on file   Tobacco Use: Medium Risk (4/4/2024)    Patient History     Smoking Tobacco  Use: Former     Smokeless Tobacco Use: Never     Passive Exposure: Not on file   Financial Resource Strain: Not on file   Alcohol Use: Not on file   Transportation Needs: Not on file   Physical Activity: Not on file   Interpersonal Safety: Not on file   Stress: Not on file   Social Connections: Not on file       Functional Status:  Prior to admission patient needed assistance:   Dependent ADLs:: Independent  Dependent IADLs:: Independent       Mental Health Status:  Mental Health Status: No Current Concerns       Chemical Dependency Status:  Chemical Dependency Status: No Current Concerns             Values/Beliefs:  Spiritual, Cultural Beliefs, Confucianism Practices, Values that affect care: no               Additional Information:  SW introduced self and CM role to patient.  Patient reports recently discharging from [facility] transitional care with home PT and OT through [facility].  Patient shares having a fall at home while home care staff was present.  Patient lives in an independent senior apartment alone. Patient shares that at baseline, she is independent with all I/ADLs and did not use any assistive devices with ambulation.  Patient identifies positive support from her three daughters who live near by. Patient's goal is to return home with resumption of home care services.  SW sent initial referral to [facility] to confirm services and resumption of care at discharge.  Patient reports one of her daughters will transport home.     TERE Perrin

## 2024-04-06 NOTE — PROGRESS NOTES
[facility]    Medicine Progress Note - Hospitalist Service    Date of Admission:  4/4/2024    Assessment & Plan     Sherice Alvarez is 89 year old female with history of CAD, chronic pain, lumbar radiculopathy, aortic stenosis (s/p TAVR), asthma, stage III CKD, hypertension, and prior CVA who presents to the ER with episode of syncope and head trauma. She was found to have subarachnoid hemorrhage.    Recurrent syncope and fall:  Patient had a syncope prior to admission and had two episodes after admission. Associated with lightheadedness and hypotension. During the two episodes after admission, no cardiac arrhythmia identified on telemetry. But both episodes occurred after the use of tizanidine and hydralazine 10 mg.   Echocardiogram shows normal LVEF. No significant change compared to prior echo.   No orthostatic BP change.   - Continue telemetry  - Discontinued tizanidine  - Low dose hydralazine prn to avoid big drop of blood pressure    Subarachnoid hemorrhage in the right sylvian fissure  Head CT showed small volume subarachnoid blood products layering along the right sylvian fissure.    Per neurosurgery service, hemorrhage in the sylvian fissure could be secondary to MCA aneurysm for which CT angiogram or MRA is recommended.  CTA head shows no aneurysm or high flow vascular malformation identified. Scattered mild to moderate stenoses of the intracranial arterial vasculature.  CT head repeated after 24 hours showed no change of the brain bleed.   - Neurosurgery input appreciated.   - Continue Keppra for 7 days  - Keep systolic BP < 150  - Continue to monitor.   - Outpatient neurosurgery follow up in 6 weeks with head CT prior  - Follow up with neurovascular surgery in several months to rule out potential aneurysm     Essential hypertension: BP has been uncontrolled since admission. Systolic BP is as high as 190s.  - Continue PTA metoprolol  - Start amlodipine 5 mg bid  - Hydralazine  5 mg prn.  SBP < 150    Chronic pain  Patient has chronic bilateral hip pain and lower back pain. She underwent open reduction internal fixation right proximal femur peritrochanteric fracture on 2/10/24 and was subsequently discharged to TCU.  Patient recently returned home from TCU. She also had left hip steroid injection on 3/12/24.   On admission, pelvic x-ray showed postoperative changes from instrumented fixation of the right femur transfixing a comminuted proximal femur fracture which demonstrates some interval incomplete healing when compared to the prior study and no evidence of displaced pelvic fracture.    Patient has been on oxycodone for pain control chronically. Per daughter, patient was scheduled to have a spinal cord stimulator placement 4/5/24. However, she was admitted.   - Topical lidocaine and tylenol prn  - Continue PTA lyrica  - Not sure whether Tizanidine contribute to her syncope. Currently on hold.   - Outpatient pain clinic follow up    CAD, HFpEF, Aortic stenosis status post TAVR  No anginal chest pain. Compensated currently.   - Continue PTA metoprolol succinate  - Hold PTA aspirin due to subarachnoid hemorrhage    Asthma: Not in acute exacerbation. Continue PTA DuoNebs as needed    Stage IIIa CKD: Creatinine is at baseline. Monitor BMP    Discharge planning:  PT recommends TCU. Her daughter agrees with that. Patient lives in an independent senior living. Her daughter does not think patient can take care of herself at this time.       Her daughter Carly was updated over the phone and plan of care was discussed.           Diet: Combination Diet Low Saturated Fat Na <2400mg Diet, No Caffeine Diet    DVT Prophylaxis: Pneumatic Compression Devices  Tyson Catheter: Not present  Lines: None     Cardiac Monitoring: ACTIVE order. Indication: Syncope- low cardiac risk (24 hours)  Code Status: Full Code      Clinically Significant Risk Factors                  # Hypertension: Noted on problem  list  # Chronic heart failure with preserved ejection fraction: heart failure noted on problem list and last echo with EF >50%           # Financial/Environmental Concerns: none  # Asthma: noted on problem list        Disposition Plan      Expected Discharge Date: 04/07/2024      Destination: home with help/services  Discharge Comments: WOC consult            Yokasta Mckenzie MD  Hospitalist Service  [facility]  Securely message with [employer] (more info)  Text page via [employer] Paging/Directory   ______________________________________________________________________    Interval History   Patient did not have further episode of syncope overnight. When seen this morning, she reports having pain from her lower back. No chest pain or SOB. She has no recollection of the syncope events yesterday. Her BP has been high since admission.     Physical Exam   Vital Signs: Temp: 98.1  F (36.7  C) Temp src: Oral BP: (!) 156/70 (nurse notified) Pulse: 76   Resp: 18 SpO2: 92 % O2 Device: Nasal cannula Oxygen Delivery: 1 LPM  Weight: 126 lbs 1.65 oz    General appearance: not in acute distress  HEENT: PERRL, EOMI  Lungs: Clear breath sounds in bilateral lung fields  Cardiovascular: Regular rate and rhythm, normal S1-S2  Abdomen: Soft, non tender, no distension  Musculoskeletal: No joint swelling  Skin: No rash and no edema  Neurology: AAO ×3.  Cranial nerves II - XII normal.  Normal muscle strength in all four extremities.     Medical Decision Making       48 MINUTES SPENT BY ME on the date of service doing chart review, history, exam, documentation & further activities per the note.      Data     I have personally reviewed the following data over the past 24 hrs:    N/A  \   N/A   / N/A     N/A N/A N/A /  N/A   N/A N/A 0.84 \       Imaging results reviewed over the past 24 hrs:   No results found for this or any previous visit (from the past 24 hour(s)).

## 2024-04-06 NOTE — PLAN OF CARE
Goal Outcome Evaluation:       Alert and oriented.  Up to the chair this am for breakfast. Up with therapy. Voiding and had BM today.  Feels slight dizziness at rest. BP elevated this am. Norvasc started per MD orders. BP remains elevated after her Norvasc. 186/82 hydralazine administered.

## 2024-04-06 NOTE — PROGRESS NOTES
[facility]    Neurosurgery  Daily Note    Assessment & Plan    Ms. Alvarez is a 89-year-old female who presented to the emergency department after syncope and being found on the floor.  Head CT scan was performed in the ER, demonstrated right sylvian fissure subarachnoid hemorrhage.  Follow-up CT negative for aneurysm.     AM Rounds: Denies headache,nausea, vomiting, speech changes. Does not some blurry vision; however, patient correlates this to her not having her glasses. Exam stable.     Plan:  - Follow up in outpatient NSGY clinic in 6 weeks with head CT prior, NSGY will facilitate scheduling this appointment   - Keppra 500mg BID x 7 days  - Hold all anticoagulation including ASA and NSAIDs until seen by NSGY in 6 weeks  - Follow up with neurovascular surgery in several months to rule out potential aneurysm as CTA imaging is negative, and may represent thrombus in the aneurysm which when resolved may be able to be detected at a later date.   - Discharge instructions and outpatient head CT order placed    NSGY signing off, please page or reconsult for questions or concerns.     Plans discussed with Dr. Hood who was in agreement with plans.    Wendy Logan PA-C  [facility] Neurosurgery  [address] [address]    Interval History   Doing well.  Continues to improve.  Pain is well-controlled.     Physical Exam   Temp: 98  F (36.7  C) Temp src: Oral BP: (!) 183/84 Pulse: 80   Resp: 18 SpO2: 96 % O2 Device: Nasal cannula Oxygen Delivery: 1 LPM  Vitals:    04/04/24 1043 04/04/24 1532   Weight: 52.6 kg (116 lb) 57.2 kg (126 lb 1.7 oz)     Vital Signs with Ranges  Temp:  [97.6  F (36.4  C)-98.8  F (37.1  C)] 98  F (36.7  C)  Pulse:  [65-80] 80  Resp:  [16-18] 18  BP: ()/(33-85) 183/84  SpO2:  [94 %-99 %] 96 %  I/O last 3 completed shifts:  In: 100 [P.O.:100]  Out: -     Awake, alert, and appropriate. NAD  Face symmetric   Tongue midline    Speech clear   Cranial nerves II-XII intact   EOMI. PERRL   Moves all extremities equally   BUE 5/5  BLE 5/5, except RLE HF/KF limited due to recent right femur surgery   Sensation intact BUE/BLE  Pronator drift negative bilaterally  FTN smooth and accurate bilaterally   Calves soft and non-tender       Medications   Current Facility-Administered Medications   Medication Dose Route Frequency Provider Last Rate Last Admin      Current Facility-Administered Medications   Medication Dose Route Frequency Provider Last Rate Last Admin    atorvastatin (LIPITOR) tablet 40 mg  40 mg Oral QPM Lindy Mcduffie MD   40 mg at 04/05/24 2154    levETIRAcetam (KEPPRA) tablet 500 mg  500 mg Oral BID Lindy Mcduffie MD   500 mg at 04/05/24 2154    lidocaine (XYLOCAINE) 5 % ointment   Topical BID 09 12 Yokasta Mckenzie MD   Given at 04/05/24 2004    metoprolol succinate ER (TOPROL XL) 24 hr tablet 25 mg  25 mg Oral QPM Lindy Mcduffie MD   25 mg at 04/05/24 2154    pregabalin (LYRICA) capsule 25 mg  25 mg Oral BID Lindy Mcduffie MD   25 mg at 04/05/24 2154    rOPINIRole (REQUIP) tablet 2 mg  2 mg Oral At Bedtime Lindy Mcduffie MD   2 mg at 04/05/24 2154    sodium chloride (PF) 0.9% PF flush 3 mL  3 mL Intracatheter Q8H Lindy Mcduffie MD   3 mL at 04/06/24 0119    sodium chloride 0.9% BOLUS 500 mL  500 mL Intravenous Once Yokasta Mckenzie  mL/hr at 04/05/24 1522 500 mL at 04/05/24 1522

## 2024-04-06 NOTE — PLAN OF CARE
Goal Outcome Evaluation:      Plan of Care Reviewed With: patient    Overall Patient Progress: decliningOverall Patient Progress: declining     Patient was a rapid response, see code/rapid response note from 3:59 PM. Otherwise patient reporting moderate to severe pain in her middle lower back, right leg and right hip. Order obtained for new scheduled lidocaine cream and patient positioned off back to protect sacrum/coccyx. Blood pressure frequently fluctuates. Patient also can be forgetful, asking multiple times who the providers were that responded to her rapid responses and what they think caused it. Orthostatics performed and charted, left a sticky note to provider with orthostatics as well. Patient also complaining of pain in back lower right of head, found small, pink, painful bump. Charted and left sticky note to provider. Monitored patient closely after 2150 med pass and patient remained alert and stable. Purewick in place for overnight.

## 2024-04-06 NOTE — PROGRESS NOTES
"   04/06/24 0900   Appointment Info   Signing Clinician's Name / Credentials (PT) MIKEL Esteves   Living Environment   People in Home alone   Current Living Arrangements independent living facility   Home Accessibility no concerns   Transportation Anticipated health plan transportation   Living Environment Comments Pt lives alone and does not recieve any services at this time.   Self-Care   Usual Activity Tolerance good   Current Activity Tolerance fair   Equipment Currently Used at Home walker, rolling   Fall history within last six months yes   Number of times patient has fallen within last six months 1   Activity/Exercise/Self-Care Comment Pt just returned home from TCU following right hip ORIF 2/10/24.  Was tolerating TCU well, she reports \"thriving\" there.   General Information   Onset of Illness/Injury or Date of Surgery 04/04/24   Referring Physician Lindy Mcduffie MD   Patient/Family Therapy Goals Statement (PT) Pt would like to return when she is medically ready.   Pertinent History of Current Problem (include personal factors and/or comorbidities that impact the POC) 89 year old female with history of CAD, chronic pain, lumbar radiculopathy, CAD, aortic Stenosis (s/p TAVR), asthma, stage III CKD, GERD, hypertension, and prior CVA who presents to the ER with episode of syncope, head trauma and subarachnoid hemorrhage.   Existing Precautions/Restrictions fall   Cognition   Affect/Mental Status (Cognition) WFL   Orientation Status (Cognition) oriented x 4   Follows Commands (Cognition) WNL   Posture    Posture Not impaired   Range of Motion (ROM)   Range of Motion ROM deficits secondary to pain;ROM deficits secondary to surgical procedure;ROM deficits secondary to weakness   Strength (Manual Muscle Testing)   Strength (Manual Muscle Testing) Deficits observed during functional mobility   Bed Mobility   Bed Mobility rolling left;supine-sit   Rolling Left [place] (Bed Mobility) contact " guard;verbal cues;supervision   Supine-Sit [place] (Bed Mobility) contact guard;verbal cues;supervision   Bed Mobility Limitations decreased ability to use legs for bridging/pushing   Impairments Contributing to Impaired Bed Mobility pain;decreased strength   Assistive Device (Bed Mobility) bed rails   Transfers   Transfers bed-chair transfer;sit-stand transfer;toilet transfer   Maintains Weight-bearing Status (Transfers) able to maintain   Transfer Safety Concerns Noted losing balance backward;decreased proprioception   Impairments Contributing to Impaired Transfers pain;decreased strength   Bed-Chair Transfer   Assistive Device (Bed-Chair Transfers) walker, front-wheeled   Bed-Chair [place] (Transfers) contact guard;verbal cues;supervision   Sit-Stand Transfer   Sit-Stand [place] (Transfers) contact guard;verbal cues;supervision   Assistive Device (Sit-Stand Transfers) walker, front-wheeled   Toilet Transfer   [place] Level (Toilet Transfer) modified independence;supervision;verbal cues   Assistive Device (Toilet Transfer) grab bar, toilet;walker, front-wheeled   Type (Toilet Transfer) stand-sit;sit-stand   Gait/Stairs (Locomotion)   [place] Level (Gait) contact guard;verbal cues;supervision   Assistive Device (Gait) walker, front-wheeled   Distance in Feet (Gait) 5   Pattern (Gait) swing-to   Deviations/Abnormal Patterns (Gait) antalgic;gait speed decreased;carol decreased   Clinical Impression   Criteria for Skilled Therapeutic Intervention Yes, treatment indicated   PT Diagnosis (PT) impaired functional mobility, difficulty with gait   Influenced by the following impairments weakness, pain   Functional limitations due to impairments bed mobility, transfers, gait   Clinical Presentation (PT Evaluation Complexity) stable   Clinical Presentation Rationale pt presents as clinically diagnosed.   Clinical Decision Making (Complexity) moderate complexity   Planned Therapy Interventions (PT)  bed mobility training;gait training;strengthening;transfer training   Risk & Benefits of therapy have been explained evaluation/treatment results reviewed;patient   PT Total Evaluation Time   PT Eval, Moderate Complexity Minutes (16896) 15   Physical Therapy Goals   PT Frequency Daily   PT Predicted Duration/Target Date for Goal Attainment 04/13/24   PT Goals Bed Mobility;Transfers;Gait   PT: Bed Mobility Supervision/stand-by assist;Modified independent;Supine to/from sit;Rolling;Bridging   PT: Transfers Supervision/stand-by assist;Sit to/from stand;Bed to/from chair;Assistive device   PT: Gait Supervision/stand-by assist;Rolling walker;150 feet   Interventions   Interventions Quick Adds Gait Training   Gait Training   Gait Training Minutes (21273) 15   Symptoms Noted During/After Treatment (Gait Training) fatigue   Treatment Detail/Skilled Intervention pt amb 30 ft x2 with FWW   Distance in Feet 30x2   [place] Level (Gait Training) contact guard   Physical Assistance Level (Gait Training) supervision;verbal cues;1 person assist   Assistive Device (Gait Training) rolling walker   Pattern Analysis (Gait Training) swing-to gait   Gait Analysis Deviations decreased carol;decreased weight-shifting ability   Impairments (Gait Analysis/Training) pain;strength decreased   PT Discharge Planning   PT Plan gait, transfers, bed mobility   PT Discharge Recommendation (DC Rec) Transitional Care Facility   PT Rationale for DC Rec Pt would benefit from rehab stay and is not able to return home at this level of function.   PT Brief overview of current status CGA bed mobility, transfers, gait 30ft x2 FWW   PT Equipment Needed at Discharge walker, rolling

## 2024-04-06 NOTE — PLAN OF CARE
Problem: Comorbidity Management  Goal: Blood Pressure in Desired Range  Intervention: Maintain Blood Pressure Management  Recent Flowsheet Documentation  Taken 4/6/2024 0024 by Uvaldo Elaine RN  Medication Review/Management: medications reviewed     Problem: Pain Chronic (Persistent)  Goal: Optimal Pain Control and Function  Intervention: Manage Persistent Pain  Recent Flowsheet Documentation  Taken 4/6/2024 0024 by Uvaldo Elaine RN  Medication Review/Management: medications reviewed   Goal Outcome Evaluation:      Patient is alert and oriented x 4. Makes her needs known and uses call light. Call light within reach.  Patient complained of back pain rated 7/10, given prn oxycodone and tylenol at 0222. Patient expressed pain relief afterwards. BP elevated this shift despite complaints of feeling lightheaded. Right arm /79 and left arm /85. Given prn iv hydralazine 5 mg at 0317 and at 0430 BP dropped to 126/61. Has PCD pumps to legs. Has pure wick in place. Tele is sinus rhythm with 1st degree AV block.

## 2024-04-07 ENCOUNTER — APPOINTMENT (OUTPATIENT)
Dept: PHYSICAL THERAPY | Facility: HOSPITAL | Age: 89
DRG: 083 | End: 2024-04-07
Payer: COMMERCIAL

## 2024-04-07 PROCEDURE — 120N000001 HC R&B MED SURG/OB

## 2024-04-07 PROCEDURE — 97116 GAIT TRAINING THERAPY: CPT | Mod: GP | Performed by: PHYSICAL THERAPIST

## 2024-04-07 PROCEDURE — 97530 THERAPEUTIC ACTIVITIES: CPT | Mod: GP | Performed by: PHYSICAL THERAPIST

## 2024-04-07 PROCEDURE — 250N000013 HC RX MED GY IP 250 OP 250 PS 637: Performed by: INTERNAL MEDICINE

## 2024-04-07 PROCEDURE — 99232 SBSQ HOSP IP/OBS MODERATE 35: CPT | Performed by: HOSPITALIST

## 2024-04-07 RX ORDER — LABETALOL HYDROCHLORIDE 5 MG/ML
10 INJECTION, SOLUTION INTRAVENOUS EVERY 4 HOURS PRN
Status: DISCONTINUED | OUTPATIENT
Start: 2024-04-07 | End: 2024-04-08 | Stop reason: HOSPADM

## 2024-04-07 RX ADMIN — LIDOCAINE: 50 OINTMENT TOPICAL at 12:08

## 2024-04-07 RX ADMIN — ACETAMINOPHEN 650 MG: 325 TABLET ORAL at 20:48

## 2024-04-07 RX ADMIN — PREGABALIN 25 MG: 25 CAPSULE ORAL at 20:49

## 2024-04-07 RX ADMIN — ACETAMINOPHEN 650 MG: 325 TABLET ORAL at 12:08

## 2024-04-07 RX ADMIN — AMLODIPINE BESYLATE 5 MG: 5 TABLET ORAL at 20:48

## 2024-04-07 RX ADMIN — LEVETIRACETAM 500 MG: 500 TABLET, FILM COATED ORAL at 20:48

## 2024-04-07 RX ADMIN — LIDOCAINE: 50 OINTMENT TOPICAL at 08:18

## 2024-04-07 RX ADMIN — OXYCODONE HYDROCHLORIDE 5 MG: 5 TABLET ORAL at 12:08

## 2024-04-07 RX ADMIN — LEVETIRACETAM 500 MG: 500 TABLET, FILM COATED ORAL at 08:18

## 2024-04-07 RX ADMIN — ATORVASTATIN CALCIUM 40 MG: 40 TABLET, FILM COATED ORAL at 20:48

## 2024-04-07 RX ADMIN — AMLODIPINE BESYLATE 5 MG: 5 TABLET ORAL at 08:18

## 2024-04-07 RX ADMIN — ROPINIROLE HYDROCHLORIDE 2 MG: 1 TABLET, FILM COATED ORAL at 20:48

## 2024-04-07 RX ADMIN — METOPROLOL SUCCINATE 25 MG: 25 TABLET, EXTENDED RELEASE ORAL at 20:48

## 2024-04-07 RX ADMIN — PREGABALIN 25 MG: 25 CAPSULE ORAL at 08:18

## 2024-04-07 ASSESSMENT — ACTIVITIES OF DAILY LIVING (ADL)
ADLS_ACUITY_SCORE: 34
ADLS_ACUITY_SCORE: 39
ADLS_ACUITY_SCORE: 35
ADLS_ACUITY_SCORE: 34
ADLS_ACUITY_SCORE: 39
ADLS_ACUITY_SCORE: 38
ADLS_ACUITY_SCORE: 38
ADLS_ACUITY_SCORE: 39
ADLS_ACUITY_SCORE: 38
ADLS_ACUITY_SCORE: 39
ADLS_ACUITY_SCORE: 38
ADLS_ACUITY_SCORE: 35
ADLS_ACUITY_SCORE: 39
ADLS_ACUITY_SCORE: 38

## 2024-04-07 NOTE — PROGRESS NOTES
[facility]    Medicine Progress Note - Hospitalist Service    Date of Admission:  4/4/2024    Assessment & Plan   89 year old female with history of CAD, chronic pain, aortic stenosis s/p TAVR, CKD3, HTN and prior CVA who presented with syncope and head trauma. Found to have subarachnoid hemorrhage.    #Recurrent syncope and fall  Episode prior to admission and x2 episodes after admission- associated with lightheadedness and hypotension.   No cardiac arrhythmia identified on telemetry. Both episodes occurred after tizanidine and hydralazine    Echocardiogram unremarkable  No orthostatic BP change  Continue telemetry  Discontinued tizanidine and hydralazine    #Subarachnoid hemorrhage   Head CT showed small volume subarachnoid blood products layering along the right sylvian fissure.    Per neurosurgery: hemorrhage in the sylvian fissure could be secondary to MCA aneurysm for which CT angiogram or MRA is recommended   CTA head shows no aneurysm or high flow vascular malformation identified. Scattered mild to moderate stenoses of the intracranial arterial vasculature.  CT head repeated after 24 hours showed no change of the brain bleed.   Hold aspirin  Continue Keppra for 7 days  Keep systolic BP < 150, use IV labetolol if needed as opposed to hydralazine  Outpatient neurosurgery follow up in 6 weeks with head CT prior  Follow up with neurovascular surgery in several months to rule out potential aneurysm     #Essential hypertension  Systolic BP as high as 190s initially  Now controlled with metoprolol and amlodipine    #Chronic pain  Patient has chronic bilateral hip pain and lower back pain. She underwent open reduction internal fixation right proximal femur peritrochanteric fracture on 2/10/24 and was subsequently discharged to TCU.  Patient recently returned home from TCU. She also had left hip steroid injection on 3/12/24.   On admission, pelvic x-ray showed postoperative changes from  instrumented fixation of the right femur transfixing a comminuted proximal femur fracture which demonstrates some interval incomplete healing when compared to the prior study and no evidence of displaced pelvic fracture.    Patient has been on oxycodone for pain control chronically. Per daughter, patient was scheduled to have a spinal cord stimulator placement 4/5/24. However, she was admitted.   - Topical lidocaine and tylenol prn  - Continue PTA lyrica  - Discontinued Tizanidine   - Outpatient pain clinic follow up    #CKD stage III  Creatinine is at baseline      DVT ppx: PCDs      Discharge planning: PT recommends TCU             Diet: Combination Diet Low Saturated Fat Na <2400mg Diet, No Caffeine Diet    Tyson Catheter: Not present  Lines: None     Cardiac Monitoring: ACTIVE order. Indication: Syncope- high cardiac risk (48 hours)  Code Status: Full Code      Clinically Significant Risk Factors                  # Hypertension: Noted on problem list  # Chronic heart failure with preserved ejection fraction: heart failure noted on problem list and last echo with EF >50%           # Financial/Environmental Concerns: none  # Asthma: noted on problem list        Disposition Plan      Expected Discharge Date: 04/08/2024      Destination: home with help/services  Discharge Comments: WOC consult            Kb Looney, DO  Hospitalist Service  [facility]  Securely message with [employer] (more info)  Text page via [employer] Paging/Directory   ______________________________________________________________________    Interval History   No acute events overnight    Physical Exam   Vital Signs: Temp: 98.2  F (36.8  C) Temp src: Oral BP: 116/59 Pulse: 86   Resp: 20 SpO2: 94 % O2 Device: None (Room air) Oxygen Delivery: 1 LPM  Weight: 126 lbs 1.65 oz    General Appearance:  No acute distress  Respiratory: Clear to auscultation bilaterally  Cardiovascular: Regular rate and rhythm  Neuro: Alert and oriented  x 3, normal speech    Medical Decision Making             Data

## 2024-04-07 NOTE — PLAN OF CARE
Goal Outcome Evaluation:      Plan of Care Reviewed With: patient    Overall Patient Progress: improvingOverall Patient Progress: improving     Daughters at bedside at start of shift. Patient complaining of burning pain on sacrum/coccyx and states she feels it is getting worse. Messaged provider and obtained order for calmoseptine. Patient on Purewick to protect from moisture. Foam dressing in place and MIRTHA pump applied to bed. Patient frequently turned and repositioned throughout shift. A&Ox4. Gave PRN oxycodone for pain, effective per patient. Placed patient on 1 L NC at bedtime because patient was satting 90-91.

## 2024-04-07 NOTE — PLAN OF CARE
Problem: Comorbidity Management  Goal: Blood Pressure in Desired Range  Intervention: Maintain Blood Pressure Management  Recent Flowsheet Documentation  Taken 4/7/2024 0029 by Uvaldo Elaine RN  Medication Review/Management: medications reviewed     Problem: Pain Chronic (Persistent)  Goal: Optimal Pain Control and Function  Intervention: Manage Persistent Pain  Recent Flowsheet Documentation  Taken 4/7/2024 0029 by Uvaldo Elaine RN  Medication Review/Management: medications reviewed   Goal Outcome Evaluation:    Patient complained of back pain rated 4/10, declined to take offered pain meds. Tele is sinus rhythm with 1st degree AV block. /52. Denies dizziness or lightheadedness.

## 2024-04-07 NOTE — PLAN OF CARE
Goal Outcome Evaluation:    Alert and oriented.  Up with stand by assist walker and gait belt.  Sat in chair this am. Ambulated halls with therapy today. Incontinent of urine. Bm x 1.  Tolerating diet. Complaints of pain in her coccyx area 8/10. Oxy and prn tylenol administered.  Telemetry is first degree av block. No feeling of dizziness.

## 2024-04-08 ENCOUNTER — APPOINTMENT (OUTPATIENT)
Dept: RADIOLOGY | Facility: HOSPITAL | Age: 89
DRG: 083 | End: 2024-04-08
Attending: STUDENT IN AN ORGANIZED HEALTH CARE EDUCATION/TRAINING PROGRAM
Payer: COMMERCIAL

## 2024-04-08 ENCOUNTER — DOCUMENTATION ONLY (OUTPATIENT)
Dept: GERIATRICS | Facility: CLINIC | Age: 89
End: 2024-04-08

## 2024-04-08 VITALS
BODY MASS INDEX: 23.34 KG/M2 | DIASTOLIC BLOOD PRESSURE: 59 MMHG | WEIGHT: 118.9 LBS | HEIGHT: 60 IN | SYSTOLIC BLOOD PRESSURE: 112 MMHG | TEMPERATURE: 98.4 F | OXYGEN SATURATION: 93 % | RESPIRATION RATE: 20 BRPM | HEART RATE: 87 BPM

## 2024-04-08 PROCEDURE — 72100 X-RAY EXAM L-S SPINE 2/3 VWS: CPT

## 2024-04-08 PROCEDURE — 250N000013 HC RX MED GY IP 250 OP 250 PS 637: Performed by: INTERNAL MEDICINE

## 2024-04-08 PROCEDURE — 99239 HOSP IP/OBS DSCHRG MGMT >30: CPT | Performed by: STUDENT IN AN ORGANIZED HEALTH CARE EDUCATION/TRAINING PROGRAM

## 2024-04-08 RX ORDER — LEVETIRACETAM 500 MG/1
500 TABLET ORAL 2 TIMES DAILY
Status: SHIPPED
Start: 2024-04-04 | End: 2024-04-11

## 2024-04-08 RX ORDER — POLYETHYLENE GLYCOL 3350 17 G/17G
17 POWDER, FOR SOLUTION ORAL DAILY
Status: SHIPPED
Start: 2024-04-08

## 2024-04-08 RX ORDER — AMLODIPINE BESYLATE 5 MG/1
5 TABLET ORAL 2 TIMES DAILY
Status: SHIPPED
Start: 2024-04-08

## 2024-04-08 RX ADMIN — AMLODIPINE BESYLATE 5 MG: 5 TABLET ORAL at 08:27

## 2024-04-08 RX ADMIN — PREGABALIN 25 MG: 25 CAPSULE ORAL at 08:27

## 2024-04-08 RX ADMIN — LEVETIRACETAM 500 MG: 500 TABLET, FILM COATED ORAL at 08:27

## 2024-04-08 RX ADMIN — OXYCODONE HYDROCHLORIDE 5 MG: 5 TABLET ORAL at 13:23

## 2024-04-08 RX ADMIN — LIDOCAINE: 50 OINTMENT TOPICAL at 13:23

## 2024-04-08 RX ADMIN — ACETAMINOPHEN 650 MG: 325 TABLET ORAL at 13:23

## 2024-04-08 RX ADMIN — LIDOCAINE: 50 OINTMENT TOPICAL at 08:28

## 2024-04-08 ASSESSMENT — ACTIVITIES OF DAILY LIVING (ADL)
ADLS_ACUITY_SCORE: 34
ADLS_ACUITY_SCORE: 35
ADLS_ACUITY_SCORE: 34
ADLS_ACUITY_SCORE: 35
ADLS_ACUITY_SCORE: 34
ADLS_ACUITY_SCORE: 35

## 2024-04-08 NOTE — PLAN OF CARE
Occupational Therapy Discharge Summary    Reason for therapy discharge:    Discharged to transitional care facility.    Progress towards therapy goal(s). See goals on Care Plan in [facility] electronic health record for goal details.  Goals partially met.  Barriers to achieving goals:   discharge from facility.    Therapy recommendation(s):    Continued therapy is recommended.  Rationale/Recommendations:  to maximize ADL independence.

## 2024-04-08 NOTE — PLAN OF CARE
Problem: Risk for Delirium  Goal: Improved Sleep  Outcome: Progressing     Problem: Comorbidity Management  Goal: Blood Pressure in Desired Range  Outcome: Progressing     Problem: Adult Inpatient Plan of Care  Goal: Absence of Hospital-Acquired Illness or Injury  Intervention: Prevent Skin Injury  Recent Flowsheet Documentation  Taken 4/8/2024 0100 by Yunior Booker RN  Body Position:   position changed independently   legs elevated   Goal Outcome Evaluation:     Patient alert and oriented X 4, denies pain. Foam dressing in place  to buttocks, Purewick in place with adequate output. Pt on RA, and able to let needs be known. /53 (BP Location: Right arm)   Pulse 78   Temp 98.3  F (36.8  C) (Oral)   Resp 20   Ht 1.524 m (5')   Wt 57.2 kg (126 lb 1.7 oz)   SpO2 93%   BMI 24.63 kg/m

## 2024-04-08 NOTE — PLAN OF CARE
Problem: Adult Inpatient Plan of Care  Goal: Optimal Comfort and Wellbeing  Outcome: Adequate for Care Transition     Problem: Adult Inpatient Plan of Care  Goal: Readiness for Transition of Care  Outcome: Adequate for Care Transition   Goal Outcome Evaluation:    Pt is alert and oriented x4, with intermittent confusion. Has mild pain in sacrum, managed by repositioning. Reviewed discharge paperwork with pt and daughter. Pt will discharge to TCU, daughter will provide ride.     Lorrie Cantu RN.

## 2024-04-08 NOTE — PROGRESS NOTES
Care Management Discharge Note    Discharge Date: 04/08/2024       Discharge Disposition: transitional care    Discharge Services:  rehab    Discharge DME:      Discharge Transportation: Jacqueline valdez will transport between [id number]    Private pay costs discussed: transportation costs    Does the patient's insurance plan have a 3 day qualifying hospital stay waiver?  No    PAS Confirmation Code: [id number]  Patient/family educated on Medicare website which has current facility and service quality ratings:  yes    Education Provided on the Discharge Plan:  yes  Persons Notified of Discharge Plans: patient and Jacqueline valdez  Patient/Family in Agreement with the Plan:  yes    Handoff Referral Completed: Yes    Additional Information:  [facility] was full. Patient agrees with [facility] Desirae Lomb[facility]. Jacqueline Valdez to transport. PAS done.    TERE Serrano

## 2024-04-08 NOTE — PROGRESS NOTES
Care Management Follow Up    Length of Stay (days): 4    Expected Discharge Date: 04/08/2024     Concerns to be Addressed:   Transitional Care    Patient plan of care discussed at interdisciplinary rounds: Yes    Anticipated Discharge Disposition:  patient hopes to return to TCU at [facility]     Anticipated Discharge Services:  rehab  Anticipated Discharge DME:      Patient/family educated on Medicare website which has current facility and service quality ratings: yes   Education Provided on the Discharge Plan: yes   Patient/Family in Agreement with the Plan:  yes    Referrals Placed by CM/SW:  at this time she is only on list at [facility]. If they are full she will consider other options.  Private pay costs discussed: transportation costs    Additional Information:  Patient lives alone. She said she fell and hit her head as a result of a medication reaction. She hopes to return to [facility] TCU. Daughter to transport. PAS needed.    TERE Serrano

## 2024-04-08 NOTE — PROGRESS NOTES
[facility] GERIATRIC SERVICES    Code Status:  FULL CODE   Visit Type:   Chief Complaint   Patient presents with    TCU Admission     [facility] 4/4/2024 - 4/8/2024     Facility:  [facility] ([facility]) [[id number]]         HPI: Sherice Alvarez is a 89 year old female who I am seeing today for admit to the TCU. Past medical history includes history of CAD, chronic pain, aortic stenosis s/p TAVR, CKD3, HTN, recent right femur fx, s/p ORIF and prior CVA. Pt presented with syncope and head trauma post fall. No cardiac arrhythmia identified on telemetry. Both episodes occurred after tizanidine and hydralazine  Symptoms thought to be due to medication reaction of tizanidine. Tizanidine and hydralazine discontinued. Pt found to have subarachnoid hemorrhage. Hemorrhage in the sylvian fissure could be secondary to MCA aneurysm for which CT angiogram or MRA is recommended. CTA head shows no aneurysm or high flow vascular malformation identified. Scattered mild to moderate stenoses of the intracranial arterial vasculature. CT head repeated after 24 hours showed no change of the brain bleed. She was placed on Keppra. ASA held.     Transitional Care Course: Today pt found ambulating independently in room holding onto chair. Pt with some mild memory impairment on exam with repetitive speech and poor recall. Pt and per records pt had reaction to tizanidine. Pt with recent hip fracture. She continues with some pain in hip. She also reports muscle spasms in her both thighs. She reports pain is controlled with oxycodone. She denies any headache. She reports occasional dizziness. She reports she is having regular bowel movements.       Assessment/Plan:      Recurrent syncope and fall  -associated with lightheadedness and hypotension after taking tizanidine.   -Echocardiogram unremarkable  -monitor bp.   -Follow up BMP.     Subarachnoid hemorrhage   -Head CT showed small volume subarachnoid blood products layering along the right  sylvian fissure.    -Per neurosurgery: hemorrhage in the sylvian fissure could be secondary to MCA aneurysm for which CT angiogram or MRA is recommended   -CTA head shows no aneurysm or high flow vascular malformation identified. Scattered mild to moderate stenoses of the intracranial arterial vasculature.  -CT head repeated after 24 hours showed no change of the brain bleed.   -Hold aspirin  -Continue Keppra for 7 days.   -Keep systolic BP < 150. Continue amlodipine 5 mg BID.   -neurosurgery follow up in 6 weeks with repeat head CT prior  -Follow up with neurovascular surgery in several months to rule out potential aneurysm      Essential hypertension  -metoprolol 25 mg Q pm.   -amlodipine 5 mg BID.   -monitor bp.      Chronic Back pain  -Lumbar XR with no definitive evidence of acute fracture  -continue Oxycodone BID.   -pt admitted with Lyrica. Pain team records reviewed from 2 recent hospitalizations and not on records.   -pt reports she does not usually take Lyrica.   -Discontinue Lyrica.      Right femur fracture  -continue oxycodone for pain.     -Ok to PT, OT to eval and treat.   -Follow up CBC and BMP.     Active Ambulatory Problems     Diagnosis Date Noted    Constipation 06/03/2014    Cancer (H)     Gastroesophageal reflux disease with esophagitis 11/04/2016    Dyslipidemia, goal LDL below 100 11/04/2016    Abnormal chest CT     Anterior epistaxis 11/22/2017    Stage 3 chronic kidney disease (H) 05/15/2019    Asthma 05/17/2019    Coronary arteriosclerosis due to lipid rich plaque     Severe calcific aortic valve stenosis     Dyspnea 05/07/2020    S/P TAVR (transcatheter aortic valve replacement) 06/02/2020    Leukocytosis, unspecified type     S/P total knee arthroplasty, left 05/11/2021    Acute deep vein thrombosis (DVT) of lower extremity (H) 09/08/2021    (HFpEF) heart failure with preserved ejection fraction (H) 10/28/2021    Chest pain, unspecified type 08/02/2022    Other insomnia 08/02/2022     Adjustment disorder with mixed anxiety and depressed mood 08/02/2022    Restless leg syndrome 08/03/2022    Neck pain 05/18/2023    Cervical spine pain 05/18/2023    Polyuria 05/19/2023    Mild cognitive impairment 05/19/2023    Cellulitis of left lower extremity 09/09/2023    Edema of left lower extremity 09/09/2023    Avulsion of skin of left lower leg, initial encounter 09/09/2023    Essential hypertension 09/09/2023    Acute on chronic anemia 09/09/2023    Fall, initial encounter 02/09/2024    Closed displaced subtrochanteric fracture of right femur, initial encounter (H) 02/09/2024    Episodic mood disorder (H24) 03/09/2024    Aftercare for healing traumatic fracture of femur 02/17/2024    Aortic calcification (H24) 04/26/2023    Chronic systolic CHF (congestive heart failure) (H) 01/11/2023    Closed fracture of neck of left femur (H) 02/18/2024    Dyslipidemia 04/26/2023    Hypothyroidism (acquired) 01/11/2023    Skin ulcer of sacrum (H) 02/18/2024    Spinal stenosis of lumbar region with neurogenic claudication 02/17/2024    Subarachnoid hemorrhage (H) 04/04/2024    Syncope, unspecified syncope type 04/04/2024    Other chronic pain 04/06/2024     Resolved Ambulatory Problems     Diagnosis Date Noted    Accelerated hypertension     Epistaxis     Chest pain 07/21/2017    Lightheadedness     Atypical chest pain 04/16/2020    Acute diastolic congestive heart failure (H)     ACS (acute coronary syndrome) (H) 11/04/2016    Ischemic chest pain (H24)     NSTEMI (non-ST elevated myocardial infarction) (H)     Acute respiratory failure with hypoxia (H) 05/15/2019    Coronary artery disease 04/23/2020     Past Medical History:   Diagnosis Date    Arthritis     CAD (coronary artery disease)     Chronic kidney disease     Chronic pain syndrome     Congestive heart failure (H)     Crohn's disease (H)     GERD (gastroesophageal reflux disease)     Hx of heart artery stent 11/01/2016    Hyperlipidemia     Hypertension      PONV (postoperative nausea and vomiting)     Restless legs syndrome     Stroke (H) 01/01/2010     Allergies   Allergen Reactions    Amitriptyline Hcl [Amitriptyline] Unknown    Erythromycin Diarrhea     Childhood reaction    Gabapentin Swelling     And jerking movements    Gramineae Pollens Unknown    Naproxen Unknown    Other Environmental Allergy Unknown     Molds and pet dander       All Meds and Allergies reviewed in the record at the facility and is the most up-to-date.    Post Discharge Medication Reconciliation Status: discharge medications reconciled and changed, per note/orders  Current Outpatient Medications   Medication Sig Dispense Refill    acetaminophen (TYLENOL) 500 MG tablet Take 500 mg by mouth 3 times daily      amLODIPine (NORVASC) 5 MG tablet Take 1 tablet (5 mg) by mouth 2 times daily      atorvastatin (LIPITOR) 40 MG tablet Take 1 tablet (40 mg) by mouth every evening      levETIRAcetam (KEPPRA) 500 MG tablet Take 1 tablet (500 mg) by mouth 2 times daily for 7 days      Lidocaine (LIDOCARE) 4 % Patch Place 2 patches onto the skin every 24 hours To prevent lidocaine toxicity, patient should be patch free for 12 hrs daily. 5 patch 0    lidocaine (XYLOCAINE) 5 % external ointment Apply topically 3 times daily as needed for moderate pain      melatonin 3 MG tablet Take 3 mg by mouth nightly as needed for sleep      metoprolol succinate ER (TOPROL XL) 25 MG 24 hr tablet Take 25 mg by mouth every evening      Multiple Vitamin (THERA-TABS) TABS Take 1 tablet by mouth daily as needed (when desired)      nitroGLYcerin (NITROSTAT) 0.4 MG sublingual tablet Place 1 tablet (0.4 mg) under the tongue every 5 minutes as needed 90 tablet 0    ondansetron (ZOFRAN) 4 MG tablet Take 4 mg by mouth every 6 hours as needed for nausea      oxyCODONE (ROXICODONE) 5 MG tablet Take 1 tablet (5 mg) by mouth 2 times daily. May also take 1 tablet (5 mg) every 12 hours as needed for severe pain. not within 6 hours of  "previous dose. 60 tablet 0    polyethylene glycol (MIRALAX) 17 GM/Dose powder Take 17 g by mouth daily      rOPINIRole (REQUIP) 2 MG tablet Take 2 mg by mouth at bedtime      senna-docusate (SENOKOT-S/PERICOLACE) 8.6-50 MG tablet Take 2 tablets by mouth 2 times daily as needed for constipation 60 tablet 0     No current facility-administered medications for this visit.       REVIEW OF SYSTEMS:   10 point review of systems reviewed and pertinent positives in the HPI.     PHYSICAL EXAMINATION:  Physical Exam     Vital signs: BP (!) 151/82   Pulse 87   Temp 98.2  F (36.8  C)   Resp 18   Ht 1.499 m (4' 11\")   Wt 54.2 kg (119 lb 6.4 oz)   SpO2 94%   BMI 24.12 kg/m    General: Awake, Alert, oriented x3, sitting up on edge of bed, appropriately, follows simple commands, conversant  HEENT:PERRLA, Pink conjunctiva, moist oral mucosa  NECK: Supple  CVS:  S1  S2, without murmur or gallop.   LUNG: Clear to auscultation, No wheezes, rales or rhonci.  BACK: No kyphosis of the thoracic spine  ABDOMEN: Soft, nontender to palpation, with positive bowel sounds  EXTREMITIES: moves both upper and lower extremities, no pedal edema, no calf tenderness  SKIN: Slit in coccygeal fold. Maceration noted. Right hip incision dry and intact.   NEUROLOGIC: Intact, pulses palpable,  equal.   PSYCHIATRIC: Mild cognitive impairment noted.       Labs:  All labs reviewed in the nursing home record and [facility]   @  Lab Results   Component Value Date    WBC 10.6 04/04/2024     Lab Results   Component Value Date    RBC 3.06 04/04/2024     Lab Results   Component Value Date    HGB 9.4 04/04/2024     Lab Results   Component Value Date    HCT 28.8 04/04/2024     Lab Results   Component Value Date    MCV 94 04/04/2024     Lab Results   Component Value Date    MCH 30.7 04/04/2024     Lab Results   Component Value Date    MCHC 32.6 04/04/2024     Lab Results   Component Value Date    RDW 19.3 04/04/2024     Lab Results   Component Value Date    PLT " 177 04/04/2024        @Last Comprehensive Metabolic Panel:  Sodium   Date Value Ref Range Status   04/04/2024 141 135 - 145 mmol/L Final     Comment:     Reference intervals for this test were updated on 09/26/2023 to more accurately reflect our healthy population. There may be differences in the flagging of prior results with similar values performed with this method. Interpretation of those prior results can be made in the context of the updated reference intervals.      Potassium   Date Value Ref Range Status   04/04/2024 3.9 3.4 - 5.3 mmol/L Final   08/03/2022 3.6 3.5 - 5.0 mmol/L Final     Chloride   Date Value Ref Range Status   04/04/2024 108 (H) 98 - 107 mmol/L Final   08/03/2022 110 (H) 98 - 107 mmol/L Final     Carbon Dioxide (CO2)   Date Value Ref Range Status   04/04/2024 23 22 - 29 mmol/L Final   08/03/2022 26 22 - 31 mmol/L Final     Anion Gap   Date Value Ref Range Status   04/04/2024 10 7 - 15 mmol/L Final   08/03/2022 7 5 - 18 mmol/L Final     Glucose   Date Value Ref Range Status   08/03/2022 80 70 - 125 mg/dL Final     GLUCOSE BY METER POCT   Date Value Ref Range Status   04/05/2024 163 (H) 70 - 99 mg/dL Final     Urea Nitrogen   Date Value Ref Range Status   04/04/2024 27.7 (H) 8.0 - 23.0 mg/dL Final   08/03/2022 27 8 - 28 mg/dL Final     Creatinine   Date Value Ref Range Status   04/06/2024 0.84 0.51 - 0.95 mg/dL Final     GFR Estimate   Date Value Ref Range Status   04/06/2024 66 >60 mL/min/1.73m2 Final   06/07/2021 41 (L) >60 mL/min/1.73m2 Final     Calcium   Date Value Ref Range Status   04/04/2024 9.2 8.8 - 10.2 mg/dL Final     > 35 minutes spent preparing for visit, reviewing labs, imaging, meds as well as face to face time spent with pt and collaborating with nursing staff.     This note has been dictated using voice recognition software. Any grammatical or context distortions are unintentional and inherent to the software    Electronically signed by: Karyn Abraham CNP

## 2024-04-08 NOTE — PLAN OF CARE
Goal Outcome Evaluation:               Pt A&Ox4, VSS on room air. Having back pain, managed with PRN tylenol. 1st degree AV block on tele. Purwick on at night. Up with SBA. Bed alarm on, call light within reach.

## 2024-04-08 NOTE — DISCHARGE SUMMARY
[facility]  Hospitalist Discharge Summary      Date of Admission:  4/4/2024  Date of Discharge:  4/8/2024  Discharging Provider: Jagdeep Beaver DO  Discharge Service: Hospitalist Service    Discharge Diagnoses   Principal Problem:    Subarachnoid hemorrhage (H)  Active Problems:    Stage 3 chronic kidney disease (H)    Asthma    Coronary arteriosclerosis due to lipid rich plaque    S/P TAVR (transcatheter aortic valve replacement)    S/P total knee arthroplasty, left    (HFpEF) heart failure with preserved ejection fraction (H)    Essential hypertension    Syncope, unspecified syncope type    Other chronic pain        Clinically Significant Risk Factors          Follow-ups Needed After Discharge   Follow-up Appointments     Follow Up and recommended labs and tests      Follow up with jail physician.  The following labs/tests are   recommended: CBC, BMP.  Follow up with neurosurgery as outlined.  Recommend spine consult if back pain not improving.        Follow-up and recommended labs and tests       Please follow up at [facility] Neurosurgery in 6 weeks with head   CT prior.  Please call our schedulers at [phone] to schedule your   appointment or for any questions or concerns.      Please follow up with [facility] Vascular neurosurgery is a few   months to re-evaluate for possible aneurysm.            Discharge Disposition   Discharge to TCU  Condition at discharge: Stable    Hospital Course   89 year old female with history of CAD, chronic pain, aortic stenosis s/p TAVR, CKD3, HTN and prior CVA who presented with syncope and head trauma. Found to have subarachnoid hemorrhage.    #Recurrent syncope and fall  Episode prior to admission and x2 episodes after admission- associated with lightheadedness and hypotension.   No cardiac arrhythmia identified on telemetry. Both episodes occurred after tizanidine and hydralazine    Echocardiogram unremarkable  No orthostatic  BP change  Continue telemetry  Discontinued tizanidine and hydralazine    #Subarachnoid hemorrhage   Head CT showed small volume subarachnoid blood products layering along the right sylvian fissure.    Per neurosurgery: hemorrhage in the sylvian fissure could be secondary to MCA aneurysm for which CT angiogram or MRA is recommended   CTA head shows no aneurysm or high flow vascular malformation identified. Scattered mild to moderate stenoses of the intracranial arterial vasculature.  CT head repeated after 24 hours showed no change of the brain bleed.   Hold aspirin  Continue Keppra for 7 days total   Keep systolic BP < 150, use IV labetolol if needed as opposed to hydralazine and will also start low dose amlodipine   Outpatient neurosurgery follow up in 6 weeks with head CT prior  Follow up with neurovascular surgery in several months to rule out potential aneurysm     #Essential hypertension  Systolic BP as high as 190s initially  Now controlled with metoprolol and amlodipine    #Back pain  Lumbar XR with no definitive evidence of acute fracture  Discussed additional follow-up if pain continues  Continue home pain regimen on discharge       Consultations This Hospital Stay   NEUROSURGERY IP CONSULT  PHYSICAL THERAPY ADULT IP CONSULT  OCCUPATIONAL THERAPY ADULT IP CONSULT  WOUND OSTOMY CONTINENCE NURSE  IP CONSULT  CARE MANAGEMENT / SOCIAL WORK IP CONSULT  PHYSICAL THERAPY ADULT IP CONSULT  OCCUPATIONAL THERAPY ADULT IP CONSULT    Code Status   Full Code    Time Spent on this Encounter   IJagdeep DO, personally saw the patient today and spent greater than 30 minutes discharging this patient.       Jagdeep Beaver DO  [address] [address]  Phone: [phone]  Fax: [phone]  ______________________________________________________________________    Physical Exam   Vital Signs: Temp: 98.4  F (36.9  C) Temp src: Oral BP: 112/59 Pulse: 87    Resp: 20 SpO2: 93 % O2 Device: None (Room air)    Weight: 118 lbs 14.4 oz  General Appearance: Non-toxic, alert and cooperative   Respiratory: CTAB with good effort  Cardiovascular: RRR  GI: no pain  Skin: no rash  Other: No focal deficits         Primary Care Physician   [facility]    Discharge Orders      CT Head w/o Contrast     Adult Neurosurgery  Referral      Pain Management  Referral      Follow-up and recommended labs and tests     Please follow up at [facility] Neurosurgery in 6 weeks with head CT prior.  Please call our schedulers at [phone] to schedule your appointment or for any questions or concerns.      Please follow up with [facility] Vascular neurosurgery is a few months to re-evaluate for possible aneurysm.     Activity    Discharge Instructions:  *No lifting, pushing or pulling greater than 5-10 pound (this is about a gallon of milk).  *No aerobic or strenuous activity  *No activities with increased risk of falls  *You may move about your home as tolerated  *You may walk up and down stairs as tolerated    DO NOT take aspirin, NSAIDs (Ibuprofen, Aleve, Advil) until seen by neurosurgery provider in clinic.     Call the office at [phone] for any questions and concerns.    Go to ER with any seizure activity, mental status change (increasing confusion), difficulty with speech or increasing or acute weakness     General info for SNF    Length of Stay Estimate: Short Term Care: Estimated # of Days <30  Condition at Discharge: Stable  Level of care:skilled   Rehabilitation Potential: Fair  Admission H&P remains valid and up-to-date: Yes  Recent Chemotherapy: N/A  Use Nursing Home Standing Orders: Yes     Mantoux instructions    Give two-step Mantoux (PPD) Per Facility Policy Yes     Follow Up and recommended labs and tests    Follow up with jail physician.  The following labs/tests are recommended: CBC, BMP.  Follow up with neurosurgery as  outlined.  Recommend spine consult if back pain not improving.     Reason for your hospital stay    Principal Problem:    Subarachnoid hemorrhage (H)  Active Problems:    Stage 3 chronic kidney disease (H)    Asthma    Coronary arteriosclerosis due to lipid rich plaque    S/P TAVR (transcatheter aortic valve replacement)    S/P total knee arthroplasty, left    (HFpEF) heart failure with preserved ejection fraction (H)    Essential hypertension    Syncope, unspecified syncope type    Other chronic pain     Activity - Up with nursing assistance     Physical Therapy Adult Consult    Evaluate and treat as clinically indicated.     Occupational Therapy Adult Consult    Evaluate and treat as clinically indicated.     Fall precautions     Diet    Follow this diet upon discharge: Orders Placed This Encounter      Combination Diet Low Saturated Fat Na <2400mg Diet, No Caffeine Diet       Significant Results and Procedures   Results for orders placed or performed during the hospital encounter of 04/04/24   Head CT w/o contrast     Value    Radiologist flags Acute intracranial hemorrhage (AA)    Narrative    EXAM: CT HEAD W/O CONTRAST  LOCATION: [facility]  DATE: 4/4/2024    INDICATION: Syncope, confusion  COMPARISON: MRI head 10/08/2019.  TECHNIQUE: Routine CT Head without IV contrast. Multiplanar reformats. Dose reduction techniques were used.    FINDINGS:  INTRACRANIAL CONTENTS: Small volume subarachnoid blood products layering along the right sylvian fissure; no associated mass effect, no additional foci of hemorrhage. No findings to suggest intracranial mass. No CT evidence of acute infarct. Moderate   presumed chronic small vessel ischemic changes. Moderate generalized volume loss. No hydrocephalus.     VISUALIZED ORBITS/SINUSES/MASTOIDS: No intraorbital abnormality. No paranasal sinus mucosal disease. No middle ear or mastoid effusion.    BONES/SOFT TISSUES: No acute abnormality.      Impression     IMPRESSION:  1.  Small volume subarachnoid blood products layering along the right sylvian fissure.  2.  Additional chronic findings as described.      [Critical Result: Acute intracranial hemorrhage]    Finding was identified on 4/4/2024 12:51 PM CDT.     Dr. Zuniga was contacted by me on 4/4/2024 1:02 PM CDT and verbalized understanding of the critical result.   CT Cervical Spine w/o Contrast    Narrative    EXAM: CT CERVICAL SPINE W/O CONTRAST  LOCATION: [facility]  DATE: 4/4/2024    INDICATION: Syncope  COMPARISON: None.  TECHNIQUE: Routine CT Cervical Spine without IV contrast. Multiplanar reformats. Dose reduction techniques were used.    FINDINGS:  VERTEBRA: Marked degenerative changes throughout the cervical spine with bilateral facet arthropathy and multilevel ankylosis. Associated grade 1 anterolisthesis at C3-C4, C7-T1, and T1-T2. Assessment somewhat degraded due to osseous demineralization and   artifact through the mid cervical spine. Vertebral body heights within normal limits. No discernible acute fracture.     CANAL/FORAMINA: No high-grade spinal canal stenosis.    PARASPINAL: Postsurgical changes throughout the right neck soft tissue.      Impression    IMPRESSION:  1.  No discernible acute traumatic abnormality within the cervical spine on this somewhat degraded exam.     XR Pelvis and Hip Right 2 Views    Narrative    EXAM: XR PELVIS AND HIP RIGHT 2 VIEWS  LOCATION: [facility]  DATE: 4/4/2024    INDICATION: Fall, right hip pain, recent ORIF  COMPARISON: 02/17/2024.      Impression    IMPRESSION:     Again seen are postoperative changes from instrumented fixation of the right femur transfixing a comminuted proximal femur fracture which demonstrates some interval incomplete healing when compared to the prior study. There is a persistently displaced   lesser trochanteric fragment, in similar alignment. No definite new right hip fracture is  identified.     No displaced pelvic fracture. There is diffuse osseous demineralization, which reduces radiographic sensitivity for the detection of nondisplaced fractures. There is advanced left hip osteoarthritis. There are degenerative changes in the lower lumbar   spine and at the sacroiliac joints.   CTA Head Neck with Contrast    Narrative    EXAM: CTA HEAD NECK W CONTRAST  LOCATION: [facility]  DATE: 4/4/2024    INDICATION: Subarachnoid hemorrhage, right sylvian fissure  COMPARISON: CT head 04/04/2024 12:37 PM  CONTRAST: isovue 370 67ml  TECHNIQUE: Head and neck CT angiogram with IV contrast. Noncontrast head CT followed by axial helical CT images of the head and neck vessels obtained during the arterial phase of intravenous contrast administration. Axial 2D reconstructed images and   multiplanar 3D MIP reconstructed images of the head and neck vessels were performed by the technologist. Dose reduction techniques were used. All stenosis measurements made according to NASCET criteria unless otherwise specified.    FINDINGS:   NONCONTRAST HEAD CT:   INTRACRANIAL CONTENTS: Similar small volume subarachnoid hemorrhage at the right sylvian fissure. No new acute intracranial hemorrhage. No CT evidence of acute infarct. Moderate presumed chronic small vessel ischemic changes. Moderate generalized volume   loss. No hydrocephalus.     VISUALIZED ORBITS/SINUSES/MASTOIDS: Prior bilateral cataract surgery. Visualized portions of the orbits are otherwise unremarkable. No paranasal sinus mucosal disease. No middle ear or mastoid effusion.    BONES/SOFT TISSUES: No acute abnormality.    HEAD CTA:  ANTERIOR CIRCULATION: No occlusion, aneurysm, or high flow vascular malformation. There are atherosclerotic calcifications of bilateral carotid siphons without hemodynamically significant stenosis. There is focal mild stenosis of the right middle   cerebral artery origin. Developmentally hypoplastic left  A1 anterior cerebral artery segment.    POSTERIOR CIRCULATION: No occlusion, aneurysm, or high flow vascular malformation. Normal basilar artery. There are scattered mild-to-moderate stenoses of the bilateral posterior cerebral arteries, greatest at the left P1 segment.     DURAL VENOUS SINUSES: Not well evaluated on a technical basis.    NECK CTA:  RIGHT CAROTID: Atherosclerotic plaque results in less than 50% stenosis in the right ICA. No dissection.    LEFT CAROTID: Atherosclerotic plaque results in 70-90% stenosis in the left ICA. No dissection.    VERTEBRAL ARTERIES: There is severely diseased right vertebral artery including complete occlusion of the V1 segment and proximal V2 segment. The mid and distal right V2 segment demonstrates numerous multifocal moderate and severe stenoses. There is   occlusion at the junction of the right V3 and V4 segments vertebral artery. There is moderate stenosis of the left vertebral artery origin. The remaining left vertebral artery is widely patent.     AORTIC ARCH: Classic aortic arch anatomy with approximately 80% stenosis of the left subclavian artery origin.    NONVASCULAR STRUCTURES: Unremarkable.      Impression    IMPRESSION:   HEAD CT:  1.  Stable small volume subarachnoid hemorrhage at the right Sylvian fissure.  2.  No new acute intracranial hemorrhage.    HEAD CTA:  1.  No aneurysm or high flow vascular malformation identified.  2.  Scattered mild to moderate stenoses of the intracranial arterial vasculature.    NECK CTA:  1.  Atherosclerotic plaque results in 70-90% stenosis of the left internal carotid artery.  2.  Atherosclerotic plaque results in less than 50% stenosis of the right internal carotid artery.  3.  Severely diseased right vertebral artery with multifocal areas of occlusion and multifocal moderate and severe stenoses in the mid and distal V2 segment.  4.  Moderate stenosis of the left vertebral artery origin.  5.  Approximately 80% stenosis of the  left subclavian artery origin.     Head CT w/o contrast    Narrative    EXAM: CT HEAD W/O CONTRAST  LOCATION: [facility]  DATE: 4/4/2024    INDICATION: Follow up on subarachnoid hemorrhage  COMPARISON: 04/04/2024 1:52 PM  TECHNIQUE: Routine CT Head without IV contrast. Multiplanar reformats. Dose reduction techniques were used.    FINDINGS:  INTRACRANIAL CONTENTS: Unchanged small volume subarachnoid hemorrhage at the right sylvian fissure. No new acute intracranial hemorrhage. No CT evidence of acute infarct. Moderate presumed chronic small vessel ischemic changes. Moderate generalized   volume loss. No hydrocephalus.     VISUALIZED ORBITS/SINUSES/MASTOIDS: Prior bilateral cataract surgery. Visualized portions of the orbits are otherwise unremarkable. No paranasal sinus mucosal disease. No middle ear or mastoid effusion.    BONES/SOFT TISSUES: No acute abnormality.      Impression    IMPRESSION:  1.  Unchanged small volume subarachnoid hemorrhage at the right Sylvian fissure.  2.  No new acute intracranial abnormality.     CT Head w/o Contrast    Narrative    EXAM: CT HEAD W/O CONTRAST  LOCATION: [facility]  DATE: 4/5/2024    INDICATION: change of mental status this morning. CT showed brain bleed yesterday. Change of brain bleed? New stroke?  COMPARISON: 04/04/2024  TECHNIQUE: Routine CT Head without IV contrast. Multiplanar reformats. Dose reduction techniques were used.    FINDINGS:  INTRACRANIAL CONTENTS: Unchanged small volume subarachnoid blood products layering along the right sylvian fissure. No new intracranial blood products. No CT evidence of acute infarct. Moderate presumed chronic small vessel ischemic changes. Moderate   generalized volume loss. No hydrocephalus.     VISUALIZED ORBITS/SINUSES/MASTOIDS: No intraorbital abnormality. No paranasal sinus mucosal disease. No middle ear or mastoid effusion.    BONES/SOFT TISSUES: No acute abnormality.       Impression    IMPRESSION:  1.  Unchanged subarachnoid blood products. No interval intracranial pathology.   XR Lumbar Spine 2/3 Views    Narrative    EXAM: XR LUMBAR SPINE 2/3 VIEWS  LOCATION: [facility]  DATE: 2024    INDICATION: worsening back pain with recent fall  COMPARISON: None.      Impression    IMPRESSION: Marked degenerative changes throughout the lumbar spine with bilateral facet arthropathy and multilevel disc height loss, most pronounced from L3-S1. Associated grade 1 anterolisthesis at L4-L5. Anterior and posterior vertebral body   osteophytes. Mild L4 and L5 compression deformities (less than 20%) which could represent acute or subacute fractures; suggest correlation with point tenderness.   Echocardiogram Complete    Narrative    [id number]  [id number]  [id number]  [id number]^ANNE^ISAIAH^[place] [address]     Name: ROBINSON SHORT  MRN: [id number]  : 1934  Study Date: 2024 10:56 AM  Age: 89 yrs  Gender: Female  Patient Location: [facility]  Reason For Study: Syncope  Ordering Physician: ISAIAH RODRÍGUEZ  Performed By: ACE     BSA: 1.5 m2  Height: 60 in  Weight: 126 lb  HR: 70  BP: 111/55 mmHg  ______________________________________________________________________________  Procedure  Complete Portable Echo Adult.  ______________________________________________________________________________  Interpretation Summary     1. Normal left ventricular size and systolic performance with a visually  estimated ejection fraction of 65-70%.  2. There is mild concentric increase in left ventricular wall thickness.  3. 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  Â  High normal aortic valve prosthesis metrics with a mean systolic gradient of  18 mmHg and a peak anterograde velocity of 2.8 m/sec.  Â  No aortic insufficiency is detected.  4. There is mild calcific mitral  stenosis.  5. Normal right ventricular size and systolic performance.     When compared to the prior real-time echocardiogram dated 11 December 2023,  the findings are felt to be fairly similar on both examinations.  ______________________________________________________________________________  Left ventricle:  Normal left ventricular size and systolic performance with a visually  estimated ejection fraction of 65-70%. There is normal regional wall motion.  There is mild concentric increase in left ventricular wall thickness.     Assessment of LV Diastolic Function: The cumulative findings are indeterminate  in the evaluation of diastolic function.     Right ventricle:  Normal right ventricular size and systolic performance.     Left atrium:  There is borderline left atrial enlargement.     Right atrium:  The right atrium is of normal size.     IVC:  The IVC is borderline dilated.     Aortic valve:  There is a bio-prosthetic aortic valve (documented 23 mm [place] 3  tissue valve). High normal aortic valve prosthesis metrics with a mean  systolic gradient of 18 mmHg and a peak anterograde velocity of 2.8 m/sec. The  Ao Acceleration Time is 0.10 sec. No aortic insufficiency is detected.     Mitral valve:  There is moderate mitral annular calcification. There is mild calcific mitral  stenosis. There is trace mitral insufficiency.     Tricuspid valve:  The tricuspid valve is grossly morphologically normal. There is mild, to  perhaps mild-moderate, tricuspid insufficiency.     Pulmonic valve:  The pulmonic valve is grossly morphologically normal. There is trace pulmonic  insufficiency.     Thoracic aorta:  The aortic root and proximal ascending aorta are of normal dimension.     Pericardium:  There is no significant pericardial effusion.  ______________________________________________________________________________  ______________________________________________________________________________  MMode/2D  Measurements & Calculations  IVSd: 0.77 cm  LVIDd: 3.5 cm  LVIDs: 2.3 cm  LVPWd: 1.3 cm  FS: 35.6 %  LV mass(C)d: 108.2 grams  LV mass(C)dI: 70.6 grams/m2  LA dimension: 2.8 cm  asc Aorta Diam: 3.4 cm  LVOT diam: 2.0 cm  LVOT area: 3.1 cm2  Asc Ao diam index BSA (cm/m2): 2.2  Asc Ao diam index Ht(cm/m): 2.2  EF Biplane: 67.1 %     LA Volume Indexed (AL/bp): 28.0 ml/m2  RV Base: 4.3 cm  RWT: 0.72  TAPSE: 2.2 cm     Time Measurements  MM HR: 67.0 BPM     Doppler Measurements & Calculations  MV E max fahad: 109.0 cm/sec  MV A max fahad: 152.0 cm/sec  MV E/A: 0.72  MV max PG: 10.6 mmHg  MV mean P.0 mmHg  MV V2 VTI: 51.7 cm  MVA(VTI): 1.6 cm2  MV dec slope: 429.0 cm/sec2  MV dec time: 0.25 sec  Ao V2 max: 283.0 cm/sec  Ao max P.0 mmHg  Ao V2 mean: 201.0 cm/sec  Ao mean P.0 mmHg  Ao V2 VTI: 74.1 cm  BIRD(I,D): 1.1 cm2  BIRD(V,D): 1.2 cm2  Ao acc time: 0.10 sec     LV V1 max P.6 mmHg  LV V1 max: 107.0 cm/sec  LV V1 VTI: 27.0 cm  SV(LVOT): 84.8 ml  SI(LVOT): 55.3 ml/m2  PA acc time: 0.11 sec  TR max fahad: 258.0 cm/sec  TR max P.6 mmHg  AV Fahad Ratio (DI): 0.38  BIRD Index (cm2/m2): 0.75  E/E': 19.6  E/E' av.3  Lateral E/e': 19.6  Medial E/e': 20.9  Peak E' Fahad: 5.5 cm/sec  RV S Fahad: 12.3 cm/sec     ______________________________________________________________________________  Report approved by: Becky Harden 2024 01:20 PM             Discharge Medications   Current Discharge Medication List        START taking these medications    Details   amLODIPine (NORVASC) 5 MG tablet Take 1 tablet (5 mg) by mouth 2 times daily    Associated Diagnoses: Subarachnoid hemorrhage (H)      levETIRAcetam (KEPPRA) 500 MG tablet Take 1 tablet (500 mg) by mouth 2 times daily for 7 days    Associated Diagnoses: Subarachnoid hemorrhage (H)      polyethylene glycol (MIRALAX) 17 GM/Dose powder Take 17 g by mouth daily    Associated Diagnoses: Subarachnoid hemorrhage (H)           CONTINUE these medications which  have NOT CHANGED    Details   acetaminophen (TYLENOL) 500 MG tablet Take 500 mg by mouth 3 times daily      atorvastatin (LIPITOR) 40 MG tablet Take 1 tablet (40 mg) by mouth every evening    Associated Diagnoses: Acute on chronic anemia      Lidocaine (LIDOCARE) 4 % Patch Place 2 patches onto the skin every 24 hours To prevent lidocaine toxicity, patient should be patch free for 12 hrs daily.  Qty: 5 patch, Refills: 0    Associated Diagnoses: Acute on chronic anemia      lidocaine (XYLOCAINE) 5 % external ointment Apply topically 3 times daily as needed for moderate pain      melatonin 3 MG tablet Take 3 mg by mouth nightly as needed for sleep      metoprolol succinate ER (TOPROL XL) 25 MG 24 hr tablet Take 25 mg by mouth every evening      Multiple Vitamin (THERA-TABS) TABS Take 1 tablet by mouth daily as needed (when desired)      nitroGLYcerin (NITROSTAT) 0.4 MG sublingual tablet Place 1 tablet (0.4 mg) under the tongue every 5 minutes as needed  Qty: 90 tablet, Refills: 0      ondansetron (ZOFRAN) 4 MG tablet Take 4 mg by mouth every 6 hours as needed for nausea      oxyCODONE (ROXICODONE) 5 MG tablet Take 1 tablet (5 mg) by mouth 2 times daily. May also take 1 tablet (5 mg) every 12 hours as needed for severe pain. not within 6 hours of previous dose.  Qty: 45 tablet, Refills: 0    Associated Diagnoses: Closed fracture of right femur with routine healing, unspecified fracture morphology, unspecified portion of femur, subsequent encounter      pregabalin (LYRICA) 25 MG capsule Take 1 capsule (25 mg) by mouth 2 times daily      rOPINIRole (REQUIP) 2 MG tablet Take 2 mg by mouth at bedtime      senna-docusate (SENOKOT-S/PERICOLACE) 8.6-50 MG tablet Take 2 tablets by mouth 2 times daily as needed for constipation  Qty: 60 tablet, Refills: 0    Associated Diagnoses: Closed displaced intertrochanteric fracture of right femur, initial encounter (H)           STOP taking these medications       aspirin 81 MG EC tablet  Comments:   Reason for Stopping:         diclofenac (VOLTAREN) 1 % topical gel Comments:   Reason for Stopping:         tiZANidine (ZANAFLEX) 2 MG tablet Comments:   Reason for Stopping:             Allergies   Allergies   Allergen Reactions    Amitriptyline Hcl [Amitriptyline] Other (See Comments)    Erythromycin Diarrhea and Other (See Comments)     Childhood reaction    Gabapentin Other (See Comments)     Jerking movements, swelling    Gramineae Pollens Unknown    Naproxen Unknown and Other (See Comments)    Other Environmental Allergy Unknown     Molds, dander    Pregabalin Other (See Comments)

## 2024-04-08 NOTE — PROGRESS NOTES
SW assisted primary care manager with completing Preadmission Screening. PAS number: [id number]    ARNALDO Smith on 4/8/2024 at 1:44 PM

## 2024-04-08 NOTE — PROGRESS NOTES
Physical Therapy Discharge Summary    Reason for therapy discharge:    Discharged to transitional care facility.    Progress towards therapy goal(s). See goals on Care Plan in [facility] electronic health record for goal details.  Goals not met.  Barriers to achieving goals:   discharge from facility.    Therapy recommendation(s):    Continued therapy is recommended.  Rationale/Recommendations:  TCU.

## 2024-04-09 ENCOUNTER — PATIENT OUTREACH (OUTPATIENT)
Dept: CARE COORDINATION | Facility: CLINIC | Age: 89
End: 2024-04-09

## 2024-04-09 ENCOUNTER — TRANSITIONAL CARE UNIT VISIT (OUTPATIENT)
Dept: GERIATRICS | Facility: CLINIC | Age: 89
End: 2024-04-09
Payer: COMMERCIAL

## 2024-04-09 VITALS
DIASTOLIC BLOOD PRESSURE: 82 MMHG | WEIGHT: 119.4 LBS | TEMPERATURE: 98.2 F | HEIGHT: 59 IN | SYSTOLIC BLOOD PRESSURE: 151 MMHG | BODY MASS INDEX: 24.07 KG/M2 | OXYGEN SATURATION: 94 % | HEART RATE: 87 BPM | RESPIRATION RATE: 18 BRPM

## 2024-04-09 DIAGNOSIS — S72.91XD CLOSED FRACTURE OF RIGHT FEMUR WITH ROUTINE HEALING, UNSPECIFIED FRACTURE MORPHOLOGY, UNSPECIFIED PORTION OF FEMUR, SUBSEQUENT ENCOUNTER: ICD-10-CM

## 2024-04-09 DIAGNOSIS — I60.9 SAH (SUBARACHNOID HEMORRHAGE) (H): ICD-10-CM

## 2024-04-09 DIAGNOSIS — R29.6 FALLS FREQUENTLY: Primary | ICD-10-CM

## 2024-04-09 DIAGNOSIS — G89.29 OTHER CHRONIC PAIN: Primary | ICD-10-CM

## 2024-04-09 DIAGNOSIS — I10 HYPERTENSION, UNSPECIFIED TYPE: ICD-10-CM

## 2024-04-09 DIAGNOSIS — M48.062 SPINAL STENOSIS OF LUMBAR REGION WITH NEUROGENIC CLAUDICATION: ICD-10-CM

## 2024-04-09 DIAGNOSIS — R55 SYNCOPE, UNSPECIFIED SYNCOPE TYPE: ICD-10-CM

## 2024-04-09 PROCEDURE — 99309 SBSQ NF CARE MODERATE MDM 30: CPT | Performed by: NURSE PRACTITIONER

## 2024-04-09 RX ORDER — PREGABALIN 25 MG/1
25 CAPSULE ORAL 2 TIMES DAILY
Qty: 60 CAPSULE | Refills: 0 | Status: SHIPPED | OUTPATIENT
Start: 2024-04-09 | End: 2024-04-09

## 2024-04-09 RX ORDER — OXYCODONE HYDROCHLORIDE 5 MG/1
TABLET ORAL
Qty: 60 TABLET | Refills: 0 | Status: SHIPPED | OUTPATIENT
Start: 2024-04-09

## 2024-04-09 NOTE — LETTER
4/9/2024        RE: Sherice Alvarez  [address]   [address]  [place] [id number]        [facility]    Code Status:  FULL CODE   Visit Type:   Chief Complaint   Patient presents with     TCU Admission     [facility] 4/4/2024 - 4/8/2024     Facility:  [facility] ([facility]) [[id number]]         HPI: Sherice Alvarez is a 89 year old female who I am seeing today for admit to the TCU. Past medical history includes history of CAD, chronic pain, aortic stenosis s/p TAVR, CKD3, HTN, recent right femur fx, s/p ORIF and prior CVA. Pt presented with syncope and head trauma post fall. No cardiac arrhythmia identified on telemetry. Both episodes occurred after tizanidine and hydralazine  Symptoms thought to be due to medication reaction of tizanidine. Tizanidine and hydralazine discontinued. Pt found to have subarachnoid hemorrhage. Hemorrhage in the sylvian fissure could be secondary to MCA aneurysm for which CT angiogram or MRA is recommended. CTA head shows no aneurysm or high flow vascular malformation identified. Scattered mild to moderate stenoses of the intracranial arterial vasculature. CT head repeated after 24 hours showed no change of the brain bleed. She was placed on Keppra. ASA held.     Transitional Care Course: Today pt found ambulating independently in room holding onto chair. Pt with some mild memory impairment on exam with repetitive speech and poor recall. Pt and per records pt had reaction to tizanidine. Pt with recent hip fracture. She continues with some pain in hip. She also reports muscle spasms in her both thighs. She reports pain is controlled with oxycodone. She denies any headache. She reports occasional dizziness. She reports she is having regular bowel movements.       Assessment/Plan:      Recurrent syncope and fall  -associated with lightheadedness and hypotension after taking tizanidine.   -Echocardiogram unremarkable  -monitor bp.   -Follow up BMP.      Subarachnoid hemorrhage   -Head CT showed small volume subarachnoid blood products layering along the right sylvian fissure.    -Per neurosurgery: hemorrhage in the sylvian fissure could be secondary to MCA aneurysm for which CT angiogram or MRA is recommended   -CTA head shows no aneurysm or high flow vascular malformation identified. Scattered mild to moderate stenoses of the intracranial arterial vasculature.  -CT head repeated after 24 hours showed no change of the brain bleed.   -Hold aspirin  -Continue Keppra for 7 days.   -Keep systolic BP < 150. Continue amlodipine 5 mg BID.   -neurosurgery follow up in 6 weeks with repeat head CT prior  -Follow up with neurovascular surgery in several months to rule out potential aneurysm      Essential hypertension  -metoprolol 25 mg Q pm.   -amlodipine 5 mg BID.   -monitor bp.      Chronic Back pain  -Lumbar XR with no definitive evidence of acute fracture  -continue Oxycodone BID.   -pt admitted with Lyrica. Pain team records reviewed from 2 recent hospitalizations and not on records.   -pt reports she does not usually take Lyrica.   -Discontinue Lyrica.      Right femur fracture  -continue oxycodone for pain.     -Ok to PT, OT to eval and treat.   -Follow up CBC and BMP.     Active Ambulatory Problems     Diagnosis Date Noted     Constipation 06/03/2014     Cancer (H)      Gastroesophageal reflux disease with esophagitis 11/04/2016     Dyslipidemia, goal LDL below 100 11/04/2016     Abnormal chest CT      Anterior epistaxis 11/22/2017     Stage 3 chronic kidney disease (H) 05/15/2019     Asthma 05/17/2019     Coronary arteriosclerosis due to lipid rich plaque      Severe calcific aortic valve stenosis      Dyspnea 05/07/2020     S/P TAVR (transcatheter aortic valve replacement) 06/02/2020     Leukocytosis, unspecified type      S/P total knee arthroplasty, left 05/11/2021     Acute deep vein thrombosis (DVT) of lower extremity (H) 09/08/2021     (HFpEF) heart  failure with preserved ejection fraction (H) 10/28/2021     Chest pain, unspecified type 08/02/2022     Other insomnia 08/02/2022     Adjustment disorder with mixed anxiety and depressed mood 08/02/2022     Restless leg syndrome 08/03/2022     Neck pain 05/18/2023     Cervical spine pain 05/18/2023     Polyuria 05/19/2023     Mild cognitive impairment 05/19/2023     Cellulitis of left lower extremity 09/09/2023     Edema of left lower extremity 09/09/2023     Avulsion of skin of left lower leg, initial encounter 09/09/2023     Essential hypertension 09/09/2023     Acute on chronic anemia 09/09/2023     Fall, initial encounter 02/09/2024     Closed displaced subtrochanteric fracture of right femur, initial encounter (H) 02/09/2024     Episodic mood disorder (H24) 03/09/2024     Aftercare for healing traumatic fracture of femur 02/17/2024     Aortic calcification (H24) 04/26/2023     Chronic systolic CHF (congestive heart failure) (H) 01/11/2023     Closed fracture of neck of left femur (H) 02/18/2024     Dyslipidemia 04/26/2023     Hypothyroidism (acquired) 01/11/2023     Skin ulcer of sacrum (H) 02/18/2024     Spinal stenosis of lumbar region with neurogenic claudication 02/17/2024     Subarachnoid hemorrhage (H) 04/04/2024     Syncope, unspecified syncope type 04/04/2024     Other chronic pain 04/06/2024     Resolved Ambulatory Problems     Diagnosis Date Noted     Accelerated hypertension      Epistaxis      Chest pain 07/21/2017     Lightheadedness      Atypical chest pain 04/16/2020     Acute diastolic congestive heart failure (H)      ACS (acute coronary syndrome) (H) 11/04/2016     Ischemic chest pain (H24)      NSTEMI (non-ST elevated myocardial infarction) (H)      Acute respiratory failure with hypoxia (H) 05/15/2019     Coronary artery disease 04/23/2020     Past Medical History:   Diagnosis Date     Arthritis      CAD (coronary artery disease)      Chronic kidney disease      Chronic pain syndrome       Congestive heart failure (H)      Crohn's disease (H)      GERD (gastroesophageal reflux disease)      Hx of heart artery stent 11/01/2016     Hyperlipidemia      Hypertension      PONV (postoperative nausea and vomiting)      Restless legs syndrome      Stroke (H) 01/01/2010     Allergies   Allergen Reactions     Amitriptyline Hcl [Amitriptyline] Unknown     Erythromycin Diarrhea     Childhood reaction     Gabapentin Swelling     And jerking movements     Gramineae Pollens Unknown     Naproxen Unknown     Other Environmental Allergy Unknown     Molds and pet dander       All Meds and Allergies reviewed in the record at the facility and is the most up-to-date.    Post Discharge Medication Reconciliation Status: discharge medications reconciled and changed, per note/orders  Current Outpatient Medications   Medication Sig Dispense Refill     acetaminophen (TYLENOL) 500 MG tablet Take 500 mg by mouth 3 times daily       amLODIPine (NORVASC) 5 MG tablet Take 1 tablet (5 mg) by mouth 2 times daily       atorvastatin (LIPITOR) 40 MG tablet Take 1 tablet (40 mg) by mouth every evening       levETIRAcetam (KEPPRA) 500 MG tablet Take 1 tablet (500 mg) by mouth 2 times daily for 7 days       Lidocaine (LIDOCARE) 4 % Patch Place 2 patches onto the skin every 24 hours To prevent lidocaine toxicity, patient should be patch free for 12 hrs daily. 5 patch 0     lidocaine (XYLOCAINE) 5 % external ointment Apply topically 3 times daily as needed for moderate pain       melatonin 3 MG tablet Take 3 mg by mouth nightly as needed for sleep       metoprolol succinate ER (TOPROL XL) 25 MG 24 hr tablet Take 25 mg by mouth every evening       Multiple Vitamin (THERA-TABS) TABS Take 1 tablet by mouth daily as needed (when desired)       nitroGLYcerin (NITROSTAT) 0.4 MG sublingual tablet Place 1 tablet (0.4 mg) under the tongue every 5 minutes as needed 90 tablet 0     ondansetron (ZOFRAN) 4 MG tablet Take 4 mg by mouth every 6 hours as  "needed for nausea       oxyCODONE (ROXICODONE) 5 MG tablet Take 1 tablet (5 mg) by mouth 2 times daily. May also take 1 tablet (5 mg) every 12 hours as needed for severe pain. not within 6 hours of previous dose. 60 tablet 0     polyethylene glycol (MIRALAX) 17 GM/Dose powder Take 17 g by mouth daily       rOPINIRole (REQUIP) 2 MG tablet Take 2 mg by mouth at bedtime       senna-docusate (SENOKOT-S/PERICOLACE) 8.6-50 MG tablet Take 2 tablets by mouth 2 times daily as needed for constipation 60 tablet 0     No current facility-administered medications for this visit.       REVIEW OF SYSTEMS:   10 point review of systems reviewed and pertinent positives in the HPI.     PHYSICAL EXAMINATION:  Physical Exam     Vital signs: BP (!) 151/82   Pulse 87   Temp 98.2  F (36.8  C)   Resp 18   Ht 1.499 m (4' 11\")   Wt 54.2 kg (119 lb 6.4 oz)   SpO2 94%   BMI 24.12 kg/m    General: Awake, Alert, oriented x3, sitting up on edge of bed, appropriately, follows simple commands, conversant  HEENT:PERRLA, Pink conjunctiva, moist oral mucosa  NECK: Supple  CVS:  S1  S2, without murmur or gallop.   LUNG: Clear to auscultation, No wheezes, rales or rhonci.  BACK: No kyphosis of the thoracic spine  ABDOMEN: Soft, nontender to palpation, with positive bowel sounds  EXTREMITIES: moves both upper and lower extremities, no pedal edema, no calf tenderness  SKIN: Slit in coccygeal fold. Maceration noted. Right hip incision dry and intact.   NEUROLOGIC: Intact, pulses palpable,  equal.   PSYCHIATRIC: Mild cognitive impairment noted.       Labs:  All labs reviewed in the nursing home record and Epic   @  Lab Results   Component Value Date    WBC 10.6 04/04/2024     Lab Results   Component Value Date    RBC 3.06 04/04/2024     Lab Results   Component Value Date    HGB 9.4 04/04/2024     Lab Results   Component Value Date    HCT 28.8 04/04/2024     Lab Results   Component Value Date    MCV 94 04/04/2024     Lab Results   Component Value " Date    MCH 30.7 04/04/2024     Lab Results   Component Value Date    MCHC 32.6 04/04/2024     Lab Results   Component Value Date    RDW 19.3 04/04/2024     Lab Results   Component Value Date     04/04/2024        @Last Comprehensive Metabolic Panel:  Sodium   Date Value Ref Range Status   04/04/2024 141 135 - 145 mmol/L Final     Comment:     Reference intervals for this test were updated on 09/26/2023 to more accurately reflect our healthy population. There may be differences in the flagging of prior results with similar values performed with this method. Interpretation of those prior results can be made in the context of the updated reference intervals.      Potassium   Date Value Ref Range Status   04/04/2024 3.9 3.4 - 5.3 mmol/L Final   08/03/2022 3.6 3.5 - 5.0 mmol/L Final     Chloride   Date Value Ref Range Status   04/04/2024 108 (H) 98 - 107 mmol/L Final   08/03/2022 110 (H) 98 - 107 mmol/L Final     Carbon Dioxide (CO2)   Date Value Ref Range Status   04/04/2024 23 22 - 29 mmol/L Final   08/03/2022 26 22 - 31 mmol/L Final     Anion Gap   Date Value Ref Range Status   04/04/2024 10 7 - 15 mmol/L Final   08/03/2022 7 5 - 18 mmol/L Final     Glucose   Date Value Ref Range Status   08/03/2022 80 70 - 125 mg/dL Final     GLUCOSE BY METER POCT   Date Value Ref Range Status   04/05/2024 163 (H) 70 - 99 mg/dL Final     Urea Nitrogen   Date Value Ref Range Status   04/04/2024 27.7 (H) 8.0 - 23.0 mg/dL Final   08/03/2022 27 8 - 28 mg/dL Final     Creatinine   Date Value Ref Range Status   04/06/2024 0.84 0.51 - 0.95 mg/dL Final     GFR Estimate   Date Value Ref Range Status   04/06/2024 66 >60 mL/min/1.73m2 Final   06/07/2021 41 (L) >60 mL/min/1.73m2 Final     Calcium   Date Value Ref Range Status   04/04/2024 9.2 8.8 - 10.2 mg/dL Final     > 35 minutes spent preparing for visit, reviewing labs, imaging, meds as well as face to face time spent with pt and collaborating with nursing staff.     This note has  been dictated using voice recognition software. Any grammatical or context distortions are unintentional and inherent to the software    Electronically signed by: Karyn Abraham CNP       Sincerely,        Karyn Abraham NP

## 2024-04-09 NOTE — PROGRESS NOTES
[facility]    Background: Transitional Care Management program identified per system criteria and reviewed by [facility] team for possible outreach.    Assessment: Upon chart review, CCRC Team member will not proceed with patient outreach related to this episode of Transitional Care Management program due to reason below:    Non-[facility] TCU: CCRC team member noted patient discharged to TCU/ARU/LTACH. Patient is not established with a [facility] currently supported by Primary Care-Care Coordination therefore handoff to Primary Care-Care Coordination is not appropriate at this time.    Plan: Transitional Care Management episode addressed appropriately per reason noted above.      RHONA Lemus  Connected [facility] Resource [place], [facility]    *Connected Care Resource Team does NOT follow patient ongoing. Referrals are identified based on internal discharge reports and the outreach is to ensure patient has an understanding of their discharge instructions.

## 2024-04-10 ENCOUNTER — LAB REQUISITION (OUTPATIENT)
Dept: LAB | Facility: CLINIC | Age: 89
End: 2024-04-10
Payer: COMMERCIAL

## 2024-04-10 DIAGNOSIS — R42 DIZZINESS AND GIDDINESS: ICD-10-CM

## 2024-04-10 NOTE — PROGRESS NOTES
SW received call from [facility] following up on referral.  SW notified intake that patient discharged to TCU.     TERE Perrin at 3:35 PM on 04/10/24

## 2024-04-11 ENCOUNTER — TRANSITIONAL CARE UNIT VISIT (OUTPATIENT)
Dept: GERIATRICS | Facility: CLINIC | Age: 89
End: 2024-04-11
Payer: COMMERCIAL

## 2024-04-11 VITALS
BODY MASS INDEX: 24.07 KG/M2 | HEART RATE: 74 BPM | HEIGHT: 59 IN | RESPIRATION RATE: 12 BRPM | WEIGHT: 119.4 LBS | DIASTOLIC BLOOD PRESSURE: 74 MMHG | OXYGEN SATURATION: 93 % | TEMPERATURE: 98.6 F | SYSTOLIC BLOOD PRESSURE: 120 MMHG

## 2024-04-11 DIAGNOSIS — I10 HYPERTENSION, UNSPECIFIED TYPE: ICD-10-CM

## 2024-04-11 DIAGNOSIS — I60.9 SAH (SUBARACHNOID HEMORRHAGE) (H): Primary | ICD-10-CM

## 2024-04-11 DIAGNOSIS — E86.0 DEHYDRATION: ICD-10-CM

## 2024-04-11 DIAGNOSIS — G25.81 RESTLESS LEG SYNDROME: ICD-10-CM

## 2024-04-11 DIAGNOSIS — S72.91XD CLOSED FRACTURE OF RIGHT FEMUR WITH ROUTINE HEALING, UNSPECIFIED FRACTURE MORPHOLOGY, UNSPECIFIED PORTION OF FEMUR, SUBSEQUENT ENCOUNTER: ICD-10-CM

## 2024-04-11 LAB
ANION GAP SERPL CALCULATED.3IONS-SCNC: 10 MMOL/L (ref 7–15)
BUN SERPL-MCNC: 29.9 MG/DL (ref 8–23)
CALCIUM SERPL-MCNC: 8.8 MG/DL (ref 8.8–10.2)
CHLORIDE SERPL-SCNC: 106 MMOL/L (ref 98–107)
CREAT SERPL-MCNC: 1.23 MG/DL (ref 0.51–0.95)
DEPRECATED HCO3 PLAS-SCNC: 23 MMOL/L (ref 22–29)
EGFRCR SERPLBLD CKD-EPI 2021: 42 ML/MIN/1.73M2
ERYTHROCYTE [DISTWIDTH] IN BLOOD BY AUTOMATED COUNT: 19.2 % (ref 10–15)
GLUCOSE SERPL-MCNC: 77 MG/DL (ref 70–99)
HCT VFR BLD AUTO: 30 % (ref 35–47)
HGB BLD-MCNC: 9.7 G/DL (ref 11.7–15.7)
MCH RBC QN AUTO: 30.4 PG (ref 26.5–33)
MCHC RBC AUTO-ENTMCNC: 32.3 G/DL (ref 31.5–36.5)
MCV RBC AUTO: 94 FL (ref 78–100)
PLATELET # BLD AUTO: 222 10E3/UL (ref 150–450)
POTASSIUM SERPL-SCNC: 4.1 MMOL/L (ref 3.4–5.3)
RBC # BLD AUTO: 3.19 10E6/UL (ref 3.8–5.2)
SODIUM SERPL-SCNC: 139 MMOL/L (ref 135–145)
WBC # BLD AUTO: 7.3 10E3/UL (ref 4–11)

## 2024-04-11 PROCEDURE — 36415 COLL VENOUS BLD VENIPUNCTURE: CPT | Mod: ORL | Performed by: FAMILY MEDICINE

## 2024-04-11 PROCEDURE — 80048 BASIC METABOLIC PNL TOTAL CA: CPT | Mod: ORL | Performed by: FAMILY MEDICINE

## 2024-04-11 PROCEDURE — 99309 SBSQ NF CARE MODERATE MDM 30: CPT | Performed by: NURSE PRACTITIONER

## 2024-04-11 PROCEDURE — 85027 COMPLETE CBC AUTOMATED: CPT | Mod: ORL | Performed by: FAMILY MEDICINE

## 2024-04-11 PROCEDURE — P9604 ONE-WAY ALLOW PRORATED TRIP: HCPCS | Mod: ORL | Performed by: FAMILY MEDICINE

## 2024-04-11 NOTE — LETTER
4/11/2024        RE: Sherice Alvarez  [address]   [address]  [facility] [address]        [facility]    Code Status:  FULL CODE   Visit Type:   Chief Complaint   Patient presents with     TCU Follow Up     Facility:  [facility]) [[id number]]         HPI: Sherice Alvarez is a 89 year old female who I am seeing today for follow up on the TCU. Past medical history includes history of CAD, chronic pain, aortic stenosis s/p TAVR, CKD3, HTN, recent right femur fx, s/p ORIF and prior CVA. Pt presented with syncope and head trauma post fall. No cardiac arrhythmia identified on telemetry. Both episodes occurred after tizanidine and hydralazine  Symptoms thought to be due to medication reaction of tizanidine. Tizanidine and hydralazine discontinued. Pt found to have subarachnoid hemorrhage. Hemorrhage in the sylvian fissure could be secondary to MCA aneurysm for which CT angiogram or MRA is recommended. CTA head shows no aneurysm or high flow vascular malformation identified. Scattered mild to moderate stenoses of the intracranial arterial vasculature. CT head repeated after 24 hours showed no change of the brain bleed. She was placed on Keppra. ASA held.     Transitional Care Course: Today pt sitting up in bedside chair. Pt continues to report some occasional dizziness. Blood pressure satisfactory. Creatine noted to be slightly elevated today at 1.23. Pt appetite fair. Fluids encouraged. Pt denies any headache. Continues with pain in her left hip. Continues on oxycodone BID.         Assessment/Plan:      Recurrent syncope and fall  -associated with lightheadedness and hypotension after taking tizanidine.   -No longer on tizanidine.   -Echocardiogram unremarkable  -monitor bp.     Dehydration   -Creatine slightly elevated at 1.23.   -Give additional 240 ml with each med pass.   -Follow up BMP.     Subarachnoid hemorrhage   -Head CT showed small volume subarachnoid blood products  layering along the right sylvian fissure.    -Per neurosurgery: hemorrhage in the sylvian fissure could be secondary to MCA aneurysm for which CT angiogram or MRA is recommended   -CTA head shows no aneurysm or high flow vascular malformation identified. Scattered mild to moderate stenoses of the intracranial arterial vasculature.  -CT head repeated after 24 hours showed no change of the brain bleed.   -Hold aspirin  -Continue Keppra for 7 days.   -Keep systolic BP < 150. Continue amlodipine 5 mg BID.   -neurosurgery follow up in 6 weeks with repeat head CT prior  -Follow up with neurovascular surgery in several months to rule out potential aneurysm      Essential hypertension  -metoprolol 25 mg Q pm.   -amlodipine 5 mg BID.   -monitor bp.     Chronic Back pain  -Lumbar XR with no definitive evidence of acute fracture  -continue Oxycodone BID.   -pt admitted with Lyrica. Pain team records reviewed from 2 recent hospitalizations and not on records.   -pt reports she does not usually take Lyrica.   -Discontinue Lyrica.      Right femur fracture  -continue oxycodone for pain.     Active Ambulatory Problems     Diagnosis Date Noted     Constipation 06/03/2014     Cancer (H)      Gastroesophageal reflux disease with esophagitis 11/04/2016     Dyslipidemia, goal LDL below 100 11/04/2016     Abnormal chest CT      Anterior epistaxis 11/22/2017     Stage 3 chronic kidney disease (H) 05/15/2019     Asthma 05/17/2019     Coronary arteriosclerosis due to lipid rich plaque      Severe calcific aortic valve stenosis      Dyspnea 05/07/2020     S/P TAVR (transcatheter aortic valve replacement) 06/02/2020     Leukocytosis, unspecified type      S/P total knee arthroplasty, left 05/11/2021     Acute deep vein thrombosis (DVT) of lower extremity (H) 09/08/2021     (HFpEF) heart failure with preserved ejection fraction (H) 10/28/2021     Chest pain, unspecified type 08/02/2022     Other insomnia 08/02/2022     Adjustment disorder  with mixed anxiety and depressed mood 08/02/2022     Restless leg syndrome 08/03/2022     Neck pain 05/18/2023     Cervical spine pain 05/18/2023     Polyuria 05/19/2023     Mild cognitive impairment 05/19/2023     Cellulitis of left lower extremity 09/09/2023     Edema of left lower extremity 09/09/2023     Avulsion of skin of left lower leg, initial encounter 09/09/2023     Essential hypertension 09/09/2023     Acute on chronic anemia 09/09/2023     Fall, initial encounter 02/09/2024     Closed displaced subtrochanteric fracture of right femur, initial encounter (H) 02/09/2024     Episodic mood disorder (H24) 03/09/2024     Aftercare for healing traumatic fracture of femur 02/17/2024     Aortic calcification (H24) 04/26/2023     Chronic systolic CHF (congestive heart failure) (H) 01/11/2023     Closed fracture of neck of left femur (H) 02/18/2024     Dyslipidemia 04/26/2023     Hypothyroidism (acquired) 01/11/2023     Skin ulcer of sacrum (H) 02/18/2024     Spinal stenosis of lumbar region with neurogenic claudication 02/17/2024     Subarachnoid hemorrhage (H) 04/04/2024     Syncope, unspecified syncope type 04/04/2024     Other chronic pain 04/06/2024     Resolved Ambulatory Problems     Diagnosis Date Noted     Accelerated hypertension      Epistaxis      Chest pain 07/21/2017     Lightheadedness      Atypical chest pain 04/16/2020     Acute diastolic congestive heart failure (H)      ACS (acute coronary syndrome) (H) 11/04/2016     Ischemic chest pain (H24)      NSTEMI (non-ST elevated myocardial infarction) (H)      Acute respiratory failure with hypoxia (H) 05/15/2019     Coronary artery disease 04/23/2020     Past Medical History:   Diagnosis Date     Arthritis      CAD (coronary artery disease)      Chronic kidney disease      Chronic pain syndrome      Congestive heart failure (H)      Crohn's disease (H)      GERD (gastroesophageal reflux disease)      Hx of heart artery stent 11/01/2016      Hyperlipidemia      Hypertension      PONV (postoperative nausea and vomiting)      Restless legs syndrome      Stroke (H) 01/01/2010     Allergies   Allergen Reactions     Amitriptyline Hcl [Amitriptyline] Unknown     Erythromycin Diarrhea     Childhood reaction     Gabapentin Swelling     And jerking movements     Gramineae Pollens Unknown     Naproxen Unknown     Other Environmental Allergy Unknown     Molds and pet dander       All Meds and Allergies reviewed in the record at the facility and is the most up-to-date.    Current Outpatient Medications   Medication Sig Dispense Refill     acetaminophen (TYLENOL) 500 MG tablet Take 500 mg by mouth 3 times daily       amLODIPine (NORVASC) 5 MG tablet Take 1 tablet (5 mg) by mouth 2 times daily       atorvastatin (LIPITOR) 40 MG tablet Take 1 tablet (40 mg) by mouth every evening       levETIRAcetam (KEPPRA) 500 MG tablet Take 1 tablet (500 mg) by mouth 2 times daily for 7 days       Lidocaine (LIDOCARE) 4 % Patch Place 2 patches onto the skin every 24 hours To prevent lidocaine toxicity, patient should be patch free for 12 hrs daily. 5 patch 0     lidocaine (XYLOCAINE) 5 % external ointment Apply topically 3 times daily as needed for moderate pain       melatonin 3 MG tablet Take 3 mg by mouth nightly as needed for sleep       metoprolol succinate ER (TOPROL XL) 25 MG 24 hr tablet Take 25 mg by mouth every evening       Multiple Vitamin (THERA-TABS) TABS Take 1 tablet by mouth daily as needed (when desired)       nitroGLYcerin (NITROSTAT) 0.4 MG sublingual tablet Place 1 tablet (0.4 mg) under the tongue every 5 minutes as needed 90 tablet 0     ondansetron (ZOFRAN) 4 MG tablet Take 4 mg by mouth every 6 hours as needed for nausea       oxyCODONE (ROXICODONE) 5 MG tablet Take 1 tablet (5 mg) by mouth 2 times daily. May also take 1 tablet (5 mg) every 12 hours as needed for severe pain. not within 6 hours of previous dose. 60 tablet 0     polyethylene glycol (MIRALAX)  "17 GM/Dose powder Take 17 g by mouth daily       rOPINIRole (REQUIP) 2 MG tablet Take 2 mg by mouth at bedtime       senna-docusate (SENOKOT-S/PERICOLACE) 8.6-50 MG tablet Take 2 tablets by mouth 2 times daily as needed for constipation 60 tablet 0     No current facility-administered medications for this visit.       REVIEW OF SYSTEMS:   10 point review of systems reviewed and pertinent positives in the HPI.     PHYSICAL EXAMINATION:  Physical Exam     Vital signs: /74   Pulse 74   Temp 98.6  F (37  C)   Resp 12   Ht 1.499 m (4' 11\")   Wt 54.2 kg (119 lb 6.4 oz)   SpO2 93%   BMI 24.12 kg/m    General: Awake, Alert, oriented x3, sitting up on edge of bed, appropriately, follows simple commands, conversant  HEENT:PERRLA, Pink conjunctiva, moist oral mucosa  NECK: Supple  CVS:  S1  S2, without murmur or gallop.   LUNG: Clear to auscultation, No wheezes, rales or rhonci.  BACK: No kyphosis of the thoracic spine  ABDOMEN: Soft, nontender to palpation, with positive bowel sounds  EXTREMITIES: moves both upper and lower extremities, no pedal edema, no calf tenderness  SKIN: Slit in coccygeal fold. Maceration noted. Right hip incision dry and intact.   NEUROLOGIC: Intact, pulses palpable,  equal.   PSYCHIATRIC: Mild cognitive impairment noted.       Labs:  All labs reviewed in the nursing home record and Epic   @  Lab Results   Component Value Date    WBC 10.6 04/04/2024     Lab Results   Component Value Date    RBC 3.06 04/04/2024     Lab Results   Component Value Date    HGB 9.4 04/04/2024     Lab Results   Component Value Date    HCT 28.8 04/04/2024     Lab Results   Component Value Date    MCV 94 04/04/2024     Lab Results   Component Value Date    MCH 30.7 04/04/2024     Lab Results   Component Value Date    MCHC 32.6 04/04/2024     Lab Results   Component Value Date    RDW 19.3 04/04/2024     Lab Results   Component Value Date     04/04/2024        @Last Comprehensive Metabolic Panel:  Sodium "   Date Value Ref Range Status   04/11/2024 139 135 - 145 mmol/L Final     Comment:     Reference intervals for this test were updated on 09/26/2023 to more accurately reflect our healthy population. There may be differences in the flagging of prior results with similar values performed with this method. Interpretation of those prior results can be made in the context of the updated reference intervals.      Potassium   Date Value Ref Range Status   04/11/2024 4.1 3.4 - 5.3 mmol/L Final   08/03/2022 3.6 3.5 - 5.0 mmol/L Final     Chloride   Date Value Ref Range Status   04/11/2024 106 98 - 107 mmol/L Final   08/03/2022 110 (H) 98 - 107 mmol/L Final     Carbon Dioxide (CO2)   Date Value Ref Range Status   04/11/2024 23 22 - 29 mmol/L Final   08/03/2022 26 22 - 31 mmol/L Final     Anion Gap   Date Value Ref Range Status   04/11/2024 10 7 - 15 mmol/L Final   08/03/2022 7 5 - 18 mmol/L Final     Glucose   Date Value Ref Range Status   04/11/2024 77 70 - 99 mg/dL Final   08/03/2022 80 70 - 125 mg/dL Final     GLUCOSE BY METER POCT   Date Value Ref Range Status   04/05/2024 163 (H) 70 - 99 mg/dL Final     Urea Nitrogen   Date Value Ref Range Status   04/11/2024 29.9 (H) 8.0 - 23.0 mg/dL Final   08/03/2022 27 8 - 28 mg/dL Final     Creatinine   Date Value Ref Range Status   04/11/2024 1.23 (H) 0.51 - 0.95 mg/dL Final     GFR Estimate   Date Value Ref Range Status   04/11/2024 42 (L) >60 mL/min/1.73m2 Final   06/07/2021 41 (L) >60 mL/min/1.73m2 Final     Calcium   Date Value Ref Range Status   04/11/2024 8.8 8.8 - 10.2 mg/dL Final     This note has been dictated using voice recognition software. Any grammatical or context distortions are unintentional and inherent to the software    Electronically signed by: Karyn Abraham CNP       Sincerely,        Karyn Abraham NP

## 2024-04-12 ENCOUNTER — LAB REQUISITION (OUTPATIENT)
Dept: LAB | Facility: CLINIC | Age: 89
End: 2024-04-12
Payer: COMMERCIAL

## 2024-04-12 DIAGNOSIS — D64.9 ANEMIA, UNSPECIFIED: ICD-10-CM

## 2024-04-12 NOTE — PROGRESS NOTES
[facility] GERIATRIC SERVICES    Code Status:  FULL CODE   Visit Type:   Chief Complaint   Patient presents with    TCU Follow Up     Facility:  [facility] ([facility]) [[id number]]         HPI: Sherice Alvarez is a 89 year old female who I am seeing today for follow up on the TCU. Past medical history includes history of CAD, chronic pain, aortic stenosis s/p TAVR, CKD3, HTN, recent right femur fx, s/p ORIF and prior CVA. Pt presented with syncope and head trauma post fall. No cardiac arrhythmia identified on telemetry. Both episodes occurred after tizanidine and hydralazine  Symptoms thought to be due to medication reaction of tizanidine. Tizanidine and hydralazine discontinued. Pt found to have subarachnoid hemorrhage. Hemorrhage in the sylvian fissure could be secondary to MCA aneurysm for which CT angiogram or MRA is recommended. CTA head shows no aneurysm or high flow vascular malformation identified. Scattered mild to moderate stenoses of the intracranial arterial vasculature. CT head repeated after 24 hours showed no change of the brain bleed. She was placed on Keppra. ASA held.     Transitional Care Course: Today pt sitting up in bedside chair. Pt continues to report some occasional dizziness. Blood pressure satisfactory. Creatine noted to be slightly elevated today at 1.23. Pt appetite fair. Fluids encouraged. Pt denies any headache. Continues with pain in her left hip. Continues on oxycodone BID.         Assessment/Plan:      Recurrent syncope and fall  -associated with lightheadedness and hypotension after taking tizanidine.   -No longer on tizanidine.   -Echocardiogram unremarkable  -monitor bp.     Dehydration   -Creatine slightly elevated at 1.23.   -Give additional 240 ml with each med pass.   -Follow up BMP.     Subarachnoid hemorrhage   -Head CT showed small volume subarachnoid blood products layering along the right sylvian fissure.    -Per neurosurgery: hemorrhage in the sylvian fissure could  be secondary to MCA aneurysm for which CT angiogram or MRA is recommended   -CTA head shows no aneurysm or high flow vascular malformation identified. Scattered mild to moderate stenoses of the intracranial arterial vasculature.  -CT head repeated after 24 hours showed no change of the brain bleed.   -Hold aspirin  -Continue Keppra for 7 days.   -Keep systolic BP < 150. Continue amlodipine 5 mg BID.   -neurosurgery follow up in 6 weeks with repeat head CT prior  -Follow up with neurovascular surgery in several months to rule out potential aneurysm      Essential hypertension  -metoprolol 25 mg Q pm.   -amlodipine 5 mg BID.   -monitor bp.     Chronic Back pain  -Lumbar XR with no definitive evidence of acute fracture  -continue Oxycodone BID.   -pt admitted with Lyrica. Pain team records reviewed from 2 recent hospitalizations and not on records.   -pt reports she does not usually take Lyrica.   -Discontinue Lyrica.      Right femur fracture  -continue oxycodone for pain.     Active Ambulatory Problems     Diagnosis Date Noted    Constipation 06/03/2014    Cancer (H)     Gastroesophageal reflux disease with esophagitis 11/04/2016    Dyslipidemia, goal LDL below 100 11/04/2016    Abnormal chest CT     Anterior epistaxis 11/22/2017    Stage 3 chronic kidney disease (H) 05/15/2019    Asthma 05/17/2019    Coronary arteriosclerosis due to lipid rich plaque     Severe calcific aortic valve stenosis     Dyspnea 05/07/2020    S/P TAVR (transcatheter aortic valve replacement) 06/02/2020    Leukocytosis, unspecified type     S/P total knee arthroplasty, left 05/11/2021    Acute deep vein thrombosis (DVT) of lower extremity (H) 09/08/2021    (HFpEF) heart failure with preserved ejection fraction (H) 10/28/2021    Chest pain, unspecified type 08/02/2022    Other insomnia 08/02/2022    Adjustment disorder with mixed anxiety and depressed mood 08/02/2022    Restless leg syndrome 08/03/2022    Neck pain 05/18/2023    Cervical spine  pain 05/18/2023    Polyuria 05/19/2023    Mild cognitive impairment 05/19/2023    Cellulitis of left lower extremity 09/09/2023    Edema of left lower extremity 09/09/2023    Avulsion of skin of left lower leg, initial encounter 09/09/2023    Essential hypertension 09/09/2023    Acute on chronic anemia 09/09/2023    Fall, initial encounter 02/09/2024    Closed displaced subtrochanteric fracture of right femur, initial encounter (H) 02/09/2024    Episodic mood disorder (H24) 03/09/2024    Aftercare for healing traumatic fracture of femur 02/17/2024    Aortic calcification (H24) 04/26/2023    Chronic systolic CHF (congestive heart failure) (H) 01/11/2023    Closed fracture of neck of left femur (H) 02/18/2024    Dyslipidemia 04/26/2023    Hypothyroidism (acquired) 01/11/2023    Skin ulcer of sacrum (H) 02/18/2024    Spinal stenosis of lumbar region with neurogenic claudication 02/17/2024    Subarachnoid hemorrhage (H) 04/04/2024    Syncope, unspecified syncope type 04/04/2024    Other chronic pain 04/06/2024     Resolved Ambulatory Problems     Diagnosis Date Noted    Accelerated hypertension     Epistaxis     Chest pain 07/21/2017    Lightheadedness     Atypical chest pain 04/16/2020    Acute diastolic congestive heart failure (H)     ACS (acute coronary syndrome) (H) 11/04/2016    Ischemic chest pain (H24)     NSTEMI (non-ST elevated myocardial infarction) (H)     Acute respiratory failure with hypoxia (H) 05/15/2019    Coronary artery disease 04/23/2020     Past Medical History:   Diagnosis Date    Arthritis     CAD (coronary artery disease)     Chronic kidney disease     Chronic pain syndrome     Congestive heart failure (H)     Crohn's disease (H)     GERD (gastroesophageal reflux disease)     Hx of heart artery stent 11/01/2016    Hyperlipidemia     Hypertension     PONV (postoperative nausea and vomiting)     Restless legs syndrome     Stroke (H) 01/01/2010     Allergies   Allergen Reactions    Amitriptyline  Hcl [Amitriptyline] Unknown    Erythromycin Diarrhea     Childhood reaction    Gabapentin Swelling     And jerking movements    Gramineae Pollens Unknown    Naproxen Unknown    Other Environmental Allergy Unknown     Molds and pet dander       All Meds and Allergies reviewed in the record at the facility and is the most up-to-date.    Current Outpatient Medications   Medication Sig Dispense Refill    acetaminophen (TYLENOL) 500 MG tablet Take 500 mg by mouth 3 times daily      amLODIPine (NORVASC) 5 MG tablet Take 1 tablet (5 mg) by mouth 2 times daily      atorvastatin (LIPITOR) 40 MG tablet Take 1 tablet (40 mg) by mouth every evening      levETIRAcetam (KEPPRA) 500 MG tablet Take 1 tablet (500 mg) by mouth 2 times daily for 7 days      Lidocaine (LIDOCARE) 4 % Patch Place 2 patches onto the skin every 24 hours To prevent lidocaine toxicity, patient should be patch free for 12 hrs daily. 5 patch 0    lidocaine (XYLOCAINE) 5 % external ointment Apply topically 3 times daily as needed for moderate pain      melatonin 3 MG tablet Take 3 mg by mouth nightly as needed for sleep      metoprolol succinate ER (TOPROL XL) 25 MG 24 hr tablet Take 25 mg by mouth every evening      Multiple Vitamin (THERA-TABS) TABS Take 1 tablet by mouth daily as needed (when desired)      nitroGLYcerin (NITROSTAT) 0.4 MG sublingual tablet Place 1 tablet (0.4 mg) under the tongue every 5 minutes as needed 90 tablet 0    ondansetron (ZOFRAN) 4 MG tablet Take 4 mg by mouth every 6 hours as needed for nausea      oxyCODONE (ROXICODONE) 5 MG tablet Take 1 tablet (5 mg) by mouth 2 times daily. May also take 1 tablet (5 mg) every 12 hours as needed for severe pain. not within 6 hours of previous dose. 60 tablet 0    polyethylene glycol (MIRALAX) 17 GM/Dose powder Take 17 g by mouth daily      rOPINIRole (REQUIP) 2 MG tablet Take 2 mg by mouth at bedtime      senna-docusate (SENOKOT-S/PERICOLACE) 8.6-50 MG tablet Take 2 tablets by mouth 2 times  "daily as needed for constipation 60 tablet 0     No current facility-administered medications for this visit.       REVIEW OF SYSTEMS:   10 point review of systems reviewed and pertinent positives in the HPI.     PHYSICAL EXAMINATION:  Physical Exam     Vital signs: /74   Pulse 74   Temp 98.6  F (37  C)   Resp 12   Ht 1.499 m (4' 11\")   Wt 54.2 kg (119 lb 6.4 oz)   SpO2 93%   BMI 24.12 kg/m    General: Awake, Alert, oriented x3, sitting up on edge of bed, appropriately, follows simple commands, conversant  HEENT:PERRLA, Pink conjunctiva, moist oral mucosa  NECK: Supple  CVS:  S1  S2, without murmur or gallop.   LUNG: Clear to auscultation, No wheezes, rales or rhonci.  BACK: No kyphosis of the thoracic spine  ABDOMEN: Soft, nontender to palpation, with positive bowel sounds  EXTREMITIES: moves both upper and lower extremities, no pedal edema, no calf tenderness  SKIN: Slit in coccygeal fold. Maceration noted. Right hip incision dry and intact.   NEUROLOGIC: Intact, pulses palpable,  equal.   PSYCHIATRIC: Mild cognitive impairment noted.       Labs:  All labs reviewed in the nursing home record and Epic   @  Lab Results   Component Value Date    WBC 10.6 04/04/2024     Lab Results   Component Value Date    RBC 3.06 04/04/2024     Lab Results   Component Value Date    HGB 9.4 04/04/2024     Lab Results   Component Value Date    HCT 28.8 04/04/2024     Lab Results   Component Value Date    MCV 94 04/04/2024     Lab Results   Component Value Date    MCH 30.7 04/04/2024     Lab Results   Component Value Date    MCHC 32.6 04/04/2024     Lab Results   Component Value Date    RDW 19.3 04/04/2024     Lab Results   Component Value Date     04/04/2024        @Last Comprehensive Metabolic Panel:  Sodium   Date Value Ref Range Status   04/11/2024 139 135 - 145 mmol/L Final     Comment:     Reference intervals for this test were updated on 09/26/2023 to more accurately reflect our healthy population. " There may be differences in the flagging of prior results with similar values performed with this method. Interpretation of those prior results can be made in the context of the updated reference intervals.      Potassium   Date Value Ref Range Status   04/11/2024 4.1 3.4 - 5.3 mmol/L Final   08/03/2022 3.6 3.5 - 5.0 mmol/L Final     Chloride   Date Value Ref Range Status   04/11/2024 106 98 - 107 mmol/L Final   08/03/2022 110 (H) 98 - 107 mmol/L Final     Carbon Dioxide (CO2)   Date Value Ref Range Status   04/11/2024 23 22 - 29 mmol/L Final   08/03/2022 26 22 - 31 mmol/L Final     Anion Gap   Date Value Ref Range Status   04/11/2024 10 7 - 15 mmol/L Final   08/03/2022 7 5 - 18 mmol/L Final     Glucose   Date Value Ref Range Status   04/11/2024 77 70 - 99 mg/dL Final   08/03/2022 80 70 - 125 mg/dL Final     GLUCOSE BY METER POCT   Date Value Ref Range Status   04/05/2024 163 (H) 70 - 99 mg/dL Final     Urea Nitrogen   Date Value Ref Range Status   04/11/2024 29.9 (H) 8.0 - 23.0 mg/dL Final   08/03/2022 27 8 - 28 mg/dL Final     Creatinine   Date Value Ref Range Status   04/11/2024 1.23 (H) 0.51 - 0.95 mg/dL Final     GFR Estimate   Date Value Ref Range Status   04/11/2024 42 (L) >60 mL/min/1.73m2 Final   06/07/2021 41 (L) >60 mL/min/1.73m2 Final     Calcium   Date Value Ref Range Status   04/11/2024 8.8 8.8 - 10.2 mg/dL Final     This note has been dictated using voice recognition software. Any grammatical or context distortions are unintentional and inherent to the software    Electronically signed by: Karyn Abraham CNP

## 2024-04-14 LAB
ATRIAL RATE - MUSE: 63 BPM
DIASTOLIC BLOOD PRESSURE - MUSE: 76 MMHG
INTERPRETATION ECG - MUSE: NORMAL
P AXIS - MUSE: 80 DEGREES
PR INTERVAL - MUSE: 248 MS
QRS DURATION - MUSE: 82 MS
QT - MUSE: 454 MS
QTC - MUSE: 464 MS
R AXIS - MUSE: 67 DEGREES
SYSTOLIC BLOOD PRESSURE - MUSE: 178 MMHG
T AXIS - MUSE: 74 DEGREES
VENTRICULAR RATE- MUSE: 63 BPM

## 2024-04-15 LAB
ANION GAP SERPL CALCULATED.3IONS-SCNC: 8 MMOL/L (ref 7–15)
BUN SERPL-MCNC: 29.3 MG/DL (ref 8–23)
CALCIUM SERPL-MCNC: 9 MG/DL (ref 8.8–10.2)
CHLORIDE SERPL-SCNC: 106 MMOL/L (ref 98–107)
CREAT SERPL-MCNC: 1.17 MG/DL (ref 0.51–0.95)
DEPRECATED HCO3 PLAS-SCNC: 24 MMOL/L (ref 22–29)
EGFRCR SERPLBLD CKD-EPI 2021: 44 ML/MIN/1.73M2
GLUCOSE SERPL-MCNC: 82 MG/DL (ref 70–99)
POTASSIUM SERPL-SCNC: 4.2 MMOL/L (ref 3.4–5.3)
SODIUM SERPL-SCNC: 138 MMOL/L (ref 135–145)

## 2024-04-15 PROCEDURE — 36415 COLL VENOUS BLD VENIPUNCTURE: CPT | Mod: ORL | Performed by: NURSE PRACTITIONER

## 2024-04-15 PROCEDURE — 80048 BASIC METABOLIC PNL TOTAL CA: CPT | Mod: ORL | Performed by: NURSE PRACTITIONER

## 2024-04-15 PROCEDURE — P9604 ONE-WAY ALLOW PRORATED TRIP: HCPCS | Mod: ORL | Performed by: NURSE PRACTITIONER

## 2024-04-16 ENCOUNTER — TRANSITIONAL CARE UNIT VISIT (OUTPATIENT)
Dept: GERIATRICS | Facility: CLINIC | Age: 89
End: 2024-04-16
Payer: COMMERCIAL

## 2024-04-16 VITALS
BODY MASS INDEX: 23.59 KG/M2 | HEIGHT: 59 IN | TEMPERATURE: 98.5 F | SYSTOLIC BLOOD PRESSURE: 145 MMHG | WEIGHT: 117 LBS | DIASTOLIC BLOOD PRESSURE: 82 MMHG | HEART RATE: 75 BPM | OXYGEN SATURATION: 95 % | RESPIRATION RATE: 18 BRPM

## 2024-04-16 DIAGNOSIS — M48.062 SPINAL STENOSIS OF LUMBAR REGION WITH NEUROGENIC CLAUDICATION: ICD-10-CM

## 2024-04-16 DIAGNOSIS — G89.29 OTHER CHRONIC PAIN: ICD-10-CM

## 2024-04-16 DIAGNOSIS — R42 DIZZINESS: ICD-10-CM

## 2024-04-16 DIAGNOSIS — G25.81 RESTLESS LEG SYNDROME: ICD-10-CM

## 2024-04-16 DIAGNOSIS — I60.9 SAH (SUBARACHNOID HEMORRHAGE) (H): Primary | ICD-10-CM

## 2024-04-16 DIAGNOSIS — I10 HYPERTENSION, UNSPECIFIED TYPE: ICD-10-CM

## 2024-04-16 PROCEDURE — 99309 SBSQ NF CARE MODERATE MDM 30: CPT | Performed by: NURSE PRACTITIONER

## 2024-04-16 NOTE — PROGRESS NOTES
[facility] GERIATRIC SERVICES    Code Status:  FULL CODE   Visit Type:   Chief Complaint   Patient presents with    TCU Follow Up     Facility:  [facility] ([facility]) [[id number]]         HPI: Sherice Alvarez is a 89 year old female who I am seeing today for follow up on the TCU. Past medical history includes history of CAD, chronic pain, aortic stenosis s/p TAVR, CKD3, HTN, recent right femur fx, s/p ORIF and prior CVA. Pt presented with syncope and head trauma post fall. No cardiac arrhythmia identified on telemetry. Both episodes occurred after tizanidine and hydralazine  Symptoms thought to be due to medication reaction of tizanidine. Tizanidine and hydralazine discontinued. Pt found to have subarachnoid hemorrhage. Hemorrhage in the sylvian fissure could be secondary to MCA aneurysm for which CT angiogram or MRA is recommended. CTA head shows no aneurysm or high flow vascular malformation identified. Scattered mild to moderate stenoses of the intracranial arterial vasculature. CT head repeated after 24 hours showed no change of the brain bleed. She was placed on Keppra. ASA held.     Transitional Care Course: Today pt sitting up in bedside chair. Pt continues to report some ongoing light headedness and dizziness. Blood pressure satisfactory. Orthostatic bp and pulse obtained which appear stable. Creatine noted to be slightly elevated today at 1.17. Pt appetite fair, but she states she does not drink enough water. Fluids encouraged. Pt denies any headache. Continues with pain in her left hip and chronic back pain. Continues on oxycodone BID.     Assessment/Plan:      Recurrent syncope and fall  Dizziness   -associated with lightheadedness and hypotension after taking tizanidine.   -No longer on tizanidine.   -Echocardiogram unremarkable  -monitor bp.  -Differentials could include dehydration, Requip and or antihypertensives.   -BLAIR hose on Q am, off Q hs    Dehydration   -Creatine slightly elevated at  1.17.   -Give additional 240 ml with each med pass.   -monitor.     Subarachnoid hemorrhage   -Head CT showed small volume subarachnoid blood products layering along the right sylvian fissure.    -Per neurosurgery: hemorrhage in the sylvian fissure could be secondary to MCA aneurysm for which CT angiogram or MRA is recommended   -CTA head shows no aneurysm or high flow vascular malformation identified. Scattered mild to moderate stenoses of the intracranial arterial vasculature.  -CT head repeated after 24 hours showed no change of the brain bleed.   -Hold aspirin  -Keppra given for 7 days, now complete.   -Keep systolic BP < 150. Continue amlodipine 5 mg BID, metoprolol 25 mg Q pm.   -neurosurgery follow up in 6 weeks with repeat head CT prior  -Follow up with neurovascular surgery in several months to rule out potential aneurysm      Essential hypertension  -metoprolol 25 mg Q pm.   -amlodipine 5 mg BID.   -monitor bp.     Chronic Back pain  -Lumbar XR with no definitive evidence of acute fracture  -continue Oxycodone BID.      Right femur fracture  -continue oxycodone for pain.     Active Ambulatory Problems     Diagnosis Date Noted    Constipation 06/03/2014    Cancer (H)     Gastroesophageal reflux disease with esophagitis 11/04/2016    Dyslipidemia, goal LDL below 100 11/04/2016    Abnormal chest CT     Anterior epistaxis 11/22/2017    Stage 3 chronic kidney disease (H) 05/15/2019    Asthma 05/17/2019    Coronary arteriosclerosis due to lipid rich plaque     Severe calcific aortic valve stenosis     Dyspnea 05/07/2020    S/P TAVR (transcatheter aortic valve replacement) 06/02/2020    Leukocytosis, unspecified type     S/P total knee arthroplasty, left 05/11/2021    Acute deep vein thrombosis (DVT) of lower extremity (H) 09/08/2021    (HFpEF) heart failure with preserved ejection fraction (H) 10/28/2021    Chest pain, unspecified type 08/02/2022    Other insomnia 08/02/2022    Adjustment disorder with mixed  anxiety and depressed mood 08/02/2022    Restless leg syndrome 08/03/2022    Neck pain 05/18/2023    Cervical spine pain 05/18/2023    Polyuria 05/19/2023    Mild cognitive impairment 05/19/2023    Cellulitis of left lower extremity 09/09/2023    Edema of left lower extremity 09/09/2023    Avulsion of skin of left lower leg, initial encounter 09/09/2023    Essential hypertension 09/09/2023    Acute on chronic anemia 09/09/2023    Fall, initial encounter 02/09/2024    Closed displaced subtrochanteric fracture of right femur, initial encounter (H) 02/09/2024    Episodic mood disorder (H24) 03/09/2024    Aftercare for healing traumatic fracture of femur 02/17/2024    Aortic calcification (H24) 04/26/2023    Chronic systolic CHF (congestive heart failure) (H) 01/11/2023    Closed fracture of neck of left femur (H) 02/18/2024    Dyslipidemia 04/26/2023    Hypothyroidism (acquired) 01/11/2023    Skin ulcer of sacrum (H) 02/18/2024    Spinal stenosis of lumbar region with neurogenic claudication 02/17/2024    Subarachnoid hemorrhage (H) 04/04/2024    Syncope, unspecified syncope type 04/04/2024    Other chronic pain 04/06/2024     Resolved Ambulatory Problems     Diagnosis Date Noted    Accelerated hypertension     Epistaxis     Chest pain 07/21/2017    Lightheadedness     Atypical chest pain 04/16/2020    Acute diastolic congestive heart failure (H)     ACS (acute coronary syndrome) (H) 11/04/2016    Ischemic chest pain (H24)     NSTEMI (non-ST elevated myocardial infarction) (H)     Acute respiratory failure with hypoxia (H) 05/15/2019    Coronary artery disease 04/23/2020     Past Medical History:   Diagnosis Date    Arthritis     CAD (coronary artery disease)     Chronic kidney disease     Chronic pain syndrome     Congestive heart failure (H)     Crohn's disease (H)     GERD (gastroesophageal reflux disease)     Hx of heart artery stent 11/01/2016    Hyperlipidemia     Hypertension     PONV (postoperative nausea and  vomiting)     Restless legs syndrome     Stroke (H) 01/01/2010     Allergies   Allergen Reactions    Amitriptyline Hcl [Amitriptyline] Unknown    Erythromycin Diarrhea     Childhood reaction    Gabapentin Swelling     And jerking movements    Gramineae Pollens Unknown    Naproxen Unknown    Other Environmental Allergy Unknown     Molds and pet dander       All Meds and Allergies reviewed in the record at the facility and is the most up-to-date.    Current Outpatient Medications   Medication Sig Dispense Refill    acetaminophen (TYLENOL) 500 MG tablet Take 500 mg by mouth 3 times daily      amLODIPine (NORVASC) 5 MG tablet Take 1 tablet (5 mg) by mouth 2 times daily      atorvastatin (LIPITOR) 40 MG tablet Take 1 tablet (40 mg) by mouth every evening      levETIRAcetam (KEPPRA) 500 MG tablet Take 1 tablet (500 mg) by mouth 2 times daily for 7 days      Lidocaine (LIDOCARE) 4 % Patch Place 2 patches onto the skin every 24 hours To prevent lidocaine toxicity, patient should be patch free for 12 hrs daily. 5 patch 0    lidocaine (XYLOCAINE) 5 % external ointment Apply topically 3 times daily as needed for moderate pain      melatonin 3 MG tablet Take 3 mg by mouth nightly as needed for sleep      metoprolol succinate ER (TOPROL XL) 25 MG 24 hr tablet Take 25 mg by mouth every evening      Multiple Vitamin (THERA-TABS) TABS Take 1 tablet by mouth daily as needed (when desired)      nitroGLYcerin (NITROSTAT) 0.4 MG sublingual tablet Place 1 tablet (0.4 mg) under the tongue every 5 minutes as needed 90 tablet 0    ondansetron (ZOFRAN) 4 MG tablet Take 4 mg by mouth every 6 hours as needed for nausea      oxyCODONE (ROXICODONE) 5 MG tablet Take 1 tablet (5 mg) by mouth 2 times daily. May also take 1 tablet (5 mg) every 12 hours as needed for severe pain. not within 6 hours of previous dose. 60 tablet 0    polyethylene glycol (MIRALAX) 17 GM/Dose powder Take 17 g by mouth daily      rOPINIRole (REQUIP) 2 MG tablet Take 2 mg  "by mouth at bedtime      senna-docusate (SENOKOT-S/PERICOLACE) 8.6-50 MG tablet Take 2 tablets by mouth 2 times daily as needed for constipation 60 tablet 0     No current facility-administered medications for this visit.       REVIEW OF SYSTEMS:   10 point review of systems reviewed and pertinent positives in the HPI.     PHYSICAL EXAMINATION:  Physical Exam     Vital signs: BP (!) 145/82   Pulse 75   Temp 98.5  F (36.9  C)   Resp 18   Ht 1.499 m (4' 11\")   Wt 53.1 kg (117 lb)   SpO2 95%   BMI 23.63 kg/m    General: Awake, Alert, oriented x3, sitting up on edge of bed, appropriately, follows simple commands, conversant  HEENT:PERRLA, Pink conjunctiva, moist oral mucosa  NECK: Supple  CVS:  S1  S2, without murmur or gallop.   LUNG: Clear to auscultation, No wheezes, rales or rhonci.  BACK: No kyphosis of the thoracic spine  ABDOMEN: Soft, nontender to palpation, with positive bowel sounds  EXTREMITIES: moves both upper and lower extremities, no pedal edema, no calf tenderness  SKIN: Slit in coccygeal fold. Maceration noted. Right hip incision dry and intact.   NEUROLOGIC: Intact, pulses palpable,  equal.   PSYCHIATRIC: Mild cognitive impairment noted.       Labs:  All labs reviewed in the nursing home record and [facility]   @  Lab Results   Component Value Date    WBC 10.6 04/04/2024     Lab Results   Component Value Date    RBC 3.06 04/04/2024     Lab Results   Component Value Date    HGB 9.4 04/04/2024     Lab Results   Component Value Date    HCT 28.8 04/04/2024     Lab Results   Component Value Date    MCV 94 04/04/2024     Lab Results   Component Value Date    MCH 30.7 04/04/2024     Lab Results   Component Value Date    MCHC 32.6 04/04/2024     Lab Results   Component Value Date    RDW 19.3 04/04/2024     Lab Results   Component Value Date     04/04/2024        @Last Comprehensive Metabolic Panel:  Sodium   Date Value Ref Range Status   04/15/2024 138 135 - 145 mmol/L Final     Comment:     " Reference intervals for this test were updated on 09/26/2023 to more accurately reflect our healthy population. There may be differences in the flagging of prior results with similar values performed with this method. Interpretation of those prior results can be made in the context of the updated reference intervals.      Potassium   Date Value Ref Range Status   04/15/2024 4.2 3.4 - 5.3 mmol/L Final   08/03/2022 3.6 3.5 - 5.0 mmol/L Final     Chloride   Date Value Ref Range Status   04/15/2024 106 98 - 107 mmol/L Final   08/03/2022 110 (H) 98 - 107 mmol/L Final     Carbon Dioxide (CO2)   Date Value Ref Range Status   04/15/2024 24 22 - 29 mmol/L Final   08/03/2022 26 22 - 31 mmol/L Final     Anion Gap   Date Value Ref Range Status   04/15/2024 8 7 - 15 mmol/L Final   08/03/2022 7 5 - 18 mmol/L Final     Glucose   Date Value Ref Range Status   04/15/2024 82 70 - 99 mg/dL Final   08/03/2022 80 70 - 125 mg/dL Final     GLUCOSE BY METER POCT   Date Value Ref Range Status   04/05/2024 163 (H) 70 - 99 mg/dL Final     Urea Nitrogen   Date Value Ref Range Status   04/15/2024 29.3 (H) 8.0 - 23.0 mg/dL Final   08/03/2022 27 8 - 28 mg/dL Final     Creatinine   Date Value Ref Range Status   04/15/2024 1.17 (H) 0.51 - 0.95 mg/dL Final     GFR Estimate   Date Value Ref Range Status   04/15/2024 44 (L) >60 mL/min/1.73m2 Final   06/07/2021 41 (L) >60 mL/min/1.73m2 Final     Calcium   Date Value Ref Range Status   04/15/2024 9.0 8.8 - 10.2 mg/dL Final     I was present with the medical student/advanced practice registered nurse, Feliica Dennis, NP student, who participated in the service and in the documentation of this note. I have verified the history and personally performed the physical exam and medical decision making. I agree with the assessment and plan of care as documented in the note.       This note has been dictated using voice recognition software. Any grammatical or context distortions are unintentional and inherent to the  software    Electronically signed by: Karyn Abraham, CNP

## 2024-04-16 NOTE — LETTER
4/16/2024        RE: Sherice Alvarez  [address]   [address]  [place] [id number]        [facility]    Code Status:  FULL CODE   Visit Type:   Chief Complaint   Patient presents with     TCU Follow Up     Facility:  [facility]) [[id number]]         HPI: Sherice Alvarez is a 89 year old female who I am seeing today for follow up on the TCU. Past medical history includes history of CAD, chronic pain, aortic stenosis s/p TAVR, CKD3, HTN, recent right femur fx, s/p ORIF and prior CVA. Pt presented with syncope and head trauma post fall. No cardiac arrhythmia identified on telemetry. Both episodes occurred after tizanidine and hydralazine  Symptoms thought to be due to medication reaction of tizanidine. Tizanidine and hydralazine discontinued. Pt found to have subarachnoid hemorrhage. Hemorrhage in the sylvian fissure could be secondary to MCA aneurysm for which CT angiogram or MRA is recommended. CTA head shows no aneurysm or high flow vascular malformation identified. Scattered mild to moderate stenoses of the intracranial arterial vasculature. CT head repeated after 24 hours showed no change of the brain bleed. She was placed on Keppra. ASA held.     Transitional Care Course: Today pt sitting up in bedside chair. Pt continues to report some ongoing light headedness and dizziness. Blood pressure satisfactory. Orthostatic bp and pulse obtained which appear stable. Creatine noted to be slightly elevated today at 1.17. Pt appetite fair, but she states she does not drink enough water. Fluids encouraged. Pt denies any headache. Continues with pain in her left hip and chronic back pain. Continues on oxycodone BID.     Assessment/Plan:      Recurrent syncope and fall  Dizziness   -associated with lightheadedness and hypotension after taking tizanidine.   -No longer on tizanidine.   -Echocardiogram unremarkable  -monitor bp.  -Differentials could include dehydration, Requip and or  antihypertensives.   -BLAIR hose on Q am, off Q hs    Dehydration   -Creatine slightly elevated at 1.17.   -Give additional 240 ml with each med pass.   -monitor.     Subarachnoid hemorrhage   -Head CT showed small volume subarachnoid blood products layering along the right sylvian fissure.    -Per neurosurgery: hemorrhage in the sylvian fissure could be secondary to MCA aneurysm for which CT angiogram or MRA is recommended   -CTA head shows no aneurysm or high flow vascular malformation identified. Scattered mild to moderate stenoses of the intracranial arterial vasculature.  -CT head repeated after 24 hours showed no change of the brain bleed.   -Hold aspirin  -Keppra given for 7 days, now complete.   -Keep systolic BP < 150. Continue amlodipine 5 mg BID, metoprolol 25 mg Q pm.   -neurosurgery follow up in 6 weeks with repeat head CT prior  -Follow up with neurovascular surgery in several months to rule out potential aneurysm      Essential hypertension  -metoprolol 25 mg Q pm.   -amlodipine 5 mg BID.   -monitor bp.     Chronic Back pain  -Lumbar XR with no definitive evidence of acute fracture  -continue Oxycodone BID.      Right femur fracture  -continue oxycodone for pain.     Active Ambulatory Problems     Diagnosis Date Noted     Constipation 06/03/2014     Cancer (H)      Gastroesophageal reflux disease with esophagitis 11/04/2016     Dyslipidemia, goal LDL below 100 11/04/2016     Abnormal chest CT      Anterior epistaxis 11/22/2017     Stage 3 chronic kidney disease (H) 05/15/2019     Asthma 05/17/2019     Coronary arteriosclerosis due to lipid rich plaque      Severe calcific aortic valve stenosis      Dyspnea 05/07/2020     S/P TAVR (transcatheter aortic valve replacement) 06/02/2020     Leukocytosis, unspecified type      S/P total knee arthroplasty, left 05/11/2021     Acute deep vein thrombosis (DVT) of lower extremity (H) 09/08/2021     (HFpEF) heart failure with preserved ejection fraction (H)  10/28/2021     Chest pain, unspecified type 08/02/2022     Other insomnia 08/02/2022     Adjustment disorder with mixed anxiety and depressed mood 08/02/2022     Restless leg syndrome 08/03/2022     Neck pain 05/18/2023     Cervical spine pain 05/18/2023     Polyuria 05/19/2023     Mild cognitive impairment 05/19/2023     Cellulitis of left lower extremity 09/09/2023     Edema of left lower extremity 09/09/2023     Avulsion of skin of left lower leg, initial encounter 09/09/2023     Essential hypertension 09/09/2023     Acute on chronic anemia 09/09/2023     Fall, initial encounter 02/09/2024     Closed displaced subtrochanteric fracture of right femur, initial encounter (H) 02/09/2024     Episodic mood disorder (H24) 03/09/2024     Aftercare for healing traumatic fracture of femur 02/17/2024     Aortic calcification (H24) 04/26/2023     Chronic systolic CHF (congestive heart failure) (H) 01/11/2023     Closed fracture of neck of left femur (H) 02/18/2024     Dyslipidemia 04/26/2023     Hypothyroidism (acquired) 01/11/2023     Skin ulcer of sacrum (H) 02/18/2024     Spinal stenosis of lumbar region with neurogenic claudication 02/17/2024     Subarachnoid hemorrhage (H) 04/04/2024     Syncope, unspecified syncope type 04/04/2024     Other chronic pain 04/06/2024     Resolved Ambulatory Problems     Diagnosis Date Noted     Accelerated hypertension      Epistaxis      Chest pain 07/21/2017     Lightheadedness      Atypical chest pain 04/16/2020     Acute diastolic congestive heart failure (H)      ACS (acute coronary syndrome) (H) 11/04/2016     Ischemic chest pain (H24)      NSTEMI (non-ST elevated myocardial infarction) (H)      Acute respiratory failure with hypoxia (H) 05/15/2019     Coronary artery disease 04/23/2020     Past Medical History:   Diagnosis Date     Arthritis      CAD (coronary artery disease)      Chronic kidney disease      Chronic pain syndrome      Congestive heart failure (H)      Crohn's  disease (H)      GERD (gastroesophageal reflux disease)      Hx of heart artery stent 11/01/2016     Hyperlipidemia      Hypertension      PONV (postoperative nausea and vomiting)      Restless legs syndrome      Stroke (H) 01/01/2010     Allergies   Allergen Reactions     Amitriptyline Hcl [Amitriptyline] Unknown     Erythromycin Diarrhea     Childhood reaction     Gabapentin Swelling     And jerking movements     Gramineae Pollens Unknown     Naproxen Unknown     Other Environmental Allergy Unknown     Molds and pet dander       All Meds and Allergies reviewed in the record at the facility and is the most up-to-date.    Current Outpatient Medications   Medication Sig Dispense Refill     acetaminophen (TYLENOL) 500 MG tablet Take 500 mg by mouth 3 times daily       amLODIPine (NORVASC) 5 MG tablet Take 1 tablet (5 mg) by mouth 2 times daily       atorvastatin (LIPITOR) 40 MG tablet Take 1 tablet (40 mg) by mouth every evening       levETIRAcetam (KEPPRA) 500 MG tablet Take 1 tablet (500 mg) by mouth 2 times daily for 7 days       Lidocaine (LIDOCARE) 4 % Patch Place 2 patches onto the skin every 24 hours To prevent lidocaine toxicity, patient should be patch free for 12 hrs daily. 5 patch 0     lidocaine (XYLOCAINE) 5 % external ointment Apply topically 3 times daily as needed for moderate pain       melatonin 3 MG tablet Take 3 mg by mouth nightly as needed for sleep       metoprolol succinate ER (TOPROL XL) 25 MG 24 hr tablet Take 25 mg by mouth every evening       Multiple Vitamin (THERA-TABS) TABS Take 1 tablet by mouth daily as needed (when desired)       nitroGLYcerin (NITROSTAT) 0.4 MG sublingual tablet Place 1 tablet (0.4 mg) under the tongue every 5 minutes as needed 90 tablet 0     ondansetron (ZOFRAN) 4 MG tablet Take 4 mg by mouth every 6 hours as needed for nausea       oxyCODONE (ROXICODONE) 5 MG tablet Take 1 tablet (5 mg) by mouth 2 times daily. May also take 1 tablet (5 mg) every 12 hours as needed  "for severe pain. not within 6 hours of previous dose. 60 tablet 0     polyethylene glycol (MIRALAX) 17 GM/Dose powder Take 17 g by mouth daily       rOPINIRole (REQUIP) 2 MG tablet Take 2 mg by mouth at bedtime       senna-docusate (SENOKOT-S/PERICOLACE) 8.6-50 MG tablet Take 2 tablets by mouth 2 times daily as needed for constipation 60 tablet 0     No current facility-administered medications for this visit.       REVIEW OF SYSTEMS:   10 point review of systems reviewed and pertinent positives in the HPI.     PHYSICAL EXAMINATION:  Physical Exam     Vital signs: BP (!) 145/82   Pulse 75   Temp 98.5  F (36.9  C)   Resp 18   Ht 1.499 m (4' 11\")   Wt 53.1 kg (117 lb)   SpO2 95%   BMI 23.63 kg/m    General: Awake, Alert, oriented x3, sitting up on edge of bed, appropriately, follows simple commands, conversant  HEENT:PERRLA, Pink conjunctiva, moist oral mucosa  NECK: Supple  CVS:  S1  S2, without murmur or gallop.   LUNG: Clear to auscultation, No wheezes, rales or rhonci.  BACK: No kyphosis of the thoracic spine  ABDOMEN: Soft, nontender to palpation, with positive bowel sounds  EXTREMITIES: moves both upper and lower extremities, no pedal edema, no calf tenderness  SKIN: Slit in coccygeal fold. Maceration noted. Right hip incision dry and intact.   NEUROLOGIC: Intact, pulses palpable,  equal.   PSYCHIATRIC: Mild cognitive impairment noted.       Labs:  All labs reviewed in the nursing home record and [facility]   @  Lab Results   Component Value Date    WBC 10.6 04/04/2024     Lab Results   Component Value Date    RBC 3.06 04/04/2024     Lab Results   Component Value Date    HGB 9.4 04/04/2024     Lab Results   Component Value Date    HCT 28.8 04/04/2024     Lab Results   Component Value Date    MCV 94 04/04/2024     Lab Results   Component Value Date    MCH 30.7 04/04/2024     Lab Results   Component Value Date    MCHC 32.6 04/04/2024     Lab Results   Component Value Date    RDW 19.3 04/04/2024     Lab " Results   Component Value Date     04/04/2024        @Last Comprehensive Metabolic Panel:  Sodium   Date Value Ref Range Status   04/15/2024 138 135 - 145 mmol/L Final     Comment:     Reference intervals for this test were updated on 09/26/2023 to more accurately reflect our healthy population. There may be differences in the flagging of prior results with similar values performed with this method. Interpretation of those prior results can be made in the context of the updated reference intervals.      Potassium   Date Value Ref Range Status   04/15/2024 4.2 3.4 - 5.3 mmol/L Final   08/03/2022 3.6 3.5 - 5.0 mmol/L Final     Chloride   Date Value Ref Range Status   04/15/2024 106 98 - 107 mmol/L Final   08/03/2022 110 (H) 98 - 107 mmol/L Final     Carbon Dioxide (CO2)   Date Value Ref Range Status   04/15/2024 24 22 - 29 mmol/L Final   08/03/2022 26 22 - 31 mmol/L Final     Anion Gap   Date Value Ref Range Status   04/15/2024 8 7 - 15 mmol/L Final   08/03/2022 7 5 - 18 mmol/L Final     Glucose   Date Value Ref Range Status   04/15/2024 82 70 - 99 mg/dL Final   08/03/2022 80 70 - 125 mg/dL Final     GLUCOSE BY METER POCT   Date Value Ref Range Status   04/05/2024 163 (H) 70 - 99 mg/dL Final     Urea Nitrogen   Date Value Ref Range Status   04/15/2024 29.3 (H) 8.0 - 23.0 mg/dL Final   08/03/2022 27 8 - 28 mg/dL Final     Creatinine   Date Value Ref Range Status   04/15/2024 1.17 (H) 0.51 - 0.95 mg/dL Final     GFR Estimate   Date Value Ref Range Status   04/15/2024 44 (L) >60 mL/min/1.73m2 Final   06/07/2021 41 (L) >60 mL/min/1.73m2 Final     Calcium   Date Value Ref Range Status   04/15/2024 9.0 8.8 - 10.2 mg/dL Final     I was present with the medical student/advanced practice registered nurse, Felicia Dennis, NP student, who participated in the service and in the documentation of this note. I have verified the history and personally performed the physical exam and medical decision making. I agree with the  assessment and plan of care as documented in the note.       This note has been dictated using voice recognition software. Any grammatical or context distortions are unintentional and inherent to the software    Electronically signed by: Karyn Abraham CNP       Sincerely,        Karyn Abraham NP

## 2024-04-23 ENCOUNTER — TRANSITIONAL CARE UNIT VISIT (OUTPATIENT)
Dept: GERIATRICS | Facility: CLINIC | Age: 89
End: 2024-04-23
Payer: COMMERCIAL

## 2024-04-23 VITALS
WEIGHT: 118.6 LBS | TEMPERATURE: 98.1 F | SYSTOLIC BLOOD PRESSURE: 119 MMHG | HEIGHT: 59 IN | DIASTOLIC BLOOD PRESSURE: 72 MMHG | HEART RATE: 74 BPM | BODY MASS INDEX: 23.91 KG/M2 | OXYGEN SATURATION: 96 % | RESPIRATION RATE: 20 BRPM

## 2024-04-23 DIAGNOSIS — G89.29 OTHER CHRONIC PAIN: ICD-10-CM

## 2024-04-23 DIAGNOSIS — G25.81 RESTLESS LEG SYNDROME: ICD-10-CM

## 2024-04-23 DIAGNOSIS — R42 DIZZINESS: ICD-10-CM

## 2024-04-23 DIAGNOSIS — I60.9 SAH (SUBARACHNOID HEMORRHAGE) (H): Primary | ICD-10-CM

## 2024-04-23 DIAGNOSIS — M48.062 SPINAL STENOSIS OF LUMBAR REGION WITH NEUROGENIC CLAUDICATION: ICD-10-CM

## 2024-04-23 DIAGNOSIS — I10 HYPERTENSION, UNSPECIFIED TYPE: ICD-10-CM

## 2024-04-23 PROCEDURE — 99309 SBSQ NF CARE MODERATE MDM 30: CPT | Performed by: NURSE PRACTITIONER

## 2024-04-23 NOTE — LETTER
4/23/2024        RE: Sherice Alvarez  [address]   [address]  [place] [address]        [facility]    Code Status:  FULL CODE   Visit Type:   Chief Complaint   Patient presents with     TCU Follow Up     Facility:  [facility]) [[id number]]         HPI: Sherice Alvarez is a 89 year old female who I am seeing today for follow up on the TCU. Past medical history includes history of CAD, chronic pain, aortic stenosis s/p TAVR, CKD3, HTN, recent right femur fx, s/p ORIF and prior CVA. Pt presented with syncope and head trauma post fall. No cardiac arrhythmia identified on telemetry. Both episodes occurred after tizanidine and hydralazine  Symptoms thought to be due to medication reaction of tizanidine. Tizanidine and hydralazine discontinued. Pt found to have subarachnoid hemorrhage. Hemorrhage in the sylvian fissure could be secondary to MCA aneurysm for which CT angiogram or MRA is recommended. CTA head shows no aneurysm or high flow vascular malformation identified. Scattered mild to moderate stenoses of the intracranial arterial vasculature. CT head repeated after 24 hours showed no change of the brain bleed. She was placed on Keppra. ASA held.     Transitional Care Course: Today pt sitting up in bedside chair. Recent SAH. Dizziness improved. She continues in TEDs. Fluids are being encouraged. BP stable. Hx of RLS on ropinirole. She denies any headache. Pain in her left hip and chronic back pain controlled with oxycodone.     Assessment/Plan:      Recurrent syncope and fall  Dizziness   -associated with lightheadedness and hypotension after taking tizanidine.   -No longer on tizanidine.   -Echocardiogram unremarkable  -monitor bp.  -BLAIR hose on Q am, off Q hs    Subarachnoid hemorrhage   -Head CT showed small volume subarachnoid blood products layering along the right sylvian fissure.    -Per neurosurgery: hemorrhage in the sylvian fissure could be secondary to MCA  aneurysm for which CT angiogram or MRA is recommended   -CTA head shows no aneurysm or high flow vascular malformation identified. Scattered mild to moderate stenoses of the intracranial arterial vasculature.  -CT head repeated after 24 hours showed no change of the brain bleed.   -Hold aspirin  -Keppra given for 7 days, now complete.   -Keep systolic BP < 150. Continue amlodipine 5 mg BID, metoprolol 25 mg Q pm.   -neurosurgery follow up in 6 weeks with repeat head CT prior  -Follow up with neurovascular surgery in several months to rule out potential aneurysm      Essential hypertension  -metoprolol 25 mg Q pm.   -amlodipine 5 mg BID.   -monitor bp.  -no orthostasis noted.      Chronic Back pain  -Lumbar XR with no definitive evidence of acute fracture  -continue Oxycodone BID.      Right femur fracture  -continue oxycodone for pain.     Active Ambulatory Problems     Diagnosis Date Noted     Constipation 06/03/2014     Cancer (H)      Gastroesophageal reflux disease with esophagitis 11/04/2016     Dyslipidemia, goal LDL below 100 11/04/2016     Abnormal chest CT      Anterior epistaxis 11/22/2017     Stage 3 chronic kidney disease (H) 05/15/2019     Asthma 05/17/2019     Coronary arteriosclerosis due to lipid rich plaque      Severe calcific aortic valve stenosis      Dyspnea 05/07/2020     S/P TAVR (transcatheter aortic valve replacement) 06/02/2020     Leukocytosis, unspecified type      S/P total knee arthroplasty, left 05/11/2021     Acute deep vein thrombosis (DVT) of lower extremity (H) 09/08/2021     (HFpEF) heart failure with preserved ejection fraction (H) 10/28/2021     Chest pain, unspecified type 08/02/2022     Other insomnia 08/02/2022     Adjustment disorder with mixed anxiety and depressed mood 08/02/2022     Restless leg syndrome 08/03/2022     Neck pain 05/18/2023     Cervical spine pain 05/18/2023     Polyuria 05/19/2023     Mild cognitive impairment 05/19/2023     Cellulitis of left lower  extremity 09/09/2023     Edema of left lower extremity 09/09/2023     Avulsion of skin of left lower leg, initial encounter 09/09/2023     Essential hypertension 09/09/2023     Acute on chronic anemia 09/09/2023     Fall, initial encounter 02/09/2024     Closed displaced subtrochanteric fracture of right femur, initial encounter (H) 02/09/2024     Episodic mood disorder (H24) 03/09/2024     Aftercare for healing traumatic fracture of femur 02/17/2024     Aortic calcification (H24) 04/26/2023     Chronic systolic CHF (congestive heart failure) (H) 01/11/2023     Closed fracture of neck of left femur (H) 02/18/2024     Dyslipidemia 04/26/2023     Hypothyroidism (acquired) 01/11/2023     Skin ulcer of sacrum (H) 02/18/2024     Spinal stenosis of lumbar region with neurogenic claudication 02/17/2024     Subarachnoid hemorrhage (H) 04/04/2024     Syncope, unspecified syncope type 04/04/2024     Other chronic pain 04/06/2024     Resolved Ambulatory Problems     Diagnosis Date Noted     Accelerated hypertension      Epistaxis      Chest pain 07/21/2017     Lightheadedness      Atypical chest pain 04/16/2020     Acute diastolic congestive heart failure (H)      ACS (acute coronary syndrome) (H) 11/04/2016     Ischemic chest pain (H24)      NSTEMI (non-ST elevated myocardial infarction) (H)      Acute respiratory failure with hypoxia (H) 05/15/2019     Coronary artery disease 04/23/2020     Past Medical History:   Diagnosis Date     Arthritis      CAD (coronary artery disease)      Chronic kidney disease      Chronic pain syndrome      Congestive heart failure (H)      Crohn's disease (H)      GERD (gastroesophageal reflux disease)      Hx of heart artery stent 11/01/2016     Hyperlipidemia      Hypertension      PONV (postoperative nausea and vomiting)      Restless legs syndrome      Stroke (H) 01/01/2010     Allergies   Allergen Reactions     Amitriptyline Hcl [Amitriptyline] Unknown     Erythromycin Diarrhea      Childhood reaction     Gabapentin Swelling     And jerking movements     Gramineae Pollens Unknown     Naproxen Unknown     Other Environmental Allergy Unknown     Molds and pet dander       All Meds and Allergies reviewed in the record at the facility and is the most up-to-date.    Current Outpatient Medications   Medication Sig Dispense Refill     acetaminophen (TYLENOL) 500 MG tablet Take 500 mg by mouth 3 times daily       amLODIPine (NORVASC) 5 MG tablet Take 1 tablet (5 mg) by mouth 2 times daily       atorvastatin (LIPITOR) 40 MG tablet Take 1 tablet (40 mg) by mouth every evening       levETIRAcetam (KEPPRA) 500 MG tablet Take 1 tablet (500 mg) by mouth 2 times daily for 7 days       Lidocaine (LIDOCARE) 4 % Patch Place 2 patches onto the skin every 24 hours To prevent lidocaine toxicity, patient should be patch free for 12 hrs daily. 5 patch 0     lidocaine (XYLOCAINE) 5 % external ointment Apply topically 3 times daily as needed for moderate pain       melatonin 3 MG tablet Take 3 mg by mouth nightly as needed for sleep       metoprolol succinate ER (TOPROL XL) 25 MG 24 hr tablet Take 25 mg by mouth every evening       Multiple Vitamin (THERA-TABS) TABS Take 1 tablet by mouth daily as needed (when desired)       nitroGLYcerin (NITROSTAT) 0.4 MG sublingual tablet Place 1 tablet (0.4 mg) under the tongue every 5 minutes as needed 90 tablet 0     ondansetron (ZOFRAN) 4 MG tablet Take 4 mg by mouth every 6 hours as needed for nausea       oxyCODONE (ROXICODONE) 5 MG tablet Take 1 tablet (5 mg) by mouth 2 times daily. May also take 1 tablet (5 mg) every 12 hours as needed for severe pain. not within 6 hours of previous dose. 60 tablet 0     polyethylene glycol (MIRALAX) 17 GM/Dose powder Take 17 g by mouth daily       rOPINIRole (REQUIP) 2 MG tablet Take 2 mg by mouth at bedtime       senna-docusate (SENOKOT-S/PERICOLACE) 8.6-50 MG tablet Take 2 tablets by mouth 2 times daily as needed for constipation 60  "tablet 0     No current facility-administered medications for this visit.       REVIEW OF SYSTEMS:   10 point review of systems reviewed and pertinent positives in the HPI.     PHYSICAL EXAMINATION:  Physical Exam     Vital signs: /72   Pulse 74   Temp 98.1  F (36.7  C)   Resp 20   Ht 1.499 m (4' 11\")   Wt 53.8 kg (118 lb 9.6 oz)   SpO2 96%   BMI 23.95 kg/m    General: Awake, Alert, oriented x3, sitting up in bedside chair, follows simple commands, conversant  HEENT:PERRLA, Pink conjunctiva, moist oral mucosa  NECK: Supple  CVS:  S1  S2, without murmur or gallop.   LUNG: Clear to auscultation, No wheezes, rales or rhonci.  BACK: No kyphosis of the thoracic spine  ABDOMEN: Soft, nontender to palpation, with positive bowel sounds  EXTREMITIES: moves both upper and lower extremities, no pedal edema, no calf tenderness. TEDs on.   NEUROLOGIC: Intact, pulses palpable,  equal.   PSYCHIATRIC: Mild cognitive impairment noted.       Labs:  All labs reviewed in the nursing home record and Epic   @  Lab Results   Component Value Date    WBC 10.6 04/04/2024     Lab Results   Component Value Date    RBC 3.06 04/04/2024     Lab Results   Component Value Date    HGB 9.4 04/04/2024     Lab Results   Component Value Date    HCT 28.8 04/04/2024     Lab Results   Component Value Date    MCV 94 04/04/2024     Lab Results   Component Value Date    MCH 30.7 04/04/2024     Lab Results   Component Value Date    MCHC 32.6 04/04/2024     Lab Results   Component Value Date    RDW 19.3 04/04/2024     Lab Results   Component Value Date     04/04/2024        @Last Comprehensive Metabolic Panel:  Sodium   Date Value Ref Range Status   04/15/2024 138 135 - 145 mmol/L Final     Comment:     Reference intervals for this test were updated on 09/26/2023 to more accurately reflect our healthy population. There may be differences in the flagging of prior results with similar values performed with this method. Interpretation of " those prior results can be made in the context of the updated reference intervals.      Potassium   Date Value Ref Range Status   04/15/2024 4.2 3.4 - 5.3 mmol/L Final   08/03/2022 3.6 3.5 - 5.0 mmol/L Final     Chloride   Date Value Ref Range Status   04/15/2024 106 98 - 107 mmol/L Final   08/03/2022 110 (H) 98 - 107 mmol/L Final     Carbon Dioxide (CO2)   Date Value Ref Range Status   04/15/2024 24 22 - 29 mmol/L Final   08/03/2022 26 22 - 31 mmol/L Final     Anion Gap   Date Value Ref Range Status   04/15/2024 8 7 - 15 mmol/L Final   08/03/2022 7 5 - 18 mmol/L Final     Glucose   Date Value Ref Range Status   04/15/2024 82 70 - 99 mg/dL Final   08/03/2022 80 70 - 125 mg/dL Final     GLUCOSE BY METER POCT   Date Value Ref Range Status   04/05/2024 163 (H) 70 - 99 mg/dL Final     Urea Nitrogen   Date Value Ref Range Status   04/15/2024 29.3 (H) 8.0 - 23.0 mg/dL Final   08/03/2022 27 8 - 28 mg/dL Final     Creatinine   Date Value Ref Range Status   04/15/2024 1.17 (H) 0.51 - 0.95 mg/dL Final     GFR Estimate   Date Value Ref Range Status   04/15/2024 44 (L) >60 mL/min/1.73m2 Final   06/07/2021 41 (L) >60 mL/min/1.73m2 Final     Calcium   Date Value Ref Range Status   04/15/2024 9.0 8.8 - 10.2 mg/dL Final       This note has been dictated using voice recognition software. Any grammatical or context distortions are unintentional and inherent to the software    Electronically signed by: Karyn Abrhaam CNP       Sincerely,        Karyn Abraham, NP

## 2024-04-23 NOTE — PROGRESS NOTES
[facility]    Code Status:  FULL CODE   Visit Type:   Chief Complaint   Patient presents with    TCU Follow Up     Facility:  [facility] ([facility]) [[id number]]         HPI: Sherice Alvarez is a 89 year old female who I am seeing today for follow up on the TCU. Past medical history includes history of CAD, chronic pain, aortic stenosis s/p TAVR, CKD3, HTN, recent right femur fx, s/p ORIF and prior CVA. Pt presented with syncope and head trauma post fall. No cardiac arrhythmia identified on telemetry. Both episodes occurred after tizanidine and hydralazine  Symptoms thought to be due to medication reaction of tizanidine. Tizanidine and hydralazine discontinued. Pt found to have subarachnoid hemorrhage. Hemorrhage in the sylvian fissure could be secondary to MCA aneurysm for which CT angiogram or MRA is recommended. CTA head shows no aneurysm or high flow vascular malformation identified. Scattered mild to moderate stenoses of the intracranial arterial vasculature. CT head repeated after 24 hours showed no change of the brain bleed. She was placed on Keppra. ASA held.     Transitional Care Course: Today pt sitting up in bedside chair. Recent SAH. Dizziness improved. She continues in TEDs. Fluids are being encouraged. BP stable. Hx of RLS on ropinirole. She denies any headache. Pain in her left hip and chronic back pain controlled with oxycodone.     Assessment/Plan:      Recurrent syncope and fall  Dizziness   -associated with lightheadedness and hypotension after taking tizanidine.   -No longer on tizanidine.   -Echocardiogram unremarkable  -monitor bp.  -BLAIR hose on Q am, off Q hs    Subarachnoid hemorrhage   -Head CT showed small volume subarachnoid blood products layering along the right sylvian fissure.    -Per neurosurgery: hemorrhage in the sylvian fissure could be secondary to MCA aneurysm for which CT angiogram or MRA is recommended   -CTA head shows no aneurysm or high flow vascular  malformation identified. Scattered mild to moderate stenoses of the intracranial arterial vasculature.  -CT head repeated after 24 hours showed no change of the brain bleed.   -Hold aspirin  -Keppra given for 7 days, now complete.   -Keep systolic BP < 150. Continue amlodipine 5 mg BID, metoprolol 25 mg Q pm.   -neurosurgery follow up in 6 weeks with repeat head CT prior  -Follow up with neurovascular surgery in several months to rule out potential aneurysm      Essential hypertension  -metoprolol 25 mg Q pm.   -amlodipine 5 mg BID.   -monitor bp.  -no orthostasis noted.      Chronic Back pain  -Lumbar XR with no definitive evidence of acute fracture  -continue Oxycodone BID.      Right femur fracture  -continue oxycodone for pain.     Active Ambulatory Problems     Diagnosis Date Noted    Constipation 06/03/2014    Cancer (H)     Gastroesophageal reflux disease with esophagitis 11/04/2016    Dyslipidemia, goal LDL below 100 11/04/2016    Abnormal chest CT     Anterior epistaxis 11/22/2017    Stage 3 chronic kidney disease (H) 05/15/2019    Asthma 05/17/2019    Coronary arteriosclerosis due to lipid rich plaque     Severe calcific aortic valve stenosis     Dyspnea 05/07/2020    S/P TAVR (transcatheter aortic valve replacement) 06/02/2020    Leukocytosis, unspecified type     S/P total knee arthroplasty, left 05/11/2021    Acute deep vein thrombosis (DVT) of lower extremity (H) 09/08/2021    (HFpEF) heart failure with preserved ejection fraction (H) 10/28/2021    Chest pain, unspecified type 08/02/2022    Other insomnia 08/02/2022    Adjustment disorder with mixed anxiety and depressed mood 08/02/2022    Restless leg syndrome 08/03/2022    Neck pain 05/18/2023    Cervical spine pain 05/18/2023    Polyuria 05/19/2023    Mild cognitive impairment 05/19/2023    Cellulitis of left lower extremity 09/09/2023    Edema of left lower extremity 09/09/2023    Avulsion of skin of left lower leg, initial encounter 09/09/2023     Essential hypertension 09/09/2023    Acute on chronic anemia 09/09/2023    Fall, initial encounter 02/09/2024    Closed displaced subtrochanteric fracture of right femur, initial encounter (H) 02/09/2024    Episodic mood disorder (H24) 03/09/2024    Aftercare for healing traumatic fracture of femur 02/17/2024    Aortic calcification (H24) 04/26/2023    Chronic systolic CHF (congestive heart failure) (H) 01/11/2023    Closed fracture of neck of left femur (H) 02/18/2024    Dyslipidemia 04/26/2023    Hypothyroidism (acquired) 01/11/2023    Skin ulcer of sacrum (H) 02/18/2024    Spinal stenosis of lumbar region with neurogenic claudication 02/17/2024    Subarachnoid hemorrhage (H) 04/04/2024    Syncope, unspecified syncope type 04/04/2024    Other chronic pain 04/06/2024     Resolved Ambulatory Problems     Diagnosis Date Noted    Accelerated hypertension     Epistaxis     Chest pain 07/21/2017    Lightheadedness     Atypical chest pain 04/16/2020    Acute diastolic congestive heart failure (H)     ACS (acute coronary syndrome) (H) 11/04/2016    Ischemic chest pain (H24)     NSTEMI (non-ST elevated myocardial infarction) (H)     Acute respiratory failure with hypoxia (H) 05/15/2019    Coronary artery disease 04/23/2020     Past Medical History:   Diagnosis Date    Arthritis     CAD (coronary artery disease)     Chronic kidney disease     Chronic pain syndrome     Congestive heart failure (H)     Crohn's disease (H)     GERD (gastroesophageal reflux disease)     Hx of heart artery stent 11/01/2016    Hyperlipidemia     Hypertension     PONV (postoperative nausea and vomiting)     Restless legs syndrome     Stroke (H) 01/01/2010     Allergies   Allergen Reactions    Amitriptyline Hcl [Amitriptyline] Unknown    Erythromycin Diarrhea     Childhood reaction    Gabapentin Swelling     And jerking movements    Gramineae Pollens Unknown    Naproxen Unknown    Other Environmental Allergy Unknown     Molds and pet dander        All Meds and Allergies reviewed in the record at the facility and is the most up-to-date.    Current Outpatient Medications   Medication Sig Dispense Refill    acetaminophen (TYLENOL) 500 MG tablet Take 500 mg by mouth 3 times daily      amLODIPine (NORVASC) 5 MG tablet Take 1 tablet (5 mg) by mouth 2 times daily      atorvastatin (LIPITOR) 40 MG tablet Take 1 tablet (40 mg) by mouth every evening      levETIRAcetam (KEPPRA) 500 MG tablet Take 1 tablet (500 mg) by mouth 2 times daily for 7 days      Lidocaine (LIDOCARE) 4 % Patch Place 2 patches onto the skin every 24 hours To prevent lidocaine toxicity, patient should be patch free for 12 hrs daily. 5 patch 0    lidocaine (XYLOCAINE) 5 % external ointment Apply topically 3 times daily as needed for moderate pain      melatonin 3 MG tablet Take 3 mg by mouth nightly as needed for sleep      metoprolol succinate ER (TOPROL XL) 25 MG 24 hr tablet Take 25 mg by mouth every evening      Multiple Vitamin (THERA-TABS) TABS Take 1 tablet by mouth daily as needed (when desired)      nitroGLYcerin (NITROSTAT) 0.4 MG sublingual tablet Place 1 tablet (0.4 mg) under the tongue every 5 minutes as needed 90 tablet 0    ondansetron (ZOFRAN) 4 MG tablet Take 4 mg by mouth every 6 hours as needed for nausea      oxyCODONE (ROXICODONE) 5 MG tablet Take 1 tablet (5 mg) by mouth 2 times daily. May also take 1 tablet (5 mg) every 12 hours as needed for severe pain. not within 6 hours of previous dose. 60 tablet 0    polyethylene glycol (MIRALAX) 17 GM/Dose powder Take 17 g by mouth daily      rOPINIRole (REQUIP) 2 MG tablet Take 2 mg by mouth at bedtime      senna-docusate (SENOKOT-S/PERICOLACE) 8.6-50 MG tablet Take 2 tablets by mouth 2 times daily as needed for constipation 60 tablet 0     No current facility-administered medications for this visit.       REVIEW OF SYSTEMS:   10 point review of systems reviewed and pertinent positives in the HPI.     PHYSICAL  "EXAMINATION:  Physical Exam     Vital signs: /72   Pulse 74   Temp 98.1  F (36.7  C)   Resp 20   Ht 1.499 m (4' 11\")   Wt 53.8 kg (118 lb 9.6 oz)   SpO2 96%   BMI 23.95 kg/m    General: Awake, Alert, oriented x3, sitting up in bedside chair, follows simple commands, conversant  HEENT:PERRLA, Pink conjunctiva, moist oral mucosa  NECK: Supple  CVS:  S1  S2, without murmur or gallop.   LUNG: Clear to auscultation, No wheezes, rales or rhonci.  BACK: No kyphosis of the thoracic spine  ABDOMEN: Soft, nontender to palpation, with positive bowel sounds  EXTREMITIES: moves both upper and lower extremities, no pedal edema, no calf tenderness. TEDs on.   NEUROLOGIC: Intact, pulses palpable,  equal.   PSYCHIATRIC: Mild cognitive impairment noted.       Labs:  All labs reviewed in the nursing home record and Epic   @  Lab Results   Component Value Date    WBC 10.6 04/04/2024     Lab Results   Component Value Date    RBC 3.06 04/04/2024     Lab Results   Component Value Date    HGB 9.4 04/04/2024     Lab Results   Component Value Date    HCT 28.8 04/04/2024     Lab Results   Component Value Date    MCV 94 04/04/2024     Lab Results   Component Value Date    MCH 30.7 04/04/2024     Lab Results   Component Value Date    MCHC 32.6 04/04/2024     Lab Results   Component Value Date    RDW 19.3 04/04/2024     Lab Results   Component Value Date     04/04/2024        @Last Comprehensive Metabolic Panel:  Sodium   Date Value Ref Range Status   04/15/2024 138 135 - 145 mmol/L Final     Comment:     Reference intervals for this test were updated on 09/26/2023 to more accurately reflect our healthy population. There may be differences in the flagging of prior results with similar values performed with this method. Interpretation of those prior results can be made in the context of the updated reference intervals.      Potassium   Date Value Ref Range Status   04/15/2024 4.2 3.4 - 5.3 mmol/L Final   08/03/2022 3.6 " 3.5 - 5.0 mmol/L Final     Chloride   Date Value Ref Range Status   04/15/2024 106 98 - 107 mmol/L Final   08/03/2022 110 (H) 98 - 107 mmol/L Final     Carbon Dioxide (CO2)   Date Value Ref Range Status   04/15/2024 24 22 - 29 mmol/L Final   08/03/2022 26 22 - 31 mmol/L Final     Anion Gap   Date Value Ref Range Status   04/15/2024 8 7 - 15 mmol/L Final   08/03/2022 7 5 - 18 mmol/L Final     Glucose   Date Value Ref Range Status   04/15/2024 82 70 - 99 mg/dL Final   08/03/2022 80 70 - 125 mg/dL Final     GLUCOSE BY METER POCT   Date Value Ref Range Status   04/05/2024 163 (H) 70 - 99 mg/dL Final     Urea Nitrogen   Date Value Ref Range Status   04/15/2024 29.3 (H) 8.0 - 23.0 mg/dL Final   08/03/2022 27 8 - 28 mg/dL Final     Creatinine   Date Value Ref Range Status   04/15/2024 1.17 (H) 0.51 - 0.95 mg/dL Final     GFR Estimate   Date Value Ref Range Status   04/15/2024 44 (L) >60 mL/min/1.73m2 Final   06/07/2021 41 (L) >60 mL/min/1.73m2 Final     Calcium   Date Value Ref Range Status   04/15/2024 9.0 8.8 - 10.2 mg/dL Final       This note has been dictated using voice recognition software. Any grammatical or context distortions are unintentional and inherent to the software    Electronically signed by: Karyn Abraham, CNP

## 2024-04-25 ENCOUNTER — DISCHARGE SUMMARY NURSING HOME (OUTPATIENT)
Dept: GERIATRICS | Facility: CLINIC | Age: 89
End: 2024-04-25
Payer: COMMERCIAL

## 2024-04-25 VITALS
WEIGHT: 117.6 LBS | SYSTOLIC BLOOD PRESSURE: 118 MMHG | HEIGHT: 59 IN | HEART RATE: 73 BPM | DIASTOLIC BLOOD PRESSURE: 69 MMHG | TEMPERATURE: 98.8 F | RESPIRATION RATE: 20 BRPM | OXYGEN SATURATION: 94 % | BODY MASS INDEX: 23.71 KG/M2

## 2024-04-25 DIAGNOSIS — M48.062 SPINAL STENOSIS OF LUMBAR REGION WITH NEUROGENIC CLAUDICATION: ICD-10-CM

## 2024-04-25 DIAGNOSIS — I60.9 SAH (SUBARACHNOID HEMORRHAGE) (H): Primary | ICD-10-CM

## 2024-04-25 DIAGNOSIS — R42 DIZZINESS: ICD-10-CM

## 2024-04-25 DIAGNOSIS — S72.91XD CLOSED FRACTURE OF RIGHT FEMUR WITH ROUTINE HEALING, UNSPECIFIED FRACTURE MORPHOLOGY, UNSPECIFIED PORTION OF FEMUR, SUBSEQUENT ENCOUNTER: ICD-10-CM

## 2024-04-25 DIAGNOSIS — I10 HYPERTENSION, UNSPECIFIED TYPE: ICD-10-CM

## 2024-04-25 DIAGNOSIS — G25.81 RESTLESS LEG SYNDROME: ICD-10-CM

## 2024-04-25 PROCEDURE — 99316 NF DSCHRG MGMT 30 MIN+: CPT | Performed by: NURSE PRACTITIONER

## 2024-04-25 NOTE — PROGRESS NOTES
[facility] GERIATRIC SERVICES    Code Status:  FULL CODE   Visit Type:   Chief Complaint   Patient presents with    TCU Discharge     Facility:  [facility] ([facility]) [[id number]]         HPI: Sherice Alvarez is a 89 year old female who I am seeing today for discharge from the TCU. Past medical history includes history of CAD, chronic pain, aortic stenosis s/p TAVR, CKD3, HTN, recent right femur fx, s/p ORIF and prior CVA. Pt presented with syncope and head trauma post fall. No cardiac arrhythmia identified on telemetry. Both episodes occurred after tizanidine and hydralazine  Symptoms thought to be due to medication reaction of tizanidine. Tizanidine and hydralazine discontinued. Pt found to have subarachnoid hemorrhage. Hemorrhage in the sylvian fissure could be secondary to MCA aneurysm for which CT angiogram or MRA is recommended. CTA head shows no aneurysm or high flow vascular malformation identified. Scattered mild to moderate stenoses of the intracranial arterial vasculature. CT head repeated after 24 hours showed no change of the brain bleed. She was placed on Keppra. ASA held.     Transitional Care Course: Today pt sitting up in bedside chair. Recent SAH. She has completed her Keppra. Dizziness improved. Avoid muscle relaxer.  She continues in TEDs. BP stable. Chronic RLS on ropinirole. She denies any headache. Pain in her left hip and chronic back pain controlled with oxycodone.     Assessment/Plan:      Recurrent syncope and fall  Dizziness   -associated with lightheadedness and hypotension after taking tizanidine.   -AVOID muscle relaxant.   -No longer on tizanidine.   -Echocardiogram unremarkable  -monitor bp.  -BLAIR hose on Q am, off Q hs    Subarachnoid hemorrhage   -Head CT showed small volume subarachnoid blood products layering along the right sylvian fissure.    -Per neurosurgery: hemorrhage in the sylvian fissure could be secondary to MCA aneurysm for which CT angiogram or MRA is  recommended   -CTA head shows no aneurysm or high flow vascular malformation identified. Scattered mild to moderate stenoses of the intracranial arterial vasculature.  -CT head repeated after 24 hours showed no change of the brain bleed.   -Hold aspirin  -Keppra given for 7 days, now complete.   -Keep systolic BP < 150. Continue amlodipine 5 mg BID, metoprolol 25 mg Q pm.   -neurosurgery follow up in 6 weeks with repeat head CT prior  -Follow up with neurovascular surgery in several months to rule out potential aneurysm      Essential hypertension  -metoprolol 25 mg Q pm.   -amlodipine 5 mg BID.   -monitor bp.  -no orthostasis noted.      Chronic Back pain  -Lumbar XR with no definitive evidence of acute fracture  -continue Oxycodone BID.      Right femur fracture  -continue oxycodone for pain.     -ok to discharge with current meds and treatments.   -home PT, OT, HHA and RN for medication management.   -Follow up with Neurology in 6 weeks for repeat head CT.   -Follow up with PCP in 1-2 weeks.     Active Ambulatory Problems     Diagnosis Date Noted    Constipation 06/03/2014    Cancer (H)     Gastroesophageal reflux disease with esophagitis 11/04/2016    Dyslipidemia, goal LDL below 100 11/04/2016    Abnormal chest CT     Anterior epistaxis 11/22/2017    Stage 3 chronic kidney disease (H) 05/15/2019    Asthma 05/17/2019    Coronary arteriosclerosis due to lipid rich plaque     Severe calcific aortic valve stenosis     Dyspnea 05/07/2020    S/P TAVR (transcatheter aortic valve replacement) 06/02/2020    Leukocytosis, unspecified type     S/P total knee arthroplasty, left 05/11/2021    Acute deep vein thrombosis (DVT) of lower extremity (H) 09/08/2021    (HFpEF) heart failure with preserved ejection fraction (H) 10/28/2021    Chest pain, unspecified type 08/02/2022    Other insomnia 08/02/2022    Adjustment disorder with mixed anxiety and depressed mood 08/02/2022    Restless leg syndrome 08/03/2022    Neck pain  05/18/2023    Cervical spine pain 05/18/2023    Polyuria 05/19/2023    Mild cognitive impairment 05/19/2023    Cellulitis of left lower extremity 09/09/2023    Edema of left lower extremity 09/09/2023    Avulsion of skin of left lower leg, initial encounter 09/09/2023    Essential hypertension 09/09/2023    Acute on chronic anemia 09/09/2023    Fall, initial encounter 02/09/2024    Closed displaced subtrochanteric fracture of right femur, initial encounter (H) 02/09/2024    Episodic mood disorder (H24) 03/09/2024    Aftercare for healing traumatic fracture of femur 02/17/2024    Aortic calcification (H24) 04/26/2023    Chronic systolic CHF (congestive heart failure) (H) 01/11/2023    Closed fracture of neck of left femur (H) 02/18/2024    Dyslipidemia 04/26/2023    Hypothyroidism (acquired) 01/11/2023    Skin ulcer of sacrum (H) 02/18/2024    Spinal stenosis of lumbar region with neurogenic claudication 02/17/2024    Subarachnoid hemorrhage (H) 04/04/2024    Syncope, unspecified syncope type 04/04/2024    Other chronic pain 04/06/2024     Resolved Ambulatory Problems     Diagnosis Date Noted    Accelerated hypertension     Epistaxis     Chest pain 07/21/2017    Lightheadedness     Atypical chest pain 04/16/2020    Acute diastolic congestive heart failure (H)     ACS (acute coronary syndrome) (H) 11/04/2016    Ischemic chest pain (H24)     NSTEMI (non-ST elevated myocardial infarction) (H)     Acute respiratory failure with hypoxia (H) 05/15/2019    Coronary artery disease 04/23/2020     Past Medical History:   Diagnosis Date    Arthritis     CAD (coronary artery disease)     Chronic kidney disease     Chronic pain syndrome     Congestive heart failure (H)     Crohn's disease (H)     GERD (gastroesophageal reflux disease)     Hx of heart artery stent 11/01/2016    Hyperlipidemia     Hypertension     PONV (postoperative nausea and vomiting)     Restless legs syndrome     Stroke (H) 01/01/2010     Allergies   Allergen  Reactions    Amitriptyline Hcl [Amitriptyline] Unknown    Erythromycin Diarrhea     Childhood reaction    Gabapentin Swelling     And jerking movements    Gramineae Pollens Unknown    Naproxen Unknown    Other Environmental Allergy Unknown     Molds and pet dander       All Meds and Allergies reviewed in the record at the facility and is the most up-to-date.    Current Outpatient Medications   Medication Sig Dispense Refill    acetaminophen (TYLENOL) 500 MG tablet Take 500 mg by mouth 3 times daily      amLODIPine (NORVASC) 5 MG tablet Take 1 tablet (5 mg) by mouth 2 times daily      atorvastatin (LIPITOR) 40 MG tablet Take 1 tablet (40 mg) by mouth every evening      levETIRAcetam (KEPPRA) 500 MG tablet Take 1 tablet (500 mg) by mouth 2 times daily for 7 days      Lidocaine (LIDOCARE) 4 % Patch Place 2 patches onto the skin every 24 hours To prevent lidocaine toxicity, patient should be patch free for 12 hrs daily. 5 patch 0    lidocaine (XYLOCAINE) 5 % external ointment Apply topically 3 times daily as needed for moderate pain      melatonin 3 MG tablet Take 3 mg by mouth nightly as needed for sleep      metoprolol succinate ER (TOPROL XL) 25 MG 24 hr tablet Take 25 mg by mouth every evening      Multiple Vitamin (THERA-TABS) TABS Take 1 tablet by mouth daily as needed (when desired)      nitroGLYcerin (NITROSTAT) 0.4 MG sublingual tablet Place 1 tablet (0.4 mg) under the tongue every 5 minutes as needed 90 tablet 0    ondansetron (ZOFRAN) 4 MG tablet Take 4 mg by mouth every 6 hours as needed for nausea      oxyCODONE (ROXICODONE) 5 MG tablet Take 1 tablet (5 mg) by mouth 2 times daily. May also take 1 tablet (5 mg) every 12 hours as needed for severe pain. not within 6 hours of previous dose. 60 tablet 0    polyethylene glycol (MIRALAX) 17 GM/Dose powder Take 17 g by mouth daily      rOPINIRole (REQUIP) 2 MG tablet Take 2 mg by mouth at bedtime      senna-docusate (SENOKOT-S/PERICOLACE) 8.6-50 MG tablet Take 2  "tablets by mouth 2 times daily as needed for constipation 60 tablet 0     No current facility-administered medications for this visit.       REVIEW OF SYSTEMS:   10 point review of systems reviewed and pertinent positives in the HPI.     PHYSICAL EXAMINATION:  Physical Exam     Vital signs: /69   Pulse 73   Temp 98.8  F (37.1  C)   Resp 20   Ht 1.499 m (4' 11\")   Wt 53.3 kg (117 lb 9.6 oz)   SpO2 94%   BMI 23.75 kg/m    General: Awake, Alert, oriented x3, sitting up in bedside chair, follows simple commands, conversant  HEENT:PERRLA, Pink conjunctiva, moist oral mucosa  NECK: Supple  CVS:  S1  S2, without murmur or gallop.   LUNG: Clear to auscultation, No wheezes, rales or rhonci.  BACK: No kyphosis of the thoracic spine  ABDOMEN: Soft, nontender to palpation, with positive bowel sounds  EXTREMITIES: moves both upper and lower extremities, no pedal edema, no calf tenderness. TEDs on.   NEUROLOGIC: Intact, pulses palpable,  equal.   PSYCHIATRIC: Mild cognitive impairment noted.       Labs:  All labs reviewed in the nursing home record and Epic   @  Lab Results   Component Value Date    WBC 10.6 04/04/2024     Lab Results   Component Value Date    RBC 3.06 04/04/2024     Lab Results   Component Value Date    HGB 9.4 04/04/2024     Lab Results   Component Value Date    HCT 28.8 04/04/2024     Lab Results   Component Value Date    MCV 94 04/04/2024     Lab Results   Component Value Date    MCH 30.7 04/04/2024     Lab Results   Component Value Date    MCHC 32.6 04/04/2024     Lab Results   Component Value Date    RDW 19.3 04/04/2024     Lab Results   Component Value Date     04/04/2024        @Last Comprehensive Metabolic Panel:  Sodium   Date Value Ref Range Status   04/15/2024 138 135 - 145 mmol/L Final     Comment:     Reference intervals for this test were updated on 09/26/2023 to more accurately reflect our healthy population. There may be differences in the flagging of prior results with " similar values performed with this method. Interpretation of those prior results can be made in the context of the updated reference intervals.      Potassium   Date Value Ref Range Status   04/15/2024 4.2 3.4 - 5.3 mmol/L Final   08/03/2022 3.6 3.5 - 5.0 mmol/L Final     Chloride   Date Value Ref Range Status   04/15/2024 106 98 - 107 mmol/L Final   08/03/2022 110 (H) 98 - 107 mmol/L Final     Carbon Dioxide (CO2)   Date Value Ref Range Status   04/15/2024 24 22 - 29 mmol/L Final   08/03/2022 26 22 - 31 mmol/L Final     Anion Gap   Date Value Ref Range Status   04/15/2024 8 7 - 15 mmol/L Final   08/03/2022 7 5 - 18 mmol/L Final     Glucose   Date Value Ref Range Status   04/15/2024 82 70 - 99 mg/dL Final   08/03/2022 80 70 - 125 mg/dL Final     GLUCOSE BY METER POCT   Date Value Ref Range Status   04/05/2024 163 (H) 70 - 99 mg/dL Final     Urea Nitrogen   Date Value Ref Range Status   04/15/2024 29.3 (H) 8.0 - 23.0 mg/dL Final   08/03/2022 27 8 - 28 mg/dL Final     Creatinine   Date Value Ref Range Status   04/15/2024 1.17 (H) 0.51 - 0.95 mg/dL Final     GFR Estimate   Date Value Ref Range Status   04/15/2024 44 (L) >60 mL/min/1.73m2 Final   06/07/2021 41 (L) >60 mL/min/1.73m2 Final     Calcium   Date Value Ref Range Status   04/15/2024 9.0 8.8 - 10.2 mg/dL Final       DISCHARGE PLAN/FACE TO FACE:  I certify that this patient is under my care and that I, or a nurse practitioner or physician's assistant working with me, had a face-to-face encounter that meets the physician face-to-face encounter requirements with this patient.       I certify that, based on my findings, the following services are medically necessary home health services.    My clinical findings support the need for the above skilled services.    This patient is homebound because: Recent hospitalization with syncope, falls with SAH.     The patient is, or has been, under my care and I have initiated the establishment of the plan of care. This patient  will be followed by a physician who will periodically review the plan of care.    > 35 minutes spent reviewing discharge medications, home care services and follow ups.     This note has been dictated using voice recognition software. Any grammatical or context distortions are unintentional and inherent to the software    Electronically signed by: Karyn Abraham CNP

## 2024-04-25 NOTE — LETTER
4/25/2024        RE: Sherice Alvarez  [address]   [address]  [facility] [id number]        [facility]    Code Status:  FULL CODE   Visit Type:   Chief Complaint   Patient presents with     TCU Discharge     Facility:  [facility]) [[id number]]         HPI: Sherice Alvarez is a 89 year old female who I am seeing today for follow up on the TCU. Past medical history includes history of CAD, chronic pain, aortic stenosis s/p TAVR, CKD3, HTN, recent right femur fx, s/p ORIF and prior CVA. Pt presented with syncope and head trauma post fall. No cardiac arrhythmia identified on telemetry. Both episodes occurred after tizanidine and hydralazine  Symptoms thought to be due to medication reaction of tizanidine. Tizanidine and hydralazine discontinued. Pt found to have subarachnoid hemorrhage. Hemorrhage in the sylvian fissure could be secondary to MCA aneurysm for which CT angiogram or MRA is recommended. CTA head shows no aneurysm or high flow vascular malformation identified. Scattered mild to moderate stenoses of the intracranial arterial vasculature. CT head repeated after 24 hours showed no change of the brain bleed. She was placed on Keppra. ASA held.     Transitional Care Course: Today pt sitting up in bedside chair. Recent SAH. She has completed her Keppra. Dizziness improved. Avoid muscle relaxer.  She continues in TEDs. BP stable. Chronic RLS on ropinirole. She denies any headache. Pain in her left hip and chronic back pain controlled with oxycodone.     Assessment/Plan:      Recurrent syncope and fall  Dizziness   -associated with lightheadedness and hypotension after taking tizanidine.   -AVOID muscle relaxant.   -No longer on tizanidine.   -Echocardiogram unremarkable  -monitor bp.  -BLAIR hose on Q am, off Q hs    Subarachnoid hemorrhage   -Head CT showed small volume subarachnoid blood products layering along the right sylvian fissure.    -Per neurosurgery: hemorrhage  in the sylvian fissure could be secondary to MCA aneurysm for which CT angiogram or MRA is recommended   -CTA head shows no aneurysm or high flow vascular malformation identified. Scattered mild to moderate stenoses of the intracranial arterial vasculature.  -CT head repeated after 24 hours showed no change of the brain bleed.   -Hold aspirin  -Keppra given for 7 days, now complete.   -Keep systolic BP < 150. Continue amlodipine 5 mg BID, metoprolol 25 mg Q pm.   -neurosurgery follow up in 6 weeks with repeat head CT prior  -Follow up with neurovascular surgery in several months to rule out potential aneurysm      Essential hypertension  -metoprolol 25 mg Q pm.   -amlodipine 5 mg BID.   -monitor bp.  -no orthostasis noted.      Chronic Back pain  -Lumbar XR with no definitive evidence of acute fracture  -continue Oxycodone BID.      Right femur fracture  -continue oxycodone for pain.     -ok to discharge with current meds and treatments.   -home PT, OT, HHA and RN for medication management.   -Follow up with Neurology in 6 weeks for repeat head CT.   -Follow up with PCP in 1-2 weeks.     Active Ambulatory Problems     Diagnosis Date Noted     Constipation 06/03/2014     Cancer (H)      Gastroesophageal reflux disease with esophagitis 11/04/2016     Dyslipidemia, goal LDL below 100 11/04/2016     Abnormal chest CT      Anterior epistaxis 11/22/2017     Stage 3 chronic kidney disease (H) 05/15/2019     Asthma 05/17/2019     Coronary arteriosclerosis due to lipid rich plaque      Severe calcific aortic valve stenosis      Dyspnea 05/07/2020     S/P TAVR (transcatheter aortic valve replacement) 06/02/2020     Leukocytosis, unspecified type      S/P total knee arthroplasty, left 05/11/2021     Acute deep vein thrombosis (DVT) of lower extremity (H) 09/08/2021     (HFpEF) heart failure with preserved ejection fraction (H) 10/28/2021     Chest pain, unspecified type 08/02/2022     Other insomnia 08/02/2022     Adjustment  disorder with mixed anxiety and depressed mood 08/02/2022     Restless leg syndrome 08/03/2022     Neck pain 05/18/2023     Cervical spine pain 05/18/2023     Polyuria 05/19/2023     Mild cognitive impairment 05/19/2023     Cellulitis of left lower extremity 09/09/2023     Edema of left lower extremity 09/09/2023     Avulsion of skin of left lower leg, initial encounter 09/09/2023     Essential hypertension 09/09/2023     Acute on chronic anemia 09/09/2023     Fall, initial encounter 02/09/2024     Closed displaced subtrochanteric fracture of right femur, initial encounter (H) 02/09/2024     Episodic mood disorder (H24) 03/09/2024     Aftercare for healing traumatic fracture of femur 02/17/2024     Aortic calcification (H24) 04/26/2023     Chronic systolic CHF (congestive heart failure) (H) 01/11/2023     Closed fracture of neck of left femur (H) 02/18/2024     Dyslipidemia 04/26/2023     Hypothyroidism (acquired) 01/11/2023     Skin ulcer of sacrum (H) 02/18/2024     Spinal stenosis of lumbar region with neurogenic claudication 02/17/2024     Subarachnoid hemorrhage (H) 04/04/2024     Syncope, unspecified syncope type 04/04/2024     Other chronic pain 04/06/2024     Resolved Ambulatory Problems     Diagnosis Date Noted     Accelerated hypertension      Epistaxis      Chest pain 07/21/2017     Lightheadedness      Atypical chest pain 04/16/2020     Acute diastolic congestive heart failure (H)      ACS (acute coronary syndrome) (H) 11/04/2016     Ischemic chest pain (H24)      NSTEMI (non-ST elevated myocardial infarction) (H)      Acute respiratory failure with hypoxia (H) 05/15/2019     Coronary artery disease 04/23/2020     Past Medical History:   Diagnosis Date     Arthritis      CAD (coronary artery disease)      Chronic kidney disease      Chronic pain syndrome      Congestive heart failure (H)      Crohn's disease (H)      GERD (gastroesophageal reflux disease)      Hx of heart artery stent 11/01/2016      Hyperlipidemia      Hypertension      PONV (postoperative nausea and vomiting)      Restless legs syndrome      Stroke (H) 01/01/2010     Allergies   Allergen Reactions     Amitriptyline Hcl [Amitriptyline] Unknown     Erythromycin Diarrhea     Childhood reaction     Gabapentin Swelling     And jerking movements     Gramineae Pollens Unknown     Naproxen Unknown     Other Environmental Allergy Unknown     Molds and pet dander       All Meds and Allergies reviewed in the record at the facility and is the most up-to-date.    Current Outpatient Medications   Medication Sig Dispense Refill     acetaminophen (TYLENOL) 500 MG tablet Take 500 mg by mouth 3 times daily       amLODIPine (NORVASC) 5 MG tablet Take 1 tablet (5 mg) by mouth 2 times daily       atorvastatin (LIPITOR) 40 MG tablet Take 1 tablet (40 mg) by mouth every evening       levETIRAcetam (KEPPRA) 500 MG tablet Take 1 tablet (500 mg) by mouth 2 times daily for 7 days       Lidocaine (LIDOCARE) 4 % Patch Place 2 patches onto the skin every 24 hours To prevent lidocaine toxicity, patient should be patch free for 12 hrs daily. 5 patch 0     lidocaine (XYLOCAINE) 5 % external ointment Apply topically 3 times daily as needed for moderate pain       melatonin 3 MG tablet Take 3 mg by mouth nightly as needed for sleep       metoprolol succinate ER (TOPROL XL) 25 MG 24 hr tablet Take 25 mg by mouth every evening       Multiple Vitamin (THERA-TABS) TABS Take 1 tablet by mouth daily as needed (when desired)       nitroGLYcerin (NITROSTAT) 0.4 MG sublingual tablet Place 1 tablet (0.4 mg) under the tongue every 5 minutes as needed 90 tablet 0     ondansetron (ZOFRAN) 4 MG tablet Take 4 mg by mouth every 6 hours as needed for nausea       oxyCODONE (ROXICODONE) 5 MG tablet Take 1 tablet (5 mg) by mouth 2 times daily. May also take 1 tablet (5 mg) every 12 hours as needed for severe pain. not within 6 hours of previous dose. 60 tablet 0     polyethylene glycol (MIRALAX)  "17 GM/Dose powder Take 17 g by mouth daily       rOPINIRole (REQUIP) 2 MG tablet Take 2 mg by mouth at bedtime       senna-docusate (SENOKOT-S/PERICOLACE) 8.6-50 MG tablet Take 2 tablets by mouth 2 times daily as needed for constipation 60 tablet 0     No current facility-administered medications for this visit.       REVIEW OF SYSTEMS:   10 point review of systems reviewed and pertinent positives in the HPI.     PHYSICAL EXAMINATION:  Physical Exam     Vital signs: /69   Pulse 73   Temp 98.8  F (37.1  C)   Resp 20   Ht 1.499 m (4' 11\")   Wt 53.3 kg (117 lb 9.6 oz)   SpO2 94%   BMI 23.75 kg/m    General: Awake, Alert, oriented x3, sitting up in bedside chair, follows simple commands, conversant  HEENT:PERRLA, Pink conjunctiva, moist oral mucosa  NECK: Supple  CVS:  S1  S2, without murmur or gallop.   LUNG: Clear to auscultation, No wheezes, rales or rhonci.  BACK: No kyphosis of the thoracic spine  ABDOMEN: Soft, nontender to palpation, with positive bowel sounds  EXTREMITIES: moves both upper and lower extremities, no pedal edema, no calf tenderness. TEDs on.   NEUROLOGIC: Intact, pulses palpable,  equal.   PSYCHIATRIC: Mild cognitive impairment noted.       Labs:  All labs reviewed in the nursing home record and Epic   @  Lab Results   Component Value Date    WBC 10.6 04/04/2024     Lab Results   Component Value Date    RBC 3.06 04/04/2024     Lab Results   Component Value Date    HGB 9.4 04/04/2024     Lab Results   Component Value Date    HCT 28.8 04/04/2024     Lab Results   Component Value Date    MCV 94 04/04/2024     Lab Results   Component Value Date    MCH 30.7 04/04/2024     Lab Results   Component Value Date    MCHC 32.6 04/04/2024     Lab Results   Component Value Date    RDW 19.3 04/04/2024     Lab Results   Component Value Date     04/04/2024        @Last Comprehensive Metabolic Panel:  Sodium   Date Value Ref Range Status   04/15/2024 138 135 - 145 mmol/L Final     Comment: "     Reference intervals for this test were updated on 09/26/2023 to more accurately reflect our healthy population. There may be differences in the flagging of prior results with similar values performed with this method. Interpretation of those prior results can be made in the context of the updated reference intervals.      Potassium   Date Value Ref Range Status   04/15/2024 4.2 3.4 - 5.3 mmol/L Final   08/03/2022 3.6 3.5 - 5.0 mmol/L Final     Chloride   Date Value Ref Range Status   04/15/2024 106 98 - 107 mmol/L Final   08/03/2022 110 (H) 98 - 107 mmol/L Final     Carbon Dioxide (CO2)   Date Value Ref Range Status   04/15/2024 24 22 - 29 mmol/L Final   08/03/2022 26 22 - 31 mmol/L Final     Anion Gap   Date Value Ref Range Status   04/15/2024 8 7 - 15 mmol/L Final   08/03/2022 7 5 - 18 mmol/L Final     Glucose   Date Value Ref Range Status   04/15/2024 82 70 - 99 mg/dL Final   08/03/2022 80 70 - 125 mg/dL Final     GLUCOSE BY METER POCT   Date Value Ref Range Status   04/05/2024 163 (H) 70 - 99 mg/dL Final     Urea Nitrogen   Date Value Ref Range Status   04/15/2024 29.3 (H) 8.0 - 23.0 mg/dL Final   08/03/2022 27 8 - 28 mg/dL Final     Creatinine   Date Value Ref Range Status   04/15/2024 1.17 (H) 0.51 - 0.95 mg/dL Final     GFR Estimate   Date Value Ref Range Status   04/15/2024 44 (L) >60 mL/min/1.73m2 Final   06/07/2021 41 (L) >60 mL/min/1.73m2 Final     Calcium   Date Value Ref Range Status   04/15/2024 9.0 8.8 - 10.2 mg/dL Final       DISCHARGE PLAN/FACE TO FACE:  I certify that this patient is under my care and that I, or a nurse practitioner or physician's assistant working with me, had a face-to-face encounter that meets the physician face-to-face encounter requirements with this patient.       I certify that, based on my findings, the following services are medically necessary home health services.    My clinical findings support the need for the above skilled services.    This patient is homebound  because: Recent hospitalization with syncope, falls with SAH.     The patient is, or has been, under my care and I have initiated the establishment of the plan of care. This patient will be followed by a physician who will periodically review the plan of care.    > 35 minutes spent reviewing discharge medications, home care services and follow ups.     This note has been dictated using voice recognition software. Any grammatical or context distortions are unintentional and inherent to the software    Electronically signed by: Karyn Abraham CNP       Sincerely,        Karyn Abraham NP

## 2024-05-07 ENCOUNTER — HOSPITAL ENCOUNTER (EMERGENCY)
Facility: HOSPITAL | Age: 89
Discharge: HOME | End: 2024-05-07
Attending: EMERGENCY MEDICINE | Admitting: EMERGENCY MEDICINE
Payer: COMMERCIAL

## 2024-05-07 ENCOUNTER — APPOINTMENT (OUTPATIENT)
Dept: CT IMAGING | Facility: HOSPITAL | Age: 89
End: 2024-05-07
Payer: COMMERCIAL

## 2024-05-07 VITALS
SYSTOLIC BLOOD PRESSURE: 161 MMHG | WEIGHT: 115 LBS | HEIGHT: 60 IN | HEART RATE: 74 BPM | OXYGEN SATURATION: 96 % | DIASTOLIC BLOOD PRESSURE: 77 MMHG | BODY MASS INDEX: 22.58 KG/M2 | RESPIRATION RATE: 16 BRPM | TEMPERATURE: 99 F

## 2024-05-07 DIAGNOSIS — R42 DIZZINESS: ICD-10-CM

## 2024-05-07 LAB
ALBUMIN SERPL BCG-MCNC: 3.8 G/DL (ref 3.5–5.2)
ALBUMIN UR-MCNC: NEGATIVE MG/DL
ALP SERPL-CCNC: 83 U/L (ref 40–150)
ALT SERPL W P-5'-P-CCNC: 14 U/L (ref 0–50)
ANION GAP SERPL CALCULATED.3IONS-SCNC: 8 MMOL/L (ref 7–15)
APPEARANCE UR: CLEAR
AST SERPL W P-5'-P-CCNC: 30 U/L (ref 0–45)
BASOPHILS # BLD AUTO: 0.1 10E3/UL (ref 0–0.2)
BASOPHILS NFR BLD AUTO: 1 %
BILIRUB DIRECT SERPL-MCNC: <0.2 MG/DL (ref 0–0.3)
BILIRUB SERPL-MCNC: 0.4 MG/DL
BILIRUB UR QL STRIP: NEGATIVE
BUN SERPL-MCNC: 21.5 MG/DL (ref 8–23)
CALCIUM SERPL-MCNC: 9.6 MG/DL (ref 8.8–10.2)
CHLORIDE SERPL-SCNC: 108 MMOL/L (ref 98–107)
COLOR UR AUTO: COLORLESS
CREAT SERPL-MCNC: 1.01 MG/DL (ref 0.51–0.95)
DEPRECATED HCO3 PLAS-SCNC: 24 MMOL/L (ref 22–29)
EGFRCR SERPLBLD CKD-EPI 2021: 53 ML/MIN/1.73M2
EOSINOPHIL # BLD AUTO: 0.3 10E3/UL (ref 0–0.7)
EOSINOPHIL NFR BLD AUTO: 4 %
ERYTHROCYTE [DISTWIDTH] IN BLOOD BY AUTOMATED COUNT: 19.2 % (ref 10–15)
GLUCOSE SERPL-MCNC: 97 MG/DL (ref 70–99)
GLUCOSE UR STRIP-MCNC: NEGATIVE MG/DL
HCT VFR BLD AUTO: 32.2 % (ref 35–47)
HGB BLD-MCNC: 10.4 G/DL (ref 11.7–15.7)
HGB UR QL STRIP: NEGATIVE
HOLD SPECIMEN: NORMAL
HOLD SPECIMEN: NORMAL
HYALINE CASTS: 4 /LPF
IMM GRANULOCYTES # BLD: 0 10E3/UL
IMM GRANULOCYTES NFR BLD: 1 %
KETONES UR STRIP-MCNC: NEGATIVE MG/DL
LEUKOCYTE ESTERASE UR QL STRIP: NEGATIVE
LYMPHOCYTES # BLD AUTO: 1.3 10E3/UL (ref 0.8–5.3)
LYMPHOCYTES NFR BLD AUTO: 15 %
MCH RBC QN AUTO: 30.1 PG (ref 26.5–33)
MCHC RBC AUTO-ENTMCNC: 32.3 G/DL (ref 31.5–36.5)
MCV RBC AUTO: 93 FL (ref 78–100)
MONOCYTES # BLD AUTO: 1.1 10E3/UL (ref 0–1.3)
MONOCYTES NFR BLD AUTO: 13 %
MUCOUS THREADS #/AREA URNS LPF: PRESENT /LPF
NEUTROPHILS # BLD AUTO: 6.1 10E3/UL (ref 1.6–8.3)
NEUTROPHILS NFR BLD AUTO: 66 %
NITRATE UR QL: NEGATIVE
NRBC # BLD AUTO: 0 10E3/UL
NRBC BLD AUTO-RTO: 0 /100
PH UR STRIP: 6.5 [PH] (ref 5–7)
PLATELET # BLD AUTO: 218 10E3/UL (ref 150–450)
POTASSIUM SERPL-SCNC: 4.1 MMOL/L (ref 3.4–5.3)
PROT SERPL-MCNC: 6.7 G/DL (ref 6.4–8.3)
RBC # BLD AUTO: 3.46 10E6/UL (ref 3.8–5.2)
RBC URINE: 2 /HPF
SODIUM SERPL-SCNC: 140 MMOL/L (ref 135–145)
SP GR UR STRIP: 1.01 (ref 1–1.03)
SQUAMOUS EPITHELIAL: 1 /HPF
TROPONIN T SERPL HS-MCNC: 36 NG/L
TROPONIN T SERPL HS-MCNC: 43 NG/L
UROBILINOGEN UR STRIP-MCNC: <2 MG/DL
WBC # BLD AUTO: 8.9 10E3/UL (ref 4–11)
WBC URINE: 2 /HPF

## 2024-05-07 PROCEDURE — 80053 COMPREHEN METABOLIC PANEL: CPT | Performed by: STUDENT IN AN ORGANIZED HEALTH CARE EDUCATION/TRAINING PROGRAM

## 2024-05-07 PROCEDURE — 258N000003 HC RX IP 258 OP 636

## 2024-05-07 PROCEDURE — 84484 ASSAY OF TROPONIN QUANT: CPT

## 2024-05-07 PROCEDURE — 80053 COMPREHEN METABOLIC PANEL: CPT | Performed by: EMERGENCY MEDICINE

## 2024-05-07 PROCEDURE — 82248 BILIRUBIN DIRECT: CPT

## 2024-05-07 PROCEDURE — 80048 BASIC METABOLIC PNL TOTAL CA: CPT | Performed by: STUDENT IN AN ORGANIZED HEALTH CARE EDUCATION/TRAINING PROGRAM

## 2024-05-07 PROCEDURE — 36415 COLL VENOUS BLD VENIPUNCTURE: CPT

## 2024-05-07 PROCEDURE — 70450 CT HEAD/BRAIN W/O DYE: CPT

## 2024-05-07 PROCEDURE — 81001 URINALYSIS AUTO W/SCOPE: CPT

## 2024-05-07 PROCEDURE — 93005 ELECTROCARDIOGRAM TRACING: CPT | Performed by: EMERGENCY MEDICINE

## 2024-05-07 PROCEDURE — 96360 HYDRATION IV INFUSION INIT: CPT

## 2024-05-07 PROCEDURE — 85025 COMPLETE CBC W/AUTO DIFF WBC: CPT | Performed by: STUDENT IN AN ORGANIZED HEALTH CARE EDUCATION/TRAINING PROGRAM

## 2024-05-07 PROCEDURE — 99285 EMERGENCY DEPT VISIT HI MDM: CPT | Mod: 25

## 2024-05-07 PROCEDURE — 36415 COLL VENOUS BLD VENIPUNCTURE: CPT | Performed by: STUDENT IN AN ORGANIZED HEALTH CARE EDUCATION/TRAINING PROGRAM

## 2024-05-07 PROCEDURE — 93005 ELECTROCARDIOGRAM TRACING: CPT | Performed by: STUDENT IN AN ORGANIZED HEALTH CARE EDUCATION/TRAINING PROGRAM

## 2024-05-07 PROCEDURE — 85025 COMPLETE CBC W/AUTO DIFF WBC: CPT | Performed by: EMERGENCY MEDICINE

## 2024-05-07 RX ADMIN — SODIUM CHLORIDE 500 ML: 9 INJECTION, SOLUTION INTRAVENOUS at 19:33

## 2024-05-07 ASSESSMENT — ACTIVITIES OF DAILY LIVING (ADL)
ADLS_ACUITY_SCORE: 38
ADLS_ACUITY_SCORE: 36

## 2024-05-07 NOTE — ED TRIAGE NOTES
Pt was just returned home from [facility] for broken R hip. Home health visited today and suggested she visit the ER for dehydration - lightheadedness and weakness.

## 2024-05-08 NOTE — DISCHARGE INSTRUCTIONS
You were seen at the Emergency Department today for your dizziness.  You had a thorough workup that included a physical exam, labs, EKG, and a CT scan of the head.  Your CT scan does not show any signs of bleeding in the brain.      Continue physical therapy and occupational therapy as previously planned.    Please follow-up with your primary care provider and neurosurgery as previously instructed.     Return to the ER if you develop any headache, confusion, vomiting, persistent dizziness, weakness/numbness/tingling of arms or legs, facial droop, speech difficulty, or other new symptoms.    Take Care!  - Cheyenne Bonilla PA-C

## 2024-05-08 NOTE — ED PROVIDER NOTES
"Emergency Department Midlevel Supervisory Note     I had a face to face encounter with this patient seen by the Advanced Practice Provider (MERCEDES). I personally made/approved the management plan and take responsibility for the patient management. I personally saw patient and performed a substantive portion of the visit including all aspects of the medical decision making.     ED Course:  8:47 PM Cheyenne Bonilla PA-C staffed patient with me. I agree with their assessment and plan of management, and I will see the patient.  8:55 PM I met with the patient to introduce myself, gather additional history, perform my initial exam, and discuss the plan.     Brief HPI:     Sherice Alvarez is a 89 year old female who presents to the Emergency Department for evaluation of dizziness.      She reports she has a home health care nurse, OT, and PT.  She reports her home health care nurse was concerned about her dizziness upon standing earlier today and advised her to be evaluated in the ED. She reports \"dizziness.\" difficult articulating whether this is room-spinning sensation or lightheadedness, but seemingly more vertiginous in origin.      I, Adria Estes, am serving as a scribe to document services personally performed by Marko Mckeon DO, based on my observations and the provider's statements to me.   I, Marko Mckeon DO attest that Adria Estes  was acting in a scribe capacity, has observed my performance of the services and has documented them in accordance with my direction.    Brief Physical Exam: BP (!) 161/77   Pulse 74   Temp 99  F (37.2  C)   Resp 16   Ht 1.524 m (5')   Wt 52.2 kg (115 lb)   SpO2 96%   BMI 22.46 kg/m    Constitutional:  Alert, in no acute distress  HENT: Atraumatic, no nystagmus  Respiratory:  Respirations even, unlabored, in no acute respiratory distress  Cardiovascular:  Regular rate and rhythm, good peripheral perfusion  Neurologic:  Alert & oriented, speech clear and fluent, no focal " deficits noted         MDM:  89-year-old female presenting with dizziness described as lightheadedness.  Dizziness has been intermittent and longstanding.  She had recent fall with subarachnoid hemorrhage, CT without evidence of subarachnoid hemorrhage or subdural.  EKG was nonischemic, initial troponin 43 on repeat 36.  Hemoglobin 10.4 stable for chronic anemia comprehensive metabolic profile within normal limits, urinalysis negative for pyuria or bacteriuria.  Patient is feeling improved after IV fluid agree with current outpatient plan as documented by the physician assistant Felicia Hernandez       1. Dizziness            Labs and Imaging:  Results for orders placed or performed during the hospital encounter of 05/07/24   Head CT w/o contrast    Impression    IMPRESSION:  1.  No CT evidence for acute intracranial process.  2.  Previously noted subarachnoid hemorrhage has resolved.  3.  Brain atrophy and presumed chronic microvascular ischemic changes as above.   Basic metabolic panel   Result Value Ref Range    Sodium 140 135 - 145 mmol/L    Potassium 4.1 3.4 - 5.3 mmol/L    Chloride 108 (H) 98 - 107 mmol/L    Carbon Dioxide (CO2) 24 22 - 29 mmol/L    Anion Gap 8 7 - 15 mmol/L    Urea Nitrogen 21.5 8.0 - 23.0 mg/dL    Creatinine 1.01 (H) 0.51 - 0.95 mg/dL    GFR Estimate 53 (L) >60 mL/min/1.73m2    Calcium 9.6 8.8 - 10.2 mg/dL    Glucose 97 70 - 99 mg/dL   CBC with platelets and differential   Result Value Ref Range    WBC Count 8.9 4.0 - 11.0 10e3/uL    RBC Count 3.46 (L) 3.80 - 5.20 10e6/uL    Hemoglobin 10.4 (L) 11.7 - 15.7 g/dL    Hematocrit 32.2 (L) 35.0 - 47.0 %    MCV 93 78 - 100 fL    MCH 30.1 26.5 - 33.0 pg    MCHC 32.3 31.5 - 36.5 g/dL    RDW 19.2 (H) 10.0 - 15.0 %    Platelet Count 218 150 - 450 10e3/uL    % Neutrophils 66 %    % Lymphocytes 15 %    % Monocytes 13 %    % Eosinophils 4 %    % Basophils 1 %    % Immature Granulocytes 1 %    NRBCs per 100 WBC 0 <1 /100    Absolute Neutrophils 6.1 1.6 - 8.3  10e3/uL    Absolute Lymphocytes 1.3 0.8 - 5.3 10e3/uL    Absolute Monocytes 1.1 0.0 - 1.3 10e3/uL    Absolute Eosinophils 0.3 0.0 - 0.7 10e3/uL    Absolute Basophils 0.1 0.0 - 0.2 10e3/uL    Absolute Immature Granulocytes 0.0 <=0.4 10e3/uL    Absolute NRBCs 0.0 10e3/uL   Extra Blue Top Tube   Result Value Ref Range    Hold Specimen JIC    Extra Red Top Tube   Result Value Ref Range    Hold Specimen JIC    Result Value Ref Range    Troponin T, High Sensitivity 43 (H) <=14 ng/L   UA with Microscopic reflex to Culture    Specimen: Urine, Midstream   Result Value Ref Range    Color Urine Colorless Colorless, Straw, Light Yellow, Yellow    Appearance Urine Clear Clear    Glucose Urine Negative Negative mg/dL    Bilirubin Urine Negative Negative    Ketones Urine Negative Negative mg/dL    Specific Gravity Urine 1.007 1.001 - 1.030    Blood Urine Negative Negative    pH Urine 6.5 5.0 - 7.0    Protein Albumin Urine Negative Negative mg/dL    Urobilinogen Urine <2.0 <2.0 mg/dL    Nitrite Urine Negative Negative    Leukocyte Esterase Urine Negative Negative    Mucus Urine Present (A) None Seen /LPF    RBC Urine 2 <=2 /HPF    WBC Urine 2 <=5 /HPF    Squamous Epithelials Urine 1 <=1 /HPF    Hyaline Casts Urine 4 (H) <=2 /LPF   Hepatic function panel   Result Value Ref Range    Protein Total 6.7 6.4 - 8.3 g/dL    Albumin 3.8 3.5 - 5.2 g/dL    Bilirubin Total 0.4 <=1.2 mg/dL    Alkaline Phosphatase 83 40 - 150 U/L    AST 30 0 - 45 U/L    ALT 14 0 - 50 U/L    Bilirubin Direct <0.20 0.00 - 0.30 mg/dL   Result Value Ref Range    Troponin T, High Sensitivity 36 (H) <=14 ng/L       I have reviewed the relevant laboratory studies above.    I independently interpreted the following imaging study(s):   CT head    EKG: I reviewed and independently interpreted the patient's EKG, with comments made as listed below. Please see scanned EKG for full report.     Performed at: May 7, 2024, 16:35    Impression: Sinus rhythm.   Rate: 71  BPM  Rhythm: Sinus   Axis: 65  25  56  RI Interval: 198 ms  QRS Interval: 82 ms  QTc Interval: 436 ms  ST Changes: No ST segment elevation or depression, no T wave inversion, no Q waves  Comparison: When compared with ECG from 04-APR-2024 10:48, no significant change        Procedures:  I was present for the key portions of procedures documented in MERCEDES/midlevel note, see midlevel note for further details.    Marko Mckeon DO  [facility] EMERGENCY DEPARTMENT  [address] [place] [id number] [phone]     Marko Mckeon DO  05/08/24 0422

## 2024-05-08 NOTE — ED NOTES
Discharge    Discharge paperwork discussed. Patient questions answered. AVS in hand. Patient discharged from ED with daughter back to assisted living facility.

## 2024-05-10 LAB
ATRIAL RATE - MUSE: 71 BPM
DIASTOLIC BLOOD PRESSURE - MUSE: NORMAL MMHG
INTERPRETATION ECG - MUSE: NORMAL
P AXIS - MUSE: 65 DEGREES
PR INTERVAL - MUSE: 198 MS
QRS DURATION - MUSE: 82 MS
QT - MUSE: 402 MS
QTC - MUSE: 436 MS
R AXIS - MUSE: 25 DEGREES
SYSTOLIC BLOOD PRESSURE - MUSE: NORMAL MMHG
T AXIS - MUSE: 56 DEGREES
VENTRICULAR RATE- MUSE: 71 BPM

## 2024-05-20 ENCOUNTER — TELEPHONE (OUTPATIENT)
Dept: NEUROSURGERY | Facility: CLINIC | Age: 89
End: 2024-05-20
Payer: COMMERCIAL

## 2024-05-20 NOTE — TELEPHONE ENCOUNTER
[facility] Call Center    Phone Message    May a detailed message be left on voicemail: yes     Reason for Call: Other: Patient's daughter Jacqueline called and is wondering if the appt tomorrow 5/21 is needed. Jacqueline stated that patient was recently in the hospital in May and had imaging then and is wondering if the appt is still needed or not. Please call and advise.      Action Taken: Message routed to:  Other: Neurosurgery    Travel Screening: Not Applicable

## 2024-07-30 NOTE — PROGRESS NOTES
Code Status:  FULL CODE  Visit Type: Discharge Summary    Facility:  [facility] [[id number]]             History of Present Illness: Sherice Alvarez is a 86 y.o. female who I am seeing today for discharge from the TCU.  Patient recently hospitalized on 5/11/2021 secondary to DJD of the left knee.  Patient status post left total knee arthroplasty on 5/11/2021.  Patient continues on Tylenol and oxycodone for pain.  There were no intraoperative complications.  Postoperative complications included some bruising.  And acute blood loss anemia.  Hemoglobin down to 8.6.  Past medical history includes GERD's, dyslipidemia, hypertension, intermittent asthma, CAD, chronic diastolic heart failure status post TAVR and stage III kidney disease.    Today patient ambulating about the unit with rolling walker.  I did accompany her to her room.  Patient status post left total knee arthroplasty. Pt continues on oxycodone and Tylenol for pain.  Recent addition of methocarbamol 250 mg p.o. twice daily for muscle spasms.  Patient ambulating over 300 feet with rolling walker.  She continues with pain in her lower extremities.  Continue to ice elevate and rest at home.  She continues on Xarelto for DVT for 3 months.  Patient underwent CT of the chest secondary to some increased shortness of breath and chest tightness over the weekend.  This was negative for pulmonary embolism.  It did show atelectasis in bilateral lower bases.  She continues on as needed albuterol.  Patient also with some anxiety however this is greatly improving.  She did have an episode of hyperventilation.  Acute blood loss anemia.  Hemoglobin 9.3.  She does continue on iron supplement.  History of GERD on PPI.  Hypertension.  Satisfactory control.  Restless leg syndrome on Requip.      Active Ambulatory Problems     Diagnosis Date Noted     Constipation 06/03/2014     Cancer (H)      Gastroesophageal reflux disease with esophagitis 11/04/2016      Dyslipidemia, goal LDL below 100 11/04/2016     Benign essential hypertension      Epistaxis      Chest pain 07/21/2017     Abnormal chest CT      Anterior epistaxis 11/22/2017     Acute respiratory insufficiency 05/14/2019     RLL pneumonia 05/14/2019     Stage 3 chronic kidney disease 05/15/2019     Asthma 05/17/2019     Mild intermittent asthma with exacerbation 05/17/2019     Coronary artery disease involving native coronary artery of native heart with unstable angina pectoris (H)      Lightheadedness      Angina pectoris, unspecified (H) 04/16/2020     Atypical chest pain 04/16/2020     Severe calcific aortic valve stenosis      Dyspnea 05/07/2020     S/P TAVR (transcatheter aortic valve replacement) 06/02/2020     Acute diastolic congestive heart failure (H)      Chronic diastolic heart failure (H)      Leukocytosis, unspecified type      S/P total knee arthroplasty, left 05/11/2021     Resolved Ambulatory Problems     Diagnosis Date Noted     Chest pain 11/03/2016     ACS (acute coronary syndrome) (H) 11/04/2016     Ischemic chest pain (H)      NSTEMI (non-ST elevated myocardial infarction) (H)      Acute chest pain 12/27/2016     Acute infection of nasal sinus 02/18/2017     Severe aortic stenosis 08/17/2018     Acute respiratory failure with hypoxia (H) 05/15/2019     Coronary artery disease 04/23/2020     Past Medical History:   Diagnosis Date     Arthritis      CAD (coronary artery disease)      Chronic kidney disease      Chronic pain syndrome      Crohn's disease (H)      GERD (gastroesophageal reflux disease)      Hx of heart artery stent 11/2016     Hyperlipidemia      Hypertension      PONV (postoperative nausea and vomiting)      Restless legs syndrome      Stroke (H) 2010       Current Outpatient Medications   Medication Sig Note     acetaminophen (TYLENOL) 325 MG tablet Take 3 tablets (975 mg total) by mouth every 8 (eight) hours.      albuterol (PROVENTIL HFA;VENTOLIN HFA) 90 mcg/actuation  inhaler Inhale 1-2 puffs every 6 (six) hours as needed for wheezing.      amLODIPine (NORVASC) 5 MG tablet Take 1 tablet (5 mg total) by mouth daily.      beclomethasone (QVAR) 80 mcg/actuation inhaler Inhale 1 puff 2 (two) times a day.      coenzyme Q10 100 mg capsule Take 100 mg by mouth daily.       ergocalciferol (ERGOCALCIFEROL) 50,000 unit capsule Take 50,000 Units by mouth once a week. 10/8/2019: On Thursdays     evolocumab (REPATHA SYRINGE) 140 mg/mL Syrg Inject 1 mL (140 mg total) under the skin every 14 (fourteen) days.      ferrous sulfate 325 (65 FE) MG tablet Take 1 tablet (325 mg total) by mouth daily with breakfast.      furosemide (LASIX) 20 MG tablet Take 10 mg by mouth 2 (two) times a day as needed.      melatonin 5 mg Tab tablet Take 5 mg by mouth at bedtime as needed (for sleep).       metoprolol succinate (TOPROL-XL) 25 MG Take 1 tablet (25 mg total) by mouth daily.      montelukast (SINGULAIR) 10 mg tablet Take 10 mg by mouth at bedtime as needed.       multivitamin therapeutic (THERAGRAN) tablet Take 1 tablet by mouth daily.      nitroglycerin (NITROSTAT) 0.4 MG SL tablet Place 1 tablet (0.4 mg total) under the tongue every 5 (five) minutes as needed for chest pain.      omeprazole (PRILOSEC) 20 MG capsule Take 1-2 capsules (20-40 mg total) by mouth daily before breakfast.      oxyCODONE (ROXICODONE) 5 MG immediate release tablet Take 1-2 tabs every 4-6 hours as needed for pain. Take 1 tab for pain 1-6/10, take 2 tabs for pain 7-10/10. Do not exceed 6 pills in one day.      rivaroxaban ANTICOAGULANT (XARELTO) 15 mg tablet Take 15 mg by mouth 2 (two) times a day with meals. Take 15 mg twice daily x21 days then 20 mg daily for 3 months.      rOPINIRole (REQUIP) 2 MG tablet Take 4 mg by mouth every evening. Patient takes 7pm      senna-docusate (PERICOLACE) 8.6-50 mg tablet Take 1 tablet by mouth 2 (two) times a day as needed for constipation.        Allergies   Allergen Reactions      Amitriptyline Hcl Other (See Comments)     Erythromycin Diarrhea and Other (See Comments)     Childhood reaction     Gabapentin Other (See Comments)     Jerking movements, swelling     Naproxen Unknown and Other (See Comments)     Other Environmental Allergy      Molds  dander     Pregabalin Other (See Comments)         Review of Systems  No fevers or chills. No headache, lightheadedness or dizziness. No SOB, chest pains or palpitations. Appetite is fair.  She reports dislike of the food.  No nausea, vomiting, constipation or diarrhea. No dysuria, frequency, burning or pain with urination.  Pain a little better on today's visit.  She also has increased swelling and bruising to the left lower extremity.  Restless leg syndrome on Requip.  Patient continues on Tylenol, methocarbamol and oxycodone for pain.  Otherwise review of systems are negative.     Physical Exam  PHYSICAL EXAMINATION:  Vital signs: /74, pulse 84, respirations 12, O2 sat 99% on room air.  Weight 139.4 pounds.  General: Awake, Alert, oriented x3, appropriately, follows simple commands, conversant  HEENT:PERRLA, Pink conjunctiva, anicteric sclerae, moist oral mucosa  NECK: Supple, without any lymphadenopathy, or masses  CVS:  S1  S2, without murmur or gallop.   LUNG: Clear to auscultation, No wheezes, rales or rhonci.  BACK: No kyphosis of the thoracic spine  ABDOMEN: Soft, nontender to palpation, with positive bowel sounds  EXTREMITIES: Good range of motion on both upper and lower extremities, 2+ pedal edema on the surgical side, Ace wraps in place.     SKIN: Moderate bruising of the left thigh, knee and shin.  Tegaderm dressing in place.  NEUROLOGIC: Intact, pulses weak on the operative side.  PSYCHIATRIC: Cognition intact.  Pleasant affect.      Labs:    Lab Results   Component Value Date    WBC 11.5 (H) 06/18/2020    HGB 9.3 (L) 05/17/2021    HCT 28.9 (L) 06/18/2020    MCV 92 06/18/2020     06/18/2020     Results for orders placed or  performed in visit on 05/17/21   Basic Metabolic Panel   Result Value Ref Range    Sodium 140 136 - 145 mmol/L    Potassium 4.1 3.5 - 5.0 mmol/L    Chloride 105 98 - 107 mmol/L    CO2 25 22 - 31 mmol/L    Anion Gap, Calculation 10 5 - 18 mmol/L    Glucose 88 70 - 125 mg/dL    Calcium 8.7 8.5 - 10.5 mg/dL    BUN 15 8 - 28 mg/dL    Creatinine 0.85 0.60 - 1.10 mg/dL    GFR MDRD Af Amer >60 >60 mL/min/1.73m2    GFR MDRD Non Af Amer >60 >60 mL/min/1.73m2           Assessment/Plan:  1. History of total left knee replacement   Continue with Tylenol and oxycodone for pain.  Recent addition of methocarbamol 250 mg now and two times a day prn.    2.   DVT left lower extremity  Xarelto 15 mg p.o. twice daily x21 days then transition to 20 mg p.o. daily x3 months.   Continue ice and elevation.  Continue with BLAIR hose on a.m. off at at bedtime. Continue ice and elevation.    3.  shortness of breath  Follow up CTPE run yesterday negative.  Atelectasis was seen.  Continue to encourage cough and deep breathing.  She also has albuterol inhaler as needed.  Control anxiety.   4.    GERD  continue PPI twice daily while on anticoagulation.  Aspirin discontinued while on Xarelto.   5.  Chronic congestive heart failure, unspecified heart failure type (H)  Weight stable. Edema less on today's visit.    6.  Essential hypertension   satisfactory control.   7.  Acute blood loss anemia   hemoglobin 9.3.  Patient continues on iron supplement.     Okay to DC home with current meds, treatments and narcotics.  Home PT, OT, home health aide and RN for medication management as well as pain assessment.  Follow-up with primary care provider in 1 week.  Follow-up with Ortho on June 1.  Total time spent for this visit was 35 minutes with greater than 50% of time spent face-to-face    DISCHARGE PLAN/FACE TO FACE:  I certify that this patient is under my care and that I, or a nurse practitioner or physician's assistant working with me, had a face-to-face  encounter that meets the physician face-to-face encounter requirements with this patient.       I certify that, based on my findings, the following services are medically necessary home health services.    My clinical findings support the need for the above skilled services.    This patient is homebound because: Right TKA with post surgical DVT.     The patient is, or has been, under my care and I have initiated the establishment of the plan of care. This patient will be followed by a physician who will periodically review the plan of care.    Total time spent for this visit was 60 minutes with > 50% of the time spent face to face with pt with patient reviewing discharge instructions, home care and follow-ups.    This note has been dictated using voice recognition software. Any grammatical or context distortions are unintentional and inherent to the software    Electronically signed by: Karyn Abraham CNP      4 = No assist / stand by assistance

## 2024-12-04 RX ORDER — ASPIRIN 81 MG/1
81 TABLET ORAL DAILY
Status: ON HOLD | COMMUNITY
End: 2025-01-02

## 2024-12-04 RX ORDER — MORPHINE SULFATE 10 MG/1
10 CAPSULE, EXTENDED RELEASE ORAL DAILY
Status: ON HOLD | COMMUNITY
End: 2025-01-02

## 2024-12-04 RX ORDER — PANTOPRAZOLE SODIUM 40 MG/1
40 TABLET, DELAYED RELEASE ORAL DAILY
Status: ON HOLD | COMMUNITY
End: 2025-01-02

## 2024-12-04 RX ORDER — QUETIAPINE FUMARATE 25 MG/1
25 TABLET, FILM COATED ORAL 2 TIMES DAILY
Status: ON HOLD | COMMUNITY
End: 2025-01-02

## 2024-12-04 RX ORDER — FUROSEMIDE 20 MG/1
20 TABLET ORAL DAILY
COMMUNITY

## 2024-12-04 RX ORDER — METHOCARBAMOL 500 MG/1
500 TABLET, FILM COATED ORAL 3 TIMES DAILY
Status: ON HOLD | COMMUNITY
End: 2025-01-03

## 2024-12-19 ENCOUNTER — LAB REQUISITION (OUTPATIENT)
Dept: LAB | Facility: CLINIC | Age: 89
End: 2024-12-19
Payer: COMMERCIAL

## 2024-12-19 DIAGNOSIS — I10 ESSENTIAL (PRIMARY) HYPERTENSION: ICD-10-CM

## 2024-12-23 LAB
ALBUMIN SERPL BCG-MCNC: 3.5 G/DL (ref 3.5–5.2)
ALP SERPL-CCNC: 76 U/L (ref 40–150)
ALT SERPL W P-5'-P-CCNC: 9 U/L (ref 0–50)
ANION GAP SERPL CALCULATED.3IONS-SCNC: 9 MMOL/L (ref 7–15)
AST SERPL W P-5'-P-CCNC: 38 U/L (ref 0–45)
BILIRUB SERPL-MCNC: 0.5 MG/DL
BUN SERPL-MCNC: 22 MG/DL (ref 8–23)
CALCIUM SERPL-MCNC: 8.7 MG/DL (ref 8.8–10.4)
CHLORIDE SERPL-SCNC: 106 MMOL/L (ref 98–107)
CREAT SERPL-MCNC: 1.05 MG/DL (ref 0.51–0.95)
EGFRCR SERPLBLD CKD-EPI 2021: 50 ML/MIN/1.73M2
ERYTHROCYTE [DISTWIDTH] IN BLOOD BY AUTOMATED COUNT: 21.2 % (ref 10–15)
GLUCOSE SERPL-MCNC: 84 MG/DL (ref 70–99)
HCO3 SERPL-SCNC: 23 MMOL/L (ref 22–29)
HCT VFR BLD AUTO: 26.8 % (ref 35–47)
HGB BLD-MCNC: 8.8 G/DL (ref 11.7–15.7)
MCH RBC QN AUTO: 29.4 PG (ref 26.5–33)
MCHC RBC AUTO-ENTMCNC: 32.8 G/DL (ref 31.5–36.5)
MCV RBC AUTO: 90 FL (ref 78–100)
PLATELET # BLD AUTO: 222 10E3/UL (ref 150–450)
POTASSIUM SERPL-SCNC: 4.3 MMOL/L (ref 3.4–5.3)
PROT SERPL-MCNC: 6 G/DL (ref 6.4–8.3)
RBC # BLD AUTO: 2.99 10E6/UL (ref 3.8–5.2)
SODIUM SERPL-SCNC: 138 MMOL/L (ref 135–145)
WBC # BLD AUTO: 9.2 10E3/UL (ref 4–11)

## 2024-12-23 PROCEDURE — 85027 COMPLETE CBC AUTOMATED: CPT | Mod: ORL

## 2024-12-23 PROCEDURE — 36415 COLL VENOUS BLD VENIPUNCTURE: CPT | Mod: ORL

## 2024-12-23 PROCEDURE — 80053 COMPREHEN METABOLIC PANEL: CPT | Mod: ORL

## 2024-12-23 PROCEDURE — P9604 ONE-WAY ALLOW PRORATED TRIP: HCPCS | Mod: ORL

## 2024-12-31 RX ORDER — BISACODYL 10 MG
10 SUPPOSITORY, RECTAL RECTAL DAILY PRN
COMMUNITY

## 2024-12-31 RX ORDER — ESCITALOPRAM OXALATE 5 MG/1
5 TABLET ORAL DAILY
COMMUNITY

## 2024-12-31 RX ORDER — OXYCODONE HYDROCHLORIDE 5 MG/1
5 TABLET ORAL EVERY 4 HOURS PRN
Status: ON HOLD | COMMUNITY
End: 2025-01-03

## 2024-12-31 RX ORDER — DICLOFENAC SODIUM 100 MG/1
100 TABLET, EXTENDED RELEASE ORAL DAILY
Status: ON HOLD | COMMUNITY
End: 2025-01-02

## 2024-12-31 RX ORDER — BUPRENORPHINE 10 UG/H
1 PATCH TRANSDERMAL WEEKLY
COMMUNITY

## 2024-12-31 RX ORDER — LORAZEPAM 0.5 MG/1
0.5 TABLET ORAL AT BEDTIME
COMMUNITY

## 2024-12-31 RX ORDER — ACETAMINOPHEN 650 MG/1
650 SUPPOSITORY RECTAL EVERY 6 HOURS PRN
COMMUNITY

## 2025-01-01 ENCOUNTER — ANESTHESIA EVENT (OUTPATIENT)
Dept: SURGERY | Facility: CLINIC | Age: OVER 89
End: 2025-01-01
Payer: COMMERCIAL

## 2025-01-01 NOTE — OR NURSING
Spoke w daughter, Jacqueline.  She said Hospice nurse was supposed to see pt at 1400 today but RN had a death and would be delayed.  Ross Phillips we needed to discuss preop call information, including meds etc.  Jacqueline said pt had cellulitis in LLE and had finished a course of abx but Rn was going to assess.  Ross Phillips that we will be gone at 1630 today in phone room so she should call back by then.

## 2025-01-01 NOTE — PROVIDER NOTIFICATION
"I am evaluating this patient for upcoming Left Total Hip Arthroplasty with Dr. Parham at [facility] on 1/2/25:    - Notified Dr. Parham and his PA that patient was cleared for surgery by PCP at preop exam on 12/18/24 but per PCP,   \" patient at high risk for complications related to her obesity, prophy, and known cardiac disease.  She is being discharged from the hospital to undergo a left total arthroplasty.  Family and patient.  To be aware of risk and patient feels like her life is now hurts living if she has to endure this level of pain.  She opts not to have a cardiac workup with stress test prior to procedure. \"     In addition patient has anemia with most recent hemoglobin 8.8 on 12/23/24. She appears to have chronic anemia with hemoglobins typically in 8-10 range. Dr. Parham and his PA-C, were notified of this information and are OK proceeding.     -Update 1/1/25 at 11:17 am:  Preop RN spoke to patient's daughter yesterday afternoon/evening. Patient recently had cellulitis of LLE and completed course of antibiotics. Hospice RN was supposed to re-assess the LLE yesterday to make sure it looked good before hip surgery but was delayed. Appears yesterday evening the LLE was assessed and patient's daughter Jacqueline called our preop RN back to inform her that her mother was good to proceed with surgery on 1/2/25 as planned. Sent message updating Dr. Parham and his PA-C of this information.     - Update 1/1/25 at 1:00 pm: Dr. Parham is OK proceeding but will look at her LLE in preop tomorrow morning. He did move her surgery start time back to 9:00 am tomorrow just in case they need to cancel. I called and left messages for patient's daughter Jacqueline and on patient's cell phone notifying them of time change to 9 am surgery start with 7 am arrival time. I left message updating preop phone room nurses. Full Treatment Plan note to follow.       TAVO Mason, CNP   Advanced Practice Nurse Navigator- " Orthopedics  [facility]   Office Phone: [phone]  Direct Fax: [phone]

## 2025-01-02 ENCOUNTER — HOSPITAL ENCOUNTER (OUTPATIENT)
Facility: CLINIC | Age: OVER 89
DRG: 469 | End: 2025-01-02
Attending: ORTHOPAEDIC SURGERY | Admitting: ORTHOPAEDIC SURGERY
Payer: COMMERCIAL

## 2025-01-02 ENCOUNTER — APPOINTMENT (OUTPATIENT)
Dept: RADIOLOGY | Facility: CLINIC | Age: OVER 89
DRG: 469 | End: 2025-01-02
Attending: PHYSICIAN ASSISTANT
Payer: COMMERCIAL

## 2025-01-02 ENCOUNTER — ANESTHESIA (OUTPATIENT)
Dept: SURGERY | Facility: CLINIC | Age: OVER 89
End: 2025-01-02
Payer: COMMERCIAL

## 2025-01-02 ENCOUNTER — APPOINTMENT (OUTPATIENT)
Dept: ULTRASOUND IMAGING | Facility: CLINIC | Age: OVER 89
DRG: 469 | End: 2025-01-02
Attending: STUDENT IN AN ORGANIZED HEALTH CARE EDUCATION/TRAINING PROGRAM
Payer: COMMERCIAL

## 2025-01-02 VITALS
TEMPERATURE: 98 F | OXYGEN SATURATION: 96 % | RESPIRATION RATE: 18 BRPM | BODY MASS INDEX: 22.28 KG/M2 | HEART RATE: 72 BPM | WEIGHT: 118 LBS | HEIGHT: 61 IN | SYSTOLIC BLOOD PRESSURE: 110 MMHG | DIASTOLIC BLOOD PRESSURE: 54 MMHG

## 2025-01-02 DIAGNOSIS — M16.12 PRIMARY OSTEOARTHRITIS OF LEFT HIP: Primary | ICD-10-CM

## 2025-01-02 PROBLEM — M16.10 PRIMARY OSTEOARTHRITIS OF HIP: Status: ACTIVE | Noted: 2025-01-02

## 2025-01-02 LAB
ABO + RH BLD: NORMAL
BLD GP AB SCN SERPL QL: NEGATIVE
SPECIMEN EXP DATE BLD: NORMAL

## 2025-01-02 PROCEDURE — 99417 PROLNG OP E/M EACH 15 MIN: CPT | Performed by: NURSE PRACTITIONER

## 2025-01-02 PROCEDURE — 258N000003 HC RX IP 258 OP 636: Performed by: ORTHOPAEDIC SURGERY

## 2025-01-02 PROCEDURE — 360N000077 HC SURGERY LEVEL 4, PER MIN: Performed by: ORTHOPAEDIC SURGERY

## 2025-01-02 PROCEDURE — 93971 EXTREMITY STUDY: CPT | Mod: LT

## 2025-01-02 PROCEDURE — 258N000003 HC RX IP 258 OP 636: Performed by: NURSE ANESTHETIST, CERTIFIED REGISTERED

## 2025-01-02 PROCEDURE — 86900 BLOOD TYPING SEROLOGIC ABO: CPT | Performed by: NURSE PRACTITIONER

## 2025-01-02 PROCEDURE — 250N000009 HC RX 250: Performed by: NURSE ANESTHETIST, CERTIFIED REGISTERED

## 2025-01-02 PROCEDURE — P9045 ALBUMIN (HUMAN), 5%, 250 ML: HCPCS | Performed by: NURSE ANESTHETIST, CERTIFIED REGISTERED

## 2025-01-02 PROCEDURE — 272N000001 HC OR GENERAL SUPPLY STERILE: Performed by: ORTHOPAEDIC SURGERY

## 2025-01-02 PROCEDURE — 250N000013 HC RX MED GY IP 250 OP 250 PS 637: Performed by: ORTHOPAEDIC SURGERY

## 2025-01-02 PROCEDURE — 250N000011 HC RX IP 250 OP 636: Performed by: STUDENT IN AN ORGANIZED HEALTH CARE EDUCATION/TRAINING PROGRAM

## 2025-01-02 PROCEDURE — 36415 COLL VENOUS BLD VENIPUNCTURE: CPT | Performed by: NURSE PRACTITIONER

## 2025-01-02 PROCEDURE — 250N000009 HC RX 250: Performed by: ORTHOPAEDIC SURGERY

## 2025-01-02 PROCEDURE — 250N000011 HC RX IP 250 OP 636: Performed by: PHYSICIAN ASSISTANT

## 2025-01-02 PROCEDURE — 99222 1ST HOSP IP/OBS MODERATE 55: CPT | Performed by: STUDENT IN AN ORGANIZED HEALTH CARE EDUCATION/TRAINING PROGRAM

## 2025-01-02 PROCEDURE — 86901 BLOOD TYPING SEROLOGIC RH(D): CPT | Performed by: NURSE PRACTITIONER

## 2025-01-02 PROCEDURE — 258N000003 HC RX IP 258 OP 636: Performed by: PHYSICIAN ASSISTANT

## 2025-01-02 PROCEDURE — 99215 OFFICE O/P EST HI 40 MIN: CPT | Performed by: NURSE PRACTITIONER

## 2025-01-02 PROCEDURE — 250N000009 HC RX 250: Performed by: NURSE PRACTITIONER

## 2025-01-02 PROCEDURE — 999N000065 XR PELVIS AND HIP PORTABLE LEFT 1 VIEW

## 2025-01-02 PROCEDURE — 250N000011 HC RX IP 250 OP 636: Performed by: NURSE PRACTITIONER

## 2025-01-02 PROCEDURE — 0SRB049 REPLACEMENT OF LEFT HIP JOINT WITH CERAMIC ON POLYETHYLENE SYNTHETIC SUBSTITUTE, CEMENTED, OPEN APPROACH: ICD-10-PCS | Performed by: ORTHOPAEDIC SURGERY

## 2025-01-02 PROCEDURE — C1713 ANCHOR/SCREW BN/BN,TIS/BN: HCPCS | Performed by: ORTHOPAEDIC SURGERY

## 2025-01-02 PROCEDURE — 250N000013 HC RX MED GY IP 250 OP 250 PS 637: Performed by: PHYSICIAN ASSISTANT

## 2025-01-02 PROCEDURE — P9045 ALBUMIN (HUMAN), 5%, 250 ML: HCPCS | Performed by: STUDENT IN AN ORGANIZED HEALTH CARE EDUCATION/TRAINING PROGRAM

## 2025-01-02 PROCEDURE — 250N000013 HC RX MED GY IP 250 OP 250 PS 637: Performed by: STUDENT IN AN ORGANIZED HEALTH CARE EDUCATION/TRAINING PROGRAM

## 2025-01-02 PROCEDURE — 250N000013 HC RX MED GY IP 250 OP 250 PS 637: Performed by: NURSE PRACTITIONER

## 2025-01-02 PROCEDURE — C1776 JOINT DEVICE (IMPLANTABLE): HCPCS | Performed by: ORTHOPAEDIC SURGERY

## 2025-01-02 PROCEDURE — 710N000011 HC RECOVERY PHASE 1, LEVEL 3, PER MIN: Performed by: ORTHOPAEDIC SURGERY

## 2025-01-02 PROCEDURE — 258N000003 HC RX IP 258 OP 636: Performed by: ANESTHESIOLOGY

## 2025-01-02 PROCEDURE — 250N000011 HC RX IP 250 OP 636: Performed by: NURSE ANESTHETIST, CERTIFIED REGISTERED

## 2025-01-02 PROCEDURE — 370N000017 HC ANESTHESIA TECHNICAL FEE, PER MIN: Performed by: ORTHOPAEDIC SURGERY

## 2025-01-02 PROCEDURE — 999N000141 HC STATISTIC PRE-PROCEDURE NURSING ASSESSMENT: Performed by: ORTHOPAEDIC SURGERY

## 2025-01-02 DEVICE — CEMENTRALIZER STEM CENTRALIZER 8.5MM CEMENTED
Type: IMPLANTABLE DEVICE | Site: HIP | Status: FUNCTIONAL
Brand: CEMENTRALIZER

## 2025-01-02 DEVICE — SUMMIT FEMORAL STEM 12/14 TAPER CEMENTED SIZE 3 HI 108MM
Type: IMPLANTABLE DEVICE | Site: HIP | Status: FUNCTIONAL
Brand: SUMMIT

## 2025-01-02 DEVICE — IMPLANTABLE DEVICE: Type: IMPLANTABLE DEVICE | Site: HIP | Status: FUNCTIONAL

## 2025-01-02 DEVICE — BIOLOX DELTA CERAMIC FEMORAL HEAD +5.0 36MM DIA 12/14 TAPER
Type: IMPLANTABLE DEVICE | Site: HIP | Status: FUNCTIONAL
Brand: BIOLOX DELTA

## 2025-01-02 DEVICE — TOBRA FULL DOSE ANTIBIOTIC BONE CEMENT, 10 PACK CATALOG NUMBER IS 6197-9-010
Type: IMPLANTABLE DEVICE | Site: HIP | Status: FUNCTIONAL
Brand: SIMPLEX

## 2025-01-02 DEVICE — 18.5MM BUCK CEMENT PLUG WITH                                    DISPOSABLE INSERTER
Type: IMPLANTABLE DEVICE | Site: HIP | Status: FUNCTIONAL
Brand: BUCK

## 2025-01-02 RX ORDER — ROPINIROLE 1 MG/1
4 TABLET, FILM COATED ORAL AT BEDTIME
Status: DISCONTINUED | OUTPATIENT
Start: 2025-01-02 | End: 2025-01-06 | Stop reason: HOSPADM

## 2025-01-02 RX ORDER — FENTANYL CITRATE 50 UG/ML
50 INJECTION, SOLUTION INTRAMUSCULAR; INTRAVENOUS EVERY 5 MIN PRN
Status: DISCONTINUED | OUTPATIENT
Start: 2025-01-02 | End: 2025-01-02 | Stop reason: HOSPADM

## 2025-01-02 RX ORDER — ONDANSETRON 4 MG/1
4 TABLET, ORALLY DISINTEGRATING ORAL EVERY 6 HOURS PRN
Status: DISCONTINUED | OUTPATIENT
Start: 2025-01-02 | End: 2025-01-06 | Stop reason: HOSPADM

## 2025-01-02 RX ORDER — CEFADROXIL 500 MG/1
500 CAPSULE ORAL 2 TIMES DAILY
Qty: 14 CAPSULE | Refills: 0 | Status: SHIPPED | OUTPATIENT
Start: 2025-01-02 | End: 2025-01-06

## 2025-01-02 RX ORDER — ONDANSETRON 2 MG/ML
4 INJECTION INTRAMUSCULAR; INTRAVENOUS EVERY 30 MIN PRN
Status: DISCONTINUED | OUTPATIENT
Start: 2025-01-02 | End: 2025-01-02

## 2025-01-02 RX ORDER — FENTANYL CITRATE 50 UG/ML
25 INJECTION, SOLUTION INTRAMUSCULAR; INTRAVENOUS
Status: DISCONTINUED | OUTPATIENT
Start: 2025-01-02 | End: 2025-01-02

## 2025-01-02 RX ORDER — ASPIRIN 81 MG/1
81 TABLET ORAL 2 TIMES DAILY
Status: DISCONTINUED | OUTPATIENT
Start: 2025-01-02 | End: 2025-01-06 | Stop reason: HOSPADM

## 2025-01-02 RX ORDER — TRANEXAMIC ACID 650 MG/1
1950 TABLET ORAL ONCE
Status: COMPLETED | OUTPATIENT
Start: 2025-01-02 | End: 2025-01-02

## 2025-01-02 RX ORDER — ONDANSETRON 2 MG/ML
4 INJECTION INTRAMUSCULAR; INTRAVENOUS EVERY 6 HOURS PRN
Status: DISCONTINUED | OUTPATIENT
Start: 2025-01-02 | End: 2025-01-06 | Stop reason: HOSPADM

## 2025-01-02 RX ORDER — LIDOCAINE 50 MG/G
OINTMENT TOPICAL 4 TIMES DAILY
Status: DISCONTINUED | OUTPATIENT
Start: 2025-01-02 | End: 2025-01-06 | Stop reason: HOSPADM

## 2025-01-02 RX ORDER — LIDOCAINE HYDROCHLORIDE 10 MG/ML
INJECTION, SOLUTION INFILTRATION; PERINEURAL PRN
Status: DISCONTINUED | OUTPATIENT
Start: 2025-01-02 | End: 2025-01-02

## 2025-01-02 RX ORDER — SODIUM CHLORIDE, SODIUM LACTATE, POTASSIUM CHLORIDE, CALCIUM CHLORIDE 600; 310; 30; 20 MG/100ML; MG/100ML; MG/100ML; MG/100ML
INJECTION, SOLUTION INTRAVENOUS CONTINUOUS
Status: DISCONTINUED | OUTPATIENT
Start: 2025-01-02 | End: 2025-01-02 | Stop reason: HOSPADM

## 2025-01-02 RX ORDER — LORAZEPAM 2 MG/ML
.5-1 INJECTION INTRAMUSCULAR
Status: DISCONTINUED | OUTPATIENT
Start: 2025-01-02 | End: 2025-01-02 | Stop reason: HOSPADM

## 2025-01-02 RX ORDER — SODIUM CHLORIDE, SODIUM LACTATE, POTASSIUM CHLORIDE, CALCIUM CHLORIDE 600; 310; 30; 20 MG/100ML; MG/100ML; MG/100ML; MG/100ML
INJECTION, SOLUTION INTRAVENOUS CONTINUOUS
Status: DISCONTINUED | OUTPATIENT
Start: 2025-01-02 | End: 2025-01-02

## 2025-01-02 RX ORDER — CEPHALEXIN 500 MG/1
500 CAPSULE ORAL EVERY 6 HOURS SCHEDULED
Status: DISCONTINUED | OUTPATIENT
Start: 2025-01-03 | End: 2025-01-06 | Stop reason: HOSPADM

## 2025-01-02 RX ORDER — PROCHLORPERAZINE MALEATE 5 MG/1
5 TABLET ORAL EVERY 6 HOURS PRN
Status: DISCONTINUED | OUTPATIENT
Start: 2025-01-02 | End: 2025-01-06 | Stop reason: HOSPADM

## 2025-01-02 RX ORDER — NALOXONE HYDROCHLORIDE 0.4 MG/ML
0.4 INJECTION, SOLUTION INTRAMUSCULAR; INTRAVENOUS; SUBCUTANEOUS
Status: DISCONTINUED | OUTPATIENT
Start: 2025-01-02 | End: 2025-01-06 | Stop reason: HOSPADM

## 2025-01-02 RX ORDER — ESCITALOPRAM OXALATE 5 MG/1
5 TABLET ORAL DAILY
Status: DISCONTINUED | OUTPATIENT
Start: 2025-01-03 | End: 2025-01-06 | Stop reason: HOSPADM

## 2025-01-02 RX ORDER — MAGNESIUM HYDROXIDE 1200 MG/15ML
LIQUID ORAL PRN
Status: DISCONTINUED | OUTPATIENT
Start: 2025-01-02 | End: 2025-01-02 | Stop reason: HOSPADM

## 2025-01-02 RX ORDER — NALOXONE HYDROCHLORIDE 0.4 MG/ML
0.1 INJECTION, SOLUTION INTRAMUSCULAR; INTRAVENOUS; SUBCUTANEOUS
Status: DISCONTINUED | OUTPATIENT
Start: 2025-01-02 | End: 2025-01-02

## 2025-01-02 RX ORDER — METHOCARBAMOL 500 MG/1
500 TABLET, FILM COATED ORAL 4 TIMES DAILY
Status: DISCONTINUED | OUTPATIENT
Start: 2025-01-02 | End: 2025-01-06 | Stop reason: HOSPADM

## 2025-01-02 RX ORDER — PROPOFOL 10 MG/ML
INJECTION, EMULSION INTRAVENOUS PRN
Status: DISCONTINUED | OUTPATIENT
Start: 2025-01-02 | End: 2025-01-02

## 2025-01-02 RX ORDER — POLYETHYLENE GLYCOL 3350 17 G/17G
17 POWDER, FOR SOLUTION ORAL DAILY
Status: DISCONTINUED | OUTPATIENT
Start: 2025-01-03 | End: 2025-01-06 | Stop reason: HOSPADM

## 2025-01-02 RX ORDER — HYDROMORPHONE HCL IN WATER/PF 6 MG/30 ML
0.2 PATIENT CONTROLLED ANALGESIA SYRINGE INTRAVENOUS EVERY 5 MIN PRN
Status: DISCONTINUED | OUTPATIENT
Start: 2025-01-02 | End: 2025-01-02 | Stop reason: HOSPADM

## 2025-01-02 RX ORDER — HYDROMORPHONE HYDROCHLORIDE 1 MG/ML
0.5 INJECTION, SOLUTION INTRAMUSCULAR; INTRAVENOUS; SUBCUTANEOUS
Status: DISCONTINUED | OUTPATIENT
Start: 2025-01-02 | End: 2025-01-02

## 2025-01-02 RX ORDER — HYDROMORPHONE HCL IN WATER/PF 6 MG/30 ML
0.4 PATIENT CONTROLLED ANALGESIA SYRINGE INTRAVENOUS EVERY 5 MIN PRN
Status: DISCONTINUED | OUTPATIENT
Start: 2025-01-02 | End: 2025-01-02 | Stop reason: HOSPADM

## 2025-01-02 RX ORDER — NALOXONE HYDROCHLORIDE 0.4 MG/ML
0.1 INJECTION, SOLUTION INTRAMUSCULAR; INTRAVENOUS; SUBCUTANEOUS
Status: DISCONTINUED | OUTPATIENT
Start: 2025-01-02 | End: 2025-01-02 | Stop reason: HOSPADM

## 2025-01-02 RX ORDER — ONDANSETRON 4 MG/1
4 TABLET, ORALLY DISINTEGRATING ORAL EVERY 30 MIN PRN
Status: DISCONTINUED | OUTPATIENT
Start: 2025-01-02 | End: 2025-01-02

## 2025-01-02 RX ORDER — SODIUM CHLORIDE, SODIUM LACTATE, POTASSIUM CHLORIDE, AND CALCIUM CHLORIDE .6; .31; .03; .02 G/100ML; G/100ML; G/100ML; G/100ML
IRRIGANT IRRIGATION PRN
Status: DISCONTINUED | OUTPATIENT
Start: 2025-01-02 | End: 2025-01-02 | Stop reason: HOSPADM

## 2025-01-02 RX ORDER — OXYCODONE HYDROCHLORIDE 5 MG/1
10 TABLET ORAL EVERY 4 HOURS PRN
Status: DISCONTINUED | OUTPATIENT
Start: 2025-01-02 | End: 2025-01-02

## 2025-01-02 RX ORDER — DEXAMETHASONE SODIUM PHOSPHATE 4 MG/ML
4 INJECTION, SOLUTION INTRA-ARTICULAR; INTRALESIONAL; INTRAMUSCULAR; INTRAVENOUS; SOFT TISSUE
Status: DISCONTINUED | OUTPATIENT
Start: 2025-01-02 | End: 2025-01-02 | Stop reason: HOSPADM

## 2025-01-02 RX ORDER — AMLODIPINE BESYLATE 5 MG/1
5 TABLET ORAL DAILY
COMMUNITY

## 2025-01-02 RX ORDER — HYDROMORPHONE HCL IN WATER/PF 6 MG/30 ML
0.2 PATIENT CONTROLLED ANALGESIA SYRINGE INTRAVENOUS
Status: DISCONTINUED | OUTPATIENT
Start: 2025-01-02 | End: 2025-01-02

## 2025-01-02 RX ORDER — OXYCODONE HYDROCHLORIDE 5 MG/1
5 TABLET ORAL 3 TIMES DAILY
Status: ON HOLD | COMMUNITY
End: 2025-01-03

## 2025-01-02 RX ORDER — CEFAZOLIN SODIUM/WATER 2 G/20 ML
2 SYRINGE (ML) INTRAVENOUS
Status: COMPLETED | OUTPATIENT
Start: 2025-01-02 | End: 2025-01-02

## 2025-01-02 RX ORDER — HYDROMORPHONE HCL IN WATER/PF 6 MG/30 ML
0.2 PATIENT CONTROLLED ANALGESIA SYRINGE INTRAVENOUS EVERY 4 HOURS PRN
Status: DISCONTINUED | OUTPATIENT
Start: 2025-01-02 | End: 2025-01-06 | Stop reason: HOSPADM

## 2025-01-02 RX ORDER — ASPIRIN 81 MG/1
81 TABLET ORAL 2 TIMES DAILY
Qty: 60 TABLET | Refills: 0 | Status: SHIPPED | OUTPATIENT
Start: 2025-01-02 | End: 2025-01-06

## 2025-01-02 RX ORDER — CEFAZOLIN SODIUM 1 G/3ML
1 INJECTION, POWDER, FOR SOLUTION INTRAMUSCULAR; INTRAVENOUS EVERY 8 HOURS
Status: COMPLETED | OUTPATIENT
Start: 2025-01-02 | End: 2025-01-03

## 2025-01-02 RX ORDER — METHOCARBAMOL 500 MG/1
500 TABLET, FILM COATED ORAL 4 TIMES DAILY
Status: DISCONTINUED | OUTPATIENT
Start: 2025-01-02 | End: 2025-01-02

## 2025-01-02 RX ORDER — NALOXONE HYDROCHLORIDE 0.4 MG/ML
0.2 INJECTION, SOLUTION INTRAMUSCULAR; INTRAVENOUS; SUBCUTANEOUS
Status: DISCONTINUED | OUTPATIENT
Start: 2025-01-02 | End: 2025-01-06 | Stop reason: HOSPADM

## 2025-01-02 RX ORDER — BISACODYL 10 MG
10 SUPPOSITORY, RECTAL RECTAL DAILY PRN
Status: DISCONTINUED | OUTPATIENT
Start: 2025-01-02 | End: 2025-01-06 | Stop reason: HOSPADM

## 2025-01-02 RX ORDER — ONDANSETRON 2 MG/ML
4 INJECTION INTRAMUSCULAR; INTRAVENOUS EVERY 30 MIN PRN
Status: DISCONTINUED | OUTPATIENT
Start: 2025-01-02 | End: 2025-01-02 | Stop reason: HOSPADM

## 2025-01-02 RX ORDER — PANTOPRAZOLE SODIUM 40 MG/1
40 TABLET, DELAYED RELEASE ORAL 2 TIMES DAILY
Status: DISCONTINUED | OUTPATIENT
Start: 2025-01-02 | End: 2025-01-06 | Stop reason: HOSPADM

## 2025-01-02 RX ORDER — DEXAMETHASONE SODIUM PHOSPHATE 10 MG/ML
INJECTION, SOLUTION INTRAMUSCULAR; INTRAVENOUS PRN
Status: DISCONTINUED | OUTPATIENT
Start: 2025-01-02 | End: 2025-01-02

## 2025-01-02 RX ORDER — OXYCODONE HYDROCHLORIDE 5 MG/1
5 TABLET ORAL
Status: DISCONTINUED | OUTPATIENT
Start: 2025-01-02 | End: 2025-01-06 | Stop reason: HOSPADM

## 2025-01-02 RX ORDER — OXYCODONE HYDROCHLORIDE 5 MG/1
5 TABLET ORAL EVERY 4 HOURS PRN
Status: DISCONTINUED | OUTPATIENT
Start: 2025-01-02 | End: 2025-01-02

## 2025-01-02 RX ORDER — AMLODIPINE BESYLATE 5 MG/1
5 TABLET ORAL DAILY
Status: DISCONTINUED | OUTPATIENT
Start: 2025-01-03 | End: 2025-01-06 | Stop reason: HOSPADM

## 2025-01-02 RX ORDER — SENNOSIDES 8.6 MG
1 TABLET ORAL DAILY PRN
COMMUNITY

## 2025-01-02 RX ORDER — AMOXICILLIN 250 MG
1 CAPSULE ORAL 2 TIMES DAILY
Status: DISCONTINUED | OUTPATIENT
Start: 2025-01-02 | End: 2025-01-06 | Stop reason: HOSPADM

## 2025-01-02 RX ORDER — LIDOCAINE 40 MG/G
CREAM TOPICAL
Status: DISCONTINUED | OUTPATIENT
Start: 2025-01-02 | End: 2025-01-02

## 2025-01-02 RX ORDER — BUPRENORPHINE 10 UG/H
1 PATCH TRANSDERMAL WEEKLY
Status: DISCONTINUED | OUTPATIENT
Start: 2025-01-02 | End: 2025-01-06 | Stop reason: HOSPADM

## 2025-01-02 RX ORDER — ONDANSETRON 2 MG/ML
INJECTION INTRAMUSCULAR; INTRAVENOUS PRN
Status: DISCONTINUED | OUTPATIENT
Start: 2025-01-02 | End: 2025-01-02

## 2025-01-02 RX ORDER — PROPOFOL 10 MG/ML
INJECTION, EMULSION INTRAVENOUS CONTINUOUS PRN
Status: DISCONTINUED | OUTPATIENT
Start: 2025-01-02 | End: 2025-01-02

## 2025-01-02 RX ORDER — FENTANYL CITRATE 50 UG/ML
25 INJECTION, SOLUTION INTRAMUSCULAR; INTRAVENOUS EVERY 5 MIN PRN
Status: DISCONTINUED | OUTPATIENT
Start: 2025-01-02 | End: 2025-01-02 | Stop reason: HOSPADM

## 2025-01-02 RX ORDER — ACETAMINOPHEN 325 MG/1
975 TABLET ORAL EVERY 8 HOURS
Status: DISCONTINUED | OUTPATIENT
Start: 2025-01-02 | End: 2025-01-02

## 2025-01-02 RX ORDER — METOPROLOL SUCCINATE 25 MG/1
25 TABLET, EXTENDED RELEASE ORAL EVERY EVENING
Status: DISCONTINUED | OUTPATIENT
Start: 2025-01-02 | End: 2025-01-06 | Stop reason: HOSPADM

## 2025-01-02 RX ORDER — LIDOCAINE 40 MG/G
CREAM TOPICAL
Status: DISCONTINUED | OUTPATIENT
Start: 2025-01-02 | End: 2025-01-02 | Stop reason: HOSPADM

## 2025-01-02 RX ORDER — NITROGLYCERIN 0.4 MG/1
0.4 TABLET SUBLINGUAL EVERY 5 MIN PRN
Status: DISCONTINUED | OUTPATIENT
Start: 2025-01-02 | End: 2025-01-06 | Stop reason: HOSPADM

## 2025-01-02 RX ORDER — SENNOSIDES 8.6 MG
1 TABLET ORAL AT BEDTIME
COMMUNITY

## 2025-01-02 RX ORDER — CEFAZOLIN SODIUM/WATER 2 G/20 ML
2 SYRINGE (ML) INTRAVENOUS SEE ADMIN INSTRUCTIONS
Status: DISCONTINUED | OUTPATIENT
Start: 2025-01-02 | End: 2025-01-02 | Stop reason: HOSPADM

## 2025-01-02 RX ORDER — DEXAMETHASONE SODIUM PHOSPHATE 4 MG/ML
4 INJECTION, SOLUTION INTRA-ARTICULAR; INTRALESIONAL; INTRAMUSCULAR; INTRAVENOUS; SOFT TISSUE
Status: DISCONTINUED | OUTPATIENT
Start: 2025-01-02 | End: 2025-01-02

## 2025-01-02 RX ORDER — ONDANSETRON 4 MG/1
4 TABLET, ORALLY DISINTEGRATING ORAL EVERY 30 MIN PRN
Status: DISCONTINUED | OUTPATIENT
Start: 2025-01-02 | End: 2025-01-02 | Stop reason: HOSPADM

## 2025-01-02 RX ORDER — ACETAMINOPHEN 325 MG/1
975 TABLET ORAL EVERY 8 HOURS
Status: DISCONTINUED | OUTPATIENT
Start: 2025-01-02 | End: 2025-01-06 | Stop reason: HOSPADM

## 2025-01-02 RX ADMIN — DEXMEDETOMIDINE HYDROCHLORIDE 8 MCG: 100 INJECTION, SOLUTION INTRAVENOUS at 11:23

## 2025-01-02 RX ADMIN — METHOCARBAMOL 500 MG: 500 TABLET ORAL at 20:55

## 2025-01-02 RX ADMIN — METHOCARBAMOL 500 MG: 500 TABLET ORAL at 16:24

## 2025-01-02 RX ADMIN — LIDOCAINE HYDROCHLORIDE 30 MG: 10 INJECTION, SOLUTION INFILTRATION; PERINEURAL at 09:12

## 2025-01-02 RX ADMIN — FENTANYL CITRATE 50 MCG: 50 INJECTION INTRAMUSCULAR; INTRAVENOUS at 11:53

## 2025-01-02 RX ADMIN — ACETAMINOPHEN 975 MG: 325 TABLET, FILM COATED ORAL at 16:24

## 2025-01-02 RX ADMIN — ALBUMIN (HUMAN): 12.5 SOLUTION INTRAVENOUS at 10:34

## 2025-01-02 RX ADMIN — OXYCODONE 5 MG: 5 TABLET ORAL at 19:26

## 2025-01-02 RX ADMIN — ASPIRIN 81 MG: 81 TABLET, COATED ORAL at 20:55

## 2025-01-02 RX ADMIN — SODIUM CHLORIDE, POTASSIUM CHLORIDE, SODIUM LACTATE AND CALCIUM CHLORIDE: 600; 310; 30; 20 INJECTION, SOLUTION INTRAVENOUS at 07:54

## 2025-01-02 RX ADMIN — TRANEXAMIC ACID 1950 MG: 650 TABLET ORAL at 07:55

## 2025-01-02 RX ADMIN — MEPIVACAINE HYDROCHLORIDE 3 ML: 15 INJECTION, SOLUTION EPIDURAL; INFILTRATION at 09:15

## 2025-01-02 RX ADMIN — Medication 2 G: at 09:10

## 2025-01-02 RX ADMIN — DEXAMETHASONE SODIUM PHOSPHATE 4 MG: 10 INJECTION, SOLUTION INTRAMUSCULAR; INTRAVENOUS at 09:30

## 2025-01-02 RX ADMIN — DEXMEDETOMIDINE HYDROCHLORIDE 8 MCG: 100 INJECTION, SOLUTION INTRAVENOUS at 11:16

## 2025-01-02 RX ADMIN — PANTOPRAZOLE SODIUM 40 MG: 40 TABLET, DELAYED RELEASE ORAL at 20:55

## 2025-01-02 RX ADMIN — ONDANSETRON 4 MG: 2 INJECTION INTRAMUSCULAR; INTRAVENOUS at 09:30

## 2025-01-02 RX ADMIN — BUPRENORPHINE 1 PATCH: 10 PATCH, EXTENDED RELEASE TRANSDERMAL at 16:26

## 2025-01-02 RX ADMIN — SENNOSIDES AND DOCUSATE SODIUM 1 TABLET: 50; 8.6 TABLET ORAL at 20:56

## 2025-01-02 RX ADMIN — CEFAZOLIN 1 G: 1 INJECTION, POWDER, FOR SOLUTION INTRAMUSCULAR; INTRAVENOUS at 16:24

## 2025-01-02 RX ADMIN — ALBUMIN (HUMAN) 12.5 G: 12.5 SOLUTION INTRAVENOUS at 13:22

## 2025-01-02 RX ADMIN — HYDROMORPHONE HYDROCHLORIDE 0.2 MG: 0.2 INJECTION, SOLUTION INTRAMUSCULAR; INTRAVENOUS; SUBCUTANEOUS at 20:49

## 2025-01-02 RX ADMIN — DEXMEDETOMIDINE HYDROCHLORIDE 8 MCG: 100 INJECTION, SOLUTION INTRAVENOUS at 11:05

## 2025-01-02 RX ADMIN — LIDOCAINE: 50 OINTMENT TOPICAL at 19:26

## 2025-01-02 RX ADMIN — PROPOFOL 20 MG: 10 INJECTION, EMULSION INTRAVENOUS at 09:15

## 2025-01-02 RX ADMIN — DICLOFENAC 2 G: 10 GEL TOPICAL at 16:27

## 2025-01-02 RX ADMIN — OXYCODONE 5 MG: 5 TABLET ORAL at 22:51

## 2025-01-02 RX ADMIN — ROPINIROLE HYDROCHLORIDE 4 MG: 1 TABLET, FILM COATED ORAL at 21:00

## 2025-01-02 RX ADMIN — PROPOFOL 100 MCG/KG/MIN: 10 INJECTION, EMULSION INTRAVENOUS at 09:15

## 2025-01-02 RX ADMIN — PHENYLEPHRINE HYDROCHLORIDE 0.3 MCG/KG/MIN: 10 INJECTION INTRAVENOUS at 09:22

## 2025-01-02 ASSESSMENT — ACTIVITIES OF DAILY LIVING (ADL)
ADLS_ACUITY_SCORE: 68
ADLS_ACUITY_SCORE: 58
ADLS_ACUITY_SCORE: 65
ADLS_ACUITY_SCORE: 68
ADLS_ACUITY_SCORE: 65
ADLS_ACUITY_SCORE: 68
ADLS_ACUITY_SCORE: 61
ADLS_ACUITY_SCORE: 65
ADLS_ACUITY_SCORE: 65
ADLS_ACUITY_SCORE: 68

## 2025-01-02 ASSESSMENT — LIFESTYLE VARIABLES: TOBACCO_USE: 1

## 2025-01-02 NOTE — TREATMENT PLAN
Orthopedic Surgery Pre-Op Plan: Sherice Alvarez  pre-op review. This is NOT an H&P   Surgeon: Dr. Parham    [facility]: [facility]  Name of Surgery: Left Total Hip Arthroplasty  Date of Surgery: 1/2/25  H&P: Completed on 12/18/24 by Dr. Tracy Wadsworth, [facility] at [facility].   History of ASA, NSAIDS, vitamin and/or herbal supplements, GLP-1 Agonist or SGLT Inhibitor medication taken within 10 days?: Yes: On ASA 81 mg daily, multivitamins: Patient was instructed to hold these for 7 days before surgery.   History of blood thinners?: No    Plan:   1) Discharge Plan: Return to Assisted Living with Nursing Assistance and help from daughter. Please see Discharge Planning section near bottom of this note for further details.     2) Recent Cellulitis LLE: Reportedly resolved following completion of antibiotic course.  Preop RN spoke to patient's daughter yesterday afternoon/evening. Patient recently had cellulitis of LLE and completed course of antibiotics. Hospice RN was supposed to re-assess the LLE yesterday to make sure it looked good before hip surgery but was delayed. Appears yesterday evening the LLE was assessed and patient's daughter Jacqueline called our preop RN back to inform her that her mother was good to proceed with surgery on 1/2/25 as planned. Dr. Parham and his PA-C notified and are OK proceeding but will look at patient's LLE in preop tomorrow.     3) Anemia: Hemoglobin 8.8 on 12/23/24. Appears to have chronic anemia with hemoglobins typically in 8-10 range. Dr. Parham and his PA-C notified of this information and are OK proceeding. They would like blood type & screen checked in preop. I have ordered this.     4) PONV: Reports severe nausea with past knee surgery.  Recommend patient discusses this with preop nursing and anesthesia in preop on day of surgery.  Would likely benefit from antiemetics and other measures to prevent or minimize nausea/vomiting.    5) Coronary  "Artery Disease- History of NSTEMI and S/P Multiple Stents: Follows with Dr. Feldman at [facility]. Per last Cardiology visit notes with Dr. Feldman from 1/17/24: \" Angiography April 2020 showed normal left main, proximal LAD 20% stenosis with patent stent, mid sequential 30% lesion with a distal 70% lesion, circumflex with a patent proximal stent with a third obtuse marginal artery 99% stenosis.  Right coronary artery patent stents with a mid 40% stenosis. Attempted intervention on distal LAD as well as obtuse marginal artery which were unsuccessful and underwent nuclear stress test May 2021 which was normal.  Presented again to the hospital and was admitted for chest pain with rule out and unremarkable nuclear stress test 2022.  Currently work on preventive therapy as she is not having any significant symptoms.\"     Continues medical management of CAD with ASA 81 mg daily (temporarily held for surgery) statin, and nitroglycerin as needed. Per Dr. Wadsworth at [facility], \" patient at high risk for complications related to her obesity, frailty, and known cardiac disease. She is being discharged from the hospice to undergo a left total arthroplasty. Family and patient appear to be aware of risk and patient feels like her life is not worth living if she has to endure this level of pain. She opts not to have a cardiac workup with stress test prior to procedure.\"     6) Congestive Heart Failure (HFpEF): PCP notes some increased lower extremity edema. Patient has not been using compression stockings recently.  I recommend close monitoring of perioperative fluid status.  If weight gain of >3 lbs in 24 hrs or any other signs/symptoms of worsening heart failure, nursing to please notify Hospitalist.     7) Severe Aortic Stenosis-S/P TAVR 6/2020: Stable.  Most recent echo below on 4/4/2024 showed high normal aortic valve prosthesis metrics with a mean systolic gradient of 18 mmHg and a peak " anterograde velocity of 2.8 m/sec.     Echocardiogram 4/4/24:  Interpretation Summary   1. Normal left ventricular size and systolic performance with a visually  estimated ejection fraction of 65-70%.  2. There is mild concentric increase in left ventricular wall thickness.  3. 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  Â  High normal aortic valve prosthesis metrics with a mean systolic gradient of  18 mmHg and a peak anterograde velocity of 2.8 m/sec.  Â  No aortic insufficiency is detected.  4. There is mild calcific mitral stenosis.  5. Normal right ventricular size and systolic performance.   When compared to the prior real-time echocardiogram dated 11 December 2023,  the findings are felt to be fairly similar on both examinations.    8) History of Subarachnoid Hemorrhage: 4/2024 after a fall.     9) History of Cerebrovascular Accident (CVA):  In 2011. On ASA 81 mg (currently on hold for surgery).     10) Hyperlipidemia: On atorvastatin.     11) Hypertension: Appears well controlled on metoprolol and amlodipine and furosemide.  Instructed to hold furosemide on the morning of surgery but to continue taking metoprolol and amlodipine.    12) Asthma: Stable without medications at this time.    13) Chronic Pain: history of severe, chronic hip pain with opioid dependence. On Butrans patch, morphine oxycodone, lidocaine patches and methocarbamol.  I will order Inpatient Pain Team Consult to assist with post-op pain management plan given this history. Their recommendations and assistance are greatly appreciated.       14) Crohn's Disease:     15) Chronic Kidney Disease-Stage 3a: Stable.  Creatinine 1.05, GFR 50 on 12/23/2024. I recommend avoiding or limiting use of nephrotoxins like NSAIDS, promoting good post-op hydration and monitoring kidney function closely.      16) Restless Leg Syndrome: On Requip.    Patient has complex medical history noted above and is at  increased risk for complications per assessment from PCP. However, patient has severe pain, patient and family reportedly have good understanding of increased risks and accept them and wish to proceed with hip surgery.  Appears medically optimized for upcoming surgery, assuming recent left lower extremity cellulitis has resolved. I would recommend Hospitalist Consult to assist with medical management.  I will also order Inpatient Pain Team Consult to assist with post-op pain management given chronic pain history on chronic opioids. Please call me below with any questions on this patient.     Review of Systems Notable for: Recent cellulitis LLE-reportedly resolved following antibiotic treatment, anemia-chronic, PONV, coronary artery disease-history of NSTEMI and s/p multiple stents, congestive heart failure, severe aortic stenosis-s/p TAVR in 2020, history of subarachnoid hemorrhage-4/2024 after a fall, history of CVA in 2011, hyperlipidemia, hypertension, asthma, chronic pain-on chronic opioids, Crohn's disease, chronic kidney disease-stage 3a, restless leg syndrome.    Past Medical History:   Past Medical History:   Diagnosis Date    Arthritis     Asthma     CAD (coronary artery disease)     Cancer (H)     basal cell    Chronic kidney disease     ckd 3    Chronic pain syndrome     Congestive heart failure (H)     Crohn's disease (H)     GERD (gastroesophageal reflux disease)     Hx of heart artery stent 11/01/2016    1 stent R Proximal CA and 1 Stent L Proximal circumflex  12/2016 Stent proximal LAD    Hyperlipidemia     Hypertension     NSTEMI (non-ST elevated myocardial infarction) (H) 11/01/2016    Other chronic pain     PONV (postoperative nausea and vomiting)     severe povn with last knee surgery    Restless legs syndrome     Stented coronary artery     Stroke (H) 01/01/2010    numbness rt jaw     Past Surgical History:   Procedure Laterality Date    APPENDECTOMY      CARDIAC CATHETERIZATION  11/04/2016     multiple vessel disease.  no intervention    CARDIAC CATHETERIZATION  2016    CARDIAC CATHETERIZATION N/A 2016    Procedure: Coronary Angiogram;  Surgeon: Sunil Moran MD;  Location: [facility];  Service:     CAROTID ENDARTERECTOMY      CAROTID ENDARTERECTOMY Right 2010    CERVICAL LAMINECTOMY  1994     SECTION      CV CORONARY ANGIOGRAM N/A 2020    Procedure: Coronary Angiogram;  Surgeon: Vinita Ramos MD;  Location: [facility];  Service: Cardiology    CV LEFT HEART CATHETERIZATION WITHOUT LEFT VENTRICULOGRAM Left 2020    Procedure: Left Heart Catheterization Without Left Ventriculogram;  Surgeon: Jerad Lerner MD;  Location: [facility];  Service: Cardiology    CV TRANSCATHETER AORTIC VALVE REPLACEMENT - OTHER APPROACH N/A 2020    Procedure: CV TRANSCATHETER AORTIC VALVE REPLACEMENT - RIGHT SUBCLAVIAN APPROACH;  Surgeon: John Caceres MD;  Location: [facility];  Service: Cardiology    HEART CATH, ANGIOPLASTY      HEMORRHOIDECTOMY EXTERNAL      IR LUMBAR EPIDURAL STEROID INJECTION  2014    IR LUMBAR NERVE ROOT INJECTION  10/01/2013    JOINT REPLACEMENT      OPEN REDUCTION INTERNAL FIXATION HIP NAILING Right 2/10/2024    Procedure: INTERNAL FIXATION, FRACTURE, TROCHANTERIC, HIP, USING PINS OR RODS RIGHT;  Surgeon: Gilberto Joy MD;  Location: [facility] [place] [place] OR    TN ESOPHAGOGASTRODUODENOSCOPY TRANSORAL DIAGNOSTIC N/A 07/10/2019    Procedure: ESOPHAGOGASTRODUODENOSCOPY (EGD) with gastric biopsy;  Surgeon: Sunil Stuart MD;  Location: [facility];  Service: Gastroenterology    TN REPLACE AORTIC VALVE OPEN AXILLRY ARTRY APPROACH N/A 2020    Procedure: OR TRANSCATHETER AORTIC VALVE REPLACEMENT, SUBCLAVIAN APPROACH;  Surgeon: Bhavin Cuadra MD;  Location: [facility];  Service: Cardiology    TONSILLECTOMY & ADENOIDECTOMY      TOTAL KNEE ARTHROPLASTY Right     TRANSCATHETER  AORTIC-VALVE REPLACEMENT      ZZC TOTAL KNEE ARTHROPLASTY Left 05/11/2021    Procedure: LEFT TOTAL KNEE ARTHROPLASTY;  Surgeon: Doug Rhodes MD;  Location: [facility] OR;  Service: Orthopedics       Current Medications:  Patient's Medications   New Prescriptions    No medications on file   Previous Medications    ACETAMINOPHEN (TYLENOL) 500 MG TABLET    Take 500 mg by mouth 3 times daily    ACETAMINOPHEN (TYLENOL) 650 MG SUPPOSITORY    Place 325 mg rectally every 4 hours as needed for fever.    AMLODIPINE (NORVASC) 5 MG TABLET    Take 1 tablet (5 mg) by mouth 2 times daily    ASPIRIN 81 MG EC TABLET    Take 81 mg by mouth daily.    ATORVASTATIN (LIPITOR) 40 MG TABLET    Take 1 tablet (40 mg) by mouth every evening    BISACODYL (DULCOLAX) 10 MG SUPPOSITORY    Place 10 mg rectally daily as needed for constipation.    BUPRENORPHINE (BUTRANS) 10 MCG/HR WK PATCH    Place 1 patch onto the skin once a week.    DICLOFENAC (VOLTAREN XR) 100 MG 24 HR TABLET    Take 100 mg by mouth daily.    DICLOFENAC (VOLTAREN) 1 % TOPICAL GEL    Apply topically 2 times daily.    ESCITALOPRAM (LEXAPRO) 5 MG TABLET    Take 5 mg by mouth daily.    FUROSEMIDE (LASIX) 20 MG TABLET    Take 40 mg by mouth daily.    LEVETIRACETAM (KEPPRA) 500 MG TABLET    Take 1 tablet (500 mg) by mouth 2 times daily for 7 days    LIDOCAINE (LIDOCARE) 4 % PATCH    Place 2 patches onto the skin every 24 hours To prevent lidocaine toxicity, patient should be patch free for 12 hrs daily.    LIDOCAINE (XYLOCAINE) 5 % EXTERNAL OINTMENT    Apply topically 2 times daily.    LORAZEPAM (ATIVAN) 0.5 MG TABLET    Take 0.5 mg by mouth every 2 hours as needed for anxiety.    MELATONIN 3 MG TABLET    Take 3 mg by mouth nightly as needed for sleep    METHOCARBAMOL (ROBAXIN) 500 MG TABLET    Take 500 mg by mouth 2 times daily.    METOPROLOL SUCCINATE ER (TOPROL XL) 25 MG 24 HR TABLET    Take 25 mg by mouth every evening    MORPHINE SULFATE (JUSTICE) 10 MG 24 HR CAPSULE     Take 10 mg by mouth daily.    MULTIPLE VITAMIN (THERA-TABS) TABS    Take 1 tablet by mouth daily as needed (when desired)    NITROGLYCERIN (NITROSTAT) 0.4 MG SUBLINGUAL TABLET    Place 1 tablet (0.4 mg) under the tongue every 5 minutes as needed    OMEPRAZOLE (PRILOSEC) 20 MG DR CAPSULE    Take 20 mg by mouth 2 times daily.    ONDANSETRON (ZOFRAN) 4 MG TABLET    Take 4 mg by mouth every 6 hours as needed for nausea    OXYCODONE (OXY-IR) 5 MG CAPSULE    Take 5 mg by mouth every 4 hours as needed for severe pain.    OXYCODONE (ROXICODONE) 5 MG TABLET    Take 1 tablet (5 mg) by mouth 2 times daily. May also take 1 tablet (5 mg) every 12 hours as needed for severe pain. not within 6 hours of previous dose.    PANTOPRAZOLE (PROTONIX) 40 MG EC TABLET    Take 40 mg by mouth daily.    POLYETHYLENE GLYCOL (MIRALAX) 17 GM/DOSE POWDER    Take 17 g by mouth daily    QUETIAPINE (SEROQUEL) 25 MG TABLET    Take 25 mg by mouth 2 times daily.    ROPINIROLE (REQUIP) 2 MG TABLET    Take 4 mg by mouth at bedtime.    SENNA-DOCUSATE (SENOKOT-S/PERICOLACE) 8.6-50 MG TABLET    Take 2 tablets by mouth 2 times daily as needed for constipation    SERTRALINE (ZOLOFT) 50 MG TABLET    Take 75 mg by mouth daily.   Modified Medications    No medications on file   Discontinued Medications    No medications on file       ALLERGIES:  Allergies   Allergen Reactions    Amitriptyline Hcl [Amitriptyline] Unknown    Erythromycin Diarrhea     Childhood reaction    Gabapentin Swelling     And jerking movements    Gramineae Pollens Unknown    Naproxen Unknown    Other Environmental Allergy Unknown     Molds and pet dander       Social History  Social History     Tobacco Use    Smoking status: Former     Current packs/day: 0.00     Types: Cigarettes     Quit date: 1980     Years since quittin.0    Smokeless tobacco: Never   Substance Use Topics    Alcohol use: No    Drug use: No       Any Abnormal Recent Diagnostics? Yes  Hemoglobin 8.8 on  12/23/2024: Dr. Parham notified and is OK proceeding. Blood type & screen ordered on day of surgery.   Creatinine 1.05, GFR 50 on 12/23/2024: Shows stable chronic kidney disease-stage 3a.     Discharge Planning:     Return to Assisted Living with Nursing Assistance and help from daughter. Patient lives in   [facility] in [place] and has twice daily RN/PCA checks.    12/04/24 0934   Discharge Planning   Patient/Family Anticipates Transition to assisted living   Living Arrangements   People in Home alone   Is your private residence a single family home or apartment? Apartment   Number of Stairs, Within Home, Primary none   Bathroom Shower/Tub Walk-in shower   Equipment Currently Used at Home walker, rolling   Support System   Support Systems Other (Comment);Children   Do you have someone available to stay with you one or two nights once you are home? Yes  (assisted living facility, daughter)       TAVO Masno, CNP   Advanced Practice Nurse Navigator- Orthopedics  [facility]   Phone: [phone]

## 2025-01-02 NOTE — ANESTHESIA PROCEDURE NOTES
"Intrathecal injection Procedure Note    Pre-Procedure   Staff -        Anesthesiologist:  Goldberg, Leslie, MD       Performed By: anesthesiologist       Location: OR       Procedure Start/Stop Times: 1/2/2025 9:12 AM and 1/2/2025 9:15 AM       Pre-Anesthestic Checklist: patient identified, IV checked, risks and benefits discussed, informed consent, monitors and equipment checked, pre-op evaluation, at physician/surgeon's request and post-op pain management  Timeout:       Correct Patient: Yes        Correct Procedure: Yes        Correct Site: Yes        Correct Position: Yes   Procedure Documentation  Procedure: intrathecal injection         Patient Position: sitting       Patient Prep/Sterile Barriers: sterile gloves, mask       Skin prep: Chloraprep       Insertion Site: L3-4. (midline approach).       Needle Gauge: 24.        Needle Length (Inches): 3.5        Spinal Needle Type: Pencan       Introducer used       Introducer: 20 G       # of attempts: 2 and  # of redirects:  1    Assessment/Narrative         Paresthesias: No.       CSF fluid: clear.       Opening pressure was cmH2O while  Sitting.      Medication(s) Administered   1.5% Mepivacaine PF (Intrathecal) - Intrathecal   3 mL - 1/2/2025 9:15:00 AM  Medication Administration Time: 1/2/2025 9:12 AM      FOR [facility] ([facility]) ONLY:   Pain Team Contact information: please page the Pain Team Via [employer]. Search \"Pain\". During daytime hours, please page the attending first. At night please page the resident first.      "

## 2025-01-02 NOTE — ANESTHESIA CARE TRANSFER NOTE
Patient: Sherice Alvarez    Procedure: Procedure(s):  LEFT TOTAL HIP ARTHROPLASTY       Diagnosis: Osteoarthritis of left hip [M16.12]  Diagnosis Additional Information: No value filed.    Anesthesia Type:   Spinal     Note:    Oropharynx: spontaneously breathing and oropharynx clear of all foreign objects  Level of Consciousness: awake  Oxygen Supplementation: face mask  Level of Supplemental Oxygen (L/min / FiO2): 6  Independent Airway: airway patency satisfactory and stable  Dentition: dentition unchanged  Vital Signs Stable: post-procedure vital signs reviewed and stable  Report to RN Given: handoff report given  Patient transferred to: PACU    Handoff Report: Identifed the Patient, Identified the Reponsible Provider, Reviewed the pertinent medical history, Discussed the surgical course, Reviewed Intra-OP anesthesia mangement and issues during anesthesia, Set expectations for post-procedure period and Allowed opportunity for questions and acknowledgement of understanding      Vitals:  Vitals Value Taken Time   /53 01/02/25 1127   Temp 35.3  C (95.54  F) 01/02/25 1128   Pulse 72 01/02/25 1128   Resp 22 01/02/25 1128   SpO2 100 % 01/02/25 1128   Vitals shown include unfiled device data.    Electronically Signed By: TAVO Evans CRNA  January 2, 2025  11:29 AM

## 2025-01-02 NOTE — ANESTHESIA POSTPROCEDURE EVALUATION
Patient: Sherice Alvarez    Procedure: Procedure(s):  LEFT TOTAL HIP ARTHROPLASTY       Anesthesia Type:  Spinal    Note:     Postop Pain Control: Uneventful            Sign Out: Well controlled pain   PONV: No   Neuro/Psych: Uneventful            Sign Out: Acceptable/Baseline neuro status   Airway/Respiratory: Uneventful            Sign Out: Acceptable/Baseline resp. status   CV/Hemodynamics: Uneventful            Sign Out: Acceptable CV status; No obvious hypovolemia; No obvious fluid overload   Other NRE: NONE   DID A NON-ROUTINE EVENT OCCUR? No       Last vitals:  Vitals Value Taken Time   /55 01/02/25 1340   Temp 24.8  C (76.64  F) 01/02/25 1345   Pulse 60 01/02/25 1345   Resp 17 01/02/25 1345   SpO2 96 % 01/02/25 1345   Vitals shown include unfiled device data.    Electronically Signed By: Leslie Goldberg, MD  January 2, 2025  1:46 PM

## 2025-01-02 NOTE — CONSULTS
[facility] ACUTE PAIN SERVICE CONSULTATION   [facility], [facility], [facility], [facility], [place]     Date of Admission:  1/2/2025  Date of Consult (When I saw the patient): 01/02/25  Physician requesting consult: German Gilmore PA-C        Assessment/Plan:     Sherice Alvarez is a 89 year old female who was admitted on 1/2/2025.  Pain team was asked to see the patient for s/p L-JEAN CLAUDE post-op pain management with chronic pain w/opioid dependence. Admitted for elective L-JEAN CLAUDE. History of anemia, CAD, NSTEMI w/multiple stents, HFpEF, CVA, HLD, HTN, asthma, CKD, RLS, and severe chronic hip pain with opioid dependence; On Butrans patch, oxycodone, lidocaine patches and methocarbamol.  Follows with [facility].  Describes pain as 5-6/10 and aching in the left hip and chronic in the low back. The patient does not smoke and denies chemical dependency history.     Patient is seen post op on the floor. She is drowsy but awakes to voice and light touch. Forgetful at times during conversation. Daughter reported to RN that patient gets sleepy with narcotics. She is on NC O2. Patient does not remember when she last had her butrans patch or when it was taken off.     Post op day: Day of Surgery.  L-JEAN CLAUDE    Opioid Induced Respiratory Depression Risk Assessment: High   (Low 0-1; Moderate 2-4; or High >4 or >/= 3 if two of the risk factors are age > 60 and opioid naive) due to the following risk factors: COPD/Asthma/pulmonary disease, CHF, renal dysfunction, Age>60, >2 opioid therapies, concomitant CNS depressants, post surgical ?     The patient's chronic pain home regimen is:   Butrans patch 10 mcg/hr once weekly on Thursdays   Voltaren gel TID to lower back  Lidocaine ointment TID PRN for moderate pain  Robaxin 500 mg TID   Oxycodone 5 mg TID and an additional 5 mg q4h PRN for severe pain   Ropinirole 4 mg at bedtime for RLS     PLAN:   1) Pain is consistent with acute on chronic post-op pain s/p L-JEAN CLAUDE in  the setting of chronic pain on chronic opioids.   Multimodal Medication Therapy  Topical: lidocaine ointment qid, alternating with voltaren gel qid   NSAID'S: CrCl 30 mL/min. Contraindicated   Steroids: none  Muscle Relaxants: robaxin 500 mg qid (home med is 500 mg tid)  Adjuvants:   Tylenol 975 mg Q8H   Antidepressants/anxiolytics: Ropinirole 4 mg at bedtime   Opioids: oxycodone 5-10 mg q4h PRN mod, severe pain - decrease to 2.5-5mg q3h prn given lethargy - first line   IV Pain medication: IV Dilaudid 0.2-0.5 mg q2h prn mod, severe pain - decrease to 0.2 mg q4h prn given lethargy - second line   Non-medication interventions: Ice, Rest, PT, OT, and Distraction (TV, Music, Reading)  Constipation Prophylaxis: Senna-docusate and Miralax    -Opioid prescriber has been consistently from [facility]   -[place]  pulled from system on 1/2/24. This indicates consistent fills of oxycodone 5 mg and Butrans patch - most recent strength 10 mcg/hr patch     Discharge Recommendations - We recommend prescribing the following at the time of discharge: Per Orthopedics      History of Present Illness (HPI):       Sherice Alvarez is a 89 year old female who presented for planned procedure.  Past medical history as above. The pain is reported to be acute, chronic, located in the left hip, and it does not radiate.  Current pain is rated at 5-6/10 and goal is 0-2/10.  The patient denies nausea, vomiting, constipation, diarrhea, chest pain, shortness of breath, dizziness, fever, and chills. The patient reports last BM was this AM prior to surgery.     Per MN  review, the patient does have an opioid tolerance. Opioid induced side effects noted and include: sedation, constipation.      Reviewed medical record, labs, imaging, ED note, and care everywhere.     Past pain treatments have included: APAP, Oxycodone, lidocaine, voltaren, butrans patch      Medical History   PAST MEDICAL HISTORY:   Past Medical History:   Diagnosis Date     Arthritis     Asthma     CAD (coronary artery disease)     Cancer (H)     basal cell    Chronic kidney disease     ckd 3    Chronic pain syndrome     Congestive heart failure (H)     Crohn's disease (H)     GERD (gastroesophageal reflux disease)     Hx of heart artery stent 2016    1 stent R Proximal CA and 1 Stent L Proximal circumflex  2016 Stent proximal LAD    Hyperlipidemia     Hypertension     NSTEMI (non-ST elevated myocardial infarction) (H) 2016    Other chronic pain     PONV (postoperative nausea and vomiting)     severe povn with last knee surgery    Restless legs syndrome     Stented coronary artery     Stroke (H) 2010    numbness rt jaw       PAST SURGICAL HISTORY:   Past Surgical History:   Procedure Laterality Date    APPENDECTOMY      CARDIAC CATHETERIZATION  2016    multiple vessel disease.  no intervention    CARDIAC CATHETERIZATION  2016    CARDIAC CATHETERIZATION N/A 2016    Procedure: Coronary Angiogram;  Surgeon: Sunil Moran MD;  Location: [facility];  Service:     CAROTID ENDARTERECTOMY      CAROTID ENDARTERECTOMY Right 2010    CERVICAL LAMINECTOMY  1994     SECTION      CV CORONARY ANGIOGRAM N/A 2020    Procedure: Coronary Angiogram;  Surgeon: Vinita Ramos MD;  Location: [facility];  Service: Cardiology    CV LEFT HEART CATHETERIZATION WITHOUT LEFT VENTRICULOGRAM Left 2020    Procedure: Left Heart Catheterization Without Left Ventriculogram;  Surgeon: Jerad Lerner MD;  Location: [facility];  Service: Cardiology    CV TRANSCATHETER AORTIC VALVE REPLACEMENT - OTHER APPROACH N/A 2020    Procedure: CV TRANSCATHETER AORTIC VALVE REPLACEMENT - RIGHT SUBCLAVIAN APPROACH;  Surgeon: John Caceres MD;  Location: [facility];  Service: Cardiology    HEART CATH, ANGIOPLASTY      HEMORRHOIDECTOMY EXTERNAL      IR LUMBAR EPIDURAL STEROID INJECTION  2014    IR LUMBAR  NERVE ROOT INJECTION  10/01/2013    JOINT REPLACEMENT      OPEN REDUCTION INTERNAL FIXATION HIP NAILING Right 2/10/2024    Procedure: INTERNAL FIXATION, FRACTURE, TROCHANTERIC, HIP, USING PINS OR RODS RIGHT;  Surgeon: Gilberto Joy MD;  [place] [facility] [place]    AL ESOPHAGOGASTRODUODENOSCOPY TRANSORAL DIAGNOSTIC N/A 07/10/2019    Procedure: ESOPHAGOGASTRODUODENOSCOPY (EGD) with gastric biopsy;  Surgeon: Sunil Stuart MD;  Location: [facility];  Service: Gastroenterology    AL REPLACE AORTIC VALVE OPEN AXILLRY ARTRY APPROACH N/A 2020    Procedure: OR TRANSCATHETER AORTIC VALVE REPLACEMENT, SUBCLAVIAN APPROACH;  Surgeon: Bhavin Cuadra MD;  Location: [facility];  Service: Cardiology    TONSILLECTOMY & ADENOIDECTOMY      TOTAL KNEE ARTHROPLASTY Right     TRANSCATHETER AORTIC-VALVE REPLACEMENT      ZZC TOTAL KNEE ARTHROPLASTY Left 2021    Procedure: LEFT TOTAL KNEE ARTHROPLASTY;  Surgeon: Doug Rhodes MD;  Location: [facility];  Service: Orthopedics       FAMILY HISTORY:   Family History   Problem Relation Age of Onset    Atrial fibrillation Mother     Parkinsonism Father        SOCIAL HISTORY:   Social History     Tobacco Use    Smoking status: Former     Current packs/day: 0.00     Types: Cigarettes     Quit date: 1980     Years since quittin.0    Smokeless tobacco: Never   Substance Use Topics    Alcohol use: No        HEALTH & LIFESTYLE PRACTICES  Tobacco:  reports that she quit smoking about 45 years ago. Her smoking use included cigarettes. She has never used smokeless tobacco.  Alcohol:  reports no history of alcohol use.  Illicit drugs:  reports no history of drug use.    Allergies  Allergies   Allergen Reactions    Tizanidine Hcl Other (See Comments)     Blood pressure tanked     Amitriptyline Hcl [Amitriptyline] Unknown    Erythromycin Diarrhea     Childhood reaction    Gabapentin Swelling     And jerking movements    Gramineae Pollens  Unknown    Naproxen Unknown    Other Environmental Allergy Unknown     Molds and pet dander       Problem List  Patient Active Problem List    Diagnosis Date Noted    Primary osteoarthritis of hip 01/02/2025     Priority: Medium    Other chronic pain 04/06/2024     Priority: Medium    Subarachnoid hemorrhage (H) 04/04/2024     Priority: Medium    Syncope, unspecified syncope type 04/04/2024     Priority: Medium    Episodic mood disorder (H) 03/09/2024     Priority: Medium    Closed fracture of neck of left femur (H) 02/18/2024     Priority: Medium    Skin ulcer of sacrum (H) 02/18/2024     Priority: Medium    Aftercare for healing traumatic fracture of femur 02/17/2024     Priority: Medium    Spinal stenosis of lumbar region with neurogenic claudication 02/17/2024     Priority: Medium    Fall, initial encounter 02/09/2024     Priority: Medium    Closed displaced subtrochanteric fracture of right femur, initial encounter (H) 02/09/2024     Priority: Medium    Cellulitis of left lower extremity 09/09/2023     Priority: Medium    Edema of left lower extremity 09/09/2023     Priority: Medium    Avulsion of skin of left lower leg, initial encounter 09/09/2023     Priority: Medium    Essential hypertension 09/09/2023     Priority: Medium    Acute on chronic anemia 09/09/2023     Priority: Medium    Polyuria 05/19/2023     Priority: Medium    Mild cognitive impairment 05/19/2023     Priority: Medium    Neck pain 05/18/2023     Priority: Medium    Cervical spine pain 05/18/2023     Priority: Medium    Aortic calcification (H) 04/26/2023     Priority: Medium    Dyslipidemia 04/26/2023     Priority: Medium    Chronic systolic CHF (congestive heart failure) (H) 01/11/2023     Priority: Medium    Hypothyroidism (acquired) 01/11/2023     Priority: Medium    Restless leg syndrome 08/03/2022     Priority: Medium    Chest pain, unspecified type 08/02/2022     Priority: Medium    Other insomnia 08/02/2022     Priority: Medium     Adjustment disorder with mixed anxiety and depressed mood 08/02/2022     Priority: Medium    (HFpEF) heart failure with preserved ejection fraction (H) 10/28/2021     Priority: Medium    Acute deep vein thrombosis (DVT) of lower extremity (H) 09/08/2021     Priority: Medium    S/P total knee arthroplasty, left 05/11/2021     Priority: Medium    Leukocytosis, unspecified type      Priority: Medium    S/P TAVR (transcatheter aortic valve replacement) 06/02/2020     Priority: Medium    Dyspnea 05/07/2020     Priority: Medium    Coronary arteriosclerosis due to lipid rich plaque      Priority: Medium    Severe calcific aortic valve stenosis      Priority: Medium    Asthma 05/17/2019     Priority: Medium    Stage 3 chronic kidney disease (H) 05/15/2019     Priority: Medium    Anterior epistaxis 11/22/2017     Priority: Medium    Abnormal chest CT      Priority: Medium    Gastroesophageal reflux disease with esophagitis 11/04/2016     Priority: Medium    Dyslipidemia, goal LDL below 100 11/04/2016     Priority: Medium    Cancer (H)      Priority: Medium    Constipation 06/03/2014     Priority: Medium       Prior to Admission Medications   Medications Prior to Admission   Medication Sig Dispense Refill Last Dose/Taking    acetaminophen (TYLENOL) 650 MG suppository Place 650 mg rectally every 6 hours as needed for mild pain.   Unknown    amLODIPine (NORVASC) 5 MG tablet Take 5 mg by mouth daily.   1/1/2025 Morning    bisacodyl (DULCOLAX) 10 MG suppository Place 10 mg rectally daily as needed for constipation.   Unknown    buprenorphine (BUTRANS) 10 MCG/HR WK patch Place 1 patch onto the skin once a week. Thursdays   Past Week    diclofenac (VOLTAREN) 1 % topical gel Apply 2 g topically 3 times daily. Lower back pain   1/1/2025 Bedtime    escitalopram (LEXAPRO) 5 MG tablet Take 5 mg by mouth daily.   1/1/2025 Morning    furosemide (LASIX) 20 MG tablet Take 20 mg by mouth daily.   1/1/2025 Morning    hyoscyamine (LEVSIN/SL)  "0.125 MG sublingual tablet Place 0.125 mg under the tongue every 4 hours as needed (secretions).   Unknown    lidocaine (XYLOCAINE) 5 % external ointment Apply topically 3 times daily as needed for moderate pain.   Unknown    LORazepam (ATIVAN) 0.5 MG tablet Take 0.5 mg by mouth at bedtime.   1/1/2025 Bedtime    methocarbamol (ROBAXIN) 500 MG tablet Take 500 mg by mouth 3 times daily.   1/1/2025 Bedtime    metoprolol succinate ER (TOPROL XL) 25 MG 24 hr tablet Take 25 mg by mouth every evening   1/1/2025 Bedtime    nitroGLYcerin (NITROSTAT) 0.4 MG sublingual tablet Place 1 tablet (0.4 mg) under the tongue every 5 minutes as needed 90 tablet 0 Unknown    omeprazole (PRILOSEC) 20 MG DR capsule Take 20 mg by mouth 2 times daily.   1/1/2025 Evening    ondansetron (ZOFRAN) 4 MG tablet Take 4 mg by mouth every 6 hours as needed for nausea or vomiting.   Unknown    oxyCODONE (ROXICODONE) 5 MG tablet Take 5 mg by mouth 3 times daily.   1/1/2025 Bedtime    oxyCODONE (ROXICODONE) 5 MG tablet Take 5 mg by mouth every 4 hours as needed for severe pain (pain or SOB).   1/1/2025    rOPINIRole (REQUIP) 2 MG tablet Take 4 mg by mouth at bedtime.   1/1/2025 Bedtime    sennosides (SENOKOT) 8.6 MG tablet Take 1 tablet by mouth at bedtime.   1/1/2025 Bedtime    sennosides (SENOKOT) 8.6 MG tablet Take 1 tablet by mouth daily as needed for constipation.   Unknown    [DISCONTINUED] aspirin 81 MG EC tablet Take 81 mg by mouth daily. (Patient not taking: Reported on 12/31/2024)   Not Taking    [DISCONTINUED] melatonin 3 MG tablet Take 3 mg by mouth nightly as needed for sleep (Patient not taking: Reported on 12/31/2024)   Not Taking       Review of Systems  Complete ROS reviewed, unless noted in HPI, all other systems reviewed (with patient) and all others found to be negative.      Objective:     Physical Exam:  /52   Pulse 63   Temp 97.3  F (36.3  C) (Temporal)   Resp 18   Ht 1.549 m (5' 1\")   Wt 53.5 kg (118 lb)   SpO2 93%   " BMI 22.30 kg/m    Weight:   Vitals:    12/04/24 0900 01/02/25 0722   Weight: 53.5 kg (118 lb) 53.5 kg (118 lb)      Body mass index is 22.3 kg/m .    General Appearance:  Alert but drowsy, cooperative, no distress, resting in bed    Head:  Normocephalic, without obvious abnormality, atraumatic   Eyes:  PERRL, conjunctiva/corneas clear, EOM's intact   ENT/Throat: Lips, mucosa, and tongue normal; teeth and gums normal   Lymph/Neck: Supple, symmetrical, trachea midline   Lungs:   Clear to auscultation bilaterally, respirations unlabored, NC O2   Chest Wall:  No tenderness or deformity   Cardiovascular/Heart:  Regular rate and rhythm, S1, S2 normal,no murmur, rub or gallop.    Abdomen:   Soft, non-tender   Musculoskeletal: Incision is covered and dressing is CDI   Skin: Skin is dry    Neurologic: Alert and oriented X 3, Moves all 4 extremities, forgetful     Psych: Affect is appropriate      Imaging: Reviewed I have personally reviewed pertinent notes, labs, tests, and radiologic imaging in patient's chart.  Labs: Reviewed I have personally reviewed pertinent notes, labs, tests, and radiologic imaging in patient's chart.  Notes: Reviewed I have personally reviewed pertinent notes, labs, tests, and radiologic imaging in patient's chart.    Total time spent 65 minutes with greater than 50% in consultation, education and coordination of care.   Also discussed with RN.   Treatment plan includes: multimodal pain approach, Hospital Medicine Service for medical management, Orthopedics, PT, OT.   Patient educated regarding: multimodal pain approach and medications as listed above.   Elements of Medical Decision Making as described above. Acute or chronic illness or injury or surgery. High risk therapy including opioids, high risk drug therapy including oral and/or parenteral controlled substances.    I was present with Negrita Pérez [facility], the pharmacy resident who participated in the service and in the documentation of the  note. I have verified the history and personally performed the physical exam and medical decision making. I agree with the assessment and plan of care as documented in the note.    TAVO Willis CNP   1/2/2025       Patient is understanding of the plan. All questions and concerns addressed to patient's satisfaction.     Thank you for this consultation.    Radha VO, FNP-C  Acute Care Inpatient Pain Management Program  [facility])  Hours of coverage Monday-Friday [id number]. After hours please contact Primary team   Page via Epic chat or HeyLets

## 2025-01-02 NOTE — OP NOTE
Operative Report    PATIENT Sherice Alvarez   DATE OF SURGERY:  1/2/2025      PREOPERATIVE DIAGNOSIS   Osteoarthritis of left hip [M16.12].    POSTOPERATIVE DIAGNOSIS   Osteoarthritis of left hip [M16.12].    PROCEDURE PERFORMED   left total hip arthroplasty     IMPLANTS  1.  DePuy Emphasys Porocoat shell, 48 mm  2.  DePuy Emphasys AOX liner, neutral, 48/36 mm  3.  DePuy Mellen cemented femoral stem, Size 3 high offset  4.  DePuy Biolox ceramic femoral head, 36/+5 mm    SURGEON  DO Gemran Lim PA-C. PA-C was needed for positioning, draping, retraction/protection of vital structures, assistance with instrumentation and correct implant placement, deep periarticular injection, closure including deep capsular layer and maintaining patient safety throughout the procedure.  Several of these duties can only be performed by a HALINA and not a lesser trained surgical assistant.    ANESTHESIA  Spinal      FINDINGS:  Grade 4 Degenerative changes on femoral head and acetabulum with multiple osteophytes and exposed bone.  Poor femoral bone quality with thinning of the calcar.    SPECIMENS:  None    ESTIMATED BLOOD LOSS:  300 ml    COMPLICATIONS   None.      INDICATION FOR PROCEDURE  Sherice Alvarez is a pleasant, 89 year old female who presented to my office with severe left hip pain.  X-rays were performed and showed severe end-stage osteoarthritis.  Patient was on hospice care at her nursing facility, but really this was mainly for pain control reasons.  She still ambulated independently with assistance.  Severe pain was restricting her ability to walk.  Conservative treatment was attempted and failed.  Pain continued to affect quality of life and activities of daily living.  With this, discussion of surgery was discussed.  The patient had multiple risk factors, and with her age it was discussed that there was relatively high likelihood of complication.  She stated understanding of this and was still  interested in proceeding as she felt her quality of life was too for severely affected, willing to accept the risks.  Due to this, total hip arthroplasty was recommended.  Risks, benefits and expected outcomes were discussed.  So is likely postoperative course.  Following this the patient elected to proceed.  Medical clearance was obtained prior to the procedure.  Hospice is designation was rescinded to move forward with surgical care.    INFORMED CONSENT  Sherice Alvarez was identified in the preoperative holding area and was identified using medical record number, name, and date of birth, all of which were confirmed. The operative hip was marked using an indelible marker and informed consent was signed. Once again, all risks and benefits as well as alternatives to surgical intervention were discussed with the patient in detail and all the questions were answered. Risks discussed included but were not limited to: persistent pain, infection, bleeding, scarring, stiffness, thromboembolic events, fracture, malalignment/malrotation, leg length discrepancy, implant complications, severe limb dysfunction, loss of limb, and loss of life. The patient signed informed consent and wished to proceed with surgery as scheduled.     TECHNIQUE  The patient was taken back to the operating room and placed on the operating table.  Spinal anesthesia was performed without difficulty.  The patient was then placed in the lateral decubitus position with the operative hip elevated, held in place by a hip positioner.  All bony prominences were well-padded and an axillary roll was used.  The operative hip was then sterilely prepped and draped in usual fashion.  A timeout was performed prior to the procedure, this verified the patient's name, correct site and side of surgery, and the procedure being performed.  Implants needed were available.  It was confirmed that the patient received TXA and IV antibiotics prior to the start of the  procedure.     I then made a 5 inch incision centered over the greater trochanter.  Hemostasis was obtained and dissection was performed with electrocautery down to the fascial layer.  This was split with electrocautery as well and the gluteal musculature was gently split in line with its fibers.  Charnley retractor was then placed and a minimally invasive posterior approach was performed taking down the capsule and external rotators in one layer.  These were tagged for later repair.  The hip was then dislocated and brought into the field.  An oscillating saw was used to make a femoral neck cut at the templated length proximal to the lesser trochanter.  The femoral head and neck was removed.  There was significant degenerative changes seen.    Retractors were then placed to expose the acetabulum.  The labrum and overlying tissue was excised from the acetabular rim.  Sequential reaming was then performed the final cup size, which was 48 mm.  The final shell was impacted into place in approximately 40  of abduction and 30  of anteversion.  Good scratch fixation was obtained.  A neutral trial liner was then placed as well.    Attention was then taken to the femur and the femoral elevator was used for exposure.  Remaining lateral shoulder soft tissue was removed.  A box osteotome followed by canal finder were used.  Sequential reaming was performed until good chatter and no further advancement was allowed. Sequential broaching was then performed until axial and rotational stability was found.  This was difficult however, due to the patient's bone quality.  Due to this I elected to cement the femoral stem in place.  A trial reduction was performed until recuperation of equal leg lengths, and stability through full range of motion was achieved.  The patient had stability with flexion to 90  and internal rotation to 80 .  There is no anterior instability as well.  There were no signs of impingement.    Following this the  trials were removed including the trial liner. The cup was irrigated and dried, and then the final liner was impacted into place.  After the final femoral stem construct with distal centralizer was assembled, I measured the length to the calcar and placed a cement restrictor 1 cm distal to this.  I then irrigated the canal once again and used a padded suction tip to dry the canal as best as possible.  2 batches of antibiotic cement were then mixed in the cement gun.  I fully filled canal cement and then used the pressurizer as well.  The femoral stem was then placed with the collar resting on the calcar in the appropriate amount of anteversion.  All excess cement was removed.  This was held in place until the cement had fully hardened. One final trial reduction was performed and it showed the similar stability that was seen during the earlier trialing period.  This size was selected and the final ceramic head was impacted into place onto a clean, dry taper.  The hip was easily reduced, and the wound was then copiously irrigated with normal saline. A dillute betadine soak lavage was performed as well.    Capsular closure was performed with #5 Ethibond suture.  This gave an overall stout repair.  Following this, 100 mL of periarticular injection was placed around the capsule fascial layer and subcutaneous tissue.  The remaining tissue was closed in layers.  The fascia was closed with #1 stratafix, followed by 0 Vicryl, 2-0 stratafix, 3-0 Monocryl and glue for skin. A sterile Aquacel dressing was placed over the incision.  A hip abduction pillow was placed and the patient was transferred to the supine position in the postoperative bed. They were transferred to PACU in stable condition.    COMMENTS:  There were no complications during the case.  The patient will weight-bear as tolerated without formal posterior hip precautions.  Mechanical and chemical DVT prophylaxis will be initiated.    Implant Name Type Inv. Item  Serial No.  Lot No. LRB No. Used Action   EMPHASYS ACETABULAR SHELL 48MM THREE HOLE    Depuy [id number] Left 1 Implanted   EMPHASYS POLYETHYLENE LINER 48MM 36MM    Depuy  Left 1 Implanted   PLUG CEMENT HERNANDEZ W/INSERT 18.5 [id number] - [id number] Total Joint Component/Insert PLUG CEMENT HERNANDEZ W/INSERT 18.5 [id number]  [employer] [id number] Left 1 Implanted   IMP BONE CEMENT STRK SIMPLEX TOBRAMYCIN 40 6197-9-001 - [id number] Cement, Bone IMP BONE CEMENT STRK SIMPLEX TOBRAMYCIN 40 6197-9-001  [employer]  Left 1 Implanted   IMP BONE CEMENT STRK SIMPLEX TOBRAMYCIN 40 6197-9-001 - [id number] Cement, Bone IMP BONE CEMENT STRK SIMPLEX TOBRAMYCIN 40 6197-9-001  [employer]  Left 1 Implanted   IMP STEM CENTRALIZER DEPUY 8.5MM 1376- - [id number] Total Joint Component/Insert IMP STEM CENTRALIZER DEPUY 8.5MM 1376-  [employer]- [id number] Left 1 Implanted   STEM FEMORAL [place] HI OFFSET CEMENT SZ3 - [id number] Total Joint Component/Insert STEM FEMORAL [place] HI OFFSET CEMENT SZ3  [employer]- [id number] Left 1 Implanted   IMP HEAD FEMORAL DEPUY CERAMIC 36MM +5MM [id number] - [id number] Total Joint Component/Insert IMP HEAD FEMORAL DEPUY CERAMIC 36MM +5MM [id number]  [employer]- [id number] Left 1 Implanted       Arturo Parham DO

## 2025-01-02 NOTE — PHARMACY-ADMISSION MEDICATION HISTORY
Pharmacist Admission Medication History    Admission medication history is complete. The information provided in this note is only as accurate as the sources available at the time of the update.    Information Source(s): Facility (U/NH/) medication list/MAR via N/A    Pertinent Information: Vitaliy Ramsey MAR.  Pt finished course of keflex 12/30.  Pt prescribed butrans patch-- per MAR looks like day to change is Thursday (don't have MAR from 12/2024) per RN, patch NOT currently on.  Hospice follows pt at facility    Changes made to PTA medication list:  Added: levsin SL  Deleted: protonix, morphine po, keppra, diclofenac po, aspirin, melatonin, atorvastatin, tylenol po, lidocaine patch, multivitamin, miralax, seroquel, zoloft  Changed: amlodipine to qday, diclofenac gel frequency, oxycodone to TID scheduled, lasix decreased to 20 mg, methocarbamol to tid, lorazepam to scheduled    Allergies reviewed with patient and updates made in EHR: yes    Medication History Completed By: Ora Ellison RPH 1/2/2025 11:23 AM    PTA Med List   Medication Sig Last Dose/Taking    acetaminophen (TYLENOL) 650 MG suppository Place 650 mg rectally every 6 hours as needed for mild pain. Unknown    amLODIPine (NORVASC) 5 MG tablet Take 5 mg by mouth daily. 1/1/2025 Morning      Taking    bisacodyl (DULCOLAX) 10 MG suppository Place 10 mg rectally daily as needed for constipation. Unknown    buprenorphine (BUTRANS) 10 MCG/HR WK patch Place 1 patch onto the skin once a week. Thursdays Past Week      Taking    diclofenac (VOLTAREN) 1 % topical gel Apply 2 g topically 3 times daily. Lower back pain 1/1/2025 Bedtime    escitalopram (LEXAPRO) 5 MG tablet Take 5 mg by mouth daily. 1/1/2025 Morning    furosemide (LASIX) 20 MG tablet Take 20 mg by mouth daily. 1/1/2025 Morning    hyoscyamine (LEVSIN/SL) 0.125 MG sublingual tablet Place 0.125 mg under the tongue every 4 hours as needed (secretions). Unknown    lidocaine (XYLOCAINE) 5 %  external ointment Apply topically 3 times daily as needed for moderate pain. Unknown    LORazepam (ATIVAN) 0.5 MG tablet Take 0.5 mg by mouth at bedtime. 1/1/2025 Bedtime    methocarbamol (ROBAXIN) 500 MG tablet Take 500 mg by mouth 3 times daily. 1/1/2025 Bedtime    metoprolol succinate ER (TOPROL XL) 25 MG 24 hr tablet Take 25 mg by mouth every evening 1/1/2025 Bedtime    nitroGLYcerin (NITROSTAT) 0.4 MG sublingual tablet Place 1 tablet (0.4 mg) under the tongue every 5 minutes as needed Unknown    omeprazole (PRILOSEC) 20 MG DR capsule Take 20 mg by mouth 2 times daily. 1/1/2025 Evening    ondansetron (ZOFRAN) 4 MG tablet Take 4 mg by mouth every 6 hours as needed for nausea or vomiting. Unknown    oxyCODONE (ROXICODONE) 5 MG tablet Take 5 mg by mouth 3 times daily. 1/1/2025 Bedtime    oxyCODONE (ROXICODONE) 5 MG tablet Take 5 mg by mouth every 4 hours as needed for severe pain (pain or SOB). 1/1/2025    rOPINIRole (REQUIP) 2 MG tablet Take 4 mg by mouth at bedtime. 1/1/2025 Bedtime    sennosides (SENOKOT) 8.6 MG tablet Take 1 tablet by mouth at bedtime. 1/1/2025 Bedtime    sennosides (SENOKOT) 8.6 MG tablet Take 1 tablet by mouth daily as needed for constipation. Unknown

## 2025-01-02 NOTE — ANESTHESIA PREPROCEDURE EVALUATION
Anesthesia Pre-Procedure Evaluation    Patient: Sherice Alvarez   MRN: [id number] : 1934        Procedure : Procedure(s):  LEFT TOTAL HIP ARTHROPLASTY          Past Medical History:   Diagnosis Date     Arthritis      Asthma      CAD (coronary artery disease)      Cancer (H)     basal cell     Chronic kidney disease     ckd 3     Chronic pain syndrome      Congestive heart failure (H)      Crohn's disease (H)      GERD (gastroesophageal reflux disease)      Hx of heart artery stent 2016    1 stent R Proximal CA and 1 Stent L Proximal circumflex  2016 Stent proximal LAD     Hyperlipidemia      Hypertension      NSTEMI (non-ST elevated myocardial infarction) (H) 2016     Other chronic pain      PONV (postoperative nausea and vomiting)     severe povn with last knee surgery     Restless legs syndrome      Stented coronary artery      Stroke (H) 2010    numbness rt jaw      Past Surgical History:   Procedure Laterality Date     APPENDECTOMY       CARDIAC CATHETERIZATION  2016    multiple vessel disease.  no intervention     CARDIAC CATHETERIZATION  2016     CARDIAC CATHETERIZATION N/A 2016    Procedure: Coronary Angiogram;  Surgeon: Sunil Moran MD;  Location: [facility] Cath Lab;  Service:      CAROTID ENDARTERECTOMY       CAROTID ENDARTERECTOMY Right 2010     CERVICAL LAMINECTOMY  1994      SECTION       CV CORONARY ANGIOGRAM N/A 2020    Procedure: Coronary Angiogram;  Surgeon: Vinita Ramos MD;  Location: [facility] Cath Lab;  Service: Cardiology     CV LEFT HEART CATHETERIZATION WITHOUT LEFT VENTRICULOGRAM Left 2020    Procedure: Left Heart Catheterization Without Left Ventriculogram;  Surgeon: Jerad Lerner MD;  Location: [facility] Cath Lab;  Service: Cardiology     CV TRANSCATHETER AORTIC VALVE REPLACEMENT - OTHER APPROACH N/A 2020    Procedure: CV TRANSCATHETER AORTIC VALVE REPLACEMENT - RIGHT SUBCLAVIAN  APPROACH;  Surgeon: John Caceres MD;  Location: [facility] Cath Lab;  Service: Cardiology     HEART CATH, ANGIOPLASTY       HEMORRHOIDECTOMY EXTERNAL       IR LUMBAR EPIDURAL STEROID INJECTION  2014     IR LUMBAR NERVE ROOT INJECTION  10/01/2013     JOINT REPLACEMENT       OPEN REDUCTION INTERNAL FIXATION HIP NAILING Right 2/10/2024    Procedure: INTERNAL FIXATION, FRACTURE, TROCHANTERIC, HIP, USING PINS OR RODS RIGHT;  Surgeon: Gilberto Joy MD;  Location: [facility] [place]     OK ESOPHAGOGASTRODUODENOSCOPY TRANSORAL DIAGNOSTIC N/A 07/10/2019    Procedure: ESOPHAGOGASTRODUODENOSCOPY (EGD) with gastric biopsy;  Surgeon: Sunil Stuart MD;  Location: [facility] GI;  Service: Gastroenterology     OK REPLACE AORTIC VALVE OPEN AXILLRY ARTRY APPROACH N/A 2020    Procedure: OR TRANSCATHETER AORTIC VALVE REPLACEMENT, SUBCLAVIAN APPROACH;  Surgeon: Bhavin Cuadra MD;  Location: [facility] Cath Lab;  Service: Cardiology     TONSILLECTOMY & ADENOIDECTOMY       TOTAL KNEE ARTHROPLASTY Right      TRANSCATHETER AORTIC-VALVE REPLACEMENT       ZZC TOTAL KNEE ARTHROPLASTY Left 2021    Procedure: LEFT TOTAL KNEE ARTHROPLASTY;  Surgeon: Doug Rhodes MD;  Location: [facility];  Service: Orthopedics      Allergies   Allergen Reactions     Amitriptyline Hcl [Amitriptyline] Unknown     Erythromycin Diarrhea     Childhood reaction     Gabapentin Swelling     And jerking movements     Gramineae Pollens Unknown     Naproxen Unknown     Other Environmental Allergy Unknown     Molds and pet dander      Social History     Tobacco Use     Smoking status: Former     Current packs/day: 0.00     Types: Cigarettes     Quit date: 1980     Years since quittin.0     Smokeless tobacco: Never   Substance Use Topics     Alcohol use: No      Wt Readings from Last 1 Encounters:   24 53.5 kg (118 lb)        Anesthesia Evaluation   Pt has had prior anesthetic. Type: Regional and General.     History of anesthetic complications  - PONV.      ROS/MED HX  ENT/Pulmonary:     (+)                tobacco use, Past use,     asthma                  Neurologic:     (+)          CVA,  without deficits,                    Cardiovascular: Comment: 4/24  1. Normal left ventricular size and systolic performance with a visually  estimated ejection fraction of 65-70%.  2. There is mild concentric increase in left ventricular wall thickness.  3. 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  Â  High normal aortic valve prosthesis metrics with a mean systolic gradient of  18 mmHg and a peak anterograde velocity of 2.8 m/sec.  Â  No aortic insufficiency is detected.  4. There is mild calcific mitral stenosis.  5. Normal right ventricular size and systolic performance.        (+)  hypertension- -  CAD -  - stent-      CHF                     valvular problems/murmurs  s/p TAVR.         METS/Exercise Tolerance:     Hematologic:     (+)      anemia,          Musculoskeletal:   (+)  arthritis,             GI/Hepatic:     (+) GERD,                   Renal/Genitourinary:     (+) renal disease,             Endo:     (+)          thyroid problem, hypothyroidism,           Psychiatric/Substance Use:     (+)    H/O chronic opiod use  (5 mg oxycodone TID, +PRNs).     Infectious Disease:       Malignancy:       Other:      (+)  , H/O Chronic Pain,         Physical Exam    Airway        Mallampati: II   TM distance: > 3 FB   Neck ROM: limited   Mouth opening: > 3 cm    Respiratory Devices and Support         Dental       (+) Minor Abnormalities - some fillings, tiny chips      Cardiovascular   cardiovascular exam normal          Pulmonary           (+) decreased breath sounds       OUTSIDE LABS:  CBC:   Lab Results   Component Value Date    WBC 9.2 12/23/2024    WBC 8.9 05/07/2024    HGB 8.8 (L) 12/23/2024    HGB 10.4 (L) 05/07/2024    HCT 26.8 (L) 12/23/2024    HCT 32.2 (L) 05/07/2024     " 12/23/2024     05/07/2024     BMP:   Lab Results   Component Value Date     12/23/2024     05/07/2024    POTASSIUM 4.3 12/23/2024    POTASSIUM 4.1 05/07/2024    CHLORIDE 106 12/23/2024    CHLORIDE 108 (H) 05/07/2024    CO2 23 12/23/2024    CO2 24 05/07/2024    BUN 22.0 12/23/2024    BUN 21.5 05/07/2024    CR 1.05 (H) 12/23/2024    CR 1.01 (H) 05/07/2024    GLC 84 12/23/2024    GLC 97 05/07/2024     COAGS:   Lab Results   Component Value Date    PTT 27 02/09/2024    INR 1.23 (H) 02/09/2024     POC: No results found for: \"BGM\", \"HCG\", \"HCGS\"  HEPATIC:   Lab Results   Component Value Date    ALBUMIN 3.5 12/23/2024    PROTTOTAL 6.0 (L) 12/23/2024    ALT 9 12/23/2024    AST 38 12/23/2024    ALKPHOS 76 12/23/2024    BILITOTAL 0.5 12/23/2024     OTHER:   Lab Results   Component Value Date    A1C 4.8 05/19/2023    BESS 8.7 (L) 12/23/2024    PHOS 4.0 06/14/2022    MAG 1.9 04/04/2024    LIPASE 54 12/28/2022    TSH 5.17 (H) 12/09/2022    T4 1.09 12/09/2022    CRP 0.3 01/15/2022    SED 24 (H) 06/07/2021       Anesthesia Plan    ASA Status:  4    NPO Status:  NPO Appropriate    Anesthesia Type: Spinal.   Induction: Propofol.   Maintenance: TIVA.   Techniques and Equipment:       - Blood: T&C, PRBC (2 units available)     Consents    Anesthesia Plan(s) and associated risks, benefits, and realistic alternatives discussed. Questions answered and patient/representative(s) expressed understanding.     - Discussed: Risks, Benefits and Alternatives for BOTH SEDATION and the PROCEDURE were discussed     - Discussed with:  Patient, Spouse       Use of blood products discussed: Yes.     - Discussed with: Patient.     - Consented: consented to blood products     Postoperative Care    Pain management: IV analgesics, Oral pain medications, Multi-modal analgesia.   PONV prophylaxis: Ondansetron (or other 5HT-3), Dexamethasone or Solumedrol, Background Propofol Infusion     Comments:               Leslie Goldberg, " MD BRITO have reviewed the pertinent notes and labs in the chart from the past 30 days and (re)examined the patient.  Any updates or changes from those notes are reflected in this note.             # Drug Induced Platelet Defect: home medication list includes an antiplatelet medication   # Hypertension: Noted on problem list  # Chronic heart failure with preserved ejection fraction: heart failure noted on problem list and last echo with EF >50%              # Financial/Environmental Concerns:    # Asthma: noted on problem list

## 2025-01-03 ENCOUNTER — APPOINTMENT (OUTPATIENT)
Dept: OCCUPATIONAL THERAPY | Facility: CLINIC | Age: OVER 89
DRG: 469 | End: 2025-01-03
Attending: PHYSICIAN ASSISTANT
Payer: COMMERCIAL

## 2025-01-03 ENCOUNTER — APPOINTMENT (OUTPATIENT)
Dept: PHYSICAL THERAPY | Facility: CLINIC | Age: OVER 89
DRG: 469 | End: 2025-01-03
Attending: PHYSICIAN ASSISTANT
Payer: COMMERCIAL

## 2025-01-03 PROBLEM — M16.12 PRIMARY OSTEOARTHRITIS OF LEFT HIP: Status: ACTIVE | Noted: 2025-01-03

## 2025-01-03 LAB
FASTING STATUS PATIENT QL REPORTED: NORMAL
GLUCOSE SERPL-MCNC: 91 MG/DL (ref 70–99)
HGB BLD-MCNC: 7.3 G/DL (ref 11.7–15.7)

## 2025-01-03 PROCEDURE — 97166 OT EVAL MOD COMPLEX 45 MIN: CPT | Mod: GO

## 2025-01-03 PROCEDURE — 36415 COLL VENOUS BLD VENIPUNCTURE: CPT | Performed by: PHYSICIAN ASSISTANT

## 2025-01-03 PROCEDURE — 82947 ASSAY GLUCOSE BLOOD QUANT: CPT | Performed by: PHYSICIAN ASSISTANT

## 2025-01-03 PROCEDURE — 97110 THERAPEUTIC EXERCISES: CPT | Mod: GP

## 2025-01-03 PROCEDURE — 250N000013 HC RX MED GY IP 250 OP 250 PS 637: Performed by: PHYSICIAN ASSISTANT

## 2025-01-03 PROCEDURE — 99232 SBSQ HOSP IP/OBS MODERATE 35: CPT | Performed by: HOSPITALIST

## 2025-01-03 PROCEDURE — 250N000011 HC RX IP 250 OP 636: Performed by: PHYSICIAN ASSISTANT

## 2025-01-03 PROCEDURE — 250N000013 HC RX MED GY IP 250 OP 250 PS 637: Performed by: NURSE PRACTITIONER

## 2025-01-03 PROCEDURE — 250N000013 HC RX MED GY IP 250 OP 250 PS 637: Performed by: ORTHOPAEDIC SURGERY

## 2025-01-03 PROCEDURE — 250N000011 HC RX IP 250 OP 636: Performed by: NURSE PRACTITIONER

## 2025-01-03 PROCEDURE — 85018 HEMOGLOBIN: CPT | Performed by: PHYSICIAN ASSISTANT

## 2025-01-03 PROCEDURE — 97116 GAIT TRAINING THERAPY: CPT | Mod: GP

## 2025-01-03 PROCEDURE — 99232 SBSQ HOSP IP/OBS MODERATE 35: CPT | Performed by: NURSE PRACTITIONER

## 2025-01-03 PROCEDURE — 250N000013 HC RX MED GY IP 250 OP 250 PS 637: Performed by: STUDENT IN AN ORGANIZED HEALTH CARE EDUCATION/TRAINING PROGRAM

## 2025-01-03 PROCEDURE — 120N000001 HC R&B MED SURG/OB

## 2025-01-03 PROCEDURE — 97161 PT EVAL LOW COMPLEX 20 MIN: CPT | Mod: GP

## 2025-01-03 PROCEDURE — 97535 SELF CARE MNGMENT TRAINING: CPT | Mod: GO

## 2025-01-03 RX ORDER — METHOCARBAMOL 500 MG/1
500 TABLET, FILM COATED ORAL 4 TIMES DAILY
Qty: 30 TABLET | Refills: 0 | Status: SHIPPED | OUTPATIENT
Start: 2025-01-03

## 2025-01-03 RX ORDER — OXYCODONE HYDROCHLORIDE 5 MG/1
5 TABLET ORAL
Qty: 26 TABLET | Refills: 0 | Status: SHIPPED | OUTPATIENT
Start: 2025-01-03 | End: 2025-01-06

## 2025-01-03 RX ADMIN — METHOCARBAMOL 500 MG: 500 TABLET ORAL at 17:27

## 2025-01-03 RX ADMIN — OXYCODONE 5 MG: 5 TABLET ORAL at 14:48

## 2025-01-03 RX ADMIN — CEPHALEXIN 500 MG: 500 CAPSULE ORAL at 11:48

## 2025-01-03 RX ADMIN — ROPINIROLE HYDROCHLORIDE 4 MG: 1 TABLET, FILM COATED ORAL at 21:44

## 2025-01-03 RX ADMIN — LIDOCAINE: 50 OINTMENT TOPICAL at 19:46

## 2025-01-03 RX ADMIN — OXYCODONE 5 MG: 5 TABLET ORAL at 08:49

## 2025-01-03 RX ADMIN — ACETAMINOPHEN 975 MG: 325 TABLET, FILM COATED ORAL at 17:27

## 2025-01-03 RX ADMIN — POLYETHYLENE GLYCOL 3350 17 G: 17 POWDER, FOR SOLUTION ORAL at 08:49

## 2025-01-03 RX ADMIN — ESCITALOPRAM 5 MG: 5 TABLET, FILM COATED ORAL at 08:49

## 2025-01-03 RX ADMIN — CEPHALEXIN 500 MG: 500 CAPSULE ORAL at 23:58

## 2025-01-03 RX ADMIN — SENNOSIDES AND DOCUSATE SODIUM 1 TABLET: 50; 8.6 TABLET ORAL at 21:40

## 2025-01-03 RX ADMIN — OXYCODONE 5 MG: 5 TABLET ORAL at 04:29

## 2025-01-03 RX ADMIN — METHOCARBAMOL 500 MG: 500 TABLET ORAL at 21:40

## 2025-01-03 RX ADMIN — DICLOFENAC 2 G: 10 GEL TOPICAL at 17:28

## 2025-01-03 RX ADMIN — CEPHALEXIN 500 MG: 500 CAPSULE ORAL at 17:27

## 2025-01-03 RX ADMIN — PANTOPRAZOLE SODIUM 40 MG: 40 TABLET, DELAYED RELEASE ORAL at 21:40

## 2025-01-03 RX ADMIN — DICLOFENAC 2 G: 10 GEL TOPICAL at 08:50

## 2025-01-03 RX ADMIN — METHOCARBAMOL 500 MG: 500 TABLET ORAL at 13:35

## 2025-01-03 RX ADMIN — PANTOPRAZOLE SODIUM 40 MG: 40 TABLET, DELAYED RELEASE ORAL at 08:49

## 2025-01-03 RX ADMIN — DICLOFENAC 2 G: 10 GEL TOPICAL at 21:41

## 2025-01-03 RX ADMIN — ACETAMINOPHEN 975 MG: 325 TABLET, FILM COATED ORAL at 00:32

## 2025-01-03 RX ADMIN — HYDROMORPHONE HYDROCHLORIDE 0.2 MG: 0.2 INJECTION, SOLUTION INTRAMUSCULAR; INTRAVENOUS; SUBCUTANEOUS at 17:28

## 2025-01-03 RX ADMIN — LIDOCAINE: 50 OINTMENT TOPICAL at 06:05

## 2025-01-03 RX ADMIN — ASPIRIN 81 MG: 81 TABLET, COATED ORAL at 08:49

## 2025-01-03 RX ADMIN — OXYCODONE 5 MG: 5 TABLET ORAL at 19:43

## 2025-01-03 RX ADMIN — CEPHALEXIN 500 MG: 500 CAPSULE ORAL at 06:05

## 2025-01-03 RX ADMIN — DICLOFENAC 2 G: 10 GEL TOPICAL at 00:32

## 2025-01-03 RX ADMIN — ACETAMINOPHEN 975 MG: 325 TABLET, FILM COATED ORAL at 08:49

## 2025-01-03 RX ADMIN — SENNOSIDES AND DOCUSATE SODIUM 1 TABLET: 50; 8.6 TABLET ORAL at 08:49

## 2025-01-03 RX ADMIN — ACETAMINOPHEN 975 MG: 325 TABLET, FILM COATED ORAL at 23:58

## 2025-01-03 RX ADMIN — CEFAZOLIN 1 G: 1 INJECTION, POWDER, FOR SOLUTION INTRAMUSCULAR; INTRAVENOUS at 00:31

## 2025-01-03 RX ADMIN — ASPIRIN 81 MG: 81 TABLET, COATED ORAL at 21:40

## 2025-01-03 RX ADMIN — DICLOFENAC 2 G: 10 GEL TOPICAL at 13:35

## 2025-01-03 RX ADMIN — METHOCARBAMOL 500 MG: 500 TABLET ORAL at 08:49

## 2025-01-03 ASSESSMENT — ACTIVITIES OF DAILY LIVING (ADL)
ADLS_ACUITY_SCORE: 68
ADLS_ACUITY_SCORE: 72
ADLS_ACUITY_SCORE: 68
ADLS_ACUITY_SCORE: 72
ADLS_ACUITY_SCORE: 68
ADLS_ACUITY_SCORE: 72
ADLS_ACUITY_SCORE: 72
ADLS_ACUITY_SCORE: 68
ADLS_ACUITY_SCORE: 72
ADLS_ACUITY_SCORE: 68

## 2025-01-03 NOTE — PLAN OF CARE
Problem: Adult Inpatient Plan of Care  Goal: Optimal Comfort and Wellbeing  Intervention: Monitor Pain and Promote Comfort  Recent Flowsheet Documentation  Taken 1/3/2025 0032 by Merle Nelson RN  Pain Management Interventions:   medication (see MAR)   rest   cold applied   emotional support   pain management plan reviewed with patient/caregiver  Taken 1/2/2025 2251 by Merle Nelson RN  Pain Management Interventions:   medication (see MAR)   cold applied   care clustered   rest   emotional support  Taken 1/2/2025 2049 by Merle Nelson RN  Pain Management Interventions:   medication (see MAR)   emotional support   pain management plan reviewed with patient/caregiver   rest   Goal Outcome Evaluation:    Patient vital signs are at baseline: No,  Reason:  required 1L O2 overnight   Patient able to ambulate as they were prior to admission or with assist devices provided by therapies during their stay:  No,  Reason:  pt not oob due to pain, activity encouraged   Patient MUST void prior to discharge:  Yes  Patient able to tolerate oral intake:  Yes  Pain has adequate pain control using Oral analgesics:  No,  Reason:  required use of IV dilaudid X1  Does patient have an identified :  Yes  Has goal D/C date and time been discussed with patient:  Yes    A&O. Pt reports small amount of tingling in fingers and feet, otherwise CMS intact. Pedal pulses +1. Voiding using purewick d/t incontinence. Dsg c/d/i. Pain managed with scheduled and prn pain meds, ice, rest, distraction, emotional support. Piv FREDY.

## 2025-01-03 NOTE — CONSULTS
[facility]  Consult Note - Hospitalist Service  Date of Admission:  1/2/2025  Consult Requested by: Orthopedic surgery Dr. Parham  Reason for Consult: Management of chronic medical conditions    Assessment & Plan   Sherice Alvarez is a 89 year old female admitted on 1/2/2025.  She has a past medical history significant for coronary artery disease, HFpEF, hypertension, CVA, CKD stage IIIa, cognitive impairment, restless leg syndrome who presented to the hospital for elective left hip arthroplasty.  Hospital medicine consulted for management of chronic medical conditions.    Hypertension  At home on amlodipine 5 mg daily, Lasix 20 mg daily, metoprolol succinate 25 mg daily.    Plan  Restart home metoprolol tonight  Restart home amlodipine on 1/3  Continue to hold Lasix.  Discontinue perioperative IV fluid.    HFpEF  At home on Lasix 20 mg daily    Plan  Hold Lasix for tonight.     Coronary artery disease  Severe aortic valve stenosis status post TAVR  At home on aspirin    Plan  Currently on aspirin twice daily for DVT prophylaxis.    History of CVA  History of subarachnoid hemorrhage    Restless leg syndrome    Plan  Continue home Requip    Recent left lower extremity cellulitis  Per prior documentation, already completed a course of antibiotic.  Left lower distal lower extremity slightly warm to touch with mild erythema.    Plan  Left lower extremity ultrasound to rule out DVT.  Continue to monitor.  Agree with cephalexin started perioperatively per primary team.    Status post left hip arthroplasty    Plan  Pain management as per primary team and pain team.  DVT prophylaxis as per primary team.  Hold home Ativan.         Clinically Significant Risk Factors Present on Admission                 # Drug Induced Platelet Defect: home medication list includes an antiplatelet medication   # Hypertension: Noted on problem list  # Chronic heart failure with preserved ejection fraction: heart  failure noted on problem list and last echo with EF >50%              # Financial/Environmental Concerns:    # Asthma: noted on problem list        NELLI CARDONA MD  Hospitalist Service  Securely message with [employer] (more info)  Text page via [facility] Paging/Directory   ______________________________________________________________________        History of Present Illness   Sherice Alvarez is a 89 year old female admitted on 1/2/2025.  She has a past medical history significant for coronary artery disease, HFpEF, hypertension, CVA, CKD stage IIIa, restless leg syndrome who presented to the hospital for elective left hip arthroplasty    Evaluated around 6 PM.  Patient is doing well.  She denies any chest pain or shortness of breath.  She denies any lightheadedness.  She denies any nausea or vomiting.      Past Medical History    Past Medical History:   Diagnosis Date    Arthritis     Asthma     CAD (coronary artery disease)     Cancer (H)     basal cell    Chronic kidney disease     ckd 3    Chronic pain syndrome     Congestive heart failure (H)     Crohn's disease (H)     GERD (gastroesophageal reflux disease)     Hx of heart artery stent 11/01/2016    1 stent R Proximal CA and 1 Stent L Proximal circumflex  12/2016 Stent proximal LAD    Hyperlipidemia     Hypertension     NSTEMI (non-ST elevated myocardial infarction) (H) 11/01/2016    Other chronic pain     PONV (postoperative nausea and vomiting)     severe povn with last knee surgery    Restless legs syndrome     Stented coronary artery     Stroke (H) 01/01/2010    numbness rt jaw       Past Surgical History   Past Surgical History:   Procedure Laterality Date    APPENDECTOMY      CARDIAC CATHETERIZATION  11/04/2016    multiple vessel disease.  no intervention    CARDIAC CATHETERIZATION  11/07/2016    CARDIAC CATHETERIZATION N/A 12/27/2016    Procedure: Coronary Angiogram;  Surgeon: Sunil Moran MD;  Location: [facility] Lab;  Service:     CAROTID  ENDARTERECTOMY      CAROTID ENDARTERECTOMY Right 2010    CERVICAL LAMINECTOMY  1994     SECTION      CV CORONARY ANGIOGRAM N/A 2020    Procedure: Coronary Angiogram;  Surgeon: Vinita Ramos MD;  Location: [facility];  Service: Cardiology    CV LEFT HEART CATHETERIZATION WITHOUT LEFT VENTRICULOGRAM Left 2020    Procedure: Left Heart Catheterization Without Left Ventriculogram;  Surgeon: Jerad Lerner MD;  Location: [facility] Cath Lab;  Service: Cardiology    CV TRANSCATHETER AORTIC VALVE REPLACEMENT - OTHER APPROACH N/A 2020    Procedure: CV TRANSCATHETER AORTIC VALVE REPLACEMENT - RIGHT SUBCLAVIAN APPROACH;  Surgeon: John Caceres MD;  Location: [facility] Cath Lab;  Service: Cardiology    HEART CATH, ANGIOPLASTY      HEMORRHOIDECTOMY EXTERNAL      IR LUMBAR EPIDURAL STEROID INJECTION  2014    IR LUMBAR NERVE ROOT INJECTION  10/01/2013    JOINT REPLACEMENT      OPEN REDUCTION INTERNAL FIXATION HIP NAILING Right 2/10/2024    Procedure: INTERNAL FIXATION, FRACTURE, TROCHANTERIC, HIP, USING PINS OR RODS RIGHT;  Surgeon: Gilberto Joy MD;  Location: [facility]    MS ESOPHAGOGASTRODUODENOSCOPY TRANSORAL DIAGNOSTIC N/A 07/10/2019    Procedure: ESOPHAGOGASTRODUODENOSCOPY (EGD) with gastric biopsy;  Surgeon: Sunil Stuart MD;  Location: [facility] GI;  Service: Gastroenterology    MS REPLACE AORTIC VALVE OPEN AXILLRY ARTRY APPROACH N/A 2020    Procedure: OR TRANSCATHETER AORTIC VALVE REPLACEMENT, SUBCLAVIAN APPROACH;  Surgeon: Bhavin Cuadra MD;  Location: [facility];  Service: Cardiology    TONSILLECTOMY & ADENOIDECTOMY      TOTAL KNEE ARTHROPLASTY Right     TRANSCATHETER AORTIC-VALVE REPLACEMENT      ZZC TOTAL KNEE ARTHROPLASTY Left 2021    Procedure: LEFT TOTAL KNEE ARTHROPLASTY;  Surgeon: Doug Rhodes MD;  Location: [facility];  Service: Orthopedics       Medications   I have reviewed this  patient's current medications  Current Facility-Administered Medications   Medication Dose Route Frequency Provider Last Rate Last Admin    acetaminophen (TYLENOL) tablet 975 mg  975 mg Oral Q8H Radha Wilder APRN CNP   975 mg at 01/02/25 1624    [START ON 1/3/2025] amLODIPine (NORVASC) tablet 5 mg  5 mg Oral Daily Zan Hobbs MD        aspirin EC tablet 81 mg  81 mg Oral BID German Gilmore PA-C        benzocaine-menthol (CEPACOL) 15-3.6 MG lozenge 1 lozenge  1 lozenge Buccal Q1H PRN German Gilmore PA-C        bisacodyl (DULCOLAX) suppository 10 mg  10 mg Rectal Daily PRN German Gilmore PA-C        buprenorphine (BUTRANS) 10 MCG/HR WK patch 1 patch  1 patch Transdermal Weekly Radha Wilder APRN CNP   1 patch at 01/02/25 1626    And    buprenorphine (BUTRANS) Patch in Place   Transdermal Q8H [facility] Radha Wilder APRN CNP        ceFAZolin (ANCEF) 1 g vial to attach to  ml bag for ADULT or 50 ml bag for PEDS  1 g Intravenous Q8H German Gilmore PA-C   1 g at 01/02/25 1624    [START ON 1/3/2025] cephALEXin (KEFLEX) capsule 500 mg  500 mg Oral Q6H [facility] German Gilmore PA-C        diclofenac (VOLTAREN) 1 % topical gel 2 g  2 g Topical 4x Daily Radha Wilder APRN CNP   2 g at 01/02/25 1627    [START ON 1/3/2025] escitalopram (LEXAPRO) tablet 5 mg  5 mg Oral Daily Zan Hobbs MD        HYDROmorphone (DILAUDID) injection 0.2 mg  0.2 mg Intravenous Q4H PRN Radha Wilder APRN CNP        lidocaine (XYLOCAINE) 5 % ointment   Topical 4x Daily Radha Wilder APRN CNP        lidocaine 1 % 0.1-1 mL  0.1-1 mL Other Q1H PRN German Gilmore PA-C        magnesium hydroxide (MILK OF MAGNESIA) suspension 30 mL  30 mL Oral Daily PRN German Gilmore PA-C        methocarbamol (ROBAXIN) tablet 500 mg  500 mg Oral 4x Daily Radha Wilder APRN CNP   500 mg at 01/02/25 1624    metoprolol succinate ER (TOPROL XL) 24 hr tablet 25 mg   25 mg Oral QPM Zan Hobbs MD        naloxone (NARCAN) injection 0.2 mg  0.2 mg Intravenous Q2 Min PRN Arturo Parham DO        Or    naloxone (NARCAN) injection 0.4 mg  0.4 mg Intravenous Q2 Min PRN Arturo Parham DO        Or    naloxone (NARCAN) injection 0.2 mg  0.2 mg Intramuscular Q2 Min PRN Arturo Parham DO        Or    naloxone (NARCAN) injection 0.4 mg  0.4 mg Intramuscular Q2 Min PRN Arturo Parham DO        nitroGLYcerin (NITROSTAT) sublingual tablet 0.4 mg  0.4 mg Sublingual Q5 Min PRN Zan Hobbs MD        ondansetron (ZOFRAN ODT) ODT tab 4 mg  4 mg Oral Q6H PRN German Gilmore PA-C        Or    ondansetron (ZOFRAN) injection 4 mg  4 mg Intravenous Q6H PRN German Gilmore PA-C        oxyCODONE IR (ROXICODONE) half-tab 2.5 mg  2.5 mg Oral Q3H PRN Radha Wilder APRN CNP        Or    oxyCODONE (ROXICODONE) tablet 5 mg  5 mg Oral Q3H PRN Radha Wilder APRN CNP        pantoprazole (PROTONIX) EC tablet 40 mg  40 mg Oral BID Arturo Parham DO        [START ON 1/3/2025] polyethylene glycol (MIRALAX) Packet 17 g  17 g Oral Daily German Gilmore PA-C        prochlorperazine (COMPAZINE) injection 5 mg  5 mg Intravenous Q6H PRN German Gilmore PA-C        Or    prochlorperazine (COMPAZINE) tablet 5 mg  5 mg Oral Q6H PRN German Gilmore PA-C        rOPINIRole (REQUIP) tablet 4 mg  4 mg Oral At Bedtime Zan Hobbs MD        senna-docusate (SENOKOT-S/PERICOLACE) 8.6-50 MG per tablet 1 tablet  1 tablet Oral BID German Gilmore PA-C        sodium chloride (PF) 0.9% PF flush 3 mL  3 mL Intracatheter Q8H German Gilmore PA-C        sodium chloride (PF) 0.9% PF flush 3 mL  3 mL Intracatheter q1 min prn German Gilmore PA-C              Physical Exam   Vital Signs: Temp: 97.7  F (36.5  C) Temp src: Oral BP: 130/58 Pulse: 70   Resp: 18 SpO2: 98 % O2 Device: Nasal cannula Oxygen Delivery: 2 LPM  Weight: 118 lbs 0 oz    Physical Exam  Constitutional:        General: She is not in acute distress.     Appearance: She is not toxic-appearing.   Cardiovascular:      Rate and Rhythm: Normal rate.      Heart sounds: Murmur heard.      Comments: Systolic murmur best heard on the mitral valve area.  Pulmonary:      Effort: Pulmonary effort is normal.   Musculoskeletal:      Right lower leg: Edema present.      Left lower leg: Edema present.   Skin:     General: Skin is warm and dry.      Comments: Mild erythema and warmth of the left distal lower extremity   Neurological:      Mental Status: She is alert.      Comments: Alert to self and place.  Was able to identify that she is in the hospital.  Intermittent confusion.          Medical Decision Making       50 MINUTES SPENT BY ME on the date of service doing chart review, history, exam, documentation & further activities per the note.      Data         Imaging results reviewed over the past 24 hrs:   Recent Results (from the past 24 hours)   XR Pelvis w Hip Port Left  1 View    Narrative    EXAM: XR PELVIS AND HIP PORTABLE LEFT 1 VIEW  LOCATION: [facility]  DATE: 1/2/2025    INDICATION: Status post Hip surgery  COMPARISON: 4/4/2024      Impression    IMPRESSION: Left hip arthroplasty without evidence of hardware complication. Redemonstrated fixation hardware in the right proximal femur. No definite acute fracture or dislocation within limitations of osteopenia.

## 2025-01-03 NOTE — PROGRESS NOTES
01/03/25 0957   Appointment Info   Signing Clinician's Name / Credentials (PT) Gertrudis Castillo, PT, DPT   Quick Adds   Quick Adds Certification   Living Environment   People in Home alone   Current Living Arrangements assisted living   Home Accessibility no concerns   Living Environment Comments pt reports being independent with functional mobility at baseline   Self-Care   Equipment Currently Used at Home walker, rolling  (has FWW & 4WW)   General Information   Onset of Illness/Injury or Date of Surgery 01/02/24   Referring Physician German Gilmore PA-C   Patient/Family Therapy Goals Statement (PT) Return to Home   Pertinent History of Current Problem (include personal factors and/or comorbidities that impact the POC) s/p JEAN CLAUDE   Existing Precautions/Restrictions weight bearing   Weight-Bearing Status - LLE weight-bearing as tolerated   Range of Motion (ROM)   ROM Comment decreased ROM s/p JEAN CLAUDE   Strength (Manual Muscle Testing)   Strength Comments decreased s/p JEAN CLAUDE   Transfers   Transfers sit-stand transfer   Maintains Weight-bearing Status (Transfers) able to maintain   Sit-Stand Transfer   Sit-Stand [place] (Transfers) minimum assist (75% patient effort)   Assistive Device (Sit-Stand Transfers) walker, front-wheeled   Gait/Stairs (Locomotion)   Oxford Level (Gait) contact guard   Assistive Device (Gait) walker, front-wheeled   Distance in Feet (Gait) 5   Pattern (Gait) swing-through;step-through   Deviations/Abnormal Patterns (Gait) carol decreased;gait speed decreased   Maintains Weight-bearing Status (Gait) able to maintain   Clinical Impression   Criteria for Skilled Therapeutic Intervention Yes, treatment indicated   PT Diagnosis (PT) Impaired Functional Mobility   Influenced by the following impairments pain, decreased ROM, impaired balance, decreased strength   Functional limitations due to impairments gait, transfers   Clinical Presentation (PT Evaluation Complexity) stable   Clinical  Presentation Rationale pt presents as medically diagnosed   Clinical Decision Making (Complexity) low complexity   Planned Therapy Interventions (PT) balance training;bed mobility training;gait training;home exercise program;patient/family education;ROM (range of motion);stair training;strengthening;transfer training   Risk & Benefits of therapy have been explained care plan/treatment goals reviewed;patient   PT Total Evaluation Time   PT Eval, Low Complexity Minutes (94381) 10   Therapy Certification   Start of care date 01/03/25   Certification date from 01/03/25   Certification date to 01/10/25   Medical Diagnosis s/p JEAN CLAUDE   Physical Therapy Goals   PT Frequency Daily   PT Predicted Duration/Target Date for Goal Attainment 01/10/25   PT Goals PT Goal 1;Transfers;Gait   PT: Transfers Supervision/stand-by assist;Sit to/from stand;Assistive device   PT: Gait Supervision/stand-by assist;Rolling walker;100 feet   PT: Goal 1 pt will be mod I with HEP for LE strengthening and ROM   Interventions   Interventions Quick Adds Therapeutic Procedure;Therapeutic Activity;Gait Training   Therapeutic Procedure/Exercise   Ther. Procedure: strength, endurance, ROM, flexibillity Minutes (27129) 15   Treatment Detail/Skilled Intervention Post-op HEP: 5-10 reps, cueing for safety/technique/specific muscle activation, Min A - limited d/t pain   Therapeutic Activity   Treatment Detail/Skilled Intervention Reviewed Education given on precautions & weightbearing status   Gait Training   Gait Training Minutes (60027) 10   Symptoms Noted During/After Treatment (Gait Training) fatigue   Treatment Detail/Skilled Intervention Cueing for safety/walker management/keeping walker close/posture   Distance in Feet 70   [place]on Level (Gait Training) contact guard   Physical Assistance Level (Gait Training) supervision;verbal cues   Weight Bearing (Gait Training) weight-bearing as tolerated   Assistive Device (Gait Training) rolling walker    Pattern Analysis (Gait Training) swing-through gait   Gait Analysis Deviations decreased carol;decreased step length   Impairments (Gait Analysis/Training) balance impaired;pain;ROM decreased;strength decreased   PT Discharge Planning   PT Plan gait as yumiko, sit to/from stand transfers, HEP   PT Discharge Recommendation (DC Rec) other (see comments)  (Defer to ortho)   PT Rationale for DC Rec pt mobilizing well. Currently requiring CGA with ambulation & transfers.   PT Brief overview of current status CGA gait & transfers   PT Equipment Needed at Discharge walker, rolling  (FWW - pt has access to)   Physical Therapy Time and Intention   Timed Code Treatment Minutes 25   Total Session Time (sum of timed and untimed services) 35     [facility]                                                                                   OUTPATIENT PHYSICAL THERAPY    PLAN OF TREATMENT FOR OUTPATIENT REHABILITATION   Patient's Last Name, First Name, Sherice Pozo YOB: 1934   Provider's Name   [facility]   Medical Record No.  [id number]     Onset Date: 01/02/24 Start of Care Date: 01/03/25     Medical Diagnosis:  s/p JEAN CLAUDE               PT Diagnosis:  Impaired Functional Mobility Certification Dates:  From: 01/03/25  To: 01/10/25       See note for plan of treatment, functional goals, and certification details.    I CERTIFY THE NEED FOR THESE SERVICES FURNISHED UNDER        THIS PLAN OF TREATMENT AND WHILE UNDER MY CARE (Physician co-signature of this document indicates review and certification of the therapy plan).

## 2025-01-03 NOTE — PROGRESS NOTES
[facility]    Medicine Progress Note - Hospitalist Service    Date of Admission:  1/2/2025    Assessment & Plan     Identification: Sherice Alvarez is a 89 year old female   PMHx: Coronary artery disease, heart failure with preserved ejection fraction, HTN, CVA, CKD 3a, cognitive impairment, restless leg syndrome    Sherice underwent elective left hip arthroplasty on January 2 with Dr Parhma, 300 ml EBL, no complications.  Hospital medicine consulted for management of chronic medical conditions.  If she discharges today, would recommend repeat Hb drawn in close follow up with primary.    Today:  Acute blood loss anemia    Acute blood loss anemia (baseline hemoglobin 10.4 May '24)  On twice daily aspirin 81 mg for PPx  Mild clinical pallor, but ambulating without lightheadedness  Monitor for melena    Essential hypertension  PTA amlodipine 5 mg daily  PTA metoprolol succinate 25 mg daily    HFpEF  PTA furosemide 20 mg on hold    Coronary artery disease  Severe aortic valve stenosis status post TAVR  At home on aspirin, resume when prophylactic aspirin completes  PTA metoprolol succinate 25 mg daily    History of CVA  History of subarachnoid hemorrhage    Restless leg syndrome  PTA Requip 4 mg bedtime    Recent left lower extremity cellulitis  Per prior documentation, already completed a course of antibiotic.  Left leg negative for DVT  Agree with cephalexin started perioperatively per primary team.    Status post left hip arthroplasty  Hold home Ativan.            Diet: Advance Diet as Tolerated: Regular Diet Adult  Discharge Instruction - Regular Diet Adult    DVT Prophylaxis: Defer to primary service  Tyson Catheter: Not present  Lines: None     Cardiac Monitoring: None  Code Status: Full Code      Clinically Significant Risk Factors Present on Admission                 # Drug Induced Platelet Defect: home medication list includes an antiplatelet medication   # Hypertension: Noted on  problem list  # Chronic heart failure with preserved ejection fraction: heart failure noted on problem list and last echo with EF >50%     # Anemia: based on hgb <11           # Financial/Environmental Concerns:    # Asthma: noted on problem list        Social Drivers of Health    Tobacco Use: Medium Risk (12/31/2024)    Patient History     Smoking Tobacco Use: Former     Smokeless Tobacco Use: Never   Interpersonal Safety: Unknown (2/17/2024)    [facility]    Humiliation, Afraid, Rape, and Kick questionnaire     Physically Abused: No     Sexually Abused: No    Received from [employer] [employer]    Social Connections          Disposition Plan     Medically Ready for Discharge: Anticipated Today       Nathanael Garcia MD  Hospitalist Service  [facility]  Securely message with [employer] (more info)  Text page via [email or web address] Paging/Directory   ______________________________________________________________________    Interval History   Activity limited by pain, voiding, tolerating p.o., received IV Dilaudid.  Vitally stable.  Hemoglobin 7.3.  DVT study negative.  Pain team consulted    Physical Exam   Vital Signs: Temp: 97.8  F (36.6  C) Temp src: Oral BP: 115/56 Pulse: 67   Resp: 16 SpO2: 97 % O2 Device: Nasal cannula Oxygen Delivery: 1 LPM  Weight: 118 lbs 0 oz    Awake, alert, appropriate in conversation  I saw her while she is working with physical therapy  Very hard of hearing  Mild conjunctival pallor  Heart regular rate and rhythm with systolic murmur (standing)  Lungs clear to auscultation  Breathing comfortably on room air  Nonacute abdomen  No obvious neurovascular deficit of bilateral lower extremities upon gross exam    Medical Decision Making             Data   ------------------------- PAST 24 HR DATA REVIEWED -----------------------------------------------    I have personally reviewed the following data over the past 24 hrs:    N/A   \   7.3 (L)   / N/A     N/A N/A N/A /  91   N/A N/A N/A \       Imaging results reviewed over the past 24 hrs:   Recent Results (from the past 24 hours)   US Lower Extremity Venous Duplex Left    Narrative    EXAM: US LOWER EXTREMITY VENOUS DUPLEX LEFT  LOCATION: [facility]  DATE: 1/2/2025    INDICATION: Recent cellulitis, left leg warmth, erythema and mild tenderness to palpation.    COMPARISON: None.  TECHNIQUE: Venous Duplex ultrasound of the left lower extremity with and without compression, augmentation and duplex. Color flow and spectral Doppler with waveform analysis performed.    FINDINGS: Exam includes the common femoral, femoral, popliteal, and contralateral common femoral veins as well as segmentally visualized deep calf veins and greater saphenous vein.     LEFT: No deep vein thrombosis. No superficial thrombophlebitis. No popliteal cyst. No collection.      Impression    IMPRESSION:  1.  No deep venous thrombosis in the left lower extremity.

## 2025-01-03 NOTE — PROGRESS NOTES
[facility] ACUTE PAIN SERVICE    [facility], [facility], [facility], [facility], [place]   PAIN Progress Note    Assessment/Plan:  Sherice Alvarez is a 89 year old female who was admitted on 1/2/2025.  Pain team was asked to see the patient for s/p L-JEAN CLAUDE post-op pain management with chronic pain w/opioid dependence. Admitted for elective L-JEAN CLAUDE. History of anemia, CAD, NSTEMI w/multiple stents, HFpEF, CVA, HLD, HTN, asthma, CKD, RLS, and severe chronic hip pain with opioid dependence; On Butrans patch, oxycodone, lidocaine patches and methocarbamol.  Follows with [facility].  The patient does not smoke and denies chemical dependency history.      Post op day: Day 1.  L-JEAN CLAUDE     Opioid Induced Respiratory Depression Risk Assessment: High   (Low 0-1; Moderate 2-4; or High >4 or >/= 3 if two of the risk factors are age > 60 and opioid naive) due to the following risk factors: COPD/Asthma/pulmonary disease, CHF, renal dysfunction, Age>60, >2 opioid therapies, concomitant CNS depressants, post surgical ?      The patient's chronic pain home regimen is:   Butrans patch 10 mcg/hr once weekly on Thursdays   Voltaren gel TID to lower back  Lidocaine ointment TID PRN for moderate pain  Robaxin 500 mg TID   Oxycodone 5 mg TID and an additional 5 mg q4h PRN for severe pain   Ropinirole 4 mg at bedtime for RLS      PLAN:   1) Pain is consistent with acute on chronic post-op pain s/p L-JEAN CLAUDE in the setting of chronic pain on chronic opioids.   Multimodal Medication Therapy  Topical: lidocaine ointment qid, alternating with voltaren gel qid   NSAID'S: CrCl 30 mL/min. Contraindicated   Steroids: none  Muscle Relaxants: robaxin 500 mg qid (home med is 500 mg tid)  Adjuvants:   Tylenol 975 mg Q8H   Antidepressants/anxiolytics: Ropinirole 4 mg at bedtime        Opioids: oxycodone 2.5-5mg q3h prn  - first line   IV Pain medication: IV Dilaudid 0.2 mg q4h prn - second line   Non-medication interventions: Ice, Rest, PT,  OT, and Distraction (TV, Music, Reading)  Constipation Prophylaxis: Senna-docusate and Miralax     -Opioid prescriber has been consistently from [facility]   -[place]  pulled from system on 1/2/24. This indicates consistent fills of oxycodone 5 mg and Butrans patch - most recent strength 10 mcg/hr patch   Discharge Recommendations - We recommend prescribing the following at the time of discharge: Per Orthopedics     Subjective:  Describes pain as 5/10 and aching, spasm in the L hip. The patient denies nausea, vomiting, constipation, diarrhea, chest pain, shortness of breath, dizziness, fever, and chills.     Principal Problem:  <principal problem not specified>     Patient Active Problem List   Diagnosis    Constipation    Cancer (H)    Gastroesophageal reflux disease with esophagitis    Dyslipidemia, goal LDL below 100    Abnormal chest CT    Anterior epistaxis    Stage 3 chronic kidney disease (H)    Asthma    Coronary arteriosclerosis due to lipid rich plaque    Severe calcific aortic valve stenosis    Dyspnea    S/P TAVR (transcatheter aortic valve replacement)    Leukocytosis, unspecified type    S/P total knee arthroplasty, left    Acute deep vein thrombosis (DVT) of lower extremity (H)    (HFpEF) heart failure with preserved ejection fraction (H)    Chest pain, unspecified type    Other insomnia    Adjustment disorder with mixed anxiety and depressed mood    Restless leg syndrome    Neck pain    Cervical spine pain    Polyuria    Mild cognitive impairment    Cellulitis of left lower extremity    Edema of left lower extremity    Avulsion of skin of left lower leg, initial encounter    Essential hypertension    Acute on chronic anemia    Fall, initial encounter    Closed displaced subtrochanteric fracture of right femur, initial encounter (H)    Episodic mood disorder (H)    Aftercare for healing traumatic fracture of femur    Aortic calcification (H)    Chronic systolic CHF (congestive heart failure) (H)     "Closed fracture of neck of left femur (H)    Dyslipidemia    Hypothyroidism (acquired)    Skin ulcer of sacrum (H)    Spinal stenosis of lumbar region with neurogenic claudication    Subarachnoid hemorrhage (H)    Syncope, unspecified syncope type    Other chronic pain    Primary osteoarthritis of hip        Objective:    History   Drug Use No          Tobacco Use      Smoking status: Former        Packs/day: 0.00        Types: Cigarettes        Quit date: 1980        Years since quittin.0      Smokeless tobacco: Never      Vital signs in last 24 hours:  /56 (BP Location: Left arm)   Pulse 67   Temp 97.8  F (36.6  C) (Oral)   Resp 16   Ht 1.549 m (5' 1\")   Wt 53.5 kg (118 lb)   SpO2 97%   BMI 22.30 kg/m      Weight:     Vitals:    24 0900 25 0722   Weight: 53.5 kg (118 lb) 53.5 kg (118 lb)      Weight change:   Body mass index is 22.3 kg/m .    Intake/Output last 3 shifts:  I/O last 3 completed shifts:  In: 1050 [I.V.:800]  Out: 300 [Blood:300]  Intake/Output this shift:  No intake/output data recorded.    Review of Systems:   As per subjective, all others negative.    Physical Exam:  General Appearance:  Alert, cooperative, no distress, up in a chair    Head:  Normocephalic, without obvious abnormality, atraumatic, hard of hearing    Eyes:  PERRL, conjunctiva/corneas clear, EOM's intact   Nose: Nares normal, septum midline   Throat: Lips, mucosa, and tongue normal; teeth and gums normal   Neck: Supple, symmetrical, trachea midline   Back:   Symmetric, no curvature, ROM normal   Lungs:   Clear to auscultation bilaterally, respirations unlabored, room air    Chest Wall:  No tenderness or deformity   Heart:  Regular rate and rhythm, S1, S2 normal    Abdomen:   Soft, non-tender   Extremities: Incision is covered, dressing is CDI    Skin: Skin warm, dry    Neurologic: Alert and oriented X 3 but forgetful, Moves all 4 extremities   Psych: Affect is appropriate      Imaging: Reviewed I " have personally reviewed pertinent notes, labs, tests, and radiologic imaging in patient's chart.  Labs: Reviewed I have personally reviewed pertinent notes, labs, tests, and radiologic imaging in patient's chart.  Notes: Reviewed I have personally reviewed pertinent notes, labs, tests, and radiologic imaging in patient's chart.    Total time spent 35 minutes with greater than 50% in consultation, education and coordination of care.   Also discussed with RN and Orthopedics.   Treatment plan includes: multimodal pain approach, Hospital Medicine Service for medical management, Orthopedics, PT, OT.   Patient educated regarding: multimodal pain approach and medications as listed above.   Elements of Medical Decision Making as described above. Acute or chronic illness or injury or surgery. High risk therapy including opioids, high risk drug therapy including oral and/or parenteral controlled substances    Patient is understanding of the plan. All questions and concerns addressed to patient's satisfaction.     Radha VO FNP-C  Acute Care Inpatient Pain Management Program  [facility] ([facility])  Hours of coverage Monday-Friday [phone]. After hours please contact Primary team.   Page via Epic chat or Cubeacon

## 2025-01-03 NOTE — CONSULTS
Care Management Initial Consult    General Information  Assessment completed with: Patient,    Type of CM/SW Visit: Initial Assessment    Primary Care Provider verified and updated as needed: Yes   Readmission within the last 30 days: no previous admission in last 30 days      Reason for Consult: discharge planning  Advance Care Planning:            Communication Assessment  Patient's communication style: spoken language (English or Bilingual)             Cognitive  Cognitive/Neuro/Behavioral: WDL  Level of Consciousness: lethargic  Arousal Level: arouses to voice  Orientation: oriented x 4  Mood/Behavior: restless     Speech: garbled    Living Environment:   People in home: alone, facility resident     Current living Arrangements: assisted living  Name of Facility: [facility]   Able to return to prior arrangements: no       Family/Social Support:  Care provided by: child(neetu), homecare agency, other (see comments)  Provides care for: no one, unable/limited ability to care for self  Marital Status:   Support system: Children          Description of Support System: Supportive, Involved         Current Resources:   Patient receiving home care services: Yes        Community Resources: Hospice  Equipment currently used at home: walker, rolling (has FWW & 4WW)  Supplies currently used at home: None    Employment/Financial:  Employment Status: retired        Financial Concerns: none   Referral to Financial Worker: No       Does the patient's insurance plan have a 3 day qualifying hospital stay waiver?  No    Lifestyle & Psychosocial Needs:  Social Drivers of Health     Food Insecurity: No Food Insecurity (2/17/2024)    Received from [employer]    Hunger Vital Sign     Worried About Running Out of Food in the Last Year: Never true     Ran Out of Food in the Last Year: Never true   Depression: Not at risk (6/20/2023)    PHQ-2     PHQ-2 Score: 1   Housing Stability: Low Risk  (2/17/2024)    [facility]    Housing Stability Vital Sign     Unable to Pay for Housing in the Last Year: No     Number of Places Lived in the Last Year: 1     Unstable Housing in the Last Year: No   Tobacco Use: Medium Risk (12/31/2024)    Patient History     Smoking Tobacco Use: Former     Smokeless Tobacco Use: Never     Passive Exposure: Not on file   Financial Resource Strain: Low Risk  (8/23/2023)    [facility]    Financial Resource Strain     Difficulty of Paying Living Expenses: 3     Difficulty of Paying Living Expenses: Not on file   Alcohol Use: Not on file   Transportation Needs: No Transportation Needs (2/17/2024)    [facility]    PRAPARE - Transportation     Lack of Transportation (Medical): No     Lack of Transportation (Non-Medical): No   Physical Activity: Not on file   Interpersonal Safety: Unknown (2/17/2024)    [facility]    Humiliation, Afraid, Rape, and Kick questionnaire     Fear of Current or Ex-Partner: Not on file     Emotionally Abused: Not on file     Physically Abused: No     Sexually Abused: No   Stress: Not on file   Social Connections: Unknown (9/1/2024)    [facility]    Social Connections     Frequency of Communication with Friends and Family: Not on file   Health Literacy: Not on file       Functional Status:  Prior to admission patient needed assistance:   Dependent ADLs:: Ambulation-walker  Dependent IADLs:: Transportation, Medication Management, Money Management, Laundry, Shopping, Cleaning, Meal Preparation       Mental Health Status:  Mental Health Status: No Current Concerns       Chemical Dependency Status:  Chemical Dependency Status: No Current Concerns             Values/Beliefs:  Spiritual, Cultural Beliefs, Orthodox Practices, Values that affect care: no                Discussed  Partnership in Safe Discharge Planning  document with patient/family: Yes:     Additional Information:  [facility] met and introduced self and CM services to Pt.  Pt lives at [facility]. Pt states that she get meals and medication by staff.  Pt is a retired SW.  [facility] has call out to [facility] as Pt could not remember name of Hospice that Pt was on prior to surgery.  [facility] called and spoke with Jori- Nurse at [facility]- [phone] and verified that Pt was on [facility].  [facility] has call into [facility].  retirement can admit over weekend pending any new supplies that maybe needed. [facility] called and spoke with Vince with [facility] and will find out more information from the [facility] branch.      1:16 PM  Vince with [facility] called back and confirmed that Pt was open to Hospice and will sign back on when Pt returns.      2:19 PM  [facility] sent referral to [facility] and will have nurse available to sign Pt back on tomorrow.  Hospitalist will need to order Hospice to evaluate and treat in discharge orders.     Next Steps:  Let [facility] know when Pt will return.    TERE Guo

## 2025-01-03 NOTE — PLAN OF CARE
Note from [id number]. Pt rated pain 5-7/10 during shift thus far. No new skin issues noted. Dressing is CDI. Numbness and tingling to all extremities per pt. Alert and oriented X4 this shift, however, can be forgetful. Very [place]. SBA to assist of 1 with walker for transferring. Saline locked. Voiding adequately, incontinent. Education on medication administration and use of call-light to reduce risk for falls and injury. Alarms in place. No further issues noted. VSS, denies shortness of breath.    Yashira Doty RN

## 2025-01-03 NOTE — PLAN OF CARE
Problem: Adult Inpatient Plan of Care  Goal: Absence of Hospital-Acquired Illness or Injury  Intervention: Identify and Manage Fall Risk  Recent Flowsheet Documentation  Taken 1/2/2025 1640 by Cynthia Stahl RN  Safety Promotion/Fall Prevention: activity supervised     Problem: Adult Inpatient Plan of Care  Goal: Absence of Hospital-Acquired Illness or Injury  Intervention: Prevent Infection  Recent Flowsheet Documentation  Taken 1/2/2025 1640 by Cynthia Stahl RN  Infection Prevention: hand hygiene promoted   Goal Outcome Evaluation:  Patient vital signs are at baseline: Yes  Patient able to ambulate as they were prior to admission or with assist devices provided by therapies during their stay:  No,  Reason:    Patient MUST void prior to discharge:  Yes  Patient able to tolerate oral intake:  Yes  Pain has adequate pain control using Oral analgesics:  Yes  Does patient have an identified :  Yes  Has goal D/C date and time been discussed with patient:  Yes   Pt alert but can be forgetful, vss on 2L, can voice needs, pain meds given, she is due to void and due to ambulate.

## 2025-01-03 NOTE — PROGRESS NOTES
"Orthopedic Progress Note      Assessment: 1 Day Post-Op  S/P Procedure(s):  LEFT TOTAL HIP ARTHROPLASTY       Plan:   - Continue PT/OT, no precautions  - Weightbearing status: WBAT  - Anticoagulation: ASA in addition to SCDs, perla stockings and early ambulation.  - Discharge planning: pending therapies, pain control, and medical clearance   - discussed with patient trying to work on straightening LLE to allow for left quad to relax to avoid muscle spasms    Subjective:  Pain: 8/10  Nausea, Vomiting:  No  Chest pain: No  Lightheadedness, Dizziness:  No  Neuro:  Patient denies new onset numbness or paresthesias     Patient endorses left hip pain 8/10. Concerned about putting weight through operative leg and keeping it straight. States that keeping it straight \"pulls on muscles\".  Hgb 7.3 (8.8 pre-op).     Objective:  /56 (BP Location: Left arm)   Pulse 67   Temp 97.8  F (36.6  C) (Oral)   Resp 16   Ht 1.549 m (5' 1\")   Wt 53.5 kg (118 lb)   SpO2 97%   BMI 22.30 kg/m    The patient is A&Ox3. Appears comfortable, sitting in recliner.  Calves are soft and non-tender bilaterally. BLE edematous, LLE slightly red and warm due to recent cellulitis.   Brisk capillary refill in the toes.   Palpable left dorsalis pedis pulse. Foot warm & well-perfused.  Sensation is intact to light touch & equal bilaterally in the femoral, DP, SP & tibial nerve distributions.  ROM: Flexes at left hip. Appropriately flexes & extends all toes bilaterally.   Motor: +5/5 dorsiflexion, plantar flexion & EHL bilaterally. Fires quad.   Leg lengths equal.  left hip dressing C/D/I without strikethrough, no surrounding erythema.       5/5 TA/GSC/EHL.         Pertinent Labs   Lab Results: personally reviewed.   Lab Results   Component Value Date    INR 1.23 (H) 02/09/2024    INR 1.13 11/09/2022    INR 1.13 01/15/2022     Lab Results   Component Value Date    WBC 9.2 12/23/2024    HGB 7.3 (L) 01/03/2025    HCT 26.8 (L) 12/23/2024    MCV 90 " 12/23/2024     12/23/2024     Lab Results   Component Value Date     12/23/2024    CO2 23 12/23/2024         Report completed by:  Alexia Blount PA-C/Dr. Parham  [facility]    Date: 1/3/2025  Time: 8:44 AM

## 2025-01-03 NOTE — PROGRESS NOTES
"   01/03/25 0950   Appointment Info   Signing Clinician's Name / Credentials (OT) Marlena Ham, MOTR/L, CLT   Rehab Comments (OT) OT brianna   Quick Adds   Quick Adds Certification   Living Environment   People in Home alone   Current Living Arrangements assisted living   Home Accessibility no concerns   Self-Care   Usual Activity Tolerance good   Current Activity Tolerance moderate   Equipment Currently Used at Home grab bar, toilet;grab bar, tub/shower;other (see comments)  (standard [facility] equip)   Fall history within last six months no   Activity/Exercise/Self-Care Comment Pt IND w/ BADL and recieves assist with IADLs at baseline at [facility].   General Information   Onset of Illness/Injury or Date of Surgery 01/02/25   Referring Physician Dr. Arturo Parham   Patient/Family Therapy Goal Statement (OT) OK to get up now   Additional Occupational Profile Info/Pertinent History of Current Problem s/p JEAN CLAUDE-89 year old female admitted on 1/2/2025.  She has a past medical history significant for coronary artery disease, HFpEF, hypertension, CVA, CKD stage IIIa, cognitive impairment, restless leg syndrome who presented to the hospital for elective left hip arthroplasty.   Existing Precautions/Restrictions weight bearing;no known precautions/restrictions  (\"no formal hip precautions\")   Left Lower Extremity (Weight-bearing Status) weight-bearing as tolerated (WBAT)   Cognitive Status Examination   Orientation Status orientation to person, place and time   Affect/Mental Status (Cognitive)   (grossly functional, however, \"mild cog impairment\" in PMH)   Visual Perception   Visual Impairment/Limitations WFL   Sensory   Sensory Quick Adds sensation intact   Pain Assessment   Patient Currently in Pain No  (pain \"all of a sudden comes\")   Posture   Posture not impaired   Range of Motion Comprehensive   General Range of Motion no range of motion deficits identified   Strength Comprehensive (MMT)   General Manual Muscle Testing (MMT) " Assessment   (some decr strength grossly)   Muscle Tone Assessment   Muscle Tone Quick Adds No deficits were identified   Coordination   Upper Extremity Coordination No deficits were identified   Transfers   Transfers bed-chair transfer;sit-stand transfer;toilet transfer;shower transfer   Transfer Comments Fabián   Transfer Skill: Bed to Chair/Chair to Bed   Transfer Comments CGA   Sit-Stand Transfer   Sit/Stand Transfer Comments Fabián   Shower Transfer   Shower Transfer Comments Fabián per clinical judgement   Toilet Transfer   Toilet Transfer Comments min A per clinical judgement   Balance   Balance Comments decreased   Activities of Daily Living   BADL Assessment/Intervention lower body dressing;toileting;bathing   Bathing Assessment/Intervention   [place] Level (Bathing) lower body;minimum assist (75% patient effort)   Lower Body Dressing Assessment/Training   [place] Level (Lower Body Dressing) lower body dressing skills;maximum assist (25% patient effort)   Toileting   [place] Level (Toileting) adjust/manage clothing;minimum assist (75% patient effort)   Clinical Impression   Criteria for Skilled Therapeutic Interventions Met (OT) Yes, treatment indicated   OT Diagnosis decreased ADLs   Influenced by the following impairments JEAN CLAUDE   OT Problem List-Impairments impacting ADL activity tolerance impaired;mobility;pain;post-surgical precautions;flexibility;balance;cognition;communication  (pt [place])   Assessment of Occupational Performance 5 or more Performance Deficits   Identified Performance Deficits LE dressing/bathing, bed mobility, all transfers, toileting   Planned Therapy Interventions (OT) ADL retraining;bed mobility training;transfer training;cognition;strengthening;progressive activity/exercise   Clinical Decision Making Complexity (OT) detailed assessment/moderate complexity   Risk & Benefits of therapy have been explained evaluation/treatment results reviewed;patient   OT Total Evaluation Time    OT Eval, Moderate Complexity Minutes (13322) 10   Therapy Certification   Medical Diagnosis JEAN CLAUDE   Start of Care Date 01/03/25   Certification date from 01/03/25   Certification date to 01/09/25   OT Goals   Therapy Frequency (OT) 5 times/week   OT Predicted Duration/Target Date for Goal Attainment 01/03/25   OT Goals Lower Body Dressing;Toilet Transfer/Toileting   OT: Lower Body Dressing Modified independent;within precautions;using adaptive equipment   OT: Toilet Transfer/Toileting Modified independent;toilet transfer   Interventions   Interventions Quick Adds Self-Care/Home Management   Self-Care/Home Management   Self-Care/Home Mgmt/ADL, Compensatory, Meal Prep Minutes (95687) 10   Symptoms Noted During/After Treatment (Meal Preparation/Planning Training) none   Treatment Detail/Skilled Intervention Pt edu on lack of hip px - demonstrated ability to complete some ADL, but has been decreased d/t incr pain. Pt edu on compensatory strategies for LE dressing using reacher and sock aid - completed Mod A w/ AE (maxA for socks to don, ModA to doff socks, min A for donning pants). STS w/ Fabián-CGA and FWW. Pt amb. 15 ft to door w/ FWW/CGA/cues for tech. Educ in func transfers-deangelo chair - pt verbalized understanding and will need reinforcement.   OT Discharge Planning   OT Plan func transfers-bed mob;LB dress with AE(no formal hip prec)   OT Discharge Recommendation (DC Rec) other (see comments)  (defer to ortho)   OT Rationale for DC Rec Pt will have assist at [facility], and dtr will assist-may need increased services/incr care from dtr at this time with incr pain and incr education for home with therapy at home if indicated.   OT Brief overview of current status CGA-Fabián for func txs; maxA for donning socks   Total Session Time   Timed Code Treatment Minutes 10   Total Session Time (sum of timed and untimed services) 20   [facility]                                                                                    OUTPATIENT OCCUPATIONAL THERAPY    PLAN OF TREATMENT FOR OUTPATIENT REHABILITATION   Patient's Last Name, First Name, MIKESukumarALISHASukumar  Sherice Alvarez YOB: 1934   Provider's Name   [facility]   Medical Record No.  [id number]     Onset Date: 01/02/25 Start of Care Date: 01/03/25     Medical Diagnosis:  JEAN CLAUDE               OT Diagnosis:  decreased ADLs Certification Dates:  From: 01/03/25  To: 01/09/25     See note for plan of treatment, functional goals, and certification details.    I CERTIFY THE NEED FOR THESE SERVICES FURNISHED UNDER        THIS PLAN OF TREATMENT AND WHILE UNDER MY CARE (Physician co-signature of this document indicates review and certification of the therapy plan).

## 2025-01-04 ENCOUNTER — APPOINTMENT (OUTPATIENT)
Dept: RADIOLOGY | Facility: CLINIC | Age: OVER 89
DRG: 469 | End: 2025-01-04
Attending: HOSPITALIST
Payer: COMMERCIAL

## 2025-01-04 ENCOUNTER — APPOINTMENT (OUTPATIENT)
Dept: PHYSICAL THERAPY | Facility: CLINIC | Age: OVER 89
DRG: 469 | End: 2025-01-04
Attending: ORTHOPAEDIC SURGERY
Payer: COMMERCIAL

## 2025-01-04 LAB
GLUCOSE BLDC GLUCOMTR-MCNC: 102 MG/DL (ref 70–99)
HGB BLD-MCNC: 7.1 G/DL (ref 11.7–15.7)
HOLD SPECIMEN: NORMAL

## 2025-01-04 PROCEDURE — 97116 GAIT TRAINING THERAPY: CPT | Mod: GP

## 2025-01-04 PROCEDURE — 71045 X-RAY EXAM CHEST 1 VIEW: CPT

## 2025-01-04 PROCEDURE — 250N000013 HC RX MED GY IP 250 OP 250 PS 637: Performed by: ORTHOPAEDIC SURGERY

## 2025-01-04 PROCEDURE — 93005 ELECTROCARDIOGRAM TRACING: CPT

## 2025-01-04 PROCEDURE — 250N000013 HC RX MED GY IP 250 OP 250 PS 637: Performed by: PHYSICIAN ASSISTANT

## 2025-01-04 PROCEDURE — 99232 SBSQ HOSP IP/OBS MODERATE 35: CPT | Performed by: HOSPITALIST

## 2025-01-04 PROCEDURE — 250N000013 HC RX MED GY IP 250 OP 250 PS 637: Performed by: NURSE PRACTITIONER

## 2025-01-04 PROCEDURE — 93005 ELECTROCARDIOGRAM TRACING: CPT | Performed by: HOSPITALIST

## 2025-01-04 PROCEDURE — 93010 ELECTROCARDIOGRAM REPORT: CPT | Performed by: INTERNAL MEDICINE

## 2025-01-04 PROCEDURE — 250N000013 HC RX MED GY IP 250 OP 250 PS 637: Performed by: STUDENT IN AN ORGANIZED HEALTH CARE EDUCATION/TRAINING PROGRAM

## 2025-01-04 PROCEDURE — 120N000001 HC R&B MED SURG/OB

## 2025-01-04 PROCEDURE — 85018 HEMOGLOBIN: CPT | Performed by: PHYSICIAN ASSISTANT

## 2025-01-04 PROCEDURE — 97530 THERAPEUTIC ACTIVITIES: CPT | Mod: GP

## 2025-01-04 PROCEDURE — 36415 COLL VENOUS BLD VENIPUNCTURE: CPT | Performed by: PHYSICIAN ASSISTANT

## 2025-01-04 RX ADMIN — SENNOSIDES AND DOCUSATE SODIUM 1 TABLET: 50; 8.6 TABLET ORAL at 20:08

## 2025-01-04 RX ADMIN — ASPIRIN 81 MG: 81 TABLET, COATED ORAL at 20:08

## 2025-01-04 RX ADMIN — CEPHALEXIN 500 MG: 500 CAPSULE ORAL at 18:15

## 2025-01-04 RX ADMIN — AMLODIPINE BESYLATE 5 MG: 5 TABLET ORAL at 08:19

## 2025-01-04 RX ADMIN — OXYCODONE 2.5 MG: 5 TABLET ORAL at 11:48

## 2025-01-04 RX ADMIN — LIDOCAINE: 50 OINTMENT TOPICAL at 20:07

## 2025-01-04 RX ADMIN — SENNOSIDES AND DOCUSATE SODIUM 1 TABLET: 50; 8.6 TABLET ORAL at 08:19

## 2025-01-04 RX ADMIN — ROPINIROLE HYDROCHLORIDE 4 MG: 1 TABLET, FILM COATED ORAL at 20:08

## 2025-01-04 RX ADMIN — ACETAMINOPHEN 975 MG: 325 TABLET, FILM COATED ORAL at 16:18

## 2025-01-04 RX ADMIN — ASPIRIN 81 MG: 81 TABLET, COATED ORAL at 08:19

## 2025-01-04 RX ADMIN — METOPROLOL SUCCINATE 25 MG: 25 TABLET, EXTENDED RELEASE ORAL at 20:08

## 2025-01-04 RX ADMIN — ACETAMINOPHEN 975 MG: 325 TABLET, FILM COATED ORAL at 08:19

## 2025-01-04 RX ADMIN — LIDOCAINE: 50 OINTMENT TOPICAL at 08:19

## 2025-01-04 RX ADMIN — DICLOFENAC 2 G: 10 GEL TOPICAL at 20:13

## 2025-01-04 RX ADMIN — CEPHALEXIN 500 MG: 500 CAPSULE ORAL at 11:49

## 2025-01-04 RX ADMIN — DICLOFENAC 2 G: 10 GEL TOPICAL at 08:19

## 2025-01-04 RX ADMIN — DICLOFENAC 2 G: 10 GEL TOPICAL at 13:46

## 2025-01-04 RX ADMIN — POLYETHYLENE GLYCOL 3350 17 G: 17 POWDER, FOR SOLUTION ORAL at 08:19

## 2025-01-04 RX ADMIN — METHOCARBAMOL 500 MG: 500 TABLET ORAL at 08:19

## 2025-01-04 RX ADMIN — CEPHALEXIN 500 MG: 500 CAPSULE ORAL at 06:25

## 2025-01-04 RX ADMIN — PANTOPRAZOLE SODIUM 40 MG: 40 TABLET, DELAYED RELEASE ORAL at 20:08

## 2025-01-04 RX ADMIN — PANTOPRAZOLE SODIUM 40 MG: 40 TABLET, DELAYED RELEASE ORAL at 08:19

## 2025-01-04 RX ADMIN — METHOCARBAMOL 500 MG: 500 TABLET ORAL at 13:46

## 2025-01-04 RX ADMIN — METHOCARBAMOL 500 MG: 500 TABLET ORAL at 18:15

## 2025-01-04 RX ADMIN — ESCITALOPRAM 5 MG: 5 TABLET, FILM COATED ORAL at 08:19

## 2025-01-04 RX ADMIN — LIDOCAINE: 50 OINTMENT TOPICAL at 16:18

## 2025-01-04 RX ADMIN — METHOCARBAMOL 500 MG: 500 TABLET ORAL at 22:28

## 2025-01-04 RX ADMIN — LIDOCAINE: 50 OINTMENT TOPICAL at 13:46

## 2025-01-04 RX ADMIN — DICLOFENAC 2 G: 10 GEL TOPICAL at 16:19

## 2025-01-04 ASSESSMENT — ACTIVITIES OF DAILY LIVING (ADL)
ADLS_ACUITY_SCORE: 51
ADLS_ACUITY_SCORE: 68
ADLS_ACUITY_SCORE: 52
ADLS_ACUITY_SCORE: 68
ADLS_ACUITY_SCORE: 53
ADLS_ACUITY_SCORE: 52
ADLS_ACUITY_SCORE: 51
ADLS_ACUITY_SCORE: 53
ADLS_ACUITY_SCORE: 53
ADLS_ACUITY_SCORE: 52
ADLS_ACUITY_SCORE: 52
ADLS_ACUITY_SCORE: 53
ADLS_ACUITY_SCORE: 51
ADLS_ACUITY_SCORE: 52
ADLS_ACUITY_SCORE: 51
ADLS_ACUITY_SCORE: 53
ADLS_ACUITY_SCORE: 51
ADLS_ACUITY_SCORE: 52
ADLS_ACUITY_SCORE: 51

## 2025-01-04 NOTE — PLAN OF CARE
Problem: Adult Inpatient Plan of Care  Goal: Absence of Hospital-Acquired Illness or Injury  Intervention: Identify and Manage Fall Risk  Recent Flowsheet Documentation  Taken 1/3/2025 1630 by Cynthia Stahl RN  Safety Promotion/Fall Prevention:   activity supervised   increased rounding and observation   clutter free environment maintained   increase visualization of patient   lighting adjusted   mobility aid in reach   nonskid shoes/slippers when out of bed   safety round/check completed     Problem: Adult Inpatient Plan of Care  Goal: Absence of Hospital-Acquired Illness or Injury  Intervention: Prevent Skin Injury  Recent Flowsheet Documentation  Taken 1/3/2025 1630 by Cynthia Stahl RN  Body Position: position changed independently   Goal Outcome Evaluation:      Plan of Care Reviewed With: patient    Overall Patient Progress: improvingOverall Patient Progress: improving  Patient vital signs are at baseline: Yes  Patient able to ambulate as they were prior to admission or with assist devices provided by therapies during their stay:  Yes  Patient MUST void prior to discharge:  Yes  Patient able to tolerate oral intake:  Yes  Pain has adequate pain control using Oral analgesics:  Yes  Does patient have an identified :  Yes  Has goal D/C date and time been discussed with patient:  Yes  Pt alert and oriented, vss on room air, can voice needs, has a purewick in  place, pain, ambulated with the therapist.

## 2025-01-04 NOTE — PROVIDER NOTIFICATION
Pagehector RODRIGUEZ due to SOB and need for 1.5 liters via NC.    Provider assessed bedside and placed orders.     Cassidy Ayala RN on 1/4/2025 at 10:19 AM

## 2025-01-04 NOTE — PROGRESS NOTES
[facility]    Medicine Progress Note - Hospitalist Service    Date of Admission:  1/2/2025    Assessment & Plan   Identification: Sherice Alvarez is a 89 year old female   PMHx: Coronary artery disease, heart failure with preserved ejection fraction, HTN, CVA, CKD 3a, cognitive impairment, restless leg syndrome    Sherice underwent elective left hip arthroplasty on January 2 with Dr Parham, 300 ml EBL, no complications.  Hospital medicine consulted for management of chronic medical conditions. POD1 she developed hypoxia. CXR showed pulmonary edema.    Today:  Sudden onset Sob and hypoxia in am  CXR, EKG    Pulmonary edema  Hypoxia  Suspect secondary to marcus-op fluids plan to diuresis    Acute blood loss anemia (baseline hemoglobin 10.4 May '24)  On twice daily aspirin 81 mg for PPx  Mild clinical pallor, but ambulating without lightheadedness  Monitor for melena    Essential hypertension  PTA amlodipine 5 mg daily  PTA metoprolol succinate 25 mg daily    HFpEF  Ordering diuresis    Coronary artery disease  Severe aortic valve stenosis status post TAVR  At home on aspirin, resume when prophylactic aspirin completes  PTA metoprolol succinate 25 mg daily    History of CVA  History of subarachnoid hemorrhage    Restless leg syndrome  PTA Requip 4 mg bedtime    Recent left lower extremity cellulitis  Per prior documentation, already completed a course of antibiotic.  Left leg negative for DVT  Agree with cephalexin started perioperatively per primary team.    Status post left hip arthroplasty  Hold home Ativan.            Diet: Advance Diet as Tolerated: Regular Diet Adult  Discharge Instruction - Regular Diet Adult    DVT Prophylaxis: Defer to primary service  Tyson Catheter: Not present  Lines: None     Cardiac Monitoring: None  Code Status: Full Code      Clinically Significant Risk Factors Present on Admission                 # Drug Induced Platelet Defect: home medication list includes  an antiplatelet medication   # Hypertension: Noted on problem list  # Chronic heart failure with preserved ejection fraction: heart failure noted on problem list and last echo with EF >50%     # Anemia: based on hgb <11           # Financial/Environmental Concerns: none  # Asthma: noted on problem list        Social Drivers of Health    Tobacco Use: Medium Risk (12/31/2024)    Patient History     Smoking Tobacco Use: Former     Smokeless Tobacco Use: Never   Interpersonal Safety: Unknown (2/17/2024)    Received from [employer]    Humiliation, Afraid, Rape, and Kick questionnaire     Physically Abused: No     Sexually Abused: No    Received from [facility] [employer]    Social Connections          Disposition Plan     Medically Ready for Discharge: Anticipated Tomorrow         Nathanael Garcia MD  Hospitalist Service  [facility]  Securely message with [employer] (more info)  Text page via [employer] Paging/Directory   ______________________________________________________________________    Interval History   Shortness of breath developed over about half an hour  Had hypoxia which improved with low-flow nasal cannula  Denies immediate onset, denies associated chest pain, denies exacerbation with activity  No lightheadedness, no other focal complaint    Physical Exam   Vital Signs: Temp: 97.9  F (36.6  C) Temp src: Oral BP: (!) 169/77 Pulse: 71   Resp: 18 SpO2: 92 % O2 Device: Nasal cannula Oxygen Delivery: 1.5 LPM  Weight: 118 lbs 0 oz    NAD in the bed, breathing comfortably on low-flow nasal cannula  Pupils symmetric, makes good eye contact  Awake, alert, appropriate conversation  Little bit hard of hearing  Minimal crackles bibasilar  Systolic murmur,    Medical Decision Making             Data   ------------------------- PAST 24 HR DATA REVIEWED -----------------------------------------------    I have personally reviewed the following data over the past 24  hrs:    N/A  \   7.5 (L)   / N/A     N/A N/A N/A /  N/A   N/A N/A N/A \       Imaging results reviewed over the past 24 hrs:   No results found for this or any previous visit (from the past 24 hours).

## 2025-01-04 NOTE — PROGRESS NOTES
[facility]    Orthopedics  Daily Post-Op Note    Assessment & Plan   Procedure(s):  LEFT TOTAL HIP ARTHROPLASTY   -2 Days Post-Op  Doing ok.  Clean wound without signs of infection.  Normal healing wound.  No immediate surgical complications identified.  No excessive bleeding  Shortness of breath this AM, medicine following.   Plan:  -Ambulate  -Advance activity as tolerated  -Start or continue physical therapy  -Pain control measures  -Advance diet as tolerated  -Continue cares      Dave Saucedo PA-C  Clinically Significant Risk Factors Present on Admission                 # Drug Induced Platelet Defect: home medication list includes an antiplatelet medication   # Hypertension: Noted on problem list  # Chronic heart failure with preserved ejection fraction: heart failure noted on problem list and last echo with EF >50%     # Anemia: based on hgb <11           # Financial/Environmental Concerns: none  # Asthma: noted on problem list          Interval History   Doing well.  Continues to improve.  Pain is well-controlled.  No fevers.      Physical Exam   Temp: 97.9  F (36.6  C) Temp src: Oral BP: (!) 169/77 Pulse: 71   Resp: 18 SpO2: 92 % O2 Device: Nasal cannula Oxygen Delivery: 1.5 LPM  Vitals:    12/04/24 0900 01/02/25 0722   Weight: 53.5 kg (118 lb) 53.5 kg (118 lb)     Vital Signs with Ranges  Temp:  [97.9  F (36.6  C)-98.4  F (36.9  C)] 97.9  F (36.6  C)  Pulse:  [71-74] 71  Resp:  [16-18] 18  BP: ()/(55-77) 169/77  SpO2:  [87 %-94 %] 92 %  I/O last 3 completed shifts:  In: 240 [P.O.:240]  Out: -     Wound clean and dry with minimal or no drainage.  Surrounding skin with minimal erythema.  Dressing dry and intact.        Medications   Current Facility-Administered Medications   Medication Dose Route Frequency Provider Last Rate Last Admin      Current Facility-Administered Medications   Medication Dose Route Frequency Provider Last Rate Last Admin    acetaminophen (TYLENOL)  tablet 975 mg  975 mg Oral Q8H Radha Wilder APRN CNP   975 mg at 01/04/25 0819    amLODIPine (NORVASC) tablet 5 mg  5 mg Oral Daily Zan Hobbs MD   5 mg at 01/04/25 0819    aspirin EC tablet 81 mg  81 mg Oral BID German Gilmore PA-C   81 mg at 01/04/25 0819    buprenorphine (BUTRANS) 10 MCG/HR WK patch 1 patch  1 patch Transdermal Weekly Radha Wilder APRN CNP   1 patch at 01/02/25 1626    And    buprenorphine (BUTRANS) Patch in Place   Transdermal Q8H [facility] Radha Wilder APRN CNP        cephALEXin (KEFLEX) capsule 500 mg  500 mg Oral Q6H [facility] German Gilmore PA-C   500 mg at 01/04/25 0625    diclofenac (VOLTAREN) 1 % topical gel 2 g  2 g Topical 4x Daily Radha Wilder APRN CNP   2 g at 01/04/25 0819    escitalopram (LEXAPRO) tablet 5 mg  5 mg Oral Daily Zan Hobbs MD   5 mg at 01/04/25 0819    lidocaine (XYLOCAINE) 5 % ointment   Topical 4x Daily Radha Wilder APRN CNP   Given at 01/04/25 0819    methocarbamol (ROBAXIN) tablet 500 mg  500 mg Oral 4x Daily Radha Wilder APRN CNP   500 mg at 01/04/25 0819    metoprolol succinate ER (TOPROL XL) 24 hr tablet 25 mg  25 mg Oral QPM Zan Hobbs MD        pantoprazole (PROTONIX) EC tablet 40 mg  40 mg Oral BID Arturo Parham DO   40 mg at 01/04/25 0819    polyethylene glycol (MIRALAX) Packet 17 g  17 g Oral Daily German Gilmore PA-C   17 g at 01/04/25 0819    rOPINIRole (REQUIP) tablet 4 mg  4 mg Oral At Bedtime Zan Hobbs MD   4 mg at 01/03/25 2144    senna-docusate (SENOKOT-S/PERICOLACE) 8.6-50 MG per tablet 1 tablet  1 tablet Oral BID German Gilmore PA-C   1 tablet at 01/04/25 0819    sodium chloride (PF) 0.9% PF flush 3 mL  3 mL Intracatheter Q8H German Gilmore PA-C   3 mL at 01/04/25 0625       Data   Results for orders placed or performed during the hospital encounter of 01/02/25 (from the past 24 hours)   Glucose by meter   Result Value Ref Range    GLUCOSE BY  METER POCT 102 (H) 70 - 99 mg/dL   Hemoglobin   Result Value Ref Range    Hemoglobin 7.1 (L) 11.7 - 15.7 g/dL   Extra Tube    Narrative    The following orders were created for panel order Extra Tube.  Procedure                               Abnormality         Status                     ---------                               -----------         ------                     Extra Green Top (Lithium...[[id number]]                      Final result                 Please view results for these tests on the individual orders.   Extra Green Top (Lithium Heparin) Tube   Result Value Ref Range    Hold Specimen [facility]    ECG 12-LEAD WITH MUSE (LHE)   Result Value Ref Range    Systolic Blood Pressure  mmHg    Diastolic Blood Pressure  mmHg    Ventricular Rate 65 BPM    Atrial Rate 65 BPM    DC Interval 198 ms    QRS Duration 86 ms     ms    QTc 436 ms    P Axis 75 degrees    R AXIS 85 degrees    T Axis 73 degrees    Interpretation ECG       Sinus rhythm  Normal ECG  When compared with ECG of 07-May-2024 16:35,  Questionable change in QRS axis     XR Chest Port 1 View    Narrative    EXAM: XR CHEST PORT 1 VIEW  LOCATION: [facility]  DATE: 1/4/2025    INDICATION: post op ortho surg, Hb 7.1, sudden onset sob  and  mild hypoxia this am. fairly clear on exam  COMPARISON: Portable chest radiography to 9/20/2024      Impression    IMPRESSION:     Mildly enlarged cardiac silhouette. Catheter deployed valvular prosthesis in aortic position and left coronary stents.    Symmetric lung inflation. Peripheral interstitial opacities are present, right greater than left consistent with interstitial edema. No lung consolidation. Subsegmental atelectasis in the medial basal left lower lobe. Question trace pleural effusions. No   pneumothorax.

## 2025-01-04 NOTE — PLAN OF CARE
Patient vital signs are at baseline: Yes  Patient able to ambulate as they were prior to admission or with assist devices provided by therapies during their stay:  Yes  Patient MUST void prior to discharge:  Yes  Patient able to tolerate oral intake:  Yes  Pain has adequate pain control using Oral analgesics:  Yes  Does patient have an identified :  Yes  Has goal D/C date and time been discussed with patient:  Yes     Goal Outcome Evaluation:    Problem: Adult Inpatient Plan of Care  Goal: Optimal Comfort and Wellbeing  Outcome: Progressing     Problem: Hip Arthroplasty  Goal: Absence of Bleeding  Outcome: Progressing  Intervention: Monitor and Manage Bleeding  Recent Flowsheet Documentation  Taken 1/3/2025 0471 by Aleida Brewster RN  Bleeding Management: dressing monitored     Problem: Hip Arthroplasty  Goal: Absence of Infection Signs and Symptoms  Outcome: Progressing

## 2025-01-04 NOTE — PLAN OF CARE
Problem: Adult Inpatient Plan of Care  Goal: Optimal Comfort and Wellbeing  Outcome: Progressing     Problem: Hip Arthroplasty  Goal: Optimal Coping  Outcome: Progressing  Goal: Optimal Pain Control and Function  Outcome: Progressing  Goal: Effective Urinary Elimination  Outcome: Progressing     Goal Outcome Evaluation:    Pt is A&O, calls appropriately for needs and is an assist 1 with GB and walker for ambulation. Vitals signs are stable, afebrile, oxygen is on 1.5 liters via NC. Pt complains of pain 7/10, to Left hip, PRN oxycodone given for this, with relief. Pt is tolerating regular diet. Pt is voiding spontaneously, last bowel movement ----.  Oral antibiotics given. Alarms in place.     Cassidy Ayala RN  January 4, 2025, 3:30 PM

## 2025-01-05 ENCOUNTER — APPOINTMENT (OUTPATIENT)
Dept: PHYSICAL THERAPY | Facility: CLINIC | Age: OVER 89
DRG: 469 | End: 2025-01-05
Attending: ORTHOPAEDIC SURGERY
Payer: COMMERCIAL

## 2025-01-05 ENCOUNTER — APPOINTMENT (OUTPATIENT)
Dept: OCCUPATIONAL THERAPY | Facility: CLINIC | Age: OVER 89
DRG: 469 | End: 2025-01-05
Attending: ORTHOPAEDIC SURGERY
Payer: COMMERCIAL

## 2025-01-05 LAB
HGB BLD-MCNC: 7.5 G/DL (ref 11.7–15.7)
HOLD SPECIMEN: NORMAL

## 2025-01-05 PROCEDURE — 250N000013 HC RX MED GY IP 250 OP 250 PS 637: Performed by: NURSE PRACTITIONER

## 2025-01-05 PROCEDURE — 250N000013 HC RX MED GY IP 250 OP 250 PS 637: Performed by: ORTHOPAEDIC SURGERY

## 2025-01-05 PROCEDURE — 99233 SBSQ HOSP IP/OBS HIGH 50: CPT | Performed by: HOSPITALIST

## 2025-01-05 PROCEDURE — 250N000009 HC RX 250: Performed by: HOSPITALIST

## 2025-01-05 PROCEDURE — 85018 HEMOGLOBIN: CPT

## 2025-01-05 PROCEDURE — 97116 GAIT TRAINING THERAPY: CPT | Mod: GP

## 2025-01-05 PROCEDURE — 120N000001 HC R&B MED SURG/OB

## 2025-01-05 PROCEDURE — 250N000011 HC RX IP 250 OP 636: Performed by: HOSPITALIST

## 2025-01-05 PROCEDURE — 250N000013 HC RX MED GY IP 250 OP 250 PS 637: Performed by: STUDENT IN AN ORGANIZED HEALTH CARE EDUCATION/TRAINING PROGRAM

## 2025-01-05 PROCEDURE — 36415 COLL VENOUS BLD VENIPUNCTURE: CPT

## 2025-01-05 PROCEDURE — 97530 THERAPEUTIC ACTIVITIES: CPT | Mod: GP

## 2025-01-05 PROCEDURE — 250N000013 HC RX MED GY IP 250 OP 250 PS 637: Performed by: PHYSICIAN ASSISTANT

## 2025-01-05 PROCEDURE — 97110 THERAPEUTIC EXERCISES: CPT | Mod: GP

## 2025-01-05 PROCEDURE — 250N000011 HC RX IP 250 OP 636: Performed by: PHYSICIAN ASSISTANT

## 2025-01-05 PROCEDURE — 999N000157 HC STATISTIC RCP TIME EA 10 MIN

## 2025-01-05 PROCEDURE — 97535 SELF CARE MNGMENT TRAINING: CPT | Mod: GO

## 2025-01-05 PROCEDURE — 94640 AIRWAY INHALATION TREATMENT: CPT

## 2025-01-05 RX ORDER — IPRATROPIUM BROMIDE AND ALBUTEROL SULFATE 2.5; .5 MG/3ML; MG/3ML
3 SOLUTION RESPIRATORY (INHALATION) EVERY 4 HOURS PRN
Status: DISCONTINUED | OUTPATIENT
Start: 2025-01-05 | End: 2025-01-06 | Stop reason: HOSPADM

## 2025-01-05 RX ORDER — FUROSEMIDE 10 MG/ML
20 INJECTION INTRAMUSCULAR; INTRAVENOUS ONCE
Status: COMPLETED | OUTPATIENT
Start: 2025-01-05 | End: 2025-01-05

## 2025-01-05 RX ORDER — IPRATROPIUM BROMIDE AND ALBUTEROL SULFATE 2.5; .5 MG/3ML; MG/3ML
3 SOLUTION RESPIRATORY (INHALATION) ONCE
Status: COMPLETED | OUTPATIENT
Start: 2025-01-05 | End: 2025-01-05

## 2025-01-05 RX ORDER — FUROSEMIDE 20 MG/1
20 TABLET ORAL DAILY
Status: DISCONTINUED | OUTPATIENT
Start: 2025-01-06 | End: 2025-01-06 | Stop reason: HOSPADM

## 2025-01-05 RX ADMIN — CEPHALEXIN 500 MG: 500 CAPSULE ORAL at 06:41

## 2025-01-05 RX ADMIN — ROPINIROLE HYDROCHLORIDE 4 MG: 1 TABLET, FILM COATED ORAL at 21:44

## 2025-01-05 RX ADMIN — ASPIRIN 81 MG: 81 TABLET, COATED ORAL at 08:09

## 2025-01-05 RX ADMIN — PANTOPRAZOLE SODIUM 40 MG: 40 TABLET, DELAYED RELEASE ORAL at 08:09

## 2025-01-05 RX ADMIN — ESCITALOPRAM 5 MG: 5 TABLET, FILM COATED ORAL at 08:09

## 2025-01-05 RX ADMIN — AMLODIPINE BESYLATE 5 MG: 5 TABLET ORAL at 08:09

## 2025-01-05 RX ADMIN — ACETAMINOPHEN 975 MG: 325 TABLET, FILM COATED ORAL at 17:07

## 2025-01-05 RX ADMIN — DICLOFENAC 2 G: 10 GEL TOPICAL at 21:44

## 2025-01-05 RX ADMIN — METHOCARBAMOL 500 MG: 500 TABLET ORAL at 13:25

## 2025-01-05 RX ADMIN — ACETAMINOPHEN 975 MG: 325 TABLET, FILM COATED ORAL at 00:47

## 2025-01-05 RX ADMIN — DICLOFENAC 2 G: 10 GEL TOPICAL at 10:20

## 2025-01-05 RX ADMIN — OXYCODONE 5 MG: 5 TABLET ORAL at 02:21

## 2025-01-05 RX ADMIN — ONDANSETRON 4 MG: 4 TABLET, ORALLY DISINTEGRATING ORAL at 22:39

## 2025-01-05 RX ADMIN — METOPROLOL SUCCINATE 25 MG: 25 TABLET, EXTENDED RELEASE ORAL at 20:12

## 2025-01-05 RX ADMIN — IPRATROPIUM BROMIDE AND ALBUTEROL SULFATE 3 ML: .5; 3 SOLUTION RESPIRATORY (INHALATION) at 10:50

## 2025-01-05 RX ADMIN — OXYCODONE 2.5 MG: 5 TABLET ORAL at 17:09

## 2025-01-05 RX ADMIN — ACETAMINOPHEN 975 MG: 325 TABLET, FILM COATED ORAL at 08:08

## 2025-01-05 RX ADMIN — METHOCARBAMOL 500 MG: 500 TABLET ORAL at 21:44

## 2025-01-05 RX ADMIN — METHOCARBAMOL 500 MG: 500 TABLET ORAL at 18:25

## 2025-01-05 RX ADMIN — ASPIRIN 81 MG: 81 TABLET, COATED ORAL at 20:12

## 2025-01-05 RX ADMIN — CEPHALEXIN 500 MG: 500 CAPSULE ORAL at 00:47

## 2025-01-05 RX ADMIN — LIDOCAINE: 50 OINTMENT TOPICAL at 13:25

## 2025-01-05 RX ADMIN — PANTOPRAZOLE SODIUM 40 MG: 40 TABLET, DELAYED RELEASE ORAL at 20:12

## 2025-01-05 RX ADMIN — CEPHALEXIN 500 MG: 500 CAPSULE ORAL at 11:00

## 2025-01-05 RX ADMIN — OXYCODONE 5 MG: 5 TABLET ORAL at 20:12

## 2025-01-05 RX ADMIN — LIDOCAINE: 50 OINTMENT TOPICAL at 20:13

## 2025-01-05 RX ADMIN — LIDOCAINE: 50 OINTMENT TOPICAL at 10:19

## 2025-01-05 RX ADMIN — CEPHALEXIN 500 MG: 500 CAPSULE ORAL at 18:26

## 2025-01-05 RX ADMIN — SENNOSIDES AND DOCUSATE SODIUM 1 TABLET: 50; 8.6 TABLET ORAL at 20:12

## 2025-01-05 RX ADMIN — DICLOFENAC 2 G: 10 GEL TOPICAL at 18:26

## 2025-01-05 RX ADMIN — METHOCARBAMOL 500 MG: 500 TABLET ORAL at 08:09

## 2025-01-05 RX ADMIN — LIDOCAINE: 50 OINTMENT TOPICAL at 17:09

## 2025-01-05 RX ADMIN — DICLOFENAC 2 G: 10 GEL TOPICAL at 13:28

## 2025-01-05 RX ADMIN — FUROSEMIDE 20 MG: 10 INJECTION, SOLUTION INTRAMUSCULAR; INTRAVENOUS at 11:00

## 2025-01-05 ASSESSMENT — ACTIVITIES OF DAILY LIVING (ADL)
ADLS_ACUITY_SCORE: 49
ADLS_ACUITY_SCORE: 53
ADLS_ACUITY_SCORE: 52
ADLS_ACUITY_SCORE: 52
ADLS_ACUITY_SCORE: 45
ADLS_ACUITY_SCORE: 53
ADLS_ACUITY_SCORE: 49
ADLS_ACUITY_SCORE: 53
ADLS_ACUITY_SCORE: 52
ADLS_ACUITY_SCORE: 52
ADLS_ACUITY_SCORE: 53
ADLS_ACUITY_SCORE: 52
ADLS_ACUITY_SCORE: 53
ADLS_ACUITY_SCORE: 52
ADLS_ACUITY_SCORE: 53
ADLS_ACUITY_SCORE: 52

## 2025-01-05 NOTE — PROGRESS NOTES
[facility]    Medicine Progress Note - Hospitalist Service    Date of Admission:  1/2/2025    Assessment & Plan   Identification: Sherice Alvarez is a 89 year old female   PMHx: Coronary artery disease, heart failure with preserved ejection fraction, HTN, CVA, CKD 3a, cognitive impairment, restless leg syndrome    Sherice underwent elective left hip arthroplasty on January 2 with Dr Parham, 300 ml EBL, no complications.  Hospital medicine consulted for management of chronic medical conditions. POD2 she developed hypoxia. CXR showed pulmonary edema. IV lasix started. She also reported numbness of her fingers.    Today:  Sats low 90's on RA, fingertips numb  Discussed with RT, will try neb x1  Lasix x1, home PO resumed  Trend weight    Finger numbness  Bilateral, Spurling positive, check C-spine x-rays. Not sure if show rober/retrolisthesis would occur with anesthesia?    Pulmonary edema  Hypoxia  Suspect secondary to marcus-op fluids, anemia  Trend weight    Acute blood loss anemia (baseline hemoglobin 10.4 May '24)  On twice daily aspirin 81 mg for PPx  Monitor for melena    Essential hypertension  PTA amlodipine 5 mg daily  PTA metoprolol succinate 25 mg daily    HFpEF  furosemide    Coronary artery disease  Severe aortic valve stenosis status post TAVR  At home on aspirin, resume when prophylactic aspirin completes  PTA metoprolol succinate 25 mg daily    History of CVA  History of subarachnoid hemorrhage    Restless leg syndrome  PTA Requip 4 mg bedtime    Recent left lower extremity cellulitis  Per prior documentation, already completed a course of antibiotic.  Left leg negative for DVT  Agree with cephalexin started perioperatively per primary team.    Status post left hip arthroplasty  Hold home Ativan.            Diet: Advance Diet as Tolerated: Regular Diet Adult  Discharge Instruction - Regular Diet Adult    DVT Prophylaxis: Defer to primary service  Tyson Catheter: Not  present  Lines: None     Cardiac Monitoring: None  Code Status: Full Code      Clinically Significant Risk Factors                   # Hypertension: Noted on problem list  # Acute heart failure with preserved ejection fraction: heart failure noted on problem list, last echo with EF >50%, and receiving IV diuretics               # Financial/Environmental Concerns: none  # Asthma: noted on problem list        Social Drivers of Health    Tobacco Use: Medium Risk (12/31/2024)    Patient History     Smoking Tobacco Use: Former     Smokeless Tobacco Use: Never   Interpersonal Safety: Unknown (2/17/2024)    Received from [employer]    Humiliation, Afraid, Rape, and Kick questionnaire     Physically Abused: No     Sexually Abused: No    Received from [facility]    Social Connections          Disposition Plan     Medically Ready for Discharge: Anticipated Tomorrow         Nathanael Garcia MD  Hospitalist Service  [facility]  Securely message with [employer] (more info)  Text page via [employer] Paging/Directory   ______________________________________________________________________    Interval History   Still has some shortness of breath, able to wean off O2 93-94% on room air this morning  Denies chest pain, reporting some tingling in bilateral fingertips    Physical Exam   Vital Signs: Temp: 97.8  F (36.6  C) Temp src: Oral BP: (!) 151/66 Pulse: 69   Resp: 18 SpO2: 95 % O2 Device: None (Room air) Oxygen Delivery: 1 LPM  Weight: 118 lbs 0 oz    Awake, alert, answers questions appropriately  Hard of hearing  Systolic murmur, regular rate and rhythm  Breathing comfortably on room air, left base crackles  No new peripheral edema  Gross dexterity of all fingers intact, reports numbness at fingerpads    Medical Decision Making       52 MINUTES SPENT BY ME on the date of service doing chart review, history, exam, documentation & further activities per the note.       Data   ------------------------- PAST 24 HR DATA REVIEWED -----------------------------------------------    I have personally reviewed the following data over the past 24 hrs:    N/A  \   7.5 (L)   / N/A     N/A N/A N/A /  N/A   N/A N/A N/A \       Imaging results reviewed over the past 24 hrs:   No results found for this or any previous visit (from the past 24 hours).

## 2025-01-05 NOTE — PROGRESS NOTES
Patient vital signs are at baseline: No,  Reason:  needing oxygen, not baseline  Patient able to ambulate as they were prior to admission or with assist devices provided by therapies during their stay:  Yes  Patient MUST void prior to discharge:  Yes  Patient able to tolerate oral intake:  Yes  Pain has adequate pain control using Oral analgesics:  Yes  Does patient have an identified :  Yes  Has goal D/C date and time been discussed with patient:  Yes

## 2025-01-05 NOTE — PROGRESS NOTES
01/05/25 1105   RCAT Assessment   Reason for Assessment Other (see comments)  (Shortness of breath)   Pulmonary Status 0   Surgical Status 1   Chest X-ray 0   Respiratory Pattern 0   Mental Status 0   Breath Sounds 2   Cough Effectiveness 0   Level of Activity 1   O2 Required for SpO2>=92% 0   Acuity Level (points) 4   Acuity Level  5     Patient seen for shortness of breath. One neb given and seemed to help pt. Encouraged to call if she needs another as prn order was placed, RN aware as well. Breath sounds clear;diminished. Maybe slightly coarse over LLL.   She is on room air and chest xray clear.   RT will continue to follow and can increase nebs if needed    Grecia Emery, RT

## 2025-01-05 NOTE — PLAN OF CARE
Goal Outcome Evaluation:      Problem: Adult Inpatient Plan of Care  Goal: Plan of Care Review  Description: The Plan of Care Review/Shift note should be completed every shift.  The Outcome Evaluation is a brief statement about your assessment that the patient is improving, declining, or no change.  This information will be displayed automatically on your shift  note.  Outcome: Not Progressing     Patient vital signs are at baseline: Yes   Patient able to ambulate as they were prior to admission or with assist devices provided by therapies during their stay:  Yes  Patient MUST void prior to discharge:  Yes  Patient able to tolerate oral intake:  Yes  Pain has adequate pain control using Oral analgesics:  Yes  Does patient have an identified :  Yes  Has goal D/C date and time been discussed with patient:  Yes

## 2025-01-05 NOTE — PROGRESS NOTES
"Orthopedic Progress Note      Assessment: 3 Days Post-Op  S/P Procedure(s):  LEFT TOTAL HIP ARTHROPLASTY @    Plan:   - Continue PT/OT  - Weightbearing status: WBAT LLE; posterior hip precautions  - Hgb 7.5 this AM and stable. Asymptomatic. Appreciate medicine input on need for transfusion.  - Continued SOB and O2 support, medicine following  - Anticoagulation: ASA 81mg BID in addition to SCDs, perla stockings and early ambulation.  - Discharge planning: pending progression with therapy, medicine team sign off, pain control      Subjective:  Patient is sitting up in bed comfortably. NO acute events overnight. She denies any reports of dizziness or lightheadedness. Continues to report SOB. Nasal cannula with O2 support. Passing gas. Voiding without difficulty. Continues to endorse hip pain although tolerating weightbearing and has been walking around OK. No new numbness or tingling. No chest pain or SOB. No other concerns.     Objective:  BP (!) 144/68 (BP Location: Left arm, Patient Position: Semi-Esposito's, Cuff Size: Adult Regular)   Pulse 74   Temp 97.9  F (36.6  C) (Oral)   Resp 18   Ht 1.549 m (5' 1\")   Wt 53.5 kg (118 lb)   SpO2 96%   BMI 22.30 kg/m    The patient is A&Ox3. Appears comfortable.   Sensation is intact.  Dorsiflexion and plantar flexion is intact.  Dorsalis pedis pulse intact.  Calves are soft and non-tender. Negative Mary Alice's.  The incision is covered. Dressing C/D/I.      Pertinent Labs   Lab Results: personally reviewed.   Lab Results   Component Value Date    INR 1.23 (H) 02/09/2024    INR 1.13 11/09/2022    INR 1.13 01/15/2022     Lab Results   Component Value Date    WBC 9.2 12/23/2024    HGB 7.5 (L) 01/05/2025    HCT 26.8 (L) 12/23/2024    MCV 90 12/23/2024     12/23/2024     Lab Results   Component Value Date     12/23/2024    CO2 23 12/23/2024         Report completed by:  KEVON MORENO PA-C  [facility]    Date: 1/5/2025  Time: 7:10 AM   "

## 2025-01-05 NOTE — PLAN OF CARE
Problem: Adult Inpatient Plan of Care  Goal: Absence of Hospital-Acquired Illness or Injury  Intervention: Identify and Manage Fall Risk  Problem: Adult Inpatient Plan of Care  Goal: Optimal Comfort and Wellbeing  Intervention: Monitor Pain and Promote Comfort   Problem: Hip Arthroplasty  Goal: Absence of Bleeding  Intervention: Monitor and Manage Bleeding  Goal Outcome Evaluation:  Patient vital signs are at baseline: Yes  Patient able to ambulate as they were prior to admission or with assist devices provided by therapies during their stay:  Yes  Patient MUST void prior to discharge:  Yes  Patient able to tolerate oral intake:  Yes  Pain has adequate pain control using Oral analgesics:  Yes. Scheduled Tylenol and PRN Oxycodone administered for pain control.   Does patient have an identified :  Yes  Has goal D/C date and time been discussed with patient:  Yes

## 2025-01-06 ENCOUNTER — APPOINTMENT (OUTPATIENT)
Dept: OCCUPATIONAL THERAPY | Facility: CLINIC | Age: OVER 89
DRG: 469 | End: 2025-01-06
Attending: ORTHOPAEDIC SURGERY
Payer: COMMERCIAL

## 2025-01-06 ENCOUNTER — APPOINTMENT (OUTPATIENT)
Dept: RADIOLOGY | Facility: CLINIC | Age: OVER 89
DRG: 469 | End: 2025-01-06
Attending: HOSPITALIST
Payer: COMMERCIAL

## 2025-01-06 ENCOUNTER — APPOINTMENT (OUTPATIENT)
Dept: PHYSICAL THERAPY | Facility: CLINIC | Age: OVER 89
DRG: 469 | End: 2025-01-06
Attending: ORTHOPAEDIC SURGERY
Payer: COMMERCIAL

## 2025-01-06 ENCOUNTER — APPOINTMENT (OUTPATIENT)
Dept: RADIOLOGY | Facility: CLINIC | Age: OVER 89
DRG: 469 | End: 2025-01-06
Attending: PHYSICIAN ASSISTANT
Payer: COMMERCIAL

## 2025-01-06 VITALS
OXYGEN SATURATION: 90 % | SYSTOLIC BLOOD PRESSURE: 135 MMHG | WEIGHT: 125.5 LBS | TEMPERATURE: 98.7 F | HEIGHT: 61 IN | RESPIRATION RATE: 16 BRPM | BODY MASS INDEX: 23.7 KG/M2 | DIASTOLIC BLOOD PRESSURE: 53 MMHG | HEART RATE: 73 BPM

## 2025-01-06 LAB
ATRIAL RATE - MUSE: 65 BPM
DIASTOLIC BLOOD PRESSURE - MUSE: NORMAL MMHG
INTERPRETATION ECG - MUSE: NORMAL
P AXIS - MUSE: 75 DEGREES
PR INTERVAL - MUSE: 198 MS
QRS DURATION - MUSE: 86 MS
QT - MUSE: 420 MS
QTC - MUSE: 436 MS
R AXIS - MUSE: 85 DEGREES
SYSTOLIC BLOOD PRESSURE - MUSE: NORMAL MMHG
T AXIS - MUSE: 73 DEGREES
VENTRICULAR RATE- MUSE: 65 BPM

## 2025-01-06 PROCEDURE — 97116 GAIT TRAINING THERAPY: CPT | Mod: GP

## 2025-01-06 PROCEDURE — 250N000013 HC RX MED GY IP 250 OP 250 PS 637: Performed by: NURSE PRACTITIONER

## 2025-01-06 PROCEDURE — 97535 SELF CARE MNGMENT TRAINING: CPT | Mod: GO

## 2025-01-06 PROCEDURE — 250N000013 HC RX MED GY IP 250 OP 250 PS 637: Performed by: ORTHOPAEDIC SURGERY

## 2025-01-06 PROCEDURE — 250N000013 HC RX MED GY IP 250 OP 250 PS 637: Performed by: PHYSICIAN ASSISTANT

## 2025-01-06 PROCEDURE — 99232 SBSQ HOSP IP/OBS MODERATE 35: CPT | Performed by: HOSPITALIST

## 2025-01-06 PROCEDURE — 73502 X-RAY EXAM HIP UNI 2-3 VIEWS: CPT

## 2025-01-06 PROCEDURE — 250N000013 HC RX MED GY IP 250 OP 250 PS 637: Performed by: HOSPITALIST

## 2025-01-06 PROCEDURE — 250N000013 HC RX MED GY IP 250 OP 250 PS 637: Performed by: STUDENT IN AN ORGANIZED HEALTH CARE EDUCATION/TRAINING PROGRAM

## 2025-01-06 PROCEDURE — 72040 X-RAY EXAM NECK SPINE 2-3 VW: CPT

## 2025-01-06 RX ORDER — OXYCODONE HYDROCHLORIDE 5 MG/1
5 TABLET ORAL EVERY 4 HOURS PRN
Qty: 30 TABLET | Status: SHIPPED
Start: 2025-01-06

## 2025-01-06 RX ORDER — CEFADROXIL 500 MG/1
500 CAPSULE ORAL 2 TIMES DAILY
Status: SHIPPED
Start: 2025-01-06

## 2025-01-06 RX ORDER — ASPIRIN 81 MG/1
81 TABLET ORAL 2 TIMES DAILY
Status: SHIPPED
Start: 2025-01-06 | End: 2025-02-06

## 2025-01-06 RX ADMIN — LIDOCAINE: 50 OINTMENT TOPICAL at 12:30

## 2025-01-06 RX ADMIN — SENNOSIDES AND DOCUSATE SODIUM 1 TABLET: 50; 8.6 TABLET ORAL at 08:08

## 2025-01-06 RX ADMIN — ASPIRIN 81 MG: 81 TABLET, COATED ORAL at 08:06

## 2025-01-06 RX ADMIN — OXYCODONE 5 MG: 5 TABLET ORAL at 10:45

## 2025-01-06 RX ADMIN — METHOCARBAMOL 500 MG: 500 TABLET ORAL at 08:08

## 2025-01-06 RX ADMIN — METHOCARBAMOL 500 MG: 500 TABLET ORAL at 12:30

## 2025-01-06 RX ADMIN — METHOCARBAMOL 500 MG: 500 TABLET ORAL at 16:52

## 2025-01-06 RX ADMIN — PANTOPRAZOLE SODIUM 40 MG: 40 TABLET, DELAYED RELEASE ORAL at 08:08

## 2025-01-06 RX ADMIN — POLYETHYLENE GLYCOL 3350 17 G: 17 POWDER, FOR SOLUTION ORAL at 08:08

## 2025-01-06 RX ADMIN — LIDOCAINE: 50 OINTMENT TOPICAL at 16:53

## 2025-01-06 RX ADMIN — CEPHALEXIN 500 MG: 500 CAPSULE ORAL at 00:27

## 2025-01-06 RX ADMIN — CEPHALEXIN 500 MG: 500 CAPSULE ORAL at 16:52

## 2025-01-06 RX ADMIN — FUROSEMIDE 20 MG: 20 TABLET ORAL at 08:07

## 2025-01-06 RX ADMIN — CEPHALEXIN 500 MG: 500 CAPSULE ORAL at 05:39

## 2025-01-06 RX ADMIN — ACETAMINOPHEN 975 MG: 325 TABLET, FILM COATED ORAL at 00:27

## 2025-01-06 RX ADMIN — DICLOFENAC 2 G: 10 GEL TOPICAL at 08:07

## 2025-01-06 RX ADMIN — DICLOFENAC 2 G: 10 GEL TOPICAL at 16:53

## 2025-01-06 RX ADMIN — AMLODIPINE BESYLATE 5 MG: 5 TABLET ORAL at 08:05

## 2025-01-06 RX ADMIN — LIDOCAINE: 50 OINTMENT TOPICAL at 08:04

## 2025-01-06 RX ADMIN — ACETAMINOPHEN 975 MG: 325 TABLET, FILM COATED ORAL at 08:03

## 2025-01-06 RX ADMIN — ESCITALOPRAM 5 MG: 5 TABLET, FILM COATED ORAL at 08:07

## 2025-01-06 RX ADMIN — CEPHALEXIN 500 MG: 500 CAPSULE ORAL at 10:46

## 2025-01-06 RX ADMIN — ACETAMINOPHEN 975 MG: 325 TABLET, FILM COATED ORAL at 16:52

## 2025-01-06 RX ADMIN — DICLOFENAC 2 G: 10 GEL TOPICAL at 12:30

## 2025-01-06 ASSESSMENT — ACTIVITIES OF DAILY LIVING (ADL)
ADLS_ACUITY_SCORE: 51
ADLS_ACUITY_SCORE: 51
ADLS_ACUITY_SCORE: 45
ADLS_ACUITY_SCORE: 48
ADLS_ACUITY_SCORE: 45
ADLS_ACUITY_SCORE: 48
ADLS_ACUITY_SCORE: 51
ADLS_ACUITY_SCORE: 45
ADLS_ACUITY_SCORE: 51
ADLS_ACUITY_SCORE: 45
ADLS_ACUITY_SCORE: 48
ADLS_ACUITY_SCORE: 45
ADLS_ACUITY_SCORE: 45
ADLS_ACUITY_SCORE: 51
ADLS_ACUITY_SCORE: 45
ADLS_ACUITY_SCORE: 48

## 2025-01-06 NOTE — PROGRESS NOTES
Care Management Follow Up    Length of Stay (days): 3    Expected Discharge Date: 01/06/2025     Concerns to be Addressed: discharge planning     Patient plan of care discussed at interdisciplinary rounds: Yes    Anticipated Discharge Disposition:  ([facility]ital[facility] at [facility])              Anticipated Discharge Services: None  Anticipated Discharge DME: None    Patient/family educated on Medicare website which has current facility and service quality ratings: yes  Education Provided on the Discharge Plan: Yes  Patient/Family in Agreement with the Plan: yes    Referrals Placed by CM/SW: Hospice  Private pay costs discussed: Not applicable    Discussed  Partnership in Safe Discharge Planning  document with patient/family: Yes:      Handoff Completed: No, handoff not indicated or clinically appropriate    Additional Information:    SWCM called and left VM for  Nurse at [facility]- [phone]. SWCM called main number for [facility] and left VM for Kim to see what time they can accept her back.     10:09 AM  [facility] and left VM.  SWCM left VM with [place].  SWCM talked with Ortho PA andis re-x-raying hip.      11:07 AM  SWCM left VM for 2 daughters and Nurse Health at [facility].    11:50 AM  Jacqu[facility]- Dtr called and said that they want Pt to have therapy and are not signing back onto to Hospice at this time.  Dtr states that [facility] has [facility].  Ortho PA will put in Home Care orders.     TERE Guo

## 2025-01-06 NOTE — PLAN OF CARE
Goal Outcome Evaluation:  Patient alert and oriented x3, disoriented to time, re-directed prn.  Patient forgetful, bed alarm in use and call light within reach.  Patient's left hip dressing remains clean, dry, intact.  Sacral foam dressing to buttocks remains clean, dry, intact.  Encouraging patient to reposition q2h to prevent further skin breakdown.  Patient incontinent of urine, timed toileting and incontinence care provided prn.  Patient reported mild nausea this evening, given prn po Zofran with relief.  Patient now resting in bed.    Patient vital signs are at baseline: Yes  Patient able to ambulate as they were prior to admission or with assist devices provided by therapies during their stay:  No,  Reason:  patient tolerated ambulating short distances, declined ambulating in hallway this evening.  Per daughter requesting TCU placement.   Patient MUST void prior to discharge:  Yes  Patient able to tolerate oral intake:  Yes  Pain has adequate pain control using Oral analgesics:  Yes  Does patient have an identified :  Yes  Has goal D/C date and time been discussed with patient:  Yes, SW following.       Problem: Hip Arthroplasty  Goal: Optimal Functional Ability  1/5/2025 2255 by Wendy Carrillo RN  Outcome: Progressing  1/5/2025 2255 by Wendy Carrillo RN  Outcome: Progressing  Intervention: Promote Optimal Functional Status  Recent Flowsheet Documentation  Taken 1/5/2025 2131 by Wendy Carrillo RN  Activity Management:   patient refuses activity   ambulated in room   ambulated to bathroom  Taken 1/5/2025 1707 by Wendy Carrillo RN  Assistive Device Utilized:   gait belt   walker  Activity Management:   activity adjusted per tolerance   activity encouraged   ambulated in room   ambulated to bathroom   up in chair  Taken 1/5/2025 1616 by Wendy Carrillo RN  Activity Management: activity adjusted per tolerance  Taken 1/5/2025 1600 by Wendy Carrillo RN  Assistive Device Utilized:   gait belt    walker  Activity Management:   ambulated in room   ambulated to bathroom     Problem: Hip Arthroplasty  Goal: Fluid and Electrolyte Balance  1/5/2025 2255 by Wendy Carrillo RN  Outcome: Progressing  1/5/2025 2255 by Wendy Carrillo RN  Outcome: Progressing     Problem: Hip Arthroplasty  Goal: Optimal Pain Control and Function  Intervention: Prevent or Manage Pain  Recent Flowsheet Documentation  Taken 1/5/2025 2144 by Wendy Carrillo RN  Pain Management Interventions: medication (see MAR)  Taken 1/5/2025 2007 by Wendy Carrillo RN  Pain Management Interventions: medication (see MAR)  Taken 1/5/2025 1830 by Wendy Carrillo RN  Pain Management Interventions: medication (see MAR)  Taken 1/5/2025 1707 by Wendy Carrillo RN  Pain Management Interventions: medication (see MAR)

## 2025-01-06 NOTE — PROGRESS NOTES
Care Management Discharge Note    Discharge Date: 01/06/2025       Discharge Disposition:  [facility]    Discharge Services: None    Discharge DME: None    Discharge Transportation: family or friend will provide    Education Provided on the Discharge Plan: Yes    Persons Notified of Discharge Plans: patient and Jacqueline (daughter)    Patient/Family in Agreement with the Plan: yes         Additional Information:  CM reviewed chart. CM left VM for [facility]. 12:09 PM   Jacqueline (daughter) provided the email for the Director at [facility]. CM emailed Director at [facility]. CM requested a call to confirm discharge. Emailed stated that discharge orders have been faxed and patient will be discharged today. Per Jacqueline the facility would like a referral for PT, OT, RN and HHA. 12:52 PM   Email will not go through.     CM called Dilia the Director at [facility]. No answer CM left a VM requesting a return call. 1:51 PM     CM called  [facility] and requested to speak to nurse. No answer at the nurse line. CM left VM that patient is returning today. Orders were faxed this AM. If the nurse has questions or concerns to call CM. 2:31 PM     CM spoke to patient and she wants to go home. Patient  asked CM to call Jacqueline (daughter). CM spoke to Jacqueline. Jacqueline is in agreement to patient returning home. Jacqueline will provide transportation at discharge. Jacqueline wanted to know when patient would be ready. CM transferred the call to bedside RN to coordinate the  time. 2:53 PM     CM sent updated discharge orders to [facility]. 2:59 PM       Per Jacqueline (daughter) the Director at the [facility] wants the patient to stay 1 more day because it's late in the day and difficult to get medications. Per Jacqueline the Director did confirm that she received the orders this AM. Patient is medically ready and has discharge orders.  Patient should discharge back to [facility]  today. 3:06 PM       CM paged [place] provider to see if medications can be [facility] [place]. 3:19 PM     [place] [facility] provider will send medications to [facility]. 3:23 PM      [facility] phone [phone]      Home Care Referral sent  [address] [address]         Tomasa Mir, RN  Care Coordinator

## 2025-01-06 NOTE — PROGRESS NOTES
[facility]    Medicine Progress Note - Hospitalist Service    Date of Admission:  1/2/2025    Assessment & Plan   Identification: Sherice Alvarez is a 89 year old female   PMHx: Coronary artery disease, heart failure with preserved ejection fraction, HTN, CVA, CKD 3a, cognitive impairment, restless leg syndrome    Sherice underwent elective left hip arthroplasty on January 2 with Dr Parham, 300 ml EBL, no complications.  Hospital medicine consulted for management of chronic medical conditions. POD2 she developed hypoxia. CXR showed pulmonary edema. IV lasix started. She also reported numbness of her fingers.    Today:  Appropriate for discharge today  Placed hospice consult (resuming)  Updated CM    Finger numbness  Bilateral, Spurling positive, C-spine x-rays uncollected. Not sure if show rober/retrolisthesis would occur with anesthesia?  Can be followed by outpatient    Pulmonary edema  Hypoxia  Suspect secondary to marcus-op fluids, anemia    Acute blood loss anemia (baseline hemoglobin 10.4 May '24)  On twice daily aspirin 81 mg for PPx  Monitor for melena    Essential hypertension  PTA amlodipine 5 mg daily  PTA metoprolol succinate 25 mg daily    HFpEF  furosemide    Coronary artery disease  Severe aortic valve stenosis status post TAVR  At home on aspirin, resume when prophylactic aspirin completes  PTA metoprolol succinate 25 mg daily    History of CVA  History of subarachnoid hemorrhage    Restless leg syndrome  PTA Requip 4 mg bedtime    Recent left lower extremity cellulitis  Per prior documentation, already completed a course of antibiotic.  Left leg negative for DVT  Agree with cephalexin started perioperatively per primary team.    Status post left hip arthroplasty  Hold home Ativan.            Diet: Advance Diet as Tolerated: Regular Diet Adult  Discharge Instruction - Regular Diet Adult    DVT Prophylaxis: Low Risk/Ambulatory with no VTE prophylaxis indicated  Jah  Catheter: Not present  Lines: None     Cardiac Monitoring: None  Code Status: Full Code      Clinically Significant Risk Factors                   # Hypertension: Noted on problem list  # Acute heart failure with preserved ejection fraction: heart failure noted on problem list, last echo with EF >50%, and receiving IV diuretics               # Financial/Environmental Concerns: none  # Asthma: noted on problem list        Social Drivers of Health    Tobacco Use: Medium Risk (12/31/2024)    Patient History     Smoking Tobacco Use: Former     Smokeless Tobacco Use: Never    Received from [employer] [facility]    Social Connections          Disposition Plan     Medically Ready for Discharge: Ready Now         Nathanael Garcia MD  Hospitalist Service  [facility]  Securely message with [email or web address] (more info)  Text page via [employer] Paging/Directory   ______________________________________________________________________    Interval History   Less SOB, off O2, denies CP. Finger numbness unchanged.   Comfortable with discharge today    Physical Exam   Vital Signs: Temp: 97.7  F (36.5  C) Temp src: Oral BP: 135/63 Pulse: 66   Resp: 18 SpO2: 90 % O2 Device: None (Room air)    Weight: 125 lbs 8 oz    Awake, alert, sitting up in the bed eating breakfast and watching television  Pupils unchanged, symmetric  Breathing comfortably on room air, improvement in aeration left lung base on auscultation  Regular rate and rhythm  Nonacute abdomen  No peripheral edema    Medical Decision Making       40 MINUTES SPENT BY ME on the date of service doing chart review, history, exam, documentation & further activities per the note.      Data   ------------------------- PAST 24 HR DATA REVIEWED -----------------------------------------------        Imaging results reviewed over the past 24 hrs:   No results found for this or any previous visit (from the past 24 hours).

## 2025-01-06 NOTE — PLAN OF CARE
Goal Outcome Evaluation:    Note from [id number]. VSS on RA. Denies shortness of breath. Disoriented to time. Pt rated pain 5/10, tylenol given. No new skin issues noted. Dressing on left hip is CDI. Baseline tingling in all extremities. Ao1 with walker/gait belt for transferring. Left PIV SL. Voiding adequately via purewick. Education of medication administration and use of call-light to reduce risk for falls and injury. No further issues noted.     Problem: Pain Acute  Goal: Optimal Pain Control and Function  Outcome: Progressing  Intervention: Optimize Psychosocial Wellbeing  Recent Flowsheet Documentation  Taken 1/6/2025 0000 by Fany Allen RN  Supportive Measures: active listening utilized  Intervention: Develop Pain Management Plan  Recent Flowsheet Documentation  Taken 1/6/2025 0000 by Fany Allen RN  Pain Management Interventions: medication (see MAR)  Intervention: Prevent or Manage Pain  Recent Flowsheet Documentation  Taken 1/6/2025 0000 by Fany Allen RN  Bowel Elimination Promotion: adequate fluid intake promoted  Medication Review/Management: medications reviewed     Problem: Adult Inpatient Plan of Care  Goal: Absence of Hospital-Acquired Illness or Injury  Intervention: Identify and Manage Fall Risk  Recent Flowsheet Documentation  Taken 1/6/2025 0000 by Fany Allen RN  Safety Promotion/Fall Prevention:   safety round/check completed   room organization consistent  Intervention: Prevent Skin Injury  Recent Flowsheet Documentation  Taken 1/6/2025 0000 by Fany Allen RN  Body Position: position maintained  Skin Protection: adhesive use limited  Intervention: Prevent Infection  Recent Flowsheet Documentation  Taken 1/6/2025 0000 by Fany Allen RN  Infection Prevention:   single patient room provided   hand hygiene promoted

## 2025-01-06 NOTE — PROGRESS NOTES
Physical Therapy Discharge Summary    Reason for therapy discharge:    No further expectations of functional progress.    Progress towards therapy goal(s). See goals on Care Plan in [facility] electronic health record for goal details.  Goals partially met.  Barriers to achieving goals:   limited tolerance for therapy.    Therapy recommendation(s):    Continue home exercise program.

## 2025-01-06 NOTE — PLAN OF CARE
Goal Outcome Evaluation:      Plan of Care Reviewed With: patient    Overall Patient Progress: improvingOverall Patient Progress: improving    Outcome Evaluation: Pt alert to self, place and situation, disoriented to time. Forgetful. Intermittently very painful which distresses her greatly. PRN oxy, along with scheduled tylenol and robaxin utilized. Cold applied. Pt's pain decreased with interventions and she was able to ambulated with PT. Pt had an xray this morning because of pain, Alexia RAMIREZ reviewed xray and pt is ok to discharge back to [facility] facility today.      Problem: Adult Inpatient Plan of Care  Goal: Optimal Comfort and Wellbeing  Outcome: Progressing  Intervention: Monitor Pain and Promote Comfort  Recent Flowsheet Documentation  Taken 1/6/2025 1127 by Enrique Payne, RN  Pain Management Interventions: cold applied  Taken 1/6/2025 1045 by Enrique Payne, RN  Pain Management Interventions:   medication (see MAR)   cold applied   distraction   emotional support   repositioned   rest  Taken 1/6/2025 0850 by Enrique Payne, RN  Pain Management Interventions: (xray ordered of left hip) MD notified (comment)  Taken 1/6/2025 0803 by Enrique Payne, RN  Pain Management Interventions: medication (see MAR)     Enrique Payne RN, ONC

## 2025-01-06 NOTE — PROGRESS NOTES
"Orthopedic Progress Note      Assessment: 4 Days Post-Op  S/P Procedure(s):  LEFT TOTAL HIP ARTHROPLASTY     Plan:   - Continue PT/OT  - Weightbearing status: WBAT LLE; posterior hip precautions  - Hgb 7.5 1/5/25. Asymptomatic. Appreciate medicine input on need for transfusion.  - Anticoagulation: ASA 81mg BID in addition to SCDs, perla stockings and early ambulation.  - Discharge planning: pending progression with therapy, medicine team sign off, pain control    - will order left x-ray: no changes in left hip x-ray    Subjective:  Pain: severe  Chest pain, SOB: no  Nausea, Vomiting:  no  Lightheadedness, Dizziness:  no  Neuro:  Patient denies new onset numbness or paresthesias    Patient endorses new left hip pain since transferring to chair this morning. States it is uncomfortable for her to sit in chair. Repositioned patient, and still endorsing severe pain.     Objective:  /63   Pulse 66   Temp 97.7  F (36.5  C) (Oral)   Resp 18   Ht 1.549 m (5' 1\")   Wt 56.9 kg (125 lb 8 oz)   SpO2 90%   BMI 23.71 kg/m    The patient is A&Ox3. Grimacing, uncomfortable.   Sensation is intact.  Dorsiflexion and plantar flexion is intact.  Dorsalis pedis pulse intact.  Calves are soft and non-tender. Negative Mary Alice's.BLE edematous, LLE slightly red and warm due to recent cellulitis.   The incision is covered. Dressing C/D/I.  Pain with attempted left hip flexion.     Pertinent Labs   Lab Results: personally reviewed.   Lab Results   Component Value Date    INR 1.23 (H) 02/09/2024    INR 1.13 11/09/2022    INR 1.13 01/15/2022     Lab Results   Component Value Date    WBC 9.2 12/23/2024    HGB 7.5 (L) 01/05/2025    HCT 26.8 (L) 12/23/2024    MCV 90 12/23/2024     12/23/2024     Lab Results   Component Value Date     12/23/2024    CO2 23 12/23/2024         Report completed by:  Alexia Blount PA-C/Dr. Parham  [facility]    Date: 1/6/2025  Time: 9:15 AM    "

## 2025-01-06 NOTE — PROGRESS NOTES
Will not see today:  PAIN MANAGEMENT SERVICE CHART CHECK    This patient's chart has been reviewed by the Pain Service. It has been determined that no change is necessary to the pain management regimen at this time. Plans for discharge. Pain team will sign off. Discussed with Orthopedics.     Thank you!      GABY GarcesP-C  Acute Care Pain Management Program [facility]   Monday-Friday 8a-4p   Page via Epic or [employer]

## 2025-01-07 NOTE — DISCHARGE SUMMARY
"ORTHOPEDIC DISCHARGE SUMMARY       Sherice Alvarez,  1934, MRN [id number]    Admission Date: 2025      Admission Diagnoses: Osteoarthritis of left hip [M16.12]     Discharge Date: 2025     Post-operative Day:  5 Days Post-Op    Reason for Admission: The patient was admitted for the following: Procedure(s):  LEFT TOTAL HIP ARTHROPLASTY    BRIEF HOSPITAL COURSE   Sherice Alvarez is a pleasant 89 year old female who underwent the aforementioned procedure with Dr. Parham on 2025. There were no intraoperative complications and the patient was transferred to the recovery room and later the orthopedic unit in stable condition. Once the patient reached the orthopedic floor our orthopedic pain protocol was implemented along with the following:    Anticoagulation Medications: ASA  Therapy: PT and OT  Activity: WBAT  Bracing: None    Consultations during Admission: Hospitalist service for medical management     COMPLICATIONS/SIGNIFICANT FINDINGS        DISCHARGE INFORMATION   Condition at discharge: Good  Discharge destination: Home  Patient was seen by myself on the date of discharge.    FOLLOW UP CARE   Follow up with orthopedics in 2 weeks or sooner should the need arise. Ortho will continue to manage pain control, post op anticoagulation and incision care.     Follow up with your PCP for management of chronic medical problems and to evaluate for post op medical complications including constipation, nausea/vomiting, DVT/PE, anemia, changes in blood pressure, fevers/chills, urinary retention and atelectasis/pneumonia.     Subjective   Patient is doing well on POD #4. Pain is well controlled with oral medications. Ambulating. Tolerating oral intake.     Physical Exam   /53 (BP Location: Left arm)   Pulse 73   Temp 98.7  F (37.1  C) (Oral)   Resp 16   Ht 1.549 m (5' 1\")   Wt 56.9 kg (125 lb 8 oz)   SpO2 90%   BMI 23.71 kg/m    The patient is A&Ox3. Grimacing, uncomfortable.   Sensation " is intact.  Dorsiflexion and plantar flexion is intact.  Dorsalis pedis pulse intact.  Calves are soft and non-tender. Negative Mary Alice's.BLE edematous, LLE slightly red and warm due to recent cellulitis.   The incision is covered. Dressing C/D/I.  Pain with attempted left hip flexion.     Pertinent Results at Discharge     Hemoglobin   Date/Time Value Ref Range Status   01/05/2025 06:39 AM 7.5 (L) 11.7 - 15.7 g/dL Final   01/04/2025 07:00 AM 7.1 (L) 11.7 - 15.7 g/dL Final   01/03/2025 07:04 AM 7.3 (L) 11.7 - 15.7 g/dL Final     INR   Date/Time Value Ref Range Status   02/09/2024 01:15 PM 1.23 (H) 0.85 - 1.15 Final   11/09/2022 03:32 PM 1.13 0.85 - 1.15 Final   01/15/2022 02:48 AM 1.13 0.90 - 1.15 Final     Platelet Count   Date/Time Value Ref Range Status   12/23/2024 09:48  150 - 450 10e3/uL Final   05/07/2024 04:48  150 - 450 10e3/uL Final   04/11/2024 06:57  150 - 450 10e3/uL Final       Problem List   Active Problems:    Primary osteoarthritis of hip    Primary osteoarthritis of left hip      Alexia Blount PA-C/Dr. Parham  [facility]  [phone]  Date: 1/7/2025  Time: 12:49 PM

## 2025-01-07 NOTE — PROGRESS NOTES
Occupational Therapy Discharge Summary    Reason for therapy discharge:    Discharged to home.    Progress towards therapy goal(s). See goals on Care Plan in [facility] electronic health record for goal details.  Goals partially met.  Barriers to achieving goals:   discharge from facility.    Therapy recommendation(s):    Continued therapy is recommended.  Rationale/Recommendations:  Would benefit from further OT to increase indep with ADLs and trsfs .

## 2025-01-07 NOTE — PROGRESS NOTES
CM received a call from the [facility].       Tomasa Mir RN  Care Coordinator

## 2025-01-07 NOTE — PLAN OF CARE
Discharge AVS reviewed with patient and daughter. Questions answered and instructions acknowledged. Belongings and paperwork sent with via daughter transport.     Jamey Skinner RN on 1/6/2025 at 6:21 PM

## 2025-05-11 ENCOUNTER — APPOINTMENT (OUTPATIENT)
Dept: MRI IMAGING | Facility: HOSPITAL | Age: OVER 89
End: 2025-05-11
Attending: EMERGENCY MEDICINE
Payer: COMMERCIAL

## 2025-05-11 ENCOUNTER — APPOINTMENT (OUTPATIENT)
Dept: RADIOLOGY | Facility: HOSPITAL | Age: OVER 89
End: 2025-05-11
Attending: EMERGENCY MEDICINE
Payer: COMMERCIAL

## 2025-05-11 ENCOUNTER — APPOINTMENT (OUTPATIENT)
Dept: CT IMAGING | Facility: HOSPITAL | Age: OVER 89
End: 2025-05-11
Attending: EMERGENCY MEDICINE
Payer: COMMERCIAL

## 2025-05-11 ENCOUNTER — HOSPITAL ENCOUNTER (EMERGENCY)
Facility: HOSPITAL | Age: OVER 89
Discharge: HOME | End: 2025-05-11
Attending: EMERGENCY MEDICINE | Admitting: EMERGENCY MEDICINE
Payer: COMMERCIAL

## 2025-05-11 VITALS
HEART RATE: 78 BPM | RESPIRATION RATE: 14 BRPM | OXYGEN SATURATION: 93 % | DIASTOLIC BLOOD PRESSURE: 79 MMHG | BODY MASS INDEX: 23.62 KG/M2 | WEIGHT: 125 LBS | SYSTOLIC BLOOD PRESSURE: 178 MMHG | TEMPERATURE: 98.3 F

## 2025-05-11 DIAGNOSIS — S22.071A BURST FRACTURE OF T9 VERTEBRA (H): ICD-10-CM
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
## 2025-05-11 LAB
ALBUMIN UR-MCNC: 10 MG/DL
ANION GAP SERPL CALCULATED.3IONS-SCNC: 8 MMOL/L (ref 7–15)
APPEARANCE UR: CLEAR
BILIRUB UR QL STRIP: NEGATIVE
BUN SERPL-MCNC: 46.2 MG/DL (ref 8–23)
CALCIUM SERPL-MCNC: 9.5 MG/DL (ref 8.8–10.4)
CHLORIDE SERPL-SCNC: 108 MMOL/L (ref 98–107)
COLOR UR AUTO: ABNORMAL
CREAT SERPL-MCNC: 1.64 MG/DL (ref 0.51–0.95)
EGFRCR SERPLBLD CKD-EPI 2021: 29 ML/MIN/1.73M2
ERYTHROCYTE [DISTWIDTH] IN BLOOD BY AUTOMATED COUNT: 22 % (ref 10–15)
GLUCOSE SERPL-MCNC: 108 MG/DL (ref 70–99)
GLUCOSE UR STRIP-MCNC: NEGATIVE MG/DL
HCO3 SERPL-SCNC: 25 MMOL/L (ref 22–29)
HCT VFR BLD AUTO: 31.9 % (ref 35–47)
HGB BLD-MCNC: 9.9 G/DL (ref 11.7–15.7)
HGB UR QL STRIP: NEGATIVE
HYALINE CASTS: 15 /LPF
KETONES UR STRIP-MCNC: NEGATIVE MG/DL
LEUKOCYTE ESTERASE UR QL STRIP: NEGATIVE
MAGNESIUM SERPL-MCNC: 2.1 MG/DL (ref 1.7–2.3)
MCH RBC QN AUTO: 28.8 PG (ref 26.5–33)
MCHC RBC AUTO-ENTMCNC: 31 G/DL (ref 31.5–36.5)
MCV RBC AUTO: 93 FL (ref 78–100)
MUCOUS THREADS #/AREA URNS LPF: PRESENT /LPF
NITRATE UR QL: NEGATIVE
PH UR STRIP: 5.5 [PH] (ref 5–7)
PLATELET # BLD AUTO: 171 10E3/UL (ref 150–450)
POTASSIUM SERPL-SCNC: 4.4 MMOL/L (ref 3.4–5.3)
RBC # BLD AUTO: 3.44 10E6/UL (ref 3.8–5.2)
RBC URINE: 0 /HPF
SODIUM SERPL-SCNC: 141 MMOL/L (ref 135–145)
SP GR UR STRIP: 1.02 (ref 1–1.03)
UROBILINOGEN UR STRIP-MCNC: NORMAL MG/DL
WBC # BLD AUTO: 6.9 10E3/UL (ref 4–11)
WBC URINE: 5 /HPF

## 2025-05-11 PROCEDURE — 99204 OFFICE O/P NEW MOD 45 MIN: CPT

## 2025-05-11 PROCEDURE — 72070 X-RAY EXAM THORAC SPINE 2VWS: CPT

## 2025-05-11 PROCEDURE — 85027 COMPLETE CBC AUTOMATED: CPT | Performed by: EMERGENCY MEDICINE

## 2025-05-11 PROCEDURE — L0456 TLSO FLEX TRNK SJ-SS PRE CST: HCPCS

## 2025-05-11 PROCEDURE — 36415 COLL VENOUS BLD VENIPUNCTURE: CPT | Performed by: EMERGENCY MEDICINE

## 2025-05-11 PROCEDURE — 258N000003 HC RX IP 258 OP 636: Performed by: EMERGENCY MEDICINE

## 2025-05-11 PROCEDURE — 70450 CT HEAD/BRAIN W/O DYE: CPT

## 2025-05-11 PROCEDURE — 81001 URINALYSIS AUTO W/SCOPE: CPT | Performed by: EMERGENCY MEDICINE

## 2025-05-11 PROCEDURE — 73560 X-RAY EXAM OF KNEE 1 OR 2: CPT | Mod: 50

## 2025-05-11 PROCEDURE — 99285 EMERGENCY DEPT VISIT HI MDM: CPT | Mod: 25

## 2025-05-11 PROCEDURE — 96360 HYDRATION IV INFUSION INIT: CPT

## 2025-05-11 PROCEDURE — 82565 ASSAY OF CREATININE: CPT | Performed by: EMERGENCY MEDICINE

## 2025-05-11 PROCEDURE — 83735 ASSAY OF MAGNESIUM: CPT | Performed by: EMERGENCY MEDICINE

## 2025-05-11 PROCEDURE — 72131 CT LUMBAR SPINE W/O DYE: CPT

## 2025-05-11 PROCEDURE — 250N000013 HC RX MED GY IP 250 OP 250 PS 637: Performed by: EMERGENCY MEDICINE

## 2025-05-11 PROCEDURE — 72125 CT NECK SPINE W/O DYE: CPT

## 2025-05-11 PROCEDURE — 72128 CT CHEST SPINE W/O DYE: CPT

## 2025-05-11 PROCEDURE — 72146 MRI CHEST SPINE W/O DYE: CPT

## 2025-05-11 PROCEDURE — 96361 HYDRATE IV INFUSION ADD-ON: CPT

## 2025-05-11 PROCEDURE — 93005 ELECTROCARDIOGRAM TRACING: CPT | Performed by: EMERGENCY MEDICINE

## 2025-05-11 RX ORDER — BUPRENORPHINE 10 UG/H
1 PATCH TRANSDERMAL ONCE
Status: DISCONTINUED | OUTPATIENT
Start: 2025-05-11 | End: 2025-05-11 | Stop reason: HOSPADM

## 2025-05-11 RX ADMIN — SODIUM CHLORIDE 1000 ML: 0.9 INJECTION, SOLUTION INTRAVENOUS at 05:10

## 2025-05-11 RX ADMIN — BUPRENORPHINE 1 PATCH: 10 PATCH, EXTENDED RELEASE TRANSDERMAL at 10:52

## 2025-05-11 ASSESSMENT — ACTIVITIES OF DAILY LIVING (ADL)
ADLS_ACUITY_SCORE: 58

## 2025-05-11 ASSESSMENT — COLUMBIA-SUICIDE SEVERITY RATING SCALE - C-SSRS
1. IN THE PAST MONTH, HAVE YOU WISHED YOU WERE DEAD OR WISHED YOU COULD GO TO SLEEP AND NOT WAKE UP?: NO
6. HAVE YOU EVER DONE ANYTHING, STARTED TO DO ANYTHING, OR PREPARED TO DO ANYTHING TO END YOUR LIFE?: NO
2. HAVE YOU ACTUALLY HAD ANY THOUGHTS OF KILLING YOURSELF IN THE PAST MONTH?: NO

## 2025-05-11 NOTE — PROGRESS NOTES
[facility]  Telephone Note    Contacted by Felicia Lopez MD at [facility] ED.     89F presenting for evaluation s/p mechanical fall.     Exam per ED:  Lower midline back pain  Mild neck pain   TTP thoracic and lumbar spine  Limited ROM BLE due to bilateral knee pain    Imaging:  EXAM: CT THORACIC SPINE W/O CONTRAST, CT LUMBAR SPINE W/O CONTRAST  LOCATION: [facility]  DATE: 5/11/2025                                                                IMPRESSION:  THORACIC SPINE CT:  1.  Moderate age-indeterminate anterior wedging burst-type fracture of T9 with up to 70% loss of height. Minimal buckling of the posterior cortex.  2.  Age-indeterminate Schmorl's node at the superior endplate of T12.  3.  No definite acute displaced fracture or subluxation.     LUMBAR SPINE CT:  1.  No acute fracture or subluxation.  2.  Mild chronic compression deformity at the superior endplate of L4.  3.  Degenerative changes as described.    Plan:   - No immediate neurosurgical intervention indicated at this time  - Thoracic MRI without contrast  - Bedrest until MRI resulted  - Further recommendations pending imaging    Anel Pimentel CNP  [facility]  [address] [place] [id number]  Tel [phone]  Fax [phone]   Securely message or page via [employer], Epic Secure Chat, or [employer]

## 2025-05-11 NOTE — CONSULTS
[facility]    Neurosurgery Consultation     Date of Admission:  5/11/2025  Date of Consult (When I saw the patient): 05/11/25    Assessment   Sherice Alvarez is a 89 year old female with history of CKD, HTN, anemia, dyslipidemia, HFpEF, severe aortic stenosis, cognitive impairment/dementia, OA who was admitted on 5/11/2025 for evaluation after a fall. Neurosurgery was consulted for T9 burst fracture.    Patient was seen this morning in bed. States she has minimal pain when lying down, but increased localized back pain with movement. Denies pain radiating around her sides or down her legs. Does endorse bilateral heel pain. Denies numbness/tingling or bowel/bladder dysfunction. Neuro exam intact.     EXAM: CT THORACIC SPINE W/O CONTRAST, CT LUMBAR SPINE W/O CONTRAST  LOCATION: [facility]  DATE: 5/11/2025                                                              IMPRESSION:  THORACIC SPINE CT:  1.  Moderate age-indeterminate anterior wedging burst-type fracture of T9 with up to 70% loss of height. Minimal buckling of the posterior cortex.  2.  Age-indeterminate Schmorl's node at the superior endplate of T12.  3.  No definite acute displaced fracture or subluxation.     LUMBAR SPINE CT:  1.  No acute fracture or subluxation.  2.  Mild chronic compression deformity at the superior endplate of L4.  3.  Degenerative changes as described.    Plan   - No immediate neurosurgical intervention indicated at this time  - Thoracic MRI without contrast  - Bedrest until MRI resulted  - Further recommendations pending imaging    ADDENDUM  EXAM: MR THORACIC SPINE WITHOUT CONTRAST  LOCATION: [facility]  DATE: 05/11/2025                                                                    IMPRESSION:  1.  T9 vertebral body fracture demonstrates mild linear edema suggestive of recent fracture, favor subacute over acute, new compared to 12/19/2023.  2.   Superior endplate Schmorl's node at T12 with mild edema, possibly recent Schmorl's node, new compared to 12/19/2023.    Plan:  - TLSO brace when OOB or upright at 89   - Upright thoracic XR with brace on  - Follow up in NSGY clinic in 4-6 weeks with upright XR prior. Our schedulers will assist in coordination  - NSGY will sign off at this time. Navigator updated.    I have discussed the following assessment and plan with Dr. Gonzalez.     Anel Pimentel, [facility] Neurosurgery  [address] [place] [id number]  Tel [phone]  Fax [phone]     Code Status    Prior    Reason for Consult   I was asked by Dr. Lopez to evaluate this patient for:  T9 burst fracture    Primary Care Physician   Tracy Mosqueda    Chief Complaint   Evaluation s/p fall     History is obtained from the patient and electronic health record    History of Present Illness   Sherice Alvarez is a 89 year old female who presents with [facility] care facility for evaluation s/p fall. Patient was on the way to the bathroom and then fell to her knees and then onto her buttocks. She uses a walker for ambulation at baseline. Patient complains of pain in her knees as well as her entire back, although she does have a history of chronic back and neck pain.     Past Medical History   I have reviewed this patient's medical history and updated it with pertinent information if needed.   Past Medical History:   Diagnosis Date    Arthritis     Asthma     CAD (coronary artery disease)     Cancer (H)     basal cell    Chronic kidney disease     ckd 3    Chronic pain syndrome     Congestive heart failure (H)     Crohn's disease (H)     GERD (gastroesophageal reflux disease)     Hx of heart artery stent 11/01/2016    1 stent R Proximal CA and 1 Stent L Proximal circumflex  12/2016 Stent proximal LAD    Hyperlipidemia     Hypertension     NSTEMI (non-ST elevated myocardial infarction) (H) 11/01/2016    Other chronic pain     PONV  (postoperative nausea and vomiting)     severe povn with last knee surgery    Restless legs syndrome     Stented coronary artery     Stroke (H) 2010    numbness rt jaw       Past Surgical History   I have reviewed this patient's surgical history and updated it with pertinent information if needed.  Past Surgical History:   Procedure Laterality Date    APPENDECTOMY      ARTHROPLASTY HIP Left 2025    Procedure: LEFT TOTAL HIP ARTHROPLASTY;  Surgeon: Arturo Parham DO;  Location: [facility] OR    CARDIAC CATHETERIZATION  2016    multiple vessel disease.  no intervention    CARDIAC CATHETERIZATION  2016    CARDIAC CATHETERIZATION N/A 2016    Procedure: Coronary Angiogram;  Surgeon: Sunil Moran MD;  Location: [facility] Cath Lab;  Service:     CAROTID ENDARTERECTOMY      CAROTID ENDARTERECTOMY Right 2010    CERVICAL LAMINECTOMY  1994     SECTION      CV CORONARY ANGIOGRAM N/A 2020    Procedure: Coronary Angiogram;  Surgeon: Vinita Ramos MD;  Location: [facility] Cath Lab;  Service: Cardiology    CV LEFT HEART CATHETERIZATION WITHOUT LEFT VENTRICULOGRAM Left 2020    Procedure: Left Heart Catheterization Without Left Ventriculogram;  Surgeon: Jerad Lerner MD;  Location: [facility] Cath Lab;  Service: Cardiology    CV TRANSCATHETER AORTIC VALVE REPLACEMENT - OTHER APPROACH N/A 2020    Procedure: CV TRANSCATHETER AORTIC VALVE REPLACEMENT - RIGHT SUBCLAVIAN APPROACH;  Surgeon: John Caceres MD;  Location: [facility] Cath Lab;  Service: Cardiology    HEART CATH, ANGIOPLASTY      HEMORRHOIDECTOMY EXTERNAL      IR LUMBAR EPIDURAL STEROID INJECTION  2014    IR LUMBAR NERVE ROOT INJECTION  10/01/2013    JOINT REPLACEMENT      OPEN REDUCTION INTERNAL FIXATION HIP NAILING Right 2/10/2024    Procedure: INTERNAL FIXATION, FRACTURE, TROCHANTERIC, HIP, USING PINS OR RODS RIGHT;  Surgeon: Gilberto Joy MD;  Location: [facility]  OR    OH ESOPHAGOGASTRODUODENOSCOPY TRANSORAL DIAGNOSTIC N/A 07/10/2019    Procedure: ESOPHAGOGASTRODUODENOSCOPY (EGD) with gastric biopsy;  Surgeon: Sunil Stuart MD;  Location: [facility] GI;  Service: Gastroenterology    OH REPLACE AORTIC VALVE OPEN AXILLRY ARTRY APPROACH N/A 06/02/2020    Procedure: OR TRANSCATHETER AORTIC VALVE REPLACEMENT, SUBCLAVIAN APPROACH;  Surgeon: Bhavin Cuadra MD;  Location: [facility] Cath Lab;  Service: Cardiology    TONSILLECTOMY & ADENOIDECTOMY      TOTAL KNEE ARTHROPLASTY Right     TRANSCATHETER AORTIC-VALVE REPLACEMENT      ZZC TOTAL KNEE ARTHROPLASTY Left 05/11/2021    Procedure: LEFT TOTAL KNEE ARTHROPLASTY;  Surgeon: Doug Rhodes MD;  Location: [facility];  Service: Orthopedics       Prior to Admission Medications   Prior to Admission Medications   Prescriptions Last Dose Informant Patient Reported? Taking?   LORazepam (ATIVAN) 0.5 MG tablet   Yes No   Sig: Take 0.5 mg by mouth at bedtime.   acetaminophen (TYLENOL) 650 MG suppository   Yes No   Sig: Place 650 mg rectally every 6 hours as needed for mild pain.   amLODIPine (NORVASC) 5 MG tablet   Yes No   Sig: Take 5 mg by mouth daily.   bisacodyl (DULCOLAX) 10 MG suppository   Yes No   Sig: Place 10 mg rectally daily as needed for constipation.   buprenorphine (BUTRANS) 10 MCG/HR WK patch   Yes No   Sig: Place 1 patch onto the skin once a week. Thursdays   cefadroxil (DURICEF) 500 MG capsule   No No   Sig: Take 1 capsule (500 mg) by mouth 2 times daily.   diclofenac (VOLTAREN) 1 % topical gel   Yes No   Sig: Apply 2 g topically 3 times daily. Lower back pain   escitalopram (LEXAPRO) 5 MG tablet   Yes No   Sig: Take 5 mg by mouth daily.   furosemide (LASIX) 20 MG tablet   Yes No   Sig: Take 20 mg by mouth daily.   hyoscyamine (LEVSIN/SL) 0.125 MG sublingual tablet   Yes No   Sig: Place 0.125 mg under the tongue every 4 hours as needed (secretions).   lidocaine (XYLOCAINE) 5 % external ointment   Yes  No   Sig: Apply topically 3 times daily as needed for moderate pain.   methocarbamol (ROBAXIN) 500 MG tablet   No No   Sig: Take 1 tablet (500 mg) by mouth 4 times daily.   metoprolol succinate ER (TOPROL XL) 25 MG 24 hr tablet   Yes No   Sig: Take 25 mg by mouth every evening   nitroGLYcerin (NITROSTAT) 0.4 MG sublingual tablet   No No   Sig: Place 1 tablet (0.4 mg) under the tongue every 5 minutes as needed   omeprazole (PRILOSEC) 20 MG DR capsule   Yes No   Sig: Take 20 mg by mouth 2 times daily.   ondansetron (ZOFRAN) 4 MG tablet   Yes No   Sig: Take 4 mg by mouth every 6 hours as needed for nausea or vomiting.   oxyCODONE (ROXICODONE) 5 MG tablet   No No   Sig: Take 1 tablet (5 mg) by mouth every 4 hours as needed for severe pain.   rOPINIRole (REQUIP) 2 MG tablet   Yes No   Sig: Take 4 mg by mouth at bedtime.   sennosides (SENOKOT) 8.6 MG tablet   Yes No   Sig: Take 1 tablet by mouth at bedtime.   sennosides (SENOKOT) 8.6 MG tablet   Yes No   Sig: Take 1 tablet by mouth daily as needed for constipation.      Facility-Administered Medications: None     Allergies   Allergies   Allergen Reactions    Tizanidine Hcl Other (See Comments)     Blood pressure tanked     Amitriptyline Hcl [Amitriptyline] Unknown    Erythromycin Diarrhea     Childhood reaction    Gabapentin Swelling     And jerking movements    Gramineae Pollens Unknown    Naproxen Unknown    Other Environmental Allergy Unknown     Molds and pet dander       Social History   I have reviewed this patient's social history and updated it with pertinent information if needed. Sherice Alvarez  reports that she quit smoking about 45 years ago. Her smoking use included cigarettes. She has never used smokeless tobacco. She reports that she does not drink alcohol and does not use drugs.    Family History   I have reviewed this patient's family history and updated it with pertinent information if needed.   Family History   Problem Relation Age of Onset     Atrial fibrillation Mother     Parkinsonism Father        Review of Systems   10 point ROS negative other than symptoms noted in HPI.    Physical Exam   Vital signs with ranges:   Temp:  [98.3  F (36.8  C)] 98.3  F (36.8  C)  Pulse:  [66-79] 71  Resp:  [10-18] 17  BP: (146-191)/(62-85) 185/80  SpO2:  [90 %-94 %] 91 %  125 lbs 0 oz    HEENT:  Normocephalic, atraumatic  Heart:  No peripheral edema  Lungs:  No SOB  Skin:  Warm and dry, good capillary refill.    NEUROLOGICAL EXAMINATION:   Mental status:  Alert and Oriented x 3, speech is fluent.  Motor:    Iliopsoas  (hip flexion)               Right: 5/5  Left:  5/5  Quadriceps  (knee extension)       Right:  5/5  Left:  5/5  Hamstrings  (knee flexion)            Right:  5/5  Left:  5/5  Gastroc Soleus  (PF)                          Right:  5/5  Left:  5/5  Tibialis Ant  (DF)                          Right:  5/5  Left:  5/5  EHL                          Right:  5/5  Left:  5/5    Sensation:  Intact to light touch   Reflexes:   Negative Clonus.   No tenderness to palpation of the thoracic spine or paraspinous muscles bilaterally.     Data   CBC RESULTS:   Recent Labs   Lab Test 05/11/25  0338   WBC 6.9   RBC 3.44*   HGB 9.9*   HCT 31.9*   MCV 93   MCH 28.8   MCHC 31.0*   RDW 22.0*        Basic Metabolic Panel:  Lab Results   Component Value Date     05/11/2025      Lab Results   Component Value Date    POTASSIUM 4.4 05/11/2025    POTASSIUM 3.6 08/03/2022     Lab Results   Component Value Date    CHLORIDE 108 05/11/2025    CHLORIDE 110 08/03/2022     Lab Results   Component Value Date    BESS 9.5 05/11/2025     Lab Results   Component Value Date    CO2 25 05/11/2025    CO2 26 08/03/2022     Lab Results   Component Value Date    BUN 46.2 05/11/2025    BUN 27 08/03/2022     Lab Results   Component Value Date    CR 1.64 05/11/2025     Lab Results   Component Value Date     05/11/2025     01/04/2025    GLC 80 08/03/2022     INR:  Lab Results    Component Value Date    INR 1.23 02/09/2024    INR 1.13 11/09/2022    INR 1.13 01/15/2022    INR 1.15 08/19/2021    INR 1.19 05/11/2021    INR 1.34 06/02/2020    INR 1.22 06/02/2020    INR 1.11 05/06/2020    INR 1.08 04/23/2020    INR 1.22 05/29/2019

## 2025-05-11 NOTE — ED NOTES
Called and left a message with daughter ti block to call back to get info on the MRI questions due to the lack reliability of pt.

## 2025-05-11 NOTE — ED NOTES
Pt is not reliable enough to get information for an MRI.  Family will be consulted in the morning to answer the questions.

## 2025-05-11 NOTE — PROGRESS NOTES
Sherice was seen for fit and delivery of a TLSO per order of her physician. Her mood was pleasant.     The TLSO 456 was fit in the supine position and she handled the fitting process with minimal issues. The waist belt was sized to her anatomy and the posterior panel was raised to appropriate height.     The goal of the orthosis is to provide tri-planar support and immobilization of the vertebral bodes to reduce pain and promote healing following fracture.     Instructions on use and application was given to her along with a card for contact after discharge.     I plan to see her PRN.     Electronically signed by Eric Rodriguez CPO, SARITA Gomez O&P  [phone]

## 2025-05-11 NOTE — ED NOTES
EMERGENCY DEPARTMENT SIGN OUT NOTE        ED COURSE AND MEDICAL DECISION MAKING  Patient was signed out to me by Dr Felicia Lopez at 7:21 AM.  9:51 AM Spoke with Neurosurgery, Anel Pimentel  10:00 AM Performed my exam of patient.  Appears comfortable, updated patient on imaging findings, need for bracing and neurosurgery outpatient follow-up.  10:43 AM Neurosurgery present at bedside providing instructions on treatment plan and follow up  1:09 PM Repeat exam benign.  Bracing in place.  Discussed discharge and again close follow-up with neurosurgery.        In brief, Sherice Alvarez is a 89 year old female who initially presented for evaluation of fall and knee pain.      At time of sign out, disposition was pending MRI imaging.  MRI imaging returned and reported T9 vertebral body fracture.  Discussed patient case with neurosurgery who at this time recommends TLSO bracing, upright thoracic spine plain films after bracing in place and then discharge and close outpatient follow-up.  We are able to place the bracing and obtain plain films, on my repeat exams patient appears comfortable, again provided multiple updates the patient.  Will discharge with recommendations for bracing to be in place and patient to follow-up with neurosurgery as discussed with neurosurgery was down discussing follow-up plan.  All of her questions were answered and reasons to return discussed.  Patient felt comfort this plan was discharged back to her care facility in stable condition.    FINAL IMPRESSION    1. Fall, initial encounter    2. Burst fracture of T9 vertebra (H)        ED MEDS  Medications   sodium chloride 0.9% BOLUS 1,000 mL (0 mLs Intravenous Stopped 5/11/25 0710)       LAB  Labs Ordered and Resulted from Time of ED Arrival to Time of ED Departure   BASIC METABOLIC PANEL - Abnormal       Result Value    Sodium 141      Potassium 4.4      Chloride 108 (*)     Carbon Dioxide (CO2) 25      Anion Gap 8      Urea Nitrogen 46.2 (*)      Creatinine 1.64 (*)     GFR Estimate 29 (*)     Calcium 9.5      Glucose 108 (*)    CBC WITH PLATELETS - Abnormal    WBC Count 6.9      RBC Count 3.44 (*)     Hemoglobin 9.9 (*)     Hematocrit 31.9 (*)     MCV 93      MCH 28.8      MCHC 31.0 (*)     RDW 22.0 (*)     Platelet Count 171     ROUTINE UA WITH MICROSCOPIC REFLEX TO CULTURE - Abnormal    Color Urine Light Yellow      Appearance Urine Clear      Glucose Urine Negative      Bilirubin Urine Negative      Ketones Urine Negative      Specific Gravity Urine 1.021      Blood Urine Negative      pH Urine 5.5      Protein Albumin Urine 10 (*)     Urobilinogen Urine Normal      Nitrite Urine Negative      Leukocyte Esterase Urine Negative      Mucus Urine Present (*)     RBC Urine 0      WBC Urine 5      Hyaline Casts Urine 15 (*)    MAGNESIUM - Normal    Magnesium 2.1         EKG      RADIOLOGY    CT Lumbar Spine w/o Contrast   Final Result   IMPRESSION:   THORACIC SPINE CT:   1.  Moderate age-indeterminate anterior wedging burst-type fracture of T9 with up to 70% loss of height. Minimal buckling of the posterior cortex.   2.  Age-indeterminate Schmorl's node at the superior endplate of T12.   3.  No definite acute displaced fracture or subluxation.      LUMBAR SPINE CT:   1.  No acute fracture or subluxation.   2.  Mild chronic compression deformity at the superior endplate of L4.   3.  Degenerative changes as described.         CT Thoracic Spine w/o Contrast   Final Result   IMPRESSION:   THORACIC SPINE CT:   1.  Moderate age-indeterminate anterior wedging burst-type fracture of T9 with up to 70% loss of height. Minimal buckling of the posterior cortex.   2.  Age-indeterminate Schmorl's node at the superior endplate of T12.   3.  No definite acute displaced fracture or subluxation.      LUMBAR SPINE CT:   1.  No acute fracture or subluxation.   2.  Mild chronic compression deformity at the superior endplate of L4.   3.  Degenerative changes as described.          CT Cervical Spine w/o Contrast   Final Result   IMPRESSION:   1.  No acute fracture or subluxation.   2.  Degenerative changes as described.      XR Knee Bilateral 1/2 Views   Final Result   IMPRESSION: Bilateral knee arthroplasties without definite hardware complication or periprosthetic fracture. Partially visualized intramedullary fahad and interlocking screws of the right distal femur. Atherosclerotic vascular calcifications.      CT Head w/o Contrast   Final Result   IMPRESSION:   1.  No CT finding of a mass, hemorrhage or focal area suggestive of acute infarct.   2.  Diffuse age related changes.         Thoracic spine MRI w/o contrast    (Results Pending)       DISCHARGE MEDS  New Prescriptions    No medications on file     I, Iraida Verde, am serving as a scribe to document services personally performed by Talib Armas MD, based on my observations and the provider's statements to me.  I, Talib Armas MD, attest that Iraida Verde is acting in a scribe capacity, has observed my performance of the services and has documented them in accordance with my direction.      Talib Armas MD.  [facility] EMERGENCY DEPARTMENT  [address] [place] [id number]  [phone]         Talib Armas MD  05/11/25 1175

## 2025-05-11 NOTE — ED NOTES
Buprenorphine patch removed from left posterior shoulder before going to MRI. The patch was wasted in the [facility] room with Nurse Doug FREEMAN as a witness.

## 2025-05-11 NOTE — ED NOTES
[facility]  Expected date:   Expected time:   Means of arrival:   Comments:  MW - 89F fall on thinners

## 2025-05-11 NOTE — DISCHARGE INSTRUCTIONS
Use the TLSO brace as discussed, follow-up with neurosurgery in the next 5 to 7 days.  Please return for any worsening or more concerning symptoms.

## 2025-05-11 NOTE — ED PROVIDER NOTES
"EMERGENCY DEPARTMENT ENCOUNTER      NAME: Sherice Alvarez  AGE: 89 year old female  YOB: 1934  EVALUATION DATE & TIME: 5/11/2025  2:18 AM    ED PROVIDER: Felicia Lopez MD    Chief Complaint   Patient presents with    Fall    Knee Pain       FINAL IMPRESSION  1. Fall, initial encounter    2. Burst fracture of T9 vertebra (H)        MEDICAL DECISION MAKING   Sherice Earl records reviewed.  Patient has a history of CKD, timur is a 89 year old female who presents via EMS for evaluation after a fall.  Hypertension, anemia, dyslipidemia, HFpEF, severe aortic stenosis, cognitive impairment/dementia, osteoarthritis.  She was last seen for home care visit on 4/30/2025 and I reviewed that note.  She was admitted 1/2/2025 and underwent left total hip arthroplasty.  Today, patient presents by EMS from care facility with complaints of bilateral knee pain after a fall.  EMS reports that patient was on the way to the bathroom and then \"went down onto her knees\" then onto her buttock.  She had the call light right away and denied hitting her head.  She does use a walker for ambulation at baseline.  Medics report that she usually does have some confusion but mental status is also at baseline.  At time of my assessment, patient was not able to offer much in the way of history.  She does not recall exactly what happened or why she fell but when reminded, states that she is having pain in her knees.  She also complains of pain in her entire back, though staff and facility know that she does have some chronic back and neck pain.  Vitals on arrival notable for elevated blood pressure at 191/81 but otherwise stable.    I considered a broad differential for episode today including orthostatic hypotension, vasovagal episode, seizure, mechanical fall, TIA/CVA, ICH, seizure, arrhythmia, ACS, vestibular pathology, anemia.  Patient unable to recall if she experienced any preceding symptoms. At this point, although fall may " have been mechanical, I cannot rule out syncope or other underlying infectious, metabolic, cardiopulmonary, or neurologic process. Will plan to pursue workup with CT head/c-cpine, labs, and ECG.     Additionally, given complaints of back and knee pain, considered fracture, dislocation, subluxation, soft tissue contusion, musculoskeletal sprain/strain, ligamentous injury. No overlying laceration/wounds to suggest open fracture. Distal CMS intact so less likely significant neurovascular injury. Will pursue workup with XR of bilateral knees and CT of thoracic and lumbar spine.      EKG showed first-degree AV block but no clear ischemic changes.    ED Course as of 05/11/25 0600   Sun May 11, 2025   0442 CBC (+ platelets, no diff)(!)  Hemoglobin 9.9, appears to be increased from baseline.  No leukocytosis.   0448 Basic metabolic panel(!)  BMP with creatinine of 1.64, appears to be uptrending and increased from baseline.  Will give fluids.,  As patient does appear somewhat dry and has elevated BUN to creatinine ratio.   0449 Magnesium: 2.1  Magnesium within normal limits, reassuring.    0449 CT Head w/o Contrast  No acute intracranial hemorrhage or traumatic process.   0449 CT Cervical Spine w/o Contrast  No acute fracture or subluxation.   0449 CT Thoracic Spine w/o Contrast  Moderate age-indeterminate anterior wedging burst-type fracture of T9 with up to 70% loss of height. Minimal buckling of the posterior cortex.  Will discuss the case with neurosurgery.   0450 CT Lumbar Spine w/o Contrast  No acute fracture or subluxation.   0453 XR Knee Bilateral 1/2 Views  I independently reviewed x-rays.  These showed no evidence of acute fracture or dislocation.  Old hardware in place.     Discussed the case with neurosurgery PA who recommended proceeding with MR of the thoracic spine keeping patient on bedrest until results return.  Unfortunately, patient is not a reliable historian so we will have to wait until the morning when  family can assist to proceed with MRI.    Patient was signed out to day ED provider pending results of MRI and recommendations from neurosurgery team.      Additional Considerations in MDM  History:  Supplemental history from: EMS  External Record(s) reviewed: [facility] on 02-06 Jan, 2025    Work Up:  Chart documentation includes differential diagnoses considered and any EKGs or imaging independently interpreted as specified above.   In additional to work up documented, I considered additional advanced imaging and laboratory workup but deferred after shared decision making conversation with patient/family    External Consultation(s):  Discussion of management with another provider as documented above and in ED course     Chronic Illness(es):  Care impacted by chronic illness(es): stroke, memory issues, Cancer/Chemotherapy, Chronic Lung Disease, Chronic Pain, Heart Disease, Hyperlipidemia, and Hypertension    Disposition considerations: Admission considered. Patient was signed out to the oncoming physician, disposition pending.    MIPS: Adult Minor Head Trauma:Age 65 years or older     Sepsis/STEMI/Stroke: None      ED COURSE  2:37 AM I met the patient and performed my initial interview and exam. Additional history from EMS  4:51 AM I discussed the case with HUMBERTO RAMIREZ.   5:15 AM I rechecked the patient.       MEDICATIONS GIVEN IN THE ED  Medications   sodium chloride 0.9% BOLUS 1,000 mL (1,000 mLs Intravenous $New Bag 5/11/25 0510)       NEW PRESCRIPTIONS STARTED AT TODAY'S VISIT  New Prescriptions    No medications on file          =================================================================    HPI:    Use of : N/A      Sherice Alvarez is a 89 year old female who presents via EMS for evaluation of fall with pain.    HPI limited due to patient memory issues.    Per the patient:  She is unsure of what happened, but was informed she fell, which she doesn't remember. She doesn't think she hit  her head and is unsure why she fell. She now reports bilateral knee pain, lower midline back pain, and mild neck pain. She denies hip pain and arm pain.    Per EMS:  The patient comes from a nursing home due to a fall. She has chronic back pain and a history of memory issues.    Per Chart Review:  Admission to [facility] on 02-Jan-2025 for left hip arthroplasty. Procedure went well and patient was discharged to home on 06-Jan-2025.       RELEVANT HISTORY, MEDICATIONS, & ALLERGIES   Past medical history, surgical history, family history, medications, and allergies reviewed and pertinent noted in HPI.    REVIEW OF SYSTEMS:  A complete review of systems was performed with pertinent positives and negatives noted in the HPI.     PHYSICAL EXAM:    Vitals: BP (!) 150/67   Pulse 75   Temp 98.3  F (36.8  C) (Oral)   Resp 16   Wt 56.7 kg (125 lb)   SpO2 (!) 90%   BMI 23.62 kg/m     General: Alert and interactive, comfortable appearing.  HENT: Atraumatic. Full AROM of neck. MMM.  Cardiovascular: Regular rate and rhythm.   Chest/Pulmonary: Normal work of breathing. Speaking in complete sentences. Lungs CTAB. No chest wall tenderness or deformities.  Abdomen: Soft, nondistended.   Back: Diffuse tenderness to palpation of thoracic and lumbar spine.  Extremities: Normal AROM of all major joints of bilateral upper extremities.  Diffuse tenderness palpation about bilateral knees with limited range of motion secondary to pain.  Wraps in place to bilateral lower extremities.  Skin: Warm and dry. Normal skin color.   Neuro: Speech clear. CNs grossly intact. Moves all extremities spontaneously.   Psych: Normal affect/mood, cooperative, memory appropriate.      LAB  Labs Ordered and Resulted from Time of ED Arrival to Time of ED Departure   BASIC METABOLIC PANEL - Abnormal       Result Value    Sodium 141      Potassium 4.4      Chloride 108 (*)     Carbon Dioxide (CO2) 25      Anion Gap 8      Urea Nitrogen 46.2 (*)      Creatinine 1.64 (*)     GFR Estimate 29 (*)     Calcium 9.5      Glucose 108 (*)    CBC WITH PLATELETS - Abnormal    WBC Count 6.9      RBC Count 3.44 (*)     Hemoglobin 9.9 (*)     Hematocrit 31.9 (*)     MCV 93      MCH 28.8      MCHC 31.0 (*)     RDW 22.0 (*)     Platelet Count 171     ROUTINE UA WITH MICROSCOPIC REFLEX TO CULTURE - Abnormal    Color Urine Light Yellow      Appearance Urine Clear      Glucose Urine Negative      Bilirubin Urine Negative      Ketones Urine Negative      Specific Gravity Urine 1.021      Blood Urine Negative      pH Urine 5.5      Protein Albumin Urine 10 (*)     Urobilinogen Urine Normal      Nitrite Urine Negative      Leukocyte Esterase Urine Negative      Mucus Urine Present (*)     RBC Urine 0      WBC Urine 5      Hyaline Casts Urine 15 (*)    MAGNESIUM - Normal    Magnesium 2.1         RADIOLOGY  CT Lumbar Spine w/o Contrast   Final Result   IMPRESSION:   THORACIC SPINE CT:   1.  Moderate age-indeterminate anterior wedging burst-type fracture of T9 with up to 70% loss of height. Minimal buckling of the posterior cortex.   2.  Age-indeterminate Schmorl's node at the superior endplate of T12.   3.  No definite acute displaced fracture or subluxation.      LUMBAR SPINE CT:   1.  No acute fracture or subluxation.   2.  Mild chronic compression deformity at the superior endplate of L4.   3.  Degenerative changes as described.         CT Thoracic Spine w/o Contrast   Final Result   IMPRESSION:   THORACIC SPINE CT:   1.  Moderate age-indeterminate anterior wedging burst-type fracture of T9 with up to 70% loss of height. Minimal buckling of the posterior cortex.   2.  Age-indeterminate Schmorl's node at the superior endplate of T12.   3.  No definite acute displaced fracture or subluxation.      LUMBAR SPINE CT:   1.  No acute fracture or subluxation.   2.  Mild chronic compression deformity at the superior endplate of L4.   3.  Degenerative changes as described.         CT Cervical  Spine w/o Contrast   Final Result   IMPRESSION:   1.  No acute fracture or subluxation.   2.  Degenerative changes as described.      XR Knee Bilateral 1/2 Views   Final Result   IMPRESSION: Bilateral knee arthroplasties without definite hardware complication or periprosthetic fracture. Partially visualized intramedullary fahad and interlocking screws of the right distal femur. Atherosclerotic vascular calcifications.      CT Head w/o Contrast   Final Result   IMPRESSION:   1.  No CT finding of a mass, hemorrhage or focal area suggestive of acute infarct.   2.  Diffuse age related changes.         Thoracic spine MRI w/o contrast    (Results Pending)       EKG  Performed at: 0439  Impression: First-degree AV block.  No clear ischemic changes.  Compared to previous, first-degree block appears new and rate has increased.  Rate: 79  Rhythm: Sinus  QRS Interval: 84  QTc Interval: 470  Comparison: 1/4/2025    All laboratory and imaging results and EKG's were personally reviewed and interpreted by myself prior to disposition decision.         I, Mian Miramontes, am serving as a scribe to document services personally performed by Dr. Felicia Lopez based on my observation and the provider's statements to me. I, Felicia Lopez MD attest that Mian Miramontes is acting in a scribe capacity, has observed my performance of the services and has documented them in accordance with my direction.    Felicia Lopez M.D.  Emergency Medicine  [facility] EMERGENCY DEPARTMENT  [facility] [facility] [address]  [phone]  Dept: [phone]     Felicia Lopez MD  05/11/25 0600

## 2025-05-11 NOTE — ED TRIAGE NOTES
Pt was on the way to the bathroom and states that she went down to here knees and onto her buttocks.  EMS states that staff reported that she hit the call light right away.  Pt denies hitting her head.  Pt uses a walker. Pt does have some confusion but is at baseline per staff.        Triage Assessment (Adult)       Row Name 05/11/25 0222          Triage Assessment    Airway WDL WDL        Respiratory WDL    Respiratory WDL WDL        Peripheral/Neurovascular WDL    Peripheral Neurovascular WDL WDL        Cognitive/Neuro/Behavioral WDL    Cognitive/Neuro/Behavioral WDL WDL

## 2025-05-12 LAB
ATRIAL RATE - MUSE: 79 BPM
DIASTOLIC BLOOD PRESSURE - MUSE: 53 MMHG
INTERPRETATION ECG - MUSE: NORMAL
P AXIS - MUSE: 72 DEGREES
PR INTERVAL - MUSE: 250 MS
QRS DURATION - MUSE: 84 MS
QT - MUSE: 410 MS
QTC - MUSE: 470 MS
R AXIS - MUSE: 58 DEGREES
SYSTOLIC BLOOD PRESSURE - MUSE: 128 MMHG
T AXIS - MUSE: 66 DEGREES
VENTRICULAR RATE- MUSE: 79 BPM

## 2025-05-14 ENCOUNTER — LAB REQUISITION (OUTPATIENT)
Dept: LAB | Facility: CLINIC | Age: OVER 89
End: 2025-05-14
Payer: COMMERCIAL

## 2025-05-14 DIAGNOSIS — I50.9 HEART FAILURE, UNSPECIFIED (H): ICD-10-CM

## 2025-05-16 ENCOUNTER — TELEPHONE (OUTPATIENT)
Dept: NEUROSURGERY | Facility: CLINIC | Age: OVER 89
End: 2025-05-16
Payer: OTHER MISCELLANEOUS

## 2025-05-16 NOTE — TELEPHONE ENCOUNTER
Date: May 16, 2025    Provider: MERCEDES     Provider/Other: Dr. Brannon or MERCEDES    Reason for out-going call: RESCHEDULE      Detailed message: Per provider review - Left voicemail to reschedule appointment on 5/19/25 with Dr. Brannon 4-6 week out from patient discharge date (5/11/25).           Number provider for patient:  NEUROSURGERY: [phone]

## 2025-05-19 ENCOUNTER — LAB REQUISITION (OUTPATIENT)
Dept: LAB | Facility: CLINIC | Age: OVER 89
End: 2025-05-19
Payer: OTHER MISCELLANEOUS

## 2025-05-19 DIAGNOSIS — I50.9 HEART FAILURE, UNSPECIFIED (H): ICD-10-CM

## 2025-05-19 LAB
ANION GAP SERPL CALCULATED.3IONS-SCNC: 11 MMOL/L (ref 7–15)
BUN SERPL-MCNC: 34 MG/DL (ref 8–23)
CALCIUM SERPL-MCNC: 9.3 MG/DL (ref 8.8–10.4)
CHLORIDE SERPL-SCNC: 106 MMOL/L (ref 98–107)
CREAT SERPL-MCNC: 1.23 MG/DL (ref 0.51–0.95)
EGFRCR SERPLBLD CKD-EPI 2021: 42 ML/MIN/1.73M2
GLUCOSE SERPL-MCNC: 85 MG/DL (ref 70–99)
HCO3 SERPL-SCNC: 24 MMOL/L (ref 22–29)
POTASSIUM SERPL-SCNC: 4.3 MMOL/L (ref 3.4–5.3)
SODIUM SERPL-SCNC: 141 MMOL/L (ref 135–145)

## 2025-05-19 PROCEDURE — 80048 BASIC METABOLIC PNL TOTAL CA: CPT | Mod: ORL | Performed by: INTERNAL MEDICINE

## 2025-05-19 PROCEDURE — P9603 ONE-WAY ALLOW PRORATED MILES: HCPCS | Mod: ORL | Performed by: INTERNAL MEDICINE

## 2025-05-19 PROCEDURE — 36415 COLL VENOUS BLD VENIPUNCTURE: CPT | Mod: ORL | Performed by: INTERNAL MEDICINE

## 2025-05-19 NOTE — TELEPHONE ENCOUNTER
Date: May 19, 2025    Provider: MERCEDES     Provider/Other: Dr. Brannon or MERCEDES    Reason for out-going call: RESCHEDULE      Detailed message: Spoke to daughter Jacqueline(c2c) to reschedule appointment today with Dr. Brannon. Jacqueline stated that they were told to schedule a follow up 1 week after discharge date. Writer reviewed notes and it says 4-6 weeks after discharge. Jacqueilne would like someone to give her a call back to explain why they need to reschedule this appointment. Please advise, thank you!

## 2025-06-09 ENCOUNTER — HOSPITAL ENCOUNTER (OUTPATIENT)
Dept: GENERAL RADIOLOGY | Facility: HOSPITAL | Age: OVER 89
Discharge: HOME | End: 2025-06-09
Payer: COMMERCIAL

## 2025-06-09 ENCOUNTER — OFFICE VISIT (OUTPATIENT)
Dept: NEUROSURGERY | Facility: CLINIC | Age: OVER 89
End: 2025-06-09
Payer: COMMERCIAL

## 2025-06-09 VITALS
HEIGHT: 61 IN | OXYGEN SATURATION: 94 % | WEIGHT: 134.4 LBS | DIASTOLIC BLOOD PRESSURE: 65 MMHG | SYSTOLIC BLOOD PRESSURE: 141 MMHG | BODY MASS INDEX: 25.37 KG/M2 | HEART RATE: 64 BPM

## 2025-06-09 DIAGNOSIS — S22.071A BURST FRACTURE OF T9 VERTEBRA (H): ICD-10-CM

## 2025-06-09 DIAGNOSIS — M81.0 OSTEOPOROSIS, UNSPECIFIED OSTEOPOROSIS TYPE, UNSPECIFIED PATHOLOGICAL FRACTURE PRESENCE: Primary | ICD-10-CM

## 2025-06-09 PROCEDURE — 99212 OFFICE O/P EST SF 10 MIN: CPT | Performed by: SURGERY

## 2025-06-09 PROCEDURE — 1125F AMNT PAIN NOTED PAIN PRSNT: CPT | Performed by: SURGERY

## 2025-06-09 PROCEDURE — 3077F SYST BP >= 140 MM HG: CPT | Performed by: SURGERY

## 2025-06-09 PROCEDURE — 3078F DIAST BP <80 MM HG: CPT | Performed by: SURGERY

## 2025-06-09 PROCEDURE — 72070 X-RAY EXAM THORAC SPINE 2VWS: CPT

## 2025-06-09 ASSESSMENT — PAIN SCALES - GENERAL: PAINLEVEL_OUTOF10: MODERATE PAIN (6)

## 2025-06-09 NOTE — LETTER
6/9/2025      Sherice Alvarez  [address]  [place] [address]      Dear Colleague,    Thank you for referring your patient, Sherice Alvarez, to the [facility] SPINE [facility]ERY. Please see a copy of my visit note below.    The patient is a 89-year-old female.  She is here with her daughter.  The patient fell at a care facility May 11, 2025.  She went to the emergency room at [facility].  She was found to have a T9 burst fracture.  Age is indeterminate.  It does not extend significantly into the spinal canal.  It is not causing any spine pain.   On exam it is not tender.  On follow-up x-ray done today it is stable and not progressively collapsing.  On the plain x-ray it was noted that the patient's bones appear diffusely demineralized.  I showed the pictures to the patient and her daughter.  Plan a DEXA scan.  The patient's daughter does not want an endocrine visit.  If the patient's bones need treatment she would prefer that her family doctor do it.  She is also not interested in a follow-up x-ray.  The patient is not wearing a brace.  The patient's daughter is going to call for the results of the DEXA scan.  The patient and her daughter are satisfied with the plan.  Total time 20 minutes, more than 50% spent counseling and/or coordinating care.    Again, thank you for allowing me to participate in the care of your patient.        Sincerely,        Michel Brannon MD    Electronically signed

## 2025-06-09 NOTE — NURSING NOTE
"Sherice Alvarez is a 89 year old female who presents for:  Chief Complaint   Patient presents with    RECHECK     Patient here for follow up on the back. Patient says it hurts to bend over.  Pain level 6. Numbness and tingling in the finger for both hands.         Initial Vitals:  BP (!) 141/65   Pulse 64   Ht 5' 1\" (1.549 m)   Wt 134 lb 6.4 oz (61 kg)   SpO2 94%   BMI 25.39 kg/m   Estimated body mass index is 25.39 kg/m  as calculated from the following:    Height as of this encounter: 5' 1\" (1.549 m).    Weight as of this encounter: 134 lb 6.4 oz (61 kg). Body surface area is 1.62 meters squared. BP completed using cuff size: regular  Moderate Pain (6)  Patient BP is slightly elevated today and will continue to monitor at home after the appointment.     Gila Maurer   "

## 2025-06-09 NOTE — PROGRESS NOTES
The patient is a 89-year-old female.  She is here with her daughter.  The patient fell at a care facility May 11, 2025.  She went to the emergency room at [facility].  She was found to have a T9 burst fracture.  Age is indeterminate.  It does not extend significantly into the spinal canal.  It is not causing any spine pain.   On exam it is not tender.  On follow-up x-ray done today it is stable and not progressively collapsing.  On the plain x-ray it was noted that the patient's bones appear diffusely demineralized.  I showed the pictures to the patient and her daughter.  Plan a DEXA scan.  The patient's daughter does not want an endocrine visit.  If the patient's bones need treatment she would prefer that her family doctor do it.  She is also not interested in a follow-up x-ray.  The patient is not wearing a brace.  The patient's daughter is going to call for the results of the DEXA scan.  The patient and her daughter are satisfied with the plan.  Total time 20 minutes, more than 50% spent counseling and/or coordinating care.

## 2025-07-09 ENCOUNTER — TRANSFERRED RECORDS (OUTPATIENT)
Dept: HEALTH INFORMATION MANAGEMENT | Facility: CLINIC | Age: OVER 89
End: 2025-07-09

## 2025-07-14 ENCOUNTER — LAB REQUISITION (OUTPATIENT)
Dept: LAB | Facility: CLINIC | Age: OVER 89
End: 2025-07-14
Payer: COMMERCIAL

## 2025-07-14 DIAGNOSIS — K21.9 GASTRO-ESOPHAGEAL REFLUX DISEASE WITHOUT ESOPHAGITIS: ICD-10-CM

## 2025-07-14 DIAGNOSIS — F03.90 UNSPECIFIED DEMENTIA, UNSPECIFIED SEVERITY, WITHOUT BEHAVIORAL DISTURBANCE, PSYCHOTIC DISTURBANCE, MOOD DISTURBANCE, AND ANXIETY (H): ICD-10-CM

## 2025-07-23 ENCOUNTER — APPOINTMENT (OUTPATIENT)
Dept: RADIOLOGY | Facility: HOSPITAL | Age: OVER 89
End: 2025-07-23
Attending: EMERGENCY MEDICINE
Payer: COMMERCIAL

## 2025-07-23 ENCOUNTER — MEDICAL CORRESPONDENCE (OUTPATIENT)
Dept: HEALTH INFORMATION MANAGEMENT | Facility: CLINIC | Age: OVER 89
End: 2025-07-23

## 2025-07-23 ENCOUNTER — HOSPITAL ENCOUNTER (EMERGENCY)
Facility: HOSPITAL | Age: OVER 89
Discharge: HOME HEALTH | End: 2025-07-23
Attending: EMERGENCY MEDICINE
Payer: COMMERCIAL

## 2025-07-23 ENCOUNTER — APPOINTMENT (OUTPATIENT)
Dept: ULTRASOUND IMAGING | Facility: HOSPITAL | Age: OVER 89
End: 2025-07-23
Attending: EMERGENCY MEDICINE
Payer: COMMERCIAL

## 2025-07-23 VITALS
HEIGHT: 61 IN | WEIGHT: 143.3 LBS | RESPIRATION RATE: 27 BRPM | DIASTOLIC BLOOD PRESSURE: 61 MMHG | TEMPERATURE: 97.9 F | BODY MASS INDEX: 27.06 KG/M2 | SYSTOLIC BLOOD PRESSURE: 137 MMHG | HEART RATE: 77 BPM | OXYGEN SATURATION: 93 %

## 2025-07-23 DIAGNOSIS — M79.89 LEG SWELLING: Primary | ICD-10-CM

## 2025-07-23 LAB
ANION GAP SERPL CALCULATED.3IONS-SCNC: 10 MMOL/L (ref 7–15)
BASOPHILS # BLD AUTO: 0 10E3/UL (ref 0–0.2)
BASOPHILS NFR BLD AUTO: 1 %
BUN SERPL-MCNC: 31.6 MG/DL (ref 8–23)
CALCIUM SERPL-MCNC: 8.9 MG/DL (ref 8.8–10.4)
CHLORIDE SERPL-SCNC: 107 MMOL/L (ref 98–107)
CREAT SERPL-MCNC: 1.34 MG/DL (ref 0.51–0.95)
EGFRCR SERPLBLD CKD-EPI 2021: 37 ML/MIN/1.73M2
EOSINOPHIL # BLD AUTO: 0.2 10E3/UL (ref 0–0.7)
EOSINOPHIL NFR BLD AUTO: 3 %
ERYTHROCYTE [DISTWIDTH] IN BLOOD BY AUTOMATED COUNT: 20.1 % (ref 10–15)
GLUCOSE SERPL-MCNC: 97 MG/DL (ref 70–99)
HCO3 SERPL-SCNC: 23 MMOL/L (ref 22–29)
HCT VFR BLD AUTO: 31.8 % (ref 35–47)
HGB BLD-MCNC: 10 G/DL (ref 11.7–15.7)
HOLD SPECIMEN: NORMAL
IMM GRANULOCYTES # BLD: 0.1 10E3/UL
IMM GRANULOCYTES NFR BLD: 1 %
LYMPHOCYTES # BLD AUTO: 1.2 10E3/UL (ref 0.8–5.3)
LYMPHOCYTES NFR BLD AUTO: 15 %
MCH RBC QN AUTO: 28.8 PG (ref 26.5–33)
MCHC RBC AUTO-ENTMCNC: 31.4 G/DL (ref 31.5–36.5)
MCV RBC AUTO: 92 FL (ref 78–100)
MONOCYTES # BLD AUTO: 0.8 10E3/UL (ref 0–1.3)
MONOCYTES NFR BLD AUTO: 11 %
NEUTROPHILS # BLD AUTO: 5.4 10E3/UL (ref 1.6–8.3)
NEUTROPHILS NFR BLD AUTO: 70 %
NRBC # BLD AUTO: 0 10E3/UL
NRBC BLD AUTO-RTO: 0 /100
NT-PROBNP SERPL-MCNC: 2748 PG/ML (ref 0–624)
PLATELET # BLD AUTO: 189 10E3/UL (ref 150–450)
POTASSIUM SERPL-SCNC: 4 MMOL/L (ref 3.4–5.3)
RBC # BLD AUTO: 3.47 10E6/UL (ref 3.8–5.2)
SODIUM SERPL-SCNC: 140 MMOL/L (ref 135–145)
TROPONIN T SERPL HS-MCNC: 57 NG/L
TROPONIN T SERPL HS-MCNC: 68 NG/L
WBC # BLD AUTO: 7.8 10E3/UL (ref 4–11)

## 2025-07-23 PROCEDURE — 36415 COLL VENOUS BLD VENIPUNCTURE: CPT | Performed by: EMERGENCY MEDICINE

## 2025-07-23 PROCEDURE — 84484 ASSAY OF TROPONIN QUANT: CPT | Performed by: EMERGENCY MEDICINE

## 2025-07-23 PROCEDURE — 93970 EXTREMITY STUDY: CPT

## 2025-07-23 PROCEDURE — 250N000011 HC RX IP 250 OP 636: Performed by: EMERGENCY MEDICINE

## 2025-07-23 PROCEDURE — 85025 COMPLETE CBC W/AUTO DIFF WBC: CPT | Performed by: EMERGENCY MEDICINE

## 2025-07-23 PROCEDURE — 96365 THER/PROPH/DIAG IV INF INIT: CPT

## 2025-07-23 PROCEDURE — 96375 TX/PRO/DX INJ NEW DRUG ADDON: CPT

## 2025-07-23 PROCEDURE — 83880 ASSAY OF NATRIURETIC PEPTIDE: CPT | Performed by: EMERGENCY MEDICINE

## 2025-07-23 PROCEDURE — 96366 THER/PROPH/DIAG IV INF ADDON: CPT

## 2025-07-23 PROCEDURE — 99285 EMERGENCY DEPT VISIT HI MDM: CPT | Mod: 25 | Performed by: EMERGENCY MEDICINE

## 2025-07-23 PROCEDURE — 250N000013 HC RX MED GY IP 250 OP 250 PS 637: Performed by: EMERGENCY MEDICINE

## 2025-07-23 PROCEDURE — 93005 ELECTROCARDIOGRAM TRACING: CPT | Performed by: EMERGENCY MEDICINE

## 2025-07-23 PROCEDURE — 71046 X-RAY EXAM CHEST 2 VIEWS: CPT

## 2025-07-23 PROCEDURE — 80048 BASIC METABOLIC PNL TOTAL CA: CPT | Performed by: EMERGENCY MEDICINE

## 2025-07-23 RX ORDER — OXYCODONE HYDROCHLORIDE 5 MG/1
5 TABLET ORAL ONCE
Refills: 0 | Status: DISCONTINUED | OUTPATIENT
Start: 2025-07-23 | End: 2025-07-23

## 2025-07-23 RX ORDER — FUROSEMIDE 20 MG/1
60 TABLET ORAL DAILY
Qty: 30 TABLET | Refills: 0 | Status: SHIPPED | OUTPATIENT
Start: 2025-07-23

## 2025-07-23 RX ORDER — MAGNESIUM SULFATE HEPTAHYDRATE 40 MG/ML
2 INJECTION, SOLUTION INTRAVENOUS ONCE
Status: COMPLETED | OUTPATIENT
Start: 2025-07-23 | End: 2025-07-23

## 2025-07-23 RX ORDER — FUROSEMIDE 10 MG/ML
40 INJECTION INTRAMUSCULAR; INTRAVENOUS ONCE
Status: COMPLETED | OUTPATIENT
Start: 2025-07-23 | End: 2025-07-23

## 2025-07-23 RX ADMIN — OXYCODONE HYDROCHLORIDE 2.5 MG: 5 TABLET ORAL at 17:08

## 2025-07-23 RX ADMIN — MAGNESIUM SULFATE HEPTAHYDRATE 2 G: 2 INJECTION, SOLUTION INTRAVENOUS at 16:37

## 2025-07-23 RX ADMIN — FUROSEMIDE 40 MG: 10 INJECTION, SOLUTION INTRAMUSCULAR; INTRAVENOUS at 18:41

## 2025-07-23 RX ADMIN — OXYCODONE HYDROCHLORIDE 2.5 MG: 5 TABLET ORAL at 17:18

## 2025-07-23 ASSESSMENT — ACTIVITIES OF DAILY LIVING (ADL)
ADLS_ACUITY_SCORE: 58

## 2025-07-23 ASSESSMENT — ENCOUNTER SYMPTOMS: SHORTNESS OF BREATH: 0

## 2025-07-23 ASSESSMENT — COLUMBIA-SUICIDE SEVERITY RATING SCALE - C-SSRS
1. IN THE PAST MONTH, HAVE YOU WISHED YOU WERE DEAD OR WISHED YOU COULD GO TO SLEEP AND NOT WAKE UP?: NO
2. HAVE YOU ACTUALLY HAD ANY THOUGHTS OF KILLING YOURSELF IN THE PAST MONTH?: NO
6. HAVE YOU EVER DONE ANYTHING, STARTED TO DO ANYTHING, OR PREPARED TO DO ANYTHING TO END YOUR LIFE?: NO

## 2025-07-23 NOTE — ED NOTES
In room and pt c/o increase pain in legs and cramping. Attempted to reposition with pillows and blankets and Dr. Cool notified of patients pain

## 2025-07-23 NOTE — ED PROVIDER NOTES
EMERGENCY DEPARTMENT ENCOUNTER      NAME: Sherice Alvarez  AGE: 89 year old female  YOB: 1934  MRN: [id number]  EVALUATION DATE & TIME: 7/23/2025  3:21 PM    PCP: Tracy Mosqueda    ED PROVIDER: Ulysses Cool MD      Chief Complaint   Patient presents with    Leg Swelling         FINAL IMPRESSION:  1. Leg swelling          ED COURSE & MEDICAL DECISION MAKING:    Pertinent Labs & Imaging studies reviewed. (See chart for details)  89 year old female presents to the Emergency Department for evaluation of chronic leg swelling.  Is on diuretics.  Blood pressure is more elevated today.  Pain was worse today as well.  Is on oxycodone and buprenorphine         ED Course as of 07/23/25 2025 Wed Jul 23, 2025   1708 Reports she is here for bilateral leg swelling and pain has been present for months.  Per review of form that came with her from her care facility at [facility] she is here for bilateral leg edema and drainage and elevated blood pressure.  They are worried about possible fluid overload and thought she would benefit from IV Lasix to reduce fluid overload.   1709 Clinically I do not suspect cellulitis.  Patient is not on anticoagulation but will obtain ultrasound to look for DVTs.  Most likely venous insufficiency.  Patient's on Lasix 40 mg daily.  Will give 60 IV Lasix here.  Will obtain chest x-ray to look for pulmonary edema.  EKG and troponin to look for ACS.  She is on p.o. nor phone and gets oxycodone as needed.  Will give a dose of oxycodone here.   1709 Platelet Count: 189   1709 WBC: 7.8   1709 Hemoglobin(!): 10.0   1709 Creatinine(!): 1.34  Stable compared to baseline   1710 N-Terminal Pro Bnp(!): 2,748  increased compared to 1 year ago   1713 She has follow up with Dr. Feldman in August.    1716 Spoke with patient's daughter regarding plan and likely discharge home with follow-up with vascular lymphedema and increase Lasix   1830 Ultrasounds without evidence of DVT   1830 Per  my read of CXR, no obvious pneumonia. Per rad x-ray with no significant pulmonary edema but does show trace pleural effusion   Ultrasound without DVT given IV diuretic.  Plan for discharge home with increase Lasix 60 mg, follow-up with Dr. Feldman her cardiologist, lymphedema therapy referral created.    4:54 PM I met the patient and performed my initial interview and exam.  5:12 PM I spoke with the patient's daughter about the patient's care.  7:53 PM I spoke with the patient's daughter about imaging, labs, and the plan. Daughter plans to pick her up.  7:56 PM Rechecked and updated patient on labs and imaging. We discussed plan for discharge and all questions were answered.    Medical Decision Making  I obtained history from Family Member/Significant Other  I reviewed the EMR: Outpatient Record: April 30 2024, on chronic opiates  Care impacted by Chronic Pain and Heart Disease  I independently interpreted the chest x-ray and note absence of pneumonia or significant pulmonary edema. See radiology report for final interpretation.  Discharge. I prescribed additional prescription strength medication(s) as charted. I considered admission, but ultimately discharged patient after discussion of patient's daughter and patient.    MIPS (CTPE, Dental pain, Tyson, Sinusitis, Asthma/COPD, Head Trauma): Not Applicable    SEPSIS: None              At the conclusion of the encounter I discussed the results of all of the tests and the disposition. The questions were answered. The patient or family acknowledged understanding and was agreeable with the care plan.       Voice recognition software used for this note,  any grammatical or spelling errors are non-intentional. Please contact the author of this note directly if you are in need of any clarification.      MEDICATIONS GIVEN IN THE EMERGENCY:  Medications   magnesium sulfate 2 g in 50 mL sterile water intermittent infusion (0 g Intravenous Stopped 7/23/25 6917)   furosemide  (LASIX) injection 40 mg (40 mg Intravenous $Given 7/23/25 1841)   oxyCODONE IR (ROXICODONE) half-tab 2.5 mg (2.5 mg Oral $Given 7/23/25 1718)       NEW PRESCRIPTIONS STARTED AT TODAY'S ER VISIT  New Prescriptions    No medications on file          =================================================================    TRIAGE ASSESSMENT:  Biba from [facility] home. Increased swelling in legs with some pain. Pt has hx chf.      Triage Assessment (Adult)       Row Name 07/23/25 1527          Triage Assessment    Airway WDL WDL                HPI    Patient information was obtained from: patient    Use of : N/A       Sherice Alvarez is a 89 year old female with a pertinent history of coronary artery disease, asthma, cardiac stents, hypertension, stroke, congestive heart failure, NSTEMI, cancer, who presents to this ED via EMS for evaluation of leg swelling.    For a few months now, the patient has had increasing amounts of swelling to her bilateral lower extremities, which the persons at her facility told her could be a heart issue. She is on medications for it, but are unsure which ones. The medications were helping for a while, but now seem less effective. Her swelling has been getting worse lately, and she is noting pains to her left leg below the knee to the ankle that are spasm-y and not worse with movement. Today, the amount of swelling with the pain caused her enough concern to have her come to the ER for evaluation.     She was given aspirin today, but no Tylenol. She has no recent leg ultrasounds. She doesn't believe she is on a blood thinner. She denies toe pain, chest pain, shortness of breath, recent injury, and recent fall.     Per Chart Review:  Visit from [facility] on 09-Jul-2025 for routine care. History of CHF, planned to continue adjustment of diuretic, but was at lasix 40 mg at that point. Planned to monitor lower extremity edema closely.    REVIEW OF SYSTEMS   Review of  Systems   Respiratory:  Negative for shortness of breath.    Cardiovascular:  Positive for leg swelling (bilateral). Negative for chest pain.   Musculoskeletal:         Positive for leg pain (left)  Negative for toe pain, recent fall/injury        PAST MEDICAL HISTORY:  Past Medical History:   Diagnosis Date    Arthritis     Asthma     CAD (coronary artery disease)     Cancer (H)     basal cell    Chronic kidney disease     ckd 3    Chronic pain syndrome     Congestive heart failure (H)     Crohn's disease (H)     GERD (gastroesophageal reflux disease)     Hx of heart artery stent 2016    1 stent R Proximal CA and 1 Stent L Proximal circumflex  2016 Stent proximal LAD    Hyperlipidemia     Hypertension     NSTEMI (non-ST elevated myocardial infarction) (H) 2016    Other chronic pain     PONV (postoperative nausea and vomiting)     severe povn with last knee surgery    Restless legs syndrome     Stented coronary artery     Stroke (H) 2010    numbness rt jaw       PAST SURGICAL HISTORY:  Past Surgical History:   Procedure Laterality Date    APPENDECTOMY      ARTHROPLASTY HIP Left 2025    Procedure: LEFT TOTAL HIP ARTHROPLASTY;  Surgeon: Arturo Parham DO;  Location: [facility] Main OR    CARDIAC CATHETERIZATION  2016    multiple vessel disease.  no intervention    CARDIAC CATHETERIZATION  2016    CARDIAC CATHETERIZATION N/A 2016    Procedure: Coronary Angiogram;  Surgeon: Sunil Moran MD;  Location: [facility] Cath Lab;  Service:     CAROTID ENDARTERECTOMY      CAROTID ENDARTERECTOMY Right 2010    CERVICAL LAMINECTOMY  1994     SECTION      CV CORONARY ANGIOGRAM N/A 2020    Procedure: Coronary Angiogram;  Surgeon: Vinita Ramos MD;  Location: [facility] Cath Lab;  Service: Cardiology    CV LEFT HEART CATHETERIZATION WITHOUT LEFT VENTRICULOGRAM Left 2020    Procedure: Left Heart Catheterization Without Left Ventriculogram;  Surgeon:  Jerad Lerner MD;  Location: [facility] Cath Lab;  Service: Cardiology    CV TRANSCATHETER AORTIC VALVE REPLACEMENT - OTHER APPROACH N/A 06/02/2020    Procedure: CV TRANSCATHETER AORTIC VALVE REPLACEMENT - RIGHT SUBCLAVIAN APPROACH;  Surgeon: John Caceres MD;  Location: [facility] Cath Lab;  Service: Cardiology    HEART CATH, ANGIOPLASTY      HEMORRHOIDECTOMY EXTERNAL      IR LUMBAR EPIDURAL STEROID INJECTION  12/30/2014    IR LUMBAR NERVE ROOT INJECTION  10/01/2013    JOINT REPLACEMENT      OPEN REDUCTION INTERNAL FIXATION HIP NAILING Right 2/10/2024    Procedure: INTERNAL FIXATION, FRACTURE, TROCHANTERIC, HIP, USING PINS OR RODS RIGHT;  Surgeon: Gilberto Joy MD;  Location: [facility]    IN ESOPHAGOGASTRODUODENOSCOPY TRANSORAL DIAGNOSTIC N/A 07/10/2019    Procedure: ESOPHAGOGASTRODUODENOSCOPY (EGD) with gastric biopsy;  Surgeon: Sunil Stuart MD;  Location: [facility] GI;  Service: Gastroenterology    IN REPLACE AORTIC VALVE OPEN AXILLRY ARTRY APPROACH N/A 06/02/2020    Procedure: OR TRANSCATHETER AORTIC VALVE REPLACEMENT, SUBCLAVIAN APPROACH;  Surgeon: Bhavin Cuadra MD;  Location: [facility] Cath Lab;  Service: Cardiology    TONSILLECTOMY & ADENOIDECTOMY      TOTAL KNEE ARTHROPLASTY Right     TRANSCATHETER AORTIC-VALVE REPLACEMENT      ZZC TOTAL KNEE ARTHROPLASTY Left 05/11/2021    Procedure: LEFT TOTAL KNEE ARTHROPLASTY;  Surgeon: Doug Rhodes MD;  Location: [facility];  Service: Orthopedics           CURRENT MEDICATIONS:    acetaminophen (TYLENOL) 650 MG suppository  amLODIPine (NORVASC) 5 MG tablet  bisacodyl (DULCOLAX) 10 MG suppository  buprenorphine (BUTRANS) 10 MCG/HR WK patch  cefadroxil (DURICEF) 500 MG capsule  diclofenac (VOLTAREN) 1 % topical gel  escitalopram (LEXAPRO) 5 MG tablet  furosemide (LASIX) 20 MG tablet  hyoscyamine (LEVSIN/SL) 0.125 MG sublingual tablet  lidocaine (XYLOCAINE) 5 % external ointment  LORazepam (ATIVAN) 0.5 MG  tablet  methocarbamol (ROBAXIN) 500 MG tablet  metoprolol succinate ER (TOPROL XL) 25 MG 24 hr tablet  nitroGLYcerin (NITROSTAT) 0.4 MG sublingual tablet  omeprazole (PRILOSEC) 20 MG DR capsule  ondansetron (ZOFRAN) 4 MG tablet  oxyCODONE (ROXICODONE) 5 MG tablet  rOPINIRole (REQUIP) 2 MG tablet  sennosides (SENOKOT) 8.6 MG tablet  sennosides (SENOKOT) 8.6 MG tablet        ALLERGIES:  Allergies   Allergen Reactions    Tizanidine Hcl Other (See Comments)     Blood pressure tanked     Amitriptyline Hcl [Amitriptyline] Unknown    Erythromycin Diarrhea     Childhood reaction    Gabapentin Swelling     And jerking movements    Gramineae Pollens Unknown    Naproxen Unknown    Other Environmental Allergy Unknown     Molds and pet dander       FAMILY HISTORY:  Family History   Problem Relation Age of Onset    Atrial fibrillation Mother     Parkinsonism Father        SOCIAL HISTORY:   Social History     Socioeconomic History    Marital status:    Tobacco Use    Smoking status: Former     Current packs/day: 0.00     Types: Cigarettes     Quit date: 1980     Years since quittin.5    Smokeless tobacco: Never   Substance and Sexual Activity    Alcohol use: No    Drug use: No     Social Drivers of Health     Financial Resource Strain: Low Risk  (2025)    Financial Resource Strain     Within the past 12 months, have you or your family members you live with been unable to get utilities (heat, electricity) when it was really needed?: No   Food Insecurity: Low Risk  (2025)    Food Insecurity     Within the past 12 months, did you worry that your food would run out before you got money to buy more?: No     Within the past 12 months, did the food you bought just not last and you didn t have money to get more?: No   Transportation Needs: Low Risk  (2025)    Transportation Needs     Within the past 12 months, has lack of transportation kept you from medical appointments, getting your medicines, non-medical  "meetings or appointments, work, or from getting things that you need?: No    Received from [facility]    Social Connections   Interpersonal Safety: Low Risk  (1/5/2025)    Interpersonal Safety     Do you feel physically and emotionally safe where you currently live?: Yes     Within the past 12 months, have you been hit, slapped, kicked or otherwise physically hurt by someone?: No     Within the past 12 months, have you been humiliated or emotionally abused in other ways by your partner or ex-partner?: No   Housing Stability: Low Risk  (1/4/2025)    Housing Stability     Do you have housing? : Yes     Are you worried about losing your housing?: No       VITALS:  /61   Pulse 77   Temp 97.9  F (36.6  C) (Oral)   Resp 27   Ht 1.549 m (5' 1\")   Wt 65 kg (143 lb 4.8 oz)   SpO2 93%   BMI 27.08 kg/m      PHYSICAL EXAM      Vitals: /61   Pulse 77   Temp 97.9  F (36.6  C) (Oral)   Resp 27   Ht 1.549 m (5' 1\")   Wt 65 kg (143 lb 4.8 oz)   SpO2 93%   BMI 27.08 kg/m    General: Appears in no acute distress, awake, alert, interactive.  Eyes: Conjunctivae non-injected. Sclera anicteric.  HENT: Atraumatic.  Neck: Supple.  Respiratory/Chest: Respiration unlabored.  No wheezing.  No rhonchi  Abdomen: non distended  Musculoskeletal: Hematoma to the left great toe. Swelling to the legs with tenderness. Capillary refill <2 seconds.   Skin: Normal color. No rash or diaphoresis.  Mildly erythematous symmetrically and venous stasis distribution to lower extremities  Neurologic: Face symmetric, moves all extremities spontaneously. Speech clear.  Psychiatric: Oriented to person, place, and time. Affect appropriate.    LAB:  All pertinent labs reviewed and interpreted.  Results for orders placed or performed during the hospital encounter of 07/23/25   Chest XR,  PA & LAT    Impression    IMPRESSION: Heart size upper limits of normal with TAVR in place. Lungs are clear. Tiny bilateral " pleural effusions.   US Lower Extremity Venous Duplex Bilateral    Impression    IMPRESSION:    1.  No deep venous thrombosis in the bilateral lower extremities.    2.  Subcutaneous edema within the bilateral calves.   Extra Blue Top Tube   Result Value Ref Range    Hold Specimen JIC    Extra Red Top Tube   Result Value Ref Range    Hold Specimen JIC    Extra Green Top (Lithium Heparin) Tube   Result Value Ref Range    Hold Specimen JIC    Extra Purple Top Tube   Result Value Ref Range    Hold Specimen JIC    Basic Metabolic Panel (Limited Occurrences)   Result Value Ref Range    Sodium 140 135 - 145 mmol/L    Potassium 4.0 3.4 - 5.3 mmol/L    Chloride 107 98 - 107 mmol/L    Carbon Dioxide (CO2) 23 22 - 29 mmol/L    Anion Gap 10 7 - 15 mmol/L    Urea Nitrogen 31.6 (H) 8.0 - 23.0 mg/dL    Creatinine 1.34 (H) 0.51 - 0.95 mg/dL    GFR Estimate 37 (L) >60 mL/min/1.73m2    Calcium 8.9 8.8 - 10.4 mg/dL    Glucose 97 70 - 99 mg/dL   NT-proBNP   Result Value Ref Range    NT-proBNP 2,748 (H) 0 - 624 pg/mL   CBC with platelets and differential   Result Value Ref Range    WBC Count 7.8 4.0 - 11.0 10e3/uL    RBC Count 3.47 (L) 3.80 - 5.20 10e6/uL    Hemoglobin 10.0 (L) 11.7 - 15.7 g/dL    Hematocrit 31.8 (L) 35.0 - 47.0 %    MCV 92 78 - 100 fL    MCH 28.8 26.5 - 33.0 pg    MCHC 31.4 (L) 31.5 - 36.5 g/dL    RDW 20.1 (H) 10.0 - 15.0 %    Platelet Count 189 150 - 450 10e3/uL    % Neutrophils 70 %    % Lymphocytes 15 %    % Monocytes 11 %    % Eosinophils 3 %    % Basophils 1 %    % Immature Granulocytes 1 %    NRBCs per 100 WBC 0 <1 /100    Absolute Neutrophils 5.4 1.6 - 8.3 10e3/uL    Absolute Lymphocytes 1.2 0.8 - 5.3 10e3/uL    Absolute Monocytes 0.8 0.0 - 1.3 10e3/uL    Absolute Eosinophils 0.2 0.0 - 0.7 10e3/uL    Absolute Basophils 0.0 0.0 - 0.2 10e3/uL    Absolute Immature Granulocytes 0.1 <=0.4 10e3/uL    Absolute NRBCs 0.0 10e3/uL   Result Value Ref Range    Troponin T, High Sensitivity 68 (H) <=14 ng/L   Result Value Ref  Range    Troponin T, High Sensitivity 57 (H) <=14 ng/L       RADIOLOGY:  Reviewed all pertinent imaging. Please see official radiology report.  Chest XR,  PA & LAT   Final Result   IMPRESSION: Heart size upper limits of normal with TAVR in place. Lungs are clear. Tiny bilateral pleural effusions.      US Lower Extremity Venous Duplex Bilateral   Final Result   IMPRESSION:      1.  No deep venous thrombosis in the bilateral lower extremities.      2.  Subcutaneous edema within the bilateral calves.          EKG:    Performed at: 23-Jul-2025 17:10    Impression: sinus rhythm with 1st degree AV block. Possible left atrial enlargement.    Rate: 71  Rhythm: sinus  Axis: 83  [place] Interval: 220  QRS Interval: 90  QTc Interval: 467  ST Changes: No ST changes.  Comparison: When compared with ECG of 11-May-2025 04:39, no significant change was found.    I have independently reviewed and interpreted the EKG(s) documented above.          I, Mian Miramontes, am serving as a scribe to document services personally performed by Ulysses Cool MD based on my observation and the provider's statements to me. I, Ulysses Cool MD, attest that Mian Miramontes is acting in a scribe capacity, has observed my performance of the services and has documented them in accordance with my direction.    Ulysses Cool MD  [facility] EMERGENCY DEPARTMENT  [facility] [facility] [id number] [phone]      Ulysses Cool MD  07/23/25 9821

## 2025-07-23 NOTE — ED NOTES
Bed: JNED-10  Expected date:   Expected time:   Means of arrival:   Comments:  [place] - pedal edema, CP, SOB

## 2025-07-23 NOTE — ED TRIAGE NOTES
Biba from [facility] home. Increased swelling in legs with some pain. Pt has hx chf.      Triage Assessment (Adult)       Row Name 07/23/25 1527          Triage Assessment    Airway WDL WDL

## 2025-07-24 NOTE — ED NOTES
Called facility to let them know pt was returning via private vehicle and they confirmed knowledge and that I would send discharge instructions and information regarding Sherice's treatment here.   [facility] ENCOUNTER  ENT ESTABLISHED PATIENT NOTE      CHIEF COMPLAINT:  Follow-up (dysfunction of both eustachian tubes right ear pain for several months uc said plugged)       SUBJECTIVE:    Trina Lizarraga is a 17 year old female seen today for evaluation of her ears. Recently she has experienced a plugged type sensation in the right ear. She was in a walk in clinic where she was told that she had an obstructed tube.    All pertinent positive and negative aspects of the review of systems are incorporated above.  Past Medical History  There is no previous medical history on file.    Active Problem List  Patient Active Problem List   Diagnosis   • Family Info   • Painful menstrual periods   • Stress   • Headache       Medications  Current Medications    LORATADINE (CLARITIN) 10 MG TABLET    Take 1 tablet by mouth at bedtime. 0       Allergies  ALLERGIES:  Seasonal    Family History  Family History   Problem Relation Age of Onset   • Diabetes Father    • Hypertension Father    • Hyperlipidemia Father    • Sleep Apnea Father    • Gastrointestinal Father    • Dementia/Alzheimers Paternal Grandfather        Social History  Social History     Tobacco Use   • Smoking status: Never Smoker   • Smokeless tobacco: Never Used   • Tobacco comment: dad does   Substance Use Topics   • Alcohol use: Never     Alcohol/week: 0.0 standard drinks     Frequency: Never   • Drug use: Never       PHYSICAL EXAM:    Vital Signs:  weight is 83 kg. Her blood pressure is 102/76 and her pulse is 83. Her oxygen saturation is 98%.      General/Constitutional:   The patient was alert and oriented ×3. Their mood and affect were appropriate.   Face:   The face was symmetrical without scarring or lesions. Facial nerve function was normal and symmetrical. Skin color and turgor were normal.  Respiration:   Respirations were normal with no evidence of retractions. There was no wheezing stridor nor audible respirations.  Eyes:  The eyes revealed  normal lids and conjunctivae. Extraocular muscle activity appeared normal.   Nose:   The nose externally was unremarkable.  Ears:   The auricles were normal bilaterally. The right ear canal revealed some cerumen which I removed. Once this was accomplished she felt that the plugging resolved. She had a patent T-tube. The left ear canal was normal. She had a left patent present T-tube as well.    ASSESSMENT:    1. Right cerumen accumulation  2. Bilateral patent present T tubes    PLAN:    I will see her back in 6 months for routine tube check or sooner as needed.    Instructions provided as documented in the After Visit Summary.      The patient indicated understanding of the diagnosis and agreed with the plan of care.

## 2025-07-24 NOTE — ED NOTES
Pt transfers out of bed to w/c and helps with getting dressed very successfully and without pain or shortness of breath. Pt able to weight bear without help.

## 2025-07-24 NOTE — DISCHARGE INSTRUCTIONS
I recommmend increasing your Lasix to 60 mg daily.  Please follow-up with your primary care doctor for consideration of venous ultrasound with ABIs.  I did place a referral to lymphedema therapy to help with pain and compressions and wraps.  Please follow up with your cardiologist as well. Return ER for worsening symptoms

## 2025-07-28 LAB
ALBUMIN SERPL BCG-MCNC: 3.5 G/DL (ref 3.5–5.2)
ALP SERPL-CCNC: 63 U/L (ref 40–150)
ALT SERPL W P-5'-P-CCNC: 17 U/L (ref 0–50)
ANION GAP SERPL CALCULATED.3IONS-SCNC: 7 MMOL/L (ref 7–15)
AST SERPL W P-5'-P-CCNC: 27 U/L (ref 0–45)
BILIRUB SERPL-MCNC: 0.3 MG/DL
BUN SERPL-MCNC: 36.1 MG/DL (ref 8–23)
CALCIUM SERPL-MCNC: 8.8 MG/DL (ref 8.8–10.4)
CHLORIDE SERPL-SCNC: 107 MMOL/L (ref 98–107)
CREAT SERPL-MCNC: 1.44 MG/DL (ref 0.51–0.95)
EGFRCR SERPLBLD CKD-EPI 2021: 34 ML/MIN/1.73M2
ERYTHROCYTE [DISTWIDTH] IN BLOOD BY AUTOMATED COUNT: 20.6 % (ref 10–15)
GLUCOSE SERPL-MCNC: 83 MG/DL (ref 70–99)
HCO3 SERPL-SCNC: 25 MMOL/L (ref 22–29)
HCT VFR BLD AUTO: 28.9 % (ref 35–47)
HGB BLD-MCNC: 9.1 G/DL (ref 11.7–15.7)
MAGNESIUM SERPL-MCNC: 2.3 MG/DL (ref 1.7–2.3)
MCH RBC QN AUTO: 29.3 PG (ref 26.5–33)
MCHC RBC AUTO-ENTMCNC: 31.5 G/DL (ref 31.5–36.5)
MCV RBC AUTO: 93 FL (ref 78–100)
PLATELET # BLD AUTO: 186 10E3/UL (ref 150–450)
POTASSIUM SERPL-SCNC: 4.6 MMOL/L (ref 3.4–5.3)
PROT SERPL-MCNC: 6 G/DL (ref 6.4–8.3)
RBC # BLD AUTO: 3.11 10E6/UL (ref 3.8–5.2)
SODIUM SERPL-SCNC: 139 MMOL/L (ref 135–145)
TSH SERPL DL<=0.005 MIU/L-ACNC: 3.2 UIU/ML (ref 0.3–4.2)
VIT B12 SERPL-MCNC: 468 PG/ML (ref 232–1245)
WBC # BLD AUTO: 7.3 10E3/UL (ref 4–11)

## 2025-07-28 PROCEDURE — 82607 VITAMIN B-12: CPT | Mod: ORL | Performed by: INTERNAL MEDICINE

## 2025-07-28 PROCEDURE — 83735 ASSAY OF MAGNESIUM: CPT | Mod: ORL | Performed by: INTERNAL MEDICINE

## 2025-07-28 PROCEDURE — 85027 COMPLETE CBC AUTOMATED: CPT | Mod: ORL | Performed by: INTERNAL MEDICINE

## 2025-07-28 PROCEDURE — 36415 COLL VENOUS BLD VENIPUNCTURE: CPT | Mod: ORL | Performed by: INTERNAL MEDICINE

## 2025-07-28 PROCEDURE — 84443 ASSAY THYROID STIM HORMONE: CPT | Mod: ORL | Performed by: INTERNAL MEDICINE

## 2025-07-28 PROCEDURE — P9604 ONE-WAY ALLOW PRORATED TRIP: HCPCS | Mod: ORL | Performed by: INTERNAL MEDICINE

## 2025-07-28 PROCEDURE — 80053 COMPREHEN METABOLIC PANEL: CPT | Mod: ORL | Performed by: INTERNAL MEDICINE

## 2025-08-06 ENCOUNTER — TRANSFERRED RECORDS (OUTPATIENT)
Dept: HEALTH INFORMATION MANAGEMENT | Facility: CLINIC | Age: OVER 89
End: 2025-08-06
Payer: COMMERCIAL

## 2025-08-08 ENCOUNTER — MEDICAL CORRESPONDENCE (OUTPATIENT)
Dept: HEALTH INFORMATION MANAGEMENT | Facility: CLINIC | Age: OVER 89
End: 2025-08-08
Payer: COMMERCIAL

## 2025-08-21 ENCOUNTER — HOSPITAL ENCOUNTER (INPATIENT)
Facility: HOSPITAL | Age: OVER 89
LOS: 5 days | Discharge: ANOTHER HOSPITAL | DRG: 183 | End: 2025-08-27
Admitting: HOSPITALIST
Payer: COMMERCIAL

## 2025-08-21 ASSESSMENT — COLUMBIA-SUICIDE SEVERITY RATING SCALE - C-SSRS
6. HAVE YOU EVER DONE ANYTHING, STARTED TO DO ANYTHING, OR PREPARED TO DO ANYTHING TO END YOUR LIFE?: NO
1. IN THE PAST MONTH, HAVE YOU WISHED YOU WERE DEAD OR WISHED YOU COULD GO TO SLEEP AND NOT WAKE UP?: NO
2. HAVE YOU ACTUALLY HAD ANY THOUGHTS OF KILLING YOURSELF IN THE PAST MONTH?: NO

## 2025-08-22 ENCOUNTER — APPOINTMENT (OUTPATIENT)
Dept: CT IMAGING | Facility: HOSPITAL | Age: OVER 89
DRG: 183 | End: 2025-08-22
Payer: COMMERCIAL

## 2025-08-22 ENCOUNTER — APPOINTMENT (OUTPATIENT)
Dept: OCCUPATIONAL THERAPY | Facility: HOSPITAL | Age: OVER 89
DRG: 183 | End: 2025-08-22
Attending: HOSPITALIST
Payer: COMMERCIAL

## 2025-08-22 ENCOUNTER — MEDICAL CORRESPONDENCE (OUTPATIENT)
Dept: HEALTH INFORMATION MANAGEMENT | Facility: CLINIC | Age: OVER 89
End: 2025-08-22

## 2025-08-22 ENCOUNTER — APPOINTMENT (OUTPATIENT)
Dept: RADIOLOGY | Facility: HOSPITAL | Age: OVER 89
DRG: 183 | End: 2025-08-22
Payer: COMMERCIAL

## 2025-08-22 ENCOUNTER — APPOINTMENT (OUTPATIENT)
Dept: PHYSICAL THERAPY | Facility: HOSPITAL | Age: OVER 89
DRG: 183 | End: 2025-08-22
Attending: HOSPITALIST
Payer: COMMERCIAL

## 2025-08-22 ENCOUNTER — APPOINTMENT (OUTPATIENT)
Dept: CARDIOLOGY | Facility: HOSPITAL | Age: OVER 89
DRG: 183 | End: 2025-08-22
Attending: HOSPITALIST
Payer: COMMERCIAL

## 2025-08-22 PROBLEM — S22.42XA MULTIPLE FRACTURES OF RIBS, LEFT SIDE, INITIAL ENCOUNTER FOR CLOSED FRACTURE: Status: ACTIVE | Noted: 2025-08-22

## 2025-08-22 ASSESSMENT — ACTIVITIES OF DAILY LIVING (ADL)
ADLS_ACUITY_SCORE: 43
ADLS_ACUITY_SCORE: 58
ADLS_ACUITY_SCORE: 58
ADLS_ACUITY_SCORE: 59
DEPENDENT_IADLS:: CLEANING;COOKING;LAUNDRY;SHOPPING;MEAL PREPARATION;MEDICATION MANAGEMENT;TRANSPORTATION
ADLS_ACUITY_SCORE: 58
ADLS_ACUITY_SCORE: 58
ADLS_ACUITY_SCORE: 64
ADLS_ACUITY_SCORE: 58
ADLS_ACUITY_SCORE: 43
ADLS_ACUITY_SCORE: 63
ADLS_ACUITY_SCORE: 59
ADLS_ACUITY_SCORE: 43
ADLS_ACUITY_SCORE: 60
ADLS_ACUITY_SCORE: 63
ADLS_ACUITY_SCORE: 43
ADLS_ACUITY_SCORE: 59
ADLS_ACUITY_SCORE: 58
ADLS_ACUITY_SCORE: 43
ADLS_ACUITY_SCORE: 60

## 2025-08-23 ENCOUNTER — APPOINTMENT (OUTPATIENT)
Dept: OCCUPATIONAL THERAPY | Facility: HOSPITAL | Age: OVER 89
DRG: 183 | End: 2025-08-23
Payer: COMMERCIAL

## 2025-08-23 ENCOUNTER — APPOINTMENT (OUTPATIENT)
Dept: RADIOLOGY | Facility: HOSPITAL | Age: OVER 89
DRG: 183 | End: 2025-08-23
Attending: HOSPITALIST
Payer: COMMERCIAL

## 2025-08-23 ASSESSMENT — ACTIVITIES OF DAILY LIVING (ADL)
ADLS_ACUITY_SCORE: 47
ADLS_ACUITY_SCORE: 47
ADLS_ACUITY_SCORE: 48
ADLS_ACUITY_SCORE: 47
ADLS_ACUITY_SCORE: 47
ADLS_ACUITY_SCORE: 43
ADLS_ACUITY_SCORE: 47
ADLS_ACUITY_SCORE: 49
ADLS_ACUITY_SCORE: 48
ADLS_ACUITY_SCORE: 43
ADLS_ACUITY_SCORE: 49
ADLS_ACUITY_SCORE: 47
ADLS_ACUITY_SCORE: 43
ADLS_ACUITY_SCORE: 43
ADLS_ACUITY_SCORE: 49
ADLS_ACUITY_SCORE: 47
ADLS_ACUITY_SCORE: 43
ADLS_ACUITY_SCORE: 49
ADLS_ACUITY_SCORE: 47
ADLS_ACUITY_SCORE: 49

## 2025-08-24 ENCOUNTER — APPOINTMENT (OUTPATIENT)
Dept: RADIOLOGY | Facility: HOSPITAL | Age: OVER 89
DRG: 183 | End: 2025-08-24
Attending: INTERNAL MEDICINE
Payer: COMMERCIAL

## 2025-08-24 ENCOUNTER — APPOINTMENT (OUTPATIENT)
Dept: RADIOLOGY | Facility: HOSPITAL | Age: OVER 89
DRG: 183 | End: 2025-08-24
Attending: HOSPITALIST
Payer: COMMERCIAL

## 2025-08-24 ASSESSMENT — ACTIVITIES OF DAILY LIVING (ADL)
ADLS_ACUITY_SCORE: 47
ADLS_ACUITY_SCORE: 48
ADLS_ACUITY_SCORE: 47
ADLS_ACUITY_SCORE: 48
ADLS_ACUITY_SCORE: 47
ADLS_ACUITY_SCORE: 48
ADLS_ACUITY_SCORE: 47
ADLS_ACUITY_SCORE: 48
ADLS_ACUITY_SCORE: 47
ADLS_ACUITY_SCORE: 48
ADLS_ACUITY_SCORE: 47
ADLS_ACUITY_SCORE: 47
ADLS_ACUITY_SCORE: 48
ADLS_ACUITY_SCORE: 47
ADLS_ACUITY_SCORE: 48
ADLS_ACUITY_SCORE: 47
ADLS_ACUITY_SCORE: 47

## 2025-08-25 ENCOUNTER — APPOINTMENT (OUTPATIENT)
Dept: RADIOLOGY | Facility: HOSPITAL | Age: OVER 89
DRG: 183 | End: 2025-08-25
Attending: INTERNAL MEDICINE
Payer: COMMERCIAL

## 2025-08-25 ENCOUNTER — DOCUMENTATION ONLY (OUTPATIENT)
Dept: OTHER | Facility: CLINIC | Age: OVER 89
End: 2025-08-25
Payer: COMMERCIAL

## 2025-08-25 ASSESSMENT — ACTIVITIES OF DAILY LIVING (ADL)
ADLS_ACUITY_SCORE: 48
ADLS_ACUITY_SCORE: 47
ADLS_ACUITY_SCORE: 48
ADLS_ACUITY_SCORE: 47
ADLS_ACUITY_SCORE: 47
ADLS_ACUITY_SCORE: 48

## 2025-08-26 ENCOUNTER — APPOINTMENT (OUTPATIENT)
Dept: CT IMAGING | Facility: HOSPITAL | Age: OVER 89
DRG: 183 | End: 2025-08-26
Payer: COMMERCIAL

## 2025-08-26 ENCOUNTER — APPOINTMENT (OUTPATIENT)
Dept: INTERVENTIONAL RADIOLOGY/VASCULAR | Facility: HOSPITAL | Age: OVER 89
DRG: 183 | End: 2025-08-26
Payer: COMMERCIAL

## 2025-08-26 ASSESSMENT — ACTIVITIES OF DAILY LIVING (ADL)
ADLS_ACUITY_SCORE: 51
ADLS_ACUITY_SCORE: 47

## 2025-08-27 ENCOUNTER — HOSPITAL ENCOUNTER (INPATIENT)
Facility: CLINIC | Age: OVER 89
End: 2025-08-27
Attending: INTERNAL MEDICINE | Admitting: INTERNAL MEDICINE
Payer: COMMERCIAL

## 2025-08-27 ENCOUNTER — APPOINTMENT (OUTPATIENT)
Dept: RADIOLOGY | Facility: HOSPITAL | Age: OVER 89
DRG: 183 | End: 2025-08-27
Attending: INTERNAL MEDICINE
Payer: COMMERCIAL

## 2025-08-27 PROBLEM — R35.89 POLYURIA: Status: RESOLVED | Noted: 2023-05-19 | Resolved: 2025-08-27

## 2025-08-27 PROBLEM — J94.2 HEMOTHORAX ON LEFT: Status: ACTIVE | Noted: 2025-08-27

## 2025-08-27 PROCEDURE — 71045 X-RAY EXAM CHEST 1 VIEW: CPT

## 2025-08-27 ASSESSMENT — ACTIVITIES OF DAILY LIVING (ADL)
ADLS_ACUITY_SCORE: 51
ADLS_ACUITY_SCORE: 47
ADLS_ACUITY_SCORE: 58
ADLS_ACUITY_SCORE: 51
ADLS_ACUITY_SCORE: 47
ADLS_ACUITY_SCORE: 51
ADLS_ACUITY_SCORE: 61
ADLS_ACUITY_SCORE: 47
ADLS_ACUITY_SCORE: 51
ADLS_ACUITY_SCORE: 51
ADLS_ACUITY_SCORE: 47
ADLS_ACUITY_SCORE: 51
ADLS_ACUITY_SCORE: 51
ADLS_ACUITY_SCORE: 58
ADLS_ACUITY_SCORE: 47
ADLS_ACUITY_SCORE: 47
ADLS_ACUITY_SCORE: 51
ADLS_ACUITY_SCORE: 61

## 2025-08-28 ENCOUNTER — APPOINTMENT (OUTPATIENT)
Dept: CT IMAGING | Facility: CLINIC | Age: OVER 89
End: 2025-08-28
Payer: COMMERCIAL

## 2025-08-28 ENCOUNTER — APPOINTMENT (OUTPATIENT)
Dept: GENERAL RADIOLOGY | Facility: CLINIC | Age: OVER 89
End: 2025-08-28
Payer: COMMERCIAL

## 2025-08-28 ENCOUNTER — ANESTHESIA EVENT (OUTPATIENT)
Dept: SURGERY | Facility: CLINIC | Age: OVER 89
End: 2025-08-28
Payer: COMMERCIAL

## 2025-08-28 ENCOUNTER — ANESTHESIA (OUTPATIENT)
Dept: SURGERY | Facility: CLINIC | Age: OVER 89
End: 2025-08-28
Payer: COMMERCIAL

## 2025-08-28 PROCEDURE — 71250 CT THORAX DX C-: CPT | Mod: 26 | Performed by: RADIOLOGY

## 2025-08-28 PROCEDURE — 71250 CT THORAX DX C-: CPT

## 2025-08-28 PROCEDURE — 71045 X-RAY EXAM CHEST 1 VIEW: CPT | Mod: 26 | Performed by: STUDENT IN AN ORGANIZED HEALTH CARE EDUCATION/TRAINING PROGRAM

## 2025-08-28 PROCEDURE — 250N000009 HC RX 250: Performed by: ANESTHESIOLOGY

## 2025-08-28 PROCEDURE — 70450 CT HEAD/BRAIN W/O DYE: CPT

## 2025-08-28 PROCEDURE — 71045 X-RAY EXAM CHEST 1 VIEW: CPT

## 2025-08-28 PROCEDURE — 70450 CT HEAD/BRAIN W/O DYE: CPT | Mod: 26 | Performed by: RADIOLOGY

## 2025-08-28 RX ORDER — BUPIVACAINE HCL/EPINEPHRINE 0.25-.0005
VIAL (ML) INJECTION
Status: COMPLETED | OUTPATIENT
Start: 2025-08-28 | End: 2025-08-28

## 2025-08-28 RX ADMIN — BUPIVACAINE HYDROCHLORIDE AND EPINEPHRINE BITARTRATE 10 ML: 2.5; .005 INJECTION, SOLUTION INFILTRATION; PERINEURAL at 12:25

## 2025-08-28 ASSESSMENT — ACTIVITIES OF DAILY LIVING (ADL)
ADLS_ACUITY_SCORE: 61

## 2025-08-28 ASSESSMENT — LIFESTYLE VARIABLES: TOBACCO_USE: 1

## 2025-08-29 ENCOUNTER — APPOINTMENT (OUTPATIENT)
Dept: GENERAL RADIOLOGY | Facility: CLINIC | Age: OVER 89
End: 2025-08-29
Attending: INTERNAL MEDICINE
Payer: COMMERCIAL

## 2025-08-29 PROCEDURE — 999N000065 XR CHEST PORT 1 VIEW

## 2025-08-29 PROCEDURE — 71045 X-RAY EXAM CHEST 1 VIEW: CPT | Mod: 26 | Performed by: STUDENT IN AN ORGANIZED HEALTH CARE EDUCATION/TRAINING PROGRAM

## 2025-08-29 PROCEDURE — 93005 ELECTROCARDIOGRAM TRACING: CPT

## 2025-08-29 ASSESSMENT — ACTIVITIES OF DAILY LIVING (ADL)
ADLS_ACUITY_SCORE: 61
DEPENDENT_IADLS:: CLEANING;COOKING;LAUNDRY;SHOPPING;MEAL PREPARATION;MEDICATION MANAGEMENT;TRANSPORTATION
ADLS_ACUITY_SCORE: 61

## 2025-08-30 ENCOUNTER — APPOINTMENT (OUTPATIENT)
Dept: GENERAL RADIOLOGY | Facility: CLINIC | Age: OVER 89
End: 2025-08-30
Attending: INTERNAL MEDICINE
Payer: COMMERCIAL

## 2025-08-30 PROCEDURE — 71045 X-RAY EXAM CHEST 1 VIEW: CPT

## 2025-08-30 PROCEDURE — 71045 X-RAY EXAM CHEST 1 VIEW: CPT | Mod: 26 | Performed by: RADIOLOGY

## 2025-08-30 ASSESSMENT — ACTIVITIES OF DAILY LIVING (ADL)
ADLS_ACUITY_SCORE: 73
ADLS_ACUITY_SCORE: 63
ADLS_ACUITY_SCORE: 73
ADLS_ACUITY_SCORE: 73
ADLS_ACUITY_SCORE: 63
ADLS_ACUITY_SCORE: 73
ADLS_ACUITY_SCORE: 73
ADLS_ACUITY_SCORE: 68
ADLS_ACUITY_SCORE: 73
ADLS_ACUITY_SCORE: 73
ADLS_ACUITY_SCORE: 63
ADLS_ACUITY_SCORE: 63
ADLS_ACUITY_SCORE: 73
ADLS_ACUITY_SCORE: 63
ADLS_ACUITY_SCORE: 73
ADLS_ACUITY_SCORE: 63
ADLS_ACUITY_SCORE: 61
ADLS_ACUITY_SCORE: 73

## 2025-08-31 ENCOUNTER — APPOINTMENT (OUTPATIENT)
Dept: GENERAL RADIOLOGY | Facility: CLINIC | Age: OVER 89
End: 2025-08-31
Attending: INTERNAL MEDICINE
Payer: COMMERCIAL

## 2025-08-31 PROCEDURE — 71045 X-RAY EXAM CHEST 1 VIEW: CPT | Mod: 26 | Performed by: RADIOLOGY

## 2025-08-31 PROCEDURE — 71045 X-RAY EXAM CHEST 1 VIEW: CPT

## 2025-08-31 ASSESSMENT — ACTIVITIES OF DAILY LIVING (ADL)
ADLS_ACUITY_SCORE: 75
ADLS_ACUITY_SCORE: 73
ADLS_ACUITY_SCORE: 73
ADLS_ACUITY_SCORE: 75
ADLS_ACUITY_SCORE: 73
ADLS_ACUITY_SCORE: 75
ADLS_ACUITY_SCORE: 73
ADLS_ACUITY_SCORE: 75
ADLS_ACUITY_SCORE: 73
ADLS_ACUITY_SCORE: 75
ADLS_ACUITY_SCORE: 73
ADLS_ACUITY_SCORE: 75
ADLS_ACUITY_SCORE: 73

## 2025-09-01 ENCOUNTER — APPOINTMENT (OUTPATIENT)
Dept: GENERAL RADIOLOGY | Facility: CLINIC | Age: OVER 89
End: 2025-09-01
Attending: STUDENT IN AN ORGANIZED HEALTH CARE EDUCATION/TRAINING PROGRAM
Payer: COMMERCIAL

## 2025-09-01 ENCOUNTER — ANESTHESIA (OUTPATIENT)
Dept: SURGERY | Facility: CLINIC | Age: OVER 89
End: 2025-09-01
Payer: COMMERCIAL

## 2025-09-01 ENCOUNTER — ANESTHESIA EVENT (OUTPATIENT)
Dept: SURGERY | Facility: CLINIC | Age: OVER 89
End: 2025-09-01
Payer: COMMERCIAL

## 2025-09-01 PROCEDURE — 71045 X-RAY EXAM CHEST 1 VIEW: CPT

## 2025-09-01 ASSESSMENT — ACTIVITIES OF DAILY LIVING (ADL)
ADLS_ACUITY_SCORE: 59
ADLS_ACUITY_SCORE: 75
ADLS_ACUITY_SCORE: 63
ADLS_ACUITY_SCORE: 63
ADLS_ACUITY_SCORE: 75
ADLS_ACUITY_SCORE: 59
ADLS_ACUITY_SCORE: 59
ADLS_ACUITY_SCORE: 75
ADLS_ACUITY_SCORE: 59
ADLS_ACUITY_SCORE: 75
ADLS_ACUITY_SCORE: 63
ADLS_ACUITY_SCORE: 59
ADLS_ACUITY_SCORE: 63
ADLS_ACUITY_SCORE: 75
ADLS_ACUITY_SCORE: 75
ADLS_ACUITY_SCORE: 63
ADLS_ACUITY_SCORE: 75
ADLS_ACUITY_SCORE: 63
ADLS_ACUITY_SCORE: 59

## 2025-09-01 ASSESSMENT — LIFESTYLE VARIABLES: TOBACCO_USE: 1

## 2025-09-01 ASSESSMENT — ENCOUNTER SYMPTOMS: DYSRHYTHMIAS: 1

## 2025-09-02 ENCOUNTER — APPOINTMENT (OUTPATIENT)
Dept: CT IMAGING | Facility: CLINIC | Age: OVER 89
End: 2025-09-02
Attending: PHYSICIAN ASSISTANT
Payer: COMMERCIAL

## 2025-09-02 ENCOUNTER — APPOINTMENT (OUTPATIENT)
Dept: GENERAL RADIOLOGY | Facility: CLINIC | Age: OVER 89
End: 2025-09-02
Attending: PHYSICIAN ASSISTANT
Payer: COMMERCIAL

## 2025-09-02 PROCEDURE — 74174 CTA ABD&PLVS W/CONTRAST: CPT | Mod: 26 | Performed by: RADIOLOGY

## 2025-09-02 PROCEDURE — 71045 X-RAY EXAM CHEST 1 VIEW: CPT

## 2025-09-02 PROCEDURE — 71275 CT ANGIOGRAPHY CHEST: CPT

## 2025-09-02 PROCEDURE — 71275 CT ANGIOGRAPHY CHEST: CPT | Mod: 26 | Performed by: RADIOLOGY

## 2025-09-02 ASSESSMENT — ACTIVITIES OF DAILY LIVING (ADL)
ADLS_ACUITY_SCORE: 71
ADLS_ACUITY_SCORE: 67
ADLS_ACUITY_SCORE: 71
ADLS_ACUITY_SCORE: 71
ADLS_ACUITY_SCORE: 67
ADLS_ACUITY_SCORE: 71
ADLS_ACUITY_SCORE: 67
ADLS_ACUITY_SCORE: 71
ADLS_ACUITY_SCORE: 67
ADLS_ACUITY_SCORE: 71
ADLS_ACUITY_SCORE: 67
ADLS_ACUITY_SCORE: 67
ADLS_ACUITY_SCORE: 71

## 2025-09-03 ENCOUNTER — ANESTHESIA EVENT (OUTPATIENT)
Dept: SURGERY | Facility: CLINIC | Age: OVER 89
End: 2025-09-03
Payer: COMMERCIAL

## 2025-09-03 ENCOUNTER — ANESTHESIA (OUTPATIENT)
Dept: SURGERY | Facility: CLINIC | Age: OVER 89
End: 2025-09-03
Payer: COMMERCIAL

## 2025-09-03 PROCEDURE — 250N000009 HC RX 250: Performed by: STUDENT IN AN ORGANIZED HEALTH CARE EDUCATION/TRAINING PROGRAM

## 2025-09-03 PROCEDURE — 250N000011 HC RX IP 250 OP 636

## 2025-09-03 PROCEDURE — 250N000009 HC RX 250

## 2025-09-03 PROCEDURE — 250N000009 HC RX 250: Performed by: HOSPITALIST

## 2025-09-03 PROCEDURE — 258N000003 HC RX IP 258 OP 636: Performed by: HOSPITALIST

## 2025-09-03 PROCEDURE — 258N000003 HC RX IP 258 OP 636

## 2025-09-03 PROCEDURE — 250N000011 HC RX IP 250 OP 636: Performed by: HOSPITALIST

## 2025-09-03 RX ORDER — ONDANSETRON 2 MG/ML
INJECTION INTRAMUSCULAR; INTRAVENOUS PRN
Status: DISCONTINUED | OUTPATIENT
Start: 2025-09-03 | End: 2025-09-03

## 2025-09-03 RX ORDER — FENTANYL CITRATE 50 UG/ML
INJECTION, SOLUTION INTRAMUSCULAR; INTRAVENOUS PRN
Status: DISCONTINUED | OUTPATIENT
Start: 2025-09-03 | End: 2025-09-03

## 2025-09-03 RX ORDER — LIDOCAINE HYDROCHLORIDE 10 MG/ML
INJECTION, SOLUTION INFILTRATION; PERINEURAL
Status: COMPLETED | OUTPATIENT
Start: 2025-09-03 | End: 2025-09-03

## 2025-09-03 RX ORDER — DEXAMETHASONE SODIUM PHOSPHATE 4 MG/ML
INJECTION, SOLUTION INTRA-ARTICULAR; INTRALESIONAL; INTRAMUSCULAR; INTRAVENOUS; SOFT TISSUE PRN
Status: DISCONTINUED | OUTPATIENT
Start: 2025-09-03 | End: 2025-09-03

## 2025-09-03 RX ORDER — PROPOFOL 10 MG/ML
INJECTION, EMULSION INTRAVENOUS PRN
Status: DISCONTINUED | OUTPATIENT
Start: 2025-09-03 | End: 2025-09-03

## 2025-09-03 RX ORDER — VASOPRESSIN IN 0.9 % NACL 2 UNIT/2ML
SYRINGE (ML) INTRAVENOUS PRN
Status: DISCONTINUED | OUTPATIENT
Start: 2025-09-03 | End: 2025-09-03

## 2025-09-03 RX ORDER — NITROGLYCERIN 10 MG/100ML
INJECTION INTRAVENOUS PRN
Status: DISCONTINUED | OUTPATIENT
Start: 2025-09-03 | End: 2025-09-03

## 2025-09-03 RX ORDER — LIDOCAINE HYDROCHLORIDE 20 MG/ML
INJECTION, SOLUTION INFILTRATION; PERINEURAL PRN
Status: DISCONTINUED | OUTPATIENT
Start: 2025-09-03 | End: 2025-09-03

## 2025-09-03 RX ORDER — PROPOFOL 10 MG/ML
INJECTION, EMULSION INTRAVENOUS CONTINUOUS PRN
Status: DISCONTINUED | OUTPATIENT
Start: 2025-09-03 | End: 2025-09-03

## 2025-09-03 RX ADMIN — SUCCINYLCHOLINE CHLORIDE 100 MG: 20 INJECTION, SOLUTION INTRAMUSCULAR; INTRAVENOUS; PARENTERAL at 15:16

## 2025-09-03 RX ADMIN — PHENYLEPHRINE HYDROCHLORIDE 100 MCG: 10 INJECTION INTRAVENOUS at 14:19

## 2025-09-03 RX ADMIN — NOREPINEPHRINE BITARTRATE 6.4 MCG: 1 INJECTION INTRAVENOUS at 15:19

## 2025-09-03 RX ADMIN — Medication 8 ML: at 14:30

## 2025-09-03 RX ADMIN — NOREPINEPHRINE BITARTRATE 6.4 MCG: 1 INJECTION INTRAVENOUS at 15:25

## 2025-09-03 RX ADMIN — FENTANYL CITRATE 50 MCG: 50 INJECTION INTRAMUSCULAR; INTRAVENOUS at 13:24

## 2025-09-03 RX ADMIN — NOREPINEPHRINE BITARTRATE 6.4 MCG: 1 INJECTION INTRAVENOUS at 15:22

## 2025-09-03 RX ADMIN — NOREPINEPHRINE BITARTRATE 3.2 MCG: 1 INJECTION INTRAVENOUS at 12:27

## 2025-09-03 RX ADMIN — Medication 0.5 UNITS: at 15:19

## 2025-09-03 RX ADMIN — PROPOFOL 20 MG: 10 INJECTION, EMULSION INTRAVENOUS at 12:20

## 2025-09-03 RX ADMIN — Medication 8 ML: at 12:30

## 2025-09-03 RX ADMIN — PROPOFOL 20 MG: 10 INJECTION, EMULSION INTRAVENOUS at 12:12

## 2025-09-03 RX ADMIN — Medication 100 MG: at 16:41

## 2025-09-03 RX ADMIN — NOREPINEPHRINE BITARTRATE 6.4 MCG: 1 INJECTION INTRAVENOUS at 15:15

## 2025-09-03 RX ADMIN — LIDOCAINE HYDROCHLORIDE 60 MG: 20 INJECTION, SOLUTION INFILTRATION; PERINEURAL at 12:07

## 2025-09-03 RX ADMIN — Medication 8 ML: at 13:30

## 2025-09-03 RX ADMIN — LIDOCAINE HYDROCHLORIDE 1 ML: 10 INJECTION, SOLUTION INFILTRATION; PERINEURAL at 11:55

## 2025-09-03 RX ADMIN — PHENYLEPHRINE HYDROCHLORIDE 100 MCG: 10 INJECTION INTRAVENOUS at 15:19

## 2025-09-03 RX ADMIN — PROPOFOL 30 MG: 10 INJECTION, EMULSION INTRAVENOUS at 13:17

## 2025-09-03 RX ADMIN — PROPOFOL 80 MG: 10 INJECTION, EMULSION INTRAVENOUS at 12:07

## 2025-09-03 RX ADMIN — Medication 25 MG: at 15:22

## 2025-09-03 RX ADMIN — NOREPINEPHRINE BITARTRATE 6.4 MCG: 1 INJECTION INTRAVENOUS at 12:09

## 2025-09-03 RX ADMIN — NOREPINEPHRINE BITARTRATE 6.4 MCG: 1 INJECTION INTRAVENOUS at 14:37

## 2025-09-03 RX ADMIN — PROPOFOL 30 MG: 10 INJECTION, EMULSION INTRAVENOUS at 12:38

## 2025-09-03 RX ADMIN — NITROGLYCERIN 50 MCG: 10 INJECTION INTRAVENOUS at 13:28

## 2025-09-03 RX ADMIN — FENTANYL CITRATE 50 MCG: 50 INJECTION INTRAMUSCULAR; INTRAVENOUS at 13:17

## 2025-09-03 RX ADMIN — PHENYLEPHRINE HYDROCHLORIDE 100 MCG: 10 INJECTION INTRAVENOUS at 13:34

## 2025-09-03 RX ADMIN — FENTANYL CITRATE 50 MCG: 50 INJECTION INTRAMUSCULAR; INTRAVENOUS at 11:58

## 2025-09-03 RX ADMIN — PHENYLEPHRINE HYDROCHLORIDE 50 MCG: 10 INJECTION INTRAVENOUS at 13:32

## 2025-09-03 RX ADMIN — DEXAMETHASONE SODIUM PHOSPHATE 4 MG: 4 INJECTION, SOLUTION INTRAMUSCULAR; INTRAVENOUS at 13:25

## 2025-09-03 RX ADMIN — Medication 5 MG: at 15:15

## 2025-09-03 RX ADMIN — NOREPINEPHRINE BITARTRATE 0.04 MCG/KG/MIN: 1 INJECTION INTRAVENOUS at 13:54

## 2025-09-03 RX ADMIN — Medication 50 MG: at 12:09

## 2025-09-03 RX ADMIN — Medication 20 MG: at 13:17

## 2025-09-03 RX ADMIN — FENTANYL CITRATE 50 MCG: 50 INJECTION INTRAMUSCULAR; INTRAVENOUS at 12:06

## 2025-09-03 RX ADMIN — PHENYLEPHRINE HYDROCHLORIDE 100 MCG: 10 INJECTION INTRAVENOUS at 13:44

## 2025-09-03 RX ADMIN — PHENYLEPHRINE HYDROCHLORIDE 100 MCG: 10 INJECTION INTRAVENOUS at 13:50

## 2025-09-03 RX ADMIN — Medication 8 ML: at 15:30

## 2025-09-03 RX ADMIN — Medication 100 MG: at 14:52

## 2025-09-03 RX ADMIN — ONDANSETRON 4 MG: 2 INJECTION INTRAMUSCULAR; INTRAVENOUS at 14:52

## 2025-09-03 RX ADMIN — Medication 100 MG: at 16:47

## 2025-09-03 RX ADMIN — Medication 0.5 UNITS: at 15:26

## 2025-09-03 RX ADMIN — PROPOFOL 80 MG: 10 INJECTION, EMULSION INTRAVENOUS at 15:15

## 2025-09-03 RX ADMIN — PROPOFOL 50 MCG/KG/MIN: 10 INJECTION, EMULSION INTRAVENOUS at 15:18

## 2025-09-03 RX ADMIN — PHENYLEPHRINE HYDROCHLORIDE 100 MCG: 10 INJECTION INTRAVENOUS at 13:53

## 2025-09-03 RX ADMIN — CEFTRIAXONE SODIUM 2 G: 2 INJECTION, POWDER, FOR SOLUTION INTRAMUSCULAR; INTRAVENOUS at 14:18

## 2025-09-03 ASSESSMENT — ACTIVITIES OF DAILY LIVING (ADL)
ADLS_ACUITY_SCORE: 67
ADLS_ACUITY_SCORE: 71
ADLS_ACUITY_SCORE: 67
ADLS_ACUITY_SCORE: 71
ADLS_ACUITY_SCORE: 67
ADLS_ACUITY_SCORE: 71
ADLS_ACUITY_SCORE: 67
ADLS_ACUITY_SCORE: 71
ADLS_ACUITY_SCORE: 67
ADLS_ACUITY_SCORE: 71

## 2025-09-03 ASSESSMENT — ENCOUNTER SYMPTOMS: DYSRHYTHMIAS: 1

## 2025-09-03 ASSESSMENT — LIFESTYLE VARIABLES: TOBACCO_USE: 1

## 2025-09-04 ASSESSMENT — ACTIVITIES OF DAILY LIVING (ADL)
ADLS_ACUITY_SCORE: 70
ADLS_ACUITY_SCORE: 66
ADLS_ACUITY_SCORE: 70
ADLS_ACUITY_SCORE: 66
ADLS_ACUITY_SCORE: 58
ADLS_ACUITY_SCORE: 66
ADLS_ACUITY_SCORE: 70
ADLS_ACUITY_SCORE: 70
ADLS_ACUITY_SCORE: 66
ADLS_ACUITY_SCORE: 70
ADLS_ACUITY_SCORE: 58
ADLS_ACUITY_SCORE: 58
ADLS_ACUITY_SCORE: 66
ADLS_ACUITY_SCORE: 70
ADLS_ACUITY_SCORE: 66
ADLS_ACUITY_SCORE: 70
ADLS_ACUITY_SCORE: 70
ADLS_ACUITY_SCORE: 58
ADLS_ACUITY_SCORE: 70
ADLS_ACUITY_SCORE: 70
ADLS_ACUITY_SCORE: 66

## (undated) DEVICE — DRAPE IOBAN ISOLATION VERTICAL 320X21CM 6617

## (undated) DEVICE — DRESSING PRIMAPORE 4 X 3-1/8 66000317

## (undated) DEVICE — ELECTRODE PATIENT RETURN ADULT L10 FT 2 PLATE CORD 0855C

## (undated) DEVICE — ESU PENCIL SMOKE EVAC W/ROCKER SWITCH 0703-047-000

## (undated) DEVICE — CEMENT PRESSURIZER FEMORAL CANAL SM

## (undated) DEVICE — PREP CHLORAPREP 26ML TINTED HI-LITE ORANGE 930815

## (undated) DEVICE — SUTURE VICRYL+ 0 27IN CT-1 UND VCP260H

## (undated) DEVICE — SU ETHIBOND 5 V-37 4X30" MB66G

## (undated) DEVICE — SUTURE MONOCRYL 3-0 18 PS2 UND MCP497G

## (undated) DEVICE — SOL NACL 0.9% IRRIG 1000ML BOTTLE 2F7124

## (undated) DEVICE — DRILL BIT QUICK COUPLING 3 FLUTE 4.2MMX145MM NDL  POINT

## (undated) DEVICE — DRAPE C-ARMOR 5 SIDED 5523

## (undated) DEVICE — ESU ELEC BLADE 6" COATED E1450-6

## (undated) DEVICE — SU DERMABOND ADVANCED .7ML DNX12

## (undated) DEVICE — CUSTOM PACK GEN MAJOR SBA5BGMHEA

## (undated) DEVICE — SU STRATAFIX PDS PLUS 1 CT-1 18" SXPP1A404

## (undated) DEVICE — HOOD SURG T7PLUS PEEL AWAY FACE SHIELD STRL LF 0416-801-100

## (undated) DEVICE — SOL WATER IRRIG 1000ML BOTTLE 2F7114

## (undated) DEVICE — CUSTOM PACK TOTAL HIP SOP5BTHHEA

## (undated) DEVICE — SUCTION MANIFOLD NEPTUNE 2 SYS 1 PORT 702-025-000

## (undated) DEVICE — WIRE GUIDE 3.2X400MM  357.399

## (undated) DEVICE — GLOVE BIOGEL PI INDICATOR 8.0 LF 41680

## (undated) DEVICE — ROD RM 950MM 3MM 3.8MM BALL TIP STRL

## (undated) DEVICE — DRSG PRIMAPORE 03 1/8X6" 66000318

## (undated) DEVICE — GLOVE BIOGEL PI SZ 8.5 40885

## (undated) DEVICE — HOLDER LIMB VELCRO OR 0814-1533

## (undated) DEVICE — DRAPE SHEET REV FOLD 3/4 9349

## (undated) DEVICE — GLOVE UNDER INDICATOR PI SZ 8.5 LF 41685

## (undated) DEVICE — SUCTION MANIFOLD NEPTUNE 2 SYS 4 PORT 0702-020-000

## (undated) DEVICE — BONE CLEANING TIP INTERPULSE  0210-010-000

## (undated) DEVICE — SUCTION TIP FLEXI CLEAR TIP DISP K62

## (undated) DEVICE — BRUSH FEMORAL CANAL 210-4 0210004000

## (undated) DEVICE — GLOVE BIOGEL PI SZ 8.0 40880

## (undated) DEVICE — Device

## (undated) DEVICE — GOWN IMPERVIOUS BREATHABLE SMART XLG 89045

## (undated) DEVICE — SUTURE VICRYL+ 2-0 27IN CT-1 UND VCP259H

## (undated) DEVICE — DRILL BIT CANNULATED 16MM HOLLOW STER 03.037.004S

## (undated) DEVICE — BLADE SAGITTAL WIDE (SO-618) 2108-118

## (undated) DEVICE — MAT FLOOR SURGICAL 40X38 0702140238

## (undated) DEVICE — ESU GROUND PAD ADULT REM W/15' CORD E7507DB

## (undated) DEVICE — SUCTION IRR SYSTEM W/O TIP INTERPULSE HANDPIECE 0210-100-000

## (undated) DEVICE — SUCTION SLEEVE NEPTUNE 2 165MM 0703-005-165

## (undated) DEVICE — DRAPE IOBAN INCISE 23X17" 6650EZ

## (undated) DEVICE — VIAL DECANTER STERILE WHITE DYNJDEC06

## (undated) DEVICE — KIT PATIENT CARE HANA TABLE PROFX SUPINE 6855

## (undated) DEVICE — PAD HIP POSITIONING MCGUIRE 707-CPM

## (undated) DEVICE — DRAPE C-ARM 60X42" 1013

## (undated) DEVICE — SOL NACL 0.9% INJ 1000ML BAG 2B1324X

## (undated) DEVICE — DRSG FOAM MEPILEX BORDER SILVER 4X10 POSTOP 498450

## (undated) DEVICE — SU STRATAFIX PDS PLUS 2-0 SPIRAL CT-1 30CM SXPP1B410

## (undated) RX ORDER — PROPOFOL 10 MG/ML
INJECTION, EMULSION INTRAVENOUS
Status: DISPENSED
Start: 2025-01-02

## (undated) RX ORDER — ONDANSETRON 2 MG/ML
INJECTION INTRAMUSCULAR; INTRAVENOUS
Status: DISPENSED
Start: 2025-01-02

## (undated) RX ORDER — PHENYLEPHRINE HYDROCHLORIDE 10 MG/ML
INJECTION INTRAVENOUS
Status: DISPENSED
Start: 2024-02-10

## (undated) RX ORDER — VASOPRESSIN 20 U/ML
INJECTION PARENTERAL
Status: DISPENSED
Start: 2024-02-10

## (undated) RX ORDER — DEXAMETHASONE SODIUM PHOSPHATE 4 MG/ML
INJECTION, SOLUTION INTRA-ARTICULAR; INTRALESIONAL; INTRAMUSCULAR; INTRAVENOUS; SOFT TISSUE
Status: DISPENSED
Start: 2025-01-02

## (undated) RX ORDER — LIDOCAINE HYDROCHLORIDE 10 MG/ML
INJECTION, SOLUTION EPIDURAL; INFILTRATION; INTRACAUDAL; PERINEURAL
Status: DISPENSED
Start: 2025-01-02

## (undated) RX ORDER — FENTANYL CITRATE-0.9 % NACL/PF 10 MCG/ML
PLASTIC BAG, INJECTION (ML) INTRAVENOUS
Status: DISPENSED
Start: 2025-01-02